# Patient Record
Sex: FEMALE | Race: WHITE | Employment: OTHER | URBAN - NONMETROPOLITAN AREA
[De-identification: names, ages, dates, MRNs, and addresses within clinical notes are randomized per-mention and may not be internally consistent; named-entity substitution may affect disease eponyms.]

---

## 2017-01-03 ENCOUNTER — OFFICE VISIT (OUTPATIENT)
Dept: FAMILY MEDICINE CLINIC | Age: 82
End: 2017-01-03

## 2017-01-03 ENCOUNTER — TELEPHONE (OUTPATIENT)
Dept: FAMILY MEDICINE CLINIC | Age: 82
End: 2017-01-03

## 2017-01-03 VITALS
HEART RATE: 64 BPM | SYSTOLIC BLOOD PRESSURE: 126 MMHG | TEMPERATURE: 97.6 F | WEIGHT: 142.8 LBS | RESPIRATION RATE: 14 BRPM | HEIGHT: 60 IN | DIASTOLIC BLOOD PRESSURE: 68 MMHG | BODY MASS INDEX: 28.03 KG/M2

## 2017-01-03 DIAGNOSIS — M25.562 ACUTE PAIN OF LEFT KNEE: Primary | ICD-10-CM

## 2017-01-03 DIAGNOSIS — M81.0 OSTEOPOROSIS: ICD-10-CM

## 2017-01-03 DIAGNOSIS — Z23 NEED FOR INFLUENZA VACCINATION: ICD-10-CM

## 2017-01-03 PROCEDURE — 90688 IIV4 VACCINE SPLT 0.5 ML IM: CPT | Performed by: FAMILY MEDICINE

## 2017-01-03 PROCEDURE — 99213 OFFICE O/P EST LOW 20 MIN: CPT | Performed by: FAMILY MEDICINE

## 2017-01-03 PROCEDURE — G0008 ADMIN INFLUENZA VIRUS VAC: HCPCS | Performed by: FAMILY MEDICINE

## 2017-01-03 RX ORDER — ZOLEDRONIC ACID 5 MG/100ML
5 INJECTION, SOLUTION INTRAVENOUS ONCE
Qty: 100 ML | Refills: 0 | Status: SHIPPED | OUTPATIENT
Start: 2017-01-03 | End: 2017-02-22

## 2017-01-31 ENCOUNTER — NURSE TRIAGE (OUTPATIENT)
Dept: ADMINISTRATIVE | Age: 82
End: 2017-01-31

## 2017-02-06 ENCOUNTER — OFFICE VISIT (OUTPATIENT)
Dept: OTOLARYNGOLOGY | Age: 82
End: 2017-02-06

## 2017-02-06 VITALS
RESPIRATION RATE: 16 BRPM | HEART RATE: 72 BPM | DIASTOLIC BLOOD PRESSURE: 60 MMHG | HEIGHT: 60 IN | TEMPERATURE: 97.9 F | BODY MASS INDEX: 27.61 KG/M2 | WEIGHT: 140.6 LBS | SYSTOLIC BLOOD PRESSURE: 126 MMHG

## 2017-02-06 DIAGNOSIS — Z96.22 S/P TYMPANIC TUBE INSERTION: ICD-10-CM

## 2017-02-06 DIAGNOSIS — J01.00 ACUTE MAXILLARY SINUSITIS, RECURRENCE NOT SPECIFIED: Primary | ICD-10-CM

## 2017-02-06 LAB — GRAM STAIN RESULT: NORMAL

## 2017-02-06 PROCEDURE — 1036F TOBACCO NON-USER: CPT | Performed by: NURSE PRACTITIONER

## 2017-02-06 PROCEDURE — 99213 OFFICE O/P EST LOW 20 MIN: CPT | Performed by: NURSE PRACTITIONER

## 2017-02-06 PROCEDURE — G8420 CALC BMI NORM PARAMETERS: HCPCS | Performed by: NURSE PRACTITIONER

## 2017-02-06 PROCEDURE — G8484 FLU IMMUNIZE NO ADMIN: HCPCS | Performed by: NURSE PRACTITIONER

## 2017-02-06 PROCEDURE — G8427 DOCREV CUR MEDS BY ELIG CLIN: HCPCS | Performed by: NURSE PRACTITIONER

## 2017-02-06 PROCEDURE — 1123F ACP DISCUSS/DSCN MKR DOCD: CPT | Performed by: NURSE PRACTITIONER

## 2017-02-06 PROCEDURE — 1090F PRES/ABSN URINE INCON ASSESS: CPT | Performed by: NURSE PRACTITIONER

## 2017-02-06 PROCEDURE — 4040F PNEUMOC VAC/ADMIN/RCVD: CPT | Performed by: NURSE PRACTITIONER

## 2017-02-06 ASSESSMENT — ENCOUNTER SYMPTOMS
COLOR CHANGE: 0
CHOKING: 0
PHOTOPHOBIA: 0
CHEST TIGHTNESS: 0
COUGH: 0
VOMITING: 0
EYE DISCHARGE: 0
CONSTIPATION: 0
STRIDOR: 0
EYE PAIN: 0
BACK PAIN: 0
RECTAL PAIN: 0
RHINORRHEA: 1
NAUSEA: 0
EYE REDNESS: 1
EYE ITCHING: 1
DIARRHEA: 0
ANAL BLEEDING: 0
TROUBLE SWALLOWING: 0
VOICE CHANGE: 0
SINUS PRESSURE: 1
ABDOMINAL PAIN: 0
APNEA: 0
FACIAL SWELLING: 0
WHEEZING: 0
BLOOD IN STOOL: 0
ABDOMINAL DISTENTION: 0
SORE THROAT: 0
SHORTNESS OF BREATH: 0

## 2017-02-08 LAB
AEROBIC CULTURE: ABNORMAL
ORGANISM: ABNORMAL

## 2017-02-09 ENCOUNTER — TELEPHONE (OUTPATIENT)
Dept: OTOLARYNGOLOGY | Age: 82
End: 2017-02-09

## 2017-02-09 RX ORDER — CEFDINIR 300 MG/1
300 CAPSULE ORAL 2 TIMES DAILY
Qty: 20 CAPSULE | Refills: 0 | Status: SHIPPED | OUTPATIENT
Start: 2017-02-09 | End: 2017-02-19

## 2017-02-22 ENCOUNTER — OFFICE VISIT (OUTPATIENT)
Dept: INTERNAL MEDICINE | Age: 82
End: 2017-02-22

## 2017-02-22 VITALS
DIASTOLIC BLOOD PRESSURE: 70 MMHG | HEIGHT: 60 IN | HEART RATE: 56 BPM | WEIGHT: 141 LBS | SYSTOLIC BLOOD PRESSURE: 146 MMHG | BODY MASS INDEX: 27.68 KG/M2

## 2017-02-22 DIAGNOSIS — I10 ESSENTIAL HYPERTENSION: Primary | ICD-10-CM

## 2017-02-22 DIAGNOSIS — E03.9 HYPOTHYROIDISM, UNSPECIFIED TYPE: ICD-10-CM

## 2017-02-22 DIAGNOSIS — E78.00 PURE HYPERCHOLESTEROLEMIA: ICD-10-CM

## 2017-02-22 PROCEDURE — 99214 OFFICE O/P EST MOD 30 MIN: CPT | Performed by: INTERNAL MEDICINE

## 2017-02-22 PROCEDURE — 4040F PNEUMOC VAC/ADMIN/RCVD: CPT | Performed by: INTERNAL MEDICINE

## 2017-02-22 PROCEDURE — G8420 CALC BMI NORM PARAMETERS: HCPCS | Performed by: INTERNAL MEDICINE

## 2017-02-22 PROCEDURE — G8427 DOCREV CUR MEDS BY ELIG CLIN: HCPCS | Performed by: INTERNAL MEDICINE

## 2017-02-22 PROCEDURE — G8484 FLU IMMUNIZE NO ADMIN: HCPCS | Performed by: INTERNAL MEDICINE

## 2017-02-22 PROCEDURE — 1036F TOBACCO NON-USER: CPT | Performed by: INTERNAL MEDICINE

## 2017-02-22 PROCEDURE — 1090F PRES/ABSN URINE INCON ASSESS: CPT | Performed by: INTERNAL MEDICINE

## 2017-02-22 PROCEDURE — 93000 ELECTROCARDIOGRAM COMPLETE: CPT | Performed by: INTERNAL MEDICINE

## 2017-02-22 PROCEDURE — 1123F ACP DISCUSS/DSCN MKR DOCD: CPT | Performed by: INTERNAL MEDICINE

## 2017-02-22 RX ORDER — MULTIVIT-MIN/IRON/FOLIC ACID/K 18-600-40
CAPSULE ORAL DAILY
COMMUNITY
End: 2018-06-13 | Stop reason: ALTCHOICE

## 2017-02-23 ENCOUNTER — OFFICE VISIT (OUTPATIENT)
Dept: FAMILY MEDICINE CLINIC | Age: 82
End: 2017-02-23

## 2017-02-23 ENCOUNTER — TELEPHONE (OUTPATIENT)
Dept: FAMILY MEDICINE CLINIC | Age: 82
End: 2017-02-23

## 2017-02-23 VITALS
OXYGEN SATURATION: 94 % | SYSTOLIC BLOOD PRESSURE: 138 MMHG | HEART RATE: 68 BPM | TEMPERATURE: 101.5 F | DIASTOLIC BLOOD PRESSURE: 54 MMHG | BODY MASS INDEX: 27.68 KG/M2 | RESPIRATION RATE: 12 BRPM | WEIGHT: 141 LBS | HEIGHT: 60 IN

## 2017-02-23 DIAGNOSIS — R05.9 COUGH: Primary | ICD-10-CM

## 2017-02-23 DIAGNOSIS — R50.81 FEVER IN OTHER DISEASES: ICD-10-CM

## 2017-02-23 LAB
INFLUENZA A ANTIBODY: NEGATIVE
INFLUENZA B ANTIBODY: NEGATIVE

## 2017-02-23 PROCEDURE — 87804 INFLUENZA ASSAY W/OPTIC: CPT | Performed by: FAMILY MEDICINE

## 2017-02-23 PROCEDURE — G8427 DOCREV CUR MEDS BY ELIG CLIN: HCPCS | Performed by: FAMILY MEDICINE

## 2017-02-23 PROCEDURE — G8420 CALC BMI NORM PARAMETERS: HCPCS | Performed by: FAMILY MEDICINE

## 2017-02-23 PROCEDURE — G8484 FLU IMMUNIZE NO ADMIN: HCPCS | Performed by: FAMILY MEDICINE

## 2017-02-23 PROCEDURE — 4040F PNEUMOC VAC/ADMIN/RCVD: CPT | Performed by: FAMILY MEDICINE

## 2017-02-23 PROCEDURE — 1036F TOBACCO NON-USER: CPT | Performed by: FAMILY MEDICINE

## 2017-02-23 PROCEDURE — 1090F PRES/ABSN URINE INCON ASSESS: CPT | Performed by: FAMILY MEDICINE

## 2017-02-23 PROCEDURE — 99213 OFFICE O/P EST LOW 20 MIN: CPT | Performed by: FAMILY MEDICINE

## 2017-02-23 PROCEDURE — 1123F ACP DISCUSS/DSCN MKR DOCD: CPT | Performed by: FAMILY MEDICINE

## 2017-02-23 RX ORDER — AZITHROMYCIN 250 MG/1
TABLET, FILM COATED ORAL
Qty: 1 PACKET | Refills: 0 | Status: SHIPPED | OUTPATIENT
Start: 2017-02-23 | End: 2017-03-02

## 2017-02-24 ENCOUNTER — NURSE TRIAGE (OUTPATIENT)
Dept: ADMINISTRATIVE | Age: 82
End: 2017-02-24

## 2017-03-02 ENCOUNTER — OFFICE VISIT (OUTPATIENT)
Dept: FAMILY MEDICINE CLINIC | Age: 82
End: 2017-03-02

## 2017-03-02 VITALS
DIASTOLIC BLOOD PRESSURE: 68 MMHG | SYSTOLIC BLOOD PRESSURE: 142 MMHG | OXYGEN SATURATION: 98 % | RESPIRATION RATE: 12 BRPM | WEIGHT: 138 LBS | HEART RATE: 66 BPM | HEIGHT: 60 IN | BODY MASS INDEX: 27.09 KG/M2 | TEMPERATURE: 98 F

## 2017-03-02 DIAGNOSIS — J01.90 ACUTE RHINOSINUSITIS: Primary | ICD-10-CM

## 2017-03-02 PROCEDURE — 4040F PNEUMOC VAC/ADMIN/RCVD: CPT | Performed by: FAMILY MEDICINE

## 2017-03-02 PROCEDURE — 1090F PRES/ABSN URINE INCON ASSESS: CPT | Performed by: FAMILY MEDICINE

## 2017-03-02 PROCEDURE — G8420 CALC BMI NORM PARAMETERS: HCPCS | Performed by: FAMILY MEDICINE

## 2017-03-02 PROCEDURE — G8428 CUR MEDS NOT DOCUMENT: HCPCS | Performed by: FAMILY MEDICINE

## 2017-03-02 PROCEDURE — 1123F ACP DISCUSS/DSCN MKR DOCD: CPT | Performed by: FAMILY MEDICINE

## 2017-03-02 PROCEDURE — 1036F TOBACCO NON-USER: CPT | Performed by: FAMILY MEDICINE

## 2017-03-02 PROCEDURE — 99213 OFFICE O/P EST LOW 20 MIN: CPT | Performed by: FAMILY MEDICINE

## 2017-03-02 PROCEDURE — G8484 FLU IMMUNIZE NO ADMIN: HCPCS | Performed by: FAMILY MEDICINE

## 2017-03-02 RX ORDER — AMOXICILLIN AND CLAVULANATE POTASSIUM 875; 125 MG/1; MG/1
1 TABLET, FILM COATED ORAL 2 TIMES DAILY
Qty: 20 TABLET | Refills: 0 | Status: SHIPPED | OUTPATIENT
Start: 2017-03-02 | End: 2019-11-07 | Stop reason: SDUPTHER

## 2017-03-17 DIAGNOSIS — I10 ESSENTIAL HYPERTENSION: ICD-10-CM

## 2017-03-17 RX ORDER — SPIRONOLACTONE 50 MG/1
TABLET, FILM COATED ORAL
Qty: 30 TABLET | Refills: 11 | Status: SHIPPED | OUTPATIENT
Start: 2017-03-17 | End: 2018-04-05 | Stop reason: SDUPTHER

## 2017-04-05 ENCOUNTER — OFFICE VISIT (OUTPATIENT)
Dept: OTOLARYNGOLOGY | Age: 82
End: 2017-04-05

## 2017-04-05 VITALS
TEMPERATURE: 97.7 F | HEIGHT: 60 IN | BODY MASS INDEX: 27.68 KG/M2 | SYSTOLIC BLOOD PRESSURE: 128 MMHG | HEART RATE: 72 BPM | WEIGHT: 141 LBS | DIASTOLIC BLOOD PRESSURE: 68 MMHG | RESPIRATION RATE: 14 BRPM

## 2017-04-05 DIAGNOSIS — Z97.4 WEARS HEARING AID: ICD-10-CM

## 2017-04-05 DIAGNOSIS — Z96.22 S/P TYMPANIC TUBE INSERTION: Primary | ICD-10-CM

## 2017-04-05 PROCEDURE — 4040F PNEUMOC VAC/ADMIN/RCVD: CPT | Performed by: NURSE PRACTITIONER

## 2017-04-05 PROCEDURE — 1090F PRES/ABSN URINE INCON ASSESS: CPT | Performed by: NURSE PRACTITIONER

## 2017-04-05 PROCEDURE — 1123F ACP DISCUSS/DSCN MKR DOCD: CPT | Performed by: NURSE PRACTITIONER

## 2017-04-05 PROCEDURE — 99212 OFFICE O/P EST SF 10 MIN: CPT | Performed by: NURSE PRACTITIONER

## 2017-04-05 PROCEDURE — G8427 DOCREV CUR MEDS BY ELIG CLIN: HCPCS | Performed by: NURSE PRACTITIONER

## 2017-04-05 PROCEDURE — 1036F TOBACCO NON-USER: CPT | Performed by: NURSE PRACTITIONER

## 2017-04-05 PROCEDURE — G8420 CALC BMI NORM PARAMETERS: HCPCS | Performed by: NURSE PRACTITIONER

## 2017-04-05 RX ORDER — AMLODIPINE BESYLATE 2.5 MG/1
2.5 TABLET ORAL DAILY
COMMUNITY
End: 2017-07-10 | Stop reason: SDUPTHER

## 2017-04-05 ASSESSMENT — ENCOUNTER SYMPTOMS
NAUSEA: 0
COUGH: 0
CHEST TIGHTNESS: 0
SHORTNESS OF BREATH: 0
SORE THROAT: 0
RECTAL PAIN: 0
EYE ITCHING: 0
DIARRHEA: 0
COLOR CHANGE: 0
FACIAL SWELLING: 0
CHOKING: 0
EYE REDNESS: 0
APNEA: 0
EYE PAIN: 0
STRIDOR: 0
VOICE CHANGE: 0
EYE DISCHARGE: 0
BACK PAIN: 0
VOMITING: 0
WHEEZING: 0
PHOTOPHOBIA: 0
ABDOMINAL PAIN: 0
ANAL BLEEDING: 0
SINUS PRESSURE: 0
BLOOD IN STOOL: 0
TROUBLE SWALLOWING: 0
RHINORRHEA: 0
ABDOMINAL DISTENTION: 0
CONSTIPATION: 0

## 2017-04-10 ENCOUNTER — TELEPHONE (OUTPATIENT)
Dept: FAMILY MEDICINE CLINIC | Age: 82
End: 2017-04-10

## 2017-04-10 DIAGNOSIS — M81.0 OSTEOPOROSIS: Primary | ICD-10-CM

## 2017-04-10 RX ORDER — ZOLEDRONIC ACID 5 MG/100ML
5 INJECTION, SOLUTION INTRAVENOUS ONCE
Qty: 100 ML | Refills: 0 | Status: SHIPPED | OUTPATIENT
Start: 2017-04-10 | End: 2018-02-09 | Stop reason: ALTCHOICE

## 2017-04-11 ENCOUNTER — TELEPHONE (OUTPATIENT)
Dept: FAMILY MEDICINE CLINIC | Age: 82
End: 2017-04-11

## 2017-06-05 RX ORDER — FLUTICASONE PROPIONATE 50 MCG
SPRAY, SUSPENSION (ML) NASAL
Qty: 1 BOTTLE | Refills: 3 | Status: SHIPPED | OUTPATIENT
Start: 2017-06-05 | End: 2018-01-09 | Stop reason: SDUPTHER

## 2017-06-13 ENCOUNTER — OFFICE VISIT (OUTPATIENT)
Dept: FAMILY MEDICINE CLINIC | Age: 82
End: 2017-06-13

## 2017-06-13 ENCOUNTER — TELEPHONE (OUTPATIENT)
Dept: FAMILY MEDICINE CLINIC | Age: 82
End: 2017-06-13

## 2017-06-13 VITALS
SYSTOLIC BLOOD PRESSURE: 104 MMHG | WEIGHT: 140 LBS | RESPIRATION RATE: 12 BRPM | HEIGHT: 60 IN | TEMPERATURE: 98.7 F | DIASTOLIC BLOOD PRESSURE: 58 MMHG | HEART RATE: 60 BPM | BODY MASS INDEX: 27.48 KG/M2

## 2017-06-13 DIAGNOSIS — I10 ESSENTIAL HYPERTENSION: ICD-10-CM

## 2017-06-13 DIAGNOSIS — R10.9 LEFT FLANK PAIN: Primary | ICD-10-CM

## 2017-06-13 DIAGNOSIS — Z91.81 AT HIGH RISK FOR FALLS: ICD-10-CM

## 2017-06-13 LAB
BILIRUBIN, POC: NEGATIVE
BLOOD URINE, POC: ABNORMAL
CLARITY, POC: CLEAR
COLOR, POC: YELLOW
GLUCOSE URINE, POC: NEGATIVE
KETONES, POC: NEGATIVE
LEUKOCYTE EST, POC: ABNORMAL
NITRITE, POC: NEGATIVE
PH, POC: 7
PROTEIN, POC: NEGATIVE
SPECIFIC GRAVITY, POC: 1.01
UROBILINOGEN, POC: ABNORMAL

## 2017-06-13 PROCEDURE — 81002 URINALYSIS NONAUTO W/O SCOPE: CPT | Performed by: FAMILY MEDICINE

## 2017-06-13 PROCEDURE — G8427 DOCREV CUR MEDS BY ELIG CLIN: HCPCS | Performed by: FAMILY MEDICINE

## 2017-06-13 PROCEDURE — G8419 CALC BMI OUT NRM PARAM NOF/U: HCPCS | Performed by: FAMILY MEDICINE

## 2017-06-13 PROCEDURE — 1123F ACP DISCUSS/DSCN MKR DOCD: CPT | Performed by: FAMILY MEDICINE

## 2017-06-13 PROCEDURE — 1036F TOBACCO NON-USER: CPT | Performed by: FAMILY MEDICINE

## 2017-06-13 PROCEDURE — 1090F PRES/ABSN URINE INCON ASSESS: CPT | Performed by: FAMILY MEDICINE

## 2017-06-13 PROCEDURE — 99213 OFFICE O/P EST LOW 20 MIN: CPT | Performed by: FAMILY MEDICINE

## 2017-06-13 PROCEDURE — 4040F PNEUMOC VAC/ADMIN/RCVD: CPT | Performed by: FAMILY MEDICINE

## 2017-06-13 ASSESSMENT — PATIENT HEALTH QUESTIONNAIRE - PHQ9
SUM OF ALL RESPONSES TO PHQ9 QUESTIONS 1 & 2: 0
1. LITTLE INTEREST OR PLEASURE IN DOING THINGS: 0
2. FEELING DOWN, DEPRESSED OR HOPELESS: 0
SUM OF ALL RESPONSES TO PHQ QUESTIONS 1-9: 0

## 2017-06-15 ENCOUNTER — OFFICE VISIT (OUTPATIENT)
Dept: FAMILY MEDICINE CLINIC | Age: 82
End: 2017-06-15

## 2017-06-15 VITALS
TEMPERATURE: 98 F | BODY MASS INDEX: 28.03 KG/M2 | WEIGHT: 142.8 LBS | HEART RATE: 69 BPM | SYSTOLIC BLOOD PRESSURE: 120 MMHG | RESPIRATION RATE: 16 BRPM | HEIGHT: 60 IN | DIASTOLIC BLOOD PRESSURE: 68 MMHG

## 2017-06-15 DIAGNOSIS — M54.6 ACUTE RIGHT-SIDED THORACIC BACK PAIN: Primary | ICD-10-CM

## 2017-06-15 PROCEDURE — 1036F TOBACCO NON-USER: CPT | Performed by: FAMILY MEDICINE

## 2017-06-15 PROCEDURE — 4040F PNEUMOC VAC/ADMIN/RCVD: CPT | Performed by: FAMILY MEDICINE

## 2017-06-15 PROCEDURE — 99213 OFFICE O/P EST LOW 20 MIN: CPT | Performed by: FAMILY MEDICINE

## 2017-06-15 PROCEDURE — 1123F ACP DISCUSS/DSCN MKR DOCD: CPT | Performed by: FAMILY MEDICINE

## 2017-06-15 PROCEDURE — 1090F PRES/ABSN URINE INCON ASSESS: CPT | Performed by: FAMILY MEDICINE

## 2017-06-15 PROCEDURE — G8427 DOCREV CUR MEDS BY ELIG CLIN: HCPCS | Performed by: FAMILY MEDICINE

## 2017-06-15 PROCEDURE — 96372 THER/PROPH/DIAG INJ SC/IM: CPT | Performed by: FAMILY MEDICINE

## 2017-06-15 PROCEDURE — G8419 CALC BMI OUT NRM PARAM NOF/U: HCPCS | Performed by: FAMILY MEDICINE

## 2017-06-15 RX ORDER — METHYLPREDNISOLONE ACETATE 40 MG/ML
20 INJECTION, SUSPENSION INTRA-ARTICULAR; INTRALESIONAL; INTRAMUSCULAR; SOFT TISSUE ONCE
Status: COMPLETED | OUTPATIENT
Start: 2017-06-15 | End: 2017-06-15

## 2017-06-15 RX ADMIN — METHYLPREDNISOLONE ACETATE 20 MG: 40 INJECTION, SUSPENSION INTRA-ARTICULAR; INTRALESIONAL; INTRAMUSCULAR; SOFT TISSUE at 15:33

## 2017-06-26 ENCOUNTER — OFFICE VISIT (OUTPATIENT)
Dept: FAMILY MEDICINE CLINIC | Age: 82
End: 2017-06-26

## 2017-06-26 VITALS
BODY MASS INDEX: 28.43 KG/M2 | SYSTOLIC BLOOD PRESSURE: 132 MMHG | RESPIRATION RATE: 12 BRPM | HEART RATE: 68 BPM | HEIGHT: 59 IN | WEIGHT: 141 LBS | TEMPERATURE: 98 F | DIASTOLIC BLOOD PRESSURE: 68 MMHG

## 2017-06-26 DIAGNOSIS — N30.01 ACUTE CYSTITIS WITH HEMATURIA: Primary | ICD-10-CM

## 2017-06-26 LAB
BILIRUBIN, POC: NEGATIVE
BLOOD URINE, POC: ABNORMAL
CLARITY, POC: CLEAR
COLOR, POC: YELLOW
GLUCOSE URINE, POC: NEGATIVE
KETONES, POC: NEGATIVE
LEUKOCYTE EST, POC: ABNORMAL
NITRITE, POC: POSITIVE
PH, POC: 6.5
PROTEIN, POC: NEGATIVE
SPECIFIC GRAVITY, POC: 1.01
UROBILINOGEN, POC: ABNORMAL

## 2017-06-26 PROCEDURE — 1036F TOBACCO NON-USER: CPT | Performed by: FAMILY MEDICINE

## 2017-06-26 PROCEDURE — 4040F PNEUMOC VAC/ADMIN/RCVD: CPT | Performed by: FAMILY MEDICINE

## 2017-06-26 PROCEDURE — 99213 OFFICE O/P EST LOW 20 MIN: CPT | Performed by: FAMILY MEDICINE

## 2017-06-26 PROCEDURE — 81003 URINALYSIS AUTO W/O SCOPE: CPT | Performed by: FAMILY MEDICINE

## 2017-06-26 PROCEDURE — 1090F PRES/ABSN URINE INCON ASSESS: CPT | Performed by: FAMILY MEDICINE

## 2017-06-26 PROCEDURE — G8419 CALC BMI OUT NRM PARAM NOF/U: HCPCS | Performed by: FAMILY MEDICINE

## 2017-06-26 PROCEDURE — 1123F ACP DISCUSS/DSCN MKR DOCD: CPT | Performed by: FAMILY MEDICINE

## 2017-06-26 PROCEDURE — G8427 DOCREV CUR MEDS BY ELIG CLIN: HCPCS | Performed by: FAMILY MEDICINE

## 2017-06-26 RX ORDER — CIPROFLOXACIN 500 MG/1
500 TABLET, FILM COATED ORAL 2 TIMES DAILY
Qty: 6 TABLET | Refills: 0 | Status: SHIPPED | OUTPATIENT
Start: 2017-06-26 | End: 2017-06-29

## 2017-06-28 ENCOUNTER — TELEPHONE (OUTPATIENT)
Dept: FAMILY MEDICINE CLINIC | Age: 82
End: 2017-06-28

## 2017-06-28 DIAGNOSIS — N30.00 ACUTE CYSTITIS WITHOUT HEMATURIA: Primary | ICD-10-CM

## 2017-06-28 LAB
ORGANISM: ABNORMAL
URINE CULTURE, ROUTINE: ABNORMAL

## 2017-06-28 RX ORDER — NITROFURANTOIN 25; 75 MG/1; MG/1
100 CAPSULE ORAL 2 TIMES DAILY
Qty: 10 CAPSULE | Refills: 0 | Status: SHIPPED | OUTPATIENT
Start: 2017-06-28 | End: 2017-07-03

## 2017-07-10 RX ORDER — AMLODIPINE BESYLATE 2.5 MG/1
2.5 TABLET ORAL DAILY
Qty: 30 TABLET | Refills: 11 | Status: SHIPPED | OUTPATIENT
Start: 2017-07-10 | End: 2018-03-23 | Stop reason: SDUPTHER

## 2017-07-21 ENCOUNTER — HOSPITAL ENCOUNTER (EMERGENCY)
Age: 82
Discharge: HOME | End: 2017-07-21
Payer: MEDICARE

## 2017-07-21 VITALS
RESPIRATION RATE: 16 BRPM | BODY MASS INDEX: 27.27 KG/M2 | OXYGEN SATURATION: 97 % | SYSTOLIC BLOOD PRESSURE: 167 MMHG | HEART RATE: 76 BPM | WEIGHT: 135 LBS | TEMPERATURE: 97.4 F | DIASTOLIC BLOOD PRESSURE: 72 MMHG

## 2017-07-21 DIAGNOSIS — L03.116 CELLULITIS OF LEFT LOWER EXTREMITY: Primary | ICD-10-CM

## 2017-07-21 PROCEDURE — 87186 SC STD MICRODIL/AGAR DIL: CPT

## 2017-07-21 PROCEDURE — 99213 OFFICE O/P EST LOW 20 MIN: CPT | Performed by: NURSE PRACTITIONER

## 2017-07-21 PROCEDURE — 87077 CULTURE AEROBIC IDENTIFY: CPT

## 2017-07-21 PROCEDURE — 99213 OFFICE O/P EST LOW 20 MIN: CPT

## 2017-07-21 PROCEDURE — 87147 CULTURE TYPE IMMUNOLOGIC: CPT

## 2017-07-21 PROCEDURE — 87205 SMEAR GRAM STAIN: CPT

## 2017-07-21 PROCEDURE — 87184 SC STD DISK METHOD PER PLATE: CPT

## 2017-07-21 PROCEDURE — 87070 CULTURE OTHR SPECIMN AEROBIC: CPT

## 2017-07-21 RX ORDER — AMOXICILLIN AND CLAVULANATE POTASSIUM 875; 125 MG/1; MG/1
1 TABLET, FILM COATED ORAL 2 TIMES DAILY
Qty: 20 TABLET | Refills: 0 | Status: SHIPPED | OUTPATIENT
Start: 2017-07-21 | End: 2017-07-31

## 2017-07-21 RX ORDER — CHOLECALCIFEROL (VITAMIN D3) 125 MCG
1 CAPSULE ORAL DAILY
Qty: 30 CAPSULE | Refills: 0 | Status: SHIPPED | OUTPATIENT
Start: 2017-07-21 | End: 2017-08-20

## 2017-07-21 ASSESSMENT — PAIN DESCRIPTION - DESCRIPTORS: DESCRIPTORS: DISCOMFORT

## 2017-07-21 ASSESSMENT — ENCOUNTER SYMPTOMS
COLOR CHANGE: 1
NAUSEA: 0
CHEST TIGHTNESS: 0
VOMITING: 0
SHORTNESS OF BREATH: 0

## 2017-07-21 ASSESSMENT — PAIN DESCRIPTION - PAIN TYPE: TYPE: ACUTE PAIN

## 2017-07-21 ASSESSMENT — PAIN SCALES - GENERAL: PAINLEVEL_OUTOF10: 4

## 2017-07-24 ENCOUNTER — CARE COORDINATION (OUTPATIENT)
Dept: FAMILY MEDICINE CLINIC | Age: 82
End: 2017-07-24

## 2017-07-24 LAB
AEROBIC CULTURE: ABNORMAL
AEROBIC CULTURE: ABNORMAL
GRAM STAIN RESULT: ABNORMAL
ORGANISM: ABNORMAL

## 2017-08-25 ENCOUNTER — OFFICE VISIT (OUTPATIENT)
Dept: FAMILY MEDICINE CLINIC | Age: 82
End: 2017-08-25
Payer: MEDICARE

## 2017-08-25 VITALS
HEART RATE: 64 BPM | RESPIRATION RATE: 10 BRPM | SYSTOLIC BLOOD PRESSURE: 132 MMHG | WEIGHT: 138 LBS | HEIGHT: 60 IN | DIASTOLIC BLOOD PRESSURE: 60 MMHG | TEMPERATURE: 97.8 F | BODY MASS INDEX: 27.09 KG/M2

## 2017-08-25 DIAGNOSIS — R10.32 LLQ PAIN: Primary | ICD-10-CM

## 2017-08-25 PROCEDURE — 4040F PNEUMOC VAC/ADMIN/RCVD: CPT | Performed by: FAMILY MEDICINE

## 2017-08-25 PROCEDURE — G8419 CALC BMI OUT NRM PARAM NOF/U: HCPCS | Performed by: FAMILY MEDICINE

## 2017-08-25 PROCEDURE — 1123F ACP DISCUSS/DSCN MKR DOCD: CPT | Performed by: FAMILY MEDICINE

## 2017-08-25 PROCEDURE — 1090F PRES/ABSN URINE INCON ASSESS: CPT | Performed by: FAMILY MEDICINE

## 2017-08-25 PROCEDURE — 99213 OFFICE O/P EST LOW 20 MIN: CPT | Performed by: FAMILY MEDICINE

## 2017-08-25 PROCEDURE — G8427 DOCREV CUR MEDS BY ELIG CLIN: HCPCS | Performed by: FAMILY MEDICINE

## 2017-08-25 PROCEDURE — 1036F TOBACCO NON-USER: CPT | Performed by: FAMILY MEDICINE

## 2017-09-06 ENCOUNTER — TELEPHONE (OUTPATIENT)
Dept: FAMILY MEDICINE CLINIC | Age: 82
End: 2017-09-06

## 2017-09-08 ENCOUNTER — OFFICE VISIT (OUTPATIENT)
Dept: INTERNAL MEDICINE CLINIC | Age: 82
End: 2017-09-08
Payer: MEDICARE

## 2017-09-08 VITALS
WEIGHT: 140 LBS | BODY MASS INDEX: 28.22 KG/M2 | SYSTOLIC BLOOD PRESSURE: 140 MMHG | DIASTOLIC BLOOD PRESSURE: 68 MMHG | HEIGHT: 59 IN | HEART RATE: 56 BPM

## 2017-09-08 DIAGNOSIS — I10 ESSENTIAL HYPERTENSION: Primary | ICD-10-CM

## 2017-09-08 DIAGNOSIS — E78.00 PURE HYPERCHOLESTEROLEMIA: ICD-10-CM

## 2017-09-08 DIAGNOSIS — R73.02 IGT (IMPAIRED GLUCOSE TOLERANCE): ICD-10-CM

## 2017-09-08 DIAGNOSIS — E03.9 HYPOTHYROIDISM, UNSPECIFIED TYPE: ICD-10-CM

## 2017-09-08 PROCEDURE — G8417 CALC BMI ABV UP PARAM F/U: HCPCS | Performed by: INTERNAL MEDICINE

## 2017-09-08 PROCEDURE — 1036F TOBACCO NON-USER: CPT | Performed by: INTERNAL MEDICINE

## 2017-09-08 PROCEDURE — 99214 OFFICE O/P EST MOD 30 MIN: CPT | Performed by: INTERNAL MEDICINE

## 2017-09-08 PROCEDURE — 1123F ACP DISCUSS/DSCN MKR DOCD: CPT | Performed by: INTERNAL MEDICINE

## 2017-09-08 PROCEDURE — 4040F PNEUMOC VAC/ADMIN/RCVD: CPT | Performed by: INTERNAL MEDICINE

## 2017-09-08 PROCEDURE — 1090F PRES/ABSN URINE INCON ASSESS: CPT | Performed by: INTERNAL MEDICINE

## 2017-09-08 PROCEDURE — G8427 DOCREV CUR MEDS BY ELIG CLIN: HCPCS | Performed by: INTERNAL MEDICINE

## 2017-09-08 PROCEDURE — 93000 ELECTROCARDIOGRAM COMPLETE: CPT | Performed by: INTERNAL MEDICINE

## 2017-09-11 ENCOUNTER — HOSPITAL ENCOUNTER (OUTPATIENT)
Age: 82
Discharge: HOME | End: 2017-09-11
Payer: MEDICARE

## 2017-09-11 DIAGNOSIS — E03.9 HYPOTHYROIDISM, UNSPECIFIED TYPE: ICD-10-CM

## 2017-09-11 LAB — TSH SERPL DL<=0.05 MIU/L-ACNC: 3.54 UIU/ML (ref 0.4–4.2)

## 2017-09-11 PROCEDURE — 36415 COLL VENOUS BLD VENIPUNCTURE: CPT

## 2017-09-11 PROCEDURE — 84443 ASSAY THYROID STIM HORMONE: CPT

## 2017-09-26 RX ORDER — LEVOTHYROXINE AND LIOTHYRONINE 38; 9 UG/1; UG/1
30 TABLET ORAL DAILY
Qty: 45 TABLET | Refills: 3 | Status: SHIPPED | OUTPATIENT
Start: 2017-09-26 | End: 2018-10-09 | Stop reason: SDUPTHER

## 2017-10-09 ENCOUNTER — OFFICE VISIT (OUTPATIENT)
Dept: ENT CLINIC | Age: 82
End: 2017-10-09
Payer: MEDICARE

## 2017-10-09 VITALS
TEMPERATURE: 98.1 F | RESPIRATION RATE: 16 BRPM | WEIGHT: 139.5 LBS | HEART RATE: 72 BPM | BODY MASS INDEX: 27.75 KG/M2 | SYSTOLIC BLOOD PRESSURE: 118 MMHG | DIASTOLIC BLOOD PRESSURE: 60 MMHG

## 2017-10-09 DIAGNOSIS — Z96.22 S/P TYMPANIC TUBE INSERTION: Primary | ICD-10-CM

## 2017-10-09 PROCEDURE — G8417 CALC BMI ABV UP PARAM F/U: HCPCS | Performed by: NURSE PRACTITIONER

## 2017-10-09 PROCEDURE — G8427 DOCREV CUR MEDS BY ELIG CLIN: HCPCS | Performed by: NURSE PRACTITIONER

## 2017-10-09 PROCEDURE — 99213 OFFICE O/P EST LOW 20 MIN: CPT | Performed by: NURSE PRACTITIONER

## 2017-10-09 PROCEDURE — 4040F PNEUMOC VAC/ADMIN/RCVD: CPT | Performed by: NURSE PRACTITIONER

## 2017-10-09 PROCEDURE — 1090F PRES/ABSN URINE INCON ASSESS: CPT | Performed by: NURSE PRACTITIONER

## 2017-10-09 PROCEDURE — 1123F ACP DISCUSS/DSCN MKR DOCD: CPT | Performed by: NURSE PRACTITIONER

## 2017-10-09 PROCEDURE — G8484 FLU IMMUNIZE NO ADMIN: HCPCS | Performed by: NURSE PRACTITIONER

## 2017-10-09 PROCEDURE — 1036F TOBACCO NON-USER: CPT | Performed by: NURSE PRACTITIONER

## 2017-10-09 ASSESSMENT — ENCOUNTER SYMPTOMS
RESPIRATORY NEGATIVE: 1
GASTROINTESTINAL NEGATIVE: 1
EYE DISCHARGE: 0
RHINORRHEA: 0

## 2017-10-09 NOTE — PROGRESS NOTES
[address] [facility]AL [facility]  [address] [facility]  Julien Portillo 83  Dept: [phone]  Dept Fax: [phone]  Loc: [phone]    Noris Franklin is a 80 y.o. female who was referred by No ref. provider found for:  Chief Complaint   Patient presents with    6 Month Follow-Up     tube check   . HPI:       Patient is here for 6 month follow up tube check. She is s/p right T-tube 7/1/15 with Dr Consuelo Fernandez. Patient previously saw Dr Consuelo Fernandez and was treated for acute sinusitis on a few occasions. She reports year-round trouble with irritated eyes, clear rhinorrhea and some stuffiness, but her symptoms are under control at this time. Subjective:        Review of Systems   Constitutional: Negative. HENT: Negative. Negative for rhinorrhea. Eyes: Negative for discharge. Respiratory: Negative. Cardiovascular: Negative. Gastrointestinal: Negative. Endocrine: Negative. Genitourinary: Negative. Musculoskeletal: Negative. Skin: Negative. Allergic/Immunologic: Positive for environmental allergies. Neurological: Negative. Hematological: Negative. Psychiatric/Behavioral: Negative. Objective:       Physical Exam     This is a 80 y.o. female. Patient is alert and oriented to person, place and time. Mood is happy. No respiratory distress. No nasal voice, no hoarseness. Not obviously hearing impaired. Vitals:    10/09/17 0930   BP: 118/60   Pulse: 72   Resp: 16   Temp: 98.1 °F (36.7 °C)       Head is normocephalic, no obvious masses or lesions. YARED, EOM full. Conjunctivae pink, moist, no discharge. External ears are normal: no scars, lesions or masses.    R External auditory canal clear and free of any pathology  L External auditory canal clear and free of any pathology   Tympanic membranes:  R T tube in place-dry, patent                                            L intact, translucent    Nasal bones: intact  Septum:  deviated  Mucosa:

## 2017-10-17 RX ORDER — NADOLOL 40 MG/1
TABLET ORAL
Qty: 45 TABLET | Refills: 3 | Status: SHIPPED | OUTPATIENT
Start: 2017-10-17 | End: 2018-06-13

## 2017-12-13 ENCOUNTER — OFFICE VISIT (OUTPATIENT)
Dept: FAMILY MEDICINE CLINIC | Age: 82
End: 2017-12-13
Payer: MEDICARE

## 2017-12-13 VITALS
TEMPERATURE: 97.7 F | HEIGHT: 60 IN | WEIGHT: 138 LBS | BODY MASS INDEX: 27.09 KG/M2 | HEART RATE: 62 BPM | RESPIRATION RATE: 14 BRPM | DIASTOLIC BLOOD PRESSURE: 54 MMHG | SYSTOLIC BLOOD PRESSURE: 127 MMHG

## 2017-12-13 DIAGNOSIS — R73.03 PREDIABETES: ICD-10-CM

## 2017-12-13 DIAGNOSIS — E03.9 HYPOTHYROIDISM, UNSPECIFIED TYPE: Primary | ICD-10-CM

## 2017-12-13 DIAGNOSIS — F51.01 PRIMARY INSOMNIA: ICD-10-CM

## 2017-12-13 DIAGNOSIS — Z23 NEED FOR IMMUNIZATION AGAINST INFLUENZA: ICD-10-CM

## 2017-12-13 DIAGNOSIS — I10 ESSENTIAL HYPERTENSION: ICD-10-CM

## 2017-12-13 PROCEDURE — 90686 IIV4 VACC NO PRSV 0.5 ML IM: CPT | Performed by: FAMILY MEDICINE

## 2017-12-13 PROCEDURE — 1090F PRES/ABSN URINE INCON ASSESS: CPT | Performed by: FAMILY MEDICINE

## 2017-12-13 PROCEDURE — G8428 CUR MEDS NOT DOCUMENT: HCPCS | Performed by: FAMILY MEDICINE

## 2017-12-13 PROCEDURE — 4040F PNEUMOC VAC/ADMIN/RCVD: CPT | Performed by: FAMILY MEDICINE

## 2017-12-13 PROCEDURE — G8484 FLU IMMUNIZE NO ADMIN: HCPCS | Performed by: FAMILY MEDICINE

## 2017-12-13 PROCEDURE — G0008 ADMIN INFLUENZA VIRUS VAC: HCPCS | Performed by: FAMILY MEDICINE

## 2017-12-13 PROCEDURE — 99214 OFFICE O/P EST MOD 30 MIN: CPT | Performed by: FAMILY MEDICINE

## 2017-12-13 PROCEDURE — 1123F ACP DISCUSS/DSCN MKR DOCD: CPT | Performed by: FAMILY MEDICINE

## 2017-12-13 PROCEDURE — G8417 CALC BMI ABV UP PARAM F/U: HCPCS | Performed by: FAMILY MEDICINE

## 2017-12-13 PROCEDURE — 1036F TOBACCO NON-USER: CPT | Performed by: FAMILY MEDICINE

## 2017-12-13 NOTE — PATIENT INSTRUCTIONS
You may receive a survey about your visit with us today. The feedback from our patients helps us identify what is working well and where the service to all patients can be enhanced. Thank you!       Janell or Mia

## 2017-12-13 NOTE — PROGRESS NOTES
Reta Del Valle is a 80 y.o. female that presents for 6 Month Follow-Up; Chills (cold all the time for past 3 months, wondering if she has thyroid problems); Insomnia (only getting 5 hrs sleep at night for past 2 months); Other (ridges in fingernails for awhile now ); and Flu Vaccine        HPI:    Notes some insomnia recently. Does not occur every night. States that she will wake in the middle of the night and be up for an hour and then fall back asleep. Hypothyroidism    HPI:  Currently treated for Hypothyroidism? Yes - [place]  Fatigue? Yes - a slight increase  Recent change in weight? No  Cold/Heat intolerance? Yes - states that she feels cold frequently  Diarrhea/Constipation? Yes, noting more frequent constipation   Diaphoresis? No  Anxiety? No  Palpitations? No   Hair Loss? No      HTN    Does patient check BP regularly at home? - Yes - has detailed readings with her today. All BPs are very well controlled. Highest BP in the past month was 120/65. Current Medication regimen - Norvasc, nadolol, aldactone  Tolerating medications well? - yes    Shortness of breath or chest pain? No  Headache or visual complaints? No  Neurologic changes like confusion? No  Extremity edema?  No    BP Readings from Last 3 Encounters:   12/13/17 (!) 127/54   10/09/17 118/60   09/08/17 (!) 140/68         Health Maintenance   Topic Date Due    DTaP/Tdap/Td vaccine (1 - Tdap) 02/21/1944    Pneumococcal low/med risk (2 of 2 - PPSV23) 03/17/2017    Zostavax vaccine  Completed    Flu vaccine  Completed       Past Medical History:   Diagnosis Date    Arthritis     Cancer ([address] 2013    SKIN CANCER ON LEFT ANKLE    Diabetes mellitus ([address] 1/2010    Diverticulosis     Hyperlipidemia     Hypertension     Hypothyroidism     Psoriasis     Thyroid disorder        Past Surgical History:   Procedure Laterality Date    BREAST BIOPSY Left     benign    BREAST SURGERY Left 6/1966    Lumpectomy    CARPAL TUNNEL RELEASE Right 1/30/2013    CARPAL TUNNEL RELEASE Left 7/25/13    CATARACT REMOVAL Bilateral 1999    COLON SURGERY  11/1999    resection    COLONOSCOPY  2003, 2006, 2011    HYSTERECTOMY  2/23/1970    HYSTERECTOMY, TOTAL ABDOMINAL      KNEE ARTHROSCOPY Right 11/21/2007    meniscectomies    KNEE SURGERY Left 2000    replacement    MYRINGOTOMY Right 07/01/2015    tube insertion    OVARY REMOVAL Bilateral 7/12/2001    oophorectomy    OVARY REMOVAL Bilateral     SHOULDER SURGERY  5/14    SINUS SURGERY         Social History   Substance Use Topics    Smoking status: Never Smoker    Smokeless tobacco: Never Used    Alcohol use No       Family History   Problem Relation Age of Onset    Cancer Child     Stroke Sister     Heart Disease Sister     Other Other      visual problems         I have reviewed the patient's past medical history, past surgical history, allergies, medications, social and family history and I have made updates where appropriate.     ROS        PHYSICAL EXAM:  BP (!) 127/54   Pulse 62   Temp 97.7 °F (36.5 °C) (Oral)   Resp 14   Ht 4' 11.5\" (1.511 m)   Wt 138 lb (62.6 kg)   BMI 27.41 kg/m²     General Appearance: well developed and well- nourished, in no acute distress  Head: normocephalic and atraumatic  ENT: external ear and ear canal normal bilaterally, nose without deformity, nasal mucosa and turbinates normal without polyps, oropharynx normal, dentition is normal for age, no lip or gum lesions noted  Neck: supple and non-tender without mass, no thyromegaly or thyroid nodules, no cervical lymphadenopathy  Pulmonary/Chest: clear to auscultation bilaterally- no wheezes, rales or rhonchi, normal air movement, no respiratory distress or retractions  Cardiovascular: normal rate, regular rhythm, normal S1 and S2, no murmurs, rubs, clicks, or gallops, distal pulses intact  Extremities: no cyanosis, clubbing or edema of the lower extremities  Psych:  Normal affect without evidence of depression or anxiety, insight and judgement are appropriate, memory appears intact  Skin: warm and dry, no rash or erythema      ASSESSMENT & Antonette Osman was seen today for 6 month follow-up, chills, insomnia, other and flu vaccine. Diagnoses and all orders for this visit:    Hypothyroidism, unspecified type    Need for immunization against influenza  -     INFLUENZA, QUADV, 3 YRS AND OLDER, IM, PF, PREFILL SYR OR SDV, 0.5ML ([facility], )    Prediabetes    Essential hypertension    Primary insomnia    -Advised to trial melatonin for sleep issues  -Other chronic issues well controlled, continue current medications  -Advised to call if any issues      Return in about 6 months (around 6/13/2018), or if symptoms worsen or fail to improve. Mickeal Skipper received counseling on the following healthy behaviors: medication adherence  Reviewed prior labs and health maintenance. Continue current medications, diet and exercise. Discussed use, benefit, and side effects of prescribed medications. Barriers to medication compliance addressed. Patient given educational materials - see patient instructions. All patient questions answered. Patient voiced understanding.

## 2018-01-09 RX ORDER — FLUTICASONE PROPIONATE 50 MCG
SPRAY, SUSPENSION (ML) NASAL
Qty: 1 BOTTLE | Refills: 5 | Status: SHIPPED | OUTPATIENT
Start: 2018-01-09 | End: 2018-04-18 | Stop reason: SDUPTHER

## 2018-01-25 ENCOUNTER — TELEPHONE (OUTPATIENT)
Dept: FAMILY MEDICINE CLINIC | Age: 83
End: 2018-01-25

## 2018-01-31 ENCOUNTER — TELEPHONE (OUTPATIENT)
Dept: FAMILY MEDICINE CLINIC | Age: 83
End: 2018-01-31

## 2018-01-31 DIAGNOSIS — M54.50 CHRONIC BILATERAL LOW BACK PAIN WITHOUT SCIATICA: Primary | ICD-10-CM

## 2018-01-31 DIAGNOSIS — G89.29 CHRONIC BILATERAL LOW BACK PAIN WITHOUT SCIATICA: Primary | ICD-10-CM

## 2018-02-09 ENCOUNTER — OFFICE VISIT (OUTPATIENT)
Dept: ENT CLINIC | Age: 83
End: 2018-02-09
Payer: MEDICARE

## 2018-02-09 VITALS
TEMPERATURE: 97.5 F | HEIGHT: 59 IN | HEART RATE: 60 BPM | WEIGHT: 142.9 LBS | BODY MASS INDEX: 28.81 KG/M2 | RESPIRATION RATE: 12 BRPM | DIASTOLIC BLOOD PRESSURE: 76 MMHG | SYSTOLIC BLOOD PRESSURE: 120 MMHG

## 2018-02-09 DIAGNOSIS — Z96.22 S/P MYRINGOTOMY WITH INSERTION OF TUBE: ICD-10-CM

## 2018-02-09 DIAGNOSIS — H92.01 OTALGIA OF RIGHT EAR: ICD-10-CM

## 2018-02-09 DIAGNOSIS — Z97.4 WEARS HEARING AID: ICD-10-CM

## 2018-02-09 DIAGNOSIS — J01.00 ACUTE MAXILLARY SINUSITIS, RECURRENCE NOT SPECIFIED: Primary | ICD-10-CM

## 2018-02-09 PROCEDURE — 1123F ACP DISCUSS/DSCN MKR DOCD: CPT | Performed by: PHYSICIAN ASSISTANT

## 2018-02-09 PROCEDURE — 99213 OFFICE O/P EST LOW 20 MIN: CPT | Performed by: PHYSICIAN ASSISTANT

## 2018-02-09 PROCEDURE — G8417 CALC BMI ABV UP PARAM F/U: HCPCS | Performed by: PHYSICIAN ASSISTANT

## 2018-02-09 PROCEDURE — G8427 DOCREV CUR MEDS BY ELIG CLIN: HCPCS | Performed by: PHYSICIAN ASSISTANT

## 2018-02-09 PROCEDURE — 4040F PNEUMOC VAC/ADMIN/RCVD: CPT | Performed by: PHYSICIAN ASSISTANT

## 2018-02-09 PROCEDURE — 1036F TOBACCO NON-USER: CPT | Performed by: PHYSICIAN ASSISTANT

## 2018-02-09 PROCEDURE — G8484 FLU IMMUNIZE NO ADMIN: HCPCS | Performed by: PHYSICIAN ASSISTANT

## 2018-02-09 PROCEDURE — 1090F PRES/ABSN URINE INCON ASSESS: CPT | Performed by: PHYSICIAN ASSISTANT

## 2018-02-09 RX ORDER — AMOXICILLIN AND CLAVULANATE POTASSIUM 875; 125 MG/1; MG/1
1 TABLET, FILM COATED ORAL 2 TIMES DAILY
Qty: 28 TABLET | Refills: 0 | Status: SHIPPED | OUTPATIENT
Start: 2018-02-09 | End: 2018-02-23

## 2018-02-09 ASSESSMENT — ENCOUNTER SYMPTOMS
ABDOMINAL PAIN: 0
RHINORRHEA: 0
EYE PAIN: 0
PHOTOPHOBIA: 0
BACK PAIN: 0
COLOR CHANGE: 0
RECTAL PAIN: 0
ABDOMINAL DISTENTION: 0
DIARRHEA: 0
SORE THROAT: 0
CHOKING: 0
SINUS PRESSURE: 0
BLOOD IN STOOL: 0
COUGH: 0
ANAL BLEEDING: 0
APNEA: 0
NAUSEA: 0
CHEST TIGHTNESS: 0
SHORTNESS OF BREATH: 0
VOMITING: 0
EYE ITCHING: 0
STRIDOR: 0
VOICE CHANGE: 0
WHEEZING: 0
FACIAL SWELLING: 0
EYE REDNESS: 0
CONSTIPATION: 0
TROUBLE SWALLOWING: 0
EYE DISCHARGE: 0

## 2018-03-02 ENCOUNTER — OFFICE VISIT (OUTPATIENT)
Dept: ENT CLINIC | Age: 83
End: 2018-03-02
Payer: MEDICARE

## 2018-03-02 VITALS
WEIGHT: 141.8 LBS | RESPIRATION RATE: 16 BRPM | SYSTOLIC BLOOD PRESSURE: 130 MMHG | BODY MASS INDEX: 28.17 KG/M2 | DIASTOLIC BLOOD PRESSURE: 60 MMHG | TEMPERATURE: 97.9 F | HEART RATE: 72 BPM

## 2018-03-02 DIAGNOSIS — J01.00 ACUTE MAXILLARY SINUSITIS, RECURRENCE NOT SPECIFIED: Primary | ICD-10-CM

## 2018-03-02 DIAGNOSIS — H92.01 OTALGIA OF RIGHT EAR: ICD-10-CM

## 2018-03-02 DIAGNOSIS — Z96.22 S/P MYRINGOTOMY WITH INSERTION OF TUBE: ICD-10-CM

## 2018-03-02 DIAGNOSIS — Z97.4 WEARS HEARING AID: ICD-10-CM

## 2018-03-02 PROCEDURE — 1036F TOBACCO NON-USER: CPT | Performed by: OTOLARYNGOLOGY

## 2018-03-02 PROCEDURE — 1090F PRES/ABSN URINE INCON ASSESS: CPT | Performed by: OTOLARYNGOLOGY

## 2018-03-02 PROCEDURE — G8417 CALC BMI ABV UP PARAM F/U: HCPCS | Performed by: OTOLARYNGOLOGY

## 2018-03-02 PROCEDURE — G8482 FLU IMMUNIZE ORDER/ADMIN: HCPCS | Performed by: OTOLARYNGOLOGY

## 2018-03-02 PROCEDURE — 4040F PNEUMOC VAC/ADMIN/RCVD: CPT | Performed by: OTOLARYNGOLOGY

## 2018-03-02 PROCEDURE — 99212 OFFICE O/P EST SF 10 MIN: CPT | Performed by: OTOLARYNGOLOGY

## 2018-03-02 PROCEDURE — 1123F ACP DISCUSS/DSCN MKR DOCD: CPT | Performed by: OTOLARYNGOLOGY

## 2018-03-02 PROCEDURE — G8427 DOCREV CUR MEDS BY ELIG CLIN: HCPCS | Performed by: OTOLARYNGOLOGY

## 2018-03-02 ASSESSMENT — ENCOUNTER SYMPTOMS
COLOR CHANGE: 0
DIARRHEA: 0
CHOKING: 0
COUGH: 0
RHINORRHEA: 0
APNEA: 0
STRIDOR: 0
NAUSEA: 0
CHEST TIGHTNESS: 0
SORE THROAT: 0
FACIAL SWELLING: 0
SINUS PRESSURE: 0
SHORTNESS OF BREATH: 0
VOMITING: 0
TROUBLE SWALLOWING: 0
WHEEZING: 0
VOICE CHANGE: 0
ABDOMINAL PAIN: 0

## 2018-03-02 NOTE — PROGRESS NOTES
[address] [facility]ESSIONAL SERVS  [facility]  [address] [facility]  [address] [place] [address]  Dept: [phone]  Dept Fax: [phone]  Loc: [phone]    Janice Mayorga is a 80 y.o. female who was referred by No ref. provider found for:  Chief Complaint   Patient presents with    Sinus Problem     3 week follow up per Ruthy Monte   .    HPI:     Janice Mayorga is a 80 y.o. female who presents today for 3 week follow up. Evaluation of right acute maxillary sinusitis and ear pain and fullness. She has a MAT in the right ear  7/2015. She is doing well. Her ear hurts at times. She had a t-tube put in about 3 years ago. She has had her hearing aids for about a month now and the hearing aid on the right is bothering her. She will get a sharp pain in her face every once in a while. Sinuses felt better on the recent antibiotics    History:      Allergies   Allergen Reactions    Actonel [Risedronate Sodium] Other (See Comments)     GI    Bactrim [Sulfamethoxazole-Trimethoprim] Nausea Only           Cardizem [Diltiazem] Other (See Comments)     Raises BP    Celebrex [Celecoxib] Other (See Comments)     Raises BP    Dicloxacillin Other (See Comments)     Heartburn    Doxycycline Other (See Comments)     pulse    Evista [Raloxifene]      Fatigue      Lopid [Gemfibrozil]      Fatigue      Questran [Cholestyramine]      constipation      Synthroid [Levothyroxine Sodium]      GERD, Leg pain    Zestoretic [Lisinopril-Hydrochlorothiazide]      Raises B.P.    Zetia [Ezetimibe]      aches     Current Outpatient Prescriptions   Medication Sig Dispense Refill    glucose blood VI test strips (ASCENSIA AUTODISC VI;ONE TOUCH ULTRA TEST VI) strip Check blood sugar 1 time daily, Dx E11.9 100 strip 3    fluticasone (FLONASE) 50 MCG/ACT nasal spray SHAKE LIQUID AND USE 2 SPRAYS IN EACH NOSTRIL DAILY 1 Bottle 5    nadolol (CORGARD) 40 MG tablet TAKE 1/2 TABLET BY MOUTH EVERY DAY 45 tablet 3    thyroid unless otherwise noted below.      Electronically signed by Carl Badillo MD on 3/24/2018 at 10:43 PM

## 2018-03-13 ENCOUNTER — TELEPHONE (OUTPATIENT)
Dept: ENT CLINIC | Age: 83
End: 2018-03-13

## 2018-03-13 NOTE — TELEPHONE ENCOUNTER
Patient has an appointment on April 10 already with Dr. Mario Lester and she is put on the cancellation list also. Patient was called and notified to keep appointment and we will call her sooner if we get an appointment available. Patient verbalized understanding and appreciated call.

## 2018-03-14 ENCOUNTER — TELEPHONE (OUTPATIENT)
Dept: FAMILY MEDICINE CLINIC | Age: 83
End: 2018-03-14

## 2018-03-14 ENCOUNTER — HOSPITAL ENCOUNTER (OUTPATIENT)
Dept: WOMENS IMAGING | Age: 83
Discharge: HOME | End: 2018-03-14
Payer: MEDICARE

## 2018-03-14 DIAGNOSIS — Z12.31 VISIT FOR SCREENING MAMMOGRAM: ICD-10-CM

## 2018-03-14 PROCEDURE — 77063 BREAST TOMOSYNTHESIS BI: CPT

## 2018-03-14 NOTE — TELEPHONE ENCOUNTER
----- Message from Whitney Copeland DO sent at 3/14/2018 11:46 AM EDT -----  Normal mammogram, repeat in 1-2 years.

## 2018-03-23 RX ORDER — AMLODIPINE BESYLATE 2.5 MG/1
2.5 TABLET ORAL DAILY
Qty: 30 TABLET | Refills: 11 | Status: SHIPPED | OUTPATIENT
Start: 2018-03-23 | End: 2018-04-05 | Stop reason: SDUPTHER

## 2018-04-05 ENCOUNTER — TELEPHONE (OUTPATIENT)
Dept: FAMILY MEDICINE CLINIC | Age: 83
End: 2018-04-05

## 2018-04-05 DIAGNOSIS — I10 ESSENTIAL HYPERTENSION: ICD-10-CM

## 2018-04-05 RX ORDER — AMLODIPINE BESYLATE 2.5 MG/1
2.5 TABLET ORAL DAILY
Qty: 90 TABLET | Refills: 3 | Status: SHIPPED | OUTPATIENT
Start: 2018-04-05 | End: 2019-02-19 | Stop reason: SDUPTHER

## 2018-04-05 RX ORDER — SPIRONOLACTONE 50 MG/1
50 TABLET, FILM COATED ORAL DAILY
Qty: 90 TABLET | Refills: 3 | Status: SHIPPED | OUTPATIENT
Start: 2018-04-05 | End: 2018-12-24 | Stop reason: SDUPTHER

## 2018-04-18 RX ORDER — FLUTICASONE PROPIONATE 50 MCG
SPRAY, SUSPENSION (ML) NASAL
Qty: 1 BOTTLE | Refills: 5 | Status: SHIPPED | OUTPATIENT
Start: 2018-04-18 | End: 2019-03-05

## 2018-05-31 ENCOUNTER — TELEPHONE (OUTPATIENT)
Dept: ADMINISTRATIVE | Age: 83
End: 2018-05-31

## 2018-05-31 ENCOUNTER — HOSPITAL ENCOUNTER (EMERGENCY)
Age: 83
Discharge: HOME | End: 2018-05-31
Attending: EMERGENCY MEDICINE
Payer: MEDICARE

## 2018-05-31 ENCOUNTER — NURSE TRIAGE (OUTPATIENT)
Dept: ADMINISTRATIVE | Age: 83
End: 2018-05-31

## 2018-05-31 VITALS
RESPIRATION RATE: 12 BRPM | HEART RATE: 67 BPM | BODY MASS INDEX: 27.81 KG/M2 | TEMPERATURE: 98.4 F | WEIGHT: 140 LBS | SYSTOLIC BLOOD PRESSURE: 161 MMHG | DIASTOLIC BLOOD PRESSURE: 72 MMHG | OXYGEN SATURATION: 95 %

## 2018-05-31 DIAGNOSIS — R19.7 DIARRHEA, UNSPECIFIED TYPE: ICD-10-CM

## 2018-05-31 DIAGNOSIS — A09 ACUTE INFECTIVE GASTROENTERITIS: Primary | ICD-10-CM

## 2018-05-31 PROCEDURE — 99213 OFFICE O/P EST LOW 20 MIN: CPT

## 2018-05-31 PROCEDURE — 99213 OFFICE O/P EST LOW 20 MIN: CPT | Performed by: EMERGENCY MEDICINE

## 2018-05-31 RX ORDER — SIMETHICONE 80 MG
80 TABLET,CHEWABLE ORAL EVERY 6 HOURS PRN
Qty: 20 TABLET | Refills: 0 | Status: SHIPPED | OUTPATIENT
Start: 2018-05-31 | End: 2018-06-13

## 2018-05-31 ASSESSMENT — ENCOUNTER SYMPTOMS
CHOKING: 0
EYE REDNESS: 0
ABDOMINAL PAIN: 0
FACIAL SWELLING: 0
CONSTIPATION: 0
EYE PAIN: 0
SINUS PRESSURE: 0
BACK PAIN: 0
VOICE CHANGE: 0
TROUBLE SWALLOWING: 0
STRIDOR: 0
NAUSEA: 0
DIARRHEA: 1
EYE DISCHARGE: 0
SORE THROAT: 0
BLOOD IN STOOL: 0
VOMITING: 0
WHEEZING: 0
SHORTNESS OF BREATH: 0
COUGH: 0

## 2018-06-01 ENCOUNTER — TELEPHONE (OUTPATIENT)
Dept: FAMILY MEDICINE CLINIC | Age: 83
End: 2018-06-01

## 2018-06-05 ENCOUNTER — HOSPITAL ENCOUNTER (EMERGENCY)
Age: 83
Discharge: HOME | End: 2018-06-05
Payer: MEDICARE

## 2018-06-05 ENCOUNTER — NURSE TRIAGE (OUTPATIENT)
Dept: ADMINISTRATIVE | Age: 83
End: 2018-06-05

## 2018-06-05 VITALS
TEMPERATURE: 98 F | RESPIRATION RATE: 16 BRPM | DIASTOLIC BLOOD PRESSURE: 72 MMHG | OXYGEN SATURATION: 96 % | WEIGHT: 140 LBS | HEART RATE: 60 BPM | SYSTOLIC BLOOD PRESSURE: 160 MMHG | BODY MASS INDEX: 27.81 KG/M2

## 2018-06-05 DIAGNOSIS — I10 ESSENTIAL HYPERTENSION: Primary | ICD-10-CM

## 2018-06-05 LAB
EKG ATRIAL RATE: 52 BPM
EKG P AXIS: -9 DEGREES
EKG P-R INTERVAL: 176 MS
EKG Q-T INTERVAL: 458 MS
EKG QRS DURATION: 76 MS
EKG QTC CALCULATION (BAZETT): 425 MS
EKG R AXIS: 45 DEGREES
EKG T AXIS: -8 DEGREES
EKG VENTRICULAR RATE: 52 BPM

## 2018-06-05 PROCEDURE — 99213 OFFICE O/P EST LOW 20 MIN: CPT

## 2018-06-05 PROCEDURE — 93005 ELECTROCARDIOGRAM TRACING: CPT | Performed by: NURSE PRACTITIONER

## 2018-06-05 PROCEDURE — 99213 OFFICE O/P EST LOW 20 MIN: CPT | Performed by: NURSE PRACTITIONER

## 2018-06-05 ASSESSMENT — ENCOUNTER SYMPTOMS
SORE THROAT: 0
BACK PAIN: 0
CHEST TIGHTNESS: 0
COUGH: 0
NAUSEA: 0
BLURRED VISION: 0
VOMITING: 0
SHORTNESS OF BREATH: 0
DIARRHEA: 0
RHINORRHEA: 0
SINUS PRESSURE: 0
SINUS PAIN: 0
ABDOMINAL PAIN: 0

## 2018-06-06 ENCOUNTER — HOSPITAL ENCOUNTER (OUTPATIENT)
Dept: GENERAL RADIOLOGY | Age: 83
Discharge: HOME | End: 2018-06-06
Payer: MEDICARE

## 2018-06-06 ENCOUNTER — OFFICE VISIT (OUTPATIENT)
Dept: FAMILY MEDICINE CLINIC | Age: 83
End: 2018-06-06
Payer: MEDICARE

## 2018-06-06 ENCOUNTER — HOSPITAL ENCOUNTER (OUTPATIENT)
Age: 83
Discharge: HOME | End: 2018-06-06
Payer: MEDICARE

## 2018-06-06 ENCOUNTER — TELEPHONE (OUTPATIENT)
Dept: FAMILY MEDICINE CLINIC | Age: 83
End: 2018-06-06

## 2018-06-06 VITALS
HEART RATE: 64 BPM | WEIGHT: 138 LBS | DIASTOLIC BLOOD PRESSURE: 62 MMHG | BODY MASS INDEX: 27.82 KG/M2 | SYSTOLIC BLOOD PRESSURE: 140 MMHG | TEMPERATURE: 98.3 F | HEIGHT: 59 IN | RESPIRATION RATE: 16 BRPM

## 2018-06-06 DIAGNOSIS — R14.0 ABDOMINAL DISTENSION (GASEOUS): ICD-10-CM

## 2018-06-06 DIAGNOSIS — R53.83 FATIGUE, UNSPECIFIED TYPE: ICD-10-CM

## 2018-06-06 DIAGNOSIS — I10 ESSENTIAL HYPERTENSION: Primary | ICD-10-CM

## 2018-06-06 LAB
ALBUMIN SERPL-MCNC: 4 G/DL (ref 3.5–5.1)
ALP BLD-CCNC: 80 U/L (ref 38–126)
ALT SERPL-CCNC: 16 U/L (ref 11–66)
ANION GAP SERPL CALCULATED.3IONS-SCNC: 11 MEQ/L (ref 8–16)
AST SERPL-CCNC: 21 U/L (ref 5–40)
BASOPHILS # BLD: 0.5 %
BASOPHILS ABSOLUTE: 0 THOU/MM3 (ref 0–0.1)
BILIRUB SERPL-MCNC: 0.3 MG/DL (ref 0.3–1.2)
BUN BLDV-MCNC: 20 MG/DL (ref 7–22)
CALCIUM SERPL-MCNC: 9.5 MG/DL (ref 8.5–10.5)
CHLORIDE BLD-SCNC: 100 MEQ/L (ref 98–111)
CO2: 27 MEQ/L (ref 23–33)
CREAT SERPL-MCNC: 0.9 MG/DL (ref 0.4–1.2)
EOSINOPHIL # BLD: 1.4 %
EOSINOPHILS ABSOLUTE: 0.1 THOU/MM3 (ref 0–0.4)
GFR SERPL CREATININE-BSD FRML MDRD: 58 ML/MIN/1.73M2
GLUCOSE BLD-MCNC: 201 MG/DL (ref 70–108)
HCT VFR BLD CALC: 41 % (ref 37–47)
HEMOGLOBIN: 14 GM/DL (ref 12–16)
LYMPHOCYTES # BLD: 25.3 %
LYMPHOCYTES ABSOLUTE: 1.6 THOU/MM3 (ref 1–4.8)
MCH RBC QN AUTO: 32 PG (ref 27–31)
MCHC RBC AUTO-ENTMCNC: 34.3 GM/DL (ref 33–37)
MCV RBC AUTO: 93.4 FL (ref 81–99)
MONOCYTES # BLD: 5.8 %
MONOCYTES ABSOLUTE: 0.4 THOU/MM3 (ref 0.4–1.3)
NUCLEATED RED BLOOD CELLS: 0 /100 WBC
PDW BLD-RTO: 13.6 % (ref 11.5–14.5)
PLATELET # BLD: 176 THOU/MM3 (ref 130–400)
PMV BLD AUTO: 9.6 FL (ref 7.4–10.4)
POTASSIUM SERPL-SCNC: 4.3 MEQ/L (ref 3.5–5.2)
RBC # BLD: 4.39 MILL/MM3 (ref 4.2–5.4)
SEG NEUTROPHILS: 67 %
SEGMENTED NEUTROPHILS ABSOLUTE COUNT: 4.2 THOU/MM3 (ref 1.8–7.7)
SODIUM BLD-SCNC: 138 MEQ/L (ref 135–145)
TOTAL PROTEIN: 7.3 G/DL (ref 6.1–8)
TSH SERPL DL<=0.05 MIU/L-ACNC: 3.28 UIU/ML (ref 0.4–4.2)
WBC # BLD: 6.3 THOU/MM3 (ref 4.8–10.8)

## 2018-06-06 PROCEDURE — 74018 RADEX ABDOMEN 1 VIEW: CPT

## 2018-06-06 PROCEDURE — 99214 OFFICE O/P EST MOD 30 MIN: CPT | Performed by: NURSE PRACTITIONER

## 2018-06-06 PROCEDURE — 4040F PNEUMOC VAC/ADMIN/RCVD: CPT | Performed by: NURSE PRACTITIONER

## 2018-06-06 PROCEDURE — 84443 ASSAY THYROID STIM HORMONE: CPT

## 2018-06-06 PROCEDURE — 1090F PRES/ABSN URINE INCON ASSESS: CPT | Performed by: NURSE PRACTITIONER

## 2018-06-06 PROCEDURE — G8417 CALC BMI ABV UP PARAM F/U: HCPCS | Performed by: NURSE PRACTITIONER

## 2018-06-06 PROCEDURE — 1036F TOBACCO NON-USER: CPT | Performed by: NURSE PRACTITIONER

## 2018-06-06 PROCEDURE — 85025 COMPLETE CBC W/AUTO DIFF WBC: CPT

## 2018-06-06 PROCEDURE — 36415 COLL VENOUS BLD VENIPUNCTURE: CPT

## 2018-06-06 PROCEDURE — 80053 COMPREHEN METABOLIC PANEL: CPT

## 2018-06-06 PROCEDURE — 1123F ACP DISCUSS/DSCN MKR DOCD: CPT | Performed by: NURSE PRACTITIONER

## 2018-06-06 PROCEDURE — G8427 DOCREV CUR MEDS BY ELIG CLIN: HCPCS | Performed by: NURSE PRACTITIONER

## 2018-06-06 ASSESSMENT — ENCOUNTER SYMPTOMS
SORE THROAT: 0
DIARRHEA: 1
NAUSEA: 0
EYE REDNESS: 0
PHOTOPHOBIA: 0
CONSTIPATION: 0
COUGH: 0
EYE PAIN: 0
BACK PAIN: 0
HEARTBURN: 1
ORTHOPNEA: 0
BLOOD IN STOOL: 0
WHEEZING: 0
HEMOPTYSIS: 0
SPUTUM PRODUCTION: 0
ABDOMINAL PAIN: 0
BLURRED VISION: 0
VOMITING: 0
SHORTNESS OF BREATH: 0
EYE DISCHARGE: 0
DOUBLE VISION: 0
SINUS PAIN: 0
STRIDOR: 0

## 2018-06-07 ENCOUNTER — NURSE ONLY (OUTPATIENT)
Dept: FAMILY MEDICINE CLINIC | Age: 83
End: 2018-06-07
Payer: MEDICARE

## 2018-06-07 ENCOUNTER — TELEPHONE (OUTPATIENT)
Dept: FAMILY MEDICINE CLINIC | Age: 83
End: 2018-06-07

## 2018-06-07 DIAGNOSIS — R73.01 ELEVATED FASTING GLUCOSE: Primary | ICD-10-CM

## 2018-06-07 LAB — HBA1C MFR BLD: 6 %

## 2018-06-07 PROCEDURE — 83036 HEMOGLOBIN GLYCOSYLATED A1C: CPT | Performed by: FAMILY MEDICINE

## 2018-06-13 ENCOUNTER — OFFICE VISIT (OUTPATIENT)
Dept: FAMILY MEDICINE CLINIC | Age: 83
End: 2018-06-13
Payer: MEDICARE

## 2018-06-13 VITALS
HEART RATE: 49 BPM | TEMPERATURE: 97.6 F | DIASTOLIC BLOOD PRESSURE: 62 MMHG | SYSTOLIC BLOOD PRESSURE: 122 MMHG | HEIGHT: 59 IN | WEIGHT: 138 LBS | BODY MASS INDEX: 27.82 KG/M2 | RESPIRATION RATE: 14 BRPM

## 2018-06-13 DIAGNOSIS — E03.9 HYPOTHYROIDISM, UNSPECIFIED TYPE: ICD-10-CM

## 2018-06-13 DIAGNOSIS — R00.1 BRADYCARDIA: Primary | ICD-10-CM

## 2018-06-13 DIAGNOSIS — I10 ESSENTIAL HYPERTENSION: ICD-10-CM

## 2018-06-13 DIAGNOSIS — R73.03 PREDIABETES: ICD-10-CM

## 2018-06-13 PROCEDURE — 4040F PNEUMOC VAC/ADMIN/RCVD: CPT | Performed by: FAMILY MEDICINE

## 2018-06-13 PROCEDURE — 1090F PRES/ABSN URINE INCON ASSESS: CPT | Performed by: FAMILY MEDICINE

## 2018-06-13 PROCEDURE — 1123F ACP DISCUSS/DSCN MKR DOCD: CPT | Performed by: FAMILY MEDICINE

## 2018-06-13 PROCEDURE — 1036F TOBACCO NON-USER: CPT | Performed by: FAMILY MEDICINE

## 2018-06-13 PROCEDURE — G8427 DOCREV CUR MEDS BY ELIG CLIN: HCPCS | Performed by: FAMILY MEDICINE

## 2018-06-13 PROCEDURE — G8417 CALC BMI ABV UP PARAM F/U: HCPCS | Performed by: FAMILY MEDICINE

## 2018-06-13 PROCEDURE — 99214 OFFICE O/P EST MOD 30 MIN: CPT | Performed by: FAMILY MEDICINE

## 2018-06-26 ENCOUNTER — NURSE ONLY (OUTPATIENT)
Dept: FAMILY MEDICINE CLINIC | Age: 83
End: 2018-06-26

## 2018-06-26 ENCOUNTER — TELEPHONE (OUTPATIENT)
Dept: FAMILY MEDICINE CLINIC | Age: 83
End: 2018-06-26

## 2018-06-26 VITALS — DIASTOLIC BLOOD PRESSURE: 62 MMHG | HEART RATE: 76 BPM | SYSTOLIC BLOOD PRESSURE: 124 MMHG

## 2018-06-26 DIAGNOSIS — Z01.30 BLOOD PRESSURE CHECK: Primary | ICD-10-CM

## 2018-07-12 ENCOUNTER — TELEPHONE (OUTPATIENT)
Dept: FAMILY MEDICINE CLINIC | Age: 83
End: 2018-07-12

## 2018-07-12 ENCOUNTER — NURSE ONLY (OUTPATIENT)
Dept: FAMILY MEDICINE CLINIC | Age: 83
End: 2018-07-12

## 2018-07-12 VITALS — SYSTOLIC BLOOD PRESSURE: 118 MMHG | DIASTOLIC BLOOD PRESSURE: 62 MMHG | HEART RATE: 88 BPM

## 2018-07-12 DIAGNOSIS — I10 ESSENTIAL HYPERTENSION: Primary | ICD-10-CM

## 2018-07-12 ASSESSMENT — PATIENT HEALTH QUESTIONNAIRE - PHQ9
1. LITTLE INTEREST OR PLEASURE IN DOING THINGS: 0
2. FEELING DOWN, DEPRESSED OR HOPELESS: 0
SUM OF ALL RESPONSES TO PHQ9 QUESTIONS 1 & 2: 0
SUM OF ALL RESPONSES TO PHQ QUESTIONS 1-9: 0

## 2018-07-12 NOTE — PROGRESS NOTES
Pt  Presented at office for b/p check . Pt states she takes her b/p meds in the evening. Pt  Allowed to sit  For  5 min. States h/a is almost gone.     B/P  Right  Med  Adult cuff 118/62, 88

## 2018-07-12 NOTE — TELEPHONE ENCOUNTER
Patients bp 192/92 and heart rate is 116. She has been helping some one move. She is obviously worked up by talking to her. She has a slight headache.

## 2018-07-25 ENCOUNTER — TELEPHONE (OUTPATIENT)
Dept: FAMILY MEDICINE CLINIC | Age: 83
End: 2018-07-25

## 2018-07-25 DIAGNOSIS — F41.9 ANXIETY: Primary | ICD-10-CM

## 2018-07-25 RX ORDER — LORAZEPAM 0.5 MG/1
0.5 TABLET ORAL EVERY 12 HOURS PRN
Qty: 10 TABLET | Refills: 0 | Status: SHIPPED | OUTPATIENT
Start: 2018-07-25 | End: 2018-09-10 | Stop reason: SDUPTHER

## 2018-08-09 ENCOUNTER — OFFICE VISIT (OUTPATIENT)
Dept: FAMILY MEDICINE CLINIC | Age: 83
End: 2018-08-09
Payer: MEDICARE

## 2018-08-09 VITALS
TEMPERATURE: 98.1 F | DIASTOLIC BLOOD PRESSURE: 68 MMHG | RESPIRATION RATE: 20 BRPM | WEIGHT: 139 LBS | HEART RATE: 65 BPM | BODY MASS INDEX: 23.16 KG/M2 | SYSTOLIC BLOOD PRESSURE: 138 MMHG | HEIGHT: 65 IN

## 2018-08-09 DIAGNOSIS — Z91.81 AT HIGH RISK FOR FALLS: ICD-10-CM

## 2018-08-09 DIAGNOSIS — I10 ESSENTIAL HYPERTENSION: ICD-10-CM

## 2018-08-09 DIAGNOSIS — F41.9 ANXIETY: ICD-10-CM

## 2018-08-09 DIAGNOSIS — K21.9 GASTROESOPHAGEAL REFLUX DISEASE WITHOUT ESOPHAGITIS: Primary | ICD-10-CM

## 2018-08-09 PROCEDURE — 1101F PT FALLS ASSESS-DOCD LE1/YR: CPT | Performed by: FAMILY MEDICINE

## 2018-08-09 PROCEDURE — 99214 OFFICE O/P EST MOD 30 MIN: CPT | Performed by: FAMILY MEDICINE

## 2018-08-09 PROCEDURE — G8427 DOCREV CUR MEDS BY ELIG CLIN: HCPCS | Performed by: FAMILY MEDICINE

## 2018-08-09 PROCEDURE — 1123F ACP DISCUSS/DSCN MKR DOCD: CPT | Performed by: FAMILY MEDICINE

## 2018-08-09 PROCEDURE — 1036F TOBACCO NON-USER: CPT | Performed by: FAMILY MEDICINE

## 2018-08-09 PROCEDURE — G8420 CALC BMI NORM PARAMETERS: HCPCS | Performed by: FAMILY MEDICINE

## 2018-08-09 PROCEDURE — 4040F PNEUMOC VAC/ADMIN/RCVD: CPT | Performed by: FAMILY MEDICINE

## 2018-08-09 PROCEDURE — 1090F PRES/ABSN URINE INCON ASSESS: CPT | Performed by: FAMILY MEDICINE

## 2018-08-09 NOTE — PROGRESS NOTES
without evidence of depression or anxiety, insight and judgement are appropriate, memory appears intact  Skin: warm and dry, no rash or erythema      ASSESSMENT & PLAN  Irena Arguello was seen today for anxiety. Diagnoses and all orders for this visit:    Gastroesophageal reflux disease without esophagitis    At high risk for falls    Essential hypertension    Anxiety    -Continue ativan for grief/anxiety  -Chronic issues stable, continue current medications  -Advised to call if any issues      Return in about 4 months (around 12/9/2018), or if symptoms worsen or fail to improve. Controlled Substances Monitoring: The Prescription Monitoring Report for this patient was reviewed today. Zaheer Correia, DO)    No signs of potential drug abuse or diversion identified. CODY Flores          Irena Arguello received counseling on the following healthy behaviors: medication adherence  Reviewed prior labs and health maintenance. Continue current medications, diet and exercise. Discussed use, benefit, and side effects of prescribed medications. Barriers to medication compliance addressed. Patient given educational materials - see patient instructions. All patient questions answered. Patient voiced understanding.

## 2018-08-16 ENCOUNTER — OFFICE VISIT (OUTPATIENT)
Dept: FAMILY MEDICINE CLINIC | Age: 83
End: 2018-08-16
Payer: MEDICARE

## 2018-08-16 VITALS
HEIGHT: 61 IN | RESPIRATION RATE: 16 BRPM | HEART RATE: 72 BPM | BODY MASS INDEX: 26.06 KG/M2 | WEIGHT: 138 LBS | TEMPERATURE: 98.8 F | SYSTOLIC BLOOD PRESSURE: 120 MMHG | DIASTOLIC BLOOD PRESSURE: 74 MMHG

## 2018-08-16 DIAGNOSIS — R00.2 HEART PALPITATIONS: ICD-10-CM

## 2018-08-16 DIAGNOSIS — F41.9 ANXIETY: Primary | ICD-10-CM

## 2018-08-16 PROCEDURE — G8427 DOCREV CUR MEDS BY ELIG CLIN: HCPCS | Performed by: FAMILY MEDICINE

## 2018-08-16 PROCEDURE — 4040F PNEUMOC VAC/ADMIN/RCVD: CPT | Performed by: FAMILY MEDICINE

## 2018-08-16 PROCEDURE — 1101F PT FALLS ASSESS-DOCD LE1/YR: CPT | Performed by: FAMILY MEDICINE

## 2018-08-16 PROCEDURE — G8417 CALC BMI ABV UP PARAM F/U: HCPCS | Performed by: FAMILY MEDICINE

## 2018-08-16 PROCEDURE — 1090F PRES/ABSN URINE INCON ASSESS: CPT | Performed by: FAMILY MEDICINE

## 2018-08-16 PROCEDURE — 93000 ELECTROCARDIOGRAM COMPLETE: CPT | Performed by: FAMILY MEDICINE

## 2018-08-16 PROCEDURE — 1123F ACP DISCUSS/DSCN MKR DOCD: CPT | Performed by: FAMILY MEDICINE

## 2018-08-16 PROCEDURE — 99213 OFFICE O/P EST LOW 20 MIN: CPT | Performed by: FAMILY MEDICINE

## 2018-08-16 PROCEDURE — 1036F TOBACCO NON-USER: CPT | Performed by: FAMILY MEDICINE

## 2018-08-16 RX ORDER — CITALOPRAM 10 MG/1
10 TABLET ORAL DAILY
Qty: 30 TABLET | Refills: 5 | Status: SHIPPED | OUTPATIENT
Start: 2018-08-16 | End: 2018-08-29 | Stop reason: ALTCHOICE

## 2018-08-21 ENCOUNTER — TELEPHONE (OUTPATIENT)
Dept: FAMILY MEDICINE CLINIC | Age: 83
End: 2018-08-21

## 2018-08-21 DIAGNOSIS — F41.1 GAD (GENERALIZED ANXIETY DISORDER): Primary | ICD-10-CM

## 2018-08-22 NOTE — TELEPHONE ENCOUNTER
Pt notified & verbalized understanding. Pt states she feels much better today without taking the Celexa but states she did take a half a tab of Ativan. Pt will let Dr Sekou Horn know if she has any other problems.

## 2018-08-23 RX ORDER — BUSPIRONE HYDROCHLORIDE 5 MG/1
5 TABLET ORAL 2 TIMES DAILY
Qty: 60 TABLET | Refills: 5 | Status: SHIPPED | OUTPATIENT
Start: 2018-08-23 | End: 2018-08-29 | Stop reason: ALTCHOICE

## 2018-08-23 NOTE — TELEPHONE ENCOUNTER
Amara Lopez took 1/2 tab ativan about 0930 felt better until 1230, she at lunch between [id number], about 1230 she started shaky, shes also a little weak, not feeling good, What to do?

## 2018-09-10 DIAGNOSIS — F41.9 ANXIETY: ICD-10-CM

## 2018-09-10 RX ORDER — LORAZEPAM 0.5 MG/1
0.5 TABLET ORAL EVERY 12 HOURS PRN
Qty: 20 TABLET | Refills: 0 | Status: SHIPPED | OUTPATIENT
Start: 2018-09-10 | End: 2019-07-17 | Stop reason: SDUPTHER

## 2018-09-10 NOTE — TELEPHONE ENCOUNTER
Controlled Substances Monitoring:     RX Monitoring 9/10/2018   Attestation The Prescription Monitoring Report for this patient was reviewed today. Documentation No signs of potential drug abuse or diversion identified.

## 2018-10-09 RX ORDER — LEVOTHYROXINE AND LIOTHYRONINE 38; 9 UG/1; UG/1
30 TABLET ORAL DAILY
Qty: 45 TABLET | Refills: 3 | Status: SHIPPED | OUTPATIENT
Start: 2018-10-09 | End: 2019-10-08 | Stop reason: SDUPTHER

## 2018-12-05 ENCOUNTER — OFFICE VISIT (OUTPATIENT)
Dept: FAMILY MEDICINE CLINIC | Age: 83
End: 2018-12-05
Payer: MEDICARE

## 2018-12-05 VITALS
SYSTOLIC BLOOD PRESSURE: 119 MMHG | HEIGHT: 61 IN | HEART RATE: 86 BPM | WEIGHT: 140.6 LBS | BODY MASS INDEX: 26.55 KG/M2 | TEMPERATURE: 97.8 F | DIASTOLIC BLOOD PRESSURE: 58 MMHG | RESPIRATION RATE: 16 BRPM

## 2018-12-05 DIAGNOSIS — F41.9 ANXIETY: ICD-10-CM

## 2018-12-05 DIAGNOSIS — R39.15 URINARY URGENCY: Primary | ICD-10-CM

## 2018-12-05 DIAGNOSIS — I10 ESSENTIAL HYPERTENSION: ICD-10-CM

## 2018-12-05 DIAGNOSIS — Z23 NEED FOR IMMUNIZATION AGAINST INFLUENZA: ICD-10-CM

## 2018-12-05 DIAGNOSIS — N18.30 CHRONIC KIDNEY DISEASE, STAGE III (MODERATE) (HCC): ICD-10-CM

## 2018-12-05 PROCEDURE — 90662 IIV NO PRSV INCREASED AG IM: CPT | Performed by: FAMILY MEDICINE

## 2018-12-05 PROCEDURE — 1090F PRES/ABSN URINE INCON ASSESS: CPT | Performed by: FAMILY MEDICINE

## 2018-12-05 PROCEDURE — G8417 CALC BMI ABV UP PARAM F/U: HCPCS | Performed by: FAMILY MEDICINE

## 2018-12-05 PROCEDURE — 1101F PT FALLS ASSESS-DOCD LE1/YR: CPT | Performed by: FAMILY MEDICINE

## 2018-12-05 PROCEDURE — 1036F TOBACCO NON-USER: CPT | Performed by: FAMILY MEDICINE

## 2018-12-05 PROCEDURE — 4040F PNEUMOC VAC/ADMIN/RCVD: CPT | Performed by: FAMILY MEDICINE

## 2018-12-05 PROCEDURE — 1123F ACP DISCUSS/DSCN MKR DOCD: CPT | Performed by: FAMILY MEDICINE

## 2018-12-05 PROCEDURE — G8428 CUR MEDS NOT DOCUMENT: HCPCS | Performed by: FAMILY MEDICINE

## 2018-12-05 PROCEDURE — G0008 ADMIN INFLUENZA VIRUS VAC: HCPCS | Performed by: FAMILY MEDICINE

## 2018-12-05 PROCEDURE — 99214 OFFICE O/P EST MOD 30 MIN: CPT | Performed by: FAMILY MEDICINE

## 2018-12-05 PROCEDURE — G8482 FLU IMMUNIZE ORDER/ADMIN: HCPCS | Performed by: FAMILY MEDICINE

## 2018-12-05 NOTE — PROGRESS NOTES
Left 7/25/13    CATARACT REMOVAL Bilateral 1999    COLON SURGERY  11/1999    resection    COLONOSCOPY  2003, 2006, 2011    HYSTERECTOMY  2/23/1970    HYSTERECTOMY, TOTAL ABDOMINAL      KNEE ARTHROSCOPY Right 11/21/2007    meniscectomies    KNEE SURGERY Left 2000    replacement    MYRINGOTOMY Right 07/01/2015    tube insertion    OVARY REMOVAL Bilateral 7/12/2001    oophorectomy    OVARY REMOVAL Bilateral     SHOULDER SURGERY  5/14    SINUS SURGERY         Social History   Substance Use Topics    Smoking status: Never Smoker    Smokeless tobacco: Never Used    Alcohol use No       Family History   Problem Relation Age of Onset    Cancer Child     Stroke Sister     Heart Disease Sister     Other Other         visual problems         I have reviewed the patient's past medical history, past surgical history, allergies, medications, social and family history and I have made updates where appropriate.     Review of Systems        PHYSICAL EXAM:  BP (!) 119/58   Pulse 86   Temp 97.8 °F (36.6 °C) (Oral)   Resp 16   Ht 5' 0.7\" (1.542 m)   Wt 140 lb 9.6 oz (63.8 kg)   BMI 26.83 kg/m²     General Appearance: well developed and well- nourished, in no acute distress  Head: normocephalic and atraumatic  ENT: external ear and ear canal normal bilaterally, nose without deformity, nasal mucosa and turbinates normal without polyps, oropharynx normal, dentition is normal for age, no lipor gum lesions noted  Neck: supple and non-tender without mass, no thyromegaly or thyroid nodules, no cervical lymphadenopathy  Pulmonary/Chest: clear to auscultation bilaterally- no wheezes, rales or rhonchi, normal air movement, no respiratory distress or retractions  Cardiovascular: normal rate, regular rhythm, normal S1 and S2, no murmurs, rubs, clicks, or gallops, distal pulses intact  Abdomen: soft, non-tender, non-distended, no rebound or guarding, no masses or hernias noted, no hepatospleenomegaly  Extremities: no

## 2018-12-13 ENCOUNTER — TELEPHONE (OUTPATIENT)
Dept: FAMILY MEDICINE CLINIC | Age: 83
End: 2018-12-13

## 2018-12-13 DIAGNOSIS — G89.29 CHRONIC MIDLINE LOW BACK PAIN WITHOUT SCIATICA: Primary | ICD-10-CM

## 2018-12-13 DIAGNOSIS — M54.50 CHRONIC MIDLINE LOW BACK PAIN WITHOUT SCIATICA: Primary | ICD-10-CM

## 2018-12-24 DIAGNOSIS — I10 ESSENTIAL HYPERTENSION: ICD-10-CM

## 2018-12-24 RX ORDER — SPIRONOLACTONE 50 MG/1
50 TABLET, FILM COATED ORAL DAILY
Qty: 90 TABLET | Refills: 3 | Status: SHIPPED | OUTPATIENT
Start: 2018-12-24 | End: 2020-01-02 | Stop reason: SDUPTHER

## 2019-02-04 ENCOUNTER — TELEPHONE (OUTPATIENT)
Dept: FAMILY MEDICINE CLINIC | Age: 84
End: 2019-02-04

## 2019-02-19 DIAGNOSIS — I10 ESSENTIAL HYPERTENSION: ICD-10-CM

## 2019-02-19 RX ORDER — AMLODIPINE BESYLATE 2.5 MG/1
2.5 TABLET ORAL DAILY
Qty: 90 TABLET | Refills: 3 | Status: SHIPPED | OUTPATIENT
Start: 2019-02-19 | End: 2020-04-21 | Stop reason: SDUPTHER

## 2019-03-05 ENCOUNTER — OFFICE VISIT (OUTPATIENT)
Dept: INTERNAL MEDICINE CLINIC | Age: 84
End: 2019-03-05
Payer: MEDICARE

## 2019-03-05 VITALS
SYSTOLIC BLOOD PRESSURE: 134 MMHG | BODY MASS INDEX: 27.68 KG/M2 | DIASTOLIC BLOOD PRESSURE: 68 MMHG | HEIGHT: 60 IN | WEIGHT: 141 LBS | HEART RATE: 80 BPM

## 2019-03-05 DIAGNOSIS — I10 ESSENTIAL HYPERTENSION: Primary | ICD-10-CM

## 2019-03-05 DIAGNOSIS — R73.02 IGT (IMPAIRED GLUCOSE TOLERANCE): ICD-10-CM

## 2019-03-05 PROCEDURE — G8427 DOCREV CUR MEDS BY ELIG CLIN: HCPCS | Performed by: INTERNAL MEDICINE

## 2019-03-05 PROCEDURE — G8482 FLU IMMUNIZE ORDER/ADMIN: HCPCS | Performed by: INTERNAL MEDICINE

## 2019-03-05 PROCEDURE — 1123F ACP DISCUSS/DSCN MKR DOCD: CPT | Performed by: INTERNAL MEDICINE

## 2019-03-05 PROCEDURE — 99214 OFFICE O/P EST MOD 30 MIN: CPT | Performed by: INTERNAL MEDICINE

## 2019-03-05 PROCEDURE — 1101F PT FALLS ASSESS-DOCD LE1/YR: CPT | Performed by: INTERNAL MEDICINE

## 2019-03-05 PROCEDURE — 1036F TOBACCO NON-USER: CPT | Performed by: INTERNAL MEDICINE

## 2019-03-05 PROCEDURE — 4040F PNEUMOC VAC/ADMIN/RCVD: CPT | Performed by: INTERNAL MEDICINE

## 2019-03-05 PROCEDURE — 1090F PRES/ABSN URINE INCON ASSESS: CPT | Performed by: INTERNAL MEDICINE

## 2019-03-05 PROCEDURE — G8417 CALC BMI ABV UP PARAM F/U: HCPCS | Performed by: INTERNAL MEDICINE

## 2019-03-05 RX ORDER — NAPROXEN SODIUM 220 MG
220 TABLET ORAL PRN
COMMUNITY
End: 2021-03-01

## 2019-03-05 ASSESSMENT — PATIENT HEALTH QUESTIONNAIRE - PHQ9
2. FEELING DOWN, DEPRESSED OR HOPELESS: 0
SUM OF ALL RESPONSES TO PHQ QUESTIONS 1-9: 0
SUM OF ALL RESPONSES TO PHQ9 QUESTIONS 1 & 2: 0
SUM OF ALL RESPONSES TO PHQ QUESTIONS 1-9: 0
1. LITTLE INTEREST OR PLEASURE IN DOING THINGS: 0

## 2019-03-15 ENCOUNTER — HOSPITAL ENCOUNTER (OUTPATIENT)
Dept: WOMENS IMAGING | Age: 84
Discharge: HOME | End: 2019-03-15
Payer: MEDICARE

## 2019-03-15 DIAGNOSIS — Z12.31 VISIT FOR SCREENING MAMMOGRAM: ICD-10-CM

## 2019-03-15 PROCEDURE — 77063 BREAST TOMOSYNTHESIS BI: CPT

## 2019-03-18 ENCOUNTER — TELEPHONE (OUTPATIENT)
Dept: FAMILY MEDICINE CLINIC | Age: 84
End: 2019-03-18

## 2019-03-25 ENCOUNTER — TELEPHONE (OUTPATIENT)
Dept: FAMILY MEDICINE CLINIC | Age: 84
End: 2019-03-25

## 2019-05-15 ENCOUNTER — OFFICE VISIT (OUTPATIENT)
Dept: FAMILY MEDICINE CLINIC | Age: 84
End: 2019-05-15
Payer: MEDICARE

## 2019-05-15 VITALS
RESPIRATION RATE: 12 BRPM | SYSTOLIC BLOOD PRESSURE: 144 MMHG | WEIGHT: 141 LBS | DIASTOLIC BLOOD PRESSURE: 60 MMHG | TEMPERATURE: 98 F | HEART RATE: 61 BPM | HEIGHT: 60 IN | BODY MASS INDEX: 27.68 KG/M2

## 2019-05-15 DIAGNOSIS — M25.551 RIGHT HIP PAIN: Primary | ICD-10-CM

## 2019-05-15 PROCEDURE — 1123F ACP DISCUSS/DSCN MKR DOCD: CPT | Performed by: FAMILY MEDICINE

## 2019-05-15 PROCEDURE — 1036F TOBACCO NON-USER: CPT | Performed by: FAMILY MEDICINE

## 2019-05-15 PROCEDURE — 1090F PRES/ABSN URINE INCON ASSESS: CPT | Performed by: FAMILY MEDICINE

## 2019-05-15 PROCEDURE — G8427 DOCREV CUR MEDS BY ELIG CLIN: HCPCS | Performed by: FAMILY MEDICINE

## 2019-05-15 PROCEDURE — G8417 CALC BMI ABV UP PARAM F/U: HCPCS | Performed by: FAMILY MEDICINE

## 2019-05-15 PROCEDURE — 4040F PNEUMOC VAC/ADMIN/RCVD: CPT | Performed by: FAMILY MEDICINE

## 2019-05-15 PROCEDURE — 99213 OFFICE O/P EST LOW 20 MIN: CPT | Performed by: FAMILY MEDICINE

## 2019-05-15 RX ORDER — PREDNISONE 10 MG/1
30 TABLET ORAL DAILY
Qty: 21 TABLET | Refills: 0 | Status: SHIPPED | OUTPATIENT
Start: 2019-05-15 | End: 2019-05-22

## 2019-05-15 NOTE — PROGRESS NOTES
SURGERY  5/14    SINUS SURGERY         Social History     Tobacco Use    Smoking status: Never Smoker    Smokeless tobacco: Never Used   Substance Use Topics    Alcohol use: No    Drug use: No       Family History   Problem Relation Age of Onset    Cancer Child     Stroke Sister     Heart Disease Sister     Other Other         visual problems         I have reviewed the patient's past medical history, past surgical history, allergies, medications, social and family history and I have made updates where appropriate. Review of Systems        PHYSICAL EXAM:  BP (!) 144/60   Pulse 61   Temp 98 °F (36.7 °C) (Oral)   Resp 12   Ht 5' (1.524 m)   Wt 141 lb (64 kg)   BMI 27.54 kg/m²     General Appearance: well developed and well- nourished, in no acute distress  Head: normocephalic and atraumatic  ENT: external ear and ear canal normal bilaterally, nose without deformity, nasal mucosa and turbinates normal without polyps, oropharynx normal, dentition is normal for age, no lipor gum lesions noted  Neck: supple and non-tender without mass, no thyromegaly or thyroid nodules, no cervical lymphadenopathy  Pulmonary/Chest: clear to auscultation bilaterally- no wheezes, rales or rhonchi, normal air movement, no respiratory distress or retractions  Cardiovascular: normal rate, regular rhythm, normal S1 and S2, no murmurs, rubs, clicks, or gallops, distal pulses intact  Abdomen: soft, non-tender, non-distended, no rebound or guarding, no masses or hernias noted, no hepatospleenomegaly  Extremities: no cyanosis, clubbing or edema of the lower extremities  Psych:  Normal affect without evidence of depression oranxiety, insight and judgement are appropriate, memory appears intact  Skin: warm and dry, no rash or erythema  No right hip tenderness, there is pain with flexion of the hip and with internal rotation    ASSESSMENT & Minal Venturaon was seen today for groin pain, hip pain, leg pain and insomnia.     Diagnoses and all orders for this visit:    Right hip pain  -     XR HIP RIGHT (2-3 VIEWS); Future  -     predniSONE (DELTASONE) 10 MG tablet; Take 3 tablets by mouth daily for 7 days    -OA vs bursitis as cause of her sx  -Will start prednisone x 7 days  -Obtain XRay  -Call in 1 week, if sx persisting, will refer to Dr Kelsey Leon    Return if symptoms worsen or fail to improve. Controlled Substances Monitoring:                     Nathan Cano received counseling on the following healthy behaviors: medication adherence  Reviewed prior labs and health maintenance. Continue current medications, diet and exercise. Discussed use, benefit, and side effects of prescribed medications. Barriers to medication compliance addressed. Patient given educational materials - see patient instructions. All patient questions answered. Patient voiced understanding.

## 2019-05-15 NOTE — PROGRESS NOTES
Have you changed or started any medications since your last visit including any over-the-counter medicines, vitamins, or herbal medicines? no   Are you having any side effects from any of your medications? -  no  Have you stopped taking any of your medications? Is so, why? -  no    Have you seen any other physician or provider since your last visit? No  Have you had any other diagnostic tests since your last visit? No  Have you been seen in the emergency room and/or had an admission to a hospital since we last saw you? No  Have you had your routine dental cleaning in the past 6 months? no    Have you activated your [employer] account? If not, what are your barriers? No:      Patient Care Team:  Danielle Thomas DO as PCP - General (Family Medicine)  Daneille Thomas DO as PCP - [facility] Attributed Provider  TINO Begum (Audiology)    Medical History Review  Past Medical, Family, and Social History     Defer to provider.

## 2019-05-16 ENCOUNTER — HOSPITAL ENCOUNTER (OUTPATIENT)
Dept: GENERAL RADIOLOGY | Age: 84
Discharge: HOME | End: 2019-05-16
Payer: MEDICARE

## 2019-05-16 ENCOUNTER — HOSPITAL ENCOUNTER (OUTPATIENT)
Age: 84
Discharge: HOME | End: 2019-05-16
Payer: MEDICARE

## 2019-05-16 ENCOUNTER — TELEPHONE (OUTPATIENT)
Dept: FAMILY MEDICINE CLINIC | Age: 84
End: 2019-05-16

## 2019-05-16 DIAGNOSIS — M25.551 RIGHT HIP PAIN: ICD-10-CM

## 2019-05-16 PROCEDURE — 73502 X-RAY EXAM HIP UNI 2-3 VIEWS: CPT

## 2019-05-16 NOTE — TELEPHONE ENCOUNTER
Spoke with pt concerning xray and medication. She states she just started Prednisone and will call when it is finished with outcome.   Denied any questions at this time and will keep June appt

## 2019-05-21 ENCOUNTER — TELEPHONE (OUTPATIENT)
Dept: FAMILY MEDICINE CLINIC | Age: 84
End: 2019-05-21

## 2019-05-21 NOTE — TELEPHONE ENCOUNTER
Patient is calling to give a update on her prednisone dosage and how she feels, she started the medication on Thursday, May 16, 2019 she took 3 in morning, she felt better within 4 hours. On Friday/Saturday she felt good, on Saturday afternoon she had a lot of energy. On Sunday/Monday she started feeling shakey. She did not sleep then and she still has 3 prednisones to take tomorrow.  Please advise to patient at  [phone], she says let it ring 5 times and start to talk when answer machine comes on and say who you are and where from and she will

## 2019-05-21 NOTE — TELEPHONE ENCOUNTER
I would see how she feels after she is off of the prednisone for several days, it can cause the symptoms she is describing.   If it worsens or persist, let me know

## 2019-05-21 NOTE — TELEPHONE ENCOUNTER
Spoke to the pt and she said that the Prednisone has taken the pain away but she is just shaky and has a lot of energy. She denies CP, lightheadedness, etc. Please advise.

## 2019-05-21 NOTE — TELEPHONE ENCOUNTER
----- Message from Ashlye Gutierrez DO sent at 5/21/2019  7:25 AM EDT -----  Please call patient and see if her hip pain has resolved. If it has not, I would like to refer her to Dr Isiah Hart.     Edson  ----- Message -----  From: Ashley Gutierrez DO  Sent: 5/21/2019  To: Ashley Gutierrez DO    Call re hip pain, refer to [facility] if no improvement

## 2019-06-05 ENCOUNTER — OFFICE VISIT (OUTPATIENT)
Dept: FAMILY MEDICINE CLINIC | Age: 84
End: 2019-06-05
Payer: MEDICARE

## 2019-06-05 VITALS
WEIGHT: 139.6 LBS | BODY MASS INDEX: 27.41 KG/M2 | HEIGHT: 60 IN | HEART RATE: 75 BPM | SYSTOLIC BLOOD PRESSURE: 121 MMHG | RESPIRATION RATE: 16 BRPM | TEMPERATURE: 97.7 F | DIASTOLIC BLOOD PRESSURE: 58 MMHG

## 2019-06-05 DIAGNOSIS — E03.9 HYPOTHYROIDISM, UNSPECIFIED TYPE: ICD-10-CM

## 2019-06-05 DIAGNOSIS — I10 ESSENTIAL HYPERTENSION: Primary | ICD-10-CM

## 2019-06-05 DIAGNOSIS — R73.01 IFG (IMPAIRED FASTING GLUCOSE): ICD-10-CM

## 2019-06-05 DIAGNOSIS — F41.1 GAD (GENERALIZED ANXIETY DISORDER): ICD-10-CM

## 2019-06-05 DIAGNOSIS — N18.30 CHRONIC KIDNEY DISEASE, STAGE III (MODERATE) (HCC): ICD-10-CM

## 2019-06-05 LAB — HBA1C MFR BLD: 6.1 %

## 2019-06-05 PROCEDURE — 99214 OFFICE O/P EST MOD 30 MIN: CPT | Performed by: FAMILY MEDICINE

## 2019-06-05 PROCEDURE — 1123F ACP DISCUSS/DSCN MKR DOCD: CPT | Performed by: FAMILY MEDICINE

## 2019-06-05 PROCEDURE — 4040F PNEUMOC VAC/ADMIN/RCVD: CPT | Performed by: FAMILY MEDICINE

## 2019-06-05 PROCEDURE — G8417 CALC BMI ABV UP PARAM F/U: HCPCS | Performed by: FAMILY MEDICINE

## 2019-06-05 PROCEDURE — 83036 HEMOGLOBIN GLYCOSYLATED A1C: CPT | Performed by: FAMILY MEDICINE

## 2019-06-05 PROCEDURE — G8427 DOCREV CUR MEDS BY ELIG CLIN: HCPCS | Performed by: FAMILY MEDICINE

## 2019-06-05 PROCEDURE — 1036F TOBACCO NON-USER: CPT | Performed by: FAMILY MEDICINE

## 2019-06-05 PROCEDURE — 1090F PRES/ABSN URINE INCON ASSESS: CPT | Performed by: FAMILY MEDICINE

## 2019-06-05 NOTE — PROGRESS NOTES
Bigg Meyers is a 80 y.o. female that presents for     Chief Complaint   Patient presents with    Follow-up     Pt presents for a 6 month f/u.  Medication Problem     Pt would like to discuss the Prednisone which has been causing nervousness, shaking, dry mouth, and heart burn.  Insomnia     Pt has been having issues getting sleep. She said she gets about 5 hours of sleep per night. She has been taking Melatonin with some relief. BP (!) 121/58   Pulse 75   Temp 97.7 °F (36.5 °C)   Resp 16   Ht 5' (1.524 m)   Wt 139 lb 9.6 oz (63.3 kg)   BMI 27.26 kg/m²       HPI:    Was on prednisone started at last visit for her right hip pain. States that the hip pain is resolved, but had a lot of SEs from the prednisone. Notes still feeling restless since she took it. Has noted some dry mouth. Has ativan at home and has taken some with good relief. She has noted some chronic insomnia. Sleeps about 5 hours per night. States that this does not bother her much and does not want to take anything more. Hypothyroidism:  Recent TSH mildly elevated, free t4 wnl. She is on [place] Thryoid. Denies fatigue. HTN    Does patient check BP regularly at home? - No  Current Medication regimen - amlodipine, aldactone  Tolerating medications well? - yes    Shortness of breath or chest pain? No  Headache or visual complaints? No  Neurologic changes like confusion? No  Extremity edema?  No    BP Readings from Last 3 Encounters:   06/05/19 (!) 121/58   05/15/19 (!) 144/60   03/05/19 134/68         Health Maintenance   Topic Date Due    DTaP/Tdap/Td vaccine (1 - Tdap) 02/21/1944    Shingles Vaccine (2 of 3) 04/12/2014    Pneumococcal 65+ years Vaccine (2 of 2 - PPSV23) 03/17/2017    TSH testing  05/04/2020    Potassium monitoring  05/04/2020    Creatinine monitoring  05/04/2020    Flu vaccine  Completed       Past Medical History:   Diagnosis Date    Arthritis     Cancer ([facility] [address] 2013    SKIN CANCER ON LEFT ANKLE    Diverticulosis     Hyperlipidemia     Hypertension     Hypothyroidism     Psoriasis     Thyroid disorder        Past Surgical History:   Procedure Laterality Date    BREAST BIOPSY Left     benign    BREAST SURGERY Left 6/1966    Lumpectomy    CARPAL TUNNEL RELEASE Right 1/30/2013    CARPAL TUNNEL RELEASE Left 7/25/13    CATARACT REMOVAL Bilateral 1999    COLON SURGERY  11/1999    resection    COLONOSCOPY  2003, 2006, 2011    HYSTERECTOMY  2/23/1970    HYSTERECTOMY, TOTAL ABDOMINAL      KNEE ARTHROSCOPY Right 11/21/2007    meniscectomies    KNEE SURGERY Left 2000    replacement    MYRINGOTOMY Right 07/01/2015    tube insertion    OVARY REMOVAL Bilateral 7/12/2001    oophorectomy    OVARY REMOVAL Bilateral     SHOULDER SURGERY  5/14    SINUS SURGERY         Social History     Tobacco Use    Smoking status: Never Smoker    Smokeless tobacco: Never Used   Substance Use Topics    Alcohol use: No    Drug use: No       Family History   Problem Relation Age of Onset    Cancer Child     Stroke Sister     Heart Disease Sister     Other Other         visual problems         I have reviewed the patient's past medical history, past surgical history, allergies, medications, social and family history and I have made updates where appropriate.     Review of Systems        PHYSICAL EXAM:  BP (!) 121/58   Pulse 75   Temp 97.7 °F (36.5 °C)   Resp 16   Ht 5' (1.524 m)   Wt 139 lb 9.6 oz (63.3 kg)   BMI 27.26 kg/m²     General Appearance: well developed and well- nourished, in no acute distress  Head: normocephalic and atraumatic  ENT: external ear and ear canal normal bilaterally, nose without deformity, nasal mucosa and turbinates normal without polyps, oropharynx normal, dentition is normal for age, no lipor gum lesions noted  Neck: supple and non-tender without mass, no thyromegaly or thyroid nodules, no cervical lymphadenopathy  Pulmonary/Chest: clear to auscultation bilaterally- no

## 2019-06-05 NOTE — PROGRESS NOTES
Visit Information    Have you changed or started any medications since your last visit including any over-the-counter medicines, vitamins, or herbal medicines? no   Are you having any side effects from any of your medications? -  no  Have you stopped taking any of your medications? Is so, why? -  no    Have you seen any other physician or provider since your last visit? No  Have you had any other diagnostic tests since your last visit? No  Have you been seen in the emergency room and/or had an admission to a hospital since we last saw you? No  Have you had your routine dental cleaning in the past 6 months? yes - routine    Have you activated your [employer] account? If not, what are your barriers? No: pt declined     Patient Care Team:  Renaldo Cabrera DO as PCP - General (Family Medicine)  Renaldo Cabrera DO as PCP - [facility]  TINO Edwards (Audiology)    Medical History Review  Past Medical, Family, and Social History reviewed and does contribute to the patient presenting condition    Health Maintenance   Topic Date Due    DTaP/Tdap/Td vaccine (1 - Tdap) 02/21/1944    Shingles Vaccine (2 of 3) 04/12/2014    Pneumococcal 65+ years Vaccine (2 of 2 - PPSV23) 03/17/2017    TSH testing  05/04/2020    Potassium monitoring  05/04/2020    Creatinine monitoring  05/04/2020    Flu vaccine  Completed

## 2019-07-16 ENCOUNTER — OFFICE VISIT (OUTPATIENT)
Dept: FAMILY MEDICINE CLINIC | Age: 84
End: 2019-07-16
Payer: MEDICARE

## 2019-07-16 ENCOUNTER — HOSPITAL ENCOUNTER (OUTPATIENT)
Dept: GENERAL RADIOLOGY | Age: 84
Discharge: HOME | End: 2019-07-16
Payer: MEDICARE

## 2019-07-16 ENCOUNTER — HOSPITAL ENCOUNTER (OUTPATIENT)
Age: 84
Discharge: HOME | End: 2019-07-16
Payer: MEDICARE

## 2019-07-16 VITALS
BODY MASS INDEX: 27.48 KG/M2 | HEIGHT: 60 IN | RESPIRATION RATE: 14 BRPM | TEMPERATURE: 97.7 F | SYSTOLIC BLOOD PRESSURE: 123 MMHG | HEART RATE: 78 BPM | DIASTOLIC BLOOD PRESSURE: 68 MMHG | WEIGHT: 140 LBS

## 2019-07-16 DIAGNOSIS — M54.6 ACUTE BILATERAL THORACIC BACK PAIN: ICD-10-CM

## 2019-07-16 DIAGNOSIS — M54.6 ACUTE BILATERAL THORACIC BACK PAIN: Primary | ICD-10-CM

## 2019-07-16 DIAGNOSIS — M25.512 BILATERAL SHOULDER PAIN, UNSPECIFIED CHRONICITY: ICD-10-CM

## 2019-07-16 DIAGNOSIS — M25.511 BILATERAL SHOULDER PAIN, UNSPECIFIED CHRONICITY: ICD-10-CM

## 2019-07-16 PROCEDURE — 1090F PRES/ABSN URINE INCON ASSESS: CPT | Performed by: FAMILY MEDICINE

## 2019-07-16 PROCEDURE — G8417 CALC BMI ABV UP PARAM F/U: HCPCS | Performed by: FAMILY MEDICINE

## 2019-07-16 PROCEDURE — 1036F TOBACCO NON-USER: CPT | Performed by: FAMILY MEDICINE

## 2019-07-16 PROCEDURE — 99213 OFFICE O/P EST LOW 20 MIN: CPT | Performed by: FAMILY MEDICINE

## 2019-07-16 PROCEDURE — 93000 ELECTROCARDIOGRAM COMPLETE: CPT | Performed by: FAMILY MEDICINE

## 2019-07-16 PROCEDURE — G8427 DOCREV CUR MEDS BY ELIG CLIN: HCPCS | Performed by: FAMILY MEDICINE

## 2019-07-16 PROCEDURE — 1123F ACP DISCUSS/DSCN MKR DOCD: CPT | Performed by: FAMILY MEDICINE

## 2019-07-16 PROCEDURE — 4040F PNEUMOC VAC/ADMIN/RCVD: CPT | Performed by: FAMILY MEDICINE

## 2019-07-16 PROCEDURE — 72072 X-RAY EXAM THORAC SPINE 3VWS: CPT

## 2019-07-16 RX ORDER — VITAMIN B COMPLEX
1 CAPSULE ORAL DAILY
COMMUNITY

## 2019-07-16 RX ORDER — BACLOFEN 10 MG/1
5 TABLET ORAL 3 TIMES DAILY PRN
Qty: 15 TABLET | Refills: 0 | Status: SHIPPED | OUTPATIENT
Start: 2019-07-16 | End: 2019-07-22 | Stop reason: SINTOL

## 2019-07-16 NOTE — PATIENT INSTRUCTIONS
Patient Education        Back Spasm: Care Instructions  Your Care Instructions  A back spasm is sudden tightness and pain in your back muscles. It may happen from overuse or an injury. Things like sleeping in an awkward way, bending, lifting, standing, or sitting can sometimes cause a spasm. But the cause isn't always clear. Home treatment includes using heat or ice, taking over-the-counter (OTC) pain medicines, and avoiding activities that may cause back pain. For a back spasm that doesn't get better with home care, your doctor may prescribe medicine. Treatments such as massage or manipulation may also help ease a back spasm. Your doctor may also suggest exercise or physical therapy to help improve strength and flexibility in your back muscles. In most cases, getting back to your normal activities is good for your back. Just make sure to avoid doing things that make your pain worse. Follow-up care is a key part of your treatment and safety. Be sure to make and go to all appointments, and call your doctor if you are having problems. It's also a good idea to know your test results and keep a list of the medicines you take. How can you care for yourself at home?   Heat, ice, and medicines    · To relieve pain, use heat or ice (whichever feels better) on the affected area. ? Put a warm water bottle, a heating pad set on low, or a warm cloth on your back. Put a thin cloth between the heating pad and your skin. Do not go to sleep with a heating pad on your skin. ? Try ice or a cold pack on the area for 10 to 20 minutes at a time. Put a thin cloth between the ice and your skin.     · For most back pain you can take over-the-counter pain medicine. Nonsteroidal anti-inflammatory drugs (NSAIDs) such as ibuprofen or naproxen seem to work best. But if you can't take NSAIDs you can try acetaminophen. Your doctor can prescribe stronger medicines if needed. Be safe with medicines.  Read and follow all instructions on the

## 2019-07-17 ENCOUNTER — TELEPHONE (OUTPATIENT)
Dept: FAMILY MEDICINE CLINIC | Age: 84
End: 2019-07-17

## 2019-07-17 DIAGNOSIS — F41.9 ANXIETY: ICD-10-CM

## 2019-07-17 RX ORDER — LORAZEPAM 0.5 MG/1
0.5 TABLET ORAL EVERY 12 HOURS PRN
Qty: 20 TABLET | Refills: 0 | Status: SHIPPED | OUTPATIENT
Start: 2019-07-17 | End: 2019-08-20 | Stop reason: SDUPTHER

## 2019-07-17 NOTE — TELEPHONE ENCOUNTER
----- Message from Mike Hodges DO sent at 7/17/2019  7:50 AM EDT -----  Shira Vigil did not display any acute changes. Recommend she continue the treatment we discussed yesterday.

## 2019-07-22 ENCOUNTER — OFFICE VISIT (OUTPATIENT)
Dept: FAMILY MEDICINE CLINIC | Age: 84
End: 2019-07-22
Payer: MEDICARE

## 2019-07-22 VITALS
TEMPERATURE: 98.3 F | DIASTOLIC BLOOD PRESSURE: 60 MMHG | HEART RATE: 88 BPM | SYSTOLIC BLOOD PRESSURE: 132 MMHG | BODY MASS INDEX: 27.29 KG/M2 | RESPIRATION RATE: 16 BRPM | WEIGHT: 139 LBS | HEIGHT: 60 IN

## 2019-07-22 DIAGNOSIS — R06.02 SHORTNESS OF BREATH: ICD-10-CM

## 2019-07-22 DIAGNOSIS — R68.84 JAW PAIN: ICD-10-CM

## 2019-07-22 DIAGNOSIS — R11.0 NAUSEA: ICD-10-CM

## 2019-07-22 DIAGNOSIS — M54.6 ACUTE MIDLINE THORACIC BACK PAIN: Primary | ICD-10-CM

## 2019-07-22 PROCEDURE — 4040F PNEUMOC VAC/ADMIN/RCVD: CPT | Performed by: FAMILY MEDICINE

## 2019-07-22 PROCEDURE — 1123F ACP DISCUSS/DSCN MKR DOCD: CPT | Performed by: FAMILY MEDICINE

## 2019-07-22 PROCEDURE — 1090F PRES/ABSN URINE INCON ASSESS: CPT | Performed by: FAMILY MEDICINE

## 2019-07-22 PROCEDURE — 99213 OFFICE O/P EST LOW 20 MIN: CPT | Performed by: FAMILY MEDICINE

## 2019-07-22 PROCEDURE — G8417 CALC BMI ABV UP PARAM F/U: HCPCS | Performed by: FAMILY MEDICINE

## 2019-07-22 PROCEDURE — G8427 DOCREV CUR MEDS BY ELIG CLIN: HCPCS | Performed by: FAMILY MEDICINE

## 2019-07-22 PROCEDURE — 1036F TOBACCO NON-USER: CPT | Performed by: FAMILY MEDICINE

## 2019-07-22 NOTE — PROGRESS NOTES
Milvia Page is a 80 y.o. female that presents for     Chief Complaint   Patient presents with    Shoulder Pain     bilat continues  up in to neck as per last  office vistit        /60   Pulse 88   Temp 98.3 °F (36.8 °C) (Oral)   Resp 16   Ht 5' (1.524 m)   Wt 139 lb (63 kg)   BMI 27.15 kg/m²       HPI:    States that she continues to have thoracic back pain. Was not able to to take the baclofen due to side effects. Notes that pain more with reaching with both hands or picking something up. States that the pain will go up into her posterior neck and into her jaw as well. Occurs at rest and with exertion. Denies associated CP. Does note intermittent SOB. States that she will get nausea with. States that it also occurs with stress. If she takes an ativan, it will help with the pain. Patient told me last week that sx were of recent onset, but now states that they have been present for 3+ months.     Health Maintenance   Topic Date Due    DTaP/Tdap/Td vaccine (1 - Tdap) 02/21/1944    Annual Wellness Visit (AWV)  02/21/1988    Shingles Vaccine (2 of 3) 04/12/2014    Pneumococcal 65+ years Vaccine (2 of 2 - PPSV23) 03/17/2017    Flu vaccine (1) 09/01/2019    TSH testing  05/04/2020    Potassium monitoring  05/04/2020    Creatinine monitoring  05/04/2020       Past Medical History:   Diagnosis Date    Arthritis     Cancer ([facility] [address] 2013    SKIN CANCER ON LEFT ANKLE    Diverticulosis     Hyperlipidemia     Hypertension     Hypothyroidism     Psoriasis     Thyroid disorder        Past Surgical History:   Procedure Laterality Date    BREAST BIOPSY Left     benign    BREAST SURGERY Left 6/1966    Lumpectomy    CARPAL TUNNEL RELEASE Right 1/30/2013    CARPAL TUNNEL RELEASE Left 7/25/13    CATARACT REMOVAL Bilateral 1999    COLON SURGERY  11/1999    resection    COLONOSCOPY  2003, 2006, 2011    HYSTERECTOMY  2/23/1970    HYSTERECTOMY, TOTAL ABDOMINAL      KNEE ARTHROSCOPY Right visit:    Acute midline thoracic back pain  -     Stress test, lexiscan; Future    Jaw pain  -     Stress test, lexiscan; Future    Nausea  -     Stress test, lexiscan; Future    Shortness of breath   -     Stress test, lexiscan; Future     -Given persistent sx that have not responded to muscular etiology Tx, will obtain stress to evaluate for anginal equivalent  -If stress wnl, will refer to PT  -Patient advised to call immediately or go to ER if any worsening of symptoms      Return if symptoms worsen or fail to improve. Controlled Substance Monitoring:    Acute and Chronic Pain Monitoring:   RX Monitoring 9/10/2018   Attestation The Prescription Monitoring Report for this patient was reviewed today. Periodic Controlled Substance Monitoring No signs of potential drug abuse or diversion identified. Christian Reagan received counseling on the following healthy behaviors: medication adherence  Reviewed prior labs and health maintenance. Continue current medications, diet and exercise. Discussed use, benefit, and side effects of prescribed medications. Barriers to medication compliance addressed. Patient given educational materials - see patient instructions. All patient questions answered. Patient voiced understanding.

## 2019-07-23 ENCOUNTER — TELEPHONE (OUTPATIENT)
Dept: FAMILY MEDICINE CLINIC | Age: 84
End: 2019-07-23

## 2019-08-05 ENCOUNTER — HOSPITAL ENCOUNTER (OUTPATIENT)
Dept: NON INVASIVE DIAGNOSTICS | Age: 84
Discharge: HOME | End: 2019-08-05
Payer: MEDICARE

## 2019-08-05 ENCOUNTER — TELEPHONE (OUTPATIENT)
Dept: FAMILY MEDICINE CLINIC | Age: 84
End: 2019-08-05

## 2019-08-05 DIAGNOSIS — R11.0 NAUSEA: ICD-10-CM

## 2019-08-05 DIAGNOSIS — M54.6 CHRONIC MIDLINE THORACIC BACK PAIN: Primary | ICD-10-CM

## 2019-08-05 DIAGNOSIS — G89.29 CHRONIC MIDLINE THORACIC BACK PAIN: Primary | ICD-10-CM

## 2019-08-05 DIAGNOSIS — R06.02 SHORTNESS OF BREATH: ICD-10-CM

## 2019-08-05 DIAGNOSIS — R68.84 JAW PAIN: ICD-10-CM

## 2019-08-05 DIAGNOSIS — M54.6 ACUTE MIDLINE THORACIC BACK PAIN: ICD-10-CM

## 2019-08-05 PROCEDURE — 93017 CV STRESS TEST TRACING ONLY: CPT

## 2019-08-05 PROCEDURE — 78452 HT MUSCLE IMAGE SPECT MULT: CPT

## 2019-08-05 PROCEDURE — 3430000000 HC RX DIAGNOSTIC RADIOPHARMACEUTICAL: Performed by: FAMILY MEDICINE

## 2019-08-05 PROCEDURE — A9500 TC99M SESTAMIBI: HCPCS | Performed by: FAMILY MEDICINE

## 2019-08-05 PROCEDURE — 2709999900 HC NON-CHARGEABLE SUPPLY

## 2019-08-05 PROCEDURE — 6360000002 HC RX W HCPCS

## 2019-08-05 RX ADMIN — Medication 10.8 MILLICURIE: at 09:56

## 2019-08-05 RX ADMIN — Medication 35 MILLICURIE: at 11:00

## 2019-08-05 NOTE — TELEPHONE ENCOUNTER
----- Message from Barb Goss DO sent at 8/5/2019  5:16 PM EDT -----  The stress test did not display and concerning findings within her heart, is her back pain improved?

## 2019-08-06 NOTE — TELEPHONE ENCOUNTER
Pt returned our call & states her back is feeling a little better. Pt states she does want Dr Daryl Menard to know she's been upset lately by things on the tv & her phone so she has been taking half an Ativan per day & it helps.

## 2019-08-20 DIAGNOSIS — F41.9 ANXIETY: ICD-10-CM

## 2019-08-21 RX ORDER — LORAZEPAM 0.5 MG/1
TABLET ORAL
Qty: 20 TABLET | Refills: 0 | Status: SHIPPED | OUTPATIENT
Start: 2019-08-21 | End: 2019-12-09 | Stop reason: SDUPTHER

## 2019-09-25 ENCOUNTER — HOSPITAL ENCOUNTER (OUTPATIENT)
Dept: CT IMAGING | Age: 84
Discharge: HOME | End: 2019-09-25
Payer: MEDICARE

## 2019-09-25 DIAGNOSIS — J32.9 SINUSITIS, UNSPECIFIED CHRONICITY, UNSPECIFIED LOCATION: ICD-10-CM

## 2019-09-25 PROCEDURE — 70486 CT MAXILLOFACIAL W/O DYE: CPT

## 2019-10-08 ENCOUNTER — OFFICE VISIT (OUTPATIENT)
Dept: FAMILY MEDICINE CLINIC | Age: 84
End: 2019-10-08
Payer: MEDICARE

## 2019-10-08 VITALS
TEMPERATURE: 97.8 F | SYSTOLIC BLOOD PRESSURE: 128 MMHG | DIASTOLIC BLOOD PRESSURE: 56 MMHG | BODY MASS INDEX: 26.56 KG/M2 | HEART RATE: 80 BPM | WEIGHT: 136 LBS | RESPIRATION RATE: 10 BRPM

## 2019-10-08 DIAGNOSIS — M54.6 CHRONIC MIDLINE THORACIC BACK PAIN: Primary | ICD-10-CM

## 2019-10-08 DIAGNOSIS — I10 ESSENTIAL HYPERTENSION: ICD-10-CM

## 2019-10-08 DIAGNOSIS — R09.81 NASAL CONGESTION: ICD-10-CM

## 2019-10-08 DIAGNOSIS — G89.29 CHRONIC MIDLINE THORACIC BACK PAIN: Primary | ICD-10-CM

## 2019-10-08 DIAGNOSIS — F41.9 ANXIETY: ICD-10-CM

## 2019-10-08 DIAGNOSIS — Z23 NEEDS FLU SHOT: ICD-10-CM

## 2019-10-08 DIAGNOSIS — R73.01 IFG (IMPAIRED FASTING GLUCOSE): ICD-10-CM

## 2019-10-08 DIAGNOSIS — E03.9 HYPOTHYROIDISM, UNSPECIFIED TYPE: ICD-10-CM

## 2019-10-08 PROCEDURE — G8417 CALC BMI ABV UP PARAM F/U: HCPCS | Performed by: FAMILY MEDICINE

## 2019-10-08 PROCEDURE — 1090F PRES/ABSN URINE INCON ASSESS: CPT | Performed by: FAMILY MEDICINE

## 2019-10-08 PROCEDURE — 4040F PNEUMOC VAC/ADMIN/RCVD: CPT | Performed by: FAMILY MEDICINE

## 2019-10-08 PROCEDURE — 1123F ACP DISCUSS/DSCN MKR DOCD: CPT | Performed by: FAMILY MEDICINE

## 2019-10-08 PROCEDURE — G8482 FLU IMMUNIZE ORDER/ADMIN: HCPCS | Performed by: FAMILY MEDICINE

## 2019-10-08 PROCEDURE — G0008 ADMIN INFLUENZA VIRUS VAC: HCPCS | Performed by: FAMILY MEDICINE

## 2019-10-08 PROCEDURE — 99214 OFFICE O/P EST MOD 30 MIN: CPT | Performed by: FAMILY MEDICINE

## 2019-10-08 PROCEDURE — G8427 DOCREV CUR MEDS BY ELIG CLIN: HCPCS | Performed by: FAMILY MEDICINE

## 2019-10-08 PROCEDURE — 90653 IIV ADJUVANT VACCINE IM: CPT | Performed by: FAMILY MEDICINE

## 2019-10-08 PROCEDURE — 1036F TOBACCO NON-USER: CPT | Performed by: FAMILY MEDICINE

## 2019-10-08 RX ORDER — GLUCOSAMINE HCL/CHONDROITIN SU 500-400 MG
CAPSULE ORAL
Qty: 100 STRIP | Refills: 0 | Status: SHIPPED | OUTPATIENT
Start: 2019-10-08 | End: 2021-07-06 | Stop reason: SDUPTHER

## 2019-10-08 RX ORDER — FLUTICASONE PROPIONATE 50 MCG
2 SPRAY, SUSPENSION (ML) NASAL DAILY
COMMUNITY
End: 2022-01-06

## 2019-10-08 RX ORDER — LEVOTHYROXINE AND LIOTHYRONINE 38; 9 UG/1; UG/1
30 TABLET ORAL DAILY
Qty: 45 TABLET | Refills: 3 | Status: SHIPPED | OUTPATIENT
Start: 2019-10-08 | End: 2020-10-27 | Stop reason: SDUPTHER

## 2019-11-04 ENCOUNTER — TELEPHONE (OUTPATIENT)
Dept: FAMILY MEDICINE CLINIC | Age: 84
End: 2019-11-04

## 2019-11-07 ENCOUNTER — OFFICE VISIT (OUTPATIENT)
Dept: FAMILY MEDICINE CLINIC | Age: 84
End: 2019-11-07
Payer: MEDICARE

## 2019-11-07 VITALS
SYSTOLIC BLOOD PRESSURE: 130 MMHG | BODY MASS INDEX: 25.68 KG/M2 | HEART RATE: 78 BPM | HEIGHT: 61 IN | DIASTOLIC BLOOD PRESSURE: 60 MMHG | WEIGHT: 136 LBS | RESPIRATION RATE: 12 BRPM | TEMPERATURE: 98.1 F

## 2019-11-07 DIAGNOSIS — J01.90 ACUTE RHINOSINUSITIS: ICD-10-CM

## 2019-11-07 PROCEDURE — 1036F TOBACCO NON-USER: CPT | Performed by: FAMILY MEDICINE

## 2019-11-07 PROCEDURE — G8427 DOCREV CUR MEDS BY ELIG CLIN: HCPCS | Performed by: FAMILY MEDICINE

## 2019-11-07 PROCEDURE — 1090F PRES/ABSN URINE INCON ASSESS: CPT | Performed by: FAMILY MEDICINE

## 2019-11-07 PROCEDURE — 99213 OFFICE O/P EST LOW 20 MIN: CPT | Performed by: FAMILY MEDICINE

## 2019-11-07 PROCEDURE — G8482 FLU IMMUNIZE ORDER/ADMIN: HCPCS | Performed by: FAMILY MEDICINE

## 2019-11-07 PROCEDURE — 4040F PNEUMOC VAC/ADMIN/RCVD: CPT | Performed by: FAMILY MEDICINE

## 2019-11-07 PROCEDURE — G8417 CALC BMI ABV UP PARAM F/U: HCPCS | Performed by: FAMILY MEDICINE

## 2019-11-07 PROCEDURE — 1123F ACP DISCUSS/DSCN MKR DOCD: CPT | Performed by: FAMILY MEDICINE

## 2019-11-07 RX ORDER — AMOXICILLIN AND CLAVULANATE POTASSIUM 875; 125 MG/1; MG/1
1 TABLET, FILM COATED ORAL 2 TIMES DAILY
Qty: 14 TABLET | Refills: 0 | Status: SHIPPED | OUTPATIENT
Start: 2019-11-07 | End: 2020-12-03 | Stop reason: SDUPTHER

## 2019-11-26 ENCOUNTER — OFFICE VISIT (OUTPATIENT)
Dept: FAMILY MEDICINE CLINIC | Age: 84
End: 2019-11-26
Payer: MEDICARE

## 2019-11-26 ENCOUNTER — NURSE ONLY (OUTPATIENT)
Dept: LAB | Age: 84
End: 2019-11-26

## 2019-11-26 ENCOUNTER — TELEPHONE (OUTPATIENT)
Dept: FAMILY MEDICINE CLINIC | Age: 84
End: 2019-11-26

## 2019-11-26 VITALS
HEART RATE: 88 BPM | RESPIRATION RATE: 12 BRPM | TEMPERATURE: 98.2 F | DIASTOLIC BLOOD PRESSURE: 60 MMHG | SYSTOLIC BLOOD PRESSURE: 144 MMHG | WEIGHT: 134 LBS | HEIGHT: 60 IN | BODY MASS INDEX: 26.31 KG/M2

## 2019-11-26 DIAGNOSIS — E03.8 OTHER SPECIFIED HYPOTHYROIDISM: ICD-10-CM

## 2019-11-26 DIAGNOSIS — R30.0 DYSURIA: ICD-10-CM

## 2019-11-26 DIAGNOSIS — K21.9 GASTROESOPHAGEAL REFLUX DISEASE WITHOUT ESOPHAGITIS: ICD-10-CM

## 2019-11-26 DIAGNOSIS — G89.29 CHRONIC MIDLINE THORACIC BACK PAIN: Primary | ICD-10-CM

## 2019-11-26 DIAGNOSIS — M54.6 CHRONIC MIDLINE THORACIC BACK PAIN: Primary | ICD-10-CM

## 2019-11-26 LAB
BILIRUBIN, POC: NORMAL
BLOOD URINE, POC: NORMAL
CLARITY, POC: CLEAR
COLOR, POC: YELLOW
GLUCOSE URINE, POC: NORMAL
KETONES, POC: NORMAL
LEUKOCYTE EST, POC: NORMAL
NITRITE, POC: NORMAL
PH, POC: 7
PROTEIN, POC: NORMAL
SPECIFIC GRAVITY, POC: 1.01
T4 FREE: 1.22 NG/DL (ref 0.93–1.76)
TSH SERPL DL<=0.05 MIU/L-ACNC: 2.21 UIU/ML (ref 0.4–4.2)
UROBILINOGEN, POC: 0.2

## 2019-11-26 PROCEDURE — 99214 OFFICE O/P EST MOD 30 MIN: CPT | Performed by: FAMILY MEDICINE

## 2019-11-26 PROCEDURE — 4040F PNEUMOC VAC/ADMIN/RCVD: CPT | Performed by: FAMILY MEDICINE

## 2019-11-26 PROCEDURE — 81003 URINALYSIS AUTO W/O SCOPE: CPT | Performed by: FAMILY MEDICINE

## 2019-11-26 PROCEDURE — G8482 FLU IMMUNIZE ORDER/ADMIN: HCPCS | Performed by: FAMILY MEDICINE

## 2019-11-26 PROCEDURE — 1090F PRES/ABSN URINE INCON ASSESS: CPT | Performed by: FAMILY MEDICINE

## 2019-11-26 PROCEDURE — G8427 DOCREV CUR MEDS BY ELIG CLIN: HCPCS | Performed by: FAMILY MEDICINE

## 2019-11-26 PROCEDURE — 1123F ACP DISCUSS/DSCN MKR DOCD: CPT | Performed by: FAMILY MEDICINE

## 2019-11-26 PROCEDURE — 1036F TOBACCO NON-USER: CPT | Performed by: FAMILY MEDICINE

## 2019-11-26 PROCEDURE — G8417 CALC BMI ABV UP PARAM F/U: HCPCS | Performed by: FAMILY MEDICINE

## 2019-11-26 RX ORDER — FAMOTIDINE 20 MG/1
20 TABLET, FILM COATED ORAL DAILY
Qty: 90 TABLET | Refills: 3 | Status: SHIPPED | OUTPATIENT
Start: 2019-11-26 | End: 2020-09-21 | Stop reason: SDUPTHER

## 2019-11-26 RX ORDER — FAMOTIDINE 20 MG/1
20 TABLET, FILM COATED ORAL DAILY
Qty: 30 TABLET | Refills: 3 | Status: SHIPPED | OUTPATIENT
Start: 2019-11-26 | End: 2019-11-26 | Stop reason: SDUPTHER

## 2019-12-09 DIAGNOSIS — F41.9 ANXIETY: ICD-10-CM

## 2019-12-09 RX ORDER — LORAZEPAM 0.5 MG/1
TABLET ORAL
Qty: 40 TABLET | Refills: 0 | Status: SHIPPED | OUTPATIENT
Start: 2019-12-09 | End: 2020-08-27 | Stop reason: SDUPTHER

## 2019-12-19 ENCOUNTER — TELEPHONE (OUTPATIENT)
Dept: FAMILY MEDICINE CLINIC | Age: 84
End: 2019-12-19

## 2020-01-02 RX ORDER — SPIRONOLACTONE 50 MG/1
50 TABLET, FILM COATED ORAL DAILY
Qty: 90 TABLET | Refills: 3 | Status: SHIPPED | OUTPATIENT
Start: 2020-01-02 | End: 2020-12-29 | Stop reason: SDUPTHER

## 2020-01-29 ENCOUNTER — OFFICE VISIT (OUTPATIENT)
Dept: FAMILY MEDICINE CLINIC | Age: 85
End: 2020-01-29
Payer: MEDICARE

## 2020-01-29 VITALS
DIASTOLIC BLOOD PRESSURE: 69 MMHG | HEART RATE: 72 BPM | TEMPERATURE: 98.1 F | SYSTOLIC BLOOD PRESSURE: 127 MMHG | WEIGHT: 132 LBS | RESPIRATION RATE: 14 BRPM | HEIGHT: 61 IN | OXYGEN SATURATION: 97 % | BODY MASS INDEX: 24.92 KG/M2

## 2020-01-29 PROCEDURE — 1036F TOBACCO NON-USER: CPT | Performed by: FAMILY MEDICINE

## 2020-01-29 PROCEDURE — G8427 DOCREV CUR MEDS BY ELIG CLIN: HCPCS | Performed by: FAMILY MEDICINE

## 2020-01-29 PROCEDURE — G8482 FLU IMMUNIZE ORDER/ADMIN: HCPCS | Performed by: FAMILY MEDICINE

## 2020-01-29 PROCEDURE — 99213 OFFICE O/P EST LOW 20 MIN: CPT | Performed by: FAMILY MEDICINE

## 2020-01-29 PROCEDURE — G8417 CALC BMI ABV UP PARAM F/U: HCPCS | Performed by: FAMILY MEDICINE

## 2020-01-29 PROCEDURE — 1090F PRES/ABSN URINE INCON ASSESS: CPT | Performed by: FAMILY MEDICINE

## 2020-01-29 PROCEDURE — 1123F ACP DISCUSS/DSCN MKR DOCD: CPT | Performed by: FAMILY MEDICINE

## 2020-01-29 PROCEDURE — 4040F PNEUMOC VAC/ADMIN/RCVD: CPT | Performed by: FAMILY MEDICINE

## 2020-01-29 ASSESSMENT — PATIENT HEALTH QUESTIONNAIRE - PHQ9
2. FEELING DOWN, DEPRESSED OR HOPELESS: 0
1. LITTLE INTEREST OR PLEASURE IN DOING THINGS: 0
SUM OF ALL RESPONSES TO PHQ QUESTIONS 1-9: 0
SUM OF ALL RESPONSES TO PHQ9 QUESTIONS 1 & 2: 0
SUM OF ALL RESPONSES TO PHQ QUESTIONS 1-9: 0

## 2020-01-29 NOTE — PROGRESS NOTES
Jay Cushing is a 80 y.o. female that presents for     Chief Complaint   Patient presents with    Other     little mouth lesion on left side of mouth- couple nights ago- not painful    Immunizations     wants to wait on pneumonia shot until her next appt       /69   Pulse 72   Temp 98.1 °F (36.7 °C) (Oral)   Resp 14   Ht 5' 0.5\" (1.537 m)   Wt 132 lb (59.9 kg)   SpO2 97%   BMI 25.36 kg/m²       HPI:    Skin Lesion    HPI:    Location - left gum/cheek, states that she noticed this while eating, states that it rubs up against her tongue. Length of time it has been present - at least several days  Recent change in size, color or shape? - No  Pruritic? No  Bleeding or drainage? No  Painful? No  No tooth pain, heat/cold sensitivity.      Health Maintenance   Topic Date Due    DTaP/Tdap/Td vaccine (1 - Tdap) 02/21/1936    Shingles Vaccine (2 of 3) 04/12/2014    Pneumococcal 65+ years Vaccine (2 of 2 - PPSV23) 03/17/2017    Annual Wellness Visit (AWV)  11/13/2019    Potassium monitoring  11/02/2020    Creatinine monitoring  11/02/2020    TSH testing  11/26/2020    Flu vaccine  Completed       Past Medical History:   Diagnosis Date    Arthritis     Cancer ([facility] [address] 2013    SKIN CANCER ON LEFT ANKLE    Diverticulosis     Hyperlipidemia     Hypertension     Hypothyroidism     Psoriasis     Thyroid disorder        Past Surgical History:   Procedure Laterality Date    BREAST BIOPSY Left     benign    BREAST SURGERY Left 6/1966    Lumpectomy    CARPAL TUNNEL RELEASE Right 1/30/2013    CARPAL TUNNEL RELEASE Left 7/25/13    CATARACT REMOVAL Bilateral 1999    COLON SURGERY  11/1999    resection    COLONOSCOPY  2003, 2006, 2011    HYSTERECTOMY  2/23/1970    HYSTERECTOMY, TOTAL ABDOMINAL      KNEE ARTHROSCOPY Right 11/21/2007    meniscectomies    KNEE SURGERY Left 2000    replacement    MYRINGOTOMY Right 07/01/2015    tube insertion    OVARY REMOVAL Bilateral 7/12/2001    oophorectomy   

## 2020-02-10 ENCOUNTER — OFFICE VISIT (OUTPATIENT)
Dept: FAMILY MEDICINE CLINIC | Age: 85
End: 2020-02-10
Payer: MEDICARE

## 2020-02-10 VITALS
SYSTOLIC BLOOD PRESSURE: 108 MMHG | BODY MASS INDEX: 25.3 KG/M2 | HEART RATE: 74 BPM | OXYGEN SATURATION: 95 % | TEMPERATURE: 97.7 F | HEIGHT: 61 IN | RESPIRATION RATE: 14 BRPM | WEIGHT: 134 LBS | DIASTOLIC BLOOD PRESSURE: 63 MMHG

## 2020-02-10 PROCEDURE — 1090F PRES/ABSN URINE INCON ASSESS: CPT | Performed by: FAMILY MEDICINE

## 2020-02-10 PROCEDURE — 1123F ACP DISCUSS/DSCN MKR DOCD: CPT | Performed by: FAMILY MEDICINE

## 2020-02-10 PROCEDURE — G8427 DOCREV CUR MEDS BY ELIG CLIN: HCPCS | Performed by: FAMILY MEDICINE

## 2020-02-10 PROCEDURE — 99214 OFFICE O/P EST MOD 30 MIN: CPT | Performed by: FAMILY MEDICINE

## 2020-02-10 PROCEDURE — G0009 ADMIN PNEUMOCOCCAL VACCINE: HCPCS | Performed by: FAMILY MEDICINE

## 2020-02-10 PROCEDURE — G8482 FLU IMMUNIZE ORDER/ADMIN: HCPCS | Performed by: FAMILY MEDICINE

## 2020-02-10 PROCEDURE — G8417 CALC BMI ABV UP PARAM F/U: HCPCS | Performed by: FAMILY MEDICINE

## 2020-02-10 PROCEDURE — 1036F TOBACCO NON-USER: CPT | Performed by: FAMILY MEDICINE

## 2020-02-10 PROCEDURE — 4040F PNEUMOC VAC/ADMIN/RCVD: CPT | Performed by: FAMILY MEDICINE

## 2020-02-10 PROCEDURE — 90732 PPSV23 VACC 2 YRS+ SUBQ/IM: CPT | Performed by: FAMILY MEDICINE

## 2020-02-10 NOTE — PROGRESS NOTES
Rosy Jacobo is a 80 y.o. female that presents for     Chief Complaint   Patient presents with    Follow-up     4 month f/u anxiety- doing better; ativan been helping    Discuss Medications     talk about b complex supplement       /63   Pulse 74   Temp 97.7 °F (36.5 °C) (Oral)   Resp 14   Ht 5' 0.5\" (1.537 m)   Wt 134 lb (60.8 kg)   SpO2 95%   BMI 25.74 kg/m²       HPI:    Has seen ENT for her oral mass. Reports that it is improved without treatment. No oral pain. Anxiety is 'better'. States that she has been keeping busy and this helps. Sleep is doing ok, she is getting a little more recently. HTN    Does patient check BP regularly at home? - Yes - keeps close records  Current Medication regimen - norvasc, aldactone  Tolerating medications well? - yes    Shortness of breath or chest pain? No  Headache or visual complaints? No  Neurologic changes like confusion? No  Extremity edema?  No    BP Readings from Last 3 Encounters:   02/10/20 108/63   01/29/20 127/69   11/26/19 (!) 144/60         Health Maintenance   Topic Date Due    DTaP/Tdap/Td vaccine (1 - Tdap) 02/21/1936    Shingles Vaccine (2 of 3) 04/12/2014    Pneumococcal 65+ years Vaccine (2 of 2 - PPSV23) 03/17/2017    Annual Wellness Visit (AWV)  11/13/2019    Potassium monitoring  11/02/2020    Creatinine monitoring  11/02/2020    TSH testing  11/26/2020    Flu vaccine  Completed    Hepatitis A vaccine  Aged Out    Hepatitis B vaccine  Aged Out    Hib vaccine  Aged Out    Meningococcal (ACWY) vaccine  Aged Out       Past Medical History:   Diagnosis Date    Arthritis     Cancer ([facility] [address] 2013    SKIN CANCER ON LEFT ANKLE    Diverticulosis     Hyperlipidemia     Hypertension     Hypothyroidism     Psoriasis     Thyroid disorder        Past Surgical History:   Procedure Laterality Date    BREAST BIOPSY Left     benign    BREAST SURGERY Left 6/1966    Lumpectomy    CARPAL TUNNEL RELEASE Right 1/30/2013    CARPAL TUNNEL RELEASE Left 7/25/13    CATARACT REMOVAL Bilateral 1999    COLON SURGERY  11/1999    resection    COLONOSCOPY  2003, 2006, 2011    HYSTERECTOMY  2/23/1970    HYSTERECTOMY, TOTAL ABDOMINAL      KNEE ARTHROSCOPY Right 11/21/2007    meniscectomies    KNEE SURGERY Left 2000    replacement    MYRINGOTOMY Right 07/01/2015    tube insertion    OVARY REMOVAL Bilateral 7/12/2001    oophorectomy    OVARY REMOVAL Bilateral     SHOULDER SURGERY  5/14    SINUS SURGERY         Social History     Tobacco Use    Smoking status: Never Smoker    Smokeless tobacco: Never Used   Substance Use Topics    Alcohol use: No    Drug use: No       Family History   Problem Relation Age of Onset    Cancer Child     Stroke Sister     Heart Disease Sister     Other Other         visual problems         I have reviewed the patient's past medical history, past surgical history, allergies, medications, social and family history and I have made updates where appropriate.     Review of Systems        PHYSICAL EXAM:  /63   Pulse 74   Temp 97.7 °F (36.5 °C) (Oral)   Resp 14   Ht 5' 0.5\" (1.537 m)   Wt 134 lb (60.8 kg)   SpO2 95%   BMI 25.74 kg/m²     General Appearance: well developed and well- nourished, in no acute distress  Head: normocephalic and atraumatic  ENT: external ear and ear canal normal bilaterally, nose without deformity, nasal mucosa and turbinates normal without polyps, oropharynx normal, dentition is normal for age, no lipor gum lesions noted  Neck: supple and non-tender without mass, no thyromegaly or thyroid nodules, no cervical lymphadenopathy  Pulmonary/Chest: clear to auscultation bilaterally- no wheezes, rales or rhonchi, normal air movement, no respiratory distress or retractions  Cardiovascular: normal rate, regular rhythm, normal S1 and S2, no murmurs, rubs, clicks, or gallops, distal pulses intact  Extremities: no cyanosis, clubbing or edema of the lower extremities  Psych:  Normal affect without evidence of depression oranxiety, insight and judgement are appropriate, memory appears intact  Skin: warm and dry, no rash or erythema      ASSESSMENT & Pam Walters was seen today for follow-up and discuss medications. Diagnoses and all orders for this visit:    Anxiety    Essential hypertension    Oral mass    -Chronic issues stable, continue current medications  -Advised to call if any issues      Return in about 4 months (around 6/10/2020), or if symptoms worsen or fail to improve. Controlled Substance Monitoring:    Acute and Chronic Pain Monitoring:   RX Monitoring 12/9/2019   Attestation -   Periodic Controlled Substance Monitoring No signs of potential drug abuse or diversion identified. Dustin Jose received counseling on the following healthy behaviors: medication adherence  Reviewed prior labs and health maintenance. Continue current medications, diet and exercise. Discussed use, benefit, and side effects of prescribed medications. Barriers to medication compliance addressed. Patient given educational materials - see patient instructions. All patient questions answered. Patient voiced understanding.

## 2020-04-21 RX ORDER — AMLODIPINE BESYLATE 2.5 MG/1
2.5 TABLET ORAL DAILY
Qty: 90 TABLET | Refills: 3 | Status: SHIPPED | OUTPATIENT
Start: 2020-04-21 | End: 2021-04-01 | Stop reason: SDUPTHER

## 2020-04-21 NOTE — TELEPHONE ENCOUNTER
Requested Prescriptions     Pending Prescriptions Disp Refills    amLODIPine (NORVASC) 2.5 MG tablet 90 tablet 3     Sig: Take 1 tablet by mouth daily

## 2020-06-02 ENCOUNTER — TELEPHONE (OUTPATIENT)
Dept: FAMILY MEDICINE CLINIC | Age: 85
End: 2020-06-02

## 2020-06-02 ENCOUNTER — HOSPITAL ENCOUNTER (OUTPATIENT)
Dept: WOMENS IMAGING | Age: 85
Discharge: HOME | End: 2020-06-02
Payer: MEDICARE

## 2020-06-02 PROCEDURE — 77063 BREAST TOMOSYNTHESIS BI: CPT

## 2020-06-02 NOTE — TELEPHONE ENCOUNTER
----- Message from Benjy Alonzo DO sent at 6/2/2020 11:55 AM EDT -----  Please let pt know that mammogram is normal. Let me know if questions, thanks!

## 2020-06-18 ENCOUNTER — OFFICE VISIT (OUTPATIENT)
Dept: FAMILY MEDICINE CLINIC | Age: 85
End: 2020-06-18
Payer: MEDICARE

## 2020-06-18 VITALS
HEART RATE: 88 BPM | RESPIRATION RATE: 12 BRPM | BODY MASS INDEX: 25.91 KG/M2 | HEIGHT: 60 IN | SYSTOLIC BLOOD PRESSURE: 136 MMHG | WEIGHT: 132 LBS | DIASTOLIC BLOOD PRESSURE: 58 MMHG | OXYGEN SATURATION: 98 % | TEMPERATURE: 97.8 F

## 2020-06-18 PROCEDURE — 1036F TOBACCO NON-USER: CPT | Performed by: FAMILY MEDICINE

## 2020-06-18 PROCEDURE — 4040F PNEUMOC VAC/ADMIN/RCVD: CPT | Performed by: FAMILY MEDICINE

## 2020-06-18 PROCEDURE — G8417 CALC BMI ABV UP PARAM F/U: HCPCS | Performed by: FAMILY MEDICINE

## 2020-06-18 PROCEDURE — 99214 OFFICE O/P EST MOD 30 MIN: CPT | Performed by: FAMILY MEDICINE

## 2020-06-18 PROCEDURE — G8427 DOCREV CUR MEDS BY ELIG CLIN: HCPCS | Performed by: FAMILY MEDICINE

## 2020-06-18 PROCEDURE — 1090F PRES/ABSN URINE INCON ASSESS: CPT | Performed by: FAMILY MEDICINE

## 2020-06-18 PROCEDURE — 1123F ACP DISCUSS/DSCN MKR DOCD: CPT | Performed by: FAMILY MEDICINE

## 2020-06-18 RX ORDER — AMOXICILLIN 500 MG/1
500 CAPSULE ORAL 3 TIMES DAILY
Status: ON HOLD | COMMUNITY
End: 2021-05-10 | Stop reason: HOSPADM

## 2020-07-14 ENCOUNTER — OFFICE VISIT (OUTPATIENT)
Dept: FAMILY MEDICINE CLINIC | Age: 85
End: 2020-07-14
Payer: MEDICARE

## 2020-07-14 VITALS
WEIGHT: 134 LBS | HEART RATE: 71 BPM | DIASTOLIC BLOOD PRESSURE: 60 MMHG | SYSTOLIC BLOOD PRESSURE: 136 MMHG | TEMPERATURE: 98.1 F | OXYGEN SATURATION: 98 % | HEIGHT: 60 IN | BODY MASS INDEX: 26.31 KG/M2 | RESPIRATION RATE: 14 BRPM

## 2020-07-14 PROCEDURE — 1123F ACP DISCUSS/DSCN MKR DOCD: CPT | Performed by: FAMILY MEDICINE

## 2020-07-14 PROCEDURE — 4040F PNEUMOC VAC/ADMIN/RCVD: CPT | Performed by: FAMILY MEDICINE

## 2020-07-14 PROCEDURE — G0438 PPPS, INITIAL VISIT: HCPCS | Performed by: FAMILY MEDICINE

## 2020-07-14 ASSESSMENT — PATIENT HEALTH QUESTIONNAIRE - PHQ9
SUM OF ALL RESPONSES TO PHQ QUESTIONS 1-9: 0
SUM OF ALL RESPONSES TO PHQ QUESTIONS 1-9: 0

## 2020-07-14 ASSESSMENT — LIFESTYLE VARIABLES: HOW OFTEN DO YOU HAVE A DRINK CONTAINING ALCOHOL: 0

## 2020-07-14 NOTE — PROGRESS NOTES
Medicare Annual Wellness Visit  Name: Aaliyah Benitez Date: 2020   MRN: [id number] Sex: Female   Age: 80 y.o. Ethnicity: Non-/Non    : 1925 Race: Gurpreet Paula is here for Medicare AWV    Screenings for behavioral, psychosocial and functional/safety risks, and cognitive dysfunction are all negative except as indicated below. These results, as well as other patient data from the [address] form, are documented in Flowsheets linked to this Encounter. Allergies   Allergen Reactions    Prednisone      GERD symptoms, shakes    Actonel [Risedronate Sodium] Other (See Comments)     GI    Baclofen Other (See Comments)     Confusion     Bactrim [Sulfamethoxazole-Trimethoprim] Nausea Only           Cardizem [Diltiazem] Other (See Comments)     Raises BP    Celebrex [Celecoxib] Other (See Comments)     Raises BP    Dicloxacillin Other (See Comments)     Heartburn    Doxycycline Other (See Comments)     pulse    Evista [Raloxifene]      Fatigue      Lopid [Gemfibrozil]      Fatigue      Questran [Cholestyramine]      constipation      Synthroid [Levothyroxine Sodium]      GERD, Leg pain    Zestoretic [Lisinopril-Hydrochlorothiazide]      Raises B.P.    Zetia [Ezetimibe]      aches         Prior to Visit Medications    Medication Sig Taking?  Authorizing Provider   amoxicillin (AMOXIL) 500 MG capsule Take 500 mg by mouth 3 times daily Yes Historical Provider, MD   amLODIPine (NORVASC) 2.5 MG tablet Take 1 tablet by mouth daily Yes Cezar Aguero, DO   spironolactone (ALDACTONE) 50 MG tablet Take 1 tablet by mouth daily Yes Cezar Aguero,    Loratadine (CLARITIN PO) Take by mouth Yes Historical Provider, MD   fluticasone (FLONASE) 50 MCG/ACT nasal spray 2 sprays by Each Nostril route daily Yes Historical Provider, MD   thyroid (ARMOUR) 60 MG tablet Take 0.5 tablets by mouth daily VINNY Yes Edson Correia, DO   blood glucose monitor strips Check blood sugar q 2/23/1970    HYSTERECTOMY, TOTAL ABDOMINAL      KNEE ARTHROSCOPY Right 11/21/2007    meniscectomies    KNEE SURGERY Left 2000    replacement    MYRINGOTOMY Right 07/01/2015    tube insertion    OVARY REMOVAL Bilateral 7/12/2001    oophorectomy    OVARY REMOVAL Bilateral     SHOULDER SURGERY  5/14    SINUS SURGERY           Family History   Problem Relation Age of Onset    Cancer Child     Stroke Sister     Heart Disease Sister     Other Other         visual problems       CareTeam (Including outside providers/suppliers regularly involved in providing care):   Patient Care Team:  Maru Mancia DO as PCP - General (Family Medicine)  Maru Mancia DO as PCP - [facility] Provider  Prem Maldonado (Audiology)    Wt Readings from Last 3 Encounters:   07/14/20 134 lb (60.8 kg)   06/18/20 132 lb (59.9 kg)   02/10/20 134 lb (60.8 kg)     Vitals:    07/14/20 1009   BP: 136/60   Pulse: 71   Resp: 14   Temp: 98.1 °F (36.7 °C)   TempSrc: Oral   SpO2: 98%   Weight: 134 lb (60.8 kg)   Height: 5' (1.524 m)     Body mass index is 26.17 kg/m². Based upon direct observation of the patient, evaluation of cognition reveals recent and remote memory intact. General Appearance: alert and oriented to person, place and time, well-developed and well-nourished, in no acute distress  Pulmonary/Chest: clear to auscultation bilaterally- no wheezes, rales or rhonchi, normal air movement, no respiratory distress  Cardiovascular: normal rate, normal S1 and S2, no gallops, intact distal pulses and no carotid bruits    Patient's complete Health Risk Assessment and screening values have been reviewed and are found in Flowsheets. The following problems were reviewed today and where indicated follow up appointments were made and/or referrals ordered.     Positive Risk Factor Screenings with Interventions:     Hearing/Vision:  No exam data present  Hearing/Vision  Do you or your family notice any trouble with your hearing?: (!) Yes  Do you have difficulty driving, watching TV, or doing any of your daily activities because of your eyesight?: No  Have you had an eye exam within the past year?: Yes  Hearing/Vision Interventions:  · Hearing concerns:  patient declines any further evaluation/treatment for hearing issues, has hearing aids    Personalized Preventive Plan   Current Health Maintenance Status  Immunization History   Administered Date(s) Administered    Influenza, High Dose (Fluzone 65 yrs and older) 12/05/2018    Influenza, Quadv, IM, (6 mo and older Fluzone, Flulaval, Fluarix and 3 yrs and older Afluria) 01/03/2017    Influenza, Quadv, IM, PF (6 mo and older Fluzone, Flulaval, Fluarix, and 3 yrs and older Afluria) 12/13/2017    Influenza, Triv, inactivated, subunit, adjuvanted, IM (Fluad 65 yrs and older) 10/08/2019    Meningococcal MPSV4 (Menomune) 04/15/2014    Pneumococcal Conjugate 13-valent ([id number]) 03/17/2016    Pneumococcal Polysaccharide ([id number]) 02/10/2020    Zoster Live (Zostavax) 02/15/2014        Health Maintenance   Topic Date Due    DTaP/Tdap/Td vaccine (1 - Tdap) 02/21/1944    Shingles Vaccine (2 of 3) 04/12/2014    Annual Wellness Visit (AWV)  11/13/2019    Flu vaccine (1) 09/01/2020    Potassium monitoring  11/02/2020    Creatinine monitoring  11/02/2020    TSH testing  11/26/2020    Pneumococcal 65+ years Vaccine  Completed    Hepatitis A vaccine  Aged Out    Hepatitis B vaccine  Aged Out    Hib vaccine  Aged Out    Meningococcal (ACWY) vaccine  Aged Out     Recommendations for [employer] Due: see orders and patient instructions/AVS.  . Recommended screening schedule for the next 5-10 years is provided to the patient in written form: see Patient Diana Ray was seen today for medicare awv.     Diagnoses and all orders for this visit:    Routine general medical examination at a health care facility

## 2020-07-14 NOTE — PATIENT INSTRUCTIONS
Personalized Preventive Plan for Ahsan Casper - 7/14/2020  Medicare offers a range of preventive health benefits. Some of the tests and screenings are paid in full while other may be subject to a deductible, co-insurance, and/or copay. Some of these benefits include a comprehensive review of your medical history including lifestyle, illnesses that may run in your family, and various assessments and screenings as appropriate. After reviewing your medical record and screening and assessments performed today your provider may have ordered immunizations, labs, imaging, and/or referrals for you. A list of these orders (if applicable) as well as your Preventive Care list are included within your After Visit Summary for your review. Other Preventive Recommendations:    · A preventive eye exam performed by an eye specialist is recommended every 1-2 years to screen for glaucoma; cataracts, macular degeneration, and other eye disorders. · A preventive dental visit is recommended every 6 months. · Try to get at least 150 minutes of exercise per week or 10,000 steps per day on a pedometer . · Order or download the FREE \"Exercise & Physical Activity: Your Everyday Guide\" from [employer]. Call 4-391.741.6838 or search [employer] online. · You need 4417-1894 mg of calcium and 1533-5835 IU of vitamin D per day. It is possible to meet your calcium requirement with diet alone, but a vitamin D supplement is usually necessary to meet this goal.  · When exposed to the sun, use a sunscreen that protects against both UVA and UVB radiation with an SPF of 30 or greater. Reapply every 2 to 3 hours or after sweating, drying off with a towel, or swimming. · Always wear a seat belt when traveling in a car. Always wear a helmet when riding a bicycle or motorcycle.

## 2020-08-27 ENCOUNTER — OFFICE VISIT (OUTPATIENT)
Dept: FAMILY MEDICINE CLINIC | Age: 85
End: 2020-08-27
Payer: MEDICARE

## 2020-08-27 VITALS — SYSTOLIC BLOOD PRESSURE: 142 MMHG | DIASTOLIC BLOOD PRESSURE: 80 MMHG

## 2020-08-27 PROCEDURE — 1036F TOBACCO NON-USER: CPT | Performed by: FAMILY MEDICINE

## 2020-08-27 PROCEDURE — 99213 OFFICE O/P EST LOW 20 MIN: CPT | Performed by: FAMILY MEDICINE

## 2020-08-27 PROCEDURE — G8427 DOCREV CUR MEDS BY ELIG CLIN: HCPCS | Performed by: FAMILY MEDICINE

## 2020-08-27 PROCEDURE — G8417 CALC BMI ABV UP PARAM F/U: HCPCS | Performed by: FAMILY MEDICINE

## 2020-08-27 PROCEDURE — 1090F PRES/ABSN URINE INCON ASSESS: CPT | Performed by: FAMILY MEDICINE

## 2020-08-27 PROCEDURE — 1123F ACP DISCUSS/DSCN MKR DOCD: CPT | Performed by: FAMILY MEDICINE

## 2020-08-27 PROCEDURE — 4040F PNEUMOC VAC/ADMIN/RCVD: CPT | Performed by: FAMILY MEDICINE

## 2020-08-27 RX ORDER — LORAZEPAM 0.5 MG/1
TABLET ORAL
Qty: 40 TABLET | Refills: 0 | Status: SHIPPED | OUTPATIENT
Start: 2020-08-27 | End: 2021-03-01 | Stop reason: SDUPTHER

## 2020-08-27 NOTE — PROGRESS NOTES
Jane Hendrix is a 80 y.o. female that presents for     No chief complaint on file. BP (!) 142/80       HPI:    Patient reports that BPs are running higher recently. Relates this to a lot of stress. Her daughter is not doing well and thinks that she may pass away in the near future. She is taking PRN ativan and this does help. HTN    Does patient check BP regularly at home? - Yes - generally has been well controlled. Has several SBPs in the low 140s. Current Medication regimen - Norvasc, aldactone  Tolerating medications well? - yes    Shortness of breath or chest pain? No  Headache or visual complaints? No  Neurologic changes like confusion? No  Extremity edema?  No    BP Readings from Last 3 Encounters:   08/27/20 (!) 142/80   07/14/20 136/60   06/18/20 (!) 136/58         Health Maintenance   Topic Date Due    DTaP/Tdap/Td vaccine (1 - Tdap) 02/21/1944    Shingles Vaccine (2 of 3) 04/12/2014    Flu vaccine (1) 09/01/2020    Potassium monitoring  11/02/2020    Creatinine monitoring  11/02/2020    TSH testing  11/26/2020    Annual Wellness Visit (AWV)  07/15/2021    Pneumococcal 65+ years Vaccine  Completed    Hepatitis A vaccine  Aged Out    Hepatitis B vaccine  Aged Out    Hib vaccine  Aged Out    Meningococcal (ACWY) vaccine  Aged Out       Past Medical History:   Diagnosis Date    Arthritis     Cancer ([facility] [address] 2013    SKIN CANCER ON LEFT ANKLE    Diverticulosis     Hyperlipidemia     Hypertension     Hypothyroidism     Psoriasis     Thyroid disorder        Past Surgical History:   Procedure Laterality Date    BREAST BIOPSY Left     benign    BREAST SURGERY Left 6/1966    Lumpectomy    CARPAL TUNNEL RELEASE Right 1/30/2013    CARPAL TUNNEL RELEASE Left 7/25/13    CATARACT REMOVAL Bilateral 1999    COLON SURGERY  11/1999    resection    COLONOSCOPY  2003, 2006, 2011    HYSTERECTOMY  2/23/1970    HYSTERECTOMY, TOTAL ABDOMINAL      KNEE ARTHROSCOPY Right 11/21/2007 meniscectomies    KNEE SURGERY Left 2000    replacement    MYRINGOTOMY Right 07/01/2015    tube insertion    OVARY REMOVAL Bilateral 7/12/2001    oophorectomy    OVARY REMOVAL Bilateral     SHOULDER SURGERY  5/14    SINUS SURGERY         Social History     Tobacco Use    Smoking status: Never Smoker    Smokeless tobacco: Never Used   Substance Use Topics    Alcohol use: No    Drug use: No       Family History   Problem Relation Age of Onset    Cancer Child     Stroke Sister     Heart Disease Sister     Other Other         visual problems         I have reviewed the patient's past medical history, past surgical history, allergies, medications, social and family history and I have made updates where appropriate.     Review of Systems        PHYSICAL EXAM:  BP (!) 142/80     General Appearance: well developed and well- nourished, in no acute distress  Head: normocephalic and atraumatic  ENT: external ear and ear canal normal bilaterally, nose without deformity, nasal mucosa and turbinates normal without polyps, oropharynx normal, dentition is normal for age, no lipor gum lesions noted  Neck: supple and non-tender without mass, no thyromegaly or thyroid nodules, no cervical lymphadenopathy  Pulmonary/Chest: clear to auscultation bilaterally- no wheezes, rales or rhonchi, normal air movement, no respiratory distress or retractions  Cardiovascular: normal rate, regular rhythm, normal S1 and S2, no murmurs, rubs, clicks, or gallops, distal pulses intact  Abdomen: soft, non-tender, non-distended, no rebound or guarding, no masses or hernias noted, no hepatospleenomegaly  Extremities: no cyanosis, clubbing or edema of the lower extremities  Psych:  Normal affect without evidence of depression oranxiety, insight and judgement are appropriate, memory appears intact  Skin: warm and dry, no rash or erythema      ASSESSMENT & PLAN  Diagnoses and all orders for this visit:    Stress    Anxiety  -     LORazepam (ATIVAN) 0.5 MG tablet; TAKE 1 TABLET BY MOUTH EVERY 12 HOURS AS NEEDED FOR ANXIETY    Essential hypertension    -Sx appear related to stress. Continue PRN ativan. Advised to let me know if any worsening of sx. Return if symptoms worsen or fail to improve. Controlled Substance Monitoring:    Acute and Chronic Pain Monitoring:   RX Monitoring 12/9/2019   Attestation -   Periodic Controlled Substance Monitoring No signs of potential drug abuse or diversion identified. Nivia Robb received counseling on the following healthy behaviors: medication adherence  Reviewed prior labs and health maintenance. Continue current medications, diet and exercise. Discussed use, benefit, and side effects of prescribed medications. Barriers to medication compliance addressed. Patient given educational materials - see patient instructions. All patient questions answered. Patient voiced understanding.

## 2020-09-01 ENCOUNTER — TELEPHONE (OUTPATIENT)
Dept: FAMILY MEDICINE CLINIC | Age: 85
End: 2020-09-01

## 2020-09-01 NOTE — TELEPHONE ENCOUNTER
Yes she can take Aleve, 1 tablet, twice a day, preferrably with food, for the neck and back pain.   I will update Dr. Bergman Snare  Please tell her we are sorry to hear about her daughter passing    Thanks  Lee

## 2020-09-01 NOTE — TELEPHONE ENCOUNTER
Pt stopped in the office today, she states he daughter passed away last Sunday of cancer. Pt wanted to know if she can take aleve for the back and neck pain, she thinks the stress is causing more pain.   She also took 2 half tabs of ativan yesterday but that was too much she states she wont try that again

## 2020-09-21 ENCOUNTER — OFFICE VISIT (OUTPATIENT)
Dept: FAMILY MEDICINE CLINIC | Age: 85
End: 2020-09-21
Payer: MEDICARE

## 2020-09-21 VITALS
WEIGHT: 134.2 LBS | SYSTOLIC BLOOD PRESSURE: 122 MMHG | DIASTOLIC BLOOD PRESSURE: 70 MMHG | HEIGHT: 60 IN | HEART RATE: 68 BPM | TEMPERATURE: 98.1 F | BODY MASS INDEX: 26.35 KG/M2 | OXYGEN SATURATION: 99 % | RESPIRATION RATE: 12 BRPM

## 2020-09-21 PROCEDURE — G8427 DOCREV CUR MEDS BY ELIG CLIN: HCPCS | Performed by: FAMILY MEDICINE

## 2020-09-21 PROCEDURE — 4040F PNEUMOC VAC/ADMIN/RCVD: CPT | Performed by: FAMILY MEDICINE

## 2020-09-21 PROCEDURE — 1090F PRES/ABSN URINE INCON ASSESS: CPT | Performed by: FAMILY MEDICINE

## 2020-09-21 PROCEDURE — 99214 OFFICE O/P EST MOD 30 MIN: CPT | Performed by: FAMILY MEDICINE

## 2020-09-21 PROCEDURE — 1036F TOBACCO NON-USER: CPT | Performed by: FAMILY MEDICINE

## 2020-09-21 PROCEDURE — G8417 CALC BMI ABV UP PARAM F/U: HCPCS | Performed by: FAMILY MEDICINE

## 2020-09-21 PROCEDURE — 1123F ACP DISCUSS/DSCN MKR DOCD: CPT | Performed by: FAMILY MEDICINE

## 2020-09-21 RX ORDER — FAMOTIDINE 20 MG/1
20 TABLET, FILM COATED ORAL 2 TIMES DAILY
Qty: 180 TABLET | Refills: 3 | Status: SHIPPED | OUTPATIENT
Start: 2020-09-21 | End: 2021-11-16

## 2020-09-21 RX ORDER — LANOLIN ALCOHOL/MO/W.PET/CERES
3 CREAM (GRAM) TOPICAL NIGHTLY PRN
COMMUNITY

## 2020-09-21 NOTE — PROGRESS NOTES
Ghada Marley is a 80 y.o. female that presents for     Chief Complaint   Patient presents with    Follow-up     Pt presents for a 3 month f/u. She is also c/o still burping a lot.  Back Pain     Pt presents c/o continued upper back pain. She feels that it raises her BP. Her systolic will run in the 739P and diastolic in the 99A when the pain worsens.  Discuss Medications      Pt wants to know if she should take a baby aspirin. She also wants to know if the multi-vit and and biotin is too much. She wants to know if she should take aleve is tylenol. /70   Pulse 68   Temp 98.1 °F (36.7 °C)   Resp 12   Ht 5' (1.524 m)   Wt 134 lb 3.2 oz (60.9 kg)   SpO2 99%   BMI 26.21 kg/m²       HPI:    Still having intermittent thoracic back pain. We have performed a cardiac work up for this which was normal.  States that heat normally helps with this. She has done PT in the past which was normal.    Still having heartburn despite taking the pepcid. Associated with certain foods. Rolaids does help with the symptoms. Notes a lot of anxiety since the passing of her daughter. Ativan does help with the symptoms. HTN    Does patient check BP regularly at home? - No  Current Medication regimen - norvasc, aldactone  Tolerating medications well? - yes    Shortness of breath or chest pain? No  Headache or visual complaints? No  Neurologic changes like confusion? No  Extremity edema?  No    BP Readings from Last 3 Encounters:   09/21/20 122/70   08/27/20 (!) 142/80   07/14/20 136/60         Health Maintenance   Topic Date Due    DTaP/Tdap/Td vaccine (1 - Tdap) 02/21/1944    Shingles Vaccine (2 of 3) 04/12/2014    Flu vaccine (1) 09/01/2020    Potassium monitoring  11/02/2020    Creatinine monitoring  11/02/2020    TSH testing  11/26/2020    Annual Wellness Visit (AWV)  07/15/2021    Pneumococcal 65+ years Vaccine  Completed    Hepatitis A vaccine  Aged Out    Hepatitis B vaccine  Aged C/ Rusty Meryl 19 Hib vaccine  Aged Out    Meningococcal (ACWY) vaccine  Aged Out       Past Medical History:   Diagnosis Date    Arthritis     Cancer ([facility] [address] 2013    SKIN CANCER ON LEFT ANKLE    Diverticulosis     Hyperlipidemia     Hypertension     Hypothyroidism     Psoriasis     Thyroid disorder        Past Surgical History:   Procedure Laterality Date    BREAST BIOPSY Left     benign    BREAST SURGERY Left 6/1966    Lumpectomy    CARPAL TUNNEL RELEASE Right 1/30/2013    CARPAL TUNNEL RELEASE Left 7/25/13    CATARACT REMOVAL Bilateral 1999    COLON SURGERY  11/1999    resection    COLONOSCOPY  2003, 2006, 2011    HYSTERECTOMY  2/23/1970    HYSTERECTOMY, TOTAL ABDOMINAL      KNEE ARTHROSCOPY Right 11/21/2007    meniscectomies    KNEE SURGERY Left 2000    replacement    MYRINGOTOMY Right 07/01/2015    tube insertion    OVARY REMOVAL Bilateral 7/12/2001    oophorectomy    OVARY REMOVAL Bilateral     SHOULDER SURGERY  5/14    SINUS SURGERY         Social History     Tobacco Use    Smoking status: Never Smoker    Smokeless tobacco: Never Used   Substance Use Topics    Alcohol use: No    Drug use: No       Family History   Problem Relation Age of Onset    Cancer Child     Stroke Sister     Heart Disease Sister     Other Other         visual problems         I have reviewed the patient's past medical history, past surgical history, allergies, medications, social and family history and I have made updates where appropriate.     Review of Systems        PHYSICAL EXAM:  /70   Pulse 68   Temp 98.1 °F (36.7 °C)   Resp 12   Ht 5' (1.524 m)   Wt 134 lb 3.2 oz (60.9 kg)   SpO2 99%   BMI 26.21 kg/m²     General Appearance: well developed and well- nourished, in no acute distress  Head: normocephalic and atraumatic  ENT: external ear and ear canal normal bilaterally, nose without deformity, nasal mucosa and turbinates normal without polyps, oropharynx normal, dentition is normal for age, no lipor gum lesions noted  Neck: supple and non-tender without mass, no thyromegaly or thyroid nodules, no cervical lymphadenopathy  Pulmonary/Chest: clear to auscultation bilaterally- no wheezes, rales or rhonchi, normal air movement, no respiratory distress or retractions  Cardiovascular: normal rate, regular rhythm, normal S1 and S2, no murmurs, rubs, clicks, or gallops, distal pulses intact  Abdomen: soft, non-tender, non-distended, no rebound or guarding, no masses or hernias noted, no hepatospleenomegaly  Extremities: no cyanosis, clubbing or edema of the lower extremities  Psych:  Normal affect without evidence of depression oranxiety, insight and judgement are appropriate, memory appears intact  Skin: warm and dry, no rash or erythema      ASSESSMENT & PLAN  Milton Pickett was seen today for follow-up, back pain and discuss medications. Diagnoses and all orders for this visit:    Essential hypertension    Gastroesophageal reflux disease without esophagitis  -     famotidine (PEPCID) 20 MG tablet; Take 1 tablet by mouth 2 times daily    Other specified hypothyroidism  -     T4, Free; Future  -     TSH without Reflex; Future    IFG (impaired fasting glucose)  -     Comprehensive Metabolic Panel; Future  -     Lipid Panel; Future    Dyslipidemia  -     Comprehensive Metabolic Panel; Future  -     Lipid Panel; Future    Anxiety    -Increase pepcid for GERD  -Back pain stable, continue current treatment  -Other chronic issues are stable, continue current medications  -Advised to call if any issues      Return in about 3 months (around 12/21/2020). Controlled Substance Monitoring:    Acute and Chronic Pain Monitoring:   RX Monitoring 12/9/2019   Attestation -   Periodic Controlled Substance Monitoring No signs of potential drug abuse or diversion identified. Milton Pickett received counseling on the following healthy behaviors: medication adherence  Reviewed prior labs and health maintenance.   Continue current medications, diet and exercise. Discussed use, benefit, and side effects of prescribed medications. Barriers to medication compliance addressed. Patient given educational materials - see patient instructions. All patient questions answered. Patient voiced understanding.

## 2020-10-27 RX ORDER — LEVOTHYROXINE AND LIOTHYRONINE 38; 9 UG/1; UG/1
30 TABLET ORAL DAILY
Qty: 45 TABLET | Refills: 3 | Status: SHIPPED | OUTPATIENT
Start: 2020-10-27 | End: 2021-11-01

## 2020-11-27 ENCOUNTER — TELEPHONE (OUTPATIENT)
Dept: FAMILY MEDICINE CLINIC | Age: 85
End: 2020-11-27

## 2020-11-27 RX ORDER — AZITHROMYCIN 250 MG/1
TABLET, FILM COATED ORAL
Qty: 1 PACKET | Refills: 0 | Status: SHIPPED | OUTPATIENT
Start: 2020-11-27 | End: 2020-12-29

## 2020-11-27 NOTE — TELEPHONE ENCOUNTER
Patient calling due to sinus issues. She states that she has drainage and pressure in her sinus and is requesting an antibiotic and an antihistamine if possible.      Pharmacy: [facility] on [address], [address]    Patient Ph: [phone]

## 2020-11-27 NOTE — TELEPHONE ENCOUNTER
Enedina sent to pharmacy, can use zyrtec OTC for the antihistamine. Recommend she let us know if any worsening or persisting sx.

## 2020-11-27 NOTE — TELEPHONE ENCOUNTER
Pt having sinus issues drainage and pressure.  Pt requesting an antibiotic and antihistamine if possible

## 2020-11-30 ENCOUNTER — TELEPHONE (OUTPATIENT)
Dept: FAMILY MEDICINE CLINIC | Age: 85
End: 2020-11-30

## 2020-11-30 NOTE — TELEPHONE ENCOUNTER
She can try OTC sinus medication to help facilitate drainage. Has she tried Sudafed or Tylenol Sinus in the past with any relief? ?  Any problem tolerating these in the past?

## 2020-11-30 NOTE — TELEPHONE ENCOUNTER
Patient informed, verbally understood. Pt has not issue with the sudafed or tylenol sinus in the past and will try one or the other to see if they help.

## 2020-12-03 ENCOUNTER — OFFICE VISIT (OUTPATIENT)
Dept: FAMILY MEDICINE CLINIC | Age: 85
End: 2020-12-03
Payer: MEDICARE

## 2020-12-03 VITALS
HEIGHT: 60 IN | TEMPERATURE: 97.8 F | RESPIRATION RATE: 18 BRPM | HEART RATE: 76 BPM | OXYGEN SATURATION: 98 % | SYSTOLIC BLOOD PRESSURE: 118 MMHG | WEIGHT: 131.6 LBS | BODY MASS INDEX: 25.84 KG/M2 | DIASTOLIC BLOOD PRESSURE: 64 MMHG

## 2020-12-03 PROCEDURE — 1036F TOBACCO NON-USER: CPT | Performed by: NURSE PRACTITIONER

## 2020-12-03 PROCEDURE — 99214 OFFICE O/P EST MOD 30 MIN: CPT | Performed by: NURSE PRACTITIONER

## 2020-12-03 PROCEDURE — G8427 DOCREV CUR MEDS BY ELIG CLIN: HCPCS | Performed by: NURSE PRACTITIONER

## 2020-12-03 PROCEDURE — 1090F PRES/ABSN URINE INCON ASSESS: CPT | Performed by: NURSE PRACTITIONER

## 2020-12-03 PROCEDURE — 4040F PNEUMOC VAC/ADMIN/RCVD: CPT | Performed by: NURSE PRACTITIONER

## 2020-12-03 PROCEDURE — 1123F ACP DISCUSS/DSCN MKR DOCD: CPT | Performed by: NURSE PRACTITIONER

## 2020-12-03 PROCEDURE — G8484 FLU IMMUNIZE NO ADMIN: HCPCS | Performed by: NURSE PRACTITIONER

## 2020-12-03 PROCEDURE — G8417 CALC BMI ABV UP PARAM F/U: HCPCS | Performed by: NURSE PRACTITIONER

## 2020-12-03 RX ORDER — AMOXICILLIN AND CLAVULANATE POTASSIUM 875; 125 MG/1; MG/1
1 TABLET, FILM COATED ORAL 2 TIMES DAILY
Qty: 14 TABLET | Refills: 0 | Status: SHIPPED | OUTPATIENT
Start: 2020-12-03 | End: 2020-12-10

## 2020-12-03 NOTE — PATIENT INSTRUCTIONS
Patient Education        Sinusitis: Care Instructions  Your Care Instructions     Sinusitis is an infection of the lining of the sinus cavities in your head. Sinusitis often follows a cold. It causes pain and pressure in your head and face. In most cases, sinusitis gets better on its own in 1 to 2 weeks. But some mild symptoms may last for several weeks. Sometimes antibiotics are needed. Follow-up care is a key part of your treatment and safety. Be sure to make and go to all appointments, and call your doctor if you are having problems. It's also a good idea to know your test results and keep a list of the medicines you take. How can you care for yourself at home? · Take an over-the-counter pain medicine, such as acetaminophen (Tylenol), ibuprofen (Advil, Motrin), or naproxen (Aleve). Read and follow all instructions on the label. · If the doctor prescribed antibiotics, take them as directed. Do not stop taking them just because you feel better. You need to take the full course of antibiotics. · Be careful when taking over-the-counter cold or flu medicines and Tylenol at the same time. Many of these medicines have acetaminophen, which is Tylenol. Read the labels to make sure that you are not taking more than the recommended dose. Too much acetaminophen (Tylenol) can be harmful. · Breathe warm, moist air from a steamy shower, a hot bath, or a sink filled with hot water. Avoid cold, dry air. Using a humidifier in your home may help. Follow the directions for cleaning the machine. · Use saline (saltwater) nasal washes to help keep your nasal passages open and wash out mucus and bacteria. You can buy saline nose drops at a grocery store or drugstore. Or you can make your own at home by adding 1 teaspoon of salt and 1 teaspoon of baking soda to 2 cups of distilled water. If you make your own, fill a bulb syringe with the solution, insert the tip into your nostril, and squeeze gently. Byron Martínez your nose.   · Put a hot, wet towel or a warm gel pack on your face 3 or 4 times a day for 5 to 10 minutes each time. · Try a decongestant nasal spray like oxymetazoline (Afrin). Do not use it for more than 3 days in a row. Using it for more than 3 days can make your congestion worse. When should you call for help? Call your doctor now or seek immediate medical care if:    · You have new or worse swelling or redness in your face or around your eyes.     · You have a new or higher fever. Watch closely for changes in your health, and be sure to contact your doctor if:    · You have new or worse facial pain.     · The mucus from your nose becomes thicker (like pus) or has new blood in it.     · You are not getting better as expected. Where can you learn more? Go to https://v2 Rat[employer]eb.[employer]. org and sign in to your [employer] account. Enter O946 in the [employer] box to learn more about \"Sinusitis: Care Instructions. \"     If you do not have an account, please click on the \"Sign Up Now\" link. Current as of: April 15, 2020               Content Version: 12.6  © 3604-0611 [employer]. Care instructions adapted under license by [facility] ([facility]). If you have questions about a medical condition or this instruction, always ask your healthcare professional. [address] any warranty or liability for your use of this information.

## 2020-12-03 NOTE — PROGRESS NOTES
[address]   [address] [address]  Dept: [phone]  Loc: [phone]  Visit Date: 12/3/2020      Chief Complaint:   Mag Pillai is a 80 y.o. female thatpresents for Sinus Problem (Zithro ordered on 11/27/2020 pt still having symptoms cant get phlegm loose plugged in sinus )    HPI:  Comes in today with persistent sinus congestion  Called in a few days ago with similar concerns  Was advised to try OTC sinus medication   Has not yet tried this   Also having some sore throat and ear fullness intermittently  Does follow with ENT for chronic sinus troubles  Denies any fever, chills, changes in taste or smell, N, V, D  She comes in alone today  History obtained from pt and medical records  I have reviewed the patient's past medical history, past surgical history, allergies,medications, social and family history and I have made updates where appropriate.     Past Medical History:   Diagnosis Date    Arthritis     Cancer [facility]) 2013    SKIN CANCER ON LEFT ANKLE    Diverticulosis     Hyperlipidemia     Hypertension     Hypothyroidism     Psoriasis     Thyroid disorder        Past Surgical History:   Procedure Laterality Date    BREAST BIOPSY Left     benign    BREAST SURGERY Left 6/1966    Lumpectomy    CARPAL TUNNEL RELEASE Right 1/30/2013    CARPAL TUNNEL RELEASE Left 7/25/13    CATARACT REMOVAL Bilateral 1999    COLON SURGERY  11/1999    resection    COLONOSCOPY  2003, 2006, 2011    HYSTERECTOMY  2/23/1970    HYSTERECTOMY, TOTAL ABDOMINAL      KNEE ARTHROSCOPY Right 11/21/2007    meniscectomies    KNEE SURGERY Left 2000    replacement    MYRINGOTOMY Right 07/01/2015    tube insertion    OVARY REMOVAL Bilateral 7/12/2001    oophorectomy    OVARY REMOVAL Bilateral     SHOULDER SURGERY  5/14    SINUS SURGERY         Social History     Tobacco Use    Smoking status: Never Smoker    Smokeless tobacco: Never Used Extremities: no cyanosis, clubbing or edema of the lowerextremities  Musculoskeletal: No joint swelling or gross deformity   Neuro:  Alert, 5/5 strength globally and symmetrically, 2+ patellar reflexes b/l,  normal speech, no focal findings or movement disorder noted, gait wnl  Psych:  Normal affect without evidence of depression or anxiety, insight and judgement are appropriate, memoryappears intact  Skin: warm and dry, no rash or erythema  Lymph:  No cervical, auricular or supraclavicular lymph nodes palpated    ASSESSMENT & PLAN  Uzair Larry was seen today for sinus problem. Diagnoses and all orders for this visit:    Acute rhinosinusitis  -     amoxicillin-clavulanate (AUGMENTIN) 875-125 MG per tablet; Take 1 tablet by mouth 2 times daily for 7 days    Try other suggested OTC meds  If no improvement, she will call ENT  She is scheduled to see them in the upcoming weeks    No follow-ups on file. Uzair Larry received counseling on the following healthy behaviors: nutrition, exercise and medication adherence  Reviewedprior labs and health maintenance. Continue current medications, diet and exercise. Discussed use, benefit, and side effects of prescribed medications. Barriers to medication compliance addressed. Patient given educationalmaterials - see patient instructions. All patient questions answered. Patient voiced understanding.      Electronically signed by RACQUEL Pena on 12/3/20 at 11:31 AM EST

## 2020-12-04 ENCOUNTER — TELEPHONE (OUTPATIENT)
Dept: ADMINISTRATIVE | Age: 85
End: 2020-12-04

## 2020-12-04 ENCOUNTER — HOSPITAL ENCOUNTER (OUTPATIENT)
Age: 85
Discharge: HOME | End: 2020-12-04
Payer: MEDICARE

## 2020-12-04 DIAGNOSIS — J01.90 ACUTE SINUSITIS, RECURRENCE NOT SPECIFIED, UNSPECIFIED LOCATION: ICD-10-CM

## 2020-12-04 PROCEDURE — 99211 OFF/OP EST MAY X REQ PHY/QHP: CPT

## 2020-12-04 PROCEDURE — U0003 INFECTIOUS AGENT DETECTION BY NUCLEIC ACID (DNA OR RNA); SEVERE ACUTE RESPIRATORY SYNDROME CORONAVIRUS 2 (SARS-COV-2) (CORONAVIRUS DISEASE [COVID-19]), AMPLIFIED PROBE TECHNIQUE, MAKING USE OF HIGH THROUGHPUT TECHNOLOGIES AS DESCRIBED BY CMS-2020-01-R: HCPCS

## 2020-12-04 NOTE — TELEPHONE ENCOUNTER
Pt was seen yesterday for a sinus infection but after talking to a friend she started wondering if she needs to go take a covid test. Would like a call back either way to ease her mind. Pt still believes she just has her \"usual sinus infection\".  Would like call back on home # [phone]

## 2020-12-06 LAB — SARS-COV-2: NOT DETECTED

## 2020-12-07 ENCOUNTER — TELEPHONE (OUTPATIENT)
Dept: FAMILY MEDICINE CLINIC | Age: 85
End: 2020-12-07

## 2020-12-08 ENCOUNTER — TELEPHONE (OUTPATIENT)
Dept: FAMILY MEDICINE CLINIC | Age: 85
End: 2020-12-08

## 2020-12-08 NOTE — TELEPHONE ENCOUNTER
Gorge Persaud came in to drop off AVS from Dr Wendy Morales [facility] and wanted Dr Maria C Parra to know that it stated no evidence of sinus or throat infection. The ENT recommended stopping or changing the spironolactone medication, pt wanted to check with Dr Maria C Parra what he thought would be best. Patient also has 4 antibiotic pills left and wants to know if she should finish those pills. Please advise thanks. The AVS is attatched to this phone encounter. Thank you.

## 2020-12-08 NOTE — TELEPHONE ENCOUNTER
Can we request the note from the visit?   I would like to review it prior to changing the spironolactone

## 2020-12-09 NOTE — TELEPHONE ENCOUNTER
Called [facility]  They will fax the notes over to our fax number at [phone]

## 2020-12-14 ENCOUNTER — TELEPHONE (OUTPATIENT)
Dept: FAMILY MEDICINE CLINIC | Age: 85
End: 2020-12-14

## 2020-12-14 NOTE — TELEPHONE ENCOUNTER
Pt c/o feeling a little nervous, shaky she did go ahead and take ativan  She was a little upset b/c she received a letter to report for jury duty, but she called and this was taken care of.   B/p 181/82 P 86  Pt states her sinus sxs have improved and she finished the abx

## 2020-12-22 ENCOUNTER — HOSPITAL ENCOUNTER (OUTPATIENT)
Age: 85
Discharge: HOME | End: 2020-12-22
Payer: MEDICARE

## 2020-12-22 ENCOUNTER — TELEPHONE (OUTPATIENT)
Dept: FAMILY MEDICINE CLINIC | Age: 85
End: 2020-12-22

## 2020-12-22 LAB
ALBUMIN SERPL-MCNC: 4.2 G/DL (ref 3.5–5.1)
ALP BLD-CCNC: 79 U/L (ref 38–126)
ALT SERPL-CCNC: 11 U/L (ref 11–66)
ANION GAP SERPL CALCULATED.3IONS-SCNC: 11 MEQ/L (ref 8–16)
AST SERPL-CCNC: 19 U/L (ref 5–40)
BILIRUB SERPL-MCNC: 0.5 MG/DL (ref 0.3–1.2)
BUN BLDV-MCNC: 14 MG/DL (ref 7–22)
CALCIUM SERPL-MCNC: 9.8 MG/DL (ref 8.5–10.5)
CHLORIDE BLD-SCNC: 107 MEQ/L (ref 98–111)
CHOLESTEROL, TOTAL: 193 MG/DL (ref 100–199)
CO2: 26 MEQ/L (ref 23–33)
CREAT SERPL-MCNC: 0.9 MG/DL (ref 0.4–1.2)
GFR SERPL CREATININE-BSD FRML MDRD: 58 ML/MIN/1.73M2
GLUCOSE BLD-MCNC: 106 MG/DL (ref 70–108)
HDLC SERPL-MCNC: 43 MG/DL
LDL CHOLESTEROL CALCULATED: 121 MG/DL
POTASSIUM SERPL-SCNC: 4.1 MEQ/L (ref 3.5–5.2)
SODIUM BLD-SCNC: 144 MEQ/L (ref 135–145)
T4 FREE: 1.16 NG/DL (ref 0.93–1.76)
TOTAL PROTEIN: 7.1 G/DL (ref 6.1–8)
TRIGL SERPL-MCNC: 146 MG/DL (ref 0–199)
TSH SERPL DL<=0.05 MIU/L-ACNC: 2.61 UIU/ML (ref 0.4–4.2)

## 2020-12-22 PROCEDURE — 84443 ASSAY THYROID STIM HORMONE: CPT

## 2020-12-22 PROCEDURE — 36415 COLL VENOUS BLD VENIPUNCTURE: CPT

## 2020-12-22 PROCEDURE — 80061 LIPID PANEL: CPT

## 2020-12-22 PROCEDURE — 80053 COMPREHEN METABOLIC PANEL: CPT

## 2020-12-22 PROCEDURE — 84439 ASSAY OF FREE THYROXINE: CPT

## 2020-12-22 NOTE — TELEPHONE ENCOUNTER
Per HIPAA, left detailed message for patient letting them know the results of their labs. Patient was advised to call the office with any questions.

## 2020-12-22 NOTE — TELEPHONE ENCOUNTER
----- Message from Katia Myles DO sent at 12/22/2020  8:19 AM EST -----  Labs look good. Continue current medications.

## 2020-12-29 ENCOUNTER — OFFICE VISIT (OUTPATIENT)
Dept: FAMILY MEDICINE CLINIC | Age: 85
End: 2020-12-29
Payer: MEDICARE

## 2020-12-29 VITALS — WEIGHT: 130.2 LBS | BODY MASS INDEX: 25.43 KG/M2

## 2020-12-29 PROCEDURE — 1036F TOBACCO NON-USER: CPT | Performed by: FAMILY MEDICINE

## 2020-12-29 PROCEDURE — G8427 DOCREV CUR MEDS BY ELIG CLIN: HCPCS | Performed by: FAMILY MEDICINE

## 2020-12-29 PROCEDURE — G8417 CALC BMI ABV UP PARAM F/U: HCPCS | Performed by: FAMILY MEDICINE

## 2020-12-29 PROCEDURE — 99214 OFFICE O/P EST MOD 30 MIN: CPT | Performed by: FAMILY MEDICINE

## 2020-12-29 PROCEDURE — G8484 FLU IMMUNIZE NO ADMIN: HCPCS | Performed by: FAMILY MEDICINE

## 2020-12-29 PROCEDURE — 4040F PNEUMOC VAC/ADMIN/RCVD: CPT | Performed by: FAMILY MEDICINE

## 2020-12-29 PROCEDURE — 1123F ACP DISCUSS/DSCN MKR DOCD: CPT | Performed by: FAMILY MEDICINE

## 2020-12-29 PROCEDURE — 1090F PRES/ABSN URINE INCON ASSESS: CPT | Performed by: FAMILY MEDICINE

## 2020-12-29 RX ORDER — MONTELUKAST SODIUM 10 MG/1
10 TABLET ORAL DAILY
Qty: 90 TABLET | Refills: 3 | Status: SHIPPED | OUTPATIENT
Start: 2020-12-29 | End: 2022-04-04 | Stop reason: SDUPTHER

## 2020-12-29 RX ORDER — SPIRONOLACTONE 50 MG/1
50 TABLET, FILM COATED ORAL DAILY
Qty: 90 TABLET | Refills: 3 | Status: SHIPPED | OUTPATIENT
Start: 2020-12-29 | End: 2021-04-14 | Stop reason: SDUPTHER

## 2020-12-29 RX ORDER — MONTELUKAST SODIUM 10 MG/1
10 TABLET ORAL DAILY
Qty: 30 TABLET | Refills: 5 | Status: SHIPPED | OUTPATIENT
Start: 2020-12-29 | End: 2020-12-29

## 2020-12-29 NOTE — PROGRESS NOTES
COLONOSCOPY  2003, 2006, 2011    HYSTERECTOMY  2/23/1970    HYSTERECTOMY, TOTAL ABDOMINAL      KNEE ARTHROSCOPY Right 11/21/2007    meniscectomies    KNEE SURGERY Left 2000    replacement    MYRINGOTOMY Right 07/01/2015    tube insertion    OVARY REMOVAL Bilateral 7/12/2001    oophorectomy    OVARY REMOVAL Bilateral     SHOULDER SURGERY  5/14    SINUS SURGERY         Social History     Tobacco Use    Smoking status: Never Smoker    Smokeless tobacco: Never Used   Substance Use Topics    Alcohol use: No    Drug use: No       Family History   Problem Relation Age of Onset    Cancer Child     Stroke Sister     Heart Disease Sister     Other Other         visual problems         I have reviewed the patient's past medical history, past surgical history, allergies, medications, social and family history and I have made updates where appropriate.     Review of Systems        PHYSICAL EXAM:  Wt 130 lb 3.2 oz (59.1 kg)   BMI 25.43 kg/m²     General Appearance: well developed and well- nourished, in no acute distress  Head: normocephalic and atraumatic  ENT: external ear and ear canal normal bilaterally, nose without deformity, nasal mucosa and turbinates normal without polyps, oropharynx normal, dentition is normal for age, no lipor gum lesions noted  Neck: supple and non-tender without mass, no thyromegaly or thyroid nodules, no cervical lymphadenopathy  Pulmonary/Chest: clear to auscultation bilaterally- no wheezes, rales or rhonchi, normal air movement, no respiratory distress or retractions  Cardiovascular: normal rate, regular rhythm, normal S1 and S2, no murmurs, rubs, clicks, or gallops, distal pulses intact  Abdomen: soft, non-tender, non-distended, no rebound or guarding, no masses or hernias noted, no hepatospleenomegaly  Extremities: no cyanosis, clubbing or edema of the lower extremities  Psych:  Normal affect without evidence of depression oranxiety, insight and judgement are appropriate, memory appears intact  Skin: warm and dry, no rash or erythema      ASSESSMENT & Opal Saleem was seen today for hypertension. Diagnoses and all orders for this visit:    Essential hypertension  -     spironolactone (ALDACTONE) 50 MG tablet; Take 1 tablet by mouth daily    Post-nasal drip  -     montelukast (SINGULAIR) 10 MG tablet; Take 1 tablet by mouth daily    Gastroesophageal reflux disease without esophagitis    Hypothyroidism, unspecified type    Anxiety    Urinary frequency    -Declining further intervention at this time for urinary frequency  -Will start singulair for allergies  -Other chronic issues are stable, continue current medications  -Advised to call if any issues      Return in about 4 months (around 4/29/2021). Controlled Substance Monitoring:    Acute and Chronic Pain Monitoring:   RX Monitoring 12/9/2019   Attestation -   Periodic Controlled Substance Monitoring No signs of potential drug abuse or diversion identified. Jeana Khanna received counseling on the following healthy behaviors: medication adherence  Reviewed prior labs and health maintenance. Continue current medications, diet and exercise. Discussed use, benefit, and side effects of prescribed medications. Barriers to medication compliance addressed. Patient given educational materials - see patient instructions. All patient questions answered. Patient voiced understanding.

## 2021-01-18 ENCOUNTER — TELEPHONE (OUTPATIENT)
Dept: FAMILY MEDICINE CLINIC | Age: 86
End: 2021-01-18

## 2021-01-18 NOTE — TELEPHONE ENCOUNTER
It's only if you have had allergies to previous vaccines that you should take caution in getting the vaccine. I have not seen any medication allergies that have been listed as a reason for caution.

## 2021-01-18 NOTE — TELEPHONE ENCOUNTER
Pt called wanting information in the covid vaccine. Viky Ovalles had read if a pt had allergies to any medications, they should not get the vaccine.     Please advise

## 2021-02-19 ENCOUNTER — VIRTUAL VISIT (OUTPATIENT)
Dept: FAMILY MEDICINE CLINIC | Age: 86
End: 2021-02-19
Payer: MEDICARE

## 2021-02-19 DIAGNOSIS — J01.91 ACUTE RECURRENT SINUSITIS, UNSPECIFIED LOCATION: Primary | ICD-10-CM

## 2021-02-19 PROCEDURE — 99442 PR PHYS/QHP TELEPHONE EVALUATION 11-20 MIN: CPT | Performed by: NURSE PRACTITIONER

## 2021-02-19 RX ORDER — AMOXICILLIN AND CLAVULANATE POTASSIUM 875; 125 MG/1; MG/1
1 TABLET, FILM COATED ORAL 2 TIMES DAILY
Qty: 20 TABLET | Refills: 0 | Status: SHIPPED | OUTPATIENT
Start: 2021-02-19 | End: 2021-03-01

## 2021-02-19 NOTE — PROGRESS NOTES
Beulah Villafana is a 80 y.o. female evaluated via telephone on 2/19/2021. CC: sinus pain and pressure    Consent:  She and/or health care decision maker is aware that that she may receive a bill for this telephone service, depending on her insurance coverage, and has provided verbal consent to proceed: Yes      Documentation:  I communicated with the patient and/or health care decision maker about sinus issues. Details of this discussion including any medical advice provided:     Runny nose, sinus pain and pressure  No cough  Dry throat  No ear pain   Headache this am   Sore, scratchy throat  Denies any fever, chills, changes in taste or smell, N, V, D    Assessment & Plan  Acute Sinusitis    Start Augmentin  Continue OTC meds  Push fluids  Warm fluids for comfort  Discussed her vitamins and gave her appropriate doses    I affirm this is a Patient Initiated Episode with a Patient who has not had a related appointment within my department in the past 7 days or scheduled within the next 24 hours.     Patient identification was verified at the start of the visit: Yes    Total Time: minutes: 11-20 minutes    Note: not billable if this call serves to triage the patient into an appointment for the relevant concern    Electronically signed by RACQUEL Rolon CNP on 2/19/2021 at 9:44 AM

## 2021-02-26 ENCOUNTER — IMMUNIZATION (OUTPATIENT)
Dept: PRIMARY CARE CLINIC | Age: 86
End: 2021-02-26
Payer: MEDICARE

## 2021-02-26 PROCEDURE — 91300 COVID-19, PFIZER VACCINE 30MCG/0.3ML DOSE: CPT | Performed by: FAMILY MEDICINE

## 2021-02-26 PROCEDURE — 0001A COVID-19, PFIZER VACCINE 30MCG/0.3ML DOSE: CPT | Performed by: FAMILY MEDICINE

## 2021-03-01 ENCOUNTER — OFFICE VISIT (OUTPATIENT)
Dept: FAMILY MEDICINE CLINIC | Age: 86
End: 2021-03-01
Payer: MEDICARE

## 2021-03-01 VITALS
HEIGHT: 60 IN | RESPIRATION RATE: 16 BRPM | DIASTOLIC BLOOD PRESSURE: 72 MMHG | SYSTOLIC BLOOD PRESSURE: 118 MMHG | OXYGEN SATURATION: 95 % | WEIGHT: 134 LBS | HEART RATE: 77 BPM | BODY MASS INDEX: 26.31 KG/M2

## 2021-03-01 DIAGNOSIS — G89.29 CHRONIC MIDLINE THORACIC BACK PAIN: Primary | ICD-10-CM

## 2021-03-01 DIAGNOSIS — I10 ESSENTIAL HYPERTENSION: ICD-10-CM

## 2021-03-01 DIAGNOSIS — F41.9 ANXIETY: ICD-10-CM

## 2021-03-01 DIAGNOSIS — M54.6 CHRONIC MIDLINE THORACIC BACK PAIN: Primary | ICD-10-CM

## 2021-03-01 PROCEDURE — 4040F PNEUMOC VAC/ADMIN/RCVD: CPT | Performed by: FAMILY MEDICINE

## 2021-03-01 PROCEDURE — 1123F ACP DISCUSS/DSCN MKR DOCD: CPT | Performed by: FAMILY MEDICINE

## 2021-03-01 PROCEDURE — 1090F PRES/ABSN URINE INCON ASSESS: CPT | Performed by: FAMILY MEDICINE

## 2021-03-01 PROCEDURE — 99214 OFFICE O/P EST MOD 30 MIN: CPT | Performed by: FAMILY MEDICINE

## 2021-03-01 PROCEDURE — 1036F TOBACCO NON-USER: CPT | Performed by: FAMILY MEDICINE

## 2021-03-01 PROCEDURE — G8484 FLU IMMUNIZE NO ADMIN: HCPCS | Performed by: FAMILY MEDICINE

## 2021-03-01 PROCEDURE — G8417 CALC BMI ABV UP PARAM F/U: HCPCS | Performed by: FAMILY MEDICINE

## 2021-03-01 PROCEDURE — G8427 DOCREV CUR MEDS BY ELIG CLIN: HCPCS | Performed by: FAMILY MEDICINE

## 2021-03-01 RX ORDER — MELOXICAM 7.5 MG/1
7.5 TABLET ORAL DAILY PRN
Qty: 30 TABLET | Refills: 3 | Status: ON HOLD | OUTPATIENT
Start: 2021-03-01 | End: 2021-05-10 | Stop reason: HOSPADM

## 2021-03-01 RX ORDER — LORAZEPAM 0.5 MG/1
TABLET ORAL
Qty: 40 TABLET | Refills: 0 | Status: SHIPPED | OUTPATIENT
Start: 2021-03-01 | End: 2022-04-20

## 2021-03-01 RX ORDER — AMLODIPINE BESYLATE 2.5 MG/1
TABLET ORAL
COMMUNITY
Start: 2020-04-21 | End: 2021-03-01 | Stop reason: SDUPTHER

## 2021-03-01 RX ORDER — AMLODIPINE BESYLATE 2.5 MG/1
2.5 TABLET ORAL DAILY
Qty: 90 TABLET | Refills: 3 | Status: SHIPPED | OUTPATIENT
Start: 2021-03-01 | End: 2021-04-01

## 2021-03-01 NOTE — PROGRESS NOTES
Marylene Forward is a 80 y.o. female that presents for     Chief Complaint   Patient presents with    Back Pain     miiddle of back and into sinuses  heat works       /72   Pulse 77   Resp 16   Ht 5' (1.524 m)   Wt 134 lb (60.8 kg)   SpO2 95%   BMI 26.17 kg/m²       HPI:    Continues to note thoracic back pain. This is a chronic issue. Has had a fairly extensive work up for this in the past, only significant finding thus far has been OA changes. Sx have improved when she takes aleve. Sx worse after she is more physically active. HTN    Does patient check BP regularly at home? - Yes - generally well controlled  Current Medication regimen - norvasc, aldactone  Tolerating medications well? - yes    Shortness of breath or chest pain? No  Headache or visual complaints? No  Neurologic changes like confusion? No  Extremity edema? No    BP Readings from Last 3 Encounters:   03/01/21 118/72   12/03/20 118/64   09/21/20 122/70     Anxiety has been ok recently. Taking lorazepam PRN which does help when she does take it.     Health Maintenance   Topic Date Due    DTaP/Tdap/Td vaccine (1 - Tdap) 02/21/1944    Shingles Vaccine (2 of 3) 04/12/2014    COVID-19 Vaccine (2 of 2 - Pfizer series) 03/19/2021    Annual Wellness Visit (AWV)  07/15/2021    TSH testing  12/22/2021    Potassium monitoring  12/22/2021    Creatinine monitoring  12/22/2021    Flu vaccine  Completed    Pneumococcal 65+ years Vaccine  Completed    Hepatitis A vaccine  Aged Out    Hepatitis B vaccine  Aged Out    Hib vaccine  Aged Out    Meningococcal (ACWY) vaccine  Aged Out       Past Medical History:   Diagnosis Date    Arthritis     Cancer ([facility] [address] 2013    SKIN CANCER ON LEFT ANKLE    Diverticulosis     Hyperlipidemia     Hypertension     Hypothyroidism     Psoriasis     Thyroid disorder        Past Surgical History:   Procedure Laterality Date    BREAST BIOPSY Left     benign    BREAST SURGERY Left 6/1966    Lumpectomy  CARPAL TUNNEL RELEASE Right 1/30/2013    CARPAL TUNNEL RELEASE Left 7/25/13    CATARACT REMOVAL Bilateral 1999    COLON SURGERY  11/1999    resection    COLONOSCOPY  2003, 2006, 2011    HYSTERECTOMY  2/23/1970    HYSTERECTOMY, TOTAL ABDOMINAL      KNEE ARTHROSCOPY Right 11/21/2007    meniscectomies    KNEE SURGERY Left 2000    replacement    MYRINGOTOMY Right 07/01/2015    tube insertion    OVARY REMOVAL Bilateral 7/12/2001    oophorectomy    OVARY REMOVAL Bilateral     SHOULDER SURGERY  5/14    SINUS SURGERY         Social History     Tobacco Use    Smoking status: Never Smoker    Smokeless tobacco: Never Used   Substance Use Topics    Alcohol use: No    Drug use: No       Family History   Problem Relation Age of Onset    Cancer Child     Stroke Sister     Heart Disease Sister     Other Other         visual problems         I have reviewed the patient's past medical history, past surgical history, allergies, medications, social and family history and I have made updates where appropriate.     Review of Systems        PHYSICAL EXAM:  /72   Pulse 77   Resp 16   Ht 5' (1.524 m)   Wt 134 lb (60.8 kg)   SpO2 95%   BMI 26.17 kg/m²     General Appearance: well developed and well- nourished, in no acute distress  Head: normocephalic and atraumatic  ENT: external ear and ear canal normal bilaterally, nose without deformity, nasal mucosa and turbinates normal without polyps, oropharynx normal, dentition is normal for age, no lipor gum lesions noted  Neck: supple and non-tender without mass, no thyromegaly or thyroid nodules, no cervical lymphadenopathy  Pulmonary/Chest: clear to auscultation bilaterally- no wheezes, rales or rhonchi, normal air movement, no respiratory distress or retractions  Cardiovascular: normal rate, regular rhythm, normal S1 and S2, no murmurs, rubs, clicks, or gallops, distal pulses intact  Extremities: no cyanosis, clubbing or edema of the lower extremities  Psych:

## 2021-03-05 ENCOUNTER — HOSPITAL ENCOUNTER (OUTPATIENT)
Dept: GENERAL RADIOLOGY | Age: 86
Discharge: HOME | End: 2021-03-05
Payer: MEDICARE

## 2021-03-05 ENCOUNTER — HOSPITAL ENCOUNTER (OUTPATIENT)
Age: 86
Discharge: HOME | End: 2021-03-05
Payer: MEDICARE

## 2021-03-05 DIAGNOSIS — G89.29 CHRONIC MIDLINE THORACIC BACK PAIN: ICD-10-CM

## 2021-03-05 DIAGNOSIS — M54.6 CHRONIC MIDLINE THORACIC BACK PAIN: ICD-10-CM

## 2021-03-05 PROCEDURE — 72072 X-RAY EXAM THORAC SPINE 3VWS: CPT

## 2021-03-08 ENCOUNTER — TELEPHONE (OUTPATIENT)
Dept: FAMILY MEDICINE CLINIC | Age: 86
End: 2021-03-08

## 2021-03-08 NOTE — TELEPHONE ENCOUNTER
----- Message from Connie Vincent DO sent at 3/8/2021  8:35 AM EST -----  The XRay did not display any new findings in her back. She has a lot of chronic arthritis changes in her back. I recommend continuing the treatment plan we discussed last week.

## 2021-03-19 ENCOUNTER — IMMUNIZATION (OUTPATIENT)
Dept: PRIMARY CARE CLINIC | Age: 86
End: 2021-03-19
Payer: MEDICARE

## 2021-03-19 PROCEDURE — 91300 COVID-19, PFIZER VACCINE 30MCG/0.3ML DOSE: CPT

## 2021-03-19 PROCEDURE — 0002A COVID-19, PFIZER VACCINE 30MCG/0.3ML DOSE: CPT

## 2021-04-01 ENCOUNTER — OFFICE VISIT (OUTPATIENT)
Dept: FAMILY MEDICINE CLINIC | Age: 86
End: 2021-04-01
Payer: MEDICARE

## 2021-04-01 VITALS
HEIGHT: 60 IN | DIASTOLIC BLOOD PRESSURE: 76 MMHG | OXYGEN SATURATION: 97 % | WEIGHT: 133.4 LBS | RESPIRATION RATE: 16 BRPM | HEART RATE: 66 BPM | TEMPERATURE: 98.6 F | SYSTOLIC BLOOD PRESSURE: 124 MMHG | BODY MASS INDEX: 26.19 KG/M2

## 2021-04-01 DIAGNOSIS — M54.6 CHRONIC MIDLINE THORACIC BACK PAIN: Primary | ICD-10-CM

## 2021-04-01 DIAGNOSIS — G89.29 CHRONIC MIDLINE THORACIC BACK PAIN: Primary | ICD-10-CM

## 2021-04-01 DIAGNOSIS — I10 ESSENTIAL HYPERTENSION: ICD-10-CM

## 2021-04-01 PROCEDURE — G8427 DOCREV CUR MEDS BY ELIG CLIN: HCPCS | Performed by: FAMILY MEDICINE

## 2021-04-01 PROCEDURE — 1090F PRES/ABSN URINE INCON ASSESS: CPT | Performed by: FAMILY MEDICINE

## 2021-04-01 PROCEDURE — 99214 OFFICE O/P EST MOD 30 MIN: CPT | Performed by: FAMILY MEDICINE

## 2021-04-01 PROCEDURE — 1123F ACP DISCUSS/DSCN MKR DOCD: CPT | Performed by: FAMILY MEDICINE

## 2021-04-01 PROCEDURE — 1036F TOBACCO NON-USER: CPT | Performed by: FAMILY MEDICINE

## 2021-04-01 PROCEDURE — G8417 CALC BMI ABV UP PARAM F/U: HCPCS | Performed by: FAMILY MEDICINE

## 2021-04-01 PROCEDURE — 4040F PNEUMOC VAC/ADMIN/RCVD: CPT | Performed by: FAMILY MEDICINE

## 2021-04-01 RX ORDER — AMLODIPINE BESYLATE 2.5 MG/1
2.5 TABLET ORAL DAILY
Qty: 90 TABLET | Refills: 3 | Status: ON HOLD | OUTPATIENT
Start: 2021-04-01 | End: 2021-05-10 | Stop reason: HOSPADM

## 2021-04-01 NOTE — PROGRESS NOTES
resection    COLONOSCOPY  2003, 2006, 2011    HYSTERECTOMY  2/23/1970    HYSTERECTOMY, TOTAL ABDOMINAL      KNEE ARTHROSCOPY Right 11/21/2007    meniscectomies    KNEE SURGERY Left 2000    replacement    MYRINGOTOMY Right 07/01/2015    tube insertion    OVARY REMOVAL Bilateral 7/12/2001    oophorectomy    OVARY REMOVAL Bilateral     SHOULDER SURGERY  5/14    SINUS SURGERY         Social History     Tobacco Use    Smoking status: Never Smoker    Smokeless tobacco: Never Used   Substance Use Topics    Alcohol use: No    Drug use: No       Family History   Problem Relation Age of Onset    Cancer Child     Stroke Sister     Heart Disease Sister     Other Other         visual problems         I have reviewed the patient's past medical history, past surgical history, allergies, medications, social and family history and I have made updates where appropriate.     Review of Systems        PHYSICAL EXAM:  /76   Pulse 66   Temp 98.6 °F (37 °C)   Resp 16   Ht 5' (1.524 m)   Wt 133 lb 6.4 oz (60.5 kg)   SpO2 97%   BMI 26.05 kg/m²     General Appearance: well developed and well- nourished, in no acute distress  Head: normocephalic and atraumatic  ENT: external ear and ear canal normal bilaterally, nose without deformity, nasal mucosa and turbinates normal without polyps, oropharynx normal, dentition is normal for age, no lipor gum lesions noted  Neck: supple and non-tender without mass, no thyromegaly or thyroid nodules, no cervical lymphadenopathy  Pulmonary/Chest: clear to auscultation bilaterally- no wheezes, rales or rhonchi, normal air movement, no respiratory distress or retractions  Cardiovascular: normal rate, regular rhythm, normal S1 and S2, no murmurs, rubs, clicks, or gallops, distal pulses intact  Extremities: no cyanosis, clubbing or edema of the lower extremities  Psych:  Normal affect without evidence of depression oranxiety, insight and judgement are appropriate, memory appears intact  Skin: warm and dry, no rash or erythema      ASSESSMENT & PLAN  Roselyn Tinoco was seen today for follow-up and other. Diagnoses and all orders for this visit:    Chronic midline thoracic back pain  -     Ambulatory referral to Physical Therapy    Essential hypertension  -     amLODIPine (NORVASC) 2.5 MG tablet; Take 1 tablet by mouth daily    -Start PT for thoracic back pain  -Other chronic issues are stable, continue current medications  -Advised to call if any issues      Return in about 2 months (around 6/1/2021). All copied or forwarded information in the progress note was verified by me to be accurate at the time of visit  Patient's past medical, surgical, social and family history were reviewed and updated     All patient questions answered. Patient voiced understanding.

## 2021-04-07 ENCOUNTER — HOSPITAL ENCOUNTER (OUTPATIENT)
Dept: PHYSICAL THERAPY | Age: 86
Setting detail: THERAPIES SERIES
Discharge: HOME | End: 2021-04-07
Payer: MEDICARE

## 2021-04-07 PROCEDURE — 97161 PT EVAL LOW COMPLEX 20 MIN: CPT

## 2021-04-07 PROCEDURE — 97110 THERAPEUTIC EXERCISES: CPT

## 2021-04-07 NOTE — PROGRESS NOTES
** PLEASE SIGN, DATE AND TIME CERTIFICATION BELOW AND RETURN TO [facility] OUTPATIENT REHABILITATION (FAX #: [phone]). ATTEST/CO-SIGN IF ACCESSING VIA [employer]. THANK YOU.**    I certify that I have examined the patient below and determined that Physical Medicine and Rehabilitation service is necessary and that I approve the established plan of care for up to 90 days or as specifically noted. Attestation, signature or co-signature of physician indicates approval of certification requirements.    ________________________ ____________ __________  Physician Signature   Date   Time  [address] [facility]  PHYSICAL THERAPY  [x] EVALUATION  [] DAILY NOTE (LAND) [] DAILY NOTE (AQUATIC ) [] PROGRESS NOTE [] DISCHARGE NOTE    [] [facility]   [] [facility] 90    [] [facility]   [] Sameer Kohli    Date: 2021  Patient Name:  Sahil Nickerson  : 1925  MRN: [id number]  CSN: [id number]    Referring Practitioner Carter Mitchell DO   Diagnosis Pain in thoracic spine [M54.6]  Other chronic pain [G89.29]    Treatment Diagnosis Chronic mid thoracic pain, postural weakness   Date of Evaluation 21    Additional Pertinent History HTN, osteoporosis, arthritis. Insurance: Primary: Payor: MEDICARE /  /  / ,   Secondary: [facility]   Authorization Information: Aquatics and modalities covered except ionto, HP/CP, telehealth covered   Visit # 1, 1/10 for progress note   Visits Allowed: Unlimited based on medical necessity   Recertification Date: 3/75/66   Physician Follow-Up: 21   Physician Orders:    History of Present Illness: Chronic midline thoracic pain since 10/2020, when she helped lift a [place] cabinet; but symptoms have been worse over the past 3 months. SUBJECTIVE: Pt notes doctor thought she might have muscle spasms so was prescribed Meloxicam to take instead of Aleve.  Pain starts at bra line and gradually moves into sinuses, occasionally starts in neck. Pt carried 2 jugs of water the other day which triggered pain. Pain is aggravated with walking too fast. Pt uses heat for relief. Pt able to stop and stretch at onset to control pain. Social/Functional History and Current Status:  Medications and Allergies have been reviewed and are listed on Medical History Questionnaire. Raghu Esposito lives alone in 1 story duplex with stairs and no handrail to enter.     Task Previous Current   ADLs  Independent Independent   IADL's Independent Independent   Ambulation Independent Independent   Transfers Independent Independent   Recreation Independent Independent   Community Integration Independent Independent   Driving Active  Active    Work Retired  Retired     OBJECTIVE:    Pain: None currently, 5-6/10 thoracic pain at onset, radiating into neck and sinusus; pt reports it elevates heart rate   Palpation Tender right mid thoracic paraspinals and latts   Observation Mild protracted shoulders, normal spinal curvature, right scapula region swollen compared to left   Posture        Range of Motion Shoulder WFL- right limited ~25% d/t TSA  Cervical [facility]  Thoracic WFL- mild pain with rotation   Strength Shoulder B flexion and abduction 4+/5, ER/IR 4-/5  Bicep B 5/5, tricep B 4-/5   WFL  Horizontal abduction 4/5, latts 4-/5   Coordination    Sensation intact   Bed Mobility Mod I   Transfers    Ambulation    Stairs    Balance    Special Tests          TREATMENT   Precautions:    Pain: Denies current pain    X in shaded column indicates activity completed today   Modalities Parameters/  Location  Notes                     Manual Therapy Time/Technique  Notes                     Exercise/Intervention   Notes   Levator stretch B 3x15 sec  x    3 way corner pec stretch 3x15 sec  x    Dive stretch 3x15 sec  x    Lying over towel roll lengthwise along spine with arms out to side x4 min  x Specific Interventions Next Treatment: postural strengthening- shoulder extension, ER, horizontal abduction, IR, latts; manual/massage as needed; cervical stabs    Activity/Treatment Tolerance:  [x]  Patient tolerated treatment well  []  Patient limited by fatigue  []  Patient limited by pain   []  Patient limited by medical complications  []  Other:     Assessment: 80year old presents with chronic mid thoracic pain, right worse than left that radiates upward to neck and sinuses. Pt demos tightness in thoracic paraspinals and latissimus dorsi causing weakness. Pt demos slightly protracted shoulders which may contribute to pain. Pt notes good tolerance to stretches and left shoulder feels better. Pt will benefit from skilled PT to address listed impairments. Body Structures/Functions/Activity Limitations: impaired ROM, impaired strength, impaired posture and pain  Prognosis: good    GOALS:  Patient Goal: Eliminate pain    Short Term Goals:  Time Frame: 6 weeks  Patient will deny pain in mid thoracic spine without tenderness to walking faster and lifting. Patient will demonstrate good postural awareness during therapy session without cues  to carry over to functional activities and lifting. Patient will improve horizontal abduction, latt, shoulder ER strength to 4+/5 for stabilization when lifting and cleaning. Long Term Goals:  Time Frame: 12 weeks  Patient will be independent and compliant with HEP daily to achieve above goals. Patient Education:   [x]  HEP/Education Completed: Plan of Care, Goals, stretches listed above with handout provided   Edyn Access Code: [email or web address]  []  No new Education completed  []  Reviewed Prior HEP      [x]  Patient verbalized and/or demonstrated understanding of education provided. []  Patient unable to verbalize and/or demonstrate understanding of education provided. Will continue education.   []  Barriers to learning:     PLAN:  Treatment Recommendations: Strengthening, Range of Motion, Manual Therapy - Soft Tissue Mobilization, Home Exercise Program, Patient Education and Modalities    [x]  Plan of care initiated. Plan to see patient 2 times per week for 12 weeks to address the treatment planned outlined above.   []  Continue with current plan of care  []  Modify plan of care as follows:    []  Hold pending physician visit  []  Discharge    Time In 1345   Time Out 1430   Timed Code Minutes: 15 min   Total Treatment Time: 45 min       Electronically Signed by: Gorge Brown

## 2021-04-12 ENCOUNTER — HOSPITAL ENCOUNTER (OUTPATIENT)
Dept: PHYSICAL THERAPY | Age: 86
Setting detail: THERAPIES SERIES
Discharge: HOME | End: 2021-04-12
Payer: MEDICARE

## 2021-04-12 PROCEDURE — 97110 THERAPEUTIC EXERCISES: CPT

## 2021-04-12 NOTE — PROGRESS NOTES
towel roll lengthwise along spine with arms out to side x4 min  x                                                       Specific Interventions Next Treatment: postural strengthening- shoulder extension, ER, horizontal abduction, IR, latts; manual/massage as needed; cervical stabs    Activity/Treatment Tolerance:  [x]  Patient tolerated treatment well  []  Patient limited by fatigue  []  Patient limited by pain   []  Patient limited by medical complications  []  Other:     Assessment: Patient having increased thoracic pain over the weekend so initiated manual interventions with patient finding some relief. GOALS:  Patient Goal: Eliminate pain    Short Term Goals:  Time Frame: 6 weeks  Patient will deny pain in mid thoracic spine without tenderness to walking faster and lifting. Patient will demonstrate good postural awareness during therapy session without cues  to carry over to functional activities and lifting. Patient will improve horizontal abduction, latt, shoulder ER strength to 4+/5 for stabilization when lifting and cleaning. Long Term Goals:  Time Frame: 12 weeks  Patient will be independent and compliant with HEP daily to achieve above goals. Patient Education:   []  HEP/Education Completed:   Princess Murphy Access Code: [email or web address]  []  No new Education completed  [x]  Reviewed Prior HEP      [x]  Patient verbalized and/or demonstrated understanding of education provided. []  Patient unable to verbalize and/or demonstrate understanding of education provided. Will continue education. []  Barriers to learning:     PLAN:  Treatment Recommendations: Strengthening, Range of Motion, Manual Therapy - Soft Tissue Mobilization, Home Exercise Program, Patient Education and Modalities    []  Plan of care initiated. Plan to see patient 2 times per week for 12 weeks to address the treatment planned outlined above.   [x]  Continue with current plan of care  []  Modify plan of care as follows:    []  Hold pending physician visit  []  Discharge    Time In 1147   Time Out 1215   Timed Code Minutes: 28 min   Total Treatment Time: 28 min       Electronically Signed by: Red Pale

## 2021-04-14 ENCOUNTER — HOSPITAL ENCOUNTER (OUTPATIENT)
Dept: GENERAL RADIOLOGY | Age: 86
Discharge: HOME | End: 2021-04-14
Payer: MEDICARE

## 2021-04-14 ENCOUNTER — OFFICE VISIT (OUTPATIENT)
Dept: FAMILY MEDICINE CLINIC | Age: 86
End: 2021-04-14
Payer: MEDICARE

## 2021-04-14 ENCOUNTER — HOSPITAL ENCOUNTER (OUTPATIENT)
Age: 86
Discharge: HOME | End: 2021-04-14
Payer: MEDICARE

## 2021-04-14 VITALS
HEART RATE: 68 BPM | RESPIRATION RATE: 18 BRPM | HEIGHT: 61 IN | TEMPERATURE: 97.3 F | DIASTOLIC BLOOD PRESSURE: 76 MMHG | WEIGHT: 133.8 LBS | SYSTOLIC BLOOD PRESSURE: 116 MMHG | OXYGEN SATURATION: 97 % | BODY MASS INDEX: 25.26 KG/M2

## 2021-04-14 DIAGNOSIS — M25.512 ACUTE PAIN OF LEFT SHOULDER: Primary | ICD-10-CM

## 2021-04-14 DIAGNOSIS — M25.612 DECREASED ROM OF LEFT SHOULDER: ICD-10-CM

## 2021-04-14 DIAGNOSIS — I10 ESSENTIAL HYPERTENSION: ICD-10-CM

## 2021-04-14 DIAGNOSIS — M25.512 ACUTE PAIN OF LEFT SHOULDER: ICD-10-CM

## 2021-04-14 PROCEDURE — 4040F PNEUMOC VAC/ADMIN/RCVD: CPT | Performed by: NURSE PRACTITIONER

## 2021-04-14 PROCEDURE — 73030 X-RAY EXAM OF SHOULDER: CPT

## 2021-04-14 PROCEDURE — 1123F ACP DISCUSS/DSCN MKR DOCD: CPT | Performed by: NURSE PRACTITIONER

## 2021-04-14 PROCEDURE — 1036F TOBACCO NON-USER: CPT | Performed by: NURSE PRACTITIONER

## 2021-04-14 PROCEDURE — G8417 CALC BMI ABV UP PARAM F/U: HCPCS | Performed by: NURSE PRACTITIONER

## 2021-04-14 PROCEDURE — 99214 OFFICE O/P EST MOD 30 MIN: CPT | Performed by: NURSE PRACTITIONER

## 2021-04-14 PROCEDURE — 1090F PRES/ABSN URINE INCON ASSESS: CPT | Performed by: NURSE PRACTITIONER

## 2021-04-14 PROCEDURE — G8427 DOCREV CUR MEDS BY ELIG CLIN: HCPCS | Performed by: NURSE PRACTITIONER

## 2021-04-14 RX ORDER — SPIRONOLACTONE 50 MG/1
50 TABLET, FILM COATED ORAL DAILY
Qty: 90 TABLET | Refills: 3 | Status: SHIPPED | OUTPATIENT
Start: 2021-04-14 | End: 2022-05-23

## 2021-04-14 ASSESSMENT — PATIENT HEALTH QUESTIONNAIRE - PHQ9
DEPRESSION UNABLE TO ASSESS: FUNCTIONAL CAPACITY MOTIVATION LIMITS ACCURACY
1. LITTLE INTEREST OR PLEASURE IN DOING THINGS: 0
2. FEELING DOWN, DEPRESSED OR HOPELESS: 0
SUM OF ALL RESPONSES TO PHQ QUESTIONS 1-9: 0
SUM OF ALL RESPONSES TO PHQ QUESTIONS 1-9: 0

## 2021-04-14 NOTE — PATIENT INSTRUCTIONS
Patient Education        Shoulder Pain: Care Instructions  Your Care Instructions     You can hurt your shoulder by using it too much during an activity, such as fishing or baseball. It can also happen as part of the everyday wear and tear of getting older. Shoulder injuries can be slow to heal, but your shoulder should get better with time. Your doctor may recommend a sling to rest your shoulder. If you have injured your shoulder, you may need testing and treatment. Follow-up care is a key part of your treatment and safety. Be sure to make and go to all appointments, and call your doctor if you are having problems. It's also a good idea to know your test results and keep a list of the medicines you take. How can you care for yourself at home? · Take pain medicines exactly as directed. ? If the doctor gave you a prescription medicine for pain, take it as prescribed. ? If you are not taking a prescription pain medicine, ask your doctor if you can take an over-the-counter medicine. ? Do not take two or more pain medicines at the same time unless the doctor told you to. Many pain medicines contain acetaminophen, which is Tylenol. Too much acetaminophen (Tylenol) can be harmful. · If your doctor recommends that you wear a sling, use it as directed. Do not take it off before your doctor tells you to. · Put ice or a cold pack on the sore area for 10 to 20 minutes at a time. Put a thin cloth between the ice and your skin. · If there is no swelling, you can put moist heat, a heating pad, or a warm cloth on your shoulder. Some doctors suggest alternating between hot and cold. · Rest your shoulder for a few days. If your doctor recommends it, you can then begin gentle exercise of the shoulder, but do not lift anything heavy. When should you call for help? Call 911 anytime you think you may need emergency care. For example, call if:    · You have chest pain or pressure.  This may occur with:  ? Sweating. ? Shortness of breath. ? Nausea or vomiting. ? Pain that spreads from the chest to the neck, jaw, or one or both shoulders or arms. ? Dizziness or lightheadedness. ? A fast or uneven pulse. After calling 911, chew 1 adult-strength aspirin. Wait for an ambulance. Do not try to drive yourself.     · Your arm or hand is cool or pale or changes color. Call your doctor now or seek immediate medical care if:    · You have signs of infection, such as:  ? Increased pain, swelling, warmth, or redness in your shoulder. ? Red streaks leading from a place on your shoulder. ? Pus draining from an area of your shoulder. ? Swollen lymph nodes in your neck, armpits, or groin. ? A fever. Watch closely for changes in your health, and be sure to contact your doctor if:    · You cannot use your shoulder.     · Your shoulder does not get better as expected. Where can you learn more? Go to [email or web address].[email or web address] org and sign in to your [employer] account. Enter O473 in the finalsite box to learn more about \"Shoulder Pain: Care Instructions. \"     If you do not have an account, please click on the \"Sign Up Now\" link. Current as of: November 16, 2020               Content Version: 12.8  © 1249-9947 [employer]wise, Incorporated. Care instructions adapted under license by [facility] ([facility]). If you have questions about a medical condition or this instruction, always ask your healthcare professional. [place] [address] any warranty or liability for your use of this information.

## 2021-04-14 NOTE — PROGRESS NOTES
[facility] MEDICINE  [address] [place] [address] [place] [address] RAJ RAJ/ [facility]  Dept: [phone]  Loc: [phone]  Visit Date: 4/14/2021      Chief Complaint:   Merrill Orona is a 80 y.o. female thatpresents for Shoulder Pain (lft sided was reaching behind to scratch and it clicked out  thinks may need xray is not getting better )      HPI:  Comes in today with c/o left shoulder pain  Started a little over a month ago after she reached back to scratch her lower back  Since then has hurt intermittently, at times \"catches and clicks\"  When the pain is present her ROM is limited   When she is not having pain she has 90% of her ROM  Denies any noticeable weakness  Denies any numbness or tingling  Going to PT for chronic back pain but unable to do a lot of the exercises due to her shoulder pain  Also reports since starting Meloxicam her pain has significantly improved    She comes in alone. History obtained from pt and medical records. I have reviewed the patient's past medical history, past surgical history, allergies,medications, social and family history and I have made updates where appropriate.     Past Medical History:   Diagnosis Date    Arthritis     Cancer [facility]) 2013    SKIN CANCER ON LEFT ANKLE    Diverticulosis     Hyperlipidemia     Hypertension     Hypothyroidism     Psoriasis     Thyroid disorder        Past Surgical History:   Procedure Laterality Date    BREAST BIOPSY Left     benign    BREAST SURGERY Left 6/1966    Lumpectomy    CARPAL TUNNEL RELEASE Right 1/30/2013    CARPAL TUNNEL RELEASE Left 7/25/13    CATARACT REMOVAL Bilateral 1999    COLON SURGERY  11/1999    resection    COLONOSCOPY  2003, 2006, 2011    HYSTERECTOMY  2/23/1970    HYSTERECTOMY, TOTAL ABDOMINAL      KNEE ARTHROSCOPY Right 11/21/2007    meniscectomies    KNEE SURGERY Left 2000    replacement    MYRINGOTOMY Right 07/01/2015    tube insertion    OVARY REMOVAL Bilateral 7/12/2001 oophorectomy    OVARY REMOVAL Bilateral     SHOULDER SURGERY  5/14    SINUS SURGERY         Social History     Tobacco Use    Smoking status: Never Smoker    Smokeless tobacco: Never Used   Substance Use Topics    Alcohol use: No    Drug use: No       Family History   Problem Relation Age of Onset    Cancer Child     Stroke Sister     Heart Disease Sister     Other Other         visual problems         Review of Systems  Constitutional: Negative for Fever, Chills, Fatigue  Cardiovascular:  Negative forChest Pain, Palpitations  Respiratory:  Negative for Cough, Wheezing, Shortness of breath  Gastrointestinal:  Nausea, Vomiting, Diarrhea, Constipation, Blood in stool  Genitourinary:  Negative for Difficulty or painful urination,Change in frequency, Urgency  Skin:  Negative for Color change, Rash, Itching, Wound  Psychiatric:  Negative forAnxiety, Depression, Suicidal ideation  Musculoskeletal:  Negative for Back pain, Gait problems +left shoulder pain  Neurological:  Negative for Dizziness, Headaches        PHYSICAL EXAM:  /76   Pulse 68   Temp 97.3 °F (36.3 °C)   Resp 18   Ht 5' 1\" (1.549 m)   Wt 133 lb 12.8 oz (60.7 kg)   SpO2 97%   BMI 25.28 kg/m²     General Appearance: well developed and well- nourished, in no acute distress  Head: normocephalic and atraumatic  Eyes: pupils equal, round, and reactive to light,  conjunctivae and eye lids without erythema  ENT: external ear and ear canal normal bilaterally, nose without deformity, nasal mucosa and turbinates normal without polyps, oropharynx normal, dentition is normal for age, no lip or gum lesions noted  Neck: supple and non-tender without mass, no thyromegaly or thyroid nodules, no cervical lymphadenopathy  Pulmonary/Chest: clear to auscultation bilaterally- no wheezes, rales or rhonchi, normal air movement, no respiratory distress orretractions  Cardiovascular: normal rate, regular rhythm, normal S1 and S2, no murmurs, rubs, clicks, or gallops,distal pulses intact  Abdomen: soft, non-tender, non-distended, normal bowel sounds,  no rebound or guarding, nomasses or hernias noted, no hepatospleenomegaly  Extremities: no cyanosis, clubbing or edema of the lowerextremities  Musculoskeletal: slight swelling of the left shoulder, hand grasps R>L, full ROM this am   Neuro:  Alert, 5/5 strength globally and symmetrically, normal speech, no focal findings or movement disorder noted, gait wnl  Psych:  Normal affect without evidence of depression or anxiety, insight and judgement are appropriate, memoryappears intact  Skin: warm and dry, no rash or erythema  Lymph:  No cervical, auricular or supraclavicular lymph nodes palpated    ASSESSMENT & PLAN  Indio Hawk was seen today for shoulder pain. Diagnoses and all orders for this visit:    Acute pain of left shoulder  -     XR SHOULDER LEFT (MIN 2 VIEWS); Future    Essential hypertension  -     spironolactone (ALDACTONE) 50 MG tablet; Take 1 tablet by mouth daily    Decreased ROM of left shoulder  -     XR SHOULDER LEFT (MIN 2 VIEWS); Future    Refilled meds  Will get imaging  Call with results    No follow-ups on file. Indio Hawk received counseling on the following healthy behaviors: nutrition, exercise and medication adherence  Reviewedprior labs and health maintenance. Continue current medications, diet and exercise. Discussed use, benefit, and side effects of prescribed medications. Barriers to medication compliance addressed. Patient given educationalmaterials - see patient instructions. All patient questions answered. Patient voiced understanding.      Electronically signed by RACQUEL Portillo on 4/14/21 at 10:46 AM EDT

## 2021-04-15 ENCOUNTER — APPOINTMENT (OUTPATIENT)
Dept: PHYSICAL THERAPY | Age: 86
End: 2021-04-15
Payer: MEDICARE

## 2021-04-15 ENCOUNTER — HOSPITAL ENCOUNTER (OUTPATIENT)
Dept: PHYSICAL THERAPY | Age: 86
Setting detail: THERAPIES SERIES
End: 2021-04-15
Payer: MEDICARE

## 2021-04-16 ENCOUNTER — TELEPHONE (OUTPATIENT)
Dept: FAMILY MEDICINE CLINIC | Age: 86
End: 2021-04-16

## 2021-04-16 DIAGNOSIS — M25.512 ACUTE PAIN OF LEFT SHOULDER: Primary | ICD-10-CM

## 2021-04-16 NOTE — TELEPHONE ENCOUNTER
----- Message from RACQUEL Mark CNP sent at 4/14/2021  4:16 PM EDT -----  Shoulder xray showed moderately severe degenerative changes in the left shoulder. Showed some spurring of the left humeral head and narrowing of the joint. We can refer her to ortho if would like to see what their recs are?   Electronically signed by ARCQUEL Mark CNP on 4/14/2021 at 4:16 PM

## 2021-04-19 ENCOUNTER — HOSPITAL ENCOUNTER (OUTPATIENT)
Dept: PHYSICAL THERAPY | Age: 86
Setting detail: THERAPIES SERIES
Discharge: HOME | End: 2021-04-19
Payer: MEDICARE

## 2021-04-19 PROCEDURE — 97110 THERAPEUTIC EXERCISES: CPT

## 2021-04-19 PROCEDURE — 97140 MANUAL THERAPY 1/> REGIONS: CPT

## 2021-04-19 NOTE — PROGRESS NOTES
X    Lying over towel roll lengthwise along spine with arms out to side, HOR ABD, cane press and supine shoulder press  1 min  x10   x    Seated scap retraction, posterior shoulder rolls and UT shrug 10x  X                                                Specific Interventions Next Treatment: postural strengthening- shoulder extension, ER, horizontal abduction, IR, latts; manual/massage as needed; cervical stabs    Activity/Treatment Tolerance:  [x]  Patient tolerated treatment well  []  Patient limited by fatigue  []  Patient limited by pain   []  Patient limited by medical complications  []  Other:     Assessment: Patient presents with palpable tension along B thoracic paraspinals that respond positively to manual interventions with mild erythemal response achieved. Patient has limited tolerance with postural training exercises due to complaints of L shoulder pain. GOALS:  Patient Goal: Eliminate pain    Short Term Goals:  Time Frame: 6 weeks  Patient will deny pain in mid thoracic spine without tenderness to walking faster and lifting. Patient will demonstrate good postural awareness during therapy session without cues  to carry over to functional activities and lifting. Patient will improve horizontal abduction, latt, shoulder ER strength to 4+/5 for stabilization when lifting and cleaning. Long Term Goals:  Time Frame: 12 weeks  Patient will be independent and compliant with HEP daily to achieve above goals. Patient Education:   []  HEP/Education Completed:   [facility] Access Code: [email or web address]  []  No new Education completed  [x]  Reviewed Prior HEP      [x]  Patient verbalized and/or demonstrated understanding of education provided. []  Patient unable to verbalize and/or demonstrate understanding of education provided. Will continue education.   []  Barriers to learning:     PLAN:  Treatment Recommendations: Strengthening, Range of Motion, Manual Therapy - Soft Tissue Mobilization, Home Exercise Program, Patient Education and Modalities    []  Plan of care initiated. Plan to see patient 2 times per week for 12 weeks to address the treatment planned outlined above.   [x]  Continue with current plan of care  []  Modify plan of care as follows:    []  Hold pending physician visit  []  Discharge    Time In 1059   Time Out 1138   Timed Code Minutes: 39 min   Total Treatment Time: 39 min       Electronically Signed by: Red Pale

## 2021-04-22 ENCOUNTER — HOSPITAL ENCOUNTER (OUTPATIENT)
Dept: PHYSICAL THERAPY | Age: 86
Setting detail: THERAPIES SERIES
Discharge: HOME | End: 2021-04-22
Payer: MEDICARE

## 2021-04-22 PROCEDURE — 97140 MANUAL THERAPY 1/> REGIONS: CPT

## 2021-04-22 PROCEDURE — 97110 THERAPEUTIC EXERCISES: CPT

## 2021-04-22 NOTE — PROGRESS NOTES
sec  X    Lying over towel roll lengthwise along spine with arms out to side, alt UE flexion, HOR ABD, cane press and supine shoulder press  1 min  x12   x    Seated scap retraction, posterior shoulder rolls and UT shrug 10x  X           UBE each way x2 min   X           Shoulder rows and extension                        Specific Interventions Next Treatment: postural strengthening- shoulder extension, ER, horizontal abduction, IR, latts; manual/massage as needed; cervical stabs    Activity/Treatment Tolerance:  [x]  Patient tolerated treatment well  []  Patient limited by fatigue  []  Patient limited by pain   []  Patient limited by medical complications  []  Other:     Assessment: Patient continues to response well with positive relief following manual interventions with good erythemal response achieved. Patient noting less frequency of experiencing elevated pain levels with daily tasks. GOALS:  Patient Goal: Eliminate pain    Short Term Goals:  Time Frame: 6 weeks  Patient will deny pain in mid thoracic spine without tenderness to walking faster and lifting. Patient will demonstrate good postural awareness during therapy session without cues  to carry over to functional activities and lifting. Patient will improve horizontal abduction, latt, shoulder ER strength to 4+/5 for stabilization when lifting and cleaning. Long Term Goals:  Time Frame: 12 weeks  Patient will be independent and compliant with HEP daily to achieve above goals. Patient Education:   []  HEP/Education Completed:   [employer] Access Code: [email or web address]  []  No new Education completed  [x]  Reviewed Prior HEP      [x]  Patient verbalized and/or demonstrated understanding of education provided. []  Patient unable to verbalize and/or demonstrate understanding of education provided. Will continue education.   []  Barriers to learning:     PLAN:  Treatment Recommendations: Strengthening, Range of Motion, Manual Therapy - Soft Tissue Mobilization, Home Exercise Program, Patient Education and Modalities    []  Plan of care initiated. Plan to see patient 2 times per week for 12 weeks to address the treatment planned outlined above.   [x]  Continue with current plan of care  []  Modify plan of care as follows:    []  Hold pending physician visit  []  Discharge    Time In 1005   Time Out 1045   Timed Code Minutes: 40 min   Total Treatment Time: 40 min       Electronically Signed by: Sarah Avendñao

## 2021-04-26 ENCOUNTER — HOSPITAL ENCOUNTER (OUTPATIENT)
Dept: PHYSICAL THERAPY | Age: 86
Setting detail: THERAPIES SERIES
Discharge: HOME | End: 2021-04-26
Payer: MEDICARE

## 2021-04-26 DIAGNOSIS — M19.012 ARTHRITIS OF LEFT SHOULDER REGION: Primary | ICD-10-CM

## 2021-04-26 DIAGNOSIS — M75.101 ROTATOR CUFF SYNDROME OF RIGHT SHOULDER: ICD-10-CM

## 2021-04-26 DIAGNOSIS — M75.102 ROTATOR CUFF SYNDROME OF LEFT SHOULDER: ICD-10-CM

## 2021-04-26 PROCEDURE — 97110 THERAPEUTIC EXERCISES: CPT

## 2021-04-26 PROCEDURE — 97140 MANUAL THERAPY 1/> REGIONS: CPT

## 2021-04-26 NOTE — PROGRESS NOTES
[facility]  PHYSICAL THERAPY  [] EVALUATION  [x] DAILY NOTE (LAND) [] DAILY NOTE (AQUATIC ) [] PROGRESS NOTE [] DISCHARGE NOTE    [] [address] [place] [address]   [] Fransico     [] [facility]   [] Bonnie Tinoco    Date: 2021  Patient Name:  Raghu Esposito  : 1925  MRN: [id number]  CSN: [id number]    Referring Practitioner DO Darinel Arcos MD also following for shoulder)   Diagnosis Pain in thoracic spine [M54.6]  Other chronic pain [G89.29]   M19.012 Osteoarthritis of left glenohumeral joint  M75.101 Unspecified rotator cuff tear or rupture of right  M75.102 Unspecified rotator cuff tear or rupture of left   Treatment Diagnosis Chronic mid thoracic pain, postural weakness, B shoulder pain, decreased shoulder ROM and strength   Date of Evaluation 21    Additional Pertinent History HTN, osteoporosis, arthritis. Insurance: Primary: Payor: MEDICARE /  /  / ,   Secondary: [facility]   Authorization Information: Aqu[employer] and modalities covered except ionto, HP/CP, telehealth covered   Visit # 5, 5/10 for progress note   Visits Allowed: Unlimited based on medical necessity   Recertification Date: 04   Physician Follow-Up: 21   Physician Orders:    History of Present Illness: Chronic midline thoracic pain since 10/2020, when she helped lift a [place] cabinet; but symptoms have been worse over the past 3 months. SUBJECTIVE: Patient will be receiving an injection to the L shoulder on 21. Patient feels her mid back pain is improving and feels its at least 30% better.        TREATMENT   Precautions:    Pain: 5/10 in thoracic spine, 8/10 L shoulder     X in shaded column indicates activity completed today   Modalities Parameters/  Location  Notes                     Manual Therapy Time/Technique  Notes   Gentle soft tissue massage to B thoracic paraspinals in prone 8 minutes  X  palpable tension on R>L Exercise/Intervention   Notes   Levator stretch B, UT and rotation  3x15 sec   Verbal review    3 way corner pec stretch 3x15 sec   Cues for form and to not support weight through arms but through legs    Dive stretch 3x15 sec  X    Lying over towel roll lengthwise along spine with arms out to side, alt UE flexion, HOR ABD, cane press and supine shoulder press  1 min  x12   x    Seated scap retraction, posterior shoulder rolls and UT shrug 10x  X           UBE each way x2 min   X           Shoulder rows and extension (bilateral and unilateral), adduction x10 ew peach X    Shoulder isometrics                 Specific Interventions Next Treatment: postural strengthening- shoulder extension, ER, horizontal abduction, IR, latts; manual/massage as needed; cervical stabs    Activity/Treatment Tolerance:  [x]  Patient tolerated treatment well  []  Patient limited by fatigue  []  Patient limited by pain   []  Patient limited by medical complications  []  Other:     Assessment: Initiated strengthening for improved postural control and scapular stability to improve patient's ease with functional tasks with decreased pain. Patient tolerates well but is fatigued. Patient continues to find positive relief in symptoms with manual interventions with softening. Additional diagnoses added this date for shoulder dysfunction. GOALS:  Patient Goal: Eliminate pain    Short Term Goals:  Time Frame: 6 weeks  Patient will deny pain in mid thoracic spine without tenderness to walking faster and lifting. Patient will demonstrate good postural awareness during therapy session without cues  to carry over to functional activities and lifting. Patient will improve horizontal abduction, latt, shoulder ER strength to 4+/5 for stabilization when lifting and cleaning. Long Term Goals:  Time Frame: 12 weeks  Patient will be independent and compliant with HEP daily to achieve above goals.        Patient Education:   []  HEP/Education Completed:    [employer] Access Code: [email or web address]  []  No new Education completed  [x]  Reviewed Prior HEP      [x]  Patient verbalized and/or demonstrated understanding of education provided. []  Patient unable to verbalize and/or demonstrate understanding of education provided. Will continue education. []  Barriers to learning:     PLAN:  Treatment Recommendations: Strengthening, Range of Motion, Manual Therapy - Soft Tissue Mobilization, Home Exercise Program, Patient Education and Modalities    []  Plan of care initiated. Plan to see patient 2 times per week for 12 weeks to address the treatment planned outlined above.   [x]  Continue with current plan of care  []  Modify plan of care as follows:    []  Hold pending physician visit  []  Discharge    Time In 1059   Time Out 1137   Timed Code Minutes: 38 min   Total Treatment Time: 38 min       Electronically Signed by: Purvi Leo

## 2021-04-29 ENCOUNTER — HOSPITAL ENCOUNTER (OUTPATIENT)
Dept: PHYSICAL THERAPY | Age: 86
Setting detail: THERAPIES SERIES
Discharge: HOME | End: 2021-04-29
Payer: MEDICARE

## 2021-04-29 PROCEDURE — 97110 THERAPEUTIC EXERCISES: CPT

## 2021-04-29 PROCEDURE — 97140 MANUAL THERAPY 1/> REGIONS: CPT

## 2021-04-29 NOTE — PROGRESS NOTES
[address] [facility]  PHYSICAL THERAPY  [] EVALUATION  [x] DAILY NOTE (LAND) [] DAILY NOTE (AQUATIC ) [] PROGRESS NOTE [] DISCHARGE NOTE    [] [facility]   [] [place] [address]    [] [facility]   [] Lora Metzger    Date: 2021  Patient Name:  Creig Scheuermann  : 1925  MRN: [id number]  CSN: [id number]    Referring Practitioner DO Kylah Peterson MD also following for shoulder)   Diagnosis Pain in thoracic spine [M54.6]  Other chronic pain [G89.29]   M19.012 Osteoarthritis of left glenohumeral joint  M75.101 Unspecified rotator cuff tear or rupture of right  M75.102 Unspecified rotator cuff tear or rupture of left   Treatment Diagnosis Chronic mid thoracic pain, postural weakness, B shoulder pain, decreased shoulder ROM and strength   Date of Evaluation 21    Additional Pertinent History HTN, osteoporosis, arthritis. Insurance: Primary: Payor: MEDICARE /  /  / ,   Secondary: [facility]   Authorization Information: Aqu[employer] and modalities covered except ionto, HP/CP, telehealth covered   Visit # 6, 6/10 for progress note   Visits Allowed: Unlimited based on medical necessity   Recertification Date: 3/18/59   Physician Follow-Up: 21   Physician Orders:    History of Present Illness: Chronic midline thoracic pain since 10/2020, when she helped lift a [place] cabinet; but symptoms have been worse over the past 3 months. SUBJECTIVE: Patient will be receiving an injection to the L shoulder on 21. Patient notes she can tell she worked her shoulder last visit but noting intolerable. Mid back pain is also steadily improving and not occurring has frequently.        TREATMENT   Precautions:    Pain: 5/10 in thoracic spine, 8/10 L shoulder     X in shaded column indicates activity completed today   Modalities Parameters/  Location  Notes                     Manual Therapy Time/Technique  Notes   Gentle soft tissue daily to achieve above goals. Patient Education:   []  HEP/Education Completed:   Camelia Ochoa Access Code: [email or web address]  []  No new Education completed  [x]  Reviewed Prior HEP      [x]  Patient verbalized and/or demonstrated understanding of education provided. []  Patient unable to verbalize and/or demonstrate understanding of education provided. Will continue education. []  Barriers to learning:     PLAN:  Treatment Recommendations: Strengthening, Range of Motion, Manual Therapy - Soft Tissue Mobilization, Home Exercise Program, Patient Education and Modalities    []  Plan of care initiated. Plan to see patient 2 times per week for 12 weeks to address the treatment planned outlined above.   [x]  Continue with current plan of care  []  Modify plan of care as follows:    []  Hold pending physician visit  []  Discharge    Time In 1000   Time Out 1045   Timed Code Minutes: 45 min   Total Treatment Time: 45 min       Electronically Signed by: Calli Colon

## 2021-05-03 ENCOUNTER — HOSPITAL ENCOUNTER (OUTPATIENT)
Dept: PHYSICAL THERAPY | Age: 86
Setting detail: THERAPIES SERIES
Discharge: HOME | End: 2021-05-03
Payer: MEDICARE

## 2021-05-03 PROCEDURE — 97140 MANUAL THERAPY 1/> REGIONS: CPT

## 2021-05-03 PROCEDURE — 97110 THERAPEUTIC EXERCISES: CPT

## 2021-05-03 NOTE — PROGRESS NOTES
[address] [facility]  PHYSICAL THERAPY  [] EVALUATION  [x] DAILY NOTE (LAND) [] DAILY NOTE (AQUATIC ) [] PROGRESS NOTE [] DISCHARGE NOTE    [x] OUTPATIENT REHABILITATION [facility]   [] [facility]    [] [facility]   [] Inés Murray    Date: 5/3/2021  Patient Name:  Stuart Alarcon  : 1925  MRN: [id number]  CSN: [id number]    Referring Practitioner DO Gilberto Cole MD also following for shoulder)   Diagnosis Pain in thoracic spine [M54.6]  Other chronic pain [G89.29]   M19.012 Osteoarthritis of left glenohumeral joint  M75.101 Unspecified rotator cuff tear or rupture of right  M75.102 Unspecified rotator cuff tear or rupture of left   Treatment Diagnosis Chronic mid thoracic pain, postural weakness, B shoulder pain, decreased shoulder ROM and strength   Date of Evaluation 21    Additional Pertinent History HTN, osteoporosis, arthritis. Insurance: Primary: Payor: MEDICARE /  /  / ,   Secondary: [facility]   Authorization Information: Aquatics and modalities covered except ionto, HP/CP, telehealth covered   Visit # 7, 7/10 for progress note   Visits Allowed: Unlimited based on medical necessity   Recertification Date:    Physician Follow-Up: 21   Physician Orders:    History of Present Illness: Chronic midline thoracic pain since 10/2020, when she helped lift a [place] cabinet; but symptoms have been worse over the past 3 months. SUBJECTIVE: Patient reports she will need therapy on her shoulder after injection 21. Pt reports thoracic pain is intermittent and feels its stress induced. Left shoulder pain is intermittent as well. No pain in either location at start of session. Pt feels band across back at bra-line when she has pain, rating it 6/10. Pt notes having some sinus issues today. Pt applies heat for relief. Pt notes back is getting better with therapy. Sometimes she doesn't have to apply heat.  Pt notes compliance with HEP daily. TREATMENT   Precautions:    Pain: 6/10 in thoracic spine    X in shaded column indicates activity completed today   Modalities Parameters/  Location  Notes                     Manual Therapy Time/Technique  Notes   Gentle soft tissue massage to B thoracic paraspinals in prone 8 minutes  X  palpable tension on R               Exercise/Intervention   Notes   Levator stretch B, UT and rotation  3x20 sec  X    3 way corner pec stretch 3x15 sec   Cues for form and to not support weight through arms but through legs    Dive stretch 3x15 sec  X    Lying over towel roll lengthwise along spine with arms out to side  alt UE flexion, HOR ABD, cane press and supine shoulder press  1 min    x15  X    X     Seated scap retraction, posterior shoulder rolls and UT shrug 14x  X           UBE each way x2 min   X 4 min retro today          Shoulder rows and extension (bilateral and unilateral), adduction x10 ew peach X    Shoulder isometrics: flexion, ext, IR and ER  5x5\" Bilateral X             Specific Interventions Next Treatment: postural strengthening- shoulder extension, ER, horizontal abduction, IR, latts; manual/massage as needed; cervical stabs    Activity/Treatment Tolerance:  [x]  Patient tolerated treatment well  []  Patient limited by fatigue  []  Patient limited by pain   []  Patient limited by medical complications  []  Other:     Assessment: Progressed reps with multiple exercises with good tolerance. Ended with STM and trigger point release to thoracic paraspinals with palpable tension and multiple knots on right. Pt c/o left bicep pain with chest press, otherwise no pain during exercises. Pt progressing well toward goals and plans to see massage therapist when finished with PT.     GOALS:  Patient Goal: Eliminate pain    Short Term Goals:  Time Frame: 6 weeks  Patient will deny pain in mid thoracic spine without tenderness to walking faster and lifting.   Patient will demonstrate good postural

## 2021-05-06 ENCOUNTER — APPOINTMENT (OUTPATIENT)
Dept: GENERAL RADIOLOGY | Age: 86
DRG: 270 | End: 2021-05-06
Payer: MEDICARE

## 2021-05-06 ENCOUNTER — HOSPITAL ENCOUNTER (OUTPATIENT)
Dept: PHYSICAL THERAPY | Age: 86
Setting detail: THERAPIES SERIES
End: 2021-05-06
Payer: MEDICARE

## 2021-05-06 ENCOUNTER — APPOINTMENT (OUTPATIENT)
Dept: CARDIAC CATH/INVASIVE PROCEDURES | Age: 86
DRG: 270 | End: 2021-05-06
Payer: MEDICARE

## 2021-05-06 ENCOUNTER — HOSPITAL ENCOUNTER (INPATIENT)
Age: 86
LOS: 4 days | Discharge: HOME | DRG: 270 | End: 2021-05-10
Attending: EMERGENCY MEDICINE | Admitting: INTERNAL MEDICINE
Payer: MEDICARE

## 2021-05-06 ENCOUNTER — OFFICE VISIT (OUTPATIENT)
Dept: FAMILY MEDICINE CLINIC | Age: 86
End: 2021-05-06
Payer: MEDICARE

## 2021-05-06 VITALS
DIASTOLIC BLOOD PRESSURE: 62 MMHG | RESPIRATION RATE: 18 BRPM | WEIGHT: 134.8 LBS | OXYGEN SATURATION: 96 % | TEMPERATURE: 97 F | HEART RATE: 70 BPM | BODY MASS INDEX: 25.45 KG/M2 | SYSTOLIC BLOOD PRESSURE: 168 MMHG | HEIGHT: 61 IN

## 2021-05-06 DIAGNOSIS — R00.2 PALPITATIONS: ICD-10-CM

## 2021-05-06 DIAGNOSIS — I21.4 NSTEMI (NON-ST ELEVATED MYOCARDIAL INFARCTION) (HCC): Primary | ICD-10-CM

## 2021-05-06 DIAGNOSIS — G89.29 CHRONIC MIDLINE THORACIC BACK PAIN: ICD-10-CM

## 2021-05-06 DIAGNOSIS — M54.6 CHRONIC MIDLINE THORACIC BACK PAIN: ICD-10-CM

## 2021-05-06 DIAGNOSIS — N17.9 ACUTE KIDNEY INJURY SUPERIMPOSED ON CKD (HCC): ICD-10-CM

## 2021-05-06 DIAGNOSIS — R94.31 ST SEGMENT CHANGES ON ELECTROCARDIOGRAM: ICD-10-CM

## 2021-05-06 DIAGNOSIS — N18.9 ACUTE KIDNEY INJURY SUPERIMPOSED ON CKD (HCC): ICD-10-CM

## 2021-05-06 DIAGNOSIS — R07.9 CHEST PAIN, UNSPECIFIED TYPE: ICD-10-CM

## 2021-05-06 DIAGNOSIS — R00.0 TACHYCARDIA: Primary | ICD-10-CM

## 2021-05-06 LAB
ACTIVATED CLOTTING TIME: 434 SECONDS (ref 1–150)
ALBUMIN SERPL-MCNC: 4.6 G/DL (ref 3.5–5.1)
ALP BLD-CCNC: 92 U/L (ref 38–126)
ALT SERPL-CCNC: 21 U/L (ref 11–66)
ANION GAP SERPL CALCULATED.3IONS-SCNC: 15 MEQ/L (ref 8–16)
APTT: 184.9 SECONDS (ref 22–38)
APTT: 30.3 SECONDS (ref 22–38)
AST SERPL-CCNC: 70 U/L (ref 5–40)
BACTERIA: ABNORMAL
BASOPHILS # BLD: 0.1 %
BASOPHILS ABSOLUTE: 0 THOU/MM3 (ref 0–0.1)
BILIRUB SERPL-MCNC: 0.2 MG/DL (ref 0.3–1.2)
BILIRUBIN URINE: NEGATIVE
BLOOD, URINE: ABNORMAL
BUN BLDV-MCNC: 22 MG/DL (ref 7–22)
CALCIUM SERPL-MCNC: 10.5 MG/DL (ref 8.5–10.5)
CASTS: ABNORMAL /LPF
CASTS: ABNORMAL /LPF
CHARACTER, URINE: CLEAR
CHLORIDE BLD-SCNC: 104 MEQ/L (ref 98–111)
CO2: 24 MEQ/L (ref 23–33)
COLOR: YELLOW
CREAT SERPL-MCNC: 1 MG/DL (ref 0.4–1.2)
CRYSTALS: ABNORMAL
EOSINOPHIL # BLD: 0 %
EOSINOPHILS ABSOLUTE: 0 THOU/MM3 (ref 0–0.4)
EPITHELIAL CELLS, UA: ABNORMAL /HPF
ERYTHROCYTE [DISTWIDTH] IN BLOOD BY AUTOMATED COUNT: 12.6 % (ref 11.5–14.5)
ERYTHROCYTE [DISTWIDTH] IN BLOOD BY AUTOMATED COUNT: 12.9 % (ref 11.5–14.5)
ERYTHROCYTE [DISTWIDTH] IN BLOOD BY AUTOMATED COUNT: 44.6 FL (ref 35–45)
ERYTHROCYTE [DISTWIDTH] IN BLOOD BY AUTOMATED COUNT: 46 FL (ref 35–45)
GFR SERPL CREATININE-BSD FRML MDRD: 51 ML/MIN/1.73M2
GLUCOSE BLD-MCNC: 163 MG/DL (ref 70–108)
GLUCOSE, URINE: NEGATIVE MG/DL
HCT VFR BLD CALC: 46.8 % (ref 37–47)
HCT VFR BLD CALC: 48.4 % (ref 37–47)
HEMOGLOBIN: 15.1 GM/DL (ref 12–16)
HEMOGLOBIN: 15.8 GM/DL (ref 12–16)
IMMATURE GRANS (ABS): 0.06 THOU/MM3 (ref 0–0.07)
IMMATURE GRANULOCYTES: 0.4 %
INR BLD: 1 (ref 0.85–1.13)
KETONES, URINE: NEGATIVE
LEUKOCYTE EST, POC: NEGATIVE
LV EF: 43 %
LV EF: 48 %
LVEF MODALITY: NORMAL
LVEF MODALITY: NORMAL
LYMPHOCYTES # BLD: 3.4 %
LYMPHOCYTES ABSOLUTE: 0.5 THOU/MM3 (ref 1–4.8)
MAGNESIUM: 2.2 MG/DL (ref 1.6–2.4)
MCH RBC QN AUTO: 30.9 PG (ref 26–33)
MCH RBC QN AUTO: 31.9 PG (ref 26–33)
MCHC RBC AUTO-ENTMCNC: 32.3 GM/DL (ref 32.2–35.5)
MCHC RBC AUTO-ENTMCNC: 32.6 GM/DL (ref 32.2–35.5)
MCV RBC AUTO: 95.9 FL (ref 81–99)
MCV RBC AUTO: 97.6 FL (ref 81–99)
MISCELLANEOUS LAB TEST RESULT: ABNORMAL
MONOCYTES # BLD: 7.3 %
MONOCYTES ABSOLUTE: 1.1 THOU/MM3 (ref 0.4–1.3)
MRSA SCREEN RT-PCR: NEGATIVE
NITRITE, URINE: NEGATIVE
NUCLEATED RED BLOOD CELLS: 0 /100 WBC
OSMOLALITY CALCULATION: 291.9 MOSMOL/KG (ref 275–300)
PH UA: 6.5 (ref 5–9)
PLATELET # BLD: 176 THOU/MM3 (ref 130–400)
PLATELET # BLD: 195 THOU/MM3 (ref 130–400)
PMV BLD AUTO: 10.5 FL (ref 9.4–12.4)
PMV BLD AUTO: 9.9 FL (ref 9.4–12.4)
POTASSIUM REFLEX MAGNESIUM: 3.9 MEQ/L (ref 3.5–5.2)
PRO-BNP: 1519 PG/ML (ref 0–1800)
PRO-BNP: 6466 PG/ML (ref 0–1800)
PROCALCITONIN: 0.06 NG/ML (ref 0.01–0.09)
PROTEIN UA: ABNORMAL MG/DL
RBC # BLD: 4.88 MILL/MM3 (ref 4.2–5.4)
RBC # BLD: 4.96 MILL/MM3 (ref 4.2–5.4)
RBC URINE: ABNORMAL /HPF
REASON FOR REJECTION: NORMAL
REJECTED TEST: NORMAL
RENAL EPITHELIAL, UA: ABNORMAL
SARS-COV-2, NAAT: NOT DETECTED
SEG NEUTROPHILS: 88.8 %
SEGMENTED NEUTROPHILS ABSOLUTE COUNT: 13 THOU/MM3 (ref 1.8–7.7)
SODIUM BLD-SCNC: 143 MEQ/L (ref 135–145)
SPECIFIC GRAVITY UA: 1.02 (ref 1–1.03)
T4 FREE: 1.09 NG/DL (ref 0.93–1.76)
TOTAL PROTEIN: 7.9 G/DL (ref 6.1–8)
TROPONIN T: 0.33 NG/ML
TROPONIN T: 1.01 NG/ML
TROPONIN T: 5.88 NG/ML
TSH SERPL DL<=0.05 MIU/L-ACNC: 4.59 UIU/ML (ref 0.4–4.2)
UROBILINOGEN, URINE: 0.2 EU/DL (ref 0–1)
VANCOMYCIN RESISTANT ENTEROCOCCUS: NEGATIVE
WBC # BLD: 14.6 THOU/MM3 (ref 4.8–10.8)
WBC # BLD: 17.7 THOU/MM3 (ref 4.8–10.8)
WBC UA: ABNORMAL /HPF
YEAST: ABNORMAL

## 2021-05-06 PROCEDURE — C9601 PERC DRUG-EL COR STENT BRAN: HCPCS | Performed by: INTERNAL MEDICINE

## 2021-05-06 PROCEDURE — 2580000003 HC RX 258: Performed by: FAMILY MEDICINE

## 2021-05-06 PROCEDURE — 87086 URINE CULTURE/COLONY COUNT: CPT

## 2021-05-06 PROCEDURE — 6360000002 HC RX W HCPCS: Performed by: STUDENT IN AN ORGANIZED HEALTH CARE EDUCATION/TRAINING PROGRAM

## 2021-05-06 PROCEDURE — 5A02210 ASSISTANCE WITH CARDIAC OUTPUT USING BALLOON PUMP, CONTINUOUS: ICD-10-PCS | Performed by: INTERNAL MEDICINE

## 2021-05-06 PROCEDURE — 33967 INSERT I-AORT PERCUT DEVICE: CPT | Performed by: INTERNAL MEDICINE

## 2021-05-06 PROCEDURE — 4A023N7 MEASUREMENT OF CARDIAC SAMPLING AND PRESSURE, LEFT HEART, PERCUTANEOUS APPROACH: ICD-10-PCS | Performed by: INTERNAL MEDICINE

## 2021-05-06 PROCEDURE — C1887 CATHETER, GUIDING: HCPCS

## 2021-05-06 PROCEDURE — 36415 COLL VENOUS BLD VENIPUNCTURE: CPT

## 2021-05-06 PROCEDURE — 6370000000 HC RX 637 (ALT 250 FOR IP)

## 2021-05-06 PROCEDURE — 92928 PRQ TCAT PLMT NTRAC ST 1 LES: CPT | Performed by: INTERNAL MEDICINE

## 2021-05-06 PROCEDURE — 99221 1ST HOSP IP/OBS SF/LOW 40: CPT | Performed by: NURSE PRACTITIONER

## 2021-05-06 PROCEDURE — 6370000000 HC RX 637 (ALT 250 FOR IP): Performed by: INTERNAL MEDICINE

## 2021-05-06 PROCEDURE — 93306 TTE W/DOPPLER COMPLETE: CPT

## 2021-05-06 PROCEDURE — B2151ZZ FLUOROSCOPY OF LEFT HEART USING LOW OSMOLAR CONTRAST: ICD-10-PCS | Performed by: INTERNAL MEDICINE

## 2021-05-06 PROCEDURE — 87040 BLOOD CULTURE FOR BACTERIA: CPT

## 2021-05-06 PROCEDURE — 99223 1ST HOSP IP/OBS HIGH 75: CPT | Performed by: INTERNAL MEDICINE

## 2021-05-06 PROCEDURE — 6370000000 HC RX 637 (ALT 250 FOR IP): Performed by: NURSE PRACTITIONER

## 2021-05-06 PROCEDURE — 84145 PROCALCITONIN (PCT): CPT

## 2021-05-06 PROCEDURE — 87081 CULTURE SCREEN ONLY: CPT

## 2021-05-06 PROCEDURE — 96374 THER/PROPH/DIAG INJ IV PUSH: CPT

## 2021-05-06 PROCEDURE — 87500 VANOMYCIN DNA AMP PROBE: CPT

## 2021-05-06 PROCEDURE — 027137Z DILATION OF CORONARY ARTERY, TWO ARTERIES WITH FOUR OR MORE DRUG-ELUTING INTRALUMINAL DEVICES, PERCUTANEOUS APPROACH: ICD-10-PCS | Performed by: INTERNAL MEDICINE

## 2021-05-06 PROCEDURE — 6370000000 HC RX 637 (ALT 250 FOR IP): Performed by: STUDENT IN AN ORGANIZED HEALTH CARE EDUCATION/TRAINING PROGRAM

## 2021-05-06 PROCEDURE — 1090F PRES/ABSN URINE INCON ASSESS: CPT | Performed by: NURSE PRACTITIONER

## 2021-05-06 PROCEDURE — 6360000002 HC RX W HCPCS: Performed by: INTERNAL MEDICINE

## 2021-05-06 PROCEDURE — 85610 PROTHROMBIN TIME: CPT

## 2021-05-06 PROCEDURE — 93454 CORONARY ARTERY ANGIO S&I: CPT | Performed by: INTERNAL MEDICINE

## 2021-05-06 PROCEDURE — 71045 X-RAY EXAM CHEST 1 VIEW: CPT

## 2021-05-06 PROCEDURE — C1894 INTRO/SHEATH, NON-LASER: HCPCS

## 2021-05-06 PROCEDURE — B2111ZZ FLUOROSCOPY OF MULTIPLE CORONARY ARTERIES USING LOW OSMOLAR CONTRAST: ICD-10-PCS | Performed by: INTERNAL MEDICINE

## 2021-05-06 PROCEDURE — 2780000010 HC IMPLANT OTHER

## 2021-05-06 PROCEDURE — 81001 URINALYSIS AUTO W/SCOPE: CPT

## 2021-05-06 PROCEDURE — 51702 INSERT TEMP BLADDER CATH: CPT

## 2021-05-06 PROCEDURE — 92929 PR PRQ TRLUML CORONARY STENT W/ANGIO ADDL ART/BRNCH: CPT | Performed by: INTERNAL MEDICINE

## 2021-05-06 PROCEDURE — 4040F PNEUMOC VAC/ADMIN/RCVD: CPT | Performed by: NURSE PRACTITIONER

## 2021-05-06 PROCEDURE — 85027 COMPLETE CBC AUTOMATED: CPT

## 2021-05-06 PROCEDURE — 93005 ELECTROCARDIOGRAM TRACING: CPT | Performed by: NURSE PRACTITIONER

## 2021-05-06 PROCEDURE — 85730 THROMBOPLASTIN TIME PARTIAL: CPT

## 2021-05-06 PROCEDURE — 93000 ELECTROCARDIOGRAM COMPLETE: CPT | Performed by: NURSE PRACTITIONER

## 2021-05-06 PROCEDURE — 6360000004 HC RX CONTRAST MEDICATION: Performed by: INTERNAL MEDICINE

## 2021-05-06 PROCEDURE — 6370000000 HC RX 637 (ALT 250 FOR IP): Performed by: FAMILY MEDICINE

## 2021-05-06 PROCEDURE — 84439 ASSAY OF FREE THYROXINE: CPT

## 2021-05-06 PROCEDURE — 84484 ASSAY OF TROPONIN QUANT: CPT

## 2021-05-06 PROCEDURE — 84443 ASSAY THYROID STIM HORMONE: CPT

## 2021-05-06 PROCEDURE — G8417 CALC BMI ABV UP PARAM F/U: HCPCS | Performed by: NURSE PRACTITIONER

## 2021-05-06 PROCEDURE — 99222 1ST HOSP IP/OBS MODERATE 55: CPT | Performed by: SURGERY

## 2021-05-06 PROCEDURE — 85025 COMPLETE CBC W/AUTO DIFF WBC: CPT

## 2021-05-06 PROCEDURE — 99222 1ST HOSP IP/OBS MODERATE 55: CPT | Performed by: FAMILY MEDICINE

## 2021-05-06 PROCEDURE — 2500000003 HC RX 250 WO HCPCS

## 2021-05-06 PROCEDURE — 93005 ELECTROCARDIOGRAM TRACING: CPT | Performed by: EMERGENCY MEDICINE

## 2021-05-06 PROCEDURE — 99215 OFFICE O/P EST HI 40 MIN: CPT | Performed by: NURSE PRACTITIONER

## 2021-05-06 PROCEDURE — 99291 CRITICAL CARE FIRST HOUR: CPT

## 2021-05-06 PROCEDURE — 80053 COMPREHEN METABOLIC PANEL: CPT

## 2021-05-06 PROCEDURE — C1874 STENT, COATED/COV W/DEL SYS: HCPCS

## 2021-05-06 PROCEDURE — 1123F ACP DISCUSS/DSCN MKR DOCD: CPT | Performed by: NURSE PRACTITIONER

## 2021-05-06 PROCEDURE — 85347 COAGULATION TIME ACTIVATED: CPT

## 2021-05-06 PROCEDURE — C1725 CATH, TRANSLUMIN NON-LASER: HCPCS

## 2021-05-06 PROCEDURE — 94660 CPAP INITIATION&MGMT: CPT

## 2021-05-06 PROCEDURE — G8427 DOCREV CUR MEDS BY ELIG CLIN: HCPCS | Performed by: NURSE PRACTITIONER

## 2021-05-06 PROCEDURE — 87641 MR-STAPH DNA AMP PROBE: CPT

## 2021-05-06 PROCEDURE — 2000000000 HC ICU R&B

## 2021-05-06 PROCEDURE — 6360000002 HC RX W HCPCS

## 2021-05-06 PROCEDURE — 87635 SARS-COV-2 COVID-19 AMP PRB: CPT

## 2021-05-06 PROCEDURE — 1036F TOBACCO NON-USER: CPT | Performed by: NURSE PRACTITIONER

## 2021-05-06 PROCEDURE — 83880 ASSAY OF NATRIURETIC PEPTIDE: CPT

## 2021-05-06 PROCEDURE — 83735 ASSAY OF MAGNESIUM: CPT

## 2021-05-06 PROCEDURE — 6360000002 HC RX W HCPCS: Performed by: NURSE PRACTITIONER

## 2021-05-06 PROCEDURE — C9600 PERC DRUG-EL COR STENT SING: HCPCS | Performed by: INTERNAL MEDICINE

## 2021-05-06 PROCEDURE — C1769 GUIDE WIRE: HCPCS

## 2021-05-06 PROCEDURE — 93005 ELECTROCARDIOGRAM TRACING: CPT | Performed by: STUDENT IN AN ORGANIZED HEALTH CARE EDUCATION/TRAINING PROGRAM

## 2021-05-06 RX ORDER — FLUTICASONE PROPIONATE 50 MCG
2 SPRAY, SUSPENSION (ML) NASAL DAILY
Status: DISCONTINUED | OUTPATIENT
Start: 2021-05-07 | End: 2021-05-10 | Stop reason: HOSPADM

## 2021-05-06 RX ORDER — POLYETHYLENE GLYCOL 3350 17 G/17G
17 POWDER, FOR SOLUTION ORAL DAILY PRN
Status: DISCONTINUED | OUTPATIENT
Start: 2021-05-06 | End: 2021-05-09

## 2021-05-06 RX ORDER — HEPARIN SODIUM 10000 [USP'U]/100ML
5-30 INJECTION, SOLUTION INTRAVENOUS CONTINUOUS
Status: DISCONTINUED | OUTPATIENT
Start: 2021-05-06 | End: 2021-05-07

## 2021-05-06 RX ORDER — SODIUM CHLORIDE 9 MG/ML
25 INJECTION, SOLUTION INTRAVENOUS PRN
Status: DISCONTINUED | OUTPATIENT
Start: 2021-05-06 | End: 2021-05-06 | Stop reason: SDUPTHER

## 2021-05-06 RX ORDER — ACETAMINOPHEN 325 MG/1
650 TABLET ORAL EVERY 6 HOURS PRN
Status: DISCONTINUED | OUTPATIENT
Start: 2021-05-06 | End: 2021-05-10 | Stop reason: HOSPADM

## 2021-05-06 RX ORDER — MORPHINE SULFATE 2 MG/ML
2 INJECTION, SOLUTION INTRAMUSCULAR; INTRAVENOUS ONCE
Status: COMPLETED | OUTPATIENT
Start: 2021-05-06 | End: 2021-05-06

## 2021-05-06 RX ORDER — ACETAMINOPHEN 325 MG/1
650 TABLET ORAL EVERY 4 HOURS PRN
Status: DISCONTINUED | OUTPATIENT
Start: 2021-05-06 | End: 2021-05-06 | Stop reason: SDUPTHER

## 2021-05-06 RX ORDER — LEVOTHYROXINE AND LIOTHYRONINE 38; 9 UG/1; UG/1
30 TABLET ORAL DAILY
Status: DISCONTINUED | OUTPATIENT
Start: 2021-05-06 | End: 2021-05-10 | Stop reason: HOSPADM

## 2021-05-06 RX ORDER — NITROGLYCERIN 0.4 MG/1
0.4 TABLET SUBLINGUAL EVERY 5 MIN PRN
Status: DISCONTINUED | OUTPATIENT
Start: 2021-05-06 | End: 2021-05-10 | Stop reason: HOSPADM

## 2021-05-06 RX ORDER — SODIUM CHLORIDE 9 MG/ML
25 INJECTION, SOLUTION INTRAVENOUS PRN
Status: DISCONTINUED | OUTPATIENT
Start: 2021-05-06 | End: 2021-05-06

## 2021-05-06 RX ORDER — ONDANSETRON 2 MG/ML
4 INJECTION INTRAMUSCULAR; INTRAVENOUS EVERY 6 HOURS PRN
Status: DISCONTINUED | OUTPATIENT
Start: 2021-05-06 | End: 2021-05-10 | Stop reason: HOSPADM

## 2021-05-06 RX ORDER — HEPARIN SODIUM 10000 [USP'U]/100ML
5-30 INJECTION, SOLUTION INTRAVENOUS CONTINUOUS
Status: DISCONTINUED | OUTPATIENT
Start: 2021-05-06 | End: 2021-05-06

## 2021-05-06 RX ORDER — ONDANSETRON 2 MG/ML
4 INJECTION INTRAMUSCULAR; INTRAVENOUS EVERY 6 HOURS PRN
Status: DISCONTINUED | OUTPATIENT
Start: 2021-05-06 | End: 2021-05-06

## 2021-05-06 RX ORDER — NITROGLYCERIN 20 MG/100ML
5-200 INJECTION INTRAVENOUS CONTINUOUS
Status: DISCONTINUED | OUTPATIENT
Start: 2021-05-06 | End: 2021-05-10

## 2021-05-06 RX ORDER — DOCUSATE SODIUM 100 MG/1
100 CAPSULE, LIQUID FILLED ORAL 2 TIMES DAILY
Status: DISCONTINUED | OUTPATIENT
Start: 2021-05-06 | End: 2021-05-10 | Stop reason: HOSPADM

## 2021-05-06 RX ORDER — ATORVASTATIN CALCIUM 80 MG/1
80 TABLET, FILM COATED ORAL NIGHTLY
Status: DISCONTINUED | OUTPATIENT
Start: 2021-05-06 | End: 2021-05-10 | Stop reason: HOSPADM

## 2021-05-06 RX ORDER — FAMOTIDINE 20 MG/1
20 TABLET, FILM COATED ORAL DAILY
Status: DISCONTINUED | OUTPATIENT
Start: 2021-05-06 | End: 2021-05-10 | Stop reason: HOSPADM

## 2021-05-06 RX ORDER — MONTELUKAST SODIUM 10 MG/1
10 TABLET ORAL DAILY
Status: DISCONTINUED | OUTPATIENT
Start: 2021-05-07 | End: 2021-05-10 | Stop reason: HOSPADM

## 2021-05-06 RX ORDER — SODIUM CHLORIDE 9 MG/ML
INJECTION, SOLUTION INTRAVENOUS CONTINUOUS
Status: DISCONTINUED | OUTPATIENT
Start: 2021-05-06 | End: 2021-05-06

## 2021-05-06 RX ORDER — OMEGA-3/DHA/EPA/FISH OIL 300-1000MG
2 CAPSULE ORAL DAILY
Status: DISCONTINUED | OUTPATIENT
Start: 2021-05-06 | End: 2021-05-06 | Stop reason: RX

## 2021-05-06 RX ORDER — SODIUM CHLORIDE 0.9 % (FLUSH) 0.9 %
5-40 SYRINGE (ML) INJECTION PRN
Status: DISCONTINUED | OUTPATIENT
Start: 2021-05-06 | End: 2021-05-06 | Stop reason: SDUPTHER

## 2021-05-06 RX ORDER — POLYETHYLENE GLYCOL 3350 17 G/17G
17 POWDER, FOR SOLUTION ORAL DAILY PRN
Status: DISCONTINUED | OUTPATIENT
Start: 2021-05-06 | End: 2021-05-06 | Stop reason: SDUPTHER

## 2021-05-06 RX ORDER — HEPARIN SODIUM 1000 [USP'U]/ML
60 INJECTION, SOLUTION INTRAVENOUS; SUBCUTANEOUS PRN
Status: DISCONTINUED | OUTPATIENT
Start: 2021-05-07 | End: 2021-05-10 | Stop reason: ALTCHOICE

## 2021-05-06 RX ORDER — PROMETHAZINE HYDROCHLORIDE 25 MG/1
12.5 TABLET ORAL EVERY 6 HOURS PRN
Status: DISCONTINUED | OUTPATIENT
Start: 2021-05-06 | End: 2021-05-10 | Stop reason: HOSPADM

## 2021-05-06 RX ORDER — HEPARIN SODIUM 1000 [USP'U]/ML
60 INJECTION, SOLUTION INTRAVENOUS; SUBCUTANEOUS PRN
Status: DISCONTINUED | OUTPATIENT
Start: 2021-05-06 | End: 2021-05-06

## 2021-05-06 RX ORDER — AMLODIPINE BESYLATE 5 MG/1
2.5 TABLET ORAL DAILY
Status: DISCONTINUED | OUTPATIENT
Start: 2021-05-07 | End: 2021-05-10 | Stop reason: HOSPADM

## 2021-05-06 RX ORDER — HEPARIN SODIUM 1000 [USP'U]/ML
60 INJECTION, SOLUTION INTRAVENOUS; SUBCUTANEOUS ONCE
Status: COMPLETED | OUTPATIENT
Start: 2021-05-06 | End: 2021-05-06

## 2021-05-06 RX ORDER — ASPIRIN 81 MG/1
324 TABLET, CHEWABLE ORAL ONCE
Status: COMPLETED | OUTPATIENT
Start: 2021-05-06 | End: 2021-05-06

## 2021-05-06 RX ORDER — HEPARIN SODIUM 1000 [USP'U]/ML
60 INJECTION, SOLUTION INTRAVENOUS; SUBCUTANEOUS PRN
Status: DISCONTINUED | OUTPATIENT
Start: 2021-05-07 | End: 2021-05-06

## 2021-05-06 RX ORDER — SPIRONOLACTONE 25 MG/1
50 TABLET ORAL DAILY
Status: DISCONTINUED | OUTPATIENT
Start: 2021-05-06 | End: 2021-05-10 | Stop reason: HOSPADM

## 2021-05-06 RX ORDER — HEPARIN SODIUM 1000 [USP'U]/ML
30 INJECTION, SOLUTION INTRAVENOUS; SUBCUTANEOUS PRN
Status: DISCONTINUED | OUTPATIENT
Start: 2021-05-07 | End: 2021-05-06

## 2021-05-06 RX ORDER — ASPIRIN 325 MG
325 TABLET ORAL ONCE
Status: DISCONTINUED | OUTPATIENT
Start: 2021-05-06 | End: 2021-05-06

## 2021-05-06 RX ORDER — FUROSEMIDE 10 MG/ML
40 INJECTION INTRAMUSCULAR; INTRAVENOUS EVERY 6 HOURS
Status: DISCONTINUED | OUTPATIENT
Start: 2021-05-06 | End: 2021-05-07

## 2021-05-06 RX ORDER — LANOLIN ALCOHOL/MO/W.PET/CERES
3 CREAM (GRAM) TOPICAL DAILY
Status: DISCONTINUED | OUTPATIENT
Start: 2021-05-06 | End: 2021-05-10 | Stop reason: HOSPADM

## 2021-05-06 RX ORDER — ASPIRIN 81 MG/1
81 TABLET, CHEWABLE ORAL DAILY
Status: DISCONTINUED | OUTPATIENT
Start: 2021-05-07 | End: 2021-05-06 | Stop reason: SDUPTHER

## 2021-05-06 RX ORDER — ACETAMINOPHEN 650 MG/1
650 SUPPOSITORY RECTAL EVERY 6 HOURS PRN
Status: DISCONTINUED | OUTPATIENT
Start: 2021-05-06 | End: 2021-05-06 | Stop reason: SDUPTHER

## 2021-05-06 RX ORDER — SODIUM CHLORIDE 0.9 % (FLUSH) 0.9 %
5-40 SYRINGE (ML) INJECTION EVERY 12 HOURS SCHEDULED
Status: DISCONTINUED | OUTPATIENT
Start: 2021-05-06 | End: 2021-05-10 | Stop reason: HOSPADM

## 2021-05-06 RX ORDER — ATORVASTATIN CALCIUM 80 MG/1
80 TABLET, FILM COATED ORAL NIGHTLY
Status: DISCONTINUED | OUTPATIENT
Start: 2021-05-06 | End: 2021-05-06

## 2021-05-06 RX ORDER — POTASSIUM CHLORIDE 20 MEQ/1
40 TABLET, EXTENDED RELEASE ORAL PRN
Status: DISCONTINUED | OUTPATIENT
Start: 2021-05-06 | End: 2021-05-06

## 2021-05-06 RX ORDER — MORPHINE SULFATE 2 MG/ML
2 INJECTION, SOLUTION INTRAMUSCULAR; INTRAVENOUS
Status: DISCONTINUED | OUTPATIENT
Start: 2021-05-06 | End: 2021-05-10 | Stop reason: HOSPADM

## 2021-05-06 RX ORDER — SODIUM CHLORIDE 9 MG/ML
INJECTION, SOLUTION INTRAVENOUS CONTINUOUS
Status: DISCONTINUED | OUTPATIENT
Start: 2021-05-06 | End: 2021-05-06 | Stop reason: SDUPTHER

## 2021-05-06 RX ORDER — GLUCOSAMINE/CHONDR SU A SOD 750-600 MG
1500 TABLET ORAL DAILY
Status: DISCONTINUED | OUTPATIENT
Start: 2021-05-06 | End: 2021-05-06 | Stop reason: RX

## 2021-05-06 RX ORDER — SODIUM CHLORIDE 0.9 % (FLUSH) 0.9 %
5-40 SYRINGE (ML) INJECTION PRN
Status: DISCONTINUED | OUTPATIENT
Start: 2021-05-06 | End: 2021-05-06

## 2021-05-06 RX ORDER — ATORVASTATIN CALCIUM 40 MG/1
40 TABLET, FILM COATED ORAL NIGHTLY
Status: DISCONTINUED | OUTPATIENT
Start: 2021-05-06 | End: 2021-05-06 | Stop reason: DRUGHIGH

## 2021-05-06 RX ORDER — HEPARIN SODIUM 1000 [USP'U]/ML
30 INJECTION, SOLUTION INTRAVENOUS; SUBCUTANEOUS PRN
Status: DISCONTINUED | OUTPATIENT
Start: 2021-05-06 | End: 2021-05-06

## 2021-05-06 RX ORDER — LORAZEPAM 1 MG/1
0.5 TABLET ORAL EVERY 6 HOURS PRN
Status: DISCONTINUED | OUTPATIENT
Start: 2021-05-06 | End: 2021-05-10 | Stop reason: HOSPADM

## 2021-05-06 RX ORDER — GLUCOSAMINE/CHONDR SU A SOD 750-600 MG
1500 TABLET ORAL DAILY
COMMUNITY
End: 2021-06-10

## 2021-05-06 RX ORDER — ACETAMINOPHEN 650 MG/1
650 SUPPOSITORY RECTAL EVERY 6 HOURS PRN
Status: DISCONTINUED | OUTPATIENT
Start: 2021-05-06 | End: 2021-05-10 | Stop reason: HOSPADM

## 2021-05-06 RX ORDER — SODIUM CHLORIDE 0.9 % (FLUSH) 0.9 %
5-40 SYRINGE (ML) INJECTION EVERY 12 HOURS SCHEDULED
Status: DISCONTINUED | OUTPATIENT
Start: 2021-05-06 | End: 2021-05-06

## 2021-05-06 RX ORDER — ACETAMINOPHEN 325 MG/1
650 TABLET ORAL EVERY 6 HOURS PRN
Status: DISCONTINUED | OUTPATIENT
Start: 2021-05-06 | End: 2021-05-06 | Stop reason: DRUGHIGH

## 2021-05-06 RX ORDER — POTASSIUM CHLORIDE 7.45 MG/ML
10 INJECTION INTRAVENOUS PRN
Status: DISCONTINUED | OUTPATIENT
Start: 2021-05-06 | End: 2021-05-06

## 2021-05-06 RX ORDER — HEPARIN SODIUM 1000 [USP'U]/ML
30 INJECTION, SOLUTION INTRAVENOUS; SUBCUTANEOUS PRN
Status: DISCONTINUED | OUTPATIENT
Start: 2021-05-07 | End: 2021-05-10 | Stop reason: ALTCHOICE

## 2021-05-06 RX ORDER — CARVEDILOL 3.12 MG/1
3.12 TABLET ORAL 2 TIMES DAILY WITH MEALS
Status: DISCONTINUED | OUTPATIENT
Start: 2021-05-06 | End: 2021-05-10 | Stop reason: HOSPADM

## 2021-05-06 RX ORDER — PROMETHAZINE HYDROCHLORIDE 25 MG/1
12.5 TABLET ORAL EVERY 6 HOURS PRN
Status: DISCONTINUED | OUTPATIENT
Start: 2021-05-06 | End: 2021-05-06

## 2021-05-06 RX ORDER — SODIUM CHLORIDE 0.9 % (FLUSH) 0.9 %
5-40 SYRINGE (ML) INJECTION EVERY 12 HOURS SCHEDULED
Status: DISCONTINUED | OUTPATIENT
Start: 2021-05-06 | End: 2021-05-06 | Stop reason: SDUPTHER

## 2021-05-06 RX ORDER — ASPIRIN 81 MG/1
81 TABLET, CHEWABLE ORAL DAILY
Status: DISCONTINUED | OUTPATIENT
Start: 2021-05-07 | End: 2021-05-10 | Stop reason: HOSPADM

## 2021-05-06 RX ORDER — LORAZEPAM 1 MG/1
0.5 TABLET ORAL EVERY 6 HOURS PRN
COMMUNITY
End: 2022-04-20

## 2021-05-06 RX ADMIN — SODIUM CHLORIDE: 9 INJECTION, SOLUTION INTRAVENOUS at 18:09

## 2021-05-06 RX ADMIN — ONDANSETRON 4 MG: 2 INJECTION INTRAMUSCULAR; INTRAVENOUS at 21:17

## 2021-05-06 RX ADMIN — ASPIRIN 324 MG: 81 TABLET, CHEWABLE ORAL at 11:32

## 2021-05-06 RX ADMIN — TICAGRELOR 180 MG: 90 TABLET ORAL at 17:14

## 2021-05-06 RX ADMIN — ATORVASTATIN CALCIUM 80 MG: 80 TABLET, FILM COATED ORAL at 22:07

## 2021-05-06 RX ADMIN — CARVEDILOL 3.12 MG: 3.12 TABLET, FILM COATED ORAL at 22:07

## 2021-05-06 RX ADMIN — ASPIRIN 325 MG: 325 TABLET, FILM COATED ORAL at 17:13

## 2021-05-06 RX ADMIN — DOCUSATE SODIUM 100 MG: 100 CAPSULE ORAL at 22:07

## 2021-05-06 RX ADMIN — FAMOTIDINE 20 MG: 20 TABLET ORAL at 22:09

## 2021-05-06 RX ADMIN — MORPHINE SULFATE 2 MG: 2 INJECTION, SOLUTION INTRAMUSCULAR; INTRAVENOUS at 18:02

## 2021-05-06 RX ADMIN — IOPAMIDOL 350 ML: 755 INJECTION, SOLUTION INTRAVENOUS at 17:15

## 2021-05-06 RX ADMIN — Medication 3 MG: at 22:07

## 2021-05-06 RX ADMIN — NITROGLYCERIN 0.4 MG: 0.4 TABLET, ORALLY DISINTEGRATING SUBLINGUAL at 11:34

## 2021-05-06 RX ADMIN — HEPARIN SODIUM 3540 UNITS: 1000 INJECTION, SOLUTION INTRAVENOUS; SUBCUTANEOUS at 12:12

## 2021-05-06 RX ADMIN — HEPARIN SODIUM AND DEXTROSE 12 UNITS/KG/HR: 10000; 5 INJECTION INTRAVENOUS at 19:51

## 2021-05-06 RX ADMIN — HEPARIN SODIUM AND DEXTROSE 12 UNITS/KG/HR: 10000; 5 INJECTION INTRAVENOUS at 12:12

## 2021-05-06 RX ADMIN — SODIUM CHLORIDE, PRESERVATIVE FREE 10 ML: 5 INJECTION INTRAVENOUS at 21:18

## 2021-05-06 ASSESSMENT — PAIN DESCRIPTION - PAIN TYPE
TYPE: ACUTE PAIN
TYPE: ACUTE PAIN
TYPE: CHRONIC PAIN;ACUTE PAIN

## 2021-05-06 ASSESSMENT — ENCOUNTER SYMPTOMS
SHORTNESS OF BREATH: 0
WHEEZING: 0
EYE PAIN: 0
ABDOMINAL PAIN: 0
ABDOMINAL DISTENTION: 0
SHORTNESS OF BREATH: 0
RHINORRHEA: 0
COUGH: 0
BACK PAIN: 1
NAUSEA: 0
EYE DISCHARGE: 0
EYE DISCHARGE: 0
VOMITING: 0
COLOR CHANGE: 0
CONSTIPATION: 0
EYE REDNESS: 0
TROUBLE SWALLOWING: 0
BACK PAIN: 1
COLOR CHANGE: 0
EYE REDNESS: 0
SINUS PRESSURE: 0
VOMITING: 0
NAUSEA: 0
SINUS PAIN: 0
ABDOMINAL PAIN: 0
SORE THROAT: 0
DIARRHEA: 0
EYE ITCHING: 0
CHEST TIGHTNESS: 1
DIARRHEA: 0

## 2021-05-06 ASSESSMENT — PAIN SCALES - GENERAL
PAINLEVEL_OUTOF10: 6
PAINLEVEL_OUTOF10: 4
PAINLEVEL_OUTOF10: 3

## 2021-05-06 ASSESSMENT — PAIN DESCRIPTION - DESCRIPTORS
DESCRIPTORS: ACHING
DESCRIPTORS: ACHING;CONSTANT

## 2021-05-06 ASSESSMENT — PAIN DESCRIPTION - LOCATION
LOCATION: NECK;HEAD
LOCATION: CHEST

## 2021-05-06 NOTE — PRE SEDATION
[facility]  Sedation/Analgesia History & Physical    Pt Name: Viridiana Mack  Account number: [de-identified]  MRN: [id number]  YOB: 1925  Provider Performing Procedure: Bibi Nielson MD  Referring Provider: Debbie Barrera DO   Primary Care Physician: Armando Streeter DO  Date: 5/6/2021    PRE-PROCEDURE    Code Status: FULL CODE  Brief History/Pre-Procedure Diagnosis:   NSTEMI     Consent: : I have discussed with the patient risks, benefits, and alternatives (and relevant risks, benefits, and side effects related to alternatives or not receiving care), and likelihood of the success. The patient and/or representative appear to understand and agree to proceed. The discussion encompasses risks, benefits, and side effects related to the alternatives and the risks related to not receiving the proposed care, treatment, and services. The indication, risks and benefits of the procedure and possible therapeutic consequences and alternatives were discussed with the patient. The patient was given the opportunity to ask questions and to have them answered to his/her satisfaction. Risks of the procedure include but are not limited to the following: Bleeding, hematoma including retroperitoneal hemmorhage, infection, pain and discomfort, injury to the aorta and other blood vessels, rhythm disturbance, low blood pressure, myocardial infarction, stroke, kidney damage/failure, myocardial perforation, allergic reactions to sedatives/contrast material, loss of pulse/vascular compromise requiring surgery, aneurysm/pseudoaneurysm formation, possible loss of a limb/hand/leg, needing blood transfusion, requiring emergent open heart surgery or emergent coronary intervention, the need for intubation/respiratory support, the requirement for defibrillation/cardioversion, and death. Alternatives to and omission of the suggested procedure were discussed.  The patient had no further questions and wished to proceed; the consent form was signed. MEDICAL HISTORY  [x]ASHD/ANGINA/MI/CHF   [x]Hypertension  []Diabetes  [x]Hyperlipidemia  []Smoking  []Family Hx of ASHD  [x]Additional information:       has a past medical history of Arthritis, Cancer ([address] Diverticulosis, Hyperlipidemia, Hypertension, Hypothyroidism, Psoriasis, and Thyroid disorder. SURGICAL HISTORY   has a past surgical history that includes Breast surgery (Left, 6/1966); Hysterectomy (2/23/1970); Colon surgery (11/1999); knee surgery (Left, 2000); Ovary removal (Bilateral, 7/12/2001); Cataract removal (Bilateral, 1999); Knee arthroscopy (Right, 11/21/2007); Carpal tunnel release (Right, 1/30/2013); Carpal tunnel release (Left, 7/25/13); shoulder surgery (5/14); myringotomy (Right, 07/01/2015); Colonoscopy (2003, 2006, 2011); Breast biopsy (Left); Ovary removal (Bilateral); Hysterectomy, total abdominal; and sinus surgery.   Additional information:       ALLERGIES   Allergies as of 05/06/2021 - Review Complete 05/06/2021   Allergen Reaction Noted    Prednisone  07/16/2019    Actonel [risedronate sodium] Other (See Comments) 02/03/2015    Baclofen Other (See Comments) 07/22/2019    Bactrim [sulfamethoxazole-trimethoprim] Nausea Only 02/03/2015    Cardizem [diltiazem] Other (See Comments) 05/07/2014    Celebrex [celecoxib] Other (See Comments) 05/07/2014    Dicloxacillin Other (See Comments) 02/23/2017    Doxycycline Other (See Comments) 02/03/2015    Evista [raloxifene]  02/03/2015    Lopid [gemfibrozil]  02/03/2015    Questran [cholestyramine]  02/03/2015    Synthroid [levothyroxine sodium]  02/03/2015    Zestoretic [lisinopril-hydrochlorothiazide]  08/26/2011    Zetia [ezetimibe]  02/03/2015     Additional information:       MEDICATIONS   Aspirin  [x] 81 mg  [] 325 mg  [] None  Antiplatelet drug therapy use last 5 days  [x]No []Yes  Coumadin Use Last 5 Days [x]No []Yes  Other anticoagulant use last 5 days  [x]No []Yes    Current Facility-Administered Medications:     nitroGLYCERIN (NITROSTAT) SL tablet 0.4 mg, 0.4 mg, Sublingual, Q5 Min PRN, Ovi Alberto MD, 0.4 mg at 05/06/21 1134    heparin (porcine) injection 3,540 Units, 60 Units/kg, Intravenous, PRN, Ovi Alberto MD    heparin (porcine) injection 1,770 Units, 30 Units/kg, Intravenous, PRN, Ovi Alberto MD    heparin 25,000 units in dextrose 5% 250 mL (premix) infusion, 5-30 Units/kg/hr, Intravenous, Continuous, Ovi Alberto MD, Last Rate: 7.1 mL/hr at 05/06/21 1212, 12 Units/kg/hr at 05/06/21 1212    [START ON 5/7/2021] amLODIPine (NORVASC) tablet 2.5 mg, 2.5 mg, Oral, Daily, Ovi Alberto MD    famotidine (PEPCID) tablet 20 mg, 20 mg, Oral, Daily, Ovi Alberto MD  [facility]  [START ON 5/7/2021] fluticasone (FLONASE) 50 MCG/ACT nasal spray 2 spray, 2 spray, Each Nostril, Daily, Ovi Alberto MD    LORazepam (ATIVAN) tablet 0.5 mg, 0.5 mg, Oral, Q6H PRN, Ovi Alberto MD    melatonin tablet 3 mg, 3 mg, Oral, Daily, Ovi Alberto MD  [facility]  [START ON 5/7/2021] montelukast (SINGULAIR) tablet 10 mg, 10 mg, Oral, Daily, Ovi Alberto MD    spironolactone (ALDACTONE) tablet 50 mg, 50 mg, Oral, Daily, Ovi Alberto MD    thyroid (ARMOUR) tablet 30 mg, 30 mg, Oral, Daily, Ovi Alberto MD    sodium chloride flush 0.9 % injection 5-40 mL, 5-40 mL, Intravenous, 2 times per day, Cristo Stanley MD    sodium chloride flush 0.9 % injection 5-40 mL, 5-40 mL, Intravenous, PRN, Ovi Alberto MD    0.9 % sodium chloride infusion, 25 mL, Intravenous, PRN, Ovi Alberto MD    promethazine (PHENERGAN) tablet 12.5 mg, 12.5 mg, Oral, Q6H PRN **OR** ondansetron (ZOFRAN) injection 4 mg, 4 mg, Intravenous, Q6H PRN, Ovi Alberto MD    acetaminophen (TYLENOL) tablet 650 mg, 650 mg, Oral, Q6H PRN **OR** acetaminophen (TYLENOL) suppository 650 mg, 650 mg, Rectal, Q6H PRN, Ovi Alberto MD    polyethylene glycol (GLYCOLAX) packet 17 g, 17 g, Oral, Daily PRN, Shira Marie MD    atorvastatin (LIPITOR) tablet 80 mg, 80 mg, Oral, Nightly, Ovi Constantino MD    perflutren lipid microspheres (DEFINITY) injection 1.65 mg, 1.5 mL, Intravenous, ONCE PRN, Ovi Constantino MD    carvedilol (COREG) tablet 3.125 mg, 3.125 mg, Oral, BID WC, Ovi Constantino MD    ticagrelor [facility]) tablet 180 mg, 180 mg, Oral, Once, Shira Marie MD  [facility]  [START ON 5/7/2021] ticagrelor (BRILINTA) tablet 90 mg, 90 mg, Oral, BID, Ovi Constantino MD    aspirin tablet 325 mg, 325 mg, Oral, Once, Shira Marie MD  [facility]  [START ON 5/7/2021] aspirin chewable tablet 81 mg, 81 mg, Oral, Daily, Ovi Constantino MD    Current Outpatient Medications:     LORazepam (ATIVAN) 1 MG tablet, Take 0.5 mg by mouth every 6 hours as needed for Anxiety.  Pt usually only takes a half tab when needed, Disp: , Rfl:     Glucosamine HCl 1500 MG TABS, Take 1,500 mg by mouth daily, Disp: , Rfl:     spironolactone (ALDACTONE) 50 MG tablet, Take 1 tablet by mouth daily, Disp: 90 tablet, Rfl: 3    amLODIPine (NORVASC) 2.5 MG tablet, Take 1 tablet by mouth daily, Disp: 90 tablet, Rfl: 3    meloxicam (MOBIC) 7.5 MG tablet, Take 1 tablet by mouth daily as needed for Pain, Disp: 30 tablet, Rfl: 3    thyroid (ARMOUR) 60 MG tablet, Take 0.5 tablets by mouth daily VINNY, Disp: 45 tablet, Rfl: 3    melatonin 3 MG TABS tablet, Take 3 mg by mouth daily, Disp: , Rfl:     famotidine (PEPCID) 20 MG tablet, Take 1 tablet by mouth 2 times daily, Disp: 180 tablet, Rfl: 3    fluticasone (FLONASE) 50 MCG/ACT nasal spray, 2 sprays by Each Nostril route daily, Disp: , Rfl:     blood glucose monitor strips, Check blood sugar q daily, Dx R73.01, Disp: 100 strip, Rfl: 0    b complex vitamins capsule, Take 1 capsule by mouth daily, Disp: , Rfl:     OLIVE LEAF PO, Take 450 mg by mouth daily, Disp: , Rfl:     Handicap Placard MISC, by Does not apply route Expires 9/6/2022, Disp: 1 each, Rfl: 0    Blood distress  HEENT:  Unremarkable for age  Neck: without increased JVD, carotid pulses 2+ bilaterally without bruits  Heart: RRR, S1 & S2 WNL, S4 gallop, without murmurs or rubs   NYHA: 2  Lungs: Clear to auscultation    Abdomen: BS present, without HSM, masses, or tenderness    Extremities: without C,C,E.  Pulses 2+ bilaterally  Mental Status: Alert & Oriented        PLANNED PROCEDURE   [x]Cath  [x]PCI                []Pacemaker/AICD  []JESSE             []Cardioversion []Peripheral angiography/PTA  []Other:      SEDATION  Planned agent:[x]Midazolam []Meperidine [x]Sublimaze []Morphine  []Diazepam  []Other:     ASA Classification:  []1 []2 [x]3 []4 []5  Class 1: A normal healthy patient  Class 2: Pt with mild to moderate systemic disease  Class 3: Severe systemic disease or disturbance  Class 4: Severe systemic disorders that are already life threatening. Class 5: Moribund pt with little chances of survival, for more than 24 hours. Mallampati I Airway Classification:   []1 []2 [x]3 []4    [x]Pre-procedure diagnostic studies complete and results available. Comment:    [x]Previous sedation/anesthesia experiences assessed. Comment:    [x]The patient is an appropriate candidate to undergo the planned procedure sedation and anesthesia. (Refer to nursing sedation/analgesia documentation record)  [x]Formulation and discussion of sedation/procedure plan, risks, and expectations with patient and/or responsible adult completed. [x]Patient examined immediately prior to the procedure.  (Refer to nursing sedation/analgesia documentation record)    Don Weiner MD   Electronically signed 5/6/2021 at 2:39 PM

## 2021-05-06 NOTE — ED NOTES
Dr Seth Aranda to bedside to updated on POC/admit status. Pt continues restful on cart, VSS.       Jerry Pollard RN  05/1934

## 2021-05-06 NOTE — FLOWSHEET NOTE
1736-patient admitted from cath lab with IABP in place. Patient alert and oriented. See flowsheets for assessment  1800-Dr Castillo rounded and orders received and updated family.   at bedside  1900-Report given to [facility]

## 2021-05-06 NOTE — ED NOTES
Bed: 040A  Expected date:   Expected time:   Means of arrival:   Comments:  [address], RN  05/06/21 [id number]

## 2021-05-06 NOTE — CONSULTS
[facility] [address] [place] [address]  Cardiology Consult      Patient:  Stuart Alarcon  YOB: 1925    MRN: [id number]   Acct: [de-identified]     Primary Care Physician: Robert Patton DO    REASON FOR CONSULT:    NSTEMI     CHIEF COMPLAINT:    Chest pain     HISTORY OF PRESENT ILLNESS:    Stuart Alarcon is a pleasant 80year old female patient with past medical history that includes:   Past Medical History:   Diagnosis Date    Arthritis     Cancer ([facility] [address] 2013    SKIN CANCER ON LEFT ANKLE    Diverticulosis     Hyperlipidemia     Hypertension     Hypothyroidism     Psoriasis     Thyroid disorder    She denies personal history of cardiac disease. She reports chronic shoulder pains. She is s/p \"left shoulder injection\" for rotator cuff tear. Patient is functional and independent in her ADLs, lives by herself. Patient denies shortness of breath, dyspnea on exertion, orthopnea, paroxysmal nocturnal dyspnea, palpitations, dizziness, syncope, recent weight gain or leg swelling. She has been experiencing intermittent chest pains for the past few months. She also reports back pains, has been by physical therapy. She states that today, her chest pain is retrosternal, radiated to shoulder blades, described as tightness/aching, up to 10/10 today, no associated SOB or diaphoresis. She was seen at PCP office, EKG revealed SR ST elevation in aVR, significant ST depressions in inferolateral leads. [facility]. Troponin was found to be elevated at 0.32. She was started on Heparin gtt, given SL NTG. She reports improvement in her chest pain. Cardiology was consulted. EKG changes improved. EKG repeated in ER revealed SR, ischemic ST/T changes in lateral leads. ProBNP 1,519. Cr 1. CXR revealed no acute findings.        All labs, EKG's, diagnostic testing and images as well as cardiac cath, stress testing   were reviewed during this encounter    CARDIAC TESTING  Echo:   ECHO: No results found for this or any previous visit. Past Medical History:    Past Medical History:   Diagnosis Date    Arthritis     Cancer ([facility] [address] 2013    SKIN CANCER ON LEFT ANKLE    Diverticulosis     Hyperlipidemia     Hypertension     Hypothyroidism     Psoriasis     Thyroid disorder        Past Surgical History:    Past Surgical History:   Procedure Laterality Date    BREAST BIOPSY Left     benign    BREAST SURGERY Left 6/1966    Lumpectomy    CARPAL TUNNEL RELEASE Right 1/30/2013    CARPAL TUNNEL RELEASE Left 7/25/13    CATARACT REMOVAL Bilateral 1999    COLON SURGERY  11/1999    resection    COLONOSCOPY  2003, 2006, 2011    HYSTERECTOMY  2/23/1970    HYSTERECTOMY, TOTAL ABDOMINAL      KNEE ARTHROSCOPY Right 11/21/2007    meniscectomies    KNEE SURGERY Left 2000    replacement    MYRINGOTOMY Right 07/01/2015    tube insertion    OVARY REMOVAL Bilateral 7/12/2001    oophorectomy    OVARY REMOVAL Bilateral     SHOULDER SURGERY  5/14    SINUS SURGERY         Medications Prior to Admission:    Not in a hospital admission. Allergies:    Prednisone, Actonel [risedronate sodium], Baclofen, Bactrim [sulfamethoxazole-trimethoprim], Cardizem [diltiazem], Celebrex [celecoxib], Dicloxacillin, Doxycycline, Evista [raloxifene], Lopid [gemfibrozil], Questran [cholestyramine], Synthroid [levothyroxine sodium], Zestoretic [lisinopril-hydrochlorothiazide], and Zetia [ezetimibe]    Social History:    reports that she has never smoked. She has never used smokeless tobacco. She reports that she does not drink alcohol or use drugs. Family History:   family history includes Cancer in her child; Heart Disease in her sister; Other in an other family member; Stroke in her sister.       REVIEW OF SYSTEMS:  Constitutional: negative for anorexia, chills and fevers,weight change  Skin: negative for new skin rash per patient  HEENT: negative for head trauma or new visual changes  Respiratory: negative for cough, hemoptysis, wheezing, MCKEON, SOB   Cardiovascular: negative for  orthopnea, palpitations and syncope. Gastrointestinal: negative for abdominal pain,nausea , vomiting, constipation, diarrhea. Hematologic/lymphatic: negative for bruising,prolonged bleeding,blood clots  Musculoskeletal:negative for muscle weakness, myalgias,wasting  Neurological: negative for coordination problems, dizziness, gait problems and vertigo  Behavioral/Psych:negative for mood/sleep disturbance      PHYSICAL EXAM:   Vitals:  Patient Vitals for the past 24 hrs:   BP Temp Temp src Pulse Resp SpO2 Height Weight   05/06/21 1319 (!) 160/78 -- -- 96 21 99 % -- --   05/06/21 1230 136/88 -- -- 92 16 97 % -- --   05/06/21 1145 129/72 -- -- 89 17 98 % -- --   05/06/21 1130 (!) 147/86 -- -- 103 16 98 % -- --   05/06/21 1100 (!) 150/79 97.8 °F (36.6 °C) Oral 96 16 99 % 5' (1.524 m) 130 lb (59 kg)       Last 3 weights: Wt Readings from Last 3 Encounters:   05/06/21 130 lb (59 kg)   05/06/21 134 lb 12.8 oz (61.1 kg)   04/14/21 133 lb 12.8 oz (60.7 kg)     24 hour intake/output:No intake or output data in the 24 hours ending 05/06/21 1356  BMI:Body mass index is 25.39 kg/m². General Appearance: alert and oriented to person, place and time, well developed and well- nourished, in no acute distress  Skin: warm and dry, no rash or erythema  Eyes: pupils equal, round, and reactive to light, extraocular eye movements intact, conjunctivae normal  Neck: supple and non-tender without mass, no thyromegaly or thyroid nodules, no cervical lymphadenopathy  Pulmonary/Chest: clear to auscultation bilaterally- no wheezes, rales or rhonchi, normal air movement, no respiratory distress  Cardiovascular: normal rate, regular rhythm, normal S1 and S2, systolic murmur.  No rubs, clicks, or gallops, distal pulses intact, no carotid bruits, Negative JVD  Radial Pulses: intact 2+  Abdomen: soft, non-tender, non-distended, normal bowel sounds, no masses or organomegaly  Extremities: no cyanosis, clubbing . no Edema  Musculoskeletal: normal range of motion, no joint swelling, deformity or tenderness      RADIOLOGY   Xr Chest Portable    Result Date: 5/6/2021  PROCEDURE: XR CHEST PORTABLE CLINICAL INFORMATION: chest pain COMPARISON: 2/23/2017 TECHNIQUE: A single mobile view of the chest was obtained. 1. Normal heart size. Moderate degenerative changes left shoulder. Right shoulder prosthesis. 2. No acute findings. No infiltrates or effusions are seen. **This report has been created using voice recognition software. It may contain minor errors which are inherent in voice recognition technology. ** Final report electronically signed by Dr. Douglass Has on 5/6/2021 11:37 AM      LABS:  Recent Labs     05/06/21  1118   TROPONINT 0.327*     CBC:   Lab Results   Component Value Date    WBC 14.6 05/06/2021    RBC 4.88 05/06/2021    HGB 15.1 05/06/2021    HCT 46.8 05/06/2021    MCV 95.9 05/06/2021    MCH 30.9 05/06/2021    MCHC 32.3 05/06/2021    RDW 13.8 11/02/2019     05/06/2021    MPV 9.9 05/06/2021     BMP:    Lab Results   Component Value Date     05/06/2021    K 3.9 05/06/2021     05/06/2021    CO2 24 05/06/2021    BUN 22 05/06/2021    LABALBU 4.6 05/06/2021    LABALBU 4.1 03/15/2012    CREATININE 1.0 05/06/2021    CALCIUM 10.5 05/06/2021    LABGLOM 51 05/06/2021    GLUCOSE 163 05/06/2021    GLUCOSE 97 11/02/2019     Hepatic Function Panel:    Lab Results   Component Value Date    ALKPHOS 92 05/06/2021    ALT 21 05/06/2021    AST 70 05/06/2021    PROT 7.9 05/06/2021    BILITOT 0.2 05/06/2021    BILIDIR 0.1 11/02/2019    LABALBU 4.6 05/06/2021    LABALBU 4.1 03/15/2012     Magnesium:    Lab Results   Component Value Date    MG 2.3 09/25/2012     Warfarin PT/INR:  No components found for: PTPATWAR, PTINRANTHONY  HgBA1c:    Lab Results   Component Value Date    LABA1C 6.1 06/05/2019     FLP:    Lab Results   Component Value Date    TRIG 146 12/22/2020 HDL 43 12/22/2020    LDLCALC 121 12/22/2020     TSH:    Lab Results   Component Value Date    TSH 2.610 12/22/2020     BNP: No results found for: BNP      ASSESSMENT:  Geovany Napier is a pleasant 80year old female patient with past medical history that includes:   Past Medical History:   Diagnosis Date    Arthritis     Cancer ([facility] [address] 2013    SKIN CANCER ON LEFT ANKLE    Diverticulosis     Hyperlipidemia     Hypertension     Hypothyroidism     Psoriasis     Thyroid disorder    She denies personal history of cardiac disease. She reports chronic shoulder pains. She is s/p \"left shoulder injection\" for rotator cuff tear. Patient is functional and independent in her ADLs, lives by herself. Patient denies shortness of breath, dyspnea on exertion, orthopnea, paroxysmal nocturnal dyspnea, palpitations, dizziness, syncope, recent weight gain or leg swelling. She has been experiencing intermittent chest pains for the past few months. She also reports back pains, has been by physical therapy. She states that today, her chest pain is retrosternal, radiated to shoulder blades, described as tightness/aching, up to 10/10 today, no associated SOB or diaphoresis. She was seen at PCP office, EKG revealed SR ST elevation in aVR, significant ST depressions in inferolateral leads. She was sent to [facility] ER for further evaluation. Troponin was found to be elevated at 0.32. She was started on Heparin gtt, given SL NTG. She reports improvement in her chest pain. Cardiology was consulted. EKG changes improved. EKG repeated in ER revealed SR, ischemic ST/T changes in lateral leads. ProBNP 1,519. Cr 1. CXR revealed no acute findings. 1. Chest pain   2. Elevated troponin   3. Abnormal EKG   4. NSTEMI  5. Hypertension  6.  Dyslipidemia     RECOMMENDATIONS:   Serial EKGs were reviewed, dynamic changes noted, improved   Chest pain has improved    Troponin elevation   Presentation is c/w MI   Treat as NSTEMI   ASA loading dose in ER   Agree with Heparin gtt   Echocardiogram today    ASA 81 mg po daily    On Coreg    Admit to Medicine    Telemetry    Keep NPO  A decision was made to proceed with cardiac catheterization with possible PCI as it it the recommended procedure for the patient. The indication, risks and benefits of the procedure and possible therapeutic consequences and alternatives were discussed with the patient. The patient was given the opportunity to ask questions and to have them answered to his/her satisfaction. Risks of the procedure include but are not limited to the following: Bleeding, hematoma including retroperitoneal hematoma, infection, pain and discomfort, injury to the aorta and other blood vessels, rhythm disturbance, low blood pressure, myocardial infarction, need for bypass surgery, stroke, kidney damage/failure, myocardial perforation, allergic reactions to sedatives/contrast material, loss of pulse/vascular compromise requiring surgery, aneurysm/pseudoaneurysm formation, possible loss of a limb/hand/leg, death. Alternatives to and omission of the suggested procedure were discussed. The patient also understands the need for DAPT if PCI is necessary. The patient had no further questions and wished to proceed    Above findings and plan of care were discussed with patient, questions were answered, agreeable to plan. Thank you for allowing me to participate in the care of this patient. Please let me know if I can be of any further assistance.       Geri Marshall MD, [facility]   1:56 PM  5/6/2021

## 2021-05-06 NOTE — ED NOTES
Bed: 004A  Expected date: 5/6/21  Expected time: 10:32 AM  Means of arrival: [facility]  Comments:  [address] 89year old      Letty Ramirez RN  05/06/21 1047

## 2021-05-06 NOTE — ACP (ADVANCE CARE PLANNING)
43  Conversation Outcomes:  [x] ACP discussion completed  [] Existing advance directive reviewed with patient; no changes to patient's previously recorded wishes  [] New Advance Directive completed  [] Portable Do Not Rescitate prepared for Provider review and signature  [] POLST/POST/MOLST/MOST prepared for Provider review and signature      Follow-up plan:    [x] Schedule follow-up conversation to continue planning  [x] Referred individual to Provider for additional questions/concerns   [] Advised patient/agent/surrogate to review completed ACP document and update if needed with changes in condition, patient preferences or care setting    [x] This note routed to one or more involved healthcare providers     - Sherryn Severin was resting comfortably during our conversation. Admittedly, she hadn't spoken to her PCP or family about her EoL choices. As we pursued her understanding of her prognosis, she initially had decided she did not want CPR.   - Paula Leonard through the conversation, a physician came in to explain the procedures likely to be performed for them t determine best course of treatment. When asked about CPR, Sherryn Severin intitally stated \"I don't want that. \" She also shared that she didn't want long term intubation (to leave the hospital on a vent). This staff shared that in order to do the proposed procedures the physicians need to know her feelings toward CPR and intubation so we are sure to do what she requests. She then decided that she would like to discuss with her children before she decided not to use CPR. Her feelings remained the same regarding ventilation however. - We then reviewed her AD that was filed and realized most of the information was invalid. Corrections were made to a new document and provided to her family present  for signature once she gets to the floor later this evening. A consult was placed with Spiritual Care to follow-up.   - The AD was provided to Nelson Martínez to obtain signature later tonight. Palliative Care consult should be determined at that time regaridng a possible code status change from Full Code to [facility]. The patient could need clarification to assist in this determination.

## 2021-05-06 NOTE — ED PROVIDER NOTES
[place] ENCOUNTER          Pt Name: Sahil Nickerson   MRN: [id number]  [place] 2/21/1925  Date of evaluation: 5/6/2021  Treating Resident Physician: Camryn Ramos DO  Supervising Physician: Yeni Hernandez DO    CHIEF COMPLAINT       Chief Complaint   Patient presents with    Chest Pain     History obtained from the patient, chart review      HISTORY OF PRESENT ILLNESS    HPI  Sahil Nickerson is a 80 y.o. female who presents to the emergency department for evaluation of chest pain. The patient states that she has had a history of chest pain which has started in her right back which radiates to her left back and up to her neck for about the past year. She reports that she intermittently experiences this pain over the past year and it was made worse with her physical therapy exercises for her shoulder. Reports that it usually resolves with a heating pad. She states that her chest pain started this morning at 7 AM while getting dressed and not improved after using a heating pad this morning so she presented to her primary care provider today, who did an EKG which showed changes and referred the patient to the emergency department for further evaluation. She reports her pain is currently a severity of 5/10, described as a \"dull ache \"and constant, starting in the right back, radiating to the left back, and up to the neck. The patient has no other acute complaints at this time. REVIEW OF SYSTEMS   Review of Systems   Constitutional: Positive for fatigue. Negative for fever. HENT: Negative for rhinorrhea, sinus pressure and sinus pain. Eyes: Negative for discharge, redness and itching. Respiratory: Positive for chest tightness. Negative for shortness of breath. Cardiovascular: Positive for chest pain and palpitations. Gastrointestinal: Negative for abdominal distention, abdominal pain, diarrhea, nausea and vomiting.    Genitourinary: Negative for difficulty urinating, dysuria, frequency, hematuria and urgency. Musculoskeletal: Positive for back pain and neck pain. Negative for arthralgias. Skin: Negative for color change and pallor. Neurological: Negative for dizziness, light-headedness and headaches.          PAST MEDICAL AND SURGICAL HISTORY     Past Medical History:   Diagnosis Date    Arthritis     Cancer ([facility] [address] 2013    SKIN CANCER ON LEFT ANKLE    Diverticulosis     Hyperlipidemia     Hypertension     Hypothyroidism     Psoriasis     Thyroid disorder      Past Surgical History:   Procedure Laterality Date    BREAST BIOPSY Left     benign    BREAST SURGERY Left 6/1966    Lumpectomy    CARPAL TUNNEL RELEASE Right 1/30/2013    CARPAL TUNNEL RELEASE Left 7/25/13    CATARACT REMOVAL Bilateral 1999    COLON SURGERY  11/1999    resection    COLONOSCOPY  2003, 2006, 2011    HYSTERECTOMY  2/23/1970    HYSTERECTOMY, TOTAL ABDOMINAL      KNEE ARTHROSCOPY Right 11/21/2007    meniscectomies    KNEE SURGERY Left 2000    replacement    MYRINGOTOMY Right 07/01/2015    tube insertion    OVARY REMOVAL Bilateral 7/12/2001    oophorectomy    OVARY REMOVAL Bilateral     SHOULDER SURGERY  5/14    SINUS SURGERY           MEDICATIONS     Current Facility-Administered Medications:     nitroGLYCERIN (NITROSTAT) SL tablet 0.4 mg, 0.4 mg, Sublingual, Q5 Min PRN, Ovi Noguera MD, 0.4 mg at 05/06/21 1134    heparin (porcine) injection 3,540 Units, 60 Units/kg, Intravenous, PRN, Ovi Noguera MD    heparin (porcine) injection 1,770 Units, 30 Units/kg, Intravenous, PRN, Ovi Noguera MD    heparin 25,000 units in dextrose 5% 250 mL (premix) infusion, 5-30 Units/kg/hr, Intravenous, Continuous, Ovi Noguera MD, Last Rate: 7.1 mL/hr at 05/06/21 1212, 12 Units/kg/hr at 05/06/21 1212    [START ON 5/7/2021] amLODIPine (NORVASC) tablet 2.5 mg, 2.5 mg, Oral, Daily, Ovi Noguera MD    famotidine (PEPCID) tablet 20 mg, 20 mg,   LORazepam (ATIVAN) 1 MG tablet, Take 0.5 mg by mouth every 6 hours as needed for Anxiety. Pt usually only takes a half tab when needed, Disp: , Rfl:     Glucosamine HCl 1500 MG TABS, Take 1,500 mg by mouth daily, Disp: , Rfl:     spironolactone (ALDACTONE) 50 MG tablet, Take 1 tablet by mouth daily, Disp: 90 tablet, Rfl: 3    amLODIPine (NORVASC) 2.5 MG tablet, Take 1 tablet by mouth daily, Disp: 90 tablet, Rfl: 3    meloxicam (MOBIC) 7.5 MG tablet, Take 1 tablet by mouth daily as needed for Pain, Disp: 30 tablet, Rfl: 3    thyroid (ARMOUR) 60 MG tablet, Take 0.5 tablets by mouth daily VINNY, Disp: 45 tablet, Rfl: 3    melatonin 3 MG TABS tablet, Take 3 mg by mouth daily, Disp: , Rfl:     famotidine (PEPCID) 20 MG tablet, Take 1 tablet by mouth 2 times daily, Disp: 180 tablet, Rfl: 3    fluticasone (FLONASE) 50 MCG/ACT nasal spray, 2 sprays by Each Nostril route daily, Disp: , Rfl:     blood glucose monitor strips, Check blood sugar q daily, Dx R73.01, Disp: 100 strip, Rfl: 0    b complex vitamins capsule, Take 1 capsule by mouth daily, Disp: , Rfl:     OLIVE LEAF PO, Take 450 mg by mouth daily, Disp: , Rfl:     Handicap Placard [facility], by Does not apply route Expires 9/6/2022, Disp: 1 each, Rfl: 0    Blood Glucose Monitoring Suppl (TRUE METRIX METER) W/DEVICE KIT, 1 Device by Does not apply route daily Check blood sugar q daily, Dx E11.9, Disp: 1 kit, Rfl: 0    Multiple Vitamins-Minerals (THERAPEUTIC MULTIVITAMIN-MINERALS) tablet, Take 1 tablet by mouth daily. , Disp: , Rfl:     fish oil-omega-3 fatty acids 1000 MG capsule, Take 2 g by mouth daily.   , Disp: , Rfl:     CALCIUM CITRATE, Take 600 mg by mouth daily , Disp: , Rfl:     montelukast (SINGULAIR) 10 MG tablet, TAKE 1 TABLET BY MOUTH DAILY, Disp: 90 tablet, Rfl: 3    amoxicillin (AMOXIL) 500 MG capsule, Take 500 mg by mouth 3 times daily, Disp: , Rfl:     Loratadine (CLARITIN PO), Take by mouth, Disp: , Rfl:     polyethyl glycol-propyl glycol 0.4-0.3 % (SYSTANE) 0.4-0.3 % ophthalmic solution, 1 drop as needed for Dry Eyes, Disp: , Rfl:     docusate sodium (COLACE) 100 MG capsule, Take 100 mg by mouth as needed for Constipation, Disp: , Rfl:     AYR SALINE NASAL NO-DRIP GEL, Use 1 spray in each nostril 4 times a day (Patient taking differently: as needed Use 1 spray in each nostril 4 times a day ), Disp: 1 Tube, Rfl: 3      SOCIAL HISTORY     Social History     Social History Narrative    Not on file     Social History     Tobacco Use    Smoking status: Never Smoker    Smokeless tobacco: Never Used   Substance Use Topics    Alcohol use: No    Drug use: No         ALLERGIES     Allergies   Allergen Reactions    Prednisone      GERD symptoms, shakes    Actonel [Risedronate Sodium] Other (See Comments)     GI    Baclofen Other (See Comments)     Confusion     Bactrim [Sulfamethoxazole-Trimethoprim] Nausea Only           Cardizem [Diltiazem] Other (See Comments)     Raises BP    Celebrex [Celecoxib] Other (See Comments)     Raises BP    Dicloxacillin Other (See Comments)     Heartburn    Doxycycline Other (See Comments)     pulse    Evista [Raloxifene]      Fatigue      Lopid [Gemfibrozil]      Fatigue      Questran [Cholestyramine]      constipation      Synthroid [Levothyroxine Sodium]      GERD, Leg pain    Zestoretic [Lisinopril-Hydrochlorothiazide]      Raises B.P.    Zetia [Ezetimibe]      aches         FAMILY HISTORY     Family History   Problem Relation Age of Onset    Cancer Child     Stroke Sister     Heart Disease Sister     Other Other         visual problems         PREVIOUS RECORDS   Previous records reviewed:       PHYSICAL EXAM     ED Triage Vitals [05/06/21 1100]   BP Temp Temp Source Pulse Resp SpO2 Height Weight   (!) 150/79 97.8 °F (36.6 °C) Oral 96 16 99 % 5' (1.524 m) 130 lb (59 kg)     Initial vital signs and nursing assessment reviewed and normal. Body mass index is 25.39 kg/m².  Pulsoximetry is normal per my interpretation. Additional Vital Signs:  Vitals:    05/06/21 1319   BP: (!) 160/78   Pulse: 96   Resp: 21   Temp:    SpO2: 99%       Physical Exam  Constitutional:       General: She is not in acute distress. Appearance: Normal appearance. She is not ill-appearing. HENT:      Head: Normocephalic and atraumatic. Nose: Nose normal. No congestion. Mouth/Throat:      Mouth: Mucous membranes are moist.      Pharynx: Oropharynx is clear. No oropharyngeal exudate or posterior oropharyngeal erythema. Eyes:      Extraocular Movements: Extraocular movements intact. Pupils: Pupils are equal, round, and reactive to light. Neck:      Musculoskeletal: Normal range of motion and neck supple. Cardiovascular:      Rate and Rhythm: Normal rate and regular rhythm. Pulses: Normal pulses. Heart sounds: Normal heart sounds. Pulmonary:      Effort: Pulmonary effort is normal.      Breath sounds: Normal breath sounds. Abdominal:      General: Abdomen is flat. There is no distension. Palpations: Abdomen is soft. Tenderness: There is no abdominal tenderness. Musculoskeletal: Normal range of motion. General: No swelling or tenderness. Right lower leg: No edema. Left lower leg: No edema. Skin:     General: Skin is warm and dry. Neurological:      General: No focal deficit present. Mental Status: She is alert and oriented to person, place, and time. Mental status is at baseline. MEDICAL DECISION MAKING   Initial Assessment:   3 80year-old female presenting to the emergency department for evaluation of chest pain with EKG changes noted as an outpatient. EKG done by primary care provider's office prior to arrival noting diffuse ST segment depression and nonspecific T abnormality which was not present on an EKG done 6/5/2018. 2. Repeat EKG today showing new T wave inversions in 1, aVL, and V4 through V6.   Plan:    IV, labs, chest infusion (12 Units/kg/hr × 59 kg Intravenous New Bag 5/6/21 1212)   amLODIPine (NORVASC) tablet 2.5 mg (has no administration in time range)   famotidine (PEPCID) tablet 20 mg (has no administration in time range)   fluticasone (FLONASE) 50 MCG/ACT nasal spray 2 spray (has no administration in time range)   LORazepam (ATIVAN) tablet 0.5 mg (has no administration in time range)   melatonin tablet 3 mg (has no administration in time range)   montelukast (SINGULAIR) tablet 10 mg (has no administration in time range)   spironolactone (ALDACTONE) tablet 50 mg (has no administration in time range)   thyroid (ARMOUR) tablet 30 mg (has no administration in time range)   sodium chloride flush 0.9 % injection 5-40 mL (has no administration in time range)   sodium chloride flush 0.9 % injection 5-40 mL (has no administration in time range)   0.9 % sodium chloride infusion (has no administration in time range)   promethazine (PHENERGAN) tablet 12.5 mg (has no administration in time range)     Or   ondansetron (ZOFRAN) injection 4 mg (has no administration in time range)   acetaminophen (TYLENOL) tablet 650 mg (has no administration in time range)     Or   acetaminophen (TYLENOL) suppository 650 mg (has no administration in time range)   polyethylene glycol (GLYCOLAX) packet 17 g (has no administration in time range)   atorvastatin (LIPITOR) tablet 80 mg (has no administration in time range)   perflutren lipid microspheres (DEFINITY) injection 1.65 mg (has no administration in time range)   carvedilol (COREG) tablet 3.125 mg (has no administration in time range)   ticagrelor (BRILINTA) tablet 180 mg (has no administration in time range)   ticagrelor (BRILINTA) tablet 90 mg (has no administration in time range)   aspirin tablet 325 mg (has no administration in time range)   aspirin chewable tablet 81 mg (has no administration in time range)   aspirin chewable tablet 324 mg (324 mg Oral Given 5/6/21 1132)   heparin (porcine) injection 3,540 Units (3,540 Units Intravenous Given 5/6/21 1212)         ED COURSE     ED Course as of May 06 1536   Thu May 06, 2021   1223 EKG repeated. Will start heparin for elevated troponin and concern for NSTEMI. Patient reported some improvement after NTG and a change in her discomfort. Troponin T(!): 0.327 [DO]   1234 Case discussed with Dr. Isabelle Eid who agreed to admit the patient to the hospital and recommended discussing this case with the cardiology team.    [DO]   [phone] with Dr. Dedra Maciel, who agreed with admission to hospitalist service, current plan, and to treat for NSTEMI.     [DO]      ED Course User Index  [DO] Ubaldo Weiner DO       Strict return precautions and follow up instructions were discussed with the patient prior to discharge, with which the patient agrees. MEDICATION CHANGES     New Prescriptions    No medications on file         FINAL DISPOSITION     Final diagnoses:   NSTEMI (non-ST elevated myocardial infarction) (HCC)     Condition: condition: fair  Dispo: Admit to med/surg floor      This transcription was electronically signed. Parts of this transcriptions may have been dictated by use of voice recognition software and electronically transcribed, and parts may have been transcribed with the assistance of an ED scribe. The transcription may contain errors not detected in proofreading. Please refer to my supervising physician's documentation if my documentation differs.     Electronically Signed: Ubaldo Weiner, 05/06/21, 3:36 PM          Ubaldo Weiner DO  Resident  05/06/21 0573

## 2021-05-06 NOTE — FLOWSHEET NOTE
[facility] PROGRESS NOTE      Patient: Sahil Nickerson  Room #: 4D-01/001-A            YOB: 1925  Age: 80 y.o. Gender: female            Admit Date & Time: 5/6/2021 10:39 AM    Assessment:    given filled HCPOA docment, still unsigned by patient, as patient went for heart surgery prior to document completion. Patient was quite alert and very conversant. Engaged daughter outside 3 D, facilitated entry to patient room, offered and led in prayer. Daughter was given a copy of HCPOA and asked if these expressions seemed representative of her mother. Daughter affirmed. Interventions:   found a friend of patient on 2 waiting and engasged her Doralee Gist) learned daughter in route.  engaged patient as she was brought to 4D 1 . Chaplalin interacted with nurse to inform of intentions expressed in unsigned HCPOA.  called palliative care and left message for consult for patient.  engaged doc in room as he was conversing with patient and informed of expressed wishes.  gave copies of HCPOA for daughter's brothers    prayed with patient, family and staff as discussion completed.  offered copy of HCPOA to nurse for chart and she accepted. Outcomes:  Several minutes of interaction with patient being quite conversant and doctor interacting, along with daughter and  and nurse. .  Patient agreed to care description of \"Doing short terms things to help. Not doing long terms things leading to disability. \"     Plan:    1. Full code to remain overnight. Paliative care to consult in morning and assess code status. Electronically signed by Leonidas Lamar on 5/6/2021 at 7:26 PM.  [address]  [phone]       05/06/21 1800   Encounter Summary   Services provided to: Patient; Family; Patient and family together Referral/Consult From: Other    Support System Children;Family members;Friends/neighbors   Place of [employer]   ( [place] )   Continue Visiting   (05/06/2021  P  F)   Complexity of Encounter High   Length of Encounter 30 minutes;1 hour   Spiritual Assessment Completed Yes   Advance Care Planning Yes   Routine   Type Post-procedure   Assessment Calm; Approachable; Anxious; Hopeful;Spiritual struggle;Coping;Peaceful   Intervention Active listening;Explored feelings, thoughts, concerns;Explored coping resources;Nurtured hope;Prayer;Hope;Sustaining presence/ Ministry of presence; Facilitated family conference;Develop care plan   Outcome Acceptance;Expressed gratitude;Expressed feelings of evy, peace, and/or awe;Engaged in conversation;Expressed feelings/needs/concerns; Less anxious, less agitated;New perspective;Encouraged

## 2021-05-06 NOTE — BRIEF OP NOTE
[address] [address]  Sedation/Analgesia Post Sedation Record    Pt Name: Roxy Collet  Account number: [de-identified]  MRN: [id number]  YOB: 1925  Procedure Performed By: Nikolai Velázquez, [address]  Primary Care Physician: Tran Grewal DO  Date: 5/6/2021    POST-PROCEDURE    Physicians/Assistants: BELEN Beatty MD    Procedure Performed:Cath/ PCI      Sedation/Anesthesia: Versed/ Fentanyl and 2% xylocaine local anesthesia. Estimated Blood Loss: < 50 ml. Specimens Removed: None         Disposition of Specimen: N/A        Complications: No Immediate Complications.        Post-procedure Diagnosis/Findings:     Severe LAD 80-90% and DX 90% disease  Ostial OM 90%   RCA non-dom    PCI of the LAD x 3 JUSTUS  PCI of the OM x 1 JUSTUS    Dx shut down during LAD PCI, ST-elevations laterally  LAD stenting was complicated by no-reflow - Adenosine IC given to improve  Rescued with PTCA but DX would not stay open - stented x 2 but still no flow    IABP placed at 1:1 to help perfuse DX/LAD  IV Heparin  DAPT  TTE    ICU care             Nikolai Velázquez, [address]  Electronically signed 5/6/2021 at 5:06 PM  Interventional Cardiology

## 2021-05-06 NOTE — H&P
History & Physical        Patient:  Geovany Napier  YOB: 1925    MRN: [id number]     Acct: [de-identified]    PCP: Hank Toledo DO    Date of Admission: 5/6/2021    Date of Service: Pt seen/examined on 05/06/21  and Admitted toInpatient with expected LOS greater than two midnights due to medical therapy. Chief Complaint:  Chest pain       History Of Present Illness:     80 y.o. female with PMH listed below who presented to [facility] with chief complaint of chest pain    Patient reports that around 7am this morning, after eating breakfast, she started having mid thoracic pain that radiates to her neck and mid sternal area. She described the pain as achy, sharp, 2-3/10, was constant until she received nitroglycerin and ASA in ER per pt, which resolved her back and chest pain. Pt reports that she had lightheadedness, HA and palpitations as well during that time. She denies N/V, diaphoresis, abd pain. She states that she called her PCP and was seen in PCP office and per pt had EKG in PCP office, which was \"abnormal\" per pt and was sent to ER. In ER, VS: temp 97.8F, RR 16, NJ 96, /79, saturating 99% on room air. Labs include BMP normal except for glucose 163. CBC normal except for WBC 14. 6. pro-BNP normal. Troponin T 0.327. 1st EKG showed ST depression on anterolateral leads. Repeat EKG showed ST depression on anterior and inferior leads, prolonged QTc. COVID-19 rapid test (-). CXR no acute findings. Pt received  mg PO and was started on heparin gtt in ER. Hospital Medicine service was called for IP management. When I saw the patient in ER, pt denies having chest pain anymore. Pt denies loss of taste/smell, myalgia, cough, fever, chills, diarrhea.       Past Medical History:          Diagnosis Date    Arthritis     Cancer [facility]) 2013    SKIN CANCER ON LEFT ANKLE    Diverticulosis     Hyperlipidemia     Hypertension     Hypothyroidism     Psoriasis     Thyroid disorder        Past Surgical History:          Procedure Laterality Date    BREAST BIOPSY Left     benign    BREAST SURGERY Left 6/1966    Lumpectomy    CARPAL TUNNEL RELEASE Right 1/30/2013    CARPAL TUNNEL RELEASE Left 7/25/13    CATARACT REMOVAL Bilateral 1999    COLON SURGERY  11/1999    resection    COLONOSCOPY  2003, 2006, 2011    HYSTERECTOMY  2/23/1970    HYSTERECTOMY, TOTAL ABDOMINAL      KNEE ARTHROSCOPY Right 11/21/2007    meniscectomies    KNEE SURGERY Left 2000    replacement    MYRINGOTOMY Right 07/01/2015    tube insertion    OVARY REMOVAL Bilateral 7/12/2001    oophorectomy    OVARY REMOVAL Bilateral     SHOULDER SURGERY  5/14    SINUS SURGERY         Medications Prior to Admission:      Prior to Admission medications    Medication Sig Start Date End Date Taking? Authorizing Provider   LORazepam (ATIVAN) 1 MG tablet Take 0.5 mg by mouth every 6 hours as needed for Anxiety.  Pt usually only takes a half tab when needed   Yes Historical Provider, MD   Glucosamine HCl 1500 MG TABS Take 1,500 mg by mouth daily   Yes Historical Provider, MD   spironolactone (ALDACTONE) 50 MG tablet Take 1 tablet by mouth daily 4/14/21  Yes RACQUEL Dunne - CNP   amLODIPine (NORVASC) 2.5 MG tablet Take 1 tablet by mouth daily 4/1/21  Yes Cristy Razo DO   meloxicam (MOBIC) 7.5 MG tablet Take 1 tablet by mouth daily as needed for Pain 3/1/21  Yes Cristy Razo DO   thyroid (ARMOUR) 60 MG tablet Take 0.5 tablets by mouth daily VINNY 10/27/20  Yes Edson Correia DO   melatonin 3 MG TABS tablet Take 3 mg by mouth daily   Yes Historical Provider, MD   famotidine (PEPCID) 20 MG tablet Take 1 tablet by mouth 2 times daily 9/21/20  Yes Cristy Razo DO   fluticasone (FLONASE) 50 MCG/ACT nasal spray 2 sprays by Each Nostril route daily   Yes Historical Provider, MD   blood glucose monitor strips Check blood sugar q daily, Dx R73.01 10/8/19  Yes Cristy Razo DO   b complex vitamins capsule Take 1 capsule by mouth daily   Yes Historical Provider, MD   OLIVE LEAF PO Take 450 mg by mouth daily   Yes Historical Provider, MD   Handicap Placard [address] [facility] by Does not apply route Expires 9/6/2022 9/6/17  Yes Estefany Crouch, DO   Blood Glucose Monitoring Suppl (TRUE METRIX METER) W/DEVICE KIT 1 Device by Does not apply route daily Check blood sugar q daily, Dx E11.9 7/15/16  Yes Estefany Crouch, DO   Multiple Vitamins-Minerals (THERAPEUTIC MULTIVITAMIN-MINERALS) tablet Take 1 tablet by mouth daily. Yes Historical Provider, MD   fish oil-omega-3 fatty acids 1000 MG capsule Take 2 g by mouth daily. Yes Historical Provider, MD   CALCIUM CITRATE Take 600 mg by mouth daily    Yes Historical Provider, MD   montelukast (SINGULAIR) 10 MG tablet TAKE 1 TABLET BY MOUTH DAILY 12/29/20   Estefany Crouch, DO   amoxicillin (AMOXIL) 500 MG capsule Take 500 mg by mouth 3 times daily    Historical Provider, MD   Loratadine (CLARITIN PO) Take by mouth    Historical Provider, MD   polyethyl glycol-propyl glycol 0.4-0.3 % (SYSTANE) 0.4-0.3 % ophthalmic solution 1 drop as needed for Dry Eyes    Historical Provider, MD   docusate sodium (COLACE) 100 MG capsule Take 100 mg by mouth as needed for Constipation    Historical Provider, MD   AYMARTINA SALINE NASAL NO-DRIP GEL Use 1 spray in each nostril 4 times a day  Patient taking differently: as needed Use 1 spray in each nostril 4 times a day  12/12/16   Bonnie Hiadlgo MD       Allergies:  Prednisone, Actonel [risedronate sodium], Baclofen, Bactrim [sulfamethoxazole-trimethoprim], Cardizem [diltiazem], Celebrex [celecoxib], Dicloxacillin, Doxycycline, Evista [raloxifene], Lopid [gemfibrozil], Questran [cholestyramine], Synthroid [levothyroxine sodium], Zestoretic [lisinopril-hydrochlorothiazide], and Zetia [ezetimibe]    Social History:      TOBACCO:   reports that she has never smoked. She has never used smokeless tobacco.  ETOH:   reports no history of alcohol use.       Family History: Problem Relation Age of Onset    Cancer Child     Stroke Sister     Heart Disease Sister     Other Other         visual problems         REVIEW OF SYSTEMS:   Pertinent positives as noted in the HPI. All other systems reviewed and negative. PHYSICAL EXAM:    /88   Pulse 92   Temp 97.8 °F (36.6 °C) (Oral)   Resp 16   Ht 5' (1.524 m)   Wt 130 lb (59 kg)   SpO2 97%   BMI 25.39 kg/m²     General appearance: alert, not in acute distress   HEENT:  Normal cephalic, atraumatic without obvious deformity. Pupils equal, round, and reactive to light. Extra ocular muscles intact. Conjunctivae clear. Clear oral mucosa. Neck: Supple, with full range of motion. No jugular venous distention. Trachea midline. Respiratory:  Normal respiratory effort. Clear to auscultation, bilaterally without Rales/Wheezes/Rhonchi. Cardiovascular:  Normal rate, regular rhythm with normal S1/S2 without murmurs, rubs or gallops. Abdomen: Soft, non-tender, non-distended with normal bowel sounds. Musculoskeletal:  No clubbing, cyanosis or edema bilaterally. Full range of motion without deformity. Skin: No rashes or lesions. Neurological exam: alert, oriented, normal speech, no focal findings or movement disorder noted. Exam of extremities: peripheral pulses normal, no pedal or leg edema, no clubbing or cyanosis        Labs:     Recent Labs     05/06/21  1118   WBC 14.6*   HGB 15.1   HCT 46.8        Recent Labs     05/06/21  1118      K 3.9      CO2 24   BUN 22   CREATININE 1.0   CALCIUM 10.5     Recent Labs     05/06/21  1118   AST 70*   ALT 21   BILITOT 0.2*   ALKPHOS 92     Recent Labs     05/06/21  1118   INR 1.00     No results for input(s): CKTOTAL, TROPONINI in the last 72 hours.     Urinalysis:      Lab Results   Component Value Date    NITRU NEGATIVE 10/13/2015    WBCUA 2-4 10/13/2015    BACTERIA NONE 10/13/2015    RBCUA 0-2 10/13/2015    BLOODU small 11/26/2019    BLOODU TRACE 10/13/2015 Encourage ambulation           [x] Already on Anticoagulation    Code Status: full code       Disposition:    [] Home       [] TCU       [] Rehab       [] Psych       [] SNF       [] [place]       [x] Other-admit to 3B under Hospital medicine service     Thank you Tran Grewal DO for the opportunity to be involved in this patient's care.     Electronically signed by Merleen Aschoff, MD on 5/6/2021 at 1:19 PM

## 2021-05-06 NOTE — CONSULTS
CRITICAL CARE CONSULT NOTE      Patient:  Matthew Singh    Unit/Bed:4D-01/001-A  YOB: 1925  MRN: [id number]   PCP: Gianluca Sanford DO  Date of Admission: 5/6/2021  Chief Complaint:- chest pain    Assessment and Plan:    1. NSTEMI: 2-day complaint of back pain with radiation up until neck face and chest.  This was not associated with any shortness of breath nausea vomiting or diaphoresis. Patient did receive steroid injection on 5/5/2021. ECHO (pre procedure) 5/6/2021 LVEF 45-50% global hypokinesis left ventricle. Mild MR, mild TR. LHC under the care Dr. Lili Jennings. Severe LAD 80 to 90% and diagonal 90% disease. Ostial OM 90%, RCA nondominant. PCI of the LAD x3 JUSTUS, PCI of the OM x1 JUSTUS. Diagonal shutdown during LAD PCI, ST elevation laterally seen. LAD stenting was complicated by no reflow-adenosine IC given to improve. Rescued with PTCA but diagonal would not stay open-stented x2 but still no flow. IABP was placed at one-to-one to help perfuse diagonal and LAD. IV heparin and nitro to continue. Bedside echo post procedure pending. DAPT. Will repeat EKG. Tentative plan for removal of IABP 5/7/2021, monitor. 2. HPTN Emergency:  History of essential hypertension. Positive for NSTEMI and Pulmonary Edema post procedure. Nitro drip titrate to maintain systolic blood pressure between 120-140. IVP Loop given. Norvasc and Coreg have been continued parameters to hold. 3. Acute respiratory failure, hypoxia: likely secondary to pulmonary edema. Will switch 100% NRB Mask to Bipap and de-escalate to NC as able. Titrate to SaO2 92%. 4. HPTN ADHF: in the setting of NSTEMI and Diagonal vessel closure that would not stay open/no flow. IABP at 1:1 will continue. IVP Loop and NTG will be given. Aldactone to be continued. Post procedure ECHO is pending. 5. Leukocytosis:  Likely reactive secondary to above. No fever present at time of admission. PCT nml. Urine is benign, monitor.    6. CKD stage 3 (baseline Crea 0.8-1.07 in elderly 80year old) :  Urine is benign, Dose medications to GFR no NSAIDS to be given. 7. Hypothyroidism:  History, [place] to continue. 8. Dyslipidemia:  History, Lipitor will be continued. INITIAL H AND P AND ICU COURSE:  Leon Callaway is a 55-year-old  nonagenarian female transferred to [facility] ICU on 5/6/2021 status post cardiac cath intervention. Patient has past medical history significant for time non-smoker, essential hypertension, hypothyroidism, dyslipidemia, skin cancer. Covid-19 vaccination completion 4/2021. Jesi Richardson was seen in outpatient clinic by her PCP with complaint of back pain onset 2 days ago. Patient states that she did undergo a steroid injection in her rotator cuff with noted improvement in pain. Patient states her back pain will radiate up into her posterior neck and wraps around to her face she also notes chest pain. This was not associated with any shortness of breath diaphoresis nausea or vomiting. An EKG was completed in clinic with noted ST depression in leads I to III, aVF, V3, V4 V5 V6. Patient was encouraged to go to the emergency room for further evaluation. She arrived to [facility] ER on 5/6/2021 via EMS. With ongoing complaint of pain she describes pain as 5 out of 10, \"dull ache and constant \"radiating in the right back to the left back and up into the neck. Patient had used heat and Tylenol with no improvement in pain. EKG was repeated. Elevated troponin was identified. Case was reviewed with cardiology team.  Nitro and heparin continuous drips were started. Bedside echo was completed. Patient was taken to cardiac Cath Lab under the care of Dr. Ike Menjivar. Severe disease of LAD and diagonal and OM1 were identified. Drug-eluting stents were placed. LAD stenting was complicated by no reflow-adenosine IC was given to improve. Rescued with PTCA but diagonal would not stay open-stented x2 but still no flow.   IABP was placed to help perfuse diagonal and LAD. Patient was then transferred to the ICU for further management care. Past Medical History: See HPI. Family History: Mother -unknown, father -unknown. .  Social History: Patient is a lifetime non-smoker, denies any alcohol or illicit drug use. Maria A Rivera is a  and lives alone. ROS   GENERAL: Positive for fatigue  SKN: No lesions or rashes. HEAD: No headaches or recent injury  EYES: No acute changes in vision, no diplopia or blurred vision, no drainage  EARS: No hearing loss, no tinnitus, no drainage  NOSE/THROAT: No rhinorrhea or pharyngitis, no nasal drainage  NECK: No lumps or unusual neck stiffness  PULMONARY: No dyspnea, orthopnea or paroxysmal nocturnal dyspnea, stridor wheezing cough  CARDIAC: Positive for chest pain, hypertension.   GI: Denies any abdominal pain no history melena or hematochezia, no diarrhea or constipation  : No hematuria  PERIPHERAL VASCULAR: No intermittent claudication or unusual leg cramps  MUSCULOSKELETAL: Occasional arthralgias, myalgias  NEUROLOGICAL: No  Seizures or Syncope  HEMATOLOGIC:  No unusual bruising or bleeding  PSYCH: No homicidal or suicidal ideations    Scheduled Meds:   [START ON 2021] amLODIPine  2.5 mg Oral Daily    famotidine  20 mg Oral Daily    [START ON 2021] fluticasone  2 spray Each Nostril Daily    melatonin  3 mg Oral Daily    [START ON 2021] montelukast  10 mg Oral Daily    spironolactone  50 mg Oral Daily    thyroid  30 mg Oral Daily    sodium chloride flush  5-40 mL Intravenous 2 times per day    carvedilol  3.125 mg Oral BID WC    [START ON 2021] aspirin  81 mg Oral Daily    potassium (CARDIAC) replacement protocol   Other RX Placeholder    magnesium replacement protocol   Other RX Placeholder    [START ON 2021] ticagrelor  90 mg Oral Q12H    atorvastatin  80 mg Oral Nightly     Continuous Infusions:   nitroGLYCERIN 40 mcg/min (21 5270)    heparin (PORCINE) Infusion         PHYSICAL EXAMINATION:  T: 97.8.  P: 102. RR: 28. B/P: 133/78. FiO2: Room air. O2 Sat: 95.  I/O: Ending  Body mass index is 25.39 kg/m². GCS:   15  General:   Pale elderly pleasant  HEENT:  normocephalic and atraumatic. No scleral icterus. PERR  Neck: supple. No Thyromegaly. Lungs: clear to auscultation, crackles right base. No retractions  Cardiac: RRR. No JVD. Abdomen: soft. Nontender, bowel sounds present  Extremities:  No clubbing, cyanosis, or edema x 4. Bilateral feet and lower extremities are cool to touch. Positive for right groin IABP. No hematoma bleeding or redness noted at insertion site. Positive for right radial ecchymosis vas band present. Digits pale warm to touch cap refill present movement and sensation is present. Vasculature: capillary refill < 3 seconds. Palpable dorsalis pedis pulses. Skin:  warm and dry. Psych:  Alert and oriented x3. Affect appropriate  Lymph:  No supraclavicular adenopathy. Neurologic:  No focal deficit. No seizures. Data: (All radiographs, tracings, PFTs, and imaging are personally viewed and interpreted unless otherwise noted).  5/6/2021 1118 sodium 1 43, potassium 3.9 chlorides 104 CO2 24 BUN 22 creatinine 1.0   Glucose 163   Calcium is 10.5   P BNP 1519   Troponin 0 0.327-1.010   Albumin 4.6 alk phos is 92 ALT 21 AST 70 total bili 0.2   5/6/2021 1118 White count 14.6 hemoglobin 17.1 hematocrit is 45.8 platelet count 259   5/6/2021 Covid-19-negative   5/6/2021 1136 chest x-ray normal heart size. Moderate degenerative changes left shoulder. Right shoulder prosthesis. No acute findings. No infiltrate or effusions are seen.  5/6/2021 1400 echo LVEF 45 to 50%, mild MR, mild TR, mild global hypokinesis left ventricle.  Cardiac telemetry normal sinus rhythm      Seen with multidisciplinary ICU team yes.   Meets Continued ICU Level Care Criteria:    [x] Yes   [] No - Transfer Planned to listed location:  [] HOSPITALIST CONTACTED- DR. Esteves discussed and reviewed with Dr. Radha Mccollum    Electronically signed by RACQUEL Rosales - CNP       CRITICAL CARE SPECIALIST  I have independently performed an evaluation on [address] [employer] . I have reviewed the above documentation completed by the [facility]. Please see my additional contributions to the HPI, physical exam, assessment/medical decision making. Currently stable on intra-aortic balloon pump, anticipate removal later today. Will consult with cardiology for plans for later today. 35 minutes of critical care time was spent, it was time was discontinuous, time does not include procedures and it does include my direct assessment of patient and coordination of care. .     Electronically signed by Tony Calix MD on 5/7/2021 at 9:08 AM

## 2021-05-06 NOTE — ED NOTES
ED to inpatient nurses report    Chief Complaint   Patient presents with    Chest Pain      Present to ED from home  LOC: alert and orientated to name, place, date  Vital signs   Vitals:    05/06/21 1130 05/06/21 1145 05/06/21 1230 05/06/21 1319   BP: (!) 147/86 129/72 136/88 (!) 160/78   Pulse: 103 89 92 96   Resp: 16 17 16 21   Temp:       TempSrc:       SpO2: 98% 98% 97% 99%   Weight:       Height:          Oxygen Baseline room air    Current needs required room air Bipap/Cpap No  LDAs:   Peripheral IV 05/06/21 Left Forearm (Active)   Site Assessment Clean;Dry; Intact 05/06/21 1125   Dressing Status Clean;Dry; Intact 05/06/21 1125   Dressing Intervention New 05/06/21 1125       Peripheral IV 05/06/21 Right Forearm (Active)   Site Assessment Clean;Dry; Intact 05/06/21 1449   Line Status Brisk blood return;Flushed;Normal saline locked 05/06/21 1449   Dressing Status Clean;Dry; Intact 05/06/21 1449   Dressing Intervention New 05/06/21 1449     Mobility: Independent  Pending ED orders: none  Present condition: stable, being taken to cath lab then to 4K    Electronically signed by Jared Jackson RN on 5/6/2021 at 2:49 PM     Herrera Wilkins RN  05/06/21 2592

## 2021-05-06 NOTE — ED TRIAGE NOTES
Pt to rm 04 per EMS sent in by her PCP for further evaluation. Pt states she has had upper back/shoulder pain for 'a while', it will sometimes radiate around into her chest and neck but she can usually control the pain at home doing her exercises and using a heating pad. Today she states the pain got really bad after breakfast, 10/10, and it didn't get any better with her heating pad so she called her PCP for an appointment. PCP noted EKG changes and sent her in for evaluation. On arrival pt appears in no acute distress, VSS, ambulated to the bathroom on arrival w/o difficulty. States she has felt well otherwise and has no other concerns.

## 2021-05-06 NOTE — PROGRESS NOTES
Lia Edward [address] MEDICINE [address] [address] [place] [address] Road  [address] [address]  Dept: [phone]  Loc: [phone]  Visit Date: 5/6/2021      Chief Complaint:   Leopoldo Rump is a 80 y.o. female thatpresents for Back Pain (chronic 2 days ago was bad yesterday had injection in rotator cuff  is going to thereapy and no changes  )        HPI:  Nora Martinez comes in today with c/o worsening thoracic back pain, palpitations, high BP, and generally \"not feeling right. \"  Had steroid injection into her shoulder yesterday. Said that pain is gone. All of her pain is in her back,  Radiates up her posterior neck, a wraps around to her face. Also notes chest pain. Denies any shortness of breath. Tried heat and Tylenol without relief. Back Pain  This is a recurrent problem. The current episode started more than 1 month ago. The problem occurs constantly. The problem has been gradually worsening since onset. The pain is present in the thoracic spine. The quality of the pain is described as aching. The pain is at a severity of 10/10. The pain is severe. The pain is the same all the time. Exacerbated by: no provoking factors identified. Associated symptoms include chest pain. Pertinent negatives include no abdominal pain, dysuria, fever, headaches or weakness. (Radiates to the posterior neck around to her face  ) She has tried heat for the symptoms. The treatment provided no relief. The patient comes in alone today. History obtained from pt and medical records. I have reviewed the patient's past medical history, past surgical history, allergies,medications, social and family history and I have made updates where appropriate.     Past Medical History:   Diagnosis Date    Arthritis     Cancer [facility]) 2013    SKIN CANCER ON LEFT ANKLE    Diverticulosis     Hyperlipidemia     Hypertension     Hypothyroidism     Psoriasis     Thyroid disorder        Past Surgical History:   Procedure Laterality Date    BREAST BIOPSY Left     benign    BREAST SURGERY Left 6/1966    Lumpectomy    CARPAL TUNNEL RELEASE Right 1/30/2013    CARPAL TUNNEL RELEASE Left 7/25/13    CATARACT REMOVAL Bilateral 1999    COLON SURGERY  11/1999    resection    COLONOSCOPY  2003, 2006, 2011    HYSTERECTOMY  2/23/1970    HYSTERECTOMY, TOTAL ABDOMINAL      KNEE ARTHROSCOPY Right 11/21/2007    meniscectomies    KNEE SURGERY Left 2000    replacement    MYRINGOTOMY Right 07/01/2015    tube insertion    OVARY REMOVAL Bilateral 7/12/2001    oophorectomy    OVARY REMOVAL Bilateral     SHOULDER SURGERY  5/14    SINUS SURGERY         Social History     Tobacco Use    Smoking status: Never Smoker    Smokeless tobacco: Never Used   Substance Use Topics    Alcohol use: No    Drug use: No       Family History   Problem Relation Age of Onset    Cancer Child     Stroke Sister     Heart Disease Sister     Other Other         visual problems         Review of Systems   Constitutional: Negative. Negative for chills, fatigue and fever. HENT: Negative for ear pain, mouth sores, sore throat and trouble swallowing. Eyes: Negative for pain, discharge, redness and visual disturbance. Respiratory: Negative for cough, shortness of breath and wheezing. Cardiovascular: Positive for chest pain. Negative for palpitations and leg swelling. Gastrointestinal: Negative for abdominal pain, constipation, diarrhea, nausea and vomiting. Endocrine: Negative for cold intolerance and heat intolerance. Genitourinary: Negative for dysuria, frequency and urgency. Musculoskeletal: Positive for back pain. Negative for arthralgias, gait problem and joint swelling. Skin: Negative for color change, rash and wound. Neurological: Negative for dizziness, weakness and headaches. Psychiatric/Behavioral: Negative for agitation, confusion and hallucinations.              PHYSICAL EXAM:  BP (!) 168/62   Pulse 70   Temp 97 °F (36.1 °C) Resp 18   Ht 5' 1\" (1.549 m)   Wt 134 lb 12.8 oz (61.1 kg)   SpO2 96%   BMI 25.47 kg/m²     Physical Exam  Constitutional:       Appearance: Normal appearance. HENT:      Head: Normocephalic and atraumatic. Right Ear: External ear normal.      Left Ear: External ear normal.      Nose: Nose normal.      Mouth/Throat:      Mouth: Mucous membranes are moist.   Eyes:      Conjunctiva/sclera: Conjunctivae normal.   Neck:      Musculoskeletal: Normal range of motion. Comments: Pain in posterior neck radiating from thoracic region of her back    Cardiovascular:      Rate and Rhythm: Tachycardia present. Rhythm irregular. Pulses: Normal pulses. Heart sounds: Normal heart sounds. Pulmonary:      Effort: Pulmonary effort is normal.      Breath sounds: Normal breath sounds. Abdominal:      General: Bowel sounds are normal.      Palpations: Abdomen is soft. Musculoskeletal: Normal range of motion. Left shoulder: She exhibits pain. Cervical back: She exhibits pain. She exhibits no tenderness and no swelling. Thoracic back: She exhibits pain. She exhibits no swelling. Skin:     General: Skin is warm and dry. Neurological:      General: No focal deficit present. Mental Status: She is alert and oriented to person, place, and time. Psychiatric:         Mood and Affect: Mood normal.         Behavior: Behavior normal.             ASSESSMENT & PLAN  Angel Faustin was seen today for back pain. Diagnoses and all orders for this visit:    Tachycardia  -     EKG 12 lead    Chronic midline thoracic back pain    Chest pain, unspecified type    Palpitations    ST segment changes on electrocardiogram      Has significant ST depression in several leads  With this along with her pain, recommend she go to ER. EMS was called for transport. Report called to Ludmila Marmolejo RN at [facility] ED      No follow-ups on file.     I spent 50+ minutes with the patient discussing symptoms, medications and side effects, treatment options, performing an exam, reviewing previous notes and tests, and developing a plan of care. All questions answered. Patient verbalized understanding. Indio Hawk received counseling on the following healthy behaviors: nutrition, exercise and medication adherence  Reviewedprior labs and health maintenance. Continue current medications, diet and exercise. Discussed use, benefit, and side effects of prescribed medications. Barriers to medication compliance addressed. Patient given educationalmaterials - see patient instructions. All patient questions answered. Patient voiced understanding.      Electronically signed by RACQUEL Portillo on 5/6/21 at 9:09 AM EDT

## 2021-05-07 ENCOUNTER — APPOINTMENT (OUTPATIENT)
Dept: GENERAL RADIOLOGY | Age: 86
DRG: 270 | End: 2021-05-07
Payer: MEDICARE

## 2021-05-07 LAB
ALBUMIN SERPL-MCNC: 4 G/DL (ref 3.5–5.1)
ALP BLD-CCNC: 76 U/L (ref 38–126)
ALT SERPL-CCNC: 54 U/L (ref 11–66)
ANION GAP SERPL CALCULATED.3IONS-SCNC: 11 MEQ/L (ref 8–16)
ANION GAP SERPL CALCULATED.3IONS-SCNC: 12 MEQ/L (ref 8–16)
ANION GAP SERPL CALCULATED.3IONS-SCNC: 16 MEQ/L (ref 8–16)
APTT: 49.4 SECONDS (ref 22–38)
APTT: 64.9 SECONDS (ref 22–38)
AST SERPL-CCNC: 324 U/L (ref 5–40)
BASOPHILS # BLD: 0.1 %
BASOPHILS ABSOLUTE: 0 THOU/MM3 (ref 0–0.1)
BILIRUB SERPL-MCNC: 0.5 MG/DL (ref 0.3–1.2)
BUN BLDV-MCNC: 31 MG/DL (ref 7–22)
BUN BLDV-MCNC: 35 MG/DL (ref 7–22)
BUN BLDV-MCNC: 42 MG/DL (ref 7–22)
CALCIUM SERPL-MCNC: 9.1 MG/DL (ref 8.5–10.5)
CALCIUM SERPL-MCNC: 9.6 MG/DL (ref 8.5–10.5)
CALCIUM SERPL-MCNC: 9.7 MG/DL (ref 8.5–10.5)
CHLORIDE BLD-SCNC: 100 MEQ/L (ref 98–111)
CHLORIDE BLD-SCNC: 101 MEQ/L (ref 98–111)
CHLORIDE BLD-SCNC: 102 MEQ/L (ref 98–111)
CHOLESTEROL, TOTAL: 184 MG/DL (ref 100–199)
CO2: 21 MEQ/L (ref 23–33)
CO2: 25 MEQ/L (ref 23–33)
CO2: 26 MEQ/L (ref 23–33)
CREAT SERPL-MCNC: 1.3 MG/DL (ref 0.4–1.2)
CREAT SERPL-MCNC: 1.5 MG/DL (ref 0.4–1.2)
CREAT SERPL-MCNC: 1.6 MG/DL (ref 0.4–1.2)
EOSINOPHIL # BLD: 0 %
EOSINOPHILS ABSOLUTE: 0 THOU/MM3 (ref 0–0.4)
ERYTHROCYTE [DISTWIDTH] IN BLOOD BY AUTOMATED COUNT: 13.3 % (ref 11.5–14.5)
ERYTHROCYTE [DISTWIDTH] IN BLOOD BY AUTOMATED COUNT: 47.3 FL (ref 35–45)
GFR SERPL CREATININE-BSD FRML MDRD: 30 ML/MIN/1.73M2
GFR SERPL CREATININE-BSD FRML MDRD: 32 ML/MIN/1.73M2
GFR SERPL CREATININE-BSD FRML MDRD: 38 ML/MIN/1.73M2
GLUCOSE BLD-MCNC: 102 MG/DL (ref 70–108)
GLUCOSE BLD-MCNC: 128 MG/DL (ref 70–108)
GLUCOSE BLD-MCNC: 133 MG/DL (ref 70–108)
HCT VFR BLD CALC: 44.1 % (ref 37–47)
HDLC SERPL-MCNC: 62 MG/DL
HEMOGLOBIN: 14.5 GM/DL (ref 12–16)
IMMATURE GRANS (ABS): 0.04 THOU/MM3 (ref 0–0.07)
IMMATURE GRANULOCYTES: 0.3 %
LDL CHOLESTEROL CALCULATED: 106 MG/DL
LYMPHOCYTES # BLD: 6.9 %
LYMPHOCYTES ABSOLUTE: 0.9 THOU/MM3 (ref 1–4.8)
MAGNESIUM: 2.5 MG/DL (ref 1.6–2.4)
MCH RBC QN AUTO: 31.9 PG (ref 26–33)
MCHC RBC AUTO-ENTMCNC: 32.9 GM/DL (ref 32.2–35.5)
MCV RBC AUTO: 97.1 FL (ref 81–99)
MONOCYTES # BLD: 10.7 %
MONOCYTES ABSOLUTE: 1.4 THOU/MM3 (ref 0.4–1.3)
NUCLEATED RED BLOOD CELLS: 0 /100 WBC
PLATELET # BLD: 150 THOU/MM3 (ref 130–400)
PMV BLD AUTO: 10.4 FL (ref 9.4–12.4)
POTASSIUM REFLEX MAGNESIUM: 4.5 MEQ/L (ref 3.5–5.2)
POTASSIUM SERPL-SCNC: 3.9 MEQ/L (ref 3.5–5.2)
POTASSIUM SERPL-SCNC: 4 MEQ/L (ref 3.5–5.2)
RBC # BLD: 4.54 MILL/MM3 (ref 4.2–5.4)
SEG NEUTROPHILS: 82 %
SEGMENTED NEUTROPHILS ABSOLUTE COUNT: 10.4 THOU/MM3 (ref 1.8–7.7)
SODIUM BLD-SCNC: 137 MEQ/L (ref 135–145)
SODIUM BLD-SCNC: 138 MEQ/L (ref 135–145)
SODIUM BLD-SCNC: 139 MEQ/L (ref 135–145)
TOTAL PROTEIN: 7.3 G/DL (ref 6.1–8)
TRIGL SERPL-MCNC: 82 MG/DL (ref 0–199)
WBC # BLD: 12.7 THOU/MM3 (ref 4.8–10.8)

## 2021-05-07 PROCEDURE — 71045 X-RAY EXAM CHEST 1 VIEW: CPT

## 2021-05-07 PROCEDURE — APPSS180 APP SPLIT SHARED TIME > 60 MINUTES: Performed by: NURSE PRACTITIONER

## 2021-05-07 PROCEDURE — 99232 SBSQ HOSP IP/OBS MODERATE 35: CPT | Performed by: PHYSICIAN ASSISTANT

## 2021-05-07 PROCEDURE — 94660 CPAP INITIATION&MGMT: CPT

## 2021-05-07 PROCEDURE — 94761 N-INVAS EAR/PLS OXIMETRY MLT: CPT

## 2021-05-07 PROCEDURE — 80053 COMPREHEN METABOLIC PANEL: CPT

## 2021-05-07 PROCEDURE — 99291 CRITICAL CARE FIRST HOUR: CPT | Performed by: SURGERY

## 2021-05-07 PROCEDURE — 2580000003 HC RX 258: Performed by: NURSE PRACTITIONER

## 2021-05-07 PROCEDURE — 80061 LIPID PANEL: CPT

## 2021-05-07 PROCEDURE — 2580000003 HC RX 258: Performed by: FAMILY MEDICINE

## 2021-05-07 PROCEDURE — 2700000000 HC OXYGEN THERAPY PER DAY

## 2021-05-07 PROCEDURE — 6360000002 HC RX W HCPCS: Performed by: INTERNAL MEDICINE

## 2021-05-07 PROCEDURE — 2140000000 HC CCU INTERMEDIATE R&B

## 2021-05-07 PROCEDURE — 36415 COLL VENOUS BLD VENIPUNCTURE: CPT

## 2021-05-07 PROCEDURE — 85730 THROMBOPLASTIN TIME PARTIAL: CPT

## 2021-05-07 PROCEDURE — 93005 ELECTROCARDIOGRAM TRACING: CPT | Performed by: INTERNAL MEDICINE

## 2021-05-07 PROCEDURE — 83735 ASSAY OF MAGNESIUM: CPT

## 2021-05-07 PROCEDURE — 6370000000 HC RX 637 (ALT 250 FOR IP): Performed by: INTERNAL MEDICINE

## 2021-05-07 PROCEDURE — 51702 INSERT TEMP BLADDER CATH: CPT

## 2021-05-07 PROCEDURE — 6370000000 HC RX 637 (ALT 250 FOR IP): Performed by: NURSE PRACTITIONER

## 2021-05-07 PROCEDURE — 6370000000 HC RX 637 (ALT 250 FOR IP): Performed by: FAMILY MEDICINE

## 2021-05-07 PROCEDURE — 6360000002 HC RX W HCPCS: Performed by: NURSE PRACTITIONER

## 2021-05-07 PROCEDURE — 85025 COMPLETE CBC W/AUTO DIFF WBC: CPT

## 2021-05-07 RX ADMIN — CARVEDILOL 3.12 MG: 3.12 TABLET, FILM COATED ORAL at 18:06

## 2021-05-07 RX ADMIN — CARVEDILOL 3.12 MG: 3.12 TABLET, FILM COATED ORAL at 08:16

## 2021-05-07 RX ADMIN — DOCUSATE SODIUM 100 MG: 100 CAPSULE ORAL at 08:15

## 2021-05-07 RX ADMIN — FUROSEMIDE 10 MG/HR: 10 INJECTION, SOLUTION INTRAMUSCULAR; INTRAVENOUS at 05:37

## 2021-05-07 RX ADMIN — MONTELUKAST SODIUM 10 MG: 10 TABLET ORAL at 08:15

## 2021-05-07 RX ADMIN — ONDANSETRON 4 MG: 2 INJECTION INTRAMUSCULAR; INTRAVENOUS at 23:34

## 2021-05-07 RX ADMIN — POLYETHYLENE GLYCOL 3350 17 G: 17 POWDER, FOR SOLUTION ORAL at 20:56

## 2021-05-07 RX ADMIN — FUROSEMIDE 40 MG: 10 INJECTION, SOLUTION INTRAMUSCULAR; INTRAVENOUS at 01:36

## 2021-05-07 RX ADMIN — ACETAMINOPHEN 650 MG: 325 TABLET ORAL at 08:15

## 2021-05-07 RX ADMIN — SODIUM CHLORIDE, PRESERVATIVE FREE 10 ML: 5 INJECTION INTRAVENOUS at 20:56

## 2021-05-07 RX ADMIN — ATORVASTATIN CALCIUM 80 MG: 80 TABLET, FILM COATED ORAL at 21:37

## 2021-05-07 RX ADMIN — FAMOTIDINE 20 MG: 20 TABLET ORAL at 08:14

## 2021-05-07 RX ADMIN — Medication 3 MG: at 21:37

## 2021-05-07 RX ADMIN — ACETAMINOPHEN 650 MG: 325 TABLET ORAL at 14:23

## 2021-05-07 RX ADMIN — ASPIRIN 81 MG: 81 TABLET, CHEWABLE ORAL at 08:15

## 2021-05-07 RX ADMIN — TICAGRELOR 90 MG: 90 TABLET ORAL at 18:06

## 2021-05-07 RX ADMIN — LORAZEPAM 0.5 MG: 1 TABLET ORAL at 10:25

## 2021-05-07 RX ADMIN — AMLODIPINE BESYLATE 2.5 MG: 5 TABLET ORAL at 08:15

## 2021-05-07 RX ADMIN — SPIRONOLACTONE 50 MG: 25 TABLET ORAL at 08:15

## 2021-05-07 RX ADMIN — TICAGRELOR 90 MG: 90 TABLET ORAL at 08:14

## 2021-05-07 RX ADMIN — LEVOTHYROXINE, LIOTHYRONINE 30 MG: 38; 9 TABLET ORAL at 08:16

## 2021-05-07 RX ADMIN — SODIUM CHLORIDE, PRESERVATIVE FREE 10 ML: 5 INJECTION INTRAVENOUS at 09:00

## 2021-05-07 ASSESSMENT — ENCOUNTER SYMPTOMS
NAUSEA: 0
SHORTNESS OF BREATH: 0
SINUS PRESSURE: 0
COLOR CHANGE: 0
BACK PAIN: 0
ABDOMINAL PAIN: 0
VOMITING: 0
WHEEZING: 0
TROUBLE SWALLOWING: 0
CHEST TIGHTNESS: 0
PHOTOPHOBIA: 0

## 2021-05-07 ASSESSMENT — PAIN SCALES - GENERAL
PAINLEVEL_OUTOF10: 0
PAINLEVEL_OUTOF10: 2
PAINLEVEL_OUTOF10: 4

## 2021-05-07 ASSESSMENT — PAIN DESCRIPTION - DESCRIPTORS: DESCRIPTORS: ACHING

## 2021-05-07 ASSESSMENT — PAIN DESCRIPTION - PAIN TYPE: TYPE: ACUTE PAIN

## 2021-05-07 ASSESSMENT — PAIN DESCRIPTION - ONSET: ONSET: ON-GOING

## 2021-05-07 ASSESSMENT — PAIN - FUNCTIONAL ASSESSMENT: PAIN_FUNCTIONAL_ASSESSMENT: ACTIVITIES ARE NOT PREVENTED

## 2021-05-07 ASSESSMENT — PAIN DESCRIPTION - LOCATION: LOCATION: HEAD

## 2021-05-07 ASSESSMENT — PAIN DESCRIPTION - FREQUENCY: FREQUENCY: CONTINUOUS

## 2021-05-07 NOTE — PROGRESS NOTES
Cardiology Progress Note      Patient:  Sarah Lebron  YOB: 1925  MRN: [id number]   Acct: [de-identified]  Admit Date:  5/6/2021  Primary Cardiologist: none    Note per dr John Bo:    NSTEMI      CHIEF COMPLAINT:    Chest pain      HISTORY OF PRESENT ILLNESS:    Sarah Lebron is a pleasant 80year old female patient with past medical history that includes:   Past Medical History        Past Medical History:   Diagnosis Date    Arthritis      Cancer ([facility] [address] 2013     SKIN CANCER ON LEFT ANKLE    Diverticulosis      Hyperlipidemia      Hypertension      Hypothyroidism      Psoriasis      Thyroid disorder        She denies personal history of cardiac disease. She reports chronic shoulder pains. She is s/p \"left shoulder injection\" for rotator cuff tear. Patient is functional and independent in her ADLs, lives by herself. Patient denies shortness of breath, dyspnea on exertion, orthopnea, paroxysmal nocturnal dyspnea, palpitations, dizziness, syncope, recent weight gain or leg swelling. She has been experiencing intermittent chest pains for the past few months. She also reports back pains, has been by physical therapy. She states that today, her chest pain is retrosternal, radiated to shoulder blades, described as tightness/aching, up to 10/10 today, no associated SOB or diaphoresis. She was seen at PCP office, EKG revealed SR ST elevation in aVR, significant ST depressions in inferolateral leads. She was sent to [facility] ER for further evaluation. Troponin was found to be elevated at 0.32. She was started on Heparin gtt, given SL NTG. She reports improvement in her chest pain. Cardiology was consulted. EKG changes improved. EKG repeated in ER revealed SR, ischemic ST/T changes in lateral leads. ProBNP 1,519. Cr 1. CXR revealed no acute findings. \"    Subjective (Events in last 24 hours): pt awake and alert. NAD. No cp or sob.   No complaints  On 3 l/min O2  On ntg gtt at 10mcg/min, on PRN  promethazine, 12.5 mg, Q6H PRN    Or  ondansetron, 4 mg, Q6H PRN  acetaminophen, 650 mg, Q6H PRN    Or  acetaminophen, 650 mg, Q6H PRN  polyethylene glycol, 17 g, Daily PRN  heparin (porcine), 30 Units/kg, PRN  heparin (porcine), 60 Units/kg, PRN  morphine, 2 mg, Q2H PRN        Diagnostics:  TTE 5/7/21  Summary   Normal left ventricular size and systolic function. There was mild-moderate hypokinesis of the mid to distal anteroseptal   wall, mid to distal lateral wall   Wall thickness was within normal limits. Ejection fraction was estimated at 40-45%. There was trace-mild aortic regurgitation. IVC size is within normal limits with normal respiratory phasic changes. Signature      ----------------------------------------------------------------   Electronically signed by Tato Bailey MD (Interpreting   physician) on 05/07/2021 at 08:07 AM    Lab Data:    Cardiac Enzymes:  No results for input(s): CKTOTAL, CKMB, CKMBINDEX, TROPONINI in the last 72 hours.     CBC:   Lab Results   Component Value Date    WBC 12.7 05/07/2021    RBC 4.54 05/07/2021    HGB 14.5 05/07/2021    HCT 44.1 05/07/2021     05/07/2021       CMP:    Lab Results   Component Value Date     05/07/2021    K 4.5 05/07/2021     05/07/2021    CO2 21 05/07/2021    BUN 31 05/07/2021    CREATININE 1.3 05/07/2021    AGRATIO 1.4 11/02/2019    LABGLOM 38 05/07/2021    GLUCOSE 133 05/07/2021    GLUCOSE 97 11/02/2019    CALCIUM 9.7 05/07/2021       Hepatic Function Panel:    Lab Results   Component Value Date    ALKPHOS 76 05/07/2021    ALT 54 05/07/2021     05/07/2021    PROT 7.3 05/07/2021    BILITOT 0.5 05/07/2021    BILIDIR 0.1 11/02/2019    LABALBU 4.0 05/07/2021    LABALBU 4.1 03/15/2012       Magnesium:    Lab Results   Component Value Date    MG 2.5 05/07/2021       PT/INR:    Lab Results   Component Value Date    INR 1.00 05/06/2021       HgBA1c:    Lab Results   Component Value Date    LABA1C 6.1 06/05/2019 FLP:    Lab Results   Component Value Date    TRIG 82 05/07/2021    HDL 62 05/07/2021    LDLCALC 106 05/07/2021       TSH:    Lab Results   Component Value Date    TSH 4.590 05/06/2021         Assessment:    NSTEMI    S/p cath 5/6/21 -   Severe LAD 80-90% and DX 90% disease  Ostial OM 90%              RCA non-dom   PCI of the LAD x 3 JUSTUS  PCI of the OM x 1 JUSTUS   Dx shut down during LAD PCI, ST-elevations laterally  LAD stenting was complicated by no-reflow - Adenosine IC given to improve  Rescued with PTCA but DX would not stay open - stented x 2 but still no flow   IABP placed at 1:1 to help perfuse DX/LAD    Acute hypoxic resp failure  Acute systolic CHF with pulmonary edema  ICMP - ef 40-45 per TTE 5/7/21  HTN  dyslipidemia      Plan:    Daily I/o and weights  2 liter fluid restriction and 2gm sodium diet  Keep mag >2 and K >4  Cont asa/brilinta/statin/BB/aldactone/norvasc  Cont ntg gtt/lasix gtt for now  Dr Marni Miller to remove IABP today  Daily bmp  Cardiac rehab  Sl ntg upon dc       Electronically signed by Tino Patrick PA-C on 5/7/2021 at 11:08 AM

## 2021-05-07 NOTE — PLAN OF CARE
Problem: Impaired respiratory status  Goal: Absence of new skin breakdown  Description: Absence of new skin breakdown  5/7/2021 1517 by Jamal Graves RN  Outcome: Ongoing  5/7/2021 0432 by Lauren Badillo RCP  Outcome: Ongoing     Problem: Impaired respiratory status  Goal: Normal spontaneous ventilation  5/7/2021 1517 by Jamal Graves RN  Outcome: Completed  5/7/2021 0432 by Lauren Badillo RCP  Outcome: Ongoing

## 2021-05-07 NOTE — FLOWSHEET NOTE
Pt is not willing to sign her Ad at this time. Spoke with nurse Adin Aquino and asked her to call our 's number when family arrives. 05/07/21 0930   Encounter Summary   Services provided to: Patient   Referral/Consult From: Other disciplines; Other    Support System Children   Place of Religious [facility] Visiting Yes  (5/7 AD unsigned)   Complexity of Encounter Moderate   Length of Encounter 15 minutes   Advance Care Planning Yes   Spiritual/Nondenominational   Type Spiritual support   Advance Directives (For Healthcare)   Healthcare Directive No, patient does not have an advance directive for healthcare treatment   Type of Healthcare Directive Health care treatment directive   Copy in Chart Other (Comment)   Chart Copy Status : Other (Comment)  (forms are in pt's chart but are unsigned)   Date Reviewed and Current: 05/07/21   Care Plan:  Continue spiritual and emotional care for patient and family. Including prayers.

## 2021-05-07 NOTE — CARE COORDINATION
DISCHARGE/PLANNING EVALUATION  5/7/21, 9:43 AM EDT    Reason for Referral: Discharge planning. Mental Status: Alert and oriented. Decision Making: Makes own decisions. Family/Social/Home Environment: Assessment completed with pt while in ICU. Pt states she lives alone and is very independent and active. Pt states she does not use dme in the home and still drives. Pt states she does  meals from [employer] 1 x a week. Pt states she has a daughter that resides in [place] [facility] and 2 sons that live in [place] and are supportive. Current Services including food security, transportation and housekeeping:  See note above. Current Equipment: Pt states she has dme if needed, walker, rollator, shower chair, high toilet, and grab bars. Pt states she does have life line that she does wear. Payment Source: Medicare and [place] BC/BS  Concerns or Barriers to Discharge: pt denies barriers. Post acute provider list with quality measures, geographic area and applicable managed care information provided. Questions regarding selection process answered: declined HH. Teach Back Method used with pt regarding care plan and discharge planning. Patient verbalized understanding of the plan of care and contribute to goal setting. Patient goals, treatment preferences and discharge plan: pt planning home alone as PTA and declines needing HH at this time. Pt states she had HH over 20 yrs ago and is aware what they have to offer. SW did recommend that pt consider closer to discharge, pt agreed.      Electronically signed by JOE Gibson on 5/7/2021 at 9:43 AM

## 2021-05-07 NOTE — PLAN OF CARE
Problem: Impaired respiratory status  Goal: Normal spontaneous ventilation  Outcome: Ongoing     Patient currently on continuous bipap due to RR, crackles. Will continue to assess for ability to come off bipap. Problem: Impaired respiratory status  Goal: Absence of new skin breakdown  Description: Absence of new skin breakdown  Outcome: Ongoing    Patient on continuous bipap at this time, will continue to alternate masks to prevent skin breakdown.

## 2021-05-07 NOTE — CARE COORDINATION
5/7/21, 7:33 AM EDT  DISCHARGE PLANNING EVALUATION:    Sherley Oneil       Admitted: 5/6/2021/ [address] day: 1   Location: -01/001-A Reason for admit: NSTEMI (non-ST elevated myocardial infarction) ([facility] [address] [I21.4]   PMH:  has a past medical history of Arthritis, Cancer ([facility] [address] Diverticulosis, Hyperlipidemia, Hypertension, Hypothyroidism, Psoriasis, and Thyroid disorder. Procedure:  5/6 CXR: No acute findings  5/6 Echo with EF 45-50%  5/6 Cardiac Cath: PCI of LAD x3, PCI of OM x1; IABP placed  5/6 CXR:   Increased interstitial markings bilaterally may represent interstitial    edema versus interstitial pneumonia     5/7 CXR:   1.  IABP has its tip projecting at the level of the left mainstem    bronchus. 2.  Diffusely increased interstitial markings persist     Barriers to Discharge: Presented to her PCP with c/o back pain x 2 days radiating to posterior neck and wraps around to face. Also reported chest pain. EKG with ST depression and was encouraged to go to ED. Presented to ED via EMS. EKG repeated. Started on heparin and nitro drips. Echo done. Taken to cath lab and received stenting to LAD and OM, IABP placed. ICU post-cath. Pulmonary edema post-procedure, placed on bipap. Afebrile. NSR. On bipap 18/6, 30% FIO2, sats 98%. Ox4. Follows commands. IABP 1:1. [place]. Intensivist and Cardiology following. On clear liquid diet. Dietitian and Cardiac Rehab consulted. Lasix @ 10 mg/hr, heparin drip, nitro @ 10 mcg/min, norvasc, asa, lipitor, coreg, pepcid, singulair, aldactone, armour, brilinta. Loading dose of brilinta given. BUN 22 - now 31, Creat 1 - now 1.3, pro-bnp 1519 - then 6466, trop 1.01 - then 5.88, ast 70 - now 324, tsh 4.59, wbc 14.6 - now 12.7. Blood and urine cultures sent. COVID 19 was negative. PCP: Elisabet Zuniga DO  Readmission Risk Score: 9%    Patient Goals/Plan/Treatment Preferences: Spoke with Adalgisa Aye; states she lives at home alone and did not use any DME PTA. She has a [employer].  She has a walker and shower chair if she would need it. She drives, cares for herself independently, and has a PCP. SW on case. Plan to return home at discharge, denies needs, and declines HH. Will need PT/OT when IABP out. Monitor for possible needs. Transportation/Food Security/Housekeeping Addressed:  No issues identified.

## 2021-05-07 NOTE — PROGRESS NOTES
5 - Dr. Zahida Hathaway pulls IABP. Sheath left intact. No complications. 1655 - Sheath pulled, quick clot applied and manual pressure held for 20 minutes, dressing applied. Friends have taken pt's purse home.

## 2021-05-07 NOTE — PROGRESS NOTES
CRITICAL CARE PROGRESS NOTE      Patient:  Philippe Miranda    Unit/Bed:4D-01/001-A  YOB: 1925  MRN: [id number]   PCP: Matias Moreno DO  Date of Admission: 5/6/2021  Chief Complaint:-Chest pain    Assessment and Plan:      1. Acute hypoxic respiratory failure: Secondary to pulmonary edema. Patient was on nasal cannula transition to BiPAP overnight. Transition back to nasal cannula as tolerated. 2. NSTEMI: S/p cardiac cath with Dr. Jane Oliver. PCI to LAD x3 drug-eluting stents, PCI OM with 1 drug-eluting stent. Balloon pump in place. Heparin drip. Brilinta and ASA. Waiting decision on removal of intra-aortic balloon pump  3. Hypertensive emergency: Nitro drip, added oral antihypertensives. Normotensive now. 4. Leukocytosis: No fever, procalcitonin normal, white blood cell count 17.7, likely stress-induced. Repeat pending. 5. CKD Stage III: At baseline creatinine. Monitor closely with Lasix drip. creatinine 1.3  6. Acute heart failure with reduced ejection fraction: On Lasix drip, status post PCI. 7. Hyperlipidemia: Lipitor  8. Hypothyroidism: [place], T 4 1.09    INITIAL H AND P AND ICU COURSE:  Tatiana Mcdaniels is a 80year old white female who presented [facility] on [facility]2021 with complaints of chest pain. She has a past medical history of lifetime non-smoker, thyroid disease, hypertension, hypothyroidism, cancer, hyperlipidemia, diverticulosis. Per report patient was seen out patient by her PCP with complaints of back pain that started 2 days prior to admission. She stated that she had underwent a steroid injection in her rotator cuff with improvement of pain. She states that the pain was radiating up her posterior neck and wrapped around her face and also noted chest pain. She had no shortness of breath diaphoresis nausea or vomiting. EKG on arrival did show ST depression.   When she [facility] on 5/6/[facility]21 via EMS she was complaining of pain 5 out of 10 that was mg Oral Daily    sodium chloride flush  5-40 mL Intravenous 2 times per day    carvedilol  3.125 mg Oral BID     aspirin  81 mg Oral Daily    magnesium replacement protocol   Other RX Placeholder    ticagrelor  90 mg Oral Q12H    atorvastatin  80 mg Oral Nightly    docusate sodium  100 mg Oral BID     Continuous Infusions:   furosemide (LASIX) 1mg/ml infusion 10 mg/hr (05/07/21 5725)    nitroGLYCERIN 10 mcg/min (05/07/21 2420)    heparin (PORCINE) Infusion 8 Units/kg/hr (05/07/21 0134)       PHYSICAL EXAMINATION:  T:  98.5. P:  74. RR:  23. B/P:  140/74. FiO2:  30. O2 Sat:  98.  I/O:  1115/2025  Body mass index is 25.39 kg/m². GCS: 15  General:   Acute on chronically ill-appearing white female  HEENT:  normocephalic and atraumatic. No scleral icterus. PERR  Neck: supple. No Thyromegaly. Lungs: diminished to auscultation. No retractions  Cardiac: RRR. No JVD. Abdomen: soft. Nontender. Extremities:  No clubbing, cyanosis, or edema x 4. Vasculature: capillary refill < 3 seconds. Palpable dorsalis pedis pulses. Skin:  warm and dry. Psych:  Alert and oriented x3. Affect appropriate  Lymph:  No supraclavicular adenopathy. Neurologic:  No focal deficit. No seizures. Data: (All radiographs, tracings, PFTs, and imaging are personally viewed and interpreted unless otherwise noted).  Sodium under 39, potassium 4.5, chloride 102, CO2 21, BUN 31, creatinine 1.3, anion gap 16.0, glucose 133   WBC 12.7, hemoglobin 14.5, hematocrit 44.1, platelet count 749   Telemetry shows   Chest x-ray 5/7/2021 IA BP has rejected at the level of the left mainstem bronchus. Diffuse increased interstitial markings.  EKG 5/7/2021 reports normal sinus rhythm   Echocardiogram 5/6/2021 reports ejection fraction 45 to 50%, mild global hypokinesis, mild mitral and tricuspid regurgitation.          Meets Continued ICU Level Care Criteria:    [x] Yes   [] No - Transfer Planned to listed location:  [] HOSPITALIST CONTACTED- DR     Case and plan discussed with Dr. Shawnee Martinez and Dr. Kostas Osborn. Electronically signed by Billie Horn. RACQUEL Nguyễn - CNP       CRITICAL CARE SPECIALIST  I have independently performed an evaluation on Kristel . I have reviewed the above documentation completed by the [facility]. Please see my additional contributions to the HPI, physical exam, assessment/medical decision making. Alert and pleasant on exam today she is extubated. Intra-aortic balloon pump in place. Cardiology plans for removal today. No requiring pressors at this time.      Electronically signed by Demetrice Mcgowan MD on 5/7/2021 at 11:08 AM

## 2021-05-08 ENCOUNTER — APPOINTMENT (OUTPATIENT)
Dept: GENERAL RADIOLOGY | Age: 86
DRG: 270 | End: 2021-05-08
Payer: MEDICARE

## 2021-05-08 LAB
ANION GAP SERPL CALCULATED.3IONS-SCNC: 11 MEQ/L (ref 8–16)
BACTERIA: ABNORMAL /HPF
BASOPHILS # BLD: 0.2 %
BASOPHILS ABSOLUTE: 0 THOU/MM3 (ref 0–0.1)
BILIRUBIN URINE: NEGATIVE
BLOOD, URINE: ABNORMAL
BUN BLDV-MCNC: 40 MG/DL (ref 7–22)
CALCIUM SERPL-MCNC: 9.4 MG/DL (ref 8.5–10.5)
CASTS 2: ABNORMAL /LPF
CASTS UA: ABNORMAL /LPF
CHARACTER, URINE: CLEAR
CHLORIDE BLD-SCNC: 101 MEQ/L (ref 98–111)
CO2: 28 MEQ/L (ref 23–33)
COLOR: YELLOW
CREAT SERPL-MCNC: 1.5 MG/DL (ref 0.4–1.2)
CRYSTALS, UA: ABNORMAL
EKG ATRIAL RATE: 74 BPM
EKG ATRIAL RATE: 82 BPM
EKG ATRIAL RATE: 84 BPM
EKG ATRIAL RATE: 91 BPM
EKG ATRIAL RATE: 95 BPM
EKG P AXIS: -22 DEGREES
EKG P AXIS: 55 DEGREES
EKG P AXIS: 71 DEGREES
EKG P AXIS: 74 DEGREES
EKG P AXIS: 84 DEGREES
EKG P-R INTERVAL: 150 MS
EKG P-R INTERVAL: 160 MS
EKG P-R INTERVAL: 166 MS
EKG P-R INTERVAL: 174 MS
EKG P-R INTERVAL: 220 MS
EKG Q-T INTERVAL: 378 MS
EKG Q-T INTERVAL: 386 MS
EKG Q-T INTERVAL: 396 MS
EKG Q-T INTERVAL: 398 MS
EKG Q-T INTERVAL: 438 MS
EKG QRS DURATION: 68 MS
EKG QRS DURATION: 70 MS
EKG QRS DURATION: 72 MS
EKG QRS DURATION: 82 MS
EKG QRS DURATION: 84 MS
EKG QTC CALCULATION (BAZETT): 462 MS
EKG QTC CALCULATION (BAZETT): 470 MS
EKG QTC CALCULATION (BAZETT): 474 MS
EKG QTC CALCULATION (BAZETT): 475 MS
EKG QTC CALCULATION (BAZETT): 486 MS
EKG R AXIS: -19 DEGREES
EKG R AXIS: -30 DEGREES
EKG R AXIS: -55 DEGREES
EKG R AXIS: -63 DEGREES
EKG R AXIS: -72 DEGREES
EKG T AXIS: 108 DEGREES
EKG T AXIS: 119 DEGREES
EKG T AXIS: 121 DEGREES
EKG T AXIS: 122 DEGREES
EKG T AXIS: 93 DEGREES
EKG VENTRICULAR RATE: 74 BPM
EKG VENTRICULAR RATE: 82 BPM
EKG VENTRICULAR RATE: 84 BPM
EKG VENTRICULAR RATE: 91 BPM
EKG VENTRICULAR RATE: 95 BPM
EOSINOPHIL # BLD: 0.2 %
EOSINOPHILS ABSOLUTE: 0 THOU/MM3 (ref 0–0.4)
EPITHELIAL CELLS, UA: ABNORMAL /HPF
ERYTHROCYTE [DISTWIDTH] IN BLOOD BY AUTOMATED COUNT: 13.2 % (ref 11.5–14.5)
ERYTHROCYTE [DISTWIDTH] IN BLOOD BY AUTOMATED COUNT: 46.7 FL (ref 35–45)
GFR SERPL CREATININE-BSD FRML MDRD: 32 ML/MIN/1.73M2
GLUCOSE BLD-MCNC: 131 MG/DL (ref 70–108)
GLUCOSE URINE: NEGATIVE MG/DL
HCT VFR BLD CALC: 46.8 % (ref 37–47)
HEMOGLOBIN: 15.2 GM/DL (ref 12–16)
IMMATURE GRANS (ABS): 0.03 THOU/MM3 (ref 0–0.07)
IMMATURE GRANULOCYTES: 0.2 %
KETONES, URINE: NEGATIVE
LEUKOCYTE ESTERASE, URINE: NEGATIVE
LYMPHOCYTES # BLD: 9.6 %
LYMPHOCYTES ABSOLUTE: 1.2 THOU/MM3 (ref 1–4.8)
MAGNESIUM: 2.5 MG/DL (ref 1.6–2.4)
MCH RBC QN AUTO: 31.1 PG (ref 26–33)
MCHC RBC AUTO-ENTMCNC: 32.5 GM/DL (ref 32.2–35.5)
MCV RBC AUTO: 95.9 FL (ref 81–99)
MISCELLANEOUS 2: ABNORMAL
MONOCYTES # BLD: 12.1 %
MONOCYTES ABSOLUTE: 1.5 THOU/MM3 (ref 0.4–1.3)
MRSA SCREEN: NORMAL
NITRITE, URINE: NEGATIVE
NUCLEATED RED BLOOD CELLS: 0 /100 WBC
PH UA: 5 (ref 5–9)
PLATELET # BLD: 150 THOU/MM3 (ref 130–400)
PMV BLD AUTO: 10.4 FL (ref 9.4–12.4)
POTASSIUM SERPL-SCNC: 4.2 MEQ/L (ref 3.5–5.2)
PRO-BNP: ABNORMAL PG/ML (ref 0–1800)
PROTEIN UA: 30
RBC # BLD: 4.88 MILL/MM3 (ref 4.2–5.4)
RBC URINE: ABNORMAL /HPF
RENAL EPITHELIAL, UA: ABNORMAL
SEG NEUTROPHILS: 77.7 %
SEGMENTED NEUTROPHILS ABSOLUTE COUNT: 9.3 THOU/MM3 (ref 1.8–7.7)
SODIUM BLD-SCNC: 140 MEQ/L (ref 135–145)
SPECIFIC GRAVITY, URINE: > 1.03 (ref 1–1.03)
URINE CULTURE, ROUTINE: NORMAL
UROBILINOGEN, URINE: 0.2 EU/DL (ref 0–1)
WBC # BLD: 12 THOU/MM3 (ref 4.8–10.8)
WBC UA: ABNORMAL /HPF
YEAST: ABNORMAL

## 2021-05-08 PROCEDURE — 93010 ELECTROCARDIOGRAM REPORT: CPT | Performed by: INTERNAL MEDICINE

## 2021-05-08 PROCEDURE — 99232 SBSQ HOSP IP/OBS MODERATE 35: CPT | Performed by: FAMILY MEDICINE

## 2021-05-08 PROCEDURE — 6370000000 HC RX 637 (ALT 250 FOR IP): Performed by: FAMILY MEDICINE

## 2021-05-08 PROCEDURE — 71046 X-RAY EXAM CHEST 2 VIEWS: CPT

## 2021-05-08 PROCEDURE — 2580000003 HC RX 258: Performed by: FAMILY MEDICINE

## 2021-05-08 PROCEDURE — 2140000000 HC CCU INTERMEDIATE R&B

## 2021-05-08 PROCEDURE — 36415 COLL VENOUS BLD VENIPUNCTURE: CPT

## 2021-05-08 PROCEDURE — 93005 ELECTROCARDIOGRAM TRACING: CPT | Performed by: FAMILY MEDICINE

## 2021-05-08 PROCEDURE — 99232 SBSQ HOSP IP/OBS MODERATE 35: CPT | Performed by: PHYSICIAN ASSISTANT

## 2021-05-08 PROCEDURE — 6370000000 HC RX 637 (ALT 250 FOR IP): Performed by: PHYSICIAN ASSISTANT

## 2021-05-08 PROCEDURE — 83735 ASSAY OF MAGNESIUM: CPT

## 2021-05-08 PROCEDURE — 6370000000 HC RX 637 (ALT 250 FOR IP): Performed by: INTERNAL MEDICINE

## 2021-05-08 PROCEDURE — 99221 1ST HOSP IP/OBS SF/LOW 40: CPT | Performed by: INTERNAL MEDICINE

## 2021-05-08 PROCEDURE — 81001 URINALYSIS AUTO W/SCOPE: CPT

## 2021-05-08 PROCEDURE — 85025 COMPLETE CBC W/AUTO DIFF WBC: CPT

## 2021-05-08 PROCEDURE — 83880 ASSAY OF NATRIURETIC PEPTIDE: CPT

## 2021-05-08 PROCEDURE — 6370000000 HC RX 637 (ALT 250 FOR IP): Performed by: NURSE PRACTITIONER

## 2021-05-08 PROCEDURE — 80048 BASIC METABOLIC PNL TOTAL CA: CPT

## 2021-05-08 RX ORDER — FUROSEMIDE 20 MG/1
20 TABLET ORAL DAILY
Status: DISCONTINUED | OUTPATIENT
Start: 2021-05-08 | End: 2021-05-10 | Stop reason: HOSPADM

## 2021-05-08 RX ADMIN — FLUTICASONE PROPIONATE 2 SPRAY: 50 SPRAY, METERED NASAL at 11:20

## 2021-05-08 RX ADMIN — SPIRONOLACTONE 50 MG: 25 TABLET ORAL at 09:32

## 2021-05-08 RX ADMIN — CARVEDILOL 3.12 MG: 3.12 TABLET, FILM COATED ORAL at 16:22

## 2021-05-08 RX ADMIN — Medication 3 MG: at 19:37

## 2021-05-08 RX ADMIN — LEVOTHYROXINE, LIOTHYRONINE 30 MG: 38; 9 TABLET ORAL at 09:31

## 2021-05-08 RX ADMIN — FAMOTIDINE 20 MG: 20 TABLET ORAL at 09:30

## 2021-05-08 RX ADMIN — MONTELUKAST SODIUM 10 MG: 10 TABLET ORAL at 09:32

## 2021-05-08 RX ADMIN — ASPIRIN 81 MG: 81 TABLET, CHEWABLE ORAL at 09:30

## 2021-05-08 RX ADMIN — FUROSEMIDE 20 MG: 20 TABLET ORAL at 14:17

## 2021-05-08 RX ADMIN — ATORVASTATIN CALCIUM 80 MG: 80 TABLET, FILM COATED ORAL at 19:37

## 2021-05-08 RX ADMIN — CARVEDILOL 3.12 MG: 3.12 TABLET, FILM COATED ORAL at 09:19

## 2021-05-08 RX ADMIN — POLYETHYLENE GLYCOL 3350 17 G: 17 POWDER, FOR SOLUTION ORAL at 19:37

## 2021-05-08 RX ADMIN — TICAGRELOR 90 MG: 90 TABLET ORAL at 19:37

## 2021-05-08 RX ADMIN — TICAGRELOR 90 MG: 90 TABLET ORAL at 09:36

## 2021-05-08 RX ADMIN — DOCUSATE SODIUM 100 MG: 100 CAPSULE ORAL at 19:38

## 2021-05-08 RX ADMIN — DOCUSATE SODIUM 100 MG: 100 CAPSULE ORAL at 09:30

## 2021-05-08 RX ADMIN — SODIUM CHLORIDE, PRESERVATIVE FREE 10 ML: 5 INJECTION INTRAVENOUS at 19:37

## 2021-05-08 RX ADMIN — SODIUM CHLORIDE, PRESERVATIVE FREE 10 ML: 5 INJECTION INTRAVENOUS at 09:32

## 2021-05-08 RX ADMIN — AMLODIPINE BESYLATE 2.5 MG: 5 TABLET ORAL at 09:19

## 2021-05-08 ASSESSMENT — PAIN SCALES - GENERAL
PAINLEVEL_OUTOF10: 2
PAINLEVEL_OUTOF10: 0

## 2021-05-08 ASSESSMENT — PAIN DESCRIPTION - ONSET: ONSET: ON-GOING

## 2021-05-08 ASSESSMENT — PAIN DESCRIPTION - PAIN TYPE: TYPE: ACUTE PAIN

## 2021-05-08 ASSESSMENT — PAIN DESCRIPTION - ORIENTATION: ORIENTATION: RIGHT;LOWER

## 2021-05-08 ASSESSMENT — PAIN DESCRIPTION - DESCRIPTORS: DESCRIPTORS: ACHING

## 2021-05-08 ASSESSMENT — PAIN DESCRIPTION - FREQUENCY: FREQUENCY: INTERMITTENT

## 2021-05-08 ASSESSMENT — PAIN DESCRIPTION - LOCATION: LOCATION: BACK

## 2021-05-08 NOTE — ACP (ADVANCE CARE PLANNING)
Advance Care Planning     Advance Care Planning Inpatient Note  [facility] Department    Today's Date: 5/8/2021  Unit: [facility]    Received request from IDT Member. Upon review of chart and communication with care team, patient's decision making abilities are not in question. Patientwas/were present in the room during visit. Goals of ACP Conversation:  Discuss advance care planning documents    Health Care Decision Makers:      Primary Decision Maker: Wei Sentbrittney - [phone]    Secondary Decision Maker: Michael Lyons - [phone]    Supplemental (Other) Decision Maker: Pedro Mar - Eloy  Click here to complete [address] including selection of the Healthcare Decision Maker Relationship (ie \"Primary\")     Today we:  Verified [address] (Patient Wishes):  Healthcare Power of /Advance Directive Appointment of Health Care Agent     Assessment:  Maria A Rivera is now in 3B, in bed in room 22. She is awake and alert. I checked her AD in Epic and found it to be not what this patient wanted. She had met with Odalys Mcghee in the ED and is now ready to sign her updated [address]. Interventions:  Reviewed ACP document(s) on record for proper execution and need for updating. Assisted in the completion of documents according to patient's wishes at this time. Encouraged ongoing ACP conversation with future decision makers and loved ones. Returned original document(s) to patient, as well as copies for distribution to appointed agents. Outcomes/Plan:  New advance directive completed.     Electronically signed by Fernando Styles, [address] on 5/8/2021 at 3:58 PM

## 2021-05-08 NOTE — PROCEDURES
[address] [place] [address]                            CARDIAC CATHETERIZATION    PATIENT NAME: Gulshan Wilkinson                     :        1925  MED REC NO:   [id number]                           ROOM:       0022  ACCOUNT NO:   [de-identified]                           ADMIT DATE: 2021  PROVIDER:     Ivan Branch MD    DATE OF PROCEDURE:  2021    INDICATIONS:  NSTEMI. DESCRIPTION OF PROCEDURE:  After informed consent was obtained from the  patient, she was brought to the cardiac catheterization laboratory and  prepped in sterile fashion. Right femoral artery was chosen as the  primary point of access. Preprocedure timeout was completed. After  infiltration of the right wrist with 2% lidocaine using micropuncture  and modified Seldinger technique under fluoroscopic guidance and  ultrasound guidance, I was able to insert a 6-Chinese sheath into the  right femoral artery. Of note, I turned to the right radial artery  access, but the artery was extremely small, would not allow the wire to  pass, therefore no further attempts were made. We then performed  diagnostic coronary angiography using a 6-Chinese JL4 and 6-Chinese JR4  catheters. CORONARY ANGIOGRAM:    LEFT MAIN:  Patent without any significant obstruction. LAD:  Ostium has 30% stenosis in the mid section of the LAD. There is  severe diffuse disease proximally totalling 70% to 80% in the mid,  distal 80% to 90% severe stenosis seen. Distal LAD has mild luminal  irregularities, no significant obstruction. There is a large diagonal  Branch, this branch was subtotally occluded. In the mid section of  the ostium, it has about 80% to 90% stenosis. LCX:  Proximally, it has no significant obstruction. Gives rise to a  large OM1 that has a 99% stenosis. AV groove branch continues without  any significant obstruction.   There is OM2 without any significant Guideline-directed therapy for CAD. 11.  Guideline-directed therapy for CHF. 12.  TTE. 13.  The patient remains critically ill, we will follow up in the ICU. All the above was explained to the patient and the patient's family. They were agreeable and amenable to the above plan.         Angella Peña MD    D: 05/07/2021 18:12:21       T: 05/07/2021 21:38:54     JOSE [email or web address]  Job#: [id number]     Doc#: [id number]    CC:

## 2021-05-08 NOTE — CONSULTS
Nephrology Consult Note  Patient's Name: Rajesh Mccallum  11:37 AM  5/8/2021    Nephrologist: Judi Casillas    Reason for Consult: Acute kidney injury  Requesting Physician: Ciera Prince MD  PCP: Flako Workman DO    Chief Complaint: None  Assessment  1. Acute kidney injury multifactorial resulting from combination of renal hypoperfusion from hypotension, diuretics and possibly contrast-induced nephropathy. 2. Status post cardiac catheter with intervention  3. Hypotension  4. Leukocytosis  5. Cardiomyopathy    Plan    1. I discussed my thoughts with the patient. 2. She appeared to have understood. 3. She had no questions. 4. Labs reviewed. 5. Chest x-ray report and images reviewed. 6. Echocardiogram report reviewed. 7. Cardiac cath report reviewed. 8. Medications reviewed. 9. No changes for now. 10. However would consider discontinuation of either furosemide or spironolactone if the serum creatinine rises more  11. Labs in the morning  12. We will follow      History Obtained From: Patient, staff electronic medical record  History of Present Ilness:    Rajesh Mccallum is a 80 y.o. female with history of hypertension, skin cancer, clinical joint disease among other things admitted through the emergency department after the patient presented there with  chest pain. She had subsequent  cardiac catheter with multiple interventions 2 days ago. .  Serum creatinine was 1.3 mg/dL on admission. Today it is 1.5 mg/dL. I was asked to see her. She otherwise is doing well with no complaint. Chest pain is gone. No shortness of breath. No nausea vomiting. No headaches. No fever chills. No difficulty urination.     Past Medical History:   Diagnosis Date    Arthritis     Cancer [facility]) 2013    SKIN CANCER ON LEFT ANKLE    Diverticulosis     Hyperlipidemia     Hypertension     Hypothyroidism     Psoriasis     Thyroid disorder        Past Surgical History:   Procedure Laterality Date    BREAST BIOPSY Left benign    BREAST SURGERY Left 6/1966    Lumpectomy    CARPAL TUNNEL RELEASE Right 1/30/2013    CARPAL TUNNEL RELEASE Left 7/25/13    CATARACT REMOVAL Bilateral 1999    COLON SURGERY  11/1999    resection    COLONOSCOPY  2003, 2006, 2011    HYSTERECTOMY  2/23/1970    HYSTERECTOMY, TOTAL ABDOMINAL      KNEE ARTHROSCOPY Right 11/21/2007    meniscectomies    KNEE SURGERY Left 2000    replacement    MYRINGOTOMY Right 07/01/2015    tube insertion    OVARY REMOVAL Bilateral 7/12/2001    oophorectomy    OVARY REMOVAL Bilateral     SHOULDER SURGERY  5/14    SINUS SURGERY         Family History   Problem Relation Age of Onset    Cancer Child     Stroke Sister     Heart Disease Sister     Other Other         visual problems        reports that she has never smoked. She has never used smokeless tobacco. She reports that she does not drink alcohol or use drugs.     Allergies:  Prednisone, Actonel [risedronate sodium], Baclofen, Bactrim [sulfamethoxazole-trimethoprim], Cardizem [diltiazem], Celebrex [celecoxib], Dicloxacillin, Doxycycline, Evista [raloxifene], Lopid [gemfibrozil], Questran [cholestyramine], Synthroid [levothyroxine sodium], Zestoretic [lisinopril-hydrochlorothiazide], and Zetia [ezetimibe]    Current Medications:    furosemide (LASIX) tablet 20 mg, Daily  nitroGLYCERIN (NITROSTAT) SL tablet 0.4 mg, Q5 Min PRN  amLODIPine (NORVASC) tablet 2.5 mg, Daily  famotidine (PEPCID) tablet 20 mg, Daily  fluticasone (FLONASE) 50 MCG/ACT nasal spray 2 spray, Daily  LORazepam (ATIVAN) tablet 0.5 mg, Q6H PRN  melatonin tablet 3 mg, Daily  montelukast (SINGULAIR) tablet 10 mg, Daily  spironolactone (ALDACTONE) tablet 50 mg, Daily  thyroid (ARMOUR) tablet 30 mg, Daily  sodium chloride flush 0.9 % injection 5-40 mL, 2 times per day  perflutren lipid microspheres (DEFINITY) injection 1.65 mg, ONCE PRN  carvedilol (COREG) tablet 3.125 mg, BID WC  aspirin chewable tablet 81 mg, Daily  trimethobenzamide ([facility]) 1.6 (H) 05/07/2021    CREATININE 1.5 (H) 05/07/2021    BUN 40 (H) 05/08/2021    BUN 42 (H) 05/07/2021    BUN 35 (H) 05/07/2021    GLUCOSE 131 (H) 05/08/2021    GLUCOSE 102 05/07/2021    GLUCOSE 128 (H) 05/07/2021    PHOS 3.5 11/02/2019    PHOS 3.7 05/04/2019    PHOS 3.1 11/03/2018    WBC 12.0 (H) 05/08/2021    WBC 12.7 (H) 05/07/2021    WBC 17.7 (H) 05/06/2021    HGB 15.2 05/08/2021    HGB 14.5 05/07/2021    HGB 15.8 05/06/2021    HCT 46.8 05/08/2021    HCT 44.1 05/07/2021    HCT 48.4 (H) 05/06/2021    MCV 95.9 05/08/2021     05/08/2021     {Labs reviewed    Imaging:  CXR results: Diagnostic images reports reviewed        Thank you Dr. Demetrice Guillen DO for allowing us to participate in care of [address]   **This report has been created using voice recognition software. It maycontain minor  errors which are inherent in voice recognition technology. **    Electronically signed by Cecilia Horne MD on 5/8/2021 at 11:37 AM

## 2021-05-08 NOTE — PLAN OF CARE
Problem: Impaired respiratory status  Goal: Absence of new skin breakdown  Description: Absence of new skin breakdown  5/7/2021 2256 by Tatyana Jiang RN  Outcome: Ongoing  Note: No new signs of skin breakdown noted this shift. Pt is continent and able to turn self. Will continue to monitor. Problem: Cardiac Output - Decreased:  Goal: Hemodynamic stability will improve  Description: Hemodynamic stability will improve  Outcome: Ongoing  Note:   Vitals:    05/07/21 2215   BP: 120/63   Pulse: 76   Resp: 22   Temp:    SpO2: 95%        Problem: Pain:  Goal: Control of chronic pain  Description: Control of chronic pain  Outcome: Ongoing  Note: Pt denied pain this shift. Pain goal is no pain. Will continue to monitor and manage pain appropriately. Problem: Tissue Perfusion - Cardiopulmonary, Altered:  Goal: Absence of angina  Description: Absence of angina  Outcome: Ongoing  Note: Pt denied angina this shift. Problem: Discharge Planning:  Goal: Discharged to appropriate level of care  Description: Discharged to appropriate level of care  Outcome: Ongoing  Note: Pt plans to be discharged to home alone with support when medically stable. Will continue to monitor for further discharge needs. Problem: Falls - Risk of:  Goal: Will remain free from falls  Description: Will remain free from falls  Outcome: Ongoing  Note: Pt free from falls this shift. Bed alarm on, 2/4 side rails up, call light in reach, fall band on, nonskid socks on, clear pathway, bed in locked and lowest position. Will continue to monitor. Care plan reviewed with patient. Patient verbalizes understanding of the plan of care and contribute to goal setting.

## 2021-05-08 NOTE — FLOWSHEET NOTE
Advance Directive Consult: Advance Directive education provided and forms were completed. Copies placed in chart and original returned to patient. Physician notified. Copy sent to Medical Records. Health care POA is now signed. 05/08/21 3332   Encounter Summary   Services provided to: Patient   Referral/Consult From: Lilly Galdamez Visiting Yes  (5/8 POA signed)   Complexity of Encounter Moderate   Length of Encounter 30 minutes   Spiritual/Scientologist   Type Spiritual support   Care Plan:  Continue spiritual and emotional care for patient and family. Including prayers.

## 2021-05-08 NOTE — PROGRESS NOTES
Cardiology Progress Note      Patient:  Dia Carmichael  YOB: 1925  MRN: [id number]   Acct: [de-identified]  Admit Date:  5/6/2021  Primary Cardiologist: none    Note per dr Galan Player:    NSTEMI      CHIEF COMPLAINT:    Chest pain      HISTORY OF PRESENT ILLNESS:    Dia Carmichael is a pleasant 80year old female patient with past medical history that includes:   Past Medical History        Past Medical History:   Diagnosis Date    Arthritis      Cancer ([facility] [address] 2013     SKIN CANCER ON LEFT ANKLE    Diverticulosis      Hyperlipidemia      Hypertension      Hypothyroidism      Psoriasis      Thyroid disorder        She denies personal history of cardiac disease. She reports chronic shoulder pains. She is s/p \"left shoulder injection\" for rotator cuff tear. Patient is functional and independent in her ADLs, lives by herself. Patient denies shortness of breath, dyspnea on exertion, orthopnea, paroxysmal nocturnal dyspnea, palpitations, dizziness, syncope, recent weight gain or leg swelling. She has been experiencing intermittent chest pains for the past few months. She also reports back pains, has been by physical therapy. She states that today, her chest pain is retrosternal, radiated to shoulder blades, described as tightness/aching, up to 10/10 today, no associated SOB or diaphoresis. She was seen at PCP office, EKG revealed SR ST elevation in aVR, significant ST depressions in inferolateral leads. She was sent to [facility] ER for further evaluation. Troponin was found to be elevated at 0.32. She was started on Heparin gtt, given SL NTG. She reports improvement in her chest pain. Cardiology was consulted. EKG changes improved. EKG repeated in ER revealed SR, ischemic ST/T changes in lateral leads. ProBNP 1,519. Cr 1. CXR revealed no acute findings. \"    Subjective (Events in last 24 hours):   Pt awake and alert. NAD. No cp or sob. Denies orthopnea.   No edema    Net I/o -1.5 L    Objective:   BP (!) 141/72   Pulse 96   Temp 98 °F (36.7 °C) (Oral)   Resp 20   Ht 5' (1.524 m)   Wt 132 lb 1.6 oz (59.9 kg)   SpO2 95%   BMI 25.80 kg/m²        TELEMETRY: nsr    Physical Exam:  General Appearance: alert and oriented to person, place and time, in no acute distress  Cardiovascular: normal rate, regular rhythm, normal S1 and S2, no murmurs, rubs, clicks, or gallops, distal pulses intact, no carotid bruits, no JVD  Pulmonary/Chest: bibasilar crackles  Abdomen: soft, non-tender, non-distended, normal bowel sounds, no masses Extremities: no cyanosis, clubbing or edema, pulse   Skin: warm and dry  Head: normocephalic and atraumatic  Eyes: pupils equal, round, and reactive to light  Neck: supple and non-tender without mass, no thyromegaly   Neurological: alert, oriented, normal speech, no focal findings or movement disorder noted  Right wrist - +ecchymosis, no hematoma, +2 radial pulse  Right groin - no hematoma, +DP, +PT    Medications:    amLODIPine  2.5 mg Oral Daily    famotidine  20 mg Oral Daily    fluticasone  2 spray Each Nostril Daily    melatonin  3 mg Oral Daily    montelukast  10 mg Oral Daily    spironolactone  50 mg Oral Daily    thyroid  30 mg Oral Daily    sodium chloride flush  5-40 mL Intravenous 2 times per day    carvedilol  3.125 mg Oral BID WC    aspirin  81 mg Oral Daily    magnesium replacement protocol   Other RX Placeholder    ticagrelor  90 mg Oral Q12H    atorvastatin  80 mg Oral Nightly    docusate sodium  100 mg Oral BID      nitroGLYCERIN Stopped (05/07/21 0915)     nitroGLYCERIN, 0.4 mg, Q5 Min PRN  LORazepam, 0.5 mg, Q6H PRN  perflutren lipid microspheres, 1.5 mL, ONCE PRN  trimethobenzamide, 200 mg, Q6H PRN  promethazine, 12.5 mg, Q6H PRN    Or  ondansetron, 4 mg, Q6H PRN  acetaminophen, 650 mg, Q6H PRN    Or  acetaminophen, 650 mg, Q6H PRN  polyethylene glycol, 17 g, Daily PRN  heparin (porcine), 30 Units/kg, PRN  heparin (porcine), 60 Units/kg, PRN  morphine, 2 mg, Q2H PRN        Diagnostics:  TTE 5/7/21  Summary   Normal left ventricular size and systolic function. There was mild-moderate hypokinesis of the mid to distal anteroseptal   wall, mid to distal lateral wall   Wall thickness was within normal limits. Ejection fraction was estimated at 40-45%. There was trace-mild aortic regurgitation. IVC size is within normal limits with normal respiratory phasic changes. Signature      ----------------------------------------------------------------   Electronically signed by Suleiman Arrington MD (Interpreting   physician) on 05/07/2021 at 08:07 AM    Cath 5/6/21  CORONARY ANGIOGRAM:     LEFT MAIN:  Patent without any significant obstruction.     LAD:  Ostium has 30% stenosis in the mid section of the LAD. There is  severe diffuse disease proximally totalling 70% to 80% in the mid,  distal 80% to 90% severe stenosis seen. Distal LAD has mild luminal  irregularities, no significant obstruction. There is a large diagonal  branch.  ______ branch was subtotally occluded. In the mid section of  the ostium, it has about 80% to 90% stenosis.     LCX:  Proximally, it has no significant obstruction. Gives rise to a  large OM1 that has a 99% stenosis. AV groove branch continues without  any significant obstruction. There is OM2 without any significant  obstruction. There is left PDA without any significant obstruction.     RAMUS INTERMEDIUS:  Subtotally occluded ramus intermedius that is about  less than 1.5 mm in diameter at the proximal end.     RCA:  There is a proximal 50% stenosis. It is a nondominant vessel with  a small marginal branch.     INTERVENTION:  Given the findings and presentation, I elected to proceed  with intervention. I exchanged out for 6-Israeli EBU 3.5 guiding  catheter. I cannulated the left main without complication. Heparin IV  was given. ACT was confirmed to be above 250 seconds.   I wired the  lesion using a Runthrough wire only to the apex. I predilated the  lesion using a 2.5 x 12 NC balloon at 5 inflations at 6 to 8  atmospheres. I then passed a Xience [place] JUSTUS 2.5 x 38, the wire would  not cross. I then used 2.5 x 12 NC balloon and predilated the lesion  distally up to 6 to 8 atmospheres. I then passed the 2.5 x [address] and deployed this into the most distal normal segment. This  was deployed at 9 atmospheres. I then passed a 3.0 x [address] and deployed this in overlapping fashion with the first stent  deployed at 12 atmospheres. Then, I saw that the diagonal branch was in  jeopardy, however, this was extremely small, we did cross the stent with  the [facility] wire to ensure that it was able to be saved if needed. However, at that time, the patient started having extravasation  anterolaterally and was complaining of back pain that was reminiscent of  her presentation. The diagonal branch had basically a NATHANIEL-1 flow. I  then after crossing it used a 2.0 x 20 NC balloon and dilated the  proximal and mid diagonal branch to ensure flow. Thereafter, we had  reasonable flow in the diagonal branch. I then stented the proximal LAD  with a 3.5 x 18 Xience [place] JUSTUS at 12 atmospheres in overlapping  fashion with the second stent deployed. I then postdilated the stent  distally with the 2.5 x 20 NC balloon from 18 to 24 atmospheres and the  proximal stent with the 3.5 x 20 from 16 to 20 atmospheres. Post PCI  angiography demonstrated NATHANIEL-0 flow with no reflow, all the way to the  distal LAD. Again IC adenosine to help improve the flow to the LAD  which did improve to the NATHANIEL-2 flow. However, there was significant  anterolateral ST elevation. There was a very severe circumflex lesion  about 90% in the OM1 left branch into the superior inferior branch. Given these findings, I was able to stent this branch.   I redirected the  [facility] wire into the OM1 branch, predilated the lesion with a 2.0 x 8  NC balloon up to 16 atmospheres and passed the 2.5 x 12 Xience [place]  JUSTUS and stented the first OM1. There was actually a good flow into both  the superior and inferior branches. The patient then again was showing  more ST elevations. I readvanced the Runthrough wire into the LAD, and  there was significant no reflow in the LAD again. At this time, I  passed over-the-wire 1.5 balloon with the Runthrough wire into the  distal LAD. I injected into coronary distal IC adenosine to help  improve the flow. While that was occurring, I then went back and  postdilated the OM branch with the 2.5 x 20 NC balloon. Once this was  done, I went back and tried to save the diagonal branch once more with  the 2.0 x 12 Mini TREK balloon up to 10 to 12 atmospheres to ensure  flow, however, the diagonal branch would not stay open no matter what I  did. I did [place] balloon inflation of the diagonal branch as well as  into the LAD, but again the diagonal branch would not stay open. There  were ST elevations in the anterolateral segment on the anterolateral  ECG. Therefore, I attempted multiple inflations while in the diagonal  branch at 10 to 12 atmospheres to avoid dissection but still no flow  could be restored. The patient was having back pain reminiscent of her  chest pain and anterolateral ST elevation. Given these findings, I  attempted to stent the vessel with 2.25 x [address] [facility] JUSTUS deployed  at 7 atmospheres and then used a 2.25 x [address] [facility] JUSTUS to the  ostium of the diagonal branch in overlapping fashion with the first  stent deployed. There was significantly no reflow in the diagonal  branch and again no perfusion that was obvious in this branch. At this  point, the LAD had improved flow. Diagonal branch was having no  significant reflow and the circumflex was open, therefore I elected no  further intervention to the diagonal branch.   Continue management of the  LAD and circumflex by placement of the balloon pump. I took a final  angiogram demonstrating NATHANIEL-2 flow in the LAD and NATHANIEL-3 flow in the  circumflex and NATHANIEL-0 flow in the diagonal branch. I then took a  femoral angiogram that demonstrated common femoral artery puncture. I  exchanged out for a 7. 5-[place] 40 mL IABP balloon pump sheath that was  inserted over the 0.035 guidewire. I prepped a 40 mL IABP balloon  catheter per manufacture's recommendations. This was inserted over a  0.25 guidewire into the ascending aorta. The guidewire was removed,  balloon pump was deaired appropriately prior to insertion and balloon  pump was then initiated. IV heparin was initiated as well. IV  nitroglycerin was also initiated. Balloon pump was then sutured in  place.     MEDICATIONS:  See EMR.     ACCESS:  See above.     ESTIMATED BLOOD LOSS:  Less than 50 mL.     SUMMARY:  Successful PCI of the LAD with three overlapping drug-eluting  stents; successful PCI of the OM1 with one drug-eluting stent; diagonal  PCI with two drug-eluting stents with no reflow; IBP insertion.     PLAN:  1. Bedrest.  2.  Optimal medical therapy. 3.  Routine access site care  4. ICU care. 5.  IV heparin. 6.  We will wean balloon pump in the next 24 hours as long as there is  no pain and the ST elevations have improved. 7. DAPT. 8.  IV Lasix diuresis. 9.  Chest x-ray. 10.  Guideline-directed therapy for CAD. 11.  Guideline-directed therapy for CHF. 12.  TTE. 13.  The patient remains critically ill, we will follow up in the ICU.     All the above was explained to the patient and the patient's family. They were agreeable and amenable to the above plan.           Jaun Wilkerson MD    Lab Data:    Cardiac Enzymes:  No results for input(s): CKTOTAL, CKMB, CKMBINDEX, TROPONINI in the last 72 hours.     CBC:   Lab Results   Component Value Date    WBC 12.0 05/08/2021    RBC 4.88 05/08/2021    HGB 15.2 05/08/2021    HCT 46.8 05/08/2021     05/08/2021       CMP: Lab Results   Component Value Date     05/08/2021    K 4.2 05/08/2021    K 4.5 05/07/2021     05/08/2021    CO2 28 05/08/2021    BUN 40 05/08/2021    CREATININE 1.5 05/08/2021    AGRATIO 1.4 11/02/2019    LABGLOM 32 05/08/2021    GLUCOSE 131 05/08/2021    GLUCOSE 97 11/02/2019    CALCIUM 9.4 05/08/2021       Hepatic Function Panel:    Lab Results   Component Value Date    ALKPHOS 76 05/07/2021    ALT 54 05/07/2021     05/07/2021    PROT 7.3 05/07/2021    BILITOT 0.5 05/07/2021    BILIDIR 0.1 11/02/2019    LABALBU 4.0 05/07/2021    LABALBU 4.1 03/15/2012       Magnesium:    Lab Results   Component Value Date    MG 2.5 05/08/2021       PT/INR:    Lab Results   Component Value Date    INR 1.00 05/06/2021       HgBA1c:    Lab Results   Component Value Date    LABA1C 6.1 06/05/2019       FLP:    Lab Results   Component Value Date    TRIG 82 05/07/2021    HDL 62 05/07/2021    LDLCALC 106 05/07/2021       TSH:    Lab Results   Component Value Date    TSH 4.590 05/06/2021         Assessment:    NSTEMI    S/p cath 5/6/21 -   Severe LAD 80-90% and DX 90% disease  Ostial OM 90%              RCA non-dom   PCI of the LAD x 3 JUSTUS  PCI of the OM x 1 JUSTUS   Dx shut down during LAD PCI, ST-elevations laterally  LAD stenting was complicated by no-reflow - Adenosine IC given to improve  Rescued with PTCA but DX would not stay open - stented x 2 but still no flow   IABP placed at 1:1 to help perfuse DX/LAD, removed 5/7/21    Acute hypoxic resp failure - improved  Acute systolic CHF with pulmonary edema - improved  ICMP - ef 40-45 per TTE 5/7/21  HTN  Dyslipidemia  CRISTINA      Plan:    Daily I/o and weights  2 liter fluid restriction and 2gm sodium diet  Keep mag >2 and K >4  Cont asa/brilinta/statin/BB/aldactone/norvasc  Start lasix 20 po daily  Renal consulted today for CRISTINA  Cardiac rehab - ordered  Sl ntg upon dc  Will see prn  F/up dr Marni Miller 2 weeks       Electronically signed by Tino Patrick PA-C on 5/8/2021 at 10:30 AM

## 2021-05-08 NOTE — FLOWSHEET NOTE
Acute Coronary Syndrome Folder given to patient which includes the following education:    1. Heart healthy Diet booklet  2. Reduce your risk of Heart attack paper  3. MI Patient experience Survey  4. Cardiac Rehab phamphlet  5. How to take your Nitroglycerine paper  6. Resources for you and your family paper  7. Smoking Cessation information  8. Heart Attack What's Ahead book  9. Cardiac Cath Discharge Instructions Groin/Radial Approach    Stent card given to the patient.

## 2021-05-08 NOTE — PLAN OF CARE
Problem: Impaired respiratory status  Goal: Absence of new skin breakdown  Description: Absence of new skin breakdown  Outcome: Ongoing     Problem: Cardiac Output - Decreased:  Goal: Hemodynamic stability will improve  Description: Hemodynamic stability will improve  Outcome: Ongoing  Note: Ongoing assessment & interventions provided throughout shift. Patient on continuous telemetry monitoring, heart tones, vitals & pulses checked at least 3 times per shift & PRN. Vitals within acceptable limits. Peripheral pulses palpable. Problem: Pain:  Goal: Control of chronic pain  Description: Control of chronic pain  Outcome: Ongoing  Note: Patient denies pain so far this shift. Reminded patient to report any pain, pressure, or shortness of breath to the nurse. Will continue to monitor. Problem: Tissue Perfusion - Cardiopulmonary, Altered:  Goal: Absence of angina  Description: Absence of angina  Outcome: Ongoing  Note: No chest pain, pressure or shortness of breath noted. Reminded patient to report any of these to the nurse. Problem: Discharge Planning:  Goal: Discharged to appropriate level of care  Description: Discharged to appropriate level of care  Outcome: Ongoing  Note: She is from home alone. Problem: Falls - Risk of:  Goal: Will remain free from falls  Description: Will remain free from falls  Outcome: Ongoing  Note: Assessment & interventions provided throughout shift. Bed locked & in low position, call light in reach, side-rails up x2, bed/chair alarm utilized, non-slip socks on when ambulating, reminded patient to use call light to call for assistance. Care plan reviewed with patient. Patient verbalizes understanding of the care plan and contributed to goal setting.

## 2021-05-08 NOTE — PROGRESS NOTES
Temp 97.5 °F (36.4 °C) (Oral)   Resp 20   Ht 5' (1.524 m)   Wt 132 lb 1.6 oz (59.9 kg)   SpO2 91%   BMI 25.80 kg/m²     General appearance: Alert, not in acute distress  HEENT: Pupils equal, round, and reactive to light. Conjunctivae clear. Clear oral mucosa  Neck: Supple, with full range of motion. No jugular venous distention. Trachea midline. Respiratory:  Normal respiratory effort. Clear to auscultation, bilaterally without Rales/Wheezes/Rhonchi. Cardiovascular: Normal rate, regular rhythm with normal S1/S2 without murmurs, rubs or gallops. Abdomen: Soft, non-tender, non-distended with normal bowel sounds. Musculoskeletal: passive and active ROM x 4 extremities. (+) Right CVA tenderness  Skin:  No rashes or lesions. Exam of extremities: peripheral pulses normal, no pedal edema, trace pitting edema on both distal legs, no clubbing or cyanosis      Labs:   Recent Labs     05/06/21  1118 05/06/21  1903 05/07/21  0706   WBC 14.6* 17.7* 12.7*   HGB 15.1 15.8 14.5   HCT 46.8 48.4* 44.1    195 150     Recent Labs     05/07/21  0706 05/07/21  1740 05/07/21  2249    138 137   K 4.5 4.0 3.9    100 101   CO2 21* 26 25   BUN 31* 35* 42*   CREATININE 1.3* 1.5* 1.6*   CALCIUM 9.7 9.6 9.1     Recent Labs     05/06/21  1118 05/07/21  0706   AST 70* 324*   ALT 21 54   BILITOT 0.2* 0.5   ALKPHOS 92 76     Recent Labs     05/06/21  1118   INR 1.00     No results for input(s): Salo Robin in the last 72 hours. Urinalysis:      Lab Results   Component Value Date    NITRU NEGATIVE 05/06/2021    WBCUA NONE SEEN 05/06/2021    BACTERIA NONE SEEN 05/06/2021    RBCUA 5-10 05/06/2021    BLOODU SMALL 05/06/2021    SPECGRAV 1.017 05/06/2021    GLUCOSEU neg 11/26/2019    GLUCOSEU NEGATIVE 10/13/2015       Radiology:  XR CHEST (2 VW)   Final Result   Actually improved aeration of the chest overall there is interstitial edema and very mild vascular congestion. There is cardiomegaly.                **This with Dr. Callaway Rued in 2 weeks after discharge    CRISTINA on CKD stage III, likely multifactorial due to recent contrast and diuretics    -Creatinine of 1.5 today, was 1.6 yesterday. Baseline creatinine ranges between 0.8-1.0. Possibly contrast-induced nephropathy versus due to diuretics (patient was placed on Lasix drip for acute systolic congestive heart failure/pulmonary edema in the ICU)  -Nephrology consulted,recommend to monitor for now and if creatinine is worsening could consider stopping either furosemide or spironolactone. Appreciate nephrology input. -BMP in a.m. Acute hypoxic resp failure due to acute systolic CHF, resolved     -pt was placed on BiPAP and nasal cannula in the ICU, currently on room air, saturating well. Acute systolic CHF with pulmonary edema, resolved  ICMP, EF 40-45%,  mild-moderate hypokinesis of the mid to distal anteroseptal   wall, mid to distal lateral wall per TTE 5/7/21     -Noted to have pulmonary edema on chest x-ray and has elevated proBNP  -Patient was managed with Lasix drip while in the ICU, will stop on 5/7/2021 at 1554  -Chest x-ray today improved  -Cardiology PA started Lasix 20 mg p.o. daily today  -Continue Coreg    Trace to mild aortic regurgitation    -Noted on echocardiogram 5/6/2021  -Follow-up needed PCP and cardiology on discharge    Hyperglycemia/prediabetes     -A1c of 6.1% on 6/5/2019, repeat A1c in a.m.     Leukocytosis, likely reactive due to NSTEMI     -afebrile  -denies s/sx of infection  -CXR normal  -(-) COVID-19  -blood cxs x 2 no growth preliminary   -CBC in am     Prolonged QTc, resolving      - check magnesium level.  Keep magnesium >/=2.0 and potassium at least 4.0  -EKG in am  -tele     Anion gap metabolic acidosis, resolved    Physical deconditioning    -Patient is c/o generalized weakness, lives alone, not safe for discharge to home yet, awaiting PT/OT to see the patient    Right flank pain, chronic, unclear etiology    -pt is c/o right flank pain   -UA unremarkable except for (+) proteinuria and microscopic hematuria. Pt denies UTI symptoms nor gross hematuria. ?neprholith, will cont to monitor, if persist or worsen, will consider kidney US, would like to avoid abd CT with contrast due to CRISTINA       HLD     -cont lipitor  -, HDL 62 on 5/7/2021      Hypothyroidism     -cont thyroid armour  -TSH at goal in 12/2020     Essential HTN     -cont home meds aldactone and amlodipine with holding parameters   -Also on Coreg and Lasix  -VS per protocol         Anticipated Discharge in : pending         DVT prophylaxis: [] Lovenox                                 [x] SCDs                                 [] SQ Heparin                                 [] Encourage ambulation           [] Already on Anticoagulation     Disposition:    [] Home       [] TCU       [] Rehab       [] Psych       [] SNF       [] [place]       [x] Other-Plan as above.  PT/OT consult for DC recommendation     Code Status: Full Code      Electronically signed by Satnam Freed MD on 5/8/2021 at 8:56 AM

## 2021-05-09 ENCOUNTER — APPOINTMENT (OUTPATIENT)
Dept: ULTRASOUND IMAGING | Age: 86
DRG: 270 | End: 2021-05-09
Payer: MEDICARE

## 2021-05-09 LAB
ANION GAP SERPL CALCULATED.3IONS-SCNC: 9 MEQ/L (ref 8–16)
AVERAGE GLUCOSE: 108 MG/DL (ref 70–126)
BUN BLDV-MCNC: 35 MG/DL (ref 7–22)
CALCIUM SERPL-MCNC: 9.3 MG/DL (ref 8.5–10.5)
CHLORIDE BLD-SCNC: 100 MEQ/L (ref 98–111)
CO2: 27 MEQ/L (ref 23–33)
CREAT SERPL-MCNC: 1.4 MG/DL (ref 0.4–1.2)
GFR SERPL CREATININE-BSD FRML MDRD: 35 ML/MIN/1.73M2
GLUCOSE BLD-MCNC: 121 MG/DL (ref 70–108)
HBA1C MFR BLD: 5.6 % (ref 4.4–6.4)
POTASSIUM REFLEX MAGNESIUM: 4.5 MEQ/L (ref 3.5–5.2)
SODIUM BLD-SCNC: 136 MEQ/L (ref 135–145)

## 2021-05-09 PROCEDURE — 99232 SBSQ HOSP IP/OBS MODERATE 35: CPT | Performed by: FAMILY MEDICINE

## 2021-05-09 PROCEDURE — 6370000000 HC RX 637 (ALT 250 FOR IP): Performed by: FAMILY MEDICINE

## 2021-05-09 PROCEDURE — 6370000000 HC RX 637 (ALT 250 FOR IP): Performed by: NURSE PRACTITIONER

## 2021-05-09 PROCEDURE — 76770 US EXAM ABDO BACK WALL COMP: CPT

## 2021-05-09 PROCEDURE — 36415 COLL VENOUS BLD VENIPUNCTURE: CPT

## 2021-05-09 PROCEDURE — 99232 SBSQ HOSP IP/OBS MODERATE 35: CPT | Performed by: INTERNAL MEDICINE

## 2021-05-09 PROCEDURE — 6370000000 HC RX 637 (ALT 250 FOR IP): Performed by: INTERNAL MEDICINE

## 2021-05-09 PROCEDURE — 2140000000 HC CCU INTERMEDIATE R&B

## 2021-05-09 PROCEDURE — 80048 BASIC METABOLIC PNL TOTAL CA: CPT

## 2021-05-09 PROCEDURE — 2580000003 HC RX 258: Performed by: FAMILY MEDICINE

## 2021-05-09 PROCEDURE — 83036 HEMOGLOBIN GLYCOSYLATED A1C: CPT

## 2021-05-09 PROCEDURE — 6370000000 HC RX 637 (ALT 250 FOR IP): Performed by: PHYSICIAN ASSISTANT

## 2021-05-09 RX ORDER — SENNA PLUS 8.6 MG/1
1 TABLET ORAL NIGHTLY
Status: DISCONTINUED | OUTPATIENT
Start: 2021-05-09 | End: 2021-05-10 | Stop reason: HOSPADM

## 2021-05-09 RX ORDER — POLYETHYLENE GLYCOL 3350 17 G/17G
17 POWDER, FOR SOLUTION ORAL DAILY
Status: DISCONTINUED | OUTPATIENT
Start: 2021-05-09 | End: 2021-05-10 | Stop reason: HOSPADM

## 2021-05-09 RX ADMIN — TICAGRELOR 90 MG: 90 TABLET ORAL at 21:21

## 2021-05-09 RX ADMIN — SENNOSIDES 8.6 MG: 8.6 TABLET, FILM COATED ORAL at 21:23

## 2021-05-09 RX ADMIN — FUROSEMIDE 20 MG: 20 TABLET ORAL at 09:32

## 2021-05-09 RX ADMIN — ACETAMINOPHEN 650 MG: 325 TABLET ORAL at 21:21

## 2021-05-09 RX ADMIN — FLUTICASONE PROPIONATE 2 SPRAY: 50 SPRAY, METERED NASAL at 09:31

## 2021-05-09 RX ADMIN — TICAGRELOR 90 MG: 90 TABLET ORAL at 09:35

## 2021-05-09 RX ADMIN — FAMOTIDINE 20 MG: 20 TABLET ORAL at 09:35

## 2021-05-09 RX ADMIN — SPIRONOLACTONE 50 MG: 25 TABLET ORAL at 09:36

## 2021-05-09 RX ADMIN — DOCUSATE SODIUM 100 MG: 100 CAPSULE ORAL at 21:21

## 2021-05-09 RX ADMIN — SODIUM CHLORIDE, PRESERVATIVE FREE 10 ML: 5 INJECTION INTRAVENOUS at 21:21

## 2021-05-09 RX ADMIN — SODIUM CHLORIDE, PRESERVATIVE FREE 10 ML: 5 INJECTION INTRAVENOUS at 09:32

## 2021-05-09 RX ADMIN — MONTELUKAST SODIUM 10 MG: 10 TABLET ORAL at 09:32

## 2021-05-09 RX ADMIN — POLYETHYLENE GLYCOL 3350 17 G: 17 POWDER, FOR SOLUTION ORAL at 09:35

## 2021-05-09 RX ADMIN — ASPIRIN 81 MG: 81 TABLET, CHEWABLE ORAL at 09:35

## 2021-05-09 RX ADMIN — CARVEDILOL 3.12 MG: 3.12 TABLET, FILM COATED ORAL at 09:30

## 2021-05-09 RX ADMIN — ATORVASTATIN CALCIUM 80 MG: 80 TABLET, FILM COATED ORAL at 21:21

## 2021-05-09 RX ADMIN — DOCUSATE SODIUM 100 MG: 100 CAPSULE ORAL at 09:35

## 2021-05-09 RX ADMIN — AMLODIPINE BESYLATE 2.5 MG: 5 TABLET ORAL at 09:32

## 2021-05-09 RX ADMIN — CARVEDILOL 3.12 MG: 3.12 TABLET, FILM COATED ORAL at 16:15

## 2021-05-09 RX ADMIN — Medication 3 MG: at 21:21

## 2021-05-09 RX ADMIN — LEVOTHYROXINE, LIOTHYRONINE 30 MG: 38; 9 TABLET ORAL at 09:33

## 2021-05-09 ASSESSMENT — PAIN SCALES - GENERAL
PAINLEVEL_OUTOF10: 3
PAINLEVEL_OUTOF10: 0
PAINLEVEL_OUTOF10: 0

## 2021-05-09 NOTE — PROGRESS NOTES
Renal Progress Note    Assessment and Plan:    1. Acute kidney injury with serum creatinine improved this morning  2. Hypotension improved  3. Leukocytosis improving  4. Cardiomyopathy  5. Deconditioned state  6.   Status post cardiac cath  PLAN:  Labs reviewed  Serum creatinine is improved today  Medications reviewed  No changes  Labs in the morning  We will follow        Patient Active Problem List:     Deviated nasal septum     Hypertrophy of nasal turbinates     Sensorineural hearing loss     Mixed hearing loss     Hypertension     Hypothyroidism     Prediabetes     Pure hypercholesterolemia     Decreased ROM of left shoulder     Acute pain of left shoulder     NSTEMI (non-ST elevated myocardial infarction) ([facility])     Hyperglycemia     Leukocytosis     Prolonged Q-T interval on ECG     Stage 3a chronic kidney disease     Hyperlipidemia     Acute kidney injury superimposed on CKD ([facility])     Acute respiratory failure with hypoxia ([facility])     Ischemic cardiomyopathy     Aortic valve regurgitation     High anion gap metabolic acidosis     Physical deconditioning     Right flank pain      Subjective:   Admit Date: 5/6/2021    Interval History:   Seen for acute kidney injury  Comfortably asleep this morning  Updated by the staff  No issues of concern  Blood pressure is good  Urine output is good        Medications:   Scheduled Meds:   furosemide  20 mg Oral Daily    amLODIPine  2.5 mg Oral Daily    famotidine  20 mg Oral Daily    fluticasone  2 spray Each Nostril Daily    melatonin  3 mg Oral Daily    montelukast  10 mg Oral Daily    spironolactone  50 mg Oral Daily    thyroid  30 mg Oral Daily    sodium chloride flush  5-40 mL Intravenous 2 times per day    carvedilol  3.125 mg Oral BID WC    aspirin  81 mg Oral Daily    magnesium replacement protocol   Other RX Placeholder    ticagrelor  90 mg Oral Q12H    atorvastatin  80 mg Oral Nightly    docusate sodium  100 mg Oral BID     Continuous Infusions:   nitroGLYCERIN Stopped (05/07/21 0915)       CBC:   Recent Labs     05/06/21  1903 05/07/21  0706 05/08/21  0849   WBC 17.7* 12.7* 12.0*   HGB 15.8 14.5 15.2    150 150     CMP:    Recent Labs     05/07/21  2249 05/08/21  0830 05/09/21  0331    140 136   K 3.9 4.2 4.5    101 100   CO2 25 28 27   BUN 42* 40* 35*   CREATININE 1.6* 1.5* 1.4*   GLUCOSE 102 131* 121*   CALCIUM 9.1 9.4 9.3   LABGLOM 30* 32* 35*     Troponin: No results for input(s): TROPONINI in the last 72 hours. BNP: No results for input(s): BNP in the last 72 hours. INR:   Recent Labs     05/06/21  1118   INR 1.00     Lipids:   Recent Labs     05/07/21  0706   CHOL 184   TRIG 82   HDL 62     Liver:   Recent Labs     05/07/21  0706   *   ALT 54   ALKPHOS 76   PROT 7.3   LABALBU 4.0   BILITOT 0.5     Iron:  No results for input(s): IRONS, FERRITIN in the last 72 hours. Invalid input(s): LABIRONS  XR CHEST (2 VW)   Final Result   Actually improved aeration of the chest overall there is interstitial edema and very mild vascular congestion. There is cardiomegaly. **This report has been created using voice recognition software. It may contain minor errors which are inherent in voice recognition technology. **      Final report electronically signed by Dr. King Valle on 5/8/2021 7:58 AM      XR CHEST PORTABLE   Final Result   Impression:   1. IABP has its tip projecting at the level of the left mainstem    bronchus. 2.  Diffusely increased interstitial markings persist.      This document has been electronically signed by: Taisha Boyd MD on    05/07/2021 03:48 AM      XR CHEST PORTABLE   Final Result   Impression:   1. Increased interstitial markings bilaterally may represent interstitial    edema versus interstitial pneumonia      This document has been electronically signed by: Taisha Boyd MD on    05/06/2021 08:19 PM      XR CHEST PORTABLE   Final Result   1. Normal heart size.

## 2021-05-09 NOTE — PLAN OF CARE
Problem: Impaired respiratory status  Goal: Absence of new skin breakdown  Description: Absence of new skin breakdown  Outcome: Ongoing     Problem: Cardiac Output - Decreased:  Goal: Hemodynamic stability will improve  Description: Hemodynamic stability will improve  Outcome: Ongoing  Note: Ongoing assessment & interventions provided throughout shift. Patient on continuous telemetry monitoring, heart tones, vitals & pulses checked at least 3 times per shift & PRN. Vitals within acceptable limits. Peripheral pulses palpable. Problem: Pain:  Goal: Control of chronic pain  Description: Control of chronic pain  Outcome: Ongoing  Note: Patient denies pain so far this shift. Reminded patient to report any pain, pressure, or shortness of breath to the nurse. Will continue to monitor. Problem: Tissue Perfusion - Cardiopulmonary, Altered:  Goal: Absence of angina  Description: Absence of angina  Outcome: Ongoing     Problem: Discharge Planning:  Goal: Discharged to appropriate level of care  Description: Discharged to appropriate level of care  Outcome: Ongoing  Note: She is from home alone but is agreeable to PT/OT/rehab/nursing home for strengthening before returning home alone. Problem: Falls - Risk of:  Goal: Will remain free from falls  Description: Will remain free from falls  Outcome: Ongoing  Note: Assessment & interventions provided throughout shift. Bed locked & in low position, call light in reach, side-rails up x2, bed/chair alarm utilized, non-slip socks on when ambulating, reminded patient to use call light to call for assistance. Problem: Pain:  Goal: Pain level will decrease  Description: Pain level will decrease  Outcome: Ongoing  Note: Patient denies pain so far this shift. Reminded patient to report any pain, pressure, or shortness of breath to the nurse. Will continue to monitor.       Goal: Control of acute pain  Description: Control of acute pain  Outcome: Ongoing    Care plan reviewed with patient. Patient verbalizes understanding of the care plan and contributed to goal setting.

## 2021-05-09 NOTE — PLAN OF CARE
Problem: Impaired respiratory status  Goal: Absence of new skin breakdown  Description: Absence of new skin breakdown  Outcome: Ongoing  Note: No new signs of skin breakdown noted this shift. Pt is continent and able to turn self. Will continue to monitor. Problem: Cardiac Output - Decreased:  Goal: Hemodynamic stability will improve  Description: Hemodynamic stability will improve  Outcome: Ongoing  Note:   Vitals:    05/08/21 1928   BP: (!) 112/56   Pulse: 82   Resp: 18   Temp: 99.1 °F (37.3 °C)   SpO2: 93%      Problem: Pain:  Goal: Control of chronic pain  Description: Control of chronic pain  Outcome: Ongoing  Note: Pt denied pain this shift. Pain goal is no pain. Will continue to monitor and manage pain appropriately. Problem: Tissue Perfusion - Cardiopulmonary, Altered:  Goal: Absence of angina  Description: Absence of angina  Outcome: Ongoing  Note: Pt denied angina this shift. Problem: Discharge Planning:  Goal: Discharged to appropriate level of care  Description: Discharged to appropriate level of care  Outcome: Ongoing  Note: Pt plans to be discharged to home alone with support when medically stable. Will continue to monitor for further discharge needs. Problem: Falls - Risk of:  Goal: Will remain free from falls  Description: Will remain free from falls  Outcome: Ongoing  Note: Pt free from falls this shift. Bed alarm on, 2/4 side rails up, call light in reach, fall band on, nonskid socks on, clear pathway, bed in locked and lowest position. Will continue to monitor. Care plan reviewed with patient. Patient verbalizes understanding of the plan of care and contribute to goal setting.

## 2021-05-09 NOTE — PROGRESS NOTES
Hospitalist Progress Note    Patient:  Leopoldo Rump      Unit/Bed:3B-22/022-A    YOB: 1925    MRN: [id number]       Acct: [de-identified]     PCP: Estefany Crouch DO    Date of Admission: 5/6/2021    Chief Complaint: Follow-up for chest pain    Hospital Course:          Subjective:     Patient seen and examined. Patient denies chest pain, shortness of breath, palpitations, cough, fever, chills. Patient reports that she still have the right flank pain, constant, feels tight, 2 out of 10 in intensity today. She reports constipation, last bowel movement 4 days ago, denies abdominal pain, nausea and vomiting. She reports that she is passing flatus. She denies UTI symptoms.         Medications:  Reviewed    Infusion Medications    nitroGLYCERIN Stopped (05/07/21 0915)     Scheduled Medications    furosemide  20 mg Oral Daily    amLODIPine  2.5 mg Oral Daily    famotidine  20 mg Oral Daily    fluticasone  2 spray Each Nostril Daily    melatonin  3 mg Oral Daily    montelukast  10 mg Oral Daily    spironolactone  50 mg Oral Daily    thyroid  30 mg Oral Daily    sodium chloride flush  5-40 mL Intravenous 2 times per day    carvedilol  3.125 mg Oral BID WC    aspirin  81 mg Oral Daily    magnesium replacement protocol   Other RX Placeholder    ticagrelor  90 mg Oral Q12H    atorvastatin  80 mg Oral Nightly    docusate sodium  100 mg Oral BID     PRN Meds: nitroGLYCERIN, LORazepam, perflutren lipid microspheres, trimethobenzamide, promethazine **OR** ondansetron, acetaminophen **OR** acetaminophen, polyethylene glycol, heparin (porcine), heparin (porcine), morphine      Intake/Output Summary (Last 24 hours) at 5/9/2021 0843  Last data filed at 5/9/2021 0417  Gross per 24 hour   Intake 1310 ml   Output 1200 ml   Net 110 ml       Diet:  DIET CARDIAC; No Caffeine    Exam:  BP (!) 117/59   Pulse 83   Temp 99.1 °F (37.3 °C) (Oral)   Resp 18   Ht 5' (1.524 m)   Wt 128 lb 12.8 oz (58.4 kg) SpO2 92%   BMI 25.15 kg/m²     General appearance: Alert, not in acute distress  HEENT: Pupils equal, round, and reactive to light. Conjunctivae clear. Clear oral mucosa  Neck: Supple, with full range of motion. No jugular venous distention. Trachea midline. Respiratory:  Normal respiratory effort. Clear to auscultation, bilaterally without Rales/Wheezes/Rhonchi. Cardiovascular: Normal rate, regular rhythm with normal S1/S2 without murmurs, rubs or gallops. Abdomen: Soft, non-tender, non-distended with normal bowel sounds. Musculoskeletal: passive and active ROM x 4 extremities. (+) Right CVA tenderness  Skin:  No rashes or lesions. Exam of extremities: peripheral pulses normal, no pedal edema, trace pitting edema on both distal legs, no clubbing or cyanosis      Labs:   Recent Labs     05/06/21  1903 05/07/21  0706 05/08/21  0849   WBC 17.7* 12.7* 12.0*   HGB 15.8 14.5 15.2   HCT 48.4* 44.1 46.8    150 150     Recent Labs     05/07/21  2249 05/08/21  0830 05/09/21  0331    140 136   K 3.9 4.2 4.5    101 100   CO2 25 28 27   BUN 42* 40* 35*   CREATININE 1.6* 1.5* 1.4*   CALCIUM 9.1 9.4 9.3     Recent Labs     05/06/21  1118 05/07/21  0706   AST 70* 324*   ALT 21 54   BILITOT 0.2* 0.5   ALKPHOS 92 76     Recent Labs     05/06/21  1118   INR 1.00     No results for input(s): CKTOTAL, TROPONINI in the last 72 hours. Urinalysis:      Lab Results   Component Value Date    NITRU NEGATIVE 05/08/2021    WBCUA 5-9 05/08/2021    BACTERIA NONE SEEN 05/08/2021    RBCUA 5-10 05/08/2021    BLOODU MODERATE 05/08/2021    SPECGRAV 1.017 05/06/2021    GLUCOSEU NEGATIVE 05/08/2021       Radiology:  XR CHEST (2 VW)   Final Result   Actually improved aeration of the chest overall there is interstitial edema and very mild vascular congestion. There is cardiomegaly. **This report has been created using voice recognition software.  It may contain minor errors which are inherent in voice recognition technology. **      Final report electronically signed by Dr. Kiet Hart on 5/8/2021 7:58 AM      XR CHEST PORTABLE   Final Result   Impression:   1. IABP has its tip projecting at the level of the left mainstem    bronchus. 2.  Diffusely increased interstitial markings persist.      This document has been electronically signed by: Lois Horowitz MD on    05/07/2021 03:48 AM      XR CHEST PORTABLE   Final Result   Impression:   1. Increased interstitial markings bilaterally may represent interstitial    edema versus interstitial pneumonia      This document has been electronically signed by: Lois Horowitz MD on    05/06/2021 08:19 PM      XR CHEST PORTABLE   Final Result   1. Normal heart size. Moderate degenerative changes left shoulder. Right shoulder prosthesis. 2. No acute findings. No infiltrates or effusions are seen. **This report has been created using voice recognition software. It may contain minor errors which are inherent in voice recognition technology. **      Final report electronically signed by Dr. Bryan Becker on 5/6/2021 11:37 AM          Diet: DIET CARDIAC; No Caffeine      Assessment/Plan:      NSTEMI    -S/p cath 5/6/21, which showed severe LAD 80-90% and DX 90% disease  Ostial OM 90%, RCA non-dom, PCI of the LAD x 3 JUSTUS, PCI of the OM x 1 JUSTUS, Dx shut down during LAD PCI, ST-elevations laterally, LAD stenting was complicated by no-reflow - Adenosine IC given to improve, Rescued with PTCA but DX would not stay open - stented x 2 but still no flow IABP placed at 1:1 to help perfuse DX/LAD, removed 5/7/21\"  -pt was transferred to ICU post-cath, then was transferred to  from ICU on 5/7/2021   -cardiac rehab consulted   -Continue aspirin, Brilinta, Lipitor, Coreg  -Telemetry  -Cardiology signed off 5/8/2021, appreciate cardiology input.   Follow-up with Dr. Syed Alarcon in 2 weeks after discharge    CRISTINA on CKD stage III, likely multifactorial due to recent contrast and diuretics, improving    -Creatinine of 1.4 today, was 1.5 yesterday. Baseline creatinine ranges between 0.8-1.0. Possibly contrast-induced nephropathy versus due to diuretics (patient was placed on Lasix drip for acute systolic congestive heart failure/pulmonary edema in the ICU)  -Nephrology consulted,recommend to monitor for now and if creatinine is worsening could consider stopping either furosemide or spironolactone. Appreciate nephrology input. -BMP in a.m. Acute hypoxic resp failure due to acute systolic CHF, resolved     -pt was placed on BiPAP and nasal cannula in the ICU, currently on room air, saturating well. Acute systolic CHF with pulmonary edema, resolved  ICMP, EF 40-45%,  mild-moderate hypokinesis of the mid to distal anteroseptal   wall, mid to distal lateral wall per TTE 5/7/21     -Noted to have pulmonary edema on chest x-ray and has elevated proBNP  -Patient was managed with Lasix drip while in the ICU, will stop on 5/7/2021 at 1554  -Chest x-ray on 5/8/2021 improved  -Cardiology PA started Lasix 20 mg p.o. daily 5/8/2021, continue Lasix, creatinine improving. Patient is also on Aldactone.  -Continue Coreg    Trace to mild aortic regurgitation    -Noted on echocardiogram 5/6/2021  -Follow-up needed PCP and cardiology on discharge    Hyperglycemia/prediabetes     -A1c of 6.1% on 6/5/2019, repeat A1c today is 5.6%     Leukocytosis, likely reactive due to NSTEMI, stable     -afebrile  -denies s/sx of infection  -CXR normal  -(-) COVID-19  -blood cxs x 2 no growth preliminary   -CBC in am     Prolonged QTc, resolving      - check magnesium level. Keep magnesium >/=2.0 and potassium at least 4.0  -EKG in am  -tele     Anion gap metabolic acidosis, resolved    Physical deconditioning    -Patient is c/o generalized weakness, lives alone, not safe for discharge to home yet, awaiting PT/OT to see the patient. Patient states that she is okay to go to SNF if PT/OT recommend SNF.   Will consult  in anticipation of possible SNF placement on discharge. Right flank pain, chronic, unclear etiology    -pt is c/o right flank pain for past 5 months  -UA unremarkable except for (+) proteinuria and microscopic hematuria. Pt denies UTI symptoms nor gross hematuria. ?neprholith, patient is still complaining of right flank pain. Will obtain ultrasound of the kidneys to rule out nephrolithiasis. Would like to avoid CT abdomen/pelvis with contrast due to CRISTINA. Constipation    -Start stool softener  -Monitor for signs and symptoms of sbo     HLD     -cont lipitor  -, HDL 62 on 5/7/2021      Hypothyroidism     -cont thyroid armour  -TSH at goal in 12/2020     Essential HTN     -cont home meds aldactone and amlodipine with holding parameters   -Also on Coreg and Lasix  -VS per protocol         Anticipated Discharge in : pending         DVT prophylaxis: [] Lovenox                                 [x] SCDs                                 [] SQ Heparin                                 [] Encourage ambulation           [] Already on Anticoagulation     Disposition:    [] Home       [] TCU       [] Rehab       [] Psych       [] SNF       [] [facility]       [x] Other-Plan as above.  PT/OT consult for DC recommendation     Code Status: Full Code      Electronically signed by Larry Ellis MD on 5/9/2021 at 8:43 AM

## 2021-05-10 VITALS
RESPIRATION RATE: 16 BRPM | DIASTOLIC BLOOD PRESSURE: 58 MMHG | WEIGHT: 129.6 LBS | HEIGHT: 60 IN | HEART RATE: 79 BPM | TEMPERATURE: 97.8 F | SYSTOLIC BLOOD PRESSURE: 110 MMHG | OXYGEN SATURATION: 97 % | BODY MASS INDEX: 25.45 KG/M2

## 2021-05-10 LAB
ANION GAP SERPL CALCULATED.3IONS-SCNC: 10 MEQ/L (ref 8–16)
BASOPHILS # BLD: 0.3 %
BASOPHILS ABSOLUTE: 0 THOU/MM3 (ref 0–0.1)
BUN BLDV-MCNC: 34 MG/DL (ref 7–22)
CALCIUM SERPL-MCNC: 8.9 MG/DL (ref 8.5–10.5)
CHLORIDE BLD-SCNC: 102 MEQ/L (ref 98–111)
CO2: 23 MEQ/L (ref 23–33)
CREAT SERPL-MCNC: 1.2 MG/DL (ref 0.4–1.2)
EOSINOPHIL # BLD: 0.9 %
EOSINOPHILS ABSOLUTE: 0.1 THOU/MM3 (ref 0–0.4)
ERYTHROCYTE [DISTWIDTH] IN BLOOD BY AUTOMATED COUNT: 13 % (ref 11.5–14.5)
ERYTHROCYTE [DISTWIDTH] IN BLOOD BY AUTOMATED COUNT: 45.2 FL (ref 35–45)
GFR SERPL CREATININE-BSD FRML MDRD: 42 ML/MIN/1.73M2
GLUCOSE BLD-MCNC: 123 MG/DL (ref 70–108)
HCT VFR BLD CALC: 46.2 % (ref 37–47)
HEMOGLOBIN: 15.2 GM/DL (ref 12–16)
IMMATURE GRANS (ABS): 0.07 THOU/MM3 (ref 0–0.07)
IMMATURE GRANULOCYTES: 0.7 %
LYMPHOCYTES # BLD: 12.5 %
LYMPHOCYTES ABSOLUTE: 1.2 THOU/MM3 (ref 1–4.8)
MCH RBC QN AUTO: 31.3 PG (ref 26–33)
MCHC RBC AUTO-ENTMCNC: 32.9 GM/DL (ref 32.2–35.5)
MCV RBC AUTO: 95.1 FL (ref 81–99)
MONOCYTES # BLD: 10.3 %
MONOCYTES ABSOLUTE: 1 THOU/MM3 (ref 0.4–1.3)
NUCLEATED RED BLOOD CELLS: 0 /100 WBC
PLATELET # BLD: 166 THOU/MM3 (ref 130–400)
PMV BLD AUTO: 10.2 FL (ref 9.4–12.4)
POTASSIUM REFLEX MAGNESIUM: 4.1 MEQ/L (ref 3.5–5.2)
RBC # BLD: 4.86 MILL/MM3 (ref 4.2–5.4)
SEG NEUTROPHILS: 75.3 %
SEGMENTED NEUTROPHILS ABSOLUTE COUNT: 7.2 THOU/MM3 (ref 1.8–7.7)
SODIUM BLD-SCNC: 135 MEQ/L (ref 135–145)
WBC # BLD: 9.6 THOU/MM3 (ref 4.8–10.8)

## 2021-05-10 PROCEDURE — 85025 COMPLETE CBC W/AUTO DIFF WBC: CPT

## 2021-05-10 PROCEDURE — 6370000000 HC RX 637 (ALT 250 FOR IP): Performed by: NURSE PRACTITIONER

## 2021-05-10 PROCEDURE — 6370000000 HC RX 637 (ALT 250 FOR IP): Performed by: PHYSICIAN ASSISTANT

## 2021-05-10 PROCEDURE — 99239 HOSP IP/OBS DSCHRG MGMT >30: CPT | Performed by: FAMILY MEDICINE

## 2021-05-10 PROCEDURE — 6370000000 HC RX 637 (ALT 250 FOR IP): Performed by: INTERNAL MEDICINE

## 2021-05-10 PROCEDURE — 80048 BASIC METABOLIC PNL TOTAL CA: CPT

## 2021-05-10 PROCEDURE — 97535 SELF CARE MNGMENT TRAINING: CPT

## 2021-05-10 PROCEDURE — 6370000000 HC RX 637 (ALT 250 FOR IP): Performed by: FAMILY MEDICINE

## 2021-05-10 PROCEDURE — 97162 PT EVAL MOD COMPLEX 30 MIN: CPT

## 2021-05-10 PROCEDURE — 2580000003 HC RX 258: Performed by: FAMILY MEDICINE

## 2021-05-10 PROCEDURE — 97166 OT EVAL MOD COMPLEX 45 MIN: CPT

## 2021-05-10 PROCEDURE — 36415 COLL VENOUS BLD VENIPUNCTURE: CPT

## 2021-05-10 PROCEDURE — 97116 GAIT TRAINING THERAPY: CPT

## 2021-05-10 PROCEDURE — 97110 THERAPEUTIC EXERCISES: CPT

## 2021-05-10 PROCEDURE — 99232 SBSQ HOSP IP/OBS MODERATE 35: CPT | Performed by: INTERNAL MEDICINE

## 2021-05-10 RX ORDER — ASPIRIN 81 MG/1
81 TABLET, CHEWABLE ORAL DAILY
Qty: 30 TABLET | Refills: 0 | DISCHARGE
Start: 2021-05-11 | End: 2021-06-16 | Stop reason: SDUPTHER

## 2021-05-10 RX ORDER — FUROSEMIDE 20 MG/1
20 TABLET ORAL DAILY
Qty: 60 TABLET | Refills: 0 | DISCHARGE
Start: 2021-05-11 | End: 2021-06-16 | Stop reason: SDUPTHER

## 2021-05-10 RX ORDER — NITROGLYCERIN 0.4 MG/1
TABLET SUBLINGUAL
Qty: 25 TABLET | Refills: 0 | DISCHARGE
Start: 2021-05-10 | End: 2022-07-05 | Stop reason: SDUPTHER

## 2021-05-10 RX ORDER — CARVEDILOL 3.12 MG/1
3.12 TABLET ORAL 2 TIMES DAILY WITH MEALS
Qty: 60 TABLET | Refills: 0 | DISCHARGE
Start: 2021-05-10 | End: 2021-06-16 | Stop reason: SDUPTHER

## 2021-05-10 RX ORDER — ATORVASTATIN CALCIUM 80 MG/1
80 TABLET, FILM COATED ORAL NIGHTLY
Qty: 30 TABLET | Refills: 0 | DISCHARGE
Start: 2021-05-10 | End: 2021-06-16 | Stop reason: SDUPTHER

## 2021-05-10 RX ADMIN — FLUTICASONE PROPIONATE 2 SPRAY: 50 SPRAY, METERED NASAL at 10:08

## 2021-05-10 RX ADMIN — MONTELUKAST SODIUM 10 MG: 10 TABLET ORAL at 10:09

## 2021-05-10 RX ADMIN — FAMOTIDINE 20 MG: 20 TABLET ORAL at 10:13

## 2021-05-10 RX ADMIN — LEVOTHYROXINE, LIOTHYRONINE 30 MG: 38; 9 TABLET ORAL at 09:52

## 2021-05-10 RX ADMIN — DOCUSATE SODIUM 100 MG: 100 CAPSULE ORAL at 10:13

## 2021-05-10 RX ADMIN — SPIRONOLACTONE 50 MG: 25 TABLET ORAL at 10:07

## 2021-05-10 RX ADMIN — SODIUM CHLORIDE, PRESERVATIVE FREE 10 ML: 5 INJECTION INTRAVENOUS at 10:15

## 2021-05-10 RX ADMIN — ASPIRIN 81 MG: 81 TABLET, CHEWABLE ORAL at 10:13

## 2021-05-10 RX ADMIN — POLYETHYLENE GLYCOL 3350 17 G: 17 POWDER, FOR SOLUTION ORAL at 10:13

## 2021-05-10 RX ADMIN — FUROSEMIDE 20 MG: 20 TABLET ORAL at 10:10

## 2021-05-10 RX ADMIN — AMLODIPINE BESYLATE 2.5 MG: 5 TABLET ORAL at 10:09

## 2021-05-10 RX ADMIN — LORAZEPAM 0.5 MG: 1 TABLET ORAL at 13:57

## 2021-05-10 RX ADMIN — TICAGRELOR 90 MG: 90 TABLET ORAL at 10:15

## 2021-05-10 RX ADMIN — CARVEDILOL 3.12 MG: 3.12 TABLET, FILM COATED ORAL at 10:08

## 2021-05-10 ASSESSMENT — PAIN SCALES - GENERAL
PAINLEVEL_OUTOF10: 0
PAINLEVEL_OUTOF10: 0

## 2021-05-10 NOTE — PROGRESS NOTES
MI Documentation    MI: : NSTEMI    PCI: YES    EF: 40-45%  ASA w/i first 24 hours: YES   BB w/i first 24 hours: YES       ------------------------------------------  1. ASA: YES  2.  BB: YES  3. ACE/ARB: CONTRAINDICATION hypotension  4.   Statin: YES  5.  P2Y12: YES    ))))))REMEMBER NTG SL AT DISCHARGE((((((

## 2021-05-10 NOTE — CARE COORDINATION
5/10/21, 9:51 AM EDT    DISCHARGE PLANNING EVALUATION  Spoke with Angel Faustin this morning. She would like to go to [facility] at discharge for rehab. Spoke with Ronald at the facility and made the referral.      5/10/21, 2:55 PM EDT    Patient goals/plan/ treatment preferences discussed by  and . Patient goals/plan/ treatment preferences reviewed with patient/ family. Patient/ family verbalize understanding of discharge plan and are in agreement with goal/plan/treatment preferences. Understanding was demonstrated using the teach back method. AVS provided by RN at time of discharge, which includes all necessary medical information pertaining to the patients current course of illness, treatment, post-discharge goals of care, and treatment preferences. Services After Discharge  Services At/After Discharge: Nursing Services, OT, PT, Aide Services([facility])   IMM Letter  IMM Letter given to Patient/Family/Significant other/Guardian/POA/by[de-identified]   IMM Letter date given[de-identified] 05/10/21  IMM Letter time given[de-identified] Madison Cherry has been accepted to [facility]. She will be skilled at the facility under her Medicare benefit. She will be transported by family. Discharge instructions are attached to blue discharge packet. Ronald from the facility is aware discharge will be this afternoon.

## 2021-05-10 NOTE — PROGRESS NOTES
Comprehensive Nutrition Assessment    Type and Reason for Visit:  Initial, Consult, Patient Education    Nutrition Recommendations/Plan:  1. Continue Cardiac, No Caffeine Diet. 2. Monitor pt's po intake. Pt is eating >50% of most meals at this time. Declined nutritional supplements but are aware that they are available. 3. No diet education needed. Pt has history of CAD with OHS and has been educated on Cardiac diet multiple times in the past.   4. Encouraged pt to resume all vitamin/mineral supplementation that pt was taking at home PTA. Nutrition Assessment:   Pt improving from a nutritional standpoint AEB improved po intake, stable weight. Pt Remains at risk for further nutritional compromise r/t need for angioplasty with multiple stents, poor po intake PTA, advanced age, and underlying medical conditions (hx of Thyroid Disease, HTN, Diverticulosis, Arthritis, Psoriasis, Skin Cancer, Hyperlipidemia). Nutrition recommendations/interventions as per above. Malnutrition Assessment:  Malnutrition Status:   No Malnutrition      Estimated Daily Nutrient Needs:  Energy (kcal):  1470 - 1764 kcal/day (25-30 kcal/kgm based on wt of 58.8 kgm); Weight Used for Energy Requirements:  Current     Protein (g):  73 - 88 grams of protein daily (1.2 - 1.5 grams of protein/kgm based on wt of 58.8 kgm); Weight Used for Protein Requirements:  Current        Fluid (ml/day):  per MD     Nutrition Related Findings:   Pt admitted with NSTEMI - s/p angioplasty on 5/6 with multiple stents. Pt seen - up sitting in chair. Had just finished breakfast and was talking on the phone. Returned to pt's room a few minutes later. Pt is [place] but very pleasant to talk with. Poor po intake PTA but has improved during hospital visit (>50% of most meals). No problems with chewing/swallowing. Discussed nutritional supplements but pt declined at this time d/t feeling that appetite is good now. Talked about Cardiac Diet.  Pt has history of CAD and OHS. Has been educated on heart healthy diet multiple times in the past. No diet education needed today. Labs reviewed. Blood sugars running in the 120's with A1C 5.6%. Rx includes: Colace, Pepcid, Lasix, Melatonin, Glycolax, Senokot, [place].  Pt takes MVI, Glucosamine, Melatonin, B Complex, Olive Leaf, MVI, Fish Oil, and Calcium Citrate at home. + BM early this am.      Wounds:  None       Current Nutrition Therapies:    DIET CARDIAC; No Caffeine    Anthropometric Measures:  · Height: 5' (152.4 cm)  · Current Body Weight: 129 lb 9.6 oz (58.8 kg)(no edema)   · Admission Body Weight: 132 lb 1.6 oz (59.9 kg)    · Usual Body Weight: 133 lb 12.8 oz (60.7 kg)(Per EMR on 4/1/21 (5 weeks ago))     · Ideal Body Weight: 100 lbs   · BMI: 25.3  · BMI Categories:   25.31kg/m  Overweight      Nutrition Diagnosis:   · Inadequate oral intake related to cardiac dysfunction as evidenced by poor intake prior to admission    Nutrition Interventions:   Food and/or Nutrient Delivery:  Continue Current Diet  Nutrition Education/Counseling:  No recommendation at this time, Education not indicated   Coordination of Nutrition Care:  Continue to monitor while inpatient    Goals:  Pt will continue to tolerate Cardiac Diet and eat at least 50% of her meals       Nutrition Monitoring and Evaluation:   Behavioral-Environmental Outcomes:  None Identified   Food/Nutrient Intake Outcomes:  Food and Nutrient Intake  Physical Signs/Symptoms Outcomes:  Biochemical Data, Weight, Skin     Discharge Planning:    Continue current diet     Electronically signed by Milagro Palumbo RD, ZAC on 5/10/21 at 10:33 AM EDT    Contact: [phone]

## 2021-05-10 NOTE — PROGRESS NOTES
Renal Progress Note    Assessment and Plan:    1. Acute kidney injury resolving  2. Leukocytosis improving  3. Cardiomyopathy  4.   Deconditioning  PLAN:   Labs reviewed  Serum creatinine is improved to 1.2 mg/dL from 1.4 mg/dL yesterday  Medications reviewed   No changes  Labs in the morning  We will continue to follow      Patient Active Problem List:     Deviated nasal septum     Hypertrophy of nasal turbinates     Sensorineural hearing loss     Mixed hearing loss     Hypertension     Hypothyroidism     Prediabetes     Pure hypercholesterolemia     Decreased ROM of left shoulder     Acute pain of left shoulder     NSTEMI (non-ST elevated myocardial infarction) ([facility])     Hyperglycemia     Leukocytosis     Prolonged Q-T interval on ECG     Stage 3a chronic kidney disease     Hyperlipidemia     Acute kidney injury superimposed on CKD ([facility])     Acute respiratory failure with hypoxia ([facility])     Ischemic cardiomyopathy     Aortic valve regurgitation     High anion gap metabolic acidosis     Physical deconditioning     Right flank pain      Subjective:   Admit Date: 5/6/2021    Interval History:   Seen for acute kidney injury  Comfortably asleep this morning  No issues from staff  Blood pressure is improved and stable  Urine output is not completely documented      Medications:   Scheduled Meds:   polyethylene glycol  17 g Oral Daily    senna  1 tablet Oral Nightly    furosemide  20 mg Oral Daily    amLODIPine  2.5 mg Oral Daily    famotidine  20 mg Oral Daily    fluticasone  2 spray Each Nostril Daily    melatonin  3 mg Oral Daily    montelukast  10 mg Oral Daily    spironolactone  50 mg Oral Daily    thyroid  30 mg Oral Daily    sodium chloride flush  5-40 mL Intravenous 2 times per day    carvedilol  3.125 mg Oral BID WC    aspirin  81 mg Oral Daily    magnesium replacement protocol   Other RX Placeholder    ticagrelor  90 mg Oral Q12H    atorvastatin  80 mg Oral Nightly    docusate sodium  100 mg Oral BID     Continuous Infusions:    CBC:   Recent Labs     05/08/21  0849   WBC 12.0*   HGB 15.2        CMP:    Recent Labs     05/08/21  0830 05/09/21  0331 05/10/21  0340    136 135   K 4.2 4.5 4.1    100 102   CO2 28 27 23   BUN 40* 35* 34*   CREATININE 1.5* 1.4* 1.2   GLUCOSE 131* 121* 123*   CALCIUM 9.4 9.3 8.9   LABGLOM 32* 35* 42*     Troponin: No results for input(s): TROPONINI in the last 72 hours. BNP: No results for input(s): BNP in the last 72 hours. INR: No results for input(s): INR in the last 72 hours. Lipids: No results for input(s): CHOL, LDLDIRECT, TRIG, HDL, AMYLASE, LIPASE in the last 72 hours. Liver: No results for input(s): AST, ALT, ALKPHOS, PROT, LABALBU, BILITOT in the last 72 hours. Invalid input(s): BILDIR  Iron:  No results for input(s): IRONS, FERRITIN in the last 72 hours. Invalid input(s): LABIRONS  US RENAL COMPLETE   Final Result   1. Questionable calculus superior pole right kidney. 2. Benign-appearing cyst superior pole left kidney. 3. Elevated resistive index left side, suggesting possible medical renal disease. 4. Study otherwise unremarkable. No acute findings. **This report has been created using voice recognition software. It may contain minor errors which are inherent in voice recognition technology. **      Final report electronically signed by Dr. Breana Melendrez on 5/9/2021 1:23 PM      XR CHEST (2 VW)   Final Result   Actually improved aeration of the chest overall there is interstitial edema and very mild vascular congestion. There is cardiomegaly. **This report has been created using voice recognition software. It may contain minor errors which are inherent in voice recognition technology. **      Final report electronically signed by Dr. Bernardo Kate on 5/8/2021 7:58 AM      XR CHEST PORTABLE   Final Result   Impression:   1. IABP has its tip projecting at the level of the left mainstem    bronchus.    2. Diffusely increased interstitial markings persist.      This document has been electronically signed by: Guero Lau MD on    05/07/2021 03:48 AM      XR CHEST PORTABLE   Final Result   Impression:   1. Increased interstitial markings bilaterally may represent interstitial    edema versus interstitial pneumonia      This document has been electronically signed by: Guero Lau MD on    05/06/2021 08:19 PM      XR CHEST PORTABLE   Final Result   1. Normal heart size. Moderate degenerative changes left shoulder. Right shoulder prosthesis. 2. No acute findings. No infiltrates or effusions are seen. **This report has been created using voice recognition software. It may contain minor errors which are inherent in voice recognition technology. **      Final report electronically signed by Dr. Julien Gonzalez on 5/6/2021 11:37 AM            Objective:   Vitals: BP (!) 124/59   Pulse 74   Temp 97.4 °F (36.3 °C)   Resp 12   Ht 5' (1.524 m)   Wt 129 lb 9.6 oz (58.8 kg)   SpO2 97%   BMI 25.31 kg/m²    Wt Readings from Last 3 Encounters:   05/10/21 129 lb 9.6 oz (58.8 kg)   05/06/21 134 lb 12.8 oz (61.1 kg)   04/14/21 133 lb 12.8 oz (60.7 kg)      24HR INTAKE/OUTPUT:      Intake/Output Summary (Last 24 hours) at 5/10/2021 0549  Last data filed at 5/9/2021 2121  Gross per 24 hour   Intake 1160 ml   Output 500 ml   Net 660 ml       Constitutional: 80year-old gentle lady comfortably asleep , no apparent distress   Skin:normal with no rash or lesions. HEENT: Head is normal. .Oral mucosa is moist   Neck:supple with no carotid bruit  Cardiovascular:  S1, S2 without murmur or rubs   Respiratory:  Clear to ausculation without wheezes, rhonchi or rales. Abdomen: Soft. Good bowel sounds. Ext: No LE edema  Musculoskeletal:Intact  Neuro: Deferred      Electronically signed by Shannan Mcbride MD on 5/10/2021 at 8:21 AM  **This report has been created using voice recognition software.  It maycontain minor errors which are inherent in voice recognition technology. **

## 2021-05-10 NOTE — FLOWSHEET NOTE
Report called to [facility] the nurse at the nursing home. Discussed discharge summary with patient. Instructed patient about medications & follow up appointments. Patient denies any additional questions. Patient was discharged with all belongings. No distress noted. Patient discharged to home. Taken down to the vehicle by tech per wheelchair. Transported to the nursing home by her friend Laisha BAUGH and last dose [facility].

## 2021-05-10 NOTE — DISCHARGE INSTR - COC
Continuity of Care Form    Patient Name: Matthew Singh   :  1925  MRN:  [id number]    Admit date:  2021  Discharge date:  5/10/21    Code Status Order: Limited   Advance Directives:   Advance Care Flowsheet Documentation       Date/Time Healthcare Directive Type of Healthcare Directive Copy in [address] Agent's Name Healthcare Agent's Phone Number    21 0930  No, patient does not have an advance directive for healthcare treatment  Health care treatment directive  Other (Comment)  --  --  --    21 1231  Yes, patient has an advance directive for healthcare treatment  Durable power of  for health care  Other (Comment) in [facility]   Mg Brooks or Karen He  --            Admitting Physician:  Barbie Borden MD  PCP: Gianluca Sanford DO    Discharging Nurse: * [facility] Unit/Room#: 3B-22/022-A  Discharging Unit Phone Number: [phone]    Emergency Contact:   Extended Emergency Contact Information  Primary Emergency Contact: Hazel JOHNSON [address] Phone: [phone]  Relation: Other  Secondary Emergency Contact: [facility]  Address: [address] [place] [address], [address] [place] [address]  Mobile Phone: [phone]  Relation: Child    Past Surgical History:  Past Surgical History:   Procedure Laterality Date    BREAST BIOPSY Left     benign    BREAST SURGERY Left 1966    Lumpectomy    CARPAL TUNNEL RELEASE Right 2013    CARPAL TUNNEL RELEASE Left 13    CATARACT REMOVAL Bilateral     COLON SURGERY  1999    resection    COLONOSCOPY  , ,     HYSTERECTOMY  1970    HYSTERECTOMY, TOTAL ABDOMINAL      KNEE ARTHROSCOPY Right 2007    meniscectomies    KNEE SURGERY Left     replacement    MYRINGOTOMY Right 2015    tube insertion    OVARY REMOVAL Bilateral 2001    oophorectomy    OVARY REMOVAL Bilateral     SHOULDER SURGERY  5/14    SINUS SURGERY         Immunization History:   Immunization History   Administered Date(s) Administered    COVID-19, Pfizer, PF, 30mcg/0.3mL 02/26/2021, 03/19/2021    Influenza, High Dose (Fluzone 65 yrs and older) 12/05/2018    Influenza, [place], IM, (6 mo and older Fluzone, Flulaval, Fluarix and 3 yrs and older Afluria) 01/03/2017    Influenza, Quadv, IM, PF (6 mo and older Fluzone, Flulaval, Fluarix, and 3 yrs and older Afluria) 12/13/2017    Influenza, Triv, inactivated, subunit, adjuvanted, IM (Fluad 65 yrs and older) 10/08/2019    Meningococcal MPSV4 (Menomune) 04/15/2014    Pneumococcal Conjugate 13-valent (Cuwhwub49) 03/17/2016    Pneumococcal Polysaccharide (Pnnrdukzz78) 02/10/2020    Zoster Live (Zostavax) 02/15/2014       Active Problems:  Patient Active Problem List   Diagnosis Code    Deviated nasal septum J34.2    Hypertrophy of nasal turbinates J34.3    Sensorineural hearing loss H90.5    Mixed hearing loss H90.8    Hypertension I10    Hypothyroidism E03.9    Prediabetes R73.03    Pure hypercholesterolemia E78.00    Decreased ROM of left shoulder M25.612    Acute pain of left shoulder M25.512    NSTEMI (non-ST elevated myocardial infarction) ([facility]) I21.4    Hyperglycemia R73.9    Leukocytosis D72.829    Prolonged Q-T interval on ECG R94.31    Stage 3a chronic kidney disease N18.31    Hyperlipidemia E78.5    Acute kidney injury superimposed on CKD ([facility]) N17.9, N18.9    Acute respiratory failure with hypoxia ([facility]) J96.01    Ischemic cardiomyopathy I25.5    Aortic valve regurgitation I35.1    High anion gap metabolic acidosis P11.9    Physical deconditioning R53.81    Right flank pain R10.9       Isolation/Infection:   Isolation            No Isolation          Patient Infection Status       Infection Onset Added Last Indicated Last Indicated By Review Planned Expiration Resolved Resolved By    None active    Resolved    COVID-19 Rule Out 05/06/21 05/06/21 05/06/21 COVID-19, Rapid (Ordered)   05/06/21 Rule-Out Test Resulted    COVID-19 Rule Out 12/04/20 12/04/20 12/04/20 COVID-19 Ambulatory (Ordered)   12/06/20 Rule-Out Test Resulted            Nurse Assessment:  Last Vital Signs: /61   Pulse 84   Temp 97.7 °F (36.5 °C) (Oral)   Resp 18   Ht 5' (1.524 m)   Wt 129 lb 9.6 oz (58.8 kg)   SpO2 96%   BMI 25.31 kg/m²     Last documented pain score (0-10 scale): Pain Level: 0  Last Weight:   Wt Readings from Last 1 Encounters:   05/10/21 129 lb 9.6 oz (58.8 kg)     Mental Status:  oriented, alert, coherent, logical, thought processes intact and able to concentrate and follow conversation    IV Access:  - None    Nursing Mobility/ADLs:  Walking   Assisted  Transfer  Assisted  Bathing  Assisted  Dressing  Assisted  Toileting  Assisted  Feeding  [address]  Assisted  Med Delivery   whole    Wound Care Documentation and Therapy:        Elimination:  Continence:   · Bowel: Yes  · Bladder: Yes  Urinary Catheter: None   Colostomy/Ileostomy/Ileal Conduit: No       Date of Last BM: 5/9/21    Intake/Output Summary (Last 24 hours) at 5/10/2021 1228  Last data filed at 5/10/2021 1015  Gross per 24 hour   Intake 1170 ml   Output 0 ml   Net 1170 ml     I/O last 3 completed shifts: In: 1160 [P.O.:1140; I.V.:20]  Out: 500 [Urine:500]    Safety Concerns: At Risk for Falls    Impairments/Disabilities:      None    Nutrition Therapy:  Current Nutrition Therapy:   - Oral Diet:  Cardiac    Routes of Feeding: Oral  Liquids: Thin Liquids  Daily Fluid Restriction: no  Last Modified Barium Swallow with Video (Video Swallowing Test): not done    Treatments at the Time of Hospital Discharge:   Respiratory Treatments: see MAR  Oxygen Therapy:  is not on home oxygen therapy.   Ventilator:    - No ventilator support    Rehab Therapies: Physical Therapy and Occupational Therapy  Weight Bearing Status/Restrictions: No weight bearing restirctions  Other Medical Equipment (for information only, NOT a DME order):  walker  Other Treatments: NA    Patient's personal belongings (please select all that are sent with patient):  Glasses, Hearing Aides bilateral, Dentures upper and lower    RN SIGNATURE:  Electronically signed by Yenifer Ferrera RN on 5/10/21 at 3:35 PM EDT    CASE MANAGEMENT/SOCIAL WORK SECTION    Inpatient Status Date: 5/6/2021    Readmission Risk Assessment Score:  Readmission Risk              Risk of Unplanned Readmission:        12           Discharging to Facility/ Agency   · Name: [facility]   · Natashal[address]ie [address], Nadia Page, [address] [facility]  · Phone:[phone]  · Fax:[phone]    Dialysis Facility (if applicable)   · Name:  · Address:  · Dialysis Schedule:  · Phone:  · Fax:    / signature: Electronically signed by Fawn Burdick. JOE Hayes on 5/10/21 at 12:46 PM EDT    PHYSICIAN SECTION    Prognosis: Fair    Condition at Discharge: Stable    Rehab Potential (if transferring to Rehab): Fair    Recommended Labs or Other Treatments After Discharge: f/u with Dr. Prabhakar Restrepo ( Cardiologist) in 2 weeks and with Dr. Earline Sanchez ( nephrologist) in 4 weeks with repeat BMP in 3 weeks     Physician Certification: I certify the above information and transfer of Merrill Orona  is necessary for the continuing treatment of the diagnosis listed and that she requires [place] for greater 30 days.      Update Admission H&P: No change in H&P    PHYSICIAN SIGNATURE:  Electronically signed by Nancy Dohetry MD on 5/10/21 at 12:28 PM EDT

## 2021-05-10 NOTE — PROGRESS NOTES
Angelica Jensen 60  INPATIENT OCCUPATIONAL THERAPY  [facility] CCU-STEPDOWN 3B  EVALUATION    Time:   Time In: 845  Time Out: 919  Timed Code Treatment Minutes: 26 Minutes  Minutes: 34    Date: 5/10/2021  Patient Name: Kyle Logan,   Gender: female      MRN: [id number]  : 1925  (80 y.o.)  Referring Practitioner: Yolanda Raymond MD  Diagnosis: NSTEMI (non-ST elevated myocardial infarction) [facility])  Additional Pertinent Hx: She denies personal history of cardiac disease. She reports chronic shoulder pains. She is s/p \"left shoulder injection\" for rotator cuff tear. Patient is functional and independent in her ADLs, lives by herself. Patient denies shortness of breath, dyspnea on exertion, orthopnea, paroxysmal nocturnal dyspnea, palpitations, dizziness, syncope, recent weight gain or leg swelling. She has been experiencing intermittent chest pains for the past few months. She also reports back pains, has been by physical therapy. She states that today, her chest pain is retrosternal, radiated to shoulder blades, described as tightness/aching, up to 10/10 today, no associated SOB or diaphoresis. She was seen at PCP office, EKG revealed SR ST elevation in aVR, significant ST depressions in inferolateral leads. She was sent to [facility] ER for further evaluation. Troponin was found to be elevated at 0.32. She was started on Heparin gtt, given SL NTG. She reports improvement in her chest pain    Restrictions/Precautions:  Restrictions/Precautions: General Precautions, Fall Risk    Subjective  Chart Reviewed: Yes, Orders, Progress Notes, History and Physical  Patient assessed for rehabilitation services?: Yes  Family / Caregiver Present: No    Subjective: RN okayed OT session. Pt sitting in bed with visitor present upon arrival. Pt awaiting breakfast but agreeable to participating in therapy.  On 5/10, pt reporting she does not feel that she could safely dc home alone yet    Pain:  Pain Assessment  Patient Currently to/from chair with arms. to bedside recliner, good safety awareness   *Pt with very slight lightheadedness after standing, encouraged to take some time for body to regulate, then reporting lightheadedness has improved. Pt also reports she usually eats early in the morning and feels that some lightheadedness might be due to not eating yet. FUNCTIONAL MOBILITY:  Assistive Device: Rolling Walker  Assist Level:  Contact Guard Assistance. Distance: To and from bathroom  **Pt reports she did not use AD prior to admission, requiring 1-2 cues for walker placement/management in smaller space. Pt motivated to walk, but requesting to wait until after she has eaten breakfast.     Activity Tolerance:  Patient tolerance of  treatment: good. Pt is very motivated to participate today, was IND PTA, but reports she would not feel safe to return home alone yet before going somewhere to receive therapies. Educated patient on energy conservation techniques and importance of pacing tasks throughout the day. Pt agreeable and verbalizing understanding. Due to decreased endurance needed for ADL/IADL completion, pt would benefit from continued therapy after discharge. Assessment:  Assessment: Pt s/p cath/PCI procedure on 5/6 and presenting with the following performance deficits. Pt requiring skilled OT services to return to PLOF home alone. Without skilled OT services, pt is at increased risk of falls. Performance deficits / Impairments: Decreased functional mobility , Decreased strength, Decreased endurance, Decreased ADL status, Decreased high-level IADLs  Prognosis: Fair  REQUIRES OT FOLLOW UP: Yes    Treatment Initiated: Treatment and education initiated within context of evaluation.   Evaluation time included review of current medical information, gathering information related to past medical, social and functional history, completion of standardized testing, formal and informal observation of tasks, assessment of data and development of plan of care and goals. Treatment time included skilled education and facilitation of tasks to increase safety and independence with ADL's for improved functional independence and quality of life. Discharge Recommendations:  [address], Patient would benefit from continued therapy after discharge    Patient Education:  OT Education: OT Role, Plan of Care, Home Exercise Program, ADL Adaptive Strategies, Energy Conservation, IADL Safety    Equipment Recommendations:  Equipment Needed: No    Plan:  Times per week: 5x  Current Treatment Recommendations: Strengthening, Self-Care / ADL, Functional Mobility Training, Endurance Training, Home Management Training. See long-term goal time frame for expected duration of plan of care. If no long-term goals established, a short length of stay is anticipated. Goals:  Patient goals : To return home and get back to walking  Short term goals  Time Frame for Short term goals: By discharge  Short term goal 1: Pt to tolerate dynamic standing task >8 minutes with BUE release, superivision, and good safety awareness to increase safety and indep in sinkside grooming tasks. Short term goal 2: Pt to complete simple homemaking task with supervision, good safety awareness, and rest breaks as needed in prep for home environment. Short term goal 3: Pt to tolerate functional [facility] and community distances with supervision and AD as needed to increase endurance for toileting and homemaking tasks. Short term goal 4: Pt to tolerate BUE ROM and light resistive strengthening HEP with minimal cues for technique for increase endurance needed for BADL/IADL tasks. Following session, patient left in safe position with all fall risk precautions in place.

## 2021-05-10 NOTE — PROGRESS NOTES
CLINICAL PHARMACY: [address] [facility] Select Patient?: No  Total # of Interventions Recommended: 1 -   - Discontinued Medication #: 1   -   Total # Interventions Accepted: 1  Intervention Severity:   - Level 1 Intervention Present?: No   - Level 2 #: 1   - Level 3 #: 0   Time Spent (min): 15    Additional Documentation:  PC to Dr. Wilson Albert to see if should stop norvasc as coreg was started for patient b/c of MI and BP is marginal  Received call back from Dr. Wilson Albert - stop norvasc

## 2021-05-10 NOTE — DISCHARGE SUMMARY
Hospital Medicine Discharge Summary      Patient Identification:   Jolie Vera   : 1925  MRN: [id number]   Account: [de-identified]      Patient's PCP: Andrew Silver DO    Admit Date: 2021     Discharge Date:   5/10/2021     Admitting Physician: Mauro Hugo MD     Discharge Physician: Jaspal Salazar MD     Discharge Diagnoses with Hospital course:    80 y.o. female with PMH listed below who presented to [address] [place] [address] with chief complaint of chest pain     Patient reports that around 7am this morning, after eating breakfast, she started having mid thoracic pain that radiates to her neck and mid sternal area. She described the pain as achy, sharp, 2-3/10, was constant until she received nitroglycerin and ASA in ER per pt, which resolved her back and chest pain. Pt reports that she had lightheadedness, HA and palpitations as well during that time. She denies N/V, diaphoresis, abd pain. She states that she called her PCP and was seen in PCP office and per pt had EKG in PCP office, which was \"abnormal\" per pt and was sent to ER.     In ER, VS: temp 97.8F, RR 16, PA 96, /79, saturating 99% on room air. Labs include BMP normal except for glucose 163. CBC normal except for WBC 14. 6. pro-BNP normal. Troponin T 0.327. 1st EKG showed ST depression on anterolateral leads. Repeat EKG showed ST depression on anterior and inferior leads, prolonged QTc. COVID-19 rapid test (-). CXR no acute findings. Pt received  mg PO and was started on heparin gtt in ER. Hospital Medicine service was called for IP management. Patient was admitted under [facility] for NSTEMI. Cardiology was consulted.  Patient underwent cardiac cath on 2021 which showed severe LAD 80-90% and DX 90% disease  Ostial OM 90%, RCA non-dom, PCI of the LAD x 3 JUSTUS, PCI of the OM x 1 JUSTUS, Dx shut down during LAD PCI, ST-elevations laterally, LAD stenting was complicated by no-reflow - Adenosine IC given to improve, Rescued with PTCA but DX would not stay open - stented x 2 but still no flow IABP placed at 1:1 to help perfuse DX/LAD, removed 5/7/21\". Patient was transferred to ICU post-cardiac cath. She was also managed with lasix gtt while in the ICU for Acute systolic CHF with pulmonary edema. She was transferred to stepdown unit 3B from ICU on 5/7/2021. Please see below for details of hospital course:    NSTEMI     -S/p cath 5/6/21, which showed severe LAD 80-90% and DX 90% disease  Ostial OM 90%, RCA non-dom, PCI of the LAD x 3 JUSTUS, PCI of the OM x 1 JUSTUS, Dx shut down during LAD PCI, ST-elevations laterally, LAD stenting was complicated by no-reflow - Adenosine IC given to improve, Rescued with PTCA but DX would not stay open - stented x 2 but still no flow IABP placed at 1:1 to help perfuse DX/LAD, removed 5/7/21\"  -pt was transferred to ICU post-cath, then was transferred to  from ICU on 5/7/2021   -cardiac rehab consulted   -Continue aspirin, Brilinta, Lipitor, Coreg  -Telemetry  -Cardiology signed off 5/8/2021, appreciate cardiology input. Follow-up with Dr. Sydnee De Guzman in 2 weeks after discharge     CRISTINA on CKD stage III, likely multifactorial due to recent contrast and diuretics, improving     -Creatinine of 1.2 today, was 1.4 yesterday. Baseline creatinine ranges between 0.8-1.0. Possibly contrast-induced nephropathy versus due to diuretics (patient was placed on Lasix drip for acute systolic congestive heart failure/pulmonary edema in the ICU)  -Nephrology on-board, Dr. Soo Dave (nephrology) is okay for discharge on 5/10/2021, recommend follow-up in his office in 4 weeks with repeat BMP in 3 weeks prior nephrology appointment. Appreciate nephrology input.   -BMP in a.m.     Acute hypoxic resp failure due to acute systolic CHF, resolved      -pt was placed on BiPAP and nasal cannula in the ICU, currently on room air, saturating well.     Acute systolic CHF with pulmonary edema, resolved  ICMP, EF 40-45%, softener  -Monitor for signs and symptoms of sbo     HLD     -cont lipitor  -, HDL 62 on 5/7/2021      Hypothyroidism     -cont thyroid armour  -TSH at goal in 12/2020     Essential HTN     -cont home meds aldactone with holding parameters   -Also on Coreg and Lasix  -stop amlodipine as SBP 100s to 110s    -VS per protocol      On 5/10/2021, patient states that her right flank pain is better now. She denies flank pain today. Patient denies chest pain, shortness of breath, palpitations, cough, fever, chills. She reports that she had small BM this morning, denies abdominal pain, nausea and vomiting. She denies UTI symptoms. Cardiology and nephrology are okay for discharge. Patient is medically stable for discharge. PT/OT evaluated the patient. Palliative care RN saw the patient for code status discussion on 5/10/2021. Patient decided to change her code status from full code to [facility] or limited no x4. PT/OT recommend SNF. SW consulted for SNF placement. [facility]. Patient was discharged in stable condition to [facility] on 5/10/2021. The patient was seen and examined on day of discharge and this discharge summary is in conjunction with any daily progress note from day of discharge. Exam:     Vitals:  Vitals:    05/10/21 0315 05/10/21 0326 05/10/21 1000 05/10/21 1207   BP:  (!) 124/59 (!) 111/57 115/61   Pulse:  74 92 84   Resp:  12 16 18   Temp:  97.4 °F (36.3 °C) 97.9 °F (36.6 °C) 97.7 °F (36.5 °C)   TempSrc:   Oral Oral   SpO2:  97% 92% 96%   Weight: 129 lb 9.6 oz (58.8 kg)      Height:         Weight: Weight: 129 lb 9.6 oz (58.8 kg)     24 hour intake/output:    Intake/Output Summary (Last 24 hours) at 5/10/2021 1510  Last data filed at 5/10/2021 1344  Gross per 24 hour   Intake 430 ml   Output 0 ml   Net 430 ml         General appearance: Alert, not in acute distress  HEENT: Pupils equal, round, and reactive to light. Conjunctivae clear.   Clear oral mucosa  Neck: Supple, with full range of motion. No jugular venous distention. Trachea midline. Respiratory:  Normal respiratory effort. Clear to auscultation, bilaterally without Rales/Wheezes/Rhonchi. Cardiovascular: Normal rate, regular rhythm with normal S1/S2 without murmurs, rubs or gallops. Abdomen: Soft, non-tender, non-distended with normal bowel sounds. Musculoskeletal: passive and active ROM x 4 extremities. No CVA tenderness  Skin:  No rashes or lesions. Exam of extremities: peripheral pulses normal, no pedal edema, trace pitting edema on both distal legs, no clubbing or cyanosis    Labs: For convenience and continuity at follow-up the following most recent labs are provided:      CBC:    Lab Results   Component Value Date    WBC 9.6 05/10/2021    HGB 15.2 05/10/2021    HCT 46.2 05/10/2021     05/10/2021       Renal:    Lab Results   Component Value Date     05/10/2021    K 4.1 05/10/2021     05/10/2021    CO2 23 05/10/2021    BUN 34 05/10/2021    CREATININE 1.2 05/10/2021    CALCIUM 8.9 05/10/2021    PHOS 3.5 11/02/2019         Significant Diagnostic Studies    Radiology:   US RENAL COMPLETE   Final Result   1. Questionable calculus superior pole right kidney. 2. Benign-appearing cyst superior pole left kidney. 3. Elevated resistive index left side, suggesting possible medical renal disease. 4. Study otherwise unremarkable. No acute findings. **This report has been created using voice recognition software. It may contain minor errors which are inherent in voice recognition technology. **      Final report electronically signed by Dr. Gabrielle Bobo on 5/9/2021 1:23 PM      XR CHEST (2 VW)   Final Result   Actually improved aeration of the chest overall there is interstitial edema and very mild vascular congestion. There is cardiomegaly. **This report has been created using voice recognition software.  It may contain minor errors which are inherent in voice recognition technology. **      Final report electronically signed by Dr. Germain Moss on 5/8/2021 7:58 AM      XR CHEST PORTABLE   Final Result   Impression:   1. IABP has its tip projecting at the level of the left mainstem    bronchus. 2.  Diffusely increased interstitial markings persist.      This document has been electronically signed by: Devon Pope MD on    05/07/2021 03:48 AM      XR CHEST PORTABLE   Final Result   Impression:   1. Increased interstitial markings bilaterally may represent interstitial    edema versus interstitial pneumonia      This document has been electronically signed by: Devon Pope MD on    05/06/2021 08:19 PM      XR CHEST PORTABLE   Final Result   1. Normal heart size. Moderate degenerative changes left shoulder. Right shoulder prosthesis. 2. No acute findings. No infiltrates or effusions are seen. **This report has been created using voice recognition software. It may contain minor errors which are inherent in voice recognition technology. **      Final report electronically signed by Dr. Ernesto Silva on 5/6/2021 11:37 AM             Consults:     IP CONSULT TO CARDIOLOGY  IP CONSULT TO CARDIOLOGY  IP CONSULT TO CARDIAC REHAB  IP CONSULT TO DIETITIAN  IP CONSULT TO SOCIAL WORK  IP CONSULT TO NEPHROLOGY  IP CONSULT TO SOCIAL WORK  PALLIATIVE CARE EVAL    Disposition:    [] Home       [] TCU       [] Rehab       [] Psych       [x] SNF       [] [place]       [] Other-    Condition at Discharge: Stable    Code Status:  Limited     Patient Instructions:    Discharge lab work: BMP in 3 weeks   Activity: activity as tolerated  Diet: DIET CARDIAC; No Caffeine      Follow-up visits:   [facility] [address]  [address], [address] [place] [address]    In 4 weeks      [address]  [facility] [address] [phone] Discharge Medications:      Brayanjoel Domenic   Home Medication Instructions [id number]    Printed on:05/10/21 6599   Medication Information                      aspirin 81 MG chewable tablet  Take 1 tablet by mouth daily             atorvastatin (LIPITOR) 80 MG tablet  Take 1 tablet by mouth nightly             AYR SALINE NASAL NO-DRIP GEL  Use 1 spray in each nostril 4 times a day             b complex vitamins capsule  Take 1 capsule by mouth daily             blood glucose monitor strips  Check blood sugar q daily, Dx R73.01             Blood Glucose Monitoring Suppl (TRUE METRIX METER) W/DEVICE KIT  1 Device by Does not apply route daily Check blood sugar q daily, Dx E11.9             CALCIUM CITRATE  Take 600 mg by mouth daily              carvedilol (COREG) 3.125 MG tablet  Take 1 tablet by mouth 2 times daily (with meals)             docusate sodium (COLACE) 100 MG capsule  Take 100 mg by mouth as needed for Constipation             famotidine (PEPCID) 20 MG tablet  Take 1 tablet by mouth 2 times daily             fish oil-omega-3 fatty acids 1000 MG capsule  Take 2 g by mouth daily. fluticasone (FLONASE) 50 MCG/ACT nasal spray  2 sprays by Each Nostril route daily             furosemide (LASIX) 20 MG tablet  Take 1 tablet by mouth daily             Glucosamine HCl 1500 MG TABS  Take 1,500 mg by mouth daily             Handicap Placard MISC  by Does not apply route Expires 9/6/2022             Loratadine (CLARITIN PO)  Take 1 tablet by mouth daily as needed              LORazepam (ATIVAN) 1 MG tablet  Take 0.5 mg by mouth every 6 hours as needed for Anxiety. Pt usually only takes a half tab when needed             melatonin 3 MG TABS tablet  Take 3 mg by mouth daily             montelukast (SINGULAIR) 10 MG tablet  TAKE 1 TABLET BY MOUTH DAILY             Multiple Vitamins-Minerals (THERAPEUTIC MULTIVITAMIN-MINERALS) tablet  Take 1 tablet by mouth daily.              nitroGLYCERIN (NITROSTAT) 0.4 MG SL tablet  up to max of 3 total doses. If no relief after 1 dose, call 911. OLIVE LEAF PO  Take 450 mg by mouth daily             polyethyl glycol-propyl glycol 0.4-0.3 % (SYSTANE) 0.4-0.3 % ophthalmic solution  1 drop as needed for Dry Eyes             spironolactone (ALDACTONE) 50 MG tablet  Take 1 tablet by mouth daily             thyroid (ARMOUR) 60 MG tablet  Take 0.5 tablets by mouth daily VINNY             ticagrelor (BRILINTA) 90 MG TABS tablet  Take 1 tablet by mouth every 12 hours                 Time Spent on discharge is more than 30 minutes in the examination, evaluation, counseling and review of medications and discharge plan. Discharge Medications for PCI/MI (performed or attempted):   ASA:   yes    Statin:   yes  ACE/ARB:  No (CRISTINA)   P2Y12 Inhibitor:  yes   Beta Blocker:   yes  Nitro SL:   yes   Cardiac Rehab:  yes             Signed: Thank you Beverly Chavez DO for the opportunity to be involved in this patient's care.     Electronically signed by Madonna Roblero MD on 5/10/2021 at 3:10 PM

## 2021-05-10 NOTE — CARE COORDINATION
5/10/21, 12:33 PM EDT    DISCHARGE ON GOING EVALUATION    [facility] day: 4  Location: -22/022-A Reason for admit: NSTEMI (non-ST elevated myocardial infarction) ([address] [I21.4]   Procedure:   5/6 Echo with EF 45-50%  5/6 Cardiac Cath: PCI of LAD x3, PCI of OM x1; IABP placed. 5/8 CXR - improved aeration, there is interstitial edema and very mild vascular congestion. Cardiomegaly. 5/9 Renal US - Questionable calculus superior pole right kidney. Benign-appearing cyst superior pole left kidney. Elevated resistive index left side, suggesting possible medical renal disease. Nothing acute. Barriers to Discharge: Pt transferred to  over the weekend from ICU. PT/OT. Planning ECF today. PCP: Tran Grewal DO  Readmission Risk Score: 12%  Patient Goals/Plan/Treatment Preferences: From home alone. Planning Em[facility] Los at discharge. SW following.

## 2021-05-10 NOTE — PROGRESS NOTES
rails  Entrance Stairs - Number of Steps: 1 LESLY  Home Equipment: 4 wheeled walker, Cane, Standard walker     Bathroom Shower/Tub: Tub/Shower unit  Bathroom Toilet: Standard  Bathroom Equipment: Shower chair, Toilet raiser, Hand-held shower, Grab bars in shower  Bathroom Accessibility: Accessible       ADL Assistance: Independent  Homemaking Assistance: [facility] Responsibilities: Yes(assist with vacuming)  Ambulation Assistance: Independent  Transfer Assistance: Independent    Active : Yes     Additional Comments: Pt independent at PLOF with no AD    OBJECTIVE:  Range of Motion:  Right Lower Extremity: WFL  Left Lower Extremity: [facility]    Strength:  Right Lower Extremity: Impaired - hip flexion and knee grossly 4/5  Left Lower Extremity: Impaired - hip flexion and knee grossly 4/5    Balance:  Static Sitting Balance:  Independent  Static Standing Balance: Contact Guard Assistance  Dynamic Standing Balance: Contact Guard Assistance    Bed Mobility:  Rolling to Right: Stand By Assistance   Supine to Sit: Minimal Assistance--assist with trunk  Sit to Supine: Stand By Assistance   *Head of bed flat, no rails    Transfers:  Sit to Stand: Contact Guard Assistance  Stand to Sit:Contact Guard Assistance   *Pt notes mild lightheadedness with transfers. Education on avoiding ambulating when lightheaded and to stop/discontinue activity until lightheadedness resolves. Ambulation:  Contact Guard Assistance  Distance: 36', 39', 5'x2  Surface: Level Tile  Device:Rolling Walker  Gait Deviations: Forward Flexed Posture, Slow Claudia, Decreased Step Length Bilaterally, Decreased Gait Speed and Decreased Heel Strike Bilaterally  *Education on ambulating with staff. PT monitoring heart rate with activity  *Verbal cues for RW placement in small spaces    Exercise:  Patient was guided in 1 set(s) 10-15 reps of exercise to both lower extremities.   Pt notes completing exercises daily at home and would like to review allow patient to return to OF. Prognosis: Good   Co-morbidities: arthritis, HTN  Pt has telemetry, requiring use of walker    REQUIRES PT FOLLOW UP: Yes    Discharge Recommendations:  Discharge Recommendations: Continue to assess pending progress, Subacute/Skilled Nursing Facility, Patient would benefit from continued therapy after discharge    Patient Education:  PT Education: PT Role, Goals, Plan of Care, Home Exercise Program    Equipment Recommendations:  Equipment Needed: (monitor need for RW)    Plan:  Times per week: 5xGM  Times per day: Daily  Current Treatment Recommendations: Strengthening, Transfer Training, Neuromuscular Re-education, Endurance Training, Patient/Caregiver Education & Training, Balance Training, Gait Training, Home Exercise Program, Functional Mobility Training, Stair training, Safety Education & Training    Goals:  Patient goals : go home  Short term goals  Time Frame for Short term goals: at discharge  Short term goal 1: Patient will complete sit < > stand with modified independence to stand to ambulate safely. Short term goal 2: Patient will ambulate 80' with a RW and SBA to progress towards ambulating household distances safely. Short term goal 3: Patient will complete supine < > sit with independence to transfer in/out of bed safely. Short term goal 4: Patient will ascend/descend 1 step with RW and CGA to prepare for safe home entry. Long term goals  Time Frame for Long term goals : N/A due to short estimated length of stay    Following session, patient left in safe position with all fall risk precautions in place.

## 2021-05-10 NOTE — CARE COORDINATION
5/10/21, 7:08 AM EDT    DISCHARGE BARRIERS        Patient transferred to 3B. Report given to unit SW, Ivette, regarding discharge plan for this patient.

## 2021-05-10 NOTE — PLAN OF CARE
Problem: Pain:  Goal: Control of chronic pain  Description: Control of chronic pain  Outcome: Ongoing     Problem: Falls - Risk of:  Goal: Will remain free from falls  Description: Will remain free from falls  Outcome: Ongoing     Problem: Pain:  Goal: Pain level will decrease  Description: Pain level will decrease  Outcome: Ongoing  Goal: Control of acute pain  Description: Control of acute pain  Outcome: Ongoing  Goal: Control of chronic pain  Description: Control of chronic pain  Outcome: Ongoing   . Josué Fulton Care plan reviewed with patient . Patient verbalize understanding of the plan of care and contribute to goal setting.

## 2021-05-10 NOTE — PLAN OF CARE
Problem: Nutrition  Goal: Optimal nutrition therapy  Outcome: Ongoing    Nutrition Problem #1: Inadequate oral intake  Intervention: Food and/or Nutrient Delivery: Continue Current Diet  Nutritional Goals: Pt will continue to tolerate Cardiac Diet and eat at least 50% of her meals

## 2021-05-11 ENCOUNTER — TELEPHONE (OUTPATIENT)
Dept: FAMILY MEDICINE CLINIC | Age: 86
End: 2021-05-11

## 2021-05-11 ENCOUNTER — TELEPHONE (OUTPATIENT)
Dept: CARDIOLOGY CLINIC | Age: 86
End: 2021-05-11

## 2021-05-11 DIAGNOSIS — N18.30 STAGE 3 CHRONIC KIDNEY DISEASE, UNSPECIFIED WHETHER STAGE 3A OR 3B CKD (HCC): Primary | ICD-10-CM

## 2021-05-11 NOTE — TELEPHONE ENCOUNTER
[facility] Transitions Initial Follow Up Call    Outreach made within 2 business days of discharge: Yes    Patient: Kyle Logan Patient : 1925   MRN: [id number]  Reason for Admission: There are no discharge diagnoses documented for the most recent discharge.   Discharge Date: 5/10/21       Spoke with: [facility]

## 2021-05-11 NOTE — TELEPHONE ENCOUNTER
[facility] Transitions Initial Follow Up Call    Outreach made within 2 business days of discharge: Yes    Patient: Marisela Mota Patient : 1925   MRN: [id number]  Reason for Admission: There are no discharge diagnoses documented for the most recent discharge. Discharge Date: 5/10/21       Spoke with: pt    Discharge department/facility: [facility] Interactive Patient Contact:  Was patient able to fill all prescriptions: Yes  Was patient instructed to bring all medications to the follow-up visit: Yes  Is patient taking all medications as directed in the discharge summary?  Yes  Does patient understand their discharge instructions: Yes  Does patient have questions or concerns that need addressed prior to 7-14 day follow up office visit: no    Scheduled appointment with PCP within 7-14 days    Follow Up  Future Appointments   Date Time Provider Rosy Fisher   2021 12:30 PM Debbie Yip MD N [facility] - [place]   2021  2:20 PM DO ALEX Aguilar PCT P - [place]   6/10/2021  2:00 PM MD CELINE Easton LPN

## 2021-05-11 NOTE — TELEPHONE ENCOUNTER
Aubrey gerardo called to make a  2 wk hospital f/u appt with Dr Alee Florez. Farhat Dudley was DC 05-10 to aubrey gerardo from [facility]. Dx=weakness, and stents place. Please call aubrey gerardo at [phone], and ask for [address]eno nurse.

## 2021-05-12 LAB
BLOOD CULTURE, ROUTINE: NORMAL
BLOOD CULTURE, ROUTINE: NORMAL

## 2021-05-19 ENCOUNTER — TELEPHONE (OUTPATIENT)
Dept: CARDIOLOGY CLINIC | Age: 86
End: 2021-05-19

## 2021-05-19 NOTE — PROGRESS NOTES
[address] [facility] [address]  [place] [address]  Dept: [phone]  Dept Fax: [phone]  Loc: [phone]    Visit Date: 5/20/2021    Ms. Elayne Torres is a 80 y.o. female  who presented for:    NSTEMI  Leg edema     HPI:   HPI Baldo Osgood is a pleasant 80year old female patient who  has a past medical history of Arthritis, Cancer ([address] Diverticulosis, Hyperlipidemia, Hypertension, Hypothyroidism, Psoriasis, S/P angioplasty with stent, and Thyroid disorder. On 5/2021, she was admitted to [facility] with NSTEMI. Patient underwent cardiac cath on 5/6/2021 which showed severe LAD 80-90% and DX 90% disease, Ostial OM 90%, RCA non-dominant. She underwent PCI of the LAD x 3 JUSTUS, PCI of the OM x 1 JUSTUS. Procedure was complicated by loss of side branch (D1) and no re-flow in LAD. IABP was placed post PCI. Echo 5/2021 revealed EF 40-45%, Mild MR. The patient returns for follow up. She reports chronic leg edema, recently improved. Patient denies chest pain, shortness of breath, dyspnea on exertion, orthopnea, paroxysmal nocturnal dyspnea, palpitations, dizziness, syncope, weight gain. /69. Current Outpatient Medications:     nitroGLYCERIN (NITROSTAT) 0.4 MG SL tablet, up to max of 3 total doses. If no relief after 1 dose, call 911., Disp: 25 tablet, Rfl: 0    atorvastatin (LIPITOR) 80 MG tablet, Take 1 tablet by mouth nightly, Disp: 30 tablet, Rfl: 0    carvedilol (COREG) 3.125 MG tablet, Take 1 tablet by mouth 2 times daily (with meals), Disp: 60 tablet, Rfl: 0    furosemide (LASIX) 20 MG tablet, Take 1 tablet by mouth daily, Disp: 60 tablet, Rfl: 0    ticagrelor (BRILINTA) 90 MG TABS tablet, Take 1 tablet by mouth every 12 hours, Disp: 60 tablet, Rfl:     aspirin 81 MG chewable tablet, Take 1 tablet by mouth daily, Disp: 30 tablet, Rfl: 0    LORazepam (ATIVAN) 1 MG tablet, Take 0.5 mg by mouth every 6 hours as needed for Anxiety.  Pt usually only takes a half tab when needed, Disp: , Rfl:     Glucosamine HCl 1500 MG TABS, Take 1,500 mg by mouth daily, Disp: , Rfl:     spironolactone (ALDACTONE) 50 MG tablet, Take 1 tablet by mouth daily, Disp: 90 tablet, Rfl: 3    montelukast (SINGULAIR) 10 MG tablet, TAKE 1 TABLET BY MOUTH DAILY, Disp: 90 tablet, Rfl: 3    thyroid (ARMOUR) 60 MG tablet, Take 0.5 tablets by mouth daily VINNY, Disp: 45 tablet, Rfl: 3    melatonin 3 MG TABS tablet, Take 3 mg by mouth daily, Disp: , Rfl:     famotidine (PEPCID) 20 MG tablet, Take 1 tablet by mouth 2 times daily, Disp: 180 tablet, Rfl: 3    Loratadine (CLARITIN PO), Take 1 tablet by mouth daily as needed , Disp: , Rfl:     fluticasone (FLONASE) 50 MCG/ACT nasal spray, 2 sprays by Each Nostril route daily, Disp: , Rfl:     blood glucose monitor strips, Check blood sugar q daily, Dx R73.01, Disp: 100 strip, Rfl: 0    b complex vitamins capsule, Take 1 capsule by mouth daily, Disp: , Rfl:     polyethyl glycol-propyl glycol 0.4-0.3 % (SYSTANE) 0.4-0.3 % ophthalmic solution, 1 drop as needed for Dry Eyes, Disp: , Rfl:     docusate sodium (COLACE) 100 MG capsule, Take 100 mg by mouth as needed for Constipation, Disp: , Rfl:     OLIVE LEAF PO, Take 450 mg by mouth daily, Disp: , Rfl:     Handicap Placard [facility], by Does not apply route Expires 9/6/2022, Disp: 1 each, Rfl: 0    AYR SALINE NASAL NO-DRIP GEL, Use 1 spray in each nostril 4 times a day (Patient taking differently: as needed Use 1 spray in each nostril 4 times a day ), Disp: 1 Tube, Rfl: 3    Blood Glucose Monitoring Suppl (TRUE METRIX METER) W/DEVICE KIT, 1 Device by Does not apply route daily Check blood sugar q daily, Dx E11.9, Disp: 1 kit, Rfl: 0    Multiple Vitamins-Minerals (THERAPEUTIC MULTIVITAMIN-MINERALS) tablet, Take 1 tablet by mouth daily. , Disp: , Rfl:     fish oil-omega-3 fatty acids 1000 MG capsule, Take 2 g by mouth daily.   , Disp: , Rfl:     CALCIUM CITRATE, Take 600 mg by mouth daily , Disp: , Rfl:     Past Medical History  Jim Calvert  has a past medical history of Arthritis, Cancer ([facility] [address] Diverticulosis, Hyperlipidemia, Hypertension, Hypothyroidism, Psoriasis, S/P angioplasty with stent, and Thyroid disorder. Social History  Jim Calvert  reports that she has never smoked. She has never used smokeless tobacco. She reports that she does not drink alcohol and does not use drugs. Family History  Jim Calvert family history includes Cancer in her child; Heart Disease in her sister; Other in an other family member; Stroke in her sister. Past Surgical History   Past Surgical History:   Procedure Laterality Date    BREAST BIOPSY Left     benign    BREAST SURGERY Left 6/1966    Lumpectomy    CARPAL TUNNEL RELEASE Right 1/30/2013    CARPAL TUNNEL RELEASE Left 7/25/13    CATARACT REMOVAL Bilateral 1999    COLON SURGERY  11/1999    resection    COLONOSCOPY  2003, 2006, 2011    HYSTERECTOMY  2/23/1970    HYSTERECTOMY, TOTAL ABDOMINAL      KNEE ARTHROSCOPY Right 11/21/2007    meniscectomies    KNEE SURGERY Left 2000    replacement    MYRINGOTOMY Right 07/01/2015    tube insertion    OVARY REMOVAL Bilateral 7/12/2001    oophorectomy    OVARY REMOVAL Bilateral     SHOULDER SURGERY  5/14    SINUS SURGERY         Review of Systems   Constitutional: Negative for chills and fever  HENT: Negative for congestion, sinus pressure, sneezing and sore throat. Eyes: Negative for pain, discharge, redness and itching. Respiratory: Negative for apnea, cough  Gastrointestinal: Negative for blood in stool, constipation, diarrhea   Endocrine: Negative for cold intolerance, heat intolerance, polydipsia. Genitourinary: Negative for dysuria, enuresis, flank pain and hematuria. Musculoskeletal: Negative for arthralgias, joint swelling and neck pain. Neurological: Negative for numbness and headaches. Psychiatric/Behavioral: Negative for agitation, confusion, decreased concentration and dysphoric mood. Objective: There were no vitals taken for this visit. Wt Readings from Last 3 Encounters:   05/10/21 129 lb 9.6 oz (58.8 kg)   05/06/21 134 lb 12.8 oz (61.1 kg)   04/14/21 133 lb 12.8 oz (60.7 kg)     BP Readings from Last 3 Encounters:   05/10/21 (!) 110/58   05/06/21 (!) 168/62   04/14/21 116/76       Nursing note and vitals reviewed. Physical Exam   Constitutional: Oriented to person, place, and time. Appears well-developed and well-nourished. ENT: Moist mucous membranes. No bleeding. Tongue is midline. Head: Normocephalic and atraumatic. Eyes: EOM are normal. Pupils are equal, round, and reactive to light. Neck: Normal range of motion. Neck supple. No JVD present. Cardiovascular: Normal rate, regular rhythm, murmur, no rubs, and intact distal pulses. Pulmonary/Chest: Effort normal and breath sounds normal. No respiratory distress. No wheezes. No rales. Abdominal: Soft. Bowel sounds are normal. No distension. There is no tenderness. Musculoskeletal: Normal range of motion. No edema. Neurological: Alert and oriented to person, place, and time. No cranial nerve deficit. Coordination normal.   Skin: Skin is warm and dry. Psychiatric: Normal mood and affect.        No results found for: CKTOTAL, CKMB, CKMBINDEX    Lab Results   Component Value Date    WBC 9.6 05/10/2021    RBC 4.86 05/10/2021    HGB 15.2 05/10/2021    HCT 46.2 05/10/2021    MCV 95.1 05/10/2021    MCH 31.3 05/10/2021    MCHC 32.9 05/10/2021    RDW 13.8 11/02/2019     05/10/2021    MPV 10.2 05/10/2021       Lab Results   Component Value Date     05/10/2021    K 4.1 05/10/2021     05/10/2021    CO2 23 05/10/2021    BUN 34 05/10/2021    LABALBU 4.0 05/07/2021    LABALBU 4.1 03/15/2012    CREATININE 1.2 05/10/2021    CALCIUM 8.9 05/10/2021    LABGLOM 42 05/10/2021    GLUCOSE 123 05/10/2021    GLUCOSE 97 11/02/2019       Lab Results   Component Value Date    ALKPHOS 76 05/07/2021    ALT 54 05/07/2021  05/07/2021    PROT 7.3 05/07/2021    BILITOT 0.5 05/07/2021    BILIDIR 0.1 11/02/2019    LABALBU 4.0 05/07/2021    LABALBU 4.1 03/15/2012       Lab Results   Component Value Date    MG 2.5 05/08/2021       Lab Results   Component Value Date    INR 1.00 05/06/2021         Lab Results   Component Value Date    LABA1C 5.6 05/09/2021       Lab Results   Component Value Date    TRIG 82 05/07/2021    HDL 62 05/07/2021    LDLCALC 106 05/07/2021       Lab Results   Component Value Date    TSH 4.590 05/06/2021         Testing Reviewed:      I have individually reviewed the cardiac test below:    ECHO: Results for orders placed during the hospital encounter of 05/06/21    Echocardiogram 2D/ M-Mode/ Colorflow/ Do    Narrative  Transthoracic Echocardiography Report (TTE)    Demographics    Patient Name    Sheldon Beyer Gender               Female    MR #            [id number]      Race                     Ethnicity    Account #       [de-identified]      Room Number          0001    Accession       [id number]     Date of Study        05/06/2021  Number    Date of Birth   02/21/1925     Referring Physician  Yue Cullen MD    Age             80 year(s)     Madi Pennington, [place],  RVT    Interpreting         Storm Lutz MD  Physician    Procedure    Type of Study    TTE procedure:ECHOCARDIOGRAM COMPLETE 2D W DOPPLER W COLOR. Procedure Date  Date: 05/06/2021 Start: 05:10 PM    Study Location: Cath lab  Technical Quality: Limited visualization due to poor acoustical window. Indications:NSTEMI. Additional Medical History:Hypertension, Hyperlipidemia, Thyroid disease,  Arthritis, Skin cancer. Patient Status: STAT    Height: 60 inches Weight: 130 pounds BSA: 1.55 m^2 BMI: 25.39 kg/m^2    BP: 160/78 mmHg    Conclusions    Summary  Normal left ventricular size and systolic function.   There was mild-moderate hypokinesis of the mid to distal anteroseptal  wall, mid to distal lateral wall  Wall thickness was within normal limits. Ejection fraction was estimated at 40-45%. There was trace-mild aortic regurgitation. IVC size is within normal limits with normal respiratory phasic changes. Signature    ----------------------------------------------------------------  Electronically signed by Florian Cooper MD (Interpreting  physician) on 05/07/2021 at 08:07 AM  ----------------------------------------------------------------    Findings    Mitral Valve  The mitral valve structure was normal with normal leaflet separation. DOPPLER: The transmitral velocity was within the normal range with no  evidence for mitral stenosis. There was no evidence of mitral  regurgitation. Aortic Valve  The aortic valve was trileaflet with normal thickness and cuspal  separation. DOPPLER: Transaortic velocity was within the normal range with  no evidence of aortic stenosis. There was trace-mild aortic regurgitation. Tricuspid Valve  The tricuspid valve structure was normal with normal leaflet separation. DOPPLER: There was no evidence of tricuspid stenosis. There was no  evidence of tricuspid regurgitation. Pulmonic Valve  The pulmonic valve leaflets were not well seen. DOPPLER: The transpulmonic  velocity was within the normal range with no evidence for regurgitation. Left Atrium  Left atrial size was normal.    Left Ventricle  Normal left ventricular size and systolic function. There was mild-moderate hypokinesis of the mid to distal anteroseptal  wall, mid to distal lateral wall  Wall thickness was within normal limits. Ejection fraction was estimated at 40-45%. Right Atrium  Right atrial size was normal.    Right Ventricle  The right ventricular size was normal with normal systolic function and  wall thickness. Pericardial Effusion  The pericardium was normal in appearance with no evidence of a pericardial  effusion. Pleural Effusion  No evidence of pleural effusion.     Aorta / Great Vessels  IVC size is within normal limits with normal respiratory phasic changes. M-Mode/2D Measurements & Calculations    LV Diastolic   LV Systolic Dimension:    AV Cusp Separation: 1.5 cmLA  Dimension: 4.2 3.3 cm                    Dimension: 2.5 cmAO Root  cm             LV Volume Diastolic: 55.0 Dimension: 2.9 cmLA Area: 13.8  LV FS:21.4 %   ml                        cm^2  LV PW          LV Volume Systolic: 07.7  Diastolic: 0.7 ml  cm             LV EDV/LV EDV Index: 51.6  Septum         ml/33 m^2LV ESV/LV ESV    RV Diastolic Dimension: 1.6 cm  Diastolic: 0.7 Index: 78.3 ml/19 m^2  cm             EF Calculated: 42.6 %     LA/Aorta: 0.86    LV Length: 6.7 cm         LA volume/Index: 30.1 ml /19m^2    LV Area  Diastolic:  40.2 cm^2  LV Area  Systolic: 31.9  cm^2    Doppler Measurements & Calculations    MV Peak E-Wave: 66 cm/s   AV Peak Velocity: 139   LVOT Peak Velocity: 72  MV Peak A-Wave: 81.4 cm/s cm/s                    cm/s  MV E/A Ratio: 0.81        AV Peak Gradient: 7.73  LVOT Peak Gradient: 2  MV Peak Gradient: 1.74    mmHg                    mmHg  mmHg    MV Deceleration Time: 77  msec  IVRT: 53 msec  PV Peak Velocity: 64.3  cm/s  AV DVI (Vmax):0.52      PV Peak Gradient: 1.65  mmHg  MR Velocity: 379 cm/s    [email or web address] [id number]     Cath:5/2021  -PCI of LAD, complicated by loss of side branch (D1), no-reflow in LAD (Reuired IABP)  -PCI OM    AssessmentPlan:   Alexandra Ochoa is a pleasant 80year old female patient who  has a past medical history of Arthritis, Cancer ([address] Diverticulosis, Hyperlipidemia, Hypertension, Hypothyroidism, Psoriasis, S/P angioplasty with stent, and Thyroid disorder. On 5/2021, she was admitted to [facility] with NSTEMI. Patient underwent cardiac cath on 5/6/2021 which showed severe LAD 80-90% and DX 90% disease, Ostial OM 90%, RCA non-dominant.  She underwent PCI of the LAD x 3 JUSTUS, PCI of the OM x 1 JUSTUS. Procedure was complicated by loss of side branch (D1) and no re-flow in LAD. IABP was placed post PCI. Echo 5/2021 revealed EF 40-45%, Mild MR. The patient returns for follow up. She reports chronic leg edema, recently improved. Patient denies chest pain, shortness of breath, dyspnea on exertion, orthopnea, paroxysmal nocturnal dyspnea, palpitations, dizziness, syncope, weight gain. /69. 1. NSTEMI  2. PCI LAD, OM 5/2021  3. 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 HFrEF  5. S/P acute respiratory failure 2/2 CHF   6. HTN  7. Dyslipidemia  8. Hypothyroidism      She feels well   She was admitted to NH/Rehab post [address] [place] [address] admission   She states that she did well with Rehab   Will go home soon   The patient is advised to begin progressive daily aerobic exercise program.   On DAPT   No bleeding complications    Denies chest pains   On Coreg, aldactone   Lasix   SBP >140   The patient was instructed to check the blood pressure at home, and record the readings. Patient will call office with blood pressure readings, will adjust patient's antihypertensive medications as needed accordingly    Leg edema has improved    Check BMP, Mg   limit Na intake         Above findings and plan of care were discussed with patient in details, patient's questions were answered.      Disposition:  RTC in 6 months             Electronically signed by Amber Oneill MD, [facility] [place], [place]    5/19/2021 at 7:33 PM EDT

## 2021-05-19 NOTE — TELEPHONE ENCOUNTER
Deann Burgess from [facility] calling in on this patient who is scheduled with Dr Grabiel Sierra for tomorrow 5/20/2021 for follow up stemi. They do not have transportation available, please call them back to advise when she can be seen again, [phone], and ask for her nurse.

## 2021-05-20 ENCOUNTER — OFFICE VISIT (OUTPATIENT)
Dept: CARDIOLOGY CLINIC | Age: 86
End: 2021-05-20
Payer: MEDICARE

## 2021-05-20 VITALS
HEIGHT: 60 IN | HEART RATE: 88 BPM | WEIGHT: 127.25 LBS | SYSTOLIC BLOOD PRESSURE: 103 MMHG | DIASTOLIC BLOOD PRESSURE: 62 MMHG | BODY MASS INDEX: 24.98 KG/M2

## 2021-05-20 DIAGNOSIS — I50.22 CHRONIC HFREF (HEART FAILURE WITH REDUCED EJECTION FRACTION) (HCC): Primary | ICD-10-CM

## 2021-05-20 PROCEDURE — 1090F PRES/ABSN URINE INCON ASSESS: CPT | Performed by: INTERNAL MEDICINE

## 2021-05-20 PROCEDURE — G8427 DOCREV CUR MEDS BY ELIG CLIN: HCPCS | Performed by: INTERNAL MEDICINE

## 2021-05-20 PROCEDURE — 1123F ACP DISCUSS/DSCN MKR DOCD: CPT | Performed by: INTERNAL MEDICINE

## 2021-05-20 PROCEDURE — 1036F TOBACCO NON-USER: CPT | Performed by: INTERNAL MEDICINE

## 2021-05-20 PROCEDURE — 4040F PNEUMOC VAC/ADMIN/RCVD: CPT | Performed by: INTERNAL MEDICINE

## 2021-05-20 PROCEDURE — 99214 OFFICE O/P EST MOD 30 MIN: CPT | Performed by: INTERNAL MEDICINE

## 2021-05-20 PROCEDURE — 1111F DSCHRG MED/CURRENT MED MERGE: CPT | Performed by: INTERNAL MEDICINE

## 2021-05-20 PROCEDURE — G8420 CALC BMI NORM PARAMETERS: HCPCS | Performed by: INTERNAL MEDICINE

## 2021-05-20 NOTE — PROGRESS NOTES
Pt here for [facility] fu cath with stents     Pt continues with sob at times, swelling in legs and feet at times

## 2021-05-24 ENCOUNTER — TELEPHONE (OUTPATIENT)
Dept: FAMILY MEDICINE CLINIC | Age: 86
End: 2021-05-24

## 2021-05-24 NOTE — TELEPHONE ENCOUNTER
----- Message from Calderon Little sent at 5/24/2021  9:48 AM EDT -----  Subject: Message to Provider    QUESTIONS  Information for Provider? Patient is requesting to speak with Jenet Runner   to inform him that she went home on Friday from the nursing facility. Please contact   ---------------------------------------------------------------------------  --------------  CALL BACK INFO  What is the best way for the office to contact you? OK to leave message on   voicemail  Preferred Call Back Phone Number? [phone]  ---------------------------------------------------------------------------  --------------  SCRIPT ANSWERS  Relationship to Patient?  Self

## 2021-05-25 ENCOUNTER — TELEPHONE (OUTPATIENT)
Dept: FAMILY MEDICINE CLINIC | Age: 86
End: 2021-05-25

## 2021-05-25 NOTE — TELEPHONE ENCOUNTER
----- Message from [employer] sent at 5/25/2021  3:29 PM EDT -----  Subject: Message to Provider    QUESTIONS  Information for Provider? Patient would like to know if she should come is   needing to come in for bloodwork 5/27- to 6/7 and wants to know if she   should keep 6/7 appointment. Patient is now home from hospital. Please   advise  ---------------------------------------------------------------------------  --------------  CALL BACK INFO  What is the best way for the office to contact you? OK to leave message on   voicemail, OK to respond with electronic message via [employer] portal (only   for patients who have registered [employer] account)  Preferred Call Back Phone Number? [phone]  ---------------------------------------------------------------------------  --------------  SCRIPT ANSWERS  Relationship to Patient?  Self

## 2021-05-26 NOTE — TELEPHONE ENCOUNTER
[facility] Trans[facility] Initial Follow Up Call    Outreach made within 2 business days of discharge: Yes    Patient: Tyler Espinoza Patient : 1925   MRN: [id number]  Reason for Admission: There are no discharge diagnoses documented for the most recent discharge. Discharge Date: 5/10/21       Spoke with: Jesi Richardson    Discharge department/facility: [facility] Interactive Patient Contact:  Was patient able to fill all prescriptions: Yes  Was patient instructed to bring all medications to the follow-up visit: Yes  Is patient taking all medications as directed in the discharge summary?  Yes  Does patient understand their discharge instructions: Yes  Does patient have questions or concerns that need addressed prior to 7-14 day follow up office visit: no    Scheduled appointment with PCP within 7-14 days    Follow Up  Future Appointments   Date Time Provider Rosy Salgadoi   2021  2:20 PM Princeton Hamman, DO [place] [facility] [place]   6/10/2021  2:00 PM [facility] [place]   2021 11:00 AM STR MAMMOGRAPHY [address] STR Radiolog   2021  3:45 PM Sandy Sanchez MD [address] [facility] Hunt LPN

## 2021-05-27 ENCOUNTER — NURSE ONLY (OUTPATIENT)
Dept: LAB | Age: 86
End: 2021-05-27

## 2021-05-27 DIAGNOSIS — I50.22 CHRONIC HFREF (HEART FAILURE WITH REDUCED EJECTION FRACTION) (HCC): ICD-10-CM

## 2021-05-27 LAB
ANION GAP SERPL CALCULATED.3IONS-SCNC: 10 MEQ/L (ref 8–16)
BUN BLDV-MCNC: 19 MG/DL (ref 7–22)
CALCIUM SERPL-MCNC: 9.6 MG/DL (ref 8.5–10.5)
CHLORIDE BLD-SCNC: 104 MEQ/L (ref 98–111)
CO2: 29 MEQ/L (ref 23–33)
CREAT SERPL-MCNC: 1 MG/DL (ref 0.4–1.2)
GFR SERPL CREATININE-BSD FRML MDRD: 51 ML/MIN/1.73M2
GLUCOSE BLD-MCNC: 130 MG/DL (ref 70–108)
MAGNESIUM: 2.2 MG/DL (ref 1.6–2.4)
POTASSIUM SERPL-SCNC: 4 MEQ/L (ref 3.5–5.2)
SODIUM BLD-SCNC: 143 MEQ/L (ref 135–145)

## 2021-05-28 ENCOUNTER — TELEPHONE (OUTPATIENT)
Dept: CARDIOLOGY CLINIC | Age: 86
End: 2021-05-28

## 2021-05-28 NOTE — TELEPHONE ENCOUNTER
Aline Patino MD  P [facility] Clinical Support Pool  Kidney function improved   Cont current Rx plan

## 2021-06-02 ENCOUNTER — OFFICE VISIT (OUTPATIENT)
Dept: FAMILY MEDICINE CLINIC | Age: 86
End: 2021-06-02
Payer: MEDICARE

## 2021-06-02 VITALS
RESPIRATION RATE: 18 BRPM | OXYGEN SATURATION: 98 % | SYSTOLIC BLOOD PRESSURE: 110 MMHG | BODY MASS INDEX: 25.45 KG/M2 | WEIGHT: 129.6 LBS | TEMPERATURE: 98.4 F | HEART RATE: 88 BPM | HEIGHT: 60 IN | DIASTOLIC BLOOD PRESSURE: 60 MMHG

## 2021-06-02 DIAGNOSIS — J96.01 ACUTE RESPIRATORY FAILURE WITH HYPOXIA (HCC): ICD-10-CM

## 2021-06-02 DIAGNOSIS — N18.9 ACUTE KIDNEY INJURY SUPERIMPOSED ON CKD (HCC): ICD-10-CM

## 2021-06-02 DIAGNOSIS — I21.4 NSTEMI (NON-ST ELEVATED MYOCARDIAL INFARCTION) (HCC): Primary | ICD-10-CM

## 2021-06-02 DIAGNOSIS — N17.9 ACUTE KIDNEY INJURY SUPERIMPOSED ON CKD (HCC): ICD-10-CM

## 2021-06-02 PROCEDURE — 99213 OFFICE O/P EST LOW 20 MIN: CPT | Performed by: NURSE PRACTITIONER

## 2021-06-02 PROCEDURE — 4040F PNEUMOC VAC/ADMIN/RCVD: CPT | Performed by: NURSE PRACTITIONER

## 2021-06-02 PROCEDURE — 1111F DSCHRG MED/CURRENT MED MERGE: CPT | Performed by: NURSE PRACTITIONER

## 2021-06-02 PROCEDURE — 1090F PRES/ABSN URINE INCON ASSESS: CPT | Performed by: NURSE PRACTITIONER

## 2021-06-02 PROCEDURE — G8417 CALC BMI ABV UP PARAM F/U: HCPCS | Performed by: NURSE PRACTITIONER

## 2021-06-02 PROCEDURE — 1036F TOBACCO NON-USER: CPT | Performed by: NURSE PRACTITIONER

## 2021-06-02 PROCEDURE — G8427 DOCREV CUR MEDS BY ELIG CLIN: HCPCS | Performed by: NURSE PRACTITIONER

## 2021-06-02 PROCEDURE — 1123F ACP DISCUSS/DSCN MKR DOCD: CPT | Performed by: NURSE PRACTITIONER

## 2021-06-02 SDOH — ECONOMIC STABILITY: FOOD INSECURITY: WITHIN THE PAST 12 MONTHS, THE FOOD YOU BOUGHT JUST DIDN'T LAST AND YOU DIDN'T HAVE MONEY TO GET MORE.: NEVER TRUE

## 2021-06-02 SDOH — ECONOMIC STABILITY: FOOD INSECURITY: WITHIN THE PAST 12 MONTHS, YOU WORRIED THAT YOUR FOOD WOULD RUN OUT BEFORE YOU GOT MONEY TO BUY MORE.: NEVER TRUE

## 2021-06-02 ASSESSMENT — SOCIAL DETERMINANTS OF HEALTH (SDOH): HOW HARD IS IT FOR YOU TO PAY FOR THE VERY BASICS LIKE FOOD, HOUSING, MEDICAL CARE, AND HEATING?: VERY HARD

## 2021-06-02 NOTE — PROGRESS NOTES
[address] MEDICINE [address] TARA Maksim Brock. [facility]  Dept: [phone]  Loc: [phone]  Visit Date: 6/2/2021      Chief Complaint:   Render Babinski is a 80 y.o. female thatpresents for Follow-Up from Hospital        HPI:  Follow up for recent admission to hospital  [facility]  Was only there about 1.5 weeks  Doing well since being home   Slowly trying to build up her strength  Lives alone, but has a lot of family support    The patient comes in with family today. History obtained from pt, family, and medical records. I have reviewed the patient's past medical history, past surgical history, allergies,medications, social and family history and I have made updates where appropriate. Past Medical History:   Diagnosis Date    Arthritis     Cancer [facility]) 2013    SKIN CANCER ON LEFT ANKLE    Diverticulosis     Hyperlipidemia     Hypertension     Hypothyroidism     Psoriasis     S/P angioplasty with stent 05/06/2021    3 stents to LAD, 1 stent to OM, 2 stents to diag.  per  Johannah Holstein Thyroid disorder        Past Surgical History:   Procedure Laterality Date    BREAST BIOPSY Left     benign    BREAST SURGERY Left 6/1966    Lumpectomy    CARPAL TUNNEL RELEASE Right 1/30/2013    CARPAL TUNNEL RELEASE Left 7/25/13    CATARACT REMOVAL Bilateral 1999    COLON SURGERY  11/1999    resection    COLONOSCOPY  2003, 2006, 2011    DIAGNOSTIC CARDIAC CATH LAB PROCEDURE  05/07/2021    6 stents     HYSTERECTOMY  2/23/1970    HYSTERECTOMY, TOTAL ABDOMINAL      KNEE ARTHROSCOPY Right 11/21/2007    meniscectomies    KNEE SURGERY Left 2000    replacement    MYRINGOTOMY Right 07/01/2015    tube insertion    OVARY REMOVAL Bilateral 7/12/2001    oophorectomy    OVARY REMOVAL Bilateral     SHOULDER SURGERY  5/14    SINUS SURGERY         Social History     Tobacco Use    Smoking status: Never Smoker    Smokeless tobacco: Never Used   Vaping Use    Vaping Use: Never used   Substance Use Topics    Alcohol use: No    Drug use: No       Family History   Problem Relation Age of Onset    Cancer Child     Stroke Sister     Heart Disease Sister     Other Other         visual problems         Review of Systems   Constitutional: Negative. Negative for chills, fatigue and fever. HENT: Negative for ear pain, mouth sores, sore throat and trouble swallowing. Eyes: Negative for pain, discharge, redness and visual disturbance. Respiratory: Negative for cough, shortness of breath and wheezing. Cardiovascular: Negative for chest pain, palpitations and leg swelling. Gastrointestinal: Negative for abdominal pain, constipation, diarrhea, nausea and vomiting. Endocrine: Negative for cold intolerance and heat intolerance. Genitourinary: Negative for dysuria, frequency and urgency. Musculoskeletal: Negative for arthralgias, gait problem and joint swelling. Skin: Negative for color change, rash and wound. Neurological: Negative for dizziness, weakness and headaches. Psychiatric/Behavioral: Negative for agitation, confusion and hallucinations. PHYSICAL EXAM:  /60   Pulse 88   Temp 98.4 °F (36.9 °C)   Resp 18   Ht 5' (1.524 m)   Wt 129 lb 9.6 oz (58.8 kg)   SpO2 98%   BMI 25.31 kg/m²     Physical Exam  Constitutional:       Appearance: Normal appearance. HENT:      Head: Normocephalic and atraumatic. Right Ear: External ear normal.      Left Ear: External ear normal.      Nose: Nose normal.      Mouth/Throat:      Mouth: Mucous membranes are moist.   Eyes:      Conjunctiva/sclera: Conjunctivae normal.   Cardiovascular:      Rate and Rhythm: Normal rate and regular rhythm. Pulses: Normal pulses. Heart sounds: Normal heart sounds. Pulmonary:      Effort: Pulmonary effort is normal.      Breath sounds: Normal breath sounds.    Abdominal:      General: Bowel sounds are normal.      Palpations: Abdomen is soft.   Musculoskeletal:         General: Normal range of motion. Cervical back: Normal range of motion. Skin:     General: Skin is warm and dry. Neurological:      General: No focal deficit present. Mental Status: She is alert and oriented to person, place, and time. Psychiatric:         Mood and Affect: Mood normal.         Behavior: Behavior normal.         ASSESSMENT & PLAN  Sherryn Severin was seen today for follow-up from hospital.    Diagnoses and all orders for this visit:    NSTEMI (non-ST elevated myocardial infarction) ([facility]    Acute respiratory failure with hypoxia ([facility]    Acute kidney injury superimposed on CKD ([facility]      Follow up after discharge from SNF following recent MI  Doing well, no concerns  Reviewed medications, activity, exercise  Continue current meds  Followed up recently with Cardio  Continue to keep an eye on BP and BS  Continue to follow up prn  No follow-ups on file. Sherryn Severin received counseling on the following healthy behaviors: nutrition, exercise and medication adherence  Reviewedprior labs and health maintenance. Continue current medications, diet and exercise. Discussed use, benefit, and side effects of prescribed medications. Barriers to medication compliance addressed. Patient given educationalmaterials - see patient instructions. All patient questions answered. Patient voiced understanding.      Electronically signed by RACQUEL Araujo - CNP, APRN on 6/2/21 at 2:44 PM EDT

## 2021-06-04 ASSESSMENT — ENCOUNTER SYMPTOMS
SORE THROAT: 0
ABDOMINAL PAIN: 0
COUGH: 0
CONSTIPATION: 0
TROUBLE SWALLOWING: 0
VOMITING: 0
NAUSEA: 0
EYE DISCHARGE: 0
COLOR CHANGE: 0
SHORTNESS OF BREATH: 0
EYE REDNESS: 0
DIARRHEA: 0
WHEEZING: 0
EYE PAIN: 0

## 2021-06-10 ENCOUNTER — OFFICE VISIT (OUTPATIENT)
Dept: NEPHROLOGY | Age: 86
End: 2021-06-10
Payer: MEDICARE

## 2021-06-10 VITALS
BODY MASS INDEX: 25.31 KG/M2 | HEART RATE: 75 BPM | DIASTOLIC BLOOD PRESSURE: 75 MMHG | TEMPERATURE: 98.6 F | OXYGEN SATURATION: 98 % | SYSTOLIC BLOOD PRESSURE: 141 MMHG | WEIGHT: 129.6 LBS

## 2021-06-10 DIAGNOSIS — R53.81 PHYSICAL DECONDITIONING: ICD-10-CM

## 2021-06-10 DIAGNOSIS — E78.2 MIXED HYPERLIPIDEMIA: ICD-10-CM

## 2021-06-10 DIAGNOSIS — I25.5 ISCHEMIC CARDIOMYOPATHY: ICD-10-CM

## 2021-06-10 DIAGNOSIS — N18.30 STAGE 3 CHRONIC KIDNEY DISEASE, UNSPECIFIED WHETHER STAGE 3A OR 3B CKD (HCC): Primary | ICD-10-CM

## 2021-06-10 PROCEDURE — 1090F PRES/ABSN URINE INCON ASSESS: CPT | Performed by: INTERNAL MEDICINE

## 2021-06-10 PROCEDURE — G8427 DOCREV CUR MEDS BY ELIG CLIN: HCPCS | Performed by: INTERNAL MEDICINE

## 2021-06-10 PROCEDURE — 1123F ACP DISCUSS/DSCN MKR DOCD: CPT | Performed by: INTERNAL MEDICINE

## 2021-06-10 PROCEDURE — 1036F TOBACCO NON-USER: CPT | Performed by: INTERNAL MEDICINE

## 2021-06-10 PROCEDURE — 4040F PNEUMOC VAC/ADMIN/RCVD: CPT | Performed by: INTERNAL MEDICINE

## 2021-06-10 PROCEDURE — G8417 CALC BMI ABV UP PARAM F/U: HCPCS | Performed by: INTERNAL MEDICINE

## 2021-06-10 PROCEDURE — 99213 OFFICE O/P EST LOW 20 MIN: CPT | Performed by: INTERNAL MEDICINE

## 2021-06-10 NOTE — PROGRESS NOTES
Medications:     Current Outpatient Medications   Medication Sig Dispense Refill    nitroGLYCERIN (NITROSTAT) 0.4 MG SL tablet up to max of 3 total doses. If no relief after 1 dose, call 911. 25 tablet 0    atorvastatin (LIPITOR) 80 MG tablet Take 1 tablet by mouth nightly 30 tablet 0    carvedilol (COREG) 3.125 MG tablet Take 1 tablet by mouth 2 times daily (with meals) 60 tablet 0    furosemide (LASIX) 20 MG tablet Take 1 tablet by mouth daily 60 tablet 0    ticagrelor (BRILINTA) 90 MG TABS tablet Take 1 tablet by mouth every 12 hours 60 tablet     aspirin 81 MG chewable tablet Take 1 tablet by mouth daily 30 tablet 0    LORazepam (ATIVAN) 1 MG tablet Take 0.5 mg by mouth every 6 hours as needed for Anxiety.  Pt usually only takes a half tab when needed      spironolactone (ALDACTONE) 50 MG tablet Take 1 tablet by mouth daily 90 tablet 3    montelukast (SINGULAIR) 10 MG tablet TAKE 1 TABLET BY MOUTH DAILY 90 tablet 3    thyroid (ARMOUR) 60 MG tablet Take 0.5 tablets by mouth daily VINNY 45 tablet 3    melatonin 3 MG TABS tablet Take 3 mg by mouth daily      famotidine (PEPCID) 20 MG tablet Take 1 tablet by mouth 2 times daily 180 tablet 3    Loratadine (CLARITIN PO) Take 1 tablet by mouth daily as needed       fluticasone (FLONASE) 50 MCG/ACT nasal spray 2 sprays by Each Nostril route daily      blood glucose monitor strips Check blood sugar q daily, Dx R73.01 100 strip 0    b complex vitamins capsule Take 1 capsule by mouth daily      polyethyl glycol-propyl glycol 0.4-0.3 % (SYSTANE) 0.4-0.3 % ophthalmic solution 1 drop as needed for Dry Eyes      docusate sodium (COLACE) 100 MG capsule Take 100 mg by mouth as needed for Constipation      OLIVE LEAF PO Take 450 mg by mouth daily      Handicap Placard MISC by Does not apply route Expires 9/6/2022 1 each 0    AYR SALINE NASAL NO-DRIP GEL Use 1 spray in each nostril 4 times a day (Patient taking differently: as needed Use 1 spray in each nostril 4 times a day ) 1 Tube 3    Blood Glucose Monitoring Suppl (TRUE METRIX METER) W/DEVICE KIT 1 Device by Does not apply route daily Check blood sugar q daily, Dx E11.9 1 kit 0    Multiple Vitamins-Minerals (THERAPEUTIC MULTIVITAMIN-MINERALS) tablet Take 1 tablet by mouth daily.  fish oil-omega-3 fatty acids 1000 MG capsule Take 2 g by mouth daily.  CALCIUM CITRATE Take 600 mg by mouth daily        No current facility-administered medications for this visit.        Lab Results:    CBC:   Lab Results   Component Value Date    WBC 9.6 05/10/2021    HGB 15.2 05/10/2021    HCT 46.2 05/10/2021    MCV 95.1 05/10/2021     05/10/2021     BMP:    Lab Results   Component Value Date     05/27/2021     05/10/2021     05/09/2021    K 4.0 05/27/2021    K 4.1 05/10/2021    K 4.5 05/09/2021     05/27/2021     05/10/2021     05/09/2021    CO2 29 05/27/2021    CO2 23 05/10/2021    CO2 27 05/09/2021    BUN 19 05/27/2021    BUN 34 (H) 05/10/2021    BUN 35 (H) 05/09/2021    CREATININE 1.0 05/27/2021    CREATININE 1.2 05/10/2021    CREATININE 1.4 (H) 05/09/2021    GLUCOSE 130 (H) 05/27/2021    GLUCOSE 123 (H) 05/10/2021    GLUCOSE 121 (H) 05/09/2021      Hepatic:   Lab Results   Component Value Date     (H) 05/07/2021    AST 70 (H) 05/06/2021    AST 19 12/22/2020    ALT 54 05/07/2021    ALT 21 05/06/2021    ALT 11 12/22/2020    BILITOT 0.5 05/07/2021    BILITOT 0.2 (L) 05/06/2021    BILITOT 0.5 12/22/2020    ALKPHOS 76 05/07/2021    ALKPHOS 92 05/06/2021    ALKPHOS 79 12/22/2020     BNP: No results found for: BNP  Lipids:   Lab Results   Component Value Date    CHOL 184 05/07/2021    HDL 62 05/07/2021     INR:   Lab Results   Component Value Date    INR 1.00 05/06/2021     URINE: No results found for: NAUR, PROTUR  Lab Results   Component Value Date    NITRU NEGATIVE 05/08/2021    COLORU YELLOW 05/08/2021    PHUR 5.0 05/08/2021    LABCAST NONE SEEN 05/06/2021    LABCAST NONE SEEN 05/06/2021    WBCUA 5-9 05/08/2021    RBCUA 5-10 05/08/2021    YEAST NONE SEEN 05/08/2021    BACTERIA NONE SEEN 05/08/2021    CLARITYU clear 11/26/2019    SPECGRAV 1.017 05/06/2021    LEUKOCYTESUR NEGATIVE 05/08/2021    UROBILINOGEN 0.2 05/08/2021    BILIRUBINUR NEGATIVE 05/08/2021    BILIRUBINUR neg 11/26/2019    BLOODU MODERATE 05/08/2021    GLUCOSEU NEGATIVE 05/08/2021    KETUA NEGATIVE 05/08/2021      Microalbumen/Creatinine ratio:  No components found for: RUCREAT    Objective:   Vitals: BP (!) 141/75 (Site: Right Upper Arm, Position: Sitting, Cuff Size: Medium Adult)   Pulse 75   Temp 98.6 °F (37 °C)   Wt 129 lb 9.6 oz (58.8 kg)   SpO2 98%   BMI 25.31 kg/m²      Constitutional:  Alert, awake, no apparent distress  Skin:normal with no rash or any lesions  HEENT:Pupils are reactive . Throat is clear. Oral mucosa is moist.  Neck:supple with no thyromegaly or bruit   Cardiovascular:  S1, S2 without murmur   Respiratory:  Clear to auscultation with no wheezes or rales  Abdomen: +bowel sound, soft, non tender and no bruit  Ext: No LE edema  Musculoskeletal:Intact  Neuro:Alert, awake and oriented with no obvious focal deficit. Speech is normal.    Electronically signed by Jim Anglin MD on 6/10/2021 at 2:12 PM   **This report has been created using voice recognition software. It maycontain minor  errors which are inherent in voice recognition technology. **

## 2021-06-10 NOTE — TELEPHONE ENCOUNTER
Pt asking if she can try another option besides brilinta, clopidogrel is preferred. Is it ok to switch?

## 2021-06-11 NOTE — TELEPHONE ENCOUNTER
Is cost the reason for switching from Brilinta? Brilinta does have better efficacy but if cost is an issue, she can switch to plavix. Her stent was done in early May 2021 so still needs to be on 1 of brilinta or plavix for sure at this time. Would recommend loading dose of Plavix 300 mg once one day after her last dose of brilinta (ie if she took brilinta doses on friday, give plavix 300mg on Saturday), then starting Sunday 75 mg daily. (D/w Dr Praful Cancino to clarify) Thanks.

## 2021-06-14 RX ORDER — CLOPIDOGREL 300 MG/1
300 TABLET, FILM COATED ORAL ONCE
Qty: 1 TABLET | Refills: 0 | Status: SHIPPED | OUTPATIENT
Start: 2021-06-20 | End: 2021-10-07

## 2021-06-14 RX ORDER — CLOPIDOGREL BISULFATE 75 MG/1
75 TABLET ORAL DAILY
Qty: 90 TABLET | Refills: 1 | Status: SHIPPED | OUTPATIENT
Start: 2021-06-14 | End: 2021-12-02

## 2021-06-14 NOTE — TELEPHONE ENCOUNTER
Discussed with patient and it is over $400 for a month supply and she can't afford. She has encough Brilinta through Saturday. She verbalized understanding to take Plavix 300mg x 1 dose on Sunday and then 75mg daily.

## 2021-06-21 RX ORDER — FUROSEMIDE 20 MG/1
20 TABLET ORAL DAILY
Qty: 30 TABLET | Refills: 6 | OUTPATIENT
Start: 2021-06-21 | End: 2022-01-17 | Stop reason: SDUPTHER

## 2021-06-21 RX ORDER — CARVEDILOL 3.12 MG/1
3.12 TABLET ORAL 2 TIMES DAILY WITH MEALS
Qty: 60 TABLET | Refills: 6 | OUTPATIENT
Start: 2021-06-21 | End: 2021-10-12 | Stop reason: SDUPTHER

## 2021-06-21 RX ORDER — ATORVASTATIN CALCIUM 80 MG/1
80 TABLET, FILM COATED ORAL NIGHTLY
Qty: 30 TABLET | Refills: 6 | OUTPATIENT
Start: 2021-06-21 | End: 2022-03-08 | Stop reason: SDUPTHER

## 2021-06-21 RX ORDER — ASPIRIN 81 MG/1
81 TABLET, CHEWABLE ORAL DAILY
Qty: 30 TABLET | Refills: 6 | OUTPATIENT
Start: 2021-06-21 | End: 2022-02-16

## 2021-06-24 ENCOUNTER — HOSPITAL ENCOUNTER (OUTPATIENT)
Dept: WOMENS IMAGING | Age: 86
Discharge: HOME | End: 2021-06-24
Payer: MEDICARE

## 2021-06-24 DIAGNOSIS — Z12.31 VISIT FOR SCREENING MAMMOGRAM: ICD-10-CM

## 2021-06-24 PROCEDURE — 77063 BREAST TOMOSYNTHESIS BI: CPT

## 2021-07-06 ENCOUNTER — TELEPHONE (OUTPATIENT)
Dept: FAMILY MEDICINE CLINIC | Age: 86
End: 2021-07-06

## 2021-07-06 DIAGNOSIS — R73.01 IFG (IMPAIRED FASTING GLUCOSE): ICD-10-CM

## 2021-07-06 RX ORDER — GLUCOSAMINE HCL/CHONDROITIN SU 500-400 MG
CAPSULE ORAL
Qty: 100 STRIP | Refills: 3 | Status: SHIPPED | OUTPATIENT
Start: 2021-07-06

## 2021-07-06 NOTE — TELEPHONE ENCOUNTER
Blood sugars and blood pressures both look fantastic. She is ok to drive from my standpoint if she feels safe enough to.

## 2021-07-06 NOTE — TELEPHONE ENCOUNTER
Patient stopped in with blood pressure and sugar readings and wanted to know what Dr Chucho Mancuso thought about them. She wanted to know if she needs to check her BP and Blood sugars everyday. She also wondered about driving, she feels fine to drive but wanted to be sure it was ok with Dr Chucho Mancuso. Thanks. The readings are attatched to this phone message.

## 2021-07-23 ENCOUNTER — TELEPHONE (OUTPATIENT)
Dept: CARDIOLOGY CLINIC | Age: 86
End: 2021-07-23

## 2021-07-23 NOTE — TELEPHONE ENCOUNTER
Pt is requesting clearance to exercise/swim at [employer] [facility]. Activity- general conditioning @ low- moderate intensity. Also to utilize whirlpool 102 degrees at no more then 10 min.      Please advise      Form in Patels box for signature  Fax # [phone]

## 2021-07-27 NOTE — TELEPHONE ENCOUNTER
Spoke with pt at length. Pt does not was to switch from Dr Alicia Marie. The [facility] told her when she was discharged to f/u with Dr Rodriguez Staff. Explained to pt that Dr Rodriguez Staff did the cath because he was on call when she came to [facility]. She has seen Dr Alicia Marie since. Pt has questions and requesting appt. Appt made with Dr Alicia Marie. Pt voiced understanding.

## 2021-07-28 ENCOUNTER — OFFICE VISIT (OUTPATIENT)
Dept: CARDIOLOGY CLINIC | Age: 86
End: 2021-07-28
Payer: MEDICARE

## 2021-07-28 VITALS
WEIGHT: 128 LBS | HEART RATE: 80 BPM | SYSTOLIC BLOOD PRESSURE: 159 MMHG | DIASTOLIC BLOOD PRESSURE: 83 MMHG | BODY MASS INDEX: 25.13 KG/M2 | HEIGHT: 60 IN

## 2021-07-28 DIAGNOSIS — I50.22 CHRONIC HFREF (HEART FAILURE WITH REDUCED EJECTION FRACTION) (HCC): Primary | ICD-10-CM

## 2021-07-28 PROCEDURE — G8417 CALC BMI ABV UP PARAM F/U: HCPCS | Performed by: INTERNAL MEDICINE

## 2021-07-28 PROCEDURE — 99214 OFFICE O/P EST MOD 30 MIN: CPT | Performed by: INTERNAL MEDICINE

## 2021-07-28 PROCEDURE — 1123F ACP DISCUSS/DSCN MKR DOCD: CPT | Performed by: INTERNAL MEDICINE

## 2021-07-28 PROCEDURE — 1090F PRES/ABSN URINE INCON ASSESS: CPT | Performed by: INTERNAL MEDICINE

## 2021-07-28 PROCEDURE — 1036F TOBACCO NON-USER: CPT | Performed by: INTERNAL MEDICINE

## 2021-07-28 PROCEDURE — G8427 DOCREV CUR MEDS BY ELIG CLIN: HCPCS | Performed by: INTERNAL MEDICINE

## 2021-07-28 PROCEDURE — 4040F PNEUMOC VAC/ADMIN/RCVD: CPT | Performed by: INTERNAL MEDICINE

## 2021-07-28 NOTE — PROGRESS NOTES
[id number] [facility] [address]  [place] [address]  Dept: [phone]  Dept Fax: [phone]  Loc: [phone]    Visit Date: 7/28/2021    Ms. Rome Fernandez is a 80 y.o. female  who presented for:  Chief Complaint   Patient presents with    Follow-up    NSTEMI    HPI:   MARCOS Jefferson is a pleasant 80year old female patient who  has a past medical history of Arthritis, Cancer ([address] Diverticulosis, Hyperlipidemia, Hypertension, Hypothyroidism, Psoriasis, S/P angioplasty with stent, and Thyroid disorder. On 5/2021, she was admitted to [facility] with NSTEMI. Patient underwent cardiac cath on 5/6/2021 which showed severe LAD 80-90% and DX 90% disease, Ostial OM 90%, RCA non-dominant. She underwent PCI of the LAD x 3 JUSTUS, PCI of the OM x 1 JUSTUS. Procedure was complicated by loss of side branch (D1) and no re-flow in LAD. IABP was placed post PCI. Echo 5/2021 revealed EF 40-45%, Mild MR. Patient denies chest pain, shortness of breath, dyspnea on exertion, orthopnea, paroxysmal nocturnal dyspnea, palpitations, dizziness, syncope, weight gain or leg swelling. She wishes to get permission to start an exercise program. She is compliant with her medications. Current Outpatient Medications:     blood glucose monitor strips, Check blood sugar q daily, Dx R73.01, Disp: 100 strip, Rfl: 3    atorvastatin (LIPITOR) 80 MG tablet, Take 1 tablet by mouth nightly, Disp: 30 tablet, Rfl: 6    carvedilol (COREG) 3.125 MG tablet, Take 1 tablet by mouth 2 times daily (with meals), Disp: 60 tablet, Rfl: 6    furosemide (LASIX) 20 MG tablet, Take 1 tablet by mouth daily, Disp: 30 tablet, Rfl: 6    aspirin 81 MG chewable tablet, Take 1 tablet by mouth daily, Disp: 30 tablet, Rfl: 6    clopidogrel (PLAVIX) 75 MG tablet, Take 1 tablet by mouth daily, Disp: 90 tablet, Rfl: 1    nitroGLYCERIN (NITROSTAT) 0.4 MG SL tablet, up to max of 3 total doses.  If no relief after 1 dose, call 911., Disp: 25 tablet, Rfl: 0    LORazepam (ATIVAN) 1 MG tablet, Take 0.5 mg by mouth every 6 hours as needed for Anxiety. Pt usually only takes a half tab when needed, Disp: , Rfl:     spironolactone (ALDACTONE) 50 MG tablet, Take 1 tablet by mouth daily, Disp: 90 tablet, Rfl: 3    montelukast (SINGULAIR) 10 MG tablet, TAKE 1 TABLET BY MOUTH DAILY, Disp: 90 tablet, Rfl: 3    thyroid (ARMOUR) 60 MG tablet, Take 0.5 tablets by mouth daily VINNY, Disp: 45 tablet, Rfl: 3    melatonin 3 MG TABS tablet, Take 3 mg by mouth daily, Disp: , Rfl:     famotidine (PEPCID) 20 MG tablet, Take 1 tablet by mouth 2 times daily, Disp: 180 tablet, Rfl: 3    Loratadine (CLARITIN PO), Take 1 tablet by mouth daily as needed , Disp: , Rfl:     fluticasone (FLONASE) 50 MCG/ACT nasal spray, 2 sprays by Each Nostril route daily, Disp: , Rfl:     b complex vitamins capsule, Take 1 capsule by mouth daily, Disp: , Rfl:     polyethyl glycol-propyl glycol 0.4-0.3 % (SYSTANE) 0.4-0.3 % ophthalmic solution, 1 drop as needed for Dry Eyes, Disp: , Rfl:     docusate sodium (COLACE) 100 MG capsule, Take 100 mg by mouth as needed for Constipation, Disp: , Rfl:     OLIVE LEAF PO, Take 450 mg by mouth daily, Disp: , Rfl:     [place]icap Placard [facility] Does not apply route Expires 9/6/2022, Disp: 1 each, Rfl: 0    AYR SALINE NASAL NO-DRIP GEL, Use 1 spray in each nostril 4 times a day (Patient taking differently: as needed Use 1 spray in each nostril 4 times a day ), Disp: 1 Tube, Rfl: 3    Blood Glucose Monitoring Suppl (TRUE METRIX METER) W/DEVICE KIT, 1 Device by Does not apply route daily Check blood sugar q daily, Dx E11.9, Disp: 1 kit, Rfl: 0    Multiple Vitamins-Minerals (THERAPEUTIC MULTIVITAMIN-MINERALS) tablet, Take 1 tablet by mouth daily. , Disp: , Rfl:     fish oil-omega-3 fatty acids 1000 MG capsule, Take 2 g by mouth daily.   , Disp: , Rfl:     CALCIUM CITRATE, Take 600 mg by mouth daily , Disp: , Rfl:    clopidogrel (PLAVIX) 300 MG TABS, Take 1 tablet by mouth once for 1 dose, Disp: 1 tablet, Rfl: 0    Past Medical History  Reji Fajardo  has a past medical history of Arthritis, Cancer ([address] Diverticulosis, Hyperlipidemia, Hypertension, Hypothyroidism, Psoriasis, S/P angioplasty with stent, and Thyroid disorder. Social History  Reji Fajardo  reports that she has never smoked. She has never used smokeless tobacco. She reports that she does not drink alcohol and does not use drugs. Family History  Reji Fajardo family history includes Breast Cancer in her sister; Breast Cancer (age of onset: 52) in her daughter; Cancer in her child; Heart Disease in her sister; Other in an other family member; Stroke in her sister. Past Surgical History   Past Surgical History:   Procedure Laterality Date    BREAST BIOPSY Left     benign    BREAST SURGERY Left 6/1966    Lumpectomy    CARPAL TUNNEL RELEASE Right 1/30/2013    CARPAL TUNNEL RELEASE Left 7/25/13    CATARACT REMOVAL Bilateral 1999    COLON SURGERY  11/1999    resection    COLONOSCOPY  2003, 2006, 2011    DIAGNOSTIC CARDIAC CATH LAB PROCEDURE  05/07/2021    6 stents     HYSTERECTOMY  2/23/1970    HYSTERECTOMY, TOTAL ABDOMINAL      KNEE ARTHROSCOPY Right 11/21/2007    meniscectomies    KNEE SURGERY Left 2000    replacement    MYRINGOTOMY Right 07/01/2015    tube insertion    OVARY REMOVAL Bilateral 7/12/2001    oophorectomy    OVARY REMOVAL Bilateral     SHOULDER SURGERY  5/14    SINUS SURGERY         Review of Systems   Constitutional: Negative for chills and fever  HENT: Negative for congestion, sinus pressure, sneezing and sore throat. Eyes: Negative for pain, discharge, redness and itching. Respiratory: Negative for apnea, cough  Gastrointestinal: Negative for blood in stool, constipation, diarrhea   Endocrine: Negative for cold intolerance, heat intolerance, polydipsia. Genitourinary: Negative for dysuria, enuresis, flank pain and hematuria. Musculoskeletal: Negative for arthralgias, joint swelling and neck pain. Neurological: Negative for numbness and headaches. Psychiatric/Behavioral: Negative for agitation, confusion, decreased concentration and dysphoric mood. Objective:     BP (!) 159/83   Pulse 80   Ht 5' (1.524 m)   Wt 128 lb (58.1 kg)   BMI 25.00 kg/m²     Wt Readings from Last 3 Encounters:   07/28/21 128 lb (58.1 kg)   06/10/21 129 lb 9.6 oz (58.8 kg)   06/02/21 129 lb 9.6 oz (58.8 kg)     BP Readings from Last 3 Encounters:   07/28/21 (!) 159/83   06/10/21 (!) 141/75   06/02/21 110/60       Nursing note and vitals reviewed. Physical Exam   Constitutional: Oriented to person, place, and time. Appears well-developed and well-nourished. ENT: Moist mucous membranes. No bleeding. Tongue is midline. Head: Normocephalic and atraumatic. Eyes: EOM are normal. Pupils are equal, round, and reactive to light. Neck: Normal range of motion. Neck supple. No JVD present. Cardiovascular: Normal rate, regular rhythm, murmur, no rubs, and intact distal pulses. Pulmonary/Chest: Effort normal and breath sounds normal. No respiratory distress. No wheezes. No rales. Abdominal: Soft. Bowel sounds are normal. No distension. There is no tenderness. Musculoskeletal: Normal range of motion. no edema. Neurological: Alert and oriented to person, place, and time. No cranial nerve deficit. Coordination normal.   Skin: Skin is warm and dry. Psychiatric: Normal mood and affect.        No results found for: CKTOTAL, CKMB, CKMBINDEX    Lab Results   Component Value Date    WBC 9.6 05/10/2021    RBC 4.86 05/10/2021    HGB 15.2 05/10/2021    HCT 46.2 05/10/2021    MCV 95.1 05/10/2021    MCH 31.3 05/10/2021    MCHC 32.9 05/10/2021    RDW 13.8 11/02/2019     05/10/2021    MPV 10.2 05/10/2021       Lab Results   Component Value Date     05/27/2021    K 4.0 05/27/2021    K 4.1 05/10/2021     05/27/2021    CO2 29 05/27/2021 normal.    Right Ventricle  The right ventricular size was normal with normal systolic function and  wall thickness. Pericardial Effusion  The pericardium was normal in appearance with no evidence of a pericardial  effusion. Pleural Effusion  No evidence of pleural effusion. Aorta / Great Vessels  IVC size is within normal limits with normal respiratory phasic changes. M-Mode/2D Measurements & Calculations    LV Diastolic   LV Systolic Dimension:    AV Cusp Separation: 1.5 cmLA  Dimension: 4.2 3.3 cm                    Dimension: 2.5 cmAO Root  cm             LV Volume Diastolic: 62.1 Dimension: 2.9 cmLA Area: 13.8  LV FS:21.4 %   ml                        cm^2  LV PW          LV Volume Systolic: 62.0  Diastolic: 0.7 ml  cm             LV EDV/LV EDV Index: 51.6  Septum         ml/33 m^2LV ESV/LV ESV    RV Diastolic Dimension: 1.6 cm  Diastolic: 0.7 Index: 15.0 ml/19 m^2  cm             EF Calculated: 42.6 %     LA/Aorta: 0.86    LV Length: 6.7 cm         LA volume/Index: 30.1 ml /19m^2    LV Area  Diastolic:  87.2 cm^2  LV Area  Systolic: 59.3  cm^2    Doppler Measurements & Calculations    MV Peak E-Wave: 66 cm/s   AV Peak Velocity: 139   LVOT Peak Velocity: 72  MV Peak A-Wave: 81.4 cm/s cm/s                    cm/s  MV E/A Ratio: 0.81        AV Peak Gradient: 7.73  LVOT Peak Gradient: 2  MV Peak Gradient: 1.74    mmHg                    mmHg  mmHg    MV Deceleration Time: 77  msec  IVRT: 53 msec  PV Peak Velocity: 64.3  cm/s  AV DVI (Vmax):0.52      PV Peak Gradient: 1.65  mmHg  MR Velocity: 379 cm/s    [id number]     Cath:  CORONARY ANGIOGRAM:     LEFT MAIN:  Patent without any significant obstruction.     LAD:  Ostium has 30% stenosis in the mid section of the LAD.   There is  severe diffuse disease proximally totalling 70% to 80% in the mid,  distal 80% to 90% severe stenosis seen. Distal LAD has mild luminal  irregularities, no significant obstruction. There is a large diagonal  Branch, this branch was subtotally occluded. In the mid section of  the ostium, it has about 80% to 90% stenosis.     LCX:  Proximally, it has no significant obstruction. Gives rise to a  large OM1 that has a 99% stenosis. AV groove branch continues without  any significant obstruction. There is OM2 without any significant  obstruction. There is left PDA without any significant obstruction.     RAMUS INTERMEDIUS:  Subtotally occluded ramus intermedius that is about  less than 1.5 mm in diameter at the proximal end.     RCA:  There is a proximal 50% stenosis. It is a nondominant vessel with  a small marginal branch.     INTERVENTION:  Given the findings and presentation, I elected to proceed  with intervention. I exchanged out for 6-Sinhala EBU 3.5 guiding  catheter. I cannulated the left main without complication. Heparin IV  was given. ACT was confirmed to be above 250 seconds. I wired the  lesion using a Runthrough wire only to the apex. I predilated the  lesion using a 2.5 x 12 NC balloon at 5 inflations at 6 to 8  atmospheres. I then passed a Xience [place] JUSTUS 2.5 x 38, the wire would  not cross. I then used 2.5 x 12 NC balloon and predilated the lesion  distally up to 6 to 8 atmospheres. I then passed the 2.5 x [address] and deployed this into the most distal normal segment. This  was deployed at 9 atmospheres. I then passed a 3.0 x [address] and deployed this in overlapping fashion with the first stent  deployed at 12 atmospheres. Then, I saw that the diagonal branch was in  jeopardy, however, this was extremely small, we did cross the stent with  the [facility] wire to ensure that it was able to be saved if needed.    However, at that time, the patient started having extravasation  anterolaterally and was complaining of back pain that was reminiscent of  her presentation. The diagonal branch had basically a NATHANIEL-1 flow. I  then after crossing it used a 2.0 x 20 NC balloon and dilated the  proximal and mid diagonal branch to ensure flow. Thereafter, we had  reasonable flow in the diagonal branch. I then stented the proximal LAD  with a 3.5 x 18 Xience [place] JUSTUS at 12 atmospheres in overlapping  fashion with the second stent deployed. I then postdilated the stent  distally with the 2.5 x 20 NC balloon from 18 to 24 atmospheres and the  proximal stent with the 3.5 x 20 from 16 to 20 atmospheres. Post PCI  angiography demonstrated NATHANIEL-0 flow with no reflow, all the way to the  distal LAD. Again IC adenosine to help improve the flow to the LAD  which did improve to the NATHANIEL-2 flow. However, there was significant  anterolateral ST elevation. There was a very severe circumflex lesion  about 90% in the OM1 left branch into the superior inferior branch. Given these findings, I was able to stent this branch. I redirected the  [facility] wire into the OM1 branch, predilated the lesion with a 2.0 x 8  NC balloon up to 16 atmospheres and passed the 2.5 x 12 Xience [place]  JUSTUS and stented the first OM1. There was actually a good flow into both  the superior and inferior branches. The patient then again was showing  more ST elevations. I readvanced the Runthrough wire into the LAD, and  there was significant no reflow in the LAD again. At this time, I  passed over-the-wire 1.5 balloon with the Runthrough wire into the  distal LAD. I injected into coronary distal IC adenosine to help  improve the flow. While that was occurring, I then went back and  postdilated the OM branch with the 2.5 x 20 NC balloon. Once this was  done, I went back and tried to save the diagonal branch once more with  the 2.0 x 12 Mini TREK balloon up to 10 to 12 atmospheres to ensure  flow, however, the diagonal branch would not stay open no matter what I  did.   I did Kissing balloon inflation of the diagonal branch as well as  into the LAD, but again the diagonal branch would not stay open. There  were ST elevations in the anterolateral segment on the anterolateral  ECG. Therefore, I attempted multiple inflations while in the diagonal  branch at 10 to 12 atmospheres to avoid dissection but still no flow  could be restored. The patient was having back pain reminiscent of her  chest pain and anterolateral ST elevation. Given these findings, I  attempted to stent the vessel with 2.25 x [address] JUSTUS deployed  at 7 atmospheres and then used a 2.25 x [facility] JUSTUS to the  ostium of the diagonal branch in overlapping fashion with the first  stent deployed. There was significantly no reflow in the diagonal  branch and again no perfusion that was obvious in this branch. At this  point, the LAD had improved flow. Diagonal branch was having no  significant reflow and the circumflex was open, therefore I elected no  further intervention to the diagonal branch. Continue management of the  LAD and circumflex by placement of the balloon pump. I took a final  angiogram demonstrating NATHANIEL-2 flow in the LAD and NATHANIEL-3 flow in the  circumflex and NATHANIEL-0 flow in the diagonal branch. I then took a  femoral angiogram that demonstrated common femoral artery puncture. I  exchanged out for a 7. 5-Lao 40 mL IABP balloon pump sheath that was  inserted over the 0.035 guidewire. I prepped a 40 mL IABP balloon  catheter per manufacture's recommendations. This was inserted over a  0.25 guidewire into the ascending aorta. The guidewire was removed,  balloon pump was deaired appropriately prior to insertion and balloon  pump was then initiated. IV heparin was initiated as well. IV  nitroglycerin was also initiated.   Balloon pump was then sutured in  place.     MEDICATIONS:  See EMR.     ACCESS:  See above.     ESTIMATED BLOOD LOSS:  Less than 50 mL.     SUMMARY:  Successful PCI of the LAD with three overlapping drug-eluting  stents; successful PCI of the OM1 with one drug-eluting stent; diagonal  PCI with two drug-eluting stents with no reflow; IABP insertion.     AssessmentPlan:     1. NSTEMI  2. PCI LAD, OM 5/2021  3. 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 HFrEF  5. S/P acute respiratory failure 2/2 CHF   6. HTN  7. Dyslipidemia  8. Hypothyroidism        BP controlled   The patient was instructed to check the blood pressure at home, and record the readings. Patient will call office with blood pressure readings, will adjust patient's antihypertensive medications as needed accordingly    Cont DAPT   No apparent bleeding complications    Hyperlipidemia: on statins, followed periodically. Patient need periodic lipid and liver profile   Coreg   Aldactone   The patient is advised to begin progressive daily aerobic exercise program   Cont daily weight   Limit salt intake   On lasix   No signs of volume overload today     Above findings and plan of care were discussed with patient in details, patient's questions were answered.      Disposition:  RTC in 1 year             Electronically signed by Loyda Eid MD, [facility]    7/28/2021 at 4:14 PM EDT

## 2021-08-09 ENCOUNTER — OFFICE VISIT (OUTPATIENT)
Dept: FAMILY MEDICINE CLINIC | Age: 86
End: 2021-08-09
Payer: MEDICARE

## 2021-08-09 VITALS
HEART RATE: 67 BPM | OXYGEN SATURATION: 99 % | RESPIRATION RATE: 12 BRPM | WEIGHT: 129.2 LBS | SYSTOLIC BLOOD PRESSURE: 118 MMHG | HEIGHT: 60 IN | BODY MASS INDEX: 25.36 KG/M2 | TEMPERATURE: 97.6 F | DIASTOLIC BLOOD PRESSURE: 64 MMHG

## 2021-08-09 DIAGNOSIS — R73.01 IFG (IMPAIRED FASTING GLUCOSE): ICD-10-CM

## 2021-08-09 DIAGNOSIS — I10 ESSENTIAL HYPERTENSION: ICD-10-CM

## 2021-08-09 DIAGNOSIS — G89.29 CHRONIC LEFT SHOULDER PAIN: Primary | ICD-10-CM

## 2021-08-09 DIAGNOSIS — M25.512 CHRONIC LEFT SHOULDER PAIN: Primary | ICD-10-CM

## 2021-08-09 DIAGNOSIS — I25.10 CORONARY ARTERY DISEASE INVOLVING NATIVE CORONARY ARTERY OF NATIVE HEART WITHOUT ANGINA PECTORIS: ICD-10-CM

## 2021-08-09 PROCEDURE — G8417 CALC BMI ABV UP PARAM F/U: HCPCS | Performed by: FAMILY MEDICINE

## 2021-08-09 PROCEDURE — 99214 OFFICE O/P EST MOD 30 MIN: CPT | Performed by: FAMILY MEDICINE

## 2021-08-09 PROCEDURE — 1123F ACP DISCUSS/DSCN MKR DOCD: CPT | Performed by: FAMILY MEDICINE

## 2021-08-09 PROCEDURE — 1036F TOBACCO NON-USER: CPT | Performed by: FAMILY MEDICINE

## 2021-08-09 PROCEDURE — 1090F PRES/ABSN URINE INCON ASSESS: CPT | Performed by: FAMILY MEDICINE

## 2021-08-09 PROCEDURE — G8427 DOCREV CUR MEDS BY ELIG CLIN: HCPCS | Performed by: FAMILY MEDICINE

## 2021-08-09 PROCEDURE — 4040F PNEUMOC VAC/ADMIN/RCVD: CPT | Performed by: FAMILY MEDICINE

## 2021-08-09 RX ORDER — ASPIRIN 81 MG/1
TABLET, COATED ORAL PRN
COMMUNITY
Start: 2021-06-16

## 2021-08-09 RX ORDER — MULTIVIT WITH MINERALS/LUTEIN
1000 TABLET ORAL DAILY
COMMUNITY

## 2021-08-09 NOTE — PROGRESS NOTES
Ellis Mcneill is a 80 y.o. female that presents for     Chief Complaint   Patient presents with    Follow-up       /64   Pulse 67   Temp 97.6 °F (36.4 °C) (Infrared)   Resp 12   Ht 5' (1.524 m)   Wt 129 lb 3.2 oz (58.6 kg)   SpO2 99%   BMI 25.23 kg/m²       HPI    CAD    History of myocardial infarction? Yes - recently admitted for this    History of heart failure? Yes. Current symptoms of angina? No   Patient compliant with therapy? No  Tobacco use? No      Antiplatelet therapy? Yes    Beta-blocker therapy? Yes   ACE/ARB therapy - Yes    HTN    Does patient check BP regularly at home? - Yes - well controlled,   Current Medication regimen - coreg, aldactone, lasix  Tolerating medications well? - yes    Shortness of breath or chest pain? No  Headache or visual complaints? No  Neurologic changes like confusion? No  Extremity edema? No    BP Readings from Last 3 Encounters:   08/09/21 118/64   07/28/21 (!) 159/83   06/10/21 (!) 141/75       Still having left shoulder pain. Has seen ortho previously for this. Mild decrease in ROM. Health Maintenance   Topic Date Due    DTaP/Tdap/Td vaccine (1 - Tdap) Never done    Shingles Vaccine (2 of 3) 04/12/2014    Annual Wellness Visit (AWV)  Never done    Flu vaccine (1) 09/01/2021    TSH testing  05/06/2022    Lipid screen  05/07/2022    Potassium monitoring  05/27/2022    Creatinine monitoring  05/27/2022    Pneumococcal 65+ years Vaccine  Completed    COVID-19 Vaccine  Completed    Hepatitis A vaccine  Aged Out    Hepatitis B vaccine  Aged Out    Hib vaccine  Aged Out    Meningococcal (ACWY) vaccine  Aged Out       Past Medical History:   Diagnosis Date    Arthritis     Cancer ([facility] [address] 2013    SKIN CANCER ON LEFT ANKLE    Diverticulosis     Hyperlipidemia     Hypertension     Hypothyroidism     Psoriasis     S/P angioplasty with stent 05/06/2021    3 stents to LAD, 1 stent to OM, 2 stents to diag.  per Dr. Jahaira Elizalde Thyroid disorder        Past Surgical History:   Procedure Laterality Date    BREAST BIOPSY Left     benign    BREAST SURGERY Left 6/1966    Lumpectomy    CARPAL TUNNEL RELEASE Right 1/30/2013    CARPAL TUNNEL RELEASE Left 7/25/13    CATARACT REMOVAL Bilateral 1999    COLON SURGERY  11/1999    resection    COLONOSCOPY  2003, 2006, 2011    DIAGNOSTIC CARDIAC CATH LAB PROCEDURE  05/07/2021    6 stents     HYSTERECTOMY  2/23/1970    HYSTERECTOMY, TOTAL ABDOMINAL      KNEE ARTHROSCOPY Right 11/21/2007    meniscectomies    KNEE SURGERY Left 2000    replacement    MYRINGOTOMY Right 07/01/2015    tube insertion    OVARY REMOVAL Bilateral 7/12/2001    oophorectomy    OVARY REMOVAL Bilateral     SHOULDER SURGERY  5/14    SINUS SURGERY         Social History     Tobacco Use    Smoking status: Never Smoker    Smokeless tobacco: Never Used   Vaping Use    Vaping Use: Never used   Substance Use Topics    Alcohol use: No    Drug use: No       Family History   Problem Relation Age of Onset    Cancer Child     Breast Cancer Sister     Stroke Sister     Heart Disease Sister     Other Other         visual problems    Breast Cancer Daughter 52         I have reviewed the patient's past medical history, past surgical history, allergies, medications, social and family history and I have made updates where appropriate. Review of Systems        PHYSICAL EXAM:  /64   Pulse 67   Temp 97.6 °F (36.4 °C) (Infrared)   Resp 12   Ht 5' (1.524 m)   Wt 129 lb 3.2 oz (58.6 kg)   SpO2 99%   BMI 25.23 kg/m²     Physical Exam  Vitals and nursing note reviewed. Constitutional:       General: She is not in acute distress. Appearance: She is well-developed. HENT:      Head: Normocephalic and atraumatic. Right Ear: Hearing and external ear normal.      Left Ear: Hearing and external ear normal.      Nose: Nose normal.   Eyes:      General:         Right eye: No discharge. Left eye: No discharge. Conjunctiva/sclera: Conjunctivae normal.   Neck:      Thyroid: No thyromegaly. Trachea: No tracheal deviation. Cardiovascular:      Rate and Rhythm: Normal rate and regular rhythm. Heart sounds: Normal heart sounds. No murmur heard. No friction rub. No gallop. Pulmonary:      Effort: Pulmonary effort is normal. No respiratory distress. Breath sounds: No wheezing or rales. Chest:      Chest wall: No tenderness. Musculoskeletal:         General: No deformity. Cervical back: Normal range of motion and neck supple. Lymphadenopathy:      Cervical: No cervical adenopathy. Skin:     General: Skin is warm and dry. Findings: No erythema or rash. Neurological:      Mental Status: She is alert. Motor: No abnormal muscle tone. Coordination: Coordination normal.   Psychiatric:         Behavior: Behavior normal.         Thought Content: Thought content normal.         Judgment: Judgment normal.             ASSESSMENT & PLAN  Eugenia Sanchez was seen today for follow-up. Diagnoses and all orders for this visit:    Chronic left shoulder pain  -     [facility]    Essential hypertension    Coronary artery disease involving native coronary artery of native heart without angina pectoris    IFG (impaired fasting glucose)    HTN controlled, continue coreg, aldactone, lasix. Tolerating these well  Refer to OT for her shoulder  -Other chronic issues are stable, continue current medications  -Advised to call if any issues        Return in about 2 months (around 10/9/2021). The most recent results of the following tests were reviewed with the patient today: Cath report, Lipids, a1c     All copied or forwarded information in the progress note was verified by me to be accurate at the time of visit  Patient's past medical, surgical, social and family history were reviewed and updated     All patient questions answered. Patient voiced understanding.

## 2021-08-13 ENCOUNTER — HOSPITAL ENCOUNTER (OUTPATIENT)
Dept: OCCUPATIONAL THERAPY | Age: 86
Setting detail: THERAPIES SERIES
Discharge: HOME | End: 2021-08-13
Payer: MEDICARE

## 2021-08-13 PROCEDURE — 97110 THERAPEUTIC EXERCISES: CPT

## 2021-08-13 PROCEDURE — 97165 OT EVAL LOW COMPLEX 30 MIN: CPT

## 2021-08-13 NOTE — PROGRESS NOTES
** PLEASE SIGN, DATE AND TIME CERTIFICATION BELOW AND RETURN TO [facility] OUTPATIENT REHABILITATION (FAX #: [phone]). ATTEST/CO-SIGN IF ACCESSING VIA [employer]. THANK YOU.**    I certify that I have examined the patient below and determined that Physical Medicine and Rehabilitation service is necessary and that I approve the established plan of care for up to 90 days or as specifically noted. Attestation, signature or co-signature of physician indicates approval of certification requirements.    ________________________ ____________ __________  Physician Signature   Date   Time   [address] THERAPY  [x] EVALUATION  [] DAILY NOTE (LAND) [] DAILY NOTE (AQUATIC ) [] PROGRESS NOTE [] DISCHARGE NOTE    [x] [address] [facility]   [] [place] [address]    [] [address] [facility]   [] [facility]    Date: 2021  Patient Name:  Wanda Veláqzuez  : 1925  MRN: [id number]  CSN: [id number]    Referring Practitioner Zulma Guzman DO   Diagnosis Chronic L shoulder pain M25.512    Treatment Diagnosis L shoulder decreased ROM, pain   Date of Evaluation 21      Functional Outcome Measure Used Upper Extremity Functional Scale   Functional Outcome Score 38/80 (21)       Insurance: Primary: Payor: Ramone Cardenas /  /  / ,   Secondary: [facility]   Authorization Information: PRECERTIFICATION REQUIRED:  No  INSURANCE THERAPY BENEFIT:  Patient has unlimited visits based on medical necessity  Benefit will not cover maintenance or preventative treatment.   AQUATIC THERAPY COVERED:   Yes  MODALITIES COVERED:  Yes, with the exception of iontophoresis and hot packs/cold packs  TELEHEALTH COVERED: Yes   Visit # 1, 1/10 for progress note   Visits Allowed:    Recertification Date:    Physician Follow-Up: 2021   Physician Orders: Chauncey Elizabeth and treat   Pertinent History: Patient reports that on , she went to reach her L arm behind her back and had pain and difficulty moving her arm. PROCEDURE: XR SHOULDER LEFT (MIN 2 VIEWS)       CLINICAL INFORMATION: Acute pain of left shoulder, Decreased ROM of left shoulder       COMPARISON: No prior study.       TECHNIQUE: 4 standard views of the left shoulder were obtained       FINDINGS: There is moderate degenerative spurring of the left humeral head. There is marked narrowing of the glenohumeral joint. No fractures seen. Findings of moderate osteoporosis. SUBJECTIVE: Patient is pleasant and cooperative. Is interested in conservative measures to improve her shoulder. Social/Functional History:  Medications and Allergies have been reviewed and are listed on the Medical History Questionnaire      Task Prior Level of Function  (current level of function addressed below)   ADLs  Independent   Ambulation Independent   Transfers Independent   Hobbies Exercises daily   Driving Active    Work Retired     OBJECTIVE:  Hand Dominance right handed   Palpation No tenderness to palpation   Observation Very mild elevation and abduction of L scapula compared to R   Posture WFL, mild forward head. Edema    Special Tests Positive empty can test.       ADL's Patient reports pain and difficulty with bathing, dressing, laying down, lifting, cleaning. Sensation Left Upper Extremity: Intact.    Coordination WFL           LEFT UPPER EXTREMITY  RANGE OF MOTION    AROM PROM COMMENTS         Shoulder Flexion 145     Shoulder Extension      Shoulder Abduction 140     Shoulder Adduction      Shoulder External Rotation 50 arm at side     Shoulder Internal Rotation IR thumb tip 2\" above waistline     Shoulder Range of Motion is [facility]  []      Elbow Flexion      Elbow Extension      Forearm Pronation      Forearm Supination      Elbow Range of Motion is [facility] [place]  [x]      Wrist Flexion      Wrist Extension      Wrist Radial Deviation      Wrist Ulnar Deviation      Wrist Range of Motion is [facility] [place]  [x]   If no measurement is recorded, no formal assessment was completed for that motion. LEFT UPPER EXTREMITY  STRENGTH    Strength Rating Comments   Shoulder Flexion 4-/5    Shoulder Extension     Shoulder Abduction 3+/5    Shoulder Adduction     Shoulder External Rotation 4-/5    Shoulder Internal Rotation 4/5    []  Shoulder Strength is grossly WFL. Elbow Flexion     Elbow Extension     Forearm Pronation     Forearm Supination     [] Elbow Strength is grossly WFL. Wrist Flexion     Wrist Extension     Wrist Radial Deviation     Wrist Ulnar Deviation     []  Wrist Strength is grossly WFL. Right Left    Strength Setting:      Pinch Strength Tip Pinch:      Lateral Pinch           If no ratings are recorded, no formal assessment was completed. TREATMENT   Precautions: 5# lifting restriction    Pain:  8/10 L shoulder with activity and at night proximal lateral shoulder radiating to elbow     X in shaded column indicates Activity Completed Today   Modalities Parameters/  Location  Notes/Comments                     Manual Therapy Time/  Technique  Notes/Comments                     Exercises   Sets/  Sec Reps  Notes/Comments   Wall slides 15 sec 3 X    Corner stretch at 60 15 sec 3 X    Corner stretch hands down for pec minor 15 sec 3 X    Posterior capsule stretch 15 sec 3 X    IR stretch hand behind back 15 sec 3 X                  Activities Time    Notes/Comments   Discussed sleeping postures to improve comfort to joint at nighttime                        Specific Interventions Next Treatment: MHP with gentle stretching (ER arm at side with dowel), ROM, gentle stretching and STM. Activity/Treatment Tolerance:  [x]  Patient tolerated treatment well  []  Patient limited by fatigue  []  Patient limited by pain   []  Patient limited by other medical complications  []  Other:     Assessment: Patient presents with history of 4 months of shoulder pain and decreased ROM.   Needs skilled therapy services to improve ROM and eventual strength for self care. Areas for Improvement: impaired activity tolerance, impaired ROM, impaired strength and pain  Prognosis: good    GOALS:  Patient Goal: Decrease pain    Short Term Goals:  Time Frame: 4 weeks  *  Patient will improve AROM L shoulder flexion to 155, abduction to 155, ER to 70, IR thumb tip to 3\" above waistline for improved flexibility for bathing. * Patient will report pain at night no more than 5/10 for improved ease with sleep. * Patient will improve L shoulder strength to 4/5 throughout for improved ease with lifting a bag of groceries. * Patient will demonstrate I knowledge of HEP for improved flexibility and strength for bathing and dressing. Long Term Goals:  Time Frame: 12 weeks  *  Patient will report dressing and bathing with no increase in pain. *  Patient will improve UEFS to at least 55. * Patient will demonstrate ability to reach overhead for an object with affected extremity without increased pain. Patient Education:   [x]  HEP/Education Completed: Plan of Care, Goals, HEP   [employer] Access Code for HEP: Access Code: Alex Greenberg   Standing Shoulder Flexion Stretch on Wall - 1 x daily - 7 x weekly - 1 sets - 5-10 reps - 15 hold   Corner Pec Major Stretch - 2 x daily - 7 x weekly - 1 sets - 5-10 reps - 15 hold   Corner Pec Minor Stretch - 2 x daily - 7 x weekly - 1 sets - 5-10 reps - 15 hold   Modified Posterior Capsule Stretch - 2 x daily - 7 x weekly - 1 sets - 3-5 reps - 15 hold   Standing Shoulder Internal Rotation Stretch with Hands Behind Back - 2 x daily - 7 x weekly - 1 sets - 3-5 reps - 15 hold  []  No new Education completed  []  Reviewed Prior HEP      [x]  Patient verbalized and/or demonstrated understanding of education provided. []  Patient unable to verbalize and/or demonstrate understanding of education provided. Will continue education.   [x]  Barriers to learning: none    PLAN:  Treatment Recommendations: Strengthening, Range of Motion, Manual Therapy - Soft Tissue Mobilization, Manual Therapy - Joint Manipulation, Pain Management, Home Exercise Program, Patient Education and Modalities    [x]  Plan of care initiated. Plan to see patient 2 times per week for 12 weeks to address the treatment planned outlined above.   []  Continue with current plan of care  []  Modify plan of care as follows:    []  Hold pending physician visit  []  Discharge    Time In 1115   Time Out 1200   Timed Code Minutes: 15 min   Total Treatment Time: 45 min       Electronically Signed by:  Kj LOPEZ, CHT #[id number]

## 2021-08-13 NOTE — PROGRESS NOTES
** PLEASE SIGN, DATE AND TIME CERTIFICATION BELOW AND RETURN TO [facility] OUTPATIENT REHABILITATION (FAX #: [phone]). ATTEST/CO-SIGN IF ACCESSING VIA [employer]. THANK YOU.**    I certify that I have examined the patient below and determined that Physical Medicine and Rehabilitation service is necessary and that I approve the established plan of care for up to 90 days or as specifically noted. Attestation, signature or co-signature of physician indicates approval of certification requirements.    ________________________ ____________ __________  Physician Signature   Date   Time   [address] THER[address]Y  [x] EVALUATION  [] DAILY NOTE (LAND) [] DAILY NOTE (AQUATIC ) [] PROGRESS NOTE [] DISCHARGE NOTE    [] [facility]   [] [facility]    [] [facility]   [] Alexia Opitz    Date: 2021  Patient Name:  Anurag Leonard  : 1925  MRN: [id number]  CSN: [id number]    Referring Practitioner Parth Meza DO   Diagnosis Pain in thoracic spine [M54.6]    Treatment Diagnosis ***   Date of Evaluation 21      Functional Outcome Measure Used ***   Functional Outcome Score *** (21)       Insurance: Primary: Payor: MEDICARE /  /  / ,   Secondary: [facility]   Authorization Information: PRECERTIFICATION REQUIRED:  No  INSURANCE THERAPY BENEFIT:  Patient has unlimited visits based on medical necessity  Benefit will not cover maintenance or preventative treatment. AQUATIC THERAPY COVERED:   Yes  MODALITIES COVERED:  Yes, with the exception of iontophoresis and hot packs/cold packs  TELEHEALTH COVERED: Yes  REFERENCE #:      Visit # 1, 1/10 for progress note   Visits Allowed: No limit   Recertification Date: ***   Physician Follow-Up: ***   Physician Orders:    Pertinent History:      SUBJECTIVE: ***    Social/Functional History:  {OT SOCIAL FUNCTIONAL:98595}    Anurag Leonard lives {PT LIVES JJZB:79342} in {PT LIVING Activity Completed Today   Modalities Parameters/  Location  Notes/Comments                     Manual Therapy Time/  Technique  Notes/Comments                     Exercises   Sets/  Sec Reps  Notes/Comments                                                    Activities Time    Notes/Comments                       Specific Interventions Next Treatment: ***    Activity/Treatment Tolerance:  []  Patient tolerated treatment well  []  Patient limited by fatigue  []  Patient limited by pain   []  Patient limited by other medical complications  []  Other:     Assessment: ***  Areas for Improvement: {Problem List:7280612380}  Prognosis: {GOOD/FAIR/POOR:480434647}    GOALS:  Patient Goal: ***    Short Term Goals:  Time Frame: ***  {OT OP ST}    Long Term Goals:  Time Frame: ***  {OT OP LT}    Patient Education:   []  HEP/Education Completed: Plan of Care, Goals, ***   [address] Access Code for HEP: ***  []  No new Education completed  []  Reviewed Prior HEP      []  Patient verbalized and/or demonstrated understanding of education provided. []  Patient unable to verbalize and/or demonstrate understanding of education provided. Will continue education. []  Barriers to learning: ***    PLAN:  Treatment Recommendations: {OT OP TREATMENT RECOMMENDATIONS:53591}    [x]  Plan of care initiated. Plan to see patient *** times per week for *** weeks to address the treatment planned outlined above.   []  Continue with current plan of care  []  Modify plan of care as follows:    []  Hold pending physician visit  []  Discharge    Time In ***   Time Out ***   Timed Code Minutes: *** min   Total Treatment Time: *** min       Electronically Signed by: Carolann Bryson OT

## 2021-08-16 ENCOUNTER — HOSPITAL ENCOUNTER (OUTPATIENT)
Dept: OCCUPATIONAL THERAPY | Age: 86
Setting detail: THERAPIES SERIES
Discharge: HOME | End: 2021-08-16
Payer: MEDICARE

## 2021-08-16 PROCEDURE — 97140 MANUAL THERAPY 1/> REGIONS: CPT

## 2021-08-16 PROCEDURE — 97110 THERAPEUTIC EXERCISES: CPT

## 2021-08-16 NOTE — PROGRESS NOTES
[address]  [] EVALUATION  [x] DAILY NOTE (LAND) [] DAILY NOTE (AQUATIC ) [] PROGRESS NOTE [] DISCHARGE NOTE    [x] OUTPATIENT REHABILITATION [facility]   [] [place] [address]    [] [facility]   [] [facility] [place]    Date: 2021  Patient Name:  Mag Pillai  : 1925  MRN: [id number]  CSN: [id number]    Referring Practitioner Zully Matos DO   Diagnosis Chronic L shoulder pain M25.512    Treatment Diagnosis L shoulder decreased ROM, pain   Date of Evaluation 21      Functional Outcome Measure Used Upper Extremity Functional Scale   Functional Outcome Score 38/80 (21)       Insurance: Primary: Payor: MEDICARE /  /  / ,   Secondary: [facility]   Authorization Information: PRECERTIFICATION REQUIRED:  No  INSURANCE THERAPY BENEFIT:  Patient has unlimited visits based on medical necessity  Benefit will not cover maintenance or preventative treatment. AQUATIC THERAPY COVERED:   Yes  MODALITIES COVERED:  Yes, with the exception of iontophoresis and hot packs/cold packs  TELEHEALTH COVERED: Yes   Visit # 2, 2/10 for progress note   Visits Allowed:    Recertification Date:    Physician Follow-Up: 2021   Physician Orders: Chanell Molina and treat   Pertinent History: Patient reports that on , she went to reach her L arm behind her back and had pain and difficulty moving her arm. PROCEDURE: XR SHOULDER LEFT (MIN 2 VIEWS)       CLINICAL INFORMATION: Acute pain of left shoulder, Decreased ROM of left shoulder       COMPARISON: No prior study.       TECHNIQUE: 4 standard views of the left shoulder were obtained       FINDINGS: There is moderate degenerative spurring of the left humeral head. There is marked narrowing of the glenohumeral joint. No fractures seen. Findings of moderate osteoporosis. SUBJECTIVE: Patient reports use of modalities over the weekend, trial of heat and icy hot.  Patient reports compliance with HEP over the weekend, 2x/day. Patient has questions on techniques of HEP exercises. Patient reports (+) sleep disturbances. TREATMENT   Precautions: 5# lifting restriction    Pain:  7/10 L shoulder with activity and at night proximal lateral shoulder radiating to elbow     X in shaded column indicates Activity Completed Today   Modalities Parameters/  Location  Notes/Comments                     Manual Therapy Time/  Technique  Notes/Comments   PROM L Shoulder all motions (ER at side)  X Trialed with MHP this date throughout motions, good tolerance    STM lateral shoulder/ bicep   X Good tolerance, short duration to avoid tenderness          Exercises   Sets/  Sec Reps  Notes/Comments   Wall slides 15 sec 3 X    Corner stretch at 60 15 sec 3 X Cues for technique with improvement noted    Corner stretch hands down for pec minor 15 sec 3 X    Posterior capsule stretch 15 sec 3 X    IR stretch hand behind back 15 sec 3 X Cues for technique    Scap retractions  1 10  X Initiated seated for warm up. Seated EOB ER dowel (arm at side) 5 sec 5 X Initiated this date. Cues for technique, with TC provided at elbow to avoid compensation                                Activities Time    Notes/Comments   Discussed sleeping postures to improve comfort to joint at nighttime                        Specific Interventions Next Treatment: MHP with gentle stretching (ER arm at side with dowel), ROM, gentle stretching and STM. Activity/Treatment Tolerance:  [x]  Patient tolerated treatment well  []  Patient limited by fatigue  []  Patient limited by pain   []  Patient limited by other medical complications  []  Other:     Assessment: Patient is slowly progressing towards goals. Good tolerance with newly added exercises and PROM, with cues provided to avoid compensation. Decreased pain present at end of session, with patient rating L shoulder 4/10. Patient enjoyed MHP throughout stretches.  Education and review of HEP throughout, with improved technique min cues provided. Areas for Improvement: impaired activity tolerance, impaired ROM, impaired strength and pain  Prognosis: good    GOALS:  Patient Goal: Decrease pain    Short Term Goals:  Time Frame: 4 weeks  *  Patient will improve AROM L shoulder flexion to 155, abduction to 155, ER to 70, IR thumb tip to 3\" above waistline for improved flexibility for bathing. * Patient will report pain at night no more than 5/10 for improved ease with sleep. * Patient will improve L shoulder strength to 4/5 throughout for improved ease with lifting a bag of groceries. * Patient will demonstrate I knowledge of HEP for improved flexibility and strength for bathing and dressing. Long Term Goals:  Time Frame: 12 weeks  *  Patient will report dressing and bathing with no increase in pain. *  Patient will improve UEFS to at least 55. * Patient will demonstrate ability to reach overhead for an object with affected extremity without increased pain. Patient Education:   []  HEP/Education Completed: Plan of Care, Goals, HEP   [employer] Access Code for HEP: Access Code: Zackery Galeana   Standing Shoulder Flexion Stretch on Wall - 1 x daily - 7 x weekly - 1 sets - 5-10 reps - 15 hold   Corner Pec Major Stretch - 2 x daily - 7 x weekly - 1 sets - 5-10 reps - 15 hold   Corner Pec Minor Stretch - 2 x daily - 7 x weekly - 1 sets - 5-10 reps - 15 hold   Modified Posterior Capsule Stretch - 2 x daily - 7 x weekly - 1 sets - 3-5 reps - 15 hold   Standing Shoulder Internal Rotation Stretch with Hands Behind Back - 2 x daily - 7 x weekly - 1 sets - 3-5 reps - 15 hold  []  No new Education completed  [x]  Reviewed Prior HEP      [x]  Patient verbalized and/or demonstrated understanding of education provided. []  Patient unable to verbalize and/or demonstrate understanding of education provided. Will continue education.   [x]  Barriers to learning: none    PLAN:  Treatment Recommendations: Strengthening, Range of Motion, Manual Therapy - Soft Tissue Mobilization, Manual Therapy - Joint Manipulation, Pain Management, Home Exercise Program, Patient Education and Modalities    []  Plan of care initiated. Plan to see patient 2 times per week for 12 weeks to address the treatment planned outlined above. [x]  Continue with current plan of care  []  Modify plan of care as follows:    []  Hold pending physician visit  []  Discharge    Time In 1000   Time Out 1040   Timed Code Minutes: 40 min   Total Treatment Time: 40 min       Electronically Signed by:   Haresh JOHANSEN/PAN #[id number]

## 2021-08-20 ENCOUNTER — HOSPITAL ENCOUNTER (OUTPATIENT)
Dept: OCCUPATIONAL THERAPY | Age: 86
Setting detail: THERAPIES SERIES
Discharge: HOME | End: 2021-08-20
Payer: MEDICARE

## 2021-08-20 PROCEDURE — 97110 THERAPEUTIC EXERCISES: CPT

## 2021-08-20 NOTE — PROGRESS NOTES
[address]  [] EVALUATION  [x] DAILY NOTE (LAND) [] DAILY NOTE (AQUATIC ) [] PROGRESS NOTE [] DISCHARGE NOTE    [x] OUTPATIENT REHABILITATION [facility]   [] [place] [address]    [] [facility]   [] [facility]    Date: 2021  Patient Name:  [place] Owens  : 1925  MRN: [id number]  CSN: [id number]    Referring Practitioner Mitzy Diaz DO   Diagnosis Chronic L shoulder pain M25.512    Treatment Diagnosis L shoulder decreased ROM, pain   Date of Evaluation 21      Functional Outcome Measure Used Upper Extremity Functional Scale   Functional Outcome Score 38/80 (21)       Insurance: Primary: Payor: MEDICARE /  /  / ,   Secondary: [facility]   Authorization Information: PRECERTIFICATION REQUIRED:  No  INSURANCE THERAPY BENEFIT:  Patient has unlimited visits based on medical necessity  Benefit will not cover maintenance or preventative treatment. AQUATIC THERAPY COVERED:   Yes  MODALITIES COVERED:  Yes, with the exception of iontophoresis and hot packs/cold packs  TELEHEALTH COVERED: Yes   Visit # 3, 3/10 for progress note   Visits Allowed:    Recertification Date:    Physician Follow-Up: 2021   Physician Orders: Rose Arteaga and treat   Pertinent History: Patient reports that on , she went to reach her L arm behind her back and had pain and difficulty moving her arm. PROCEDURE: XR SHOULDER LEFT (MIN 2 VIEWS)       CLINICAL INFORMATION: Acute pain of left shoulder, Decreased ROM of left shoulder       COMPARISON: No prior study.       TECHNIQUE: 4 standard views of the left shoulder were obtained       FINDINGS: There is moderate degenerative spurring of the left humeral head. There is marked narrowing of the glenohumeral joint. No fractures seen. Findings of moderate osteoporosis. SUBJECTIVE: Pt reports she uses Tylenol for pain (including L shoulder and hips/knees) as well as [employer] TREATMENT   Precautions: 5# lifting restriction    4/10 pain upon entry to therapy. 3/10 after heat/supine dowel. X in shaded column indicates Activity Completed Today   Modalities Parameters/  Location  Notes/Comments   Hot pack to L shoulder, supine  15 min  x Completion of dowel yani ex: [email or web address], horiz abd               Manual Therapy Time/  Technique  Notes/Comments   STM lateral shoulder/ bicep   x Good tolerance, short duration to avoid tenderness          Exercises   Sets/  Sec Reps  Notes/Comments   Wall slides 15 sec 3 x    Corner stretch at 60 15 sec 3 x Cues for technique with improvement noted - cue to step into corner closer to decrease knee pain    Corner stretch hands down for pec minor 15 sec 3 x Done at wall - corner position was awkward for her   Posterior capsule stretch 15 sec 3 x    IR stretch hand behind back 15 sec 1 x    Scap retractions  1 10  x Standing with cues to decrease shoulder elevation and retract only. After multiple reps she was able to correctly complete the motion by employing shoulder depression and retraction. PROM L Shoulder all motions (ER at side) 1 10 x ABD/SCAP [email or web address] (Inspect Manager) 70 FLEX 157                                Activities Time    Notes/Comments   Discussed sleeping postures to improve comfort to joint at nighttime                        Specific Interventions Next Treatment: MHP with gentle stretching (ER arm at side with dowel), ROM, gentle stretching and STM. Activity/Treatment Tolerance:  [x]  Patient tolerated treatment well  []  Patient limited by fatigue  []  Patient limited by pain   []  Patient limited by other medical complications  []  Other:     Assessment: Pt made good gains with L shoulder AA/PROM this date. Pt reported she was pleased with progress and felt like she has gained motion that she has not had before with decreased pain.        Areas for Improvement: impaired activity tolerance, impaired ROM, impaired strength and pain  Prognosis: good    GOALS:  Patient Goal: Decrease pain    Short Term Goals:  Time Frame: 4 weeks  *  Patient will improve AROM L shoulder flexion to 155, abduction to 155, ER to 70, IR thumb tip to 3\" above waistline for improved flexibility for bathing. * Patient will report pain at night no more than 5/10 for improved ease with sleep. * Patient will improve L shoulder strength to 4/5 throughout for improved ease with lifting a bag of groceries. * Patient will demonstrate I knowledge of HEP for improved flexibility and strength for bathing and dressing. Long Term Goals:  Time Frame: 12 weeks  *  Patient will report dressing and bathing with no increase in pain. *  Patient will improve UEFS to at least 55. * Patient will demonstrate ability to reach overhead for an object with affected extremity without increased pain. Patient Education:   []  HEP/Education Completed: Plan of Care, Goals, HEP   [facility] Access Code for HEP: Access Code: Aquiles Florian   Standing Shoulder Flexion Stretch on Wall - 1 x daily - 7 x weekly - 1 sets - 5-10 reps - 15 hold   Corner Pec Major Stretch - 2 x daily - 7 x weekly - 1 sets - 5-10 reps - 15 hold   Corner Pec Minor Stretch - 2 x daily - 7 x weekly - 1 sets - 5-10 reps - 15 hold   Modified Posterior Capsule Stretch - 2 x daily - 7 x weekly - 1 sets - 3-5 reps - 15 hold   Standing Shoulder Internal Rotation Stretch with Hands Behind Back - 2 x daily - 7 x weekly - 1 sets - 3-5 reps - 15 hold  []  No new Education completed  [x]  Reviewed Prior HEP      [x]  Patient verbalized and/or demonstrated understanding of education provided. []  Patient unable to verbalize and/or demonstrate understanding of education provided. Will continue education.   [x]  Barriers to learning: none    PLAN:  Treatment Recommendations: Strengthening, Range of Motion, Manual Therapy - Soft Tissue Mobilization, Manual Therapy - Joint Manipulation, Pain Management, Home Exercise Program, Patient Education and Modalities    []  Plan of care initiated. Plan to see patient 2 times per week for 12 weeks to address the treatment planned outlined above.   [x]  Continue with current plan of care  []  Modify plan of care as follows:    []  Hold pending physician visit  []  Discharge    Time In 1045   Time Out 1130   Timed Code Minutes: 30 min   Total Treatment Time: 45 min       Electronically Signed by:  JOE Lopez, [address] [place] [address]

## 2021-08-25 ENCOUNTER — HOSPITAL ENCOUNTER (OUTPATIENT)
Dept: OCCUPATIONAL THERAPY | Age: 86
Setting detail: THERAPIES SERIES
Discharge: HOME | End: 2021-08-25
Payer: MEDICARE

## 2021-08-25 PROCEDURE — 97110 THERAPEUTIC EXERCISES: CPT

## 2021-08-25 PROCEDURE — 97140 MANUAL THERAPY 1/> REGIONS: CPT

## 2021-08-25 NOTE — PROGRESS NOTES
[address]  [] EVALUATION  [x] DAILY NOTE (LAND) [] DAILY NOTE (AQUATIC ) [] PROGRESS NOTE [] DISCHARGE NOTE    [x] OUTPATIENT REHABILITATION CENTER [facility]   [] [place] [address]    [] [facility]   [] [facility]    Date: 2021  Patient Name:  Mag Pillai  : 1925  MRN: [id number]  CSN: [id number]    Referring Practitioner Zully Matos DO   Diagnosis Chronic L shoulder pain M25.512    Treatment Diagnosis L shoulder decreased ROM, pain   Date of Evaluation 21      Functional Outcome Measure Used Upper Extremity Functional Scale   Functional Outcome Score 38/80 (21)       Insurance: Primary: Payor: MEDICARE /  /  / ,   Secondary: [facility]   Authorization Information: PRECERTIFICATION REQUIRED:  No  INSURANCE THERAPY BENEFIT:  Patient has unlimited visits based on medical necessity  Benefit will not cover maintenance or preventative treatment. AQUATIC THERAPY COVERED:   Yes  MODALITIES COVERED:  Yes, with the exception of iontophoresis and hot packs/cold packs  TELEHEALTH COVERED: Yes   Visit # 4, 4/10 for progress note   Visits Allowed:    Recertification Date:    Physician Follow-Up: 2021   Physician Orders: Chanell Molina and treat   Pertinent History: Patient reports that on , she went to reach her L arm behind her back and had pain and difficulty moving her arm. PROCEDURE: XR SHOULDER LEFT (MIN 2 VIEWS)       CLINICAL INFORMATION: Acute pain of left shoulder, Decreased ROM of left shoulder       COMPARISON: No prior study.       TECHNIQUE: 4 standard views of the left shoulder were obtained       FINDINGS: There is moderate degenerative spurring of the left humeral head. There is marked narrowing of the glenohumeral joint. No fractures seen. Findings of moderate osteoporosis. SUBJECTIVE: Pt reports she can tell her ROM is improving and pain is getting better.   Sleeping is still difficult at night. Patient also reports pain in her R hip when performing wall exercises. TREATMENT   Precautions: 5# lifting restriction    4/10 pain upon entry to therapy. 3/10 after heat/supine dowel. X in shaded column indicates Activity Completed Today   Modalities Parameters/  Location  Notes/Comments   Hot pack to L shoulder, supine  10 min  x Gentle stretching into ER while on MHP               Manual Therapy Time/  Technique  Notes/Comments   STM lateral shoulder/ bicep, upper trap, RC  x Good tolerance, short duration to avoid tenderness          Exercises   Sets/  Sec Reps  Notes/Comments   Supine PROM L shoulder all planes   X Good ROM noted   Supine dowel flexion 1 10 X Replaced this exercise in HEP rather than wall slides due to R hip pain with wall slides   Supine dowel ER 1 10 X Replaced this exercise in HEP rather than wall slides due to R hip pain with wall slides   Posterior capsule stetch 15 sec 5 X    IR gentle stretch behind back 15 sec 5 X                                              Activities Time    Notes/Comments   Patient c/o pain and arthritis in hands, triggering of R3 and 4, trialing isotoner glove at night  X                      Specific Interventions Next Treatment: MHP with gentle stretching (ER arm at side with dowel), ROM, gentle stretching and STM. Activity/Treatment Tolerance:  [x]  Patient tolerated treatment well  []  Patient limited by fatigue  []  Patient limited by pain   []  Patient limited by other medical complications  []  Other:     Assessment: Pt made good gains with L shoulder AA/PROM this date. Pt reported she was pleased with progress and felt like she has gained motion that she has not had before with decreased pain.        Areas for Improvement: impaired activity tolerance, impaired ROM, impaired strength and pain  Prognosis: good    GOALS:  Patient Goal: Decrease pain    Short Term Goals:  Time Frame: 4 weeks  *  Patient will improve AROM L shoulder flexion to 155, abduction to 155, ER to 70, IR thumb tip to 3\" above waistline for improved flexibility for bathing. * Patient will report pain at night no more than 5/10 for improved ease with sleep. * Patient will improve L shoulder strength to 4/5 throughout for improved ease with lifting a bag of groceries. * Patient will demonstrate I knowledge of HEP for improved flexibility and strength for bathing and dressing. Long Term Goals:  Time Frame: 12 weeks  *  Patient will report dressing and bathing with no increase in pain. *  Patient will improve UEFS to at least 55. * Patient will demonstrate ability to reach overhead for an object with affected extremity without increased pain. Patient Education:   []  HEP/Education Completed: Plan of Care, Goals, HEP   [facility] Access Code for HEP: Access Code: [email or web address]   Modified Posterior Capsule Stretch - 2 x daily - 7 x weekly - 1 sets - 3-5 reps - 15 hold   Standing Shoulder Internal Rotation Stretch with Hands Behind Back - 2 x daily - 7 x weekly - 1 sets - 3-5 reps - 15 hold   Supine Shoulder Flexion Extension AAROM with Dowel - 2 x daily - 7 x weekly - 1 sets - 10 reps - 5-15 hold   Supine Shoulder External Rotation in 45 Degrees Abduction AAROM with Dowel - 2 x daily - 7 x weekly - 1 sets - 10 reps - 5-15 hold  []  No new Education completed  [x]  Reviewed Prior HEP      [x]  Patient verbalized and/or demonstrated understanding of education provided. []  Patient unable to verbalize and/or demonstrate understanding of education provided. Will continue education. [x]  Barriers to learning: none    PLAN:  Treatment Recommendations: Strengthening, Range of Motion, Manual Therapy - Soft Tissue Mobilization, Manual Therapy - Joint Manipulation, Pain Management, Home Exercise Program, Patient Education and Modalities    []  Plan of care initiated. Plan to see patient 2 times per week for 12 weeks to address the treatment planned outlined above.   [x]

## 2021-08-27 ENCOUNTER — TELEPHONE (OUTPATIENT)
Dept: FAMILY MEDICINE CLINIC | Age: 86
End: 2021-08-27

## 2021-08-27 ENCOUNTER — HOSPITAL ENCOUNTER (OUTPATIENT)
Dept: OCCUPATIONAL THERAPY | Age: 86
Setting detail: THERAPIES SERIES
Discharge: HOME | End: 2021-08-27
Payer: MEDICARE

## 2021-08-27 PROCEDURE — 97110 THERAPEUTIC EXERCISES: CPT

## 2021-08-27 NOTE — TELEPHONE ENCOUNTER
I would have her touch base with the Cardiologist to see what they recommend.   Electronically signed by RACQUEL Almodovar CNP on 8/27/2021 at 4:16 PM

## 2021-08-27 NOTE — PROGRESS NOTES
[address]  [] EVALUATION  [x] DAILY NOTE (LAND) [] DAILY NOTE (AQUATIC ) [] PROGRESS NOTE [] DISCHARGE NOTE    [x] OUTPATIENT REHABILITATION [facility]   [] [place] [address]    [] [facility]   [] [facility]    Date: 2021  Patient Name:  Kristian Alfonso  : 1925  MRN: [id number]  CSN: [id number]    Referring Practitioner Jonathon Siemens, DO   Diagnosis Chronic L shoulder pain M25.512    Treatment Diagnosis L shoulder decreased ROM, pain   Date of Evaluation 21      Functional Outcome Measure Used Upper Extremity Functional Scale   Functional Outcome Score 38/80 (21)       Insurance: Primary: Payor: MEDICARE /  /  / ,   Secondary: [facility]   Authorization Information: PRECERTIFICATION REQUIRED:  No  INSURANCE THERAPY BENEFIT:  Patient has unlimited visits based on medical necessity  Benefit will not cover maintenance or preventative treatment. AQUATIC THERAPY COVERED:   Yes  MODALITIES COVERED:  Yes, with the exception of iontophoresis and hot packs/cold packs  TELEHEALTH COVERED: Yes   Visit # 5, 5/10 for progress note   Visits Allowed:    Recertification Date:    Physician Follow-Up: 2021   Physician Orders: Rosy Amato and treat   Pertinent History: Patient reports that on , she went to reach her L arm behind her back and had pain and difficulty moving her arm. PROCEDURE: XR SHOULDER LEFT (MIN 2 VIEWS)       CLINICAL INFORMATION: Acute pain of left shoulder, Decreased ROM of left shoulder       COMPARISON: No prior study.       TECHNIQUE: 4 standard views of the left shoulder were obtained       FINDINGS: There is moderate degenerative spurring of the left humeral head. There is marked narrowing of the glenohumeral joint. No fractures seen. Findings of moderate osteoporosis.          SUBJECTIVE: Pt reports improved independence with ADL, but continues to be bothered by clicking/popping in L shoulder with ROM. TREATMENT   Precautions: 5# lifting restriction    4/10 pain upon entry to therapy. 3/10 after heat/supine dowel. X in shaded column indicates Activity Completed Today   Modalities Parameters/  Location  Notes/Comments   Hot pack to L shoulder, supine  15 min  x Gentle stretching into ER while on MHP  Dowel yani horiz abd/add, flex, and ER. Manual Therapy Time/  Technique  Notes/Comments   STM lateral shoulder/ bicep, upper trap, RC  x Good tolerance, short duration to avoid tenderness          Exercises   Sets/  Sec Reps  Notes/Comments   Supine PROM L shoulder all planes  10 x [email or web address] with heat: 61 degrees  AROM FLEX: 160    Supine dowel flexion 1 15 x Replaced this exercise in HEP rather than wall slides due to R hip pain with wall slides   Supine dowel ER 1 15 x Replaced this exercise in HEP rather than wall slides due to R hip pain with wall slides   Posterior capsule stetch 15 sec 5 x    IR gentle stretch behind back 15 sec 5 x    Supine PRE press, circles cw, ccw, flex   10 ea x 1#                                      Activities Time    Notes/Comments   Patient c/o pain and arthritis in hands, triggering of R3 and 4, trialing isotoner glove at night  x Liked the glove but wants an Xsmall                     Specific Interventions Next Treatment: MHP with gentle stretching (ER arm at side with dowel), ROM, gentle stretching and STM, gentle strengthening    Activity/Treatment Tolerance:  [x]  Patient tolerated treatment well  []  Patient limited by fatigue  []  Patient limited by pain   []  Patient limited by other medical complications  []  Other:     Assessment: Pt continues to report that she has difficulty with putting on her shirt because of popping/clicking in shoulder but not limited by ROM or strength to do this. Pt demonstrated WFL L shoulder AROM in standing. Did well with supine PRE, no pain.   Will continue to progress with strengthening for improved shoulder stability. Areas for Improvement: impaired activity tolerance, impaired ROM, impaired strength and pain  Prognosis: good    GOALS:  Patient Goal: Decrease pain    Short Term Goals:  Time Frame: 4 weeks  *  Patient will improve AROM L shoulder flexion to 155, abduction to 155, ER to 70, IR thumb tip to 3\" above waistline for improved flexibility for bathing. * Patient will report pain at night no more than 5/10 for improved ease with sleep. * Patient will improve L shoulder strength to 4/5 throughout for improved ease with lifting a bag of groceries. * Patient will demonstrate I knowledge of HEP for improved flexibility and strength for bathing and dressing. Long Term Goals:  Time Frame: 12 weeks  *  Patient will report dressing and bathing with no increase in pain. *  Patient will improve UEFS to at least 55. * Patient will demonstrate ability to reach overhead for an object with affected extremity without increased pain. Patient Education:   []  HEP/Education Completed: Plan of Care, Goals, HEP   [facility] Access Code for HEP: Access Code: [email or web address]   Modified Posterior Capsule Stretch - 2 x daily - 7 x weekly - 1 sets - 3-5 reps - 15 hold   Standing Shoulder Internal Rotation Stretch with Hands Behind Back - 2 x daily - 7 x weekly - 1 sets - 3-5 reps - 15 hold   Supine Shoulder Flexion Extension AAROM with Dowel - 2 x daily - 7 x weekly - 1 sets - 10 reps - 5-15 hold   Supine Shoulder External Rotation in 45 Degrees Abduction AAROM with Dowel - 2 x daily - 7 x weekly - 1 sets - 10 reps - 5-15 hold  []  No new Education completed  [x]  Reviewed Prior HEP      [x]  Patient verbalized and/or demonstrated understanding of education provided. []  Patient unable to verbalize and/or demonstrate understanding of education provided. Will continue education.   [x]  Barriers to learning: none    PLAN:  Treatment Recommendations: Strengthening, Range of Motion, Manual Therapy - Soft Tissue Mobilization, Manual Therapy - Joint Manipulation, Pain Management, Home Exercise Program, Patient Education and Modalities    []  Plan of care initiated. Plan to see patient 2 times per week for 12 weeks to address the treatment planned outlined above.   [x]  Continue with current plan of care  []  Modify plan of care as follows:    []  Hold pending physician visit  []  Discharge    Time In 1115   Time Out 1200   Timed Code Minutes: 45 min   Total Treatment Time: 45 min       Electronically Signed by:  LESLEY Dubon/PAN, [address] [place] [address]

## 2021-08-27 NOTE — TELEPHONE ENCOUNTER
----- Message from Maegan Stone sent at 8/27/2021  2:25 PM EDT -----  Subject: Message to Provider    QUESTIONS  Information for Provider? Pt called in to state that she has been   experiencing tiredness higher than normal after being put on new   medications post heart attack. She wanted to know if there was any way to   prevent this side effect  ---------------------------------------------------------------------------  --------------  CALL BACK INFO  What is the best way for the office to contact you? OK to leave message on   voicemail  Preferred Call Back Phone Number? [phone]  ---------------------------------------------------------------------------  --------------  SCRIPT ANSWERS  Relationship to Patient?  Self

## 2021-08-30 ENCOUNTER — HOSPITAL ENCOUNTER (OUTPATIENT)
Dept: OCCUPATIONAL THERAPY | Age: 86
Setting detail: THERAPIES SERIES
Discharge: HOME | End: 2021-08-30
Payer: MEDICARE

## 2021-08-30 PROCEDURE — 97140 MANUAL THERAPY 1/> REGIONS: CPT

## 2021-08-30 PROCEDURE — 97110 THERAPEUTIC EXERCISES: CPT

## 2021-08-30 NOTE — PROGRESS NOTES
[address]  [] EVALUATION  [x] DAILY NOTE (LAND) [] DAILY NOTE (AQUATIC ) [] PROGRESS NOTE [] DISCHARGE NOTE    [x] OUTPATIENT REHABILITATION CENTER [facility]   [] [place] [address]    [] [facility]   [] Alexia Opitz    Date: 2021  Patient Name:  Anurag Leonard  : 1925  MRN: [id number]  CSN: [id number]    Referring Practitioner Parth Meza DO   Diagnosis Chronic L shoulder pain M25.512    Treatment Diagnosis L shoulder decreased ROM, pain   Date of Evaluation 21      Functional Outcome Measure Used Upper Extremity Functional Scale   Functional Outcome Score 38/80 (21)       Insurance: Primary: Payor: Keisha Oropeza /  /  / ,   Secondary: [facility]   Authorization Information: PRECERTIFICATION REQUIRED:  No  INSURANCE THERAPY BENEFIT:  Patient has unlimited visits based on medical necessity  Benefit will not cover maintenance or preventative treatment. AQUATIC THERAPY COVERED:   Yes  MODALITIES COVERED:  Yes, with the exception of iontophoresis and hot packs/cold packs  TELEHEALTH COVERED: Yes   Visit # 6, 6/10 for progress note   Visits Allowed:    Recertification Date: 15/27/71   Physician Follow-Up: 2021   Physician Orders: Nayely Elias and treat   Pertinent History: Patient reports that on , she went to reach her L arm behind her back and had pain and difficulty moving her arm. PROCEDURE: XR SHOULDER LEFT (MIN 2 VIEWS)       CLINICAL INFORMATION: Acute pain of left shoulder, Decreased ROM of left shoulder       COMPARISON: No prior study.       TECHNIQUE: 4 standard views of the left shoulder were obtained       FINDINGS: There is moderate degenerative spurring of the left humeral head. There is marked narrowing of the glenohumeral joint. No fractures seen. Findings of moderate osteoporosis. SUBJECTIVE: Patient reports \"this weather is not helping my shoulder\".  Patient reports increased discomfort noted in the evenings. TREATMENT   Precautions: 5# lifting restriction    4/10 pain upon entry to therapy. 3/10 after heat/supine dowel. X in shaded column indicates Activity Completed Today   Modalities Parameters/  Location  Notes/Comments   Hot pack to L shoulder, supine  15 min  x Completed PROM to all motions while on MHP                Manual Therapy Time/  Technique  Notes/Comments   STM lateral shoulder/ bicep, upper trap, RC  x Good tolerance, short duration to avoid tenderness          Exercises   Sets/  Sec Reps  Notes/Comments   Supine PROM L shoulder all planes  10 x Completed with MHP   Supine dowel: flexion, horizontal abd/add, ER 1 10 e x Hold at end range for prolonged stretch. Good tolerance noted throughout reps. Cues for technique with ER           Posterior capsule stetch 15 sec 5 x    IR gentle stretch behind back 15 sec 5 x    Supine PRE: SA punch, circles cw, ccw, flex   10 ea x 1#    Therapist guided SA punch and circles for technique, good tolerance    Supine Alphabet A-Z  all X    Sidelying ER  1 10 X 1#  Initiated this date. Good tolerance, TC provided at anterior shoulder for support. No pain present, fatigue noted at end reps                         Activities Time    Notes/Comments   Patient c/o pain and arthritis in hands, triggering of R3 and 4, trialing isotoner glove at night   Liked the glove but wants an Xsmall                     Specific Interventions Next Treatment: MHP with gentle stretching (ER arm at side with dowel), ROM, gentle stretching and STM, gentle strengthening    Activity/Treatment Tolerance:  [x]  Patient tolerated treatment well  []  Patient limited by fatigue  []  Patient limited by pain   []  Patient limited by other medical complications  []  Other:     Assessment: Patient is progressing towards goals. Continued with gentle strengthening noted above with new exercises, fair tolerance with no increase in pain but fatigue present.  Continued MHP with PROM, with good results and decrease in pain noted from patient. Areas for Improvement: impaired activity tolerance, impaired ROM, impaired strength and pain  Prognosis: good    GOALS:  Patient Goal: Decrease pain    Short Term Goals:  Time Frame: 4 weeks  *  Patient will improve AROM L shoulder flexion to 155, abduction to 155, ER to 70, IR thumb tip to 3\" above waistline for improved flexibility for bathing. * Patient will report pain at night no more than 5/10 for improved ease with sleep. * Patient will improve L shoulder strength to 4/5 throughout for improved ease with lifting a bag of groceries. * Patient will demonstrate I knowledge of HEP for improved flexibility and strength for bathing and dressing. Long Term Goals:  Time Frame: 12 weeks  *  Patient will report dressing and bathing with no increase in pain. *  Patient will improve UEFS to at least 55. * Patient will demonstrate ability to reach overhead for an object with affected extremity without increased pain. Patient Education:   []  HEP/Education Completed: Plan of Care, Goals, HEP   [facility] Access Code for HEP: Access Code: [email or web address]   Modified Posterior Capsule Stretch - 2 x daily - 7 x weekly - 1 sets - 3-5 reps - 15 hold   Standing Shoulder Internal Rotation Stretch with Hands Behind Back - 2 x daily - 7 x weekly - 1 sets - 3-5 reps - 15 hold   Supine Shoulder Flexion Extension AAROM with Dowel - 2 x daily - 7 x weekly - 1 sets - 10 reps - 5-15 hold   Supine Shoulder External Rotation in 45 Degrees Abduction AAROM with Dowel - 2 x daily - 7 x weekly - 1 sets - 10 reps - 5-15 hold  []  No new Education completed  [x]  Reviewed Prior HEP      [x]  Patient verbalized and/or demonstrated understanding of education provided. []  Patient unable to verbalize and/or demonstrate understanding of education provided. Will continue education.   [x]  Barriers to learning: none    PLAN:  Treatment Recommendations: Strengthening, Range of Motion, Manual Therapy - Soft Tissue Mobilization, Manual Therapy - Joint Manipulation, Pain Management, Home Exercise Program, Patient Education and Modalities    []  Plan of care initiated. Plan to see patient 2 times per week for 12 weeks to address the treatment planned outlined above. [x]  Continue with current plan of care  []  Modify plan of care as follows:    []  Hold pending physician visit  []  Discharge    Time In 0957   Time Out 1042   Timed Code Minutes: 45 min   Total Treatment Time: 45 min       Electronically Signed by:   Barbara TANG #[id number]

## 2021-09-01 ENCOUNTER — HOSPITAL ENCOUNTER (OUTPATIENT)
Dept: OCCUPATIONAL THERAPY | Age: 86
Setting detail: THERAPIES SERIES
Discharge: HOME | End: 2021-09-01
Payer: MEDICARE

## 2021-09-01 PROCEDURE — 97110 THERAPEUTIC EXERCISES: CPT

## 2021-09-01 NOTE — PROGRESS NOTES
[address]  [] EVALUATION  [x] DAILY NOTE (LAND) [] DAILY NOTE (AQUATIC ) [] PROGRESS NOTE [] DISCHARGE NOTE    [x] OUTPATIENT REHABILITATION CENTER [facility]   [] [place] [address]    [] [facility]   [] Amina Sargent    Date: 2021  Patient Name:  Dedra Morris  : 1925  MRN: [id number]  CSN: [id number]    Referring Practitioner Keyla Adams DO   Diagnosis Chronic L shoulder pain M25.512    Treatment Diagnosis L shoulder decreased ROM, pain   Date of Evaluation 21      Functional Outcome Measure Used Upper Extremity Functional Scale   Functional Outcome Score 38/80 (21)       Insurance: Primary: Payor: MEDICARE /  /  / ,   Secondary: [facility]   Authorization Information: PRECERTIFICATION REQUIRED:  No  INSURANCE THERAPY BENEFIT:  Patient has unlimited visits based on medical necessity  Benefit will not cover maintenance or preventative treatment. AQUATIC THERAPY COVERED:   Yes  MODALITIES COVERED:  Yes, with the exception of iontophoresis and hot packs/cold packs  TELEHEALTH COVERED: Yes   Visit # 7, 7/10 for progress note   Visits Allowed:    Recertification Date:    Physician Follow-Up: 2021   Physician Orders: Lyric Colon and treat   Pertinent History: Patient reports that on , she went to reach her L arm behind her back and had pain and difficulty moving her arm. PROCEDURE: XR SHOULDER LEFT (MIN 2 VIEWS)       CLINICAL INFORMATION: Acute pain of left shoulder, Decreased ROM of left shoulder       COMPARISON: No prior study.       TECHNIQUE: 4 standard views of the left shoulder were obtained       FINDINGS: There is moderate degenerative spurring of the left humeral head. There is marked narrowing of the glenohumeral joint. No fractures seen. Findings of moderate osteoporosis. SUBJECTIVE: Patient reports good improvement in her shoulder with better ROM reported and demonstrated.   Still having difficulty sleeping at night between her L shoulder and R hip as she prefers to sleep on her side. TREATMENT   Precautions: 5# lifting restriction    \"not much\"     X in shaded column indicates Activity Completed Today   Modalities Parameters/  Location  Notes/Comments   Hot pack to L shoulder, supine  15 min  x Completed PROM to all motions while on MHP                Manual Therapy Time/  Technique  Notes/Comments   STM lateral shoulder/ bicep, upper trap, RC  x Good tolerance, short duration to avoid tenderness          Exercises   Sets/  Sec Reps  Notes/Comments   Supine PROM L shoulder all planes  10 x Completed with MHP   Supine dowel: flexion, ER 1 10 e x Hold at end range for prolonged stretch. Good tolerance noted throughout reps. Cues for technique with ER           Posterior capsule stetch 15 sec 5 x    IR gentle stretch behind back 15 sec 5 x    Supine PRE: SA punch, circles cw, ccw, flex   10 ea x 1#    Therapist guided SA punch and circles for technique, good tolerance    Supine Alphabet A-Z  all X    Sidelying ER  1 10 X 1#  Initiated this date. Good tolerance, TC provided at anterior shoulder for support. No pain present, fatigue noted at end reps    Standing row green band 1 10 X jyoti well                 Activities Time    Notes/Comments   Patient c/o pain and arthritis in hands, triggering of R3 and 4, trialing isotoner glove at night   Liked the glove but wants an Xsmall                     Specific Interventions Next Treatment: MHP with gentle stretching (ER arm at side with dowel), ROM, gentle stretching and STM, gentle strengthening    Activity/Treatment Tolerance:  [x]  Patient tolerated treatment well  []  Patient limited by fatigue  []  Patient limited by pain   []  Patient limited by other medical complications  []  Other:     Assessment: Patient is progressing towards goals. Reassessment next week.     Areas for Improvement: impaired activity tolerance, impaired ROM, impaired strength and pain  Prognosis: good    GOALS:  Patient Goal: Decrease pain    Short Term Goals:  Time Frame: 4 weeks  *  Patient will improve AROM L shoulder flexion to 155, abduction to 155, ER to 70, IR thumb tip to 3\" above waistline for improved flexibility for bathing. * Patient will report pain at night no more than 5/10 for improved ease with sleep. * Patient will improve L shoulder strength to 4/5 throughout for improved ease with lifting a bag of groceries. * Patient will demonstrate I knowledge of HEP for improved flexibility and strength for bathing and dressing. Long Term Goals:  Time Frame: 12 weeks  *  Patient will report dressing and bathing with no increase in pain. *  Patient will improve UEFS to at least 55. * Patient will demonstrate ability to reach overhead for an object with affected extremity without increased pain. Patient Education:   []  HEP/Education Completed: Plan of Care, Goals, HEP   [facility] Access Code for HEP: Access Code: [email or web address]   Modified Posterior Capsule Stretch - 2 x daily - 7 x weekly - 1 sets - 3-5 reps - 15 hold   Standing Shoulder Internal Rotation Stretch with Hands Behind Back - 2 x daily - 7 x weekly - 1 sets - 3-5 reps - 15 hold   Supine Shoulder Flexion Extension AAROM with Dowel - 2 x daily - 7 x weekly - 1 sets - 10 reps - 5-15 hold   Supine Shoulder External Rotation in 45 Degrees Abduction AAROM with Dowel - 2 x daily - 7 x weekly - 1 sets - 10 reps - 5-15 hold  []  No new Education completed  [x]  Reviewed Prior HEP      [x]  Patient verbalized and/or demonstrated understanding of education provided. []  Patient unable to verbalize and/or demonstrate understanding of education provided. Will continue education.   [x]  Barriers to learning: none    PLAN:  Treatment Recommendations: Strengthening, Range of Motion, Manual Therapy - Soft Tissue Mobilization, Manual Therapy - Joint Manipulation, Pain Management, Home Exercise Program, Patient Education and Modalities    []  Plan of care initiated. Plan to see patient 2 times per week for 12 weeks to address the treatment planned outlined above.   [x]  Continue with current plan of care  []  Modify plan of care as follows:    []  Hold pending physician visit  []  Discharge    Time In 1000   Time Out 1040   Timed Code Minutes: 40 min   Total Treatment Time: 40 min       Electronically Signed by: Macho LEACH/PAN CHT #[id number]

## 2021-09-03 ENCOUNTER — OFFICE VISIT (OUTPATIENT)
Dept: FAMILY MEDICINE CLINIC | Age: 86
End: 2021-09-03
Payer: MEDICARE

## 2021-09-03 VITALS
HEART RATE: 68 BPM | BODY MASS INDEX: 25.62 KG/M2 | WEIGHT: 130.5 LBS | SYSTOLIC BLOOD PRESSURE: 136 MMHG | OXYGEN SATURATION: 98 % | TEMPERATURE: 98.3 F | HEIGHT: 60 IN | DIASTOLIC BLOOD PRESSURE: 60 MMHG | RESPIRATION RATE: 12 BRPM

## 2021-09-03 DIAGNOSIS — M25.551 RIGHT HIP PAIN: Primary | ICD-10-CM

## 2021-09-03 DIAGNOSIS — M19.90 OSTEOARTHRITIS, UNSPECIFIED OSTEOARTHRITIS TYPE, UNSPECIFIED SITE: ICD-10-CM

## 2021-09-03 PROCEDURE — 1123F ACP DISCUSS/DSCN MKR DOCD: CPT | Performed by: NURSE PRACTITIONER

## 2021-09-03 PROCEDURE — G8427 DOCREV CUR MEDS BY ELIG CLIN: HCPCS | Performed by: NURSE PRACTITIONER

## 2021-09-03 PROCEDURE — 1036F TOBACCO NON-USER: CPT | Performed by: NURSE PRACTITIONER

## 2021-09-03 PROCEDURE — 99214 OFFICE O/P EST MOD 30 MIN: CPT | Performed by: NURSE PRACTITIONER

## 2021-09-03 PROCEDURE — 4040F PNEUMOC VAC/ADMIN/RCVD: CPT | Performed by: NURSE PRACTITIONER

## 2021-09-03 PROCEDURE — G8417 CALC BMI ABV UP PARAM F/U: HCPCS | Performed by: NURSE PRACTITIONER

## 2021-09-03 PROCEDURE — 1090F PRES/ABSN URINE INCON ASSESS: CPT | Performed by: NURSE PRACTITIONER

## 2021-09-03 NOTE — PROGRESS NOTES
SUBJECTIVE:  Qiana Goldstein is a 80 y.o. y/o female that presents with Hip Pain (right, currently seeing pt for shoulder )  . HPI:  Pain is present in the right hip. Symptoms have been present for chronically for years . The pain is intermittent, moderate. The patient describes the pain as aching. Inciting injury or history of trauma? No  Pain is aggravated by - movement  Pain is relieved by - rest  Radiation of the pain? Starts in right groin, sometimes hurts some in the upper thigh  Paresthesias of the extremities? No  Decreased ROM? Yes   Edema? No  Treatments tried - ice and heat    Says pain started after she was on bike at PT for her shoulder    OBJECTIVE:  /60   Pulse 68   Temp 98.3 °F (36.8 °C) (Oral)   Resp 12   Ht 5' (1.524 m)   Wt 130 lb 8 oz (59.2 kg)   SpO2 98%   BMI 25.49 kg/m²   General appearance: alert, well appearing, and in no distress. Right hip full ROM, PP/P equal and strong bilaterally        ASSESSMENT & PLAN  Uzair Larry was seen today for hip pain. Diagnoses and all orders for this visit:    Right hip pain  -     JOSHUA Fitzgerald MD, Orthopedic Surgery, [facility]    Osteoarthritis, unspecified osteoarthritis type, unspecified site  -     JOSHUA Fitzgerald MD, Orthopedic Surgery, [facility]        No follow-ups on file.     -Presentation is consistent with OA  -Start above treatments  -Patient advised to contact our office immediately if symptoms worsen or persist  -Patient counseled on conservative care including activity modification and OTC meds

## 2021-09-03 NOTE — PATIENT INSTRUCTIONS
Patient Education        Hip Pain: Care Instructions  Your Care Instructions     Hip pain may be caused by many things, including overuse, a fall, or a twisting movement. Another cause of hip pain is arthritis. Your pain may increase when you stand up, walk, or squat. The pain may come and go or may be constant. Home treatment can help relieve hip pain, swelling, and stiffness. If your pain is ongoing, you may need more tests and treatment. Follow-up care is a key part of your treatment and safety. Be sure to make and go to all appointments, and call your doctor if you are having problems. It's also a good idea to know your test results and keep a list of the medicines you take. How can you care for yourself at home? · Take pain medicines exactly as directed. ? If the doctor gave you a prescription medicine for pain, take it as prescribed. ? If you are not taking a prescription pain medicine, ask your doctor if you can take an over-the-counter medicine. · Rest and protect your hip. Take a break from any activity, including standing or walking, that may cause pain. · Put ice or a cold pack against your hip for 10 to 20 minutes at a time. Try to do this every 1 to 2 hours for the next 3 days (when you are awake) or until the swelling goes down. Put a thin cloth between the ice and your skin. · Sleep on your healthy side with a pillow between your knees, or sleep on your back with pillows under your knees. · If there is no swelling, you can put moist heat, a heating pad, or a warm cloth on your hip. Do gentle stretching exercises to help keep your hip flexible. · Learn how to prevent falls. Have your vision and hearing checked regularly. Wear slippers or shoes with a nonskid sole. · Stay at a healthy weight. · Wear comfortable shoes. When should you call for help? Call 911 anytime you think you may need emergency care.  For example, call if:    · You have sudden chest pain and shortness of breath, or you cough up blood.     · You are not able to stand or walk or bear weight.     · Your buttocks, legs, or feet feel numb or tingly.     · Your leg or foot is cool or pale or changes color.     · You have severe pain. Call your doctor now or seek immediate medical care if:    · You have signs of infection, such as:  ? Increased pain, swelling, warmth, or redness in the hip area. ? Red streaks leading from the hip area. ? Pus draining from the hip area. ? A fever.     · You have signs of a blood clot, such as:  ? Pain in your calf, back of the knee, thigh, or groin. ? Redness and swelling in your leg or groin.     · You are not able to bend, straighten, or move your leg normally.     · You have trouble urinating or having bowel movements. Watch closely for changes in your health, and be sure to contact your doctor if:    · You do not get better as expected. Where can you learn more? Go to https://[employer]idem[employer]. org and sign in to your [employer] account. Enter E045 in the Enabled Employment box to learn more about \"Hip Pain: Care Instructions. \"     If you do not have an account, please click on the \"Sign Up Now\" link. Current as of: October 19, 2020               Content Version: 12.9  © 6931-2185 [employer]wise, Incorporated. Care instructions adapted under license by [facility] ([facility]). If you have questions about a medical condition or this instruction, always ask your healthcare professional. [place] [address] any warranty or liability for your use of this information.

## 2021-09-08 ENCOUNTER — HOSPITAL ENCOUNTER (OUTPATIENT)
Dept: OCCUPATIONAL THERAPY | Age: 86
Setting detail: THERAPIES SERIES
Discharge: HOME | End: 2021-09-08
Payer: MEDICARE

## 2021-09-08 PROCEDURE — 97110 THERAPEUTIC EXERCISES: CPT

## 2021-09-08 PROCEDURE — 97140 MANUAL THERAPY 1/> REGIONS: CPT

## 2021-09-08 NOTE — PROGRESS NOTES
Still having difficulty sleeping at night between her L shoulder and R hip as she prefers to sleep on her side. Saw Dr. Preston Wooten for her R hip who is referring her to Dr. Hayley Holloway next week. TREATMENT   Precautions: 5# lifting restriction    \"not much\"     X in shaded column indicates Activity Completed Today   Modalities Parameters/  Location  Notes/Comments   Hot pack to L shoulder, supine  15 min  x Completed PROM to all motions while on MHP                Manual Therapy Time/  Technique  Notes/Comments   STM lateral shoulder/ bicep, upper trap, RC  x Good tolerance, short duration to avoid tenderness          Exercises   Sets/  Sec Reps  Notes/Comments   Supine PROM L shoulder all planes  10 x Completed with MHP   Supine dowel: flexion, ER 1 10 e x Hold at end range for prolonged stretch. Good tolerance noted throughout reps. Cues for technique with ER           Posterior capsule stetch 15 sec 5 x    IR gentle stretch behind back 15 sec 5 x    Supine PRE: SA punch, circles cw, ccw, flex   10 ea x 1#    Therapist guided SA punch and circles for technique, good tolerance    Supine Alphabet A-Z  all X    Sidelying ER  1 10 X 1#  Initiated this date. Good tolerance, TC provided at anterior shoulder for support. No pain present, fatigue noted at end reps    Standing row green band 1 10 X jyoti well                 Activities Time    Notes/Comments   Patient c/o pain and arthritis in hands, triggering of R3 and 4, trialing isotoner glove at night  X Issued extra small for R and L hands to wear at night. Specific Interventions Next Treatment: MHP with gentle stretching (ER arm at side with dowel), ROM, gentle stretching and STM, gentle strengthening    Activity/Treatment Tolerance:  [x]  Patient tolerated treatment well  []  Patient limited by fatigue  []  Patient limited by pain   []  Patient limited by other medical complications  []  Other:     Assessment: Patient is progressing towards goals. Patient has demonstrated improvement in ROM and reduction in pain. Reports improved ease with all aspects of ADL. Patient reports she is still having difficulty with lifting, pulling up pants, and sleeping. Plan to continue another month to address strength and improvement in pain. See goals for current objective measures. Areas for Improvement: impaired activity tolerance, impaired ROM, impaired strength and pain  Prognosis: good    GOALS:  Patient Goal: Decrease pain    Short Term Goals:  Time Frame: 4 weeks  *  Patient will improve AROM L shoulder flexion to 155, abduction to 155, ER to 70, IR thumb tip to 3\" above waistline for improved flexibility for bathing. AROM flexion = 160, abduction = 160, ER = 75, IR thumb tip reaches waistline. GOAL MET - REVISED GOAL:  Patient will improve AROM flexion and abduction to 165, ER to 80 for improved flexibility with dressing. * Patient will report pain at night no more than 5/10 for improved ease with sleep. 4/10. GOAL MET - REVISED GOAL:  Patient will report pain no more than 3/10 for improved ease with sleep. * Patient will improve L shoulder strength to 4/5 throughout for improved ease with lifting a bag of groceries. Flexion and IR = 4/5, abduction and ER = 4-/5. GOAL NOT MET - CONTINUE  * Patient will demonstrate I knowledge of HEP for improved flexibility and strength for bathing and dressing. GOAL MET - Update to strengthening    Long Term Goals:  Time Frame: 4 weeks  *  Patient will report dressing and bathing with no increase in pain. GOAL NOT MET (but improved) - CONTINUE  *  Patient will improve UEFS to at least 55.  63. GOAL MET - REVISED GOAL:  Patient will improve UEFS to greater than 63. * Patient will demonstrate ability to reach overhead for an object with affected extremity without increased pain.   GOAL NOT MET - CONTINUE    Patient Education:   [x]  HEP/Education Completed: Plan of Care, Goals, HEP   Michael Sailors Access Code for HEP:

## 2021-09-13 ENCOUNTER — HOSPITAL ENCOUNTER (OUTPATIENT)
Dept: OCCUPATIONAL THERAPY | Age: 86
Setting detail: THERAPIES SERIES
Discharge: HOME | End: 2021-09-13
Payer: MEDICARE

## 2021-09-13 ENCOUNTER — APPOINTMENT (OUTPATIENT)
Dept: OCCUPATIONAL THERAPY | Age: 86
End: 2021-09-13
Payer: MEDICARE

## 2021-09-13 PROCEDURE — 97110 THERAPEUTIC EXERCISES: CPT

## 2021-09-13 PROCEDURE — 97140 MANUAL THERAPY 1/> REGIONS: CPT

## 2021-09-13 NOTE — PROGRESS NOTES
[address]  [] EVALUATION  [x] DAILY NOTE (LAND) [] DAILY NOTE (AQUATIC ) [] PROGRESS NOTE [] DISCHARGE NOTE    [x] OUTPATIENT REHABILITATION [facility]   [] [place] [address]    [] [facility]   [facility]    Date: 2021  Patient Name:  Kristian Alfonso  : 1925  MRN: [id number]  CSN: [id number]    Referring Practitioner Jonathon Siemens, DO   Diagnosis Chronic L shoulder pain M25.512    Treatment Diagnosis L shoulder decreased ROM, pain   Date of Evaluation 21      Functional Outcome Measure Used Upper Extremity Functional Scale   Functional Outcome Score 38/80 (21) 63/80 (21)      Insurance: Primary: Payor: Cherri Covington /  /  / ,   Secondary: BCDELORIS   Authorization Information: PRECERTIFICATION REQUIRED:  No  INSURANCE THERAPY BENEFIT:  Patient has unlimited visits based on medical necessity  Benefit will not cover maintenance or preventative treatment. AQUATIC THERAPY COVERED:   Yes  MODALITIES COVERED:  Yes, with the exception of iontophoresis and hot packs/cold packs  TELEHEALTH COVERED: Yes   Visit # 9, 1/10 for PN; PN completed 21   Visits Allowed:    Recertification Date:    Physician Follow-Up: 2021   Physician Orders: Rosy Amato and treat   Pertinent History: Patient reports that on , she went to reach her L arm behind her back and had pain and difficulty moving her arm. PROCEDURE: XR SHOULDER LEFT (MIN 2 VIEWS)       CLINICAL INFORMATION: Acute pain of left shoulder, Decreased ROM of left shoulder       COMPARISON: No prior study.       TECHNIQUE: 4 standard views of the left shoulder were obtained       FINDINGS: There is moderate degenerative spurring of the left humeral head. There is marked narrowing of the glenohumeral joint. No fractures seen. Findings of moderate osteoporosis. SUBJECTIVE: Patient reports continued discomfort noted at night, with (+) sleep disturbances. arrival, with good tolerance and no increase in pain. Monitor tolerance and continued per patient. Areas for Improvement: impaired activity tolerance, impaired ROM, impaired strength and pain  Prognosis: good    GOALS:  Patient Goal: Decrease pain    Short Term Goals:  Time Frame: 4 weeks  *  Patient will improve AROM L shoulder flexion to 155, abduction to 155, ER to 70, IR thumb tip to 3\" above waistline for improved flexibility for bathing. AROM flexion = 160, abduction = 160, ER = 75, IR thumb tip reaches waistline. GOAL MET - REVISED GOAL:  Patient will improve AROM flexion and abduction to 165, ER to 80 for improved flexibility with dressing. * Patient will report pain at night no more than 5/10 for improved ease with sleep. 4/10. GOAL MET - REVISED GOAL:  Patient will report pain no more than 3/10 for improved ease with sleep. * Patient will improve L shoulder strength to 4/5 throughout for improved ease with lifting a bag of groceries. Flexion and IR = 4/5, abduction and ER = 4-/5. GOAL NOT MET - CONTINUE  * Patient will demonstrate I knowledge of HEP for improved flexibility and strength for bathing and dressing. GOAL MET - Update to strengthening    Long Term Goals:  Time Frame: 4 weeks  *  Patient will report dressing and bathing with no increase in pain. GOAL NOT MET (but improved) - CONTINUE  *  Patient will improve UEFS to at least 55.  63. GOAL MET - REVISED GOAL:  Patient will improve UEFS to greater than 63. * Patient will demonstrate ability to reach overhead for an object with affected extremity without increased pain.   GOAL NOT MET - CONTINUE    Patient Education:   [x]  HEP/Education Completed: Plan of Care, Goals, HEP   [facility] Access Code for HEP: Access Code: [email or web address]   Modified Posterior Capsule Stretch - 2 x daily - 7 x weekly - 1 sets - 3-5 reps - 15 hold   Standing Shoulder Internal Rotation Stretch with Hands Behind Back - 2 x daily - 7 x weekly - 1 sets - 3-5 reps - 15 hold   Supine Shoulder Flexion Extension AAROM with Dowel - 2 x daily - 7 x weekly - 1 sets - 10 reps - 5-15 hold   Supine Shoulder External Rotation in 45 Degrees Abduction AAROM with Dowel - 2 x daily - 7 x weekly - 1 sets - 10 reps - 5-15 hold   Supine Scapular Protraction in Flexion with Dumbbells - 1 x daily - 7 x weekly - 3 sets - 10 reps - NEW TODAY   Supine Shoulder Circles with Weight - 1 x daily - 7 x weekly - 3 sets - 10 reps - NEW TODAY   Standing Shoulder Row with Anchored Resistance - 1 x daily - 7 x weekly - 3 sets - 10 reps - NEW TODAY  []  No new Education completed  []  Reviewed Prior HEP      [x]  Patient verbalized and/or demonstrated understanding of education provided. []  Patient unable to verbalize and/or demonstrate understanding of education provided. Will continue education. [x]  Barriers to learning: none    PLAN:  Treatment Recommendations: Strengthening, Range of Motion, Manual Therapy - Soft Tissue Mobilization, Manual Therapy - Joint Manipulation, Pain Management, Home Exercise Program, Patient Education and Modalities    []  Plan of care initiated. [x]  Continue with current plan of care  Plan to see patient 2 times per week for 4 weeks to address the treatment planned outlined above. []  Modify plan of care as follows:    []  Hold pending physician visit  []  Discharge    Time In 1015   Time Out 1056   Timed Code Minutes: 41 min   Total Treatment Time: 41 min       Electronically Signed by:  aKrla TANG #[id number]

## 2021-09-14 ENCOUNTER — APPOINTMENT (OUTPATIENT)
Dept: OCCUPATIONAL THERAPY | Age: 86
End: 2021-09-14
Payer: MEDICARE

## 2021-09-15 ENCOUNTER — HOSPITAL ENCOUNTER (OUTPATIENT)
Dept: OCCUPATIONAL THERAPY | Age: 86
Setting detail: THERAPIES SERIES
Discharge: HOME | End: 2021-09-15
Payer: MEDICARE

## 2021-09-15 PROCEDURE — 97110 THERAPEUTIC EXERCISES: CPT

## 2021-09-15 NOTE — PROGRESS NOTES
[address]  [] EVALUATION  [x] DAILY NOTE (LAND) [] DAILY NOTE (AQUATIC ) [] PROGRESS NOTE [] DISCHARGE NOTE    [x] OUTPATIENT REHABILITATION CENTER [facility]   [] [place] [address]    [] [facility]   [] Rossana Vargas    Date: 9/15/2021  Patient Name:  Montana Holcomb  : 1925  MRN: [id number]  CSN: [id number]    Referring Practitioner Eloina Brantley DO   Diagnosis Chronic L shoulder pain M25.512    Treatment Diagnosis L shoulder decreased ROM, pain   Date of Evaluation 21      Functional Outcome Measure Used Upper Extremity Functional Scale   Functional Outcome Score 38/80 (21) 63/80 (21)      Insurance: Primary: Payor: Gus Medina /  /  / ,   Secondary: [facility]   Authorization Information: PRECERTIFICATION REQUIRED:  No  INSURANCE THERAPY BENEFIT:  Patient has unlimited visits based on medical necessity  Benefit will not cover maintenance or preventative treatment. AQUATIC THERAPY COVERED:   Yes  MODALITIES COVERED:  Yes, with the exception of iontophoresis and hot packs/cold packs  TELEHEALTH COVERED: Yes   Visit # 10, 2/10 for PN; PN completed 21   Visits Allowed:    Recertification Date:    Physician Follow-Up: 2021   Physician Orders: Darrin Ma and treat   Pertinent History: Patient reports that on , she went to reach her L arm behind her back and had pain and difficulty moving her arm. PROCEDURE: XR SHOULDER LEFT (MIN 2 VIEWS)       CLINICAL INFORMATION: Acute pain of left shoulder, Decreased ROM of left shoulder       COMPARISON: No prior study.       TECHNIQUE: 4 standard views of the left shoulder were obtained       FINDINGS: There is moderate degenerative spurring of the left humeral head. There is marked narrowing of the glenohumeral joint. No fractures seen. Findings of moderate osteoporosis. SUBJECTIVE: Patient reports continued discomfort noted at night, with (+) sleep disturbances. Patient reports improved ROM for improved tolerance with reaching tasks. TREATMENT   Precautions: 5# lifting restriction    PAIN 2-3/10 at worst     X in shaded column indicates Activity Completed Today   Modalities Parameters/  Location  Notes/Comments   Hot pack to L shoulder, supine  15 min  x Completed PROM to all motions while on MHP                Manual Therapy Time/  Technique  Notes/Comments   STM lateral shoulder/ bicep, upper trap, RC  x Good tolerance, short duration to avoid tenderness          Exercises   Sets/  Sec Reps  Notes/Comments   Supine PROM L shoulder all planes  10 x Completed with MHP   Supine dowel: flexion, ER 1 10 e x  Cues for technique with ER           Posterior capsule stetch 15 sec 5 x    IR gentle stretch behind back L hand only 15 sec 5     Supine PRE: SA punch, circles cw, ccw, flex   10 ea x 1#, good tolerance    Supine Alphabet A-Z  all     Sidelying ER,  1 10 e X 1#         Standing row green band 1 15 X jyoti well   Standing extension green band 1 15 X Tolerated well and good technique    Seated Bicep curl 1# 1 15 X  Tolerated well no pain    Pulley ex  1 10 x To cool down    Activities Time    Notes/Comments   Patient c/o pain and arthritis in hands, triggering of R3 and 4, trialing isotoner glove at night   Issued extra small for R and L hands to wear at night. Specific Interventions Next Treatment: MHP with gentle stretching (ER arm at side with dowel), ROM, gentle stretching and STM, gentle strengthening    Activity/Treatment Tolerance:  [x]  Patient tolerated treatment well  []  Patient limited by fatigue  []  Patient limited by pain   []  Patient limited by other medical complications  []  Other:     Assessment: Patient is progressing towards goals. Continued with gentle strengthening secondary to decreased pain upon arrival, with good tolerance and no increase in pain. Monitor tolerance and continued per patient.      Areas for Improvement: impaired activity tolerance, impaired ROM, impaired strength and pain  Prognosis: good    GOALS:  Patient Goal: Decrease pain    Short Term Goals:  Time Frame: 4 weeks  *  Patient will improve AROM L shoulder flexion to 155, abduction to 155, ER to 70, IR thumb tip to 3\" above waistline for improved flexibility for bathing. AROM flexion = 160, abduction = 160, ER = 75, IR thumb tip reaches waistline. GOAL MET - REVISED GOAL:  Patient will improve AROM flexion and abduction to 165, ER to 80 for improved flexibility with dressing. * Patient will report pain at night no more than 5/10 for improved ease with sleep. 4/10. GOAL MET - REVISED GOAL:  Patient will report pain no more than 3/10 for improved ease with sleep. * Patient will improve L shoulder strength to 4/5 throughout for improved ease with lifting a bag of groceries. Flexion and IR = 4/5, abduction and ER = 4-/5. GOAL NOT MET - CONTINUE  * Patient will demonstrate I knowledge of HEP for improved flexibility and strength for bathing and dressing. GOAL MET - Update to strengthening    Long Term Goals:  Time Frame: 4 weeks  *  Patient will report dressing and bathing with no increase in pain. GOAL NOT MET (but improved) - CONTINUE  *  Patient will improve UEFS to at least 55.  63. GOAL MET - REVISED GOAL:  Patient will improve UEFS to greater than 63. * Patient will demonstrate ability to reach overhead for an object with affected extremity without increased pain.   GOAL NOT MET - CONTINUE    Patient Education:   [x]  HEP/Education Completed: Plan of Care, Goals, HEP   [employer] Access Code for HEP: Access Code: [email or web address]   Modified Posterior Capsule Stretch - 2 x daily - 7 x weekly - 1 sets - 3-5 reps - 15 hold   Standing Shoulder Internal Rotation Stretch with Hands Behind Back - 2 x daily - 7 x weekly - 1 sets - 3-5 reps - 15 hold   Supine Shoulder Flexion Extension AAROM with Dowel - 2 x daily - 7 x weekly - 1 sets - 10 reps - 5-15 hold   Supine Shoulder External Rotation in 45 Degrees Abduction AAROM with Dowel - 2 x daily - 7 x weekly - 1 sets - 10 reps - 5-15 hold   Supine Scapular Protraction in Flexion with Dumbbells - 1 x daily - 7 x weekly - 3 sets - 10 reps - NEW TODAY   Supine Shoulder Circles with Weight - 1 x daily - 7 x weekly - 3 sets - 10 reps - NEW TODAY   Standing Shoulder Row with Anchored Resistance - 1 x daily - 7 x weekly - 3 sets - 10 reps - NEW TODAY  []  No new Education completed  []  Reviewed Prior HEP      [x]  Patient verbalized and/or demonstrated understanding of education provided. []  Patient unable to verbalize and/or demonstrate understanding of education provided. Will continue education. [x]  Barriers to learning: none    PLAN:  Treatment Recommendations: Strengthening, Range of Motion, Manual Therapy - Soft Tissue Mobilization, Manual Therapy - Joint Manipulation, Pain Management, Home Exercise Program, Patient Education and Modalities    []  Plan of care initiated. [x]  Continue with current plan of care  Plan to see patient 2 times per week for 4 weeks to address the treatment planned outlined above.   []  Modify plan of care as follows:    []  Hold pending physician visit  []  Discharge    Time In 1045   Time Out 1130   Timed Code Minutes: 45 min   Total Treatment Time: 45 min       Electronically Signed by: LESLEY Loo OTR/L [id number]

## 2021-09-16 ENCOUNTER — APPOINTMENT (OUTPATIENT)
Dept: OCCUPATIONAL THERAPY | Age: 86
End: 2021-09-16
Payer: MEDICARE

## 2021-09-17 ENCOUNTER — HOSPITAL ENCOUNTER (OUTPATIENT)
Dept: OCCUPATIONAL THERAPY | Age: 86
Setting detail: THERAPIES SERIES
Discharge: HOME | End: 2021-09-17
Payer: MEDICARE

## 2021-09-17 ENCOUNTER — APPOINTMENT (OUTPATIENT)
Dept: OCCUPATIONAL THERAPY | Age: 86
End: 2021-09-17
Payer: MEDICARE

## 2021-09-17 PROCEDURE — 97140 MANUAL THERAPY 1/> REGIONS: CPT

## 2021-09-17 PROCEDURE — 97110 THERAPEUTIC EXERCISES: CPT

## 2021-09-17 NOTE — PROGRESS NOTES
[address]  [] EVALUATION  [x] DAILY NOTE (LAND) [] DAILY NOTE (AQUATIC ) [] PROGRESS NOTE [] DISCHARGE NOTE    [x] OUTPATIENT REHABILITATION CENTER [facility]   [] [place] [address]    [] [facility]   [] Khoi Yepez    Date: 2021  Patient Name:  Emil Daigle  : 1925  MRN: [id number]  CSN: [id number]    Referring Practitioner Suzy Burgess DO   Diagnosis Chronic L shoulder pain M25.512    Treatment Diagnosis L shoulder decreased ROM, pain   Date of Evaluation 21      Functional Outcome Measure Used Upper Extremity Functional Scale   Functional Outcome Score 38/80 (21) 63/80 (21)      Insurance: Primary: Payor: Emery Damico /  /  / ,   Secondary: BC   Authorization Information: PRECERTIFICATION REQUIRED:  No  INSURANCE THERAPY BENEFIT:  Patient has unlimited visits based on medical necessity  Benefit will not cover maintenance or preventative treatment. AQUATIC THERAPY COVERED:   Yes  MODALITIES COVERED:  Yes, with the exception of iontophoresis and hot packs/cold packs  TELEHEALTH COVERED: Yes   Visit # 11, 3/10 for PN; PN completed 21   Visits Allowed:    Recertification Date:    Physician Follow-Up: 2021   Physician Orders: Trav Parra and treat   Pertinent History: Patient reports that on , she went to reach her L arm behind her back and had pain and difficulty moving her arm. PROCEDURE: XR SHOULDER LEFT (MIN 2 VIEWS)       CLINICAL INFORMATION: Acute pain of left shoulder, Decreased ROM of left shoulder       COMPARISON: No prior study.       TECHNIQUE: 4 standard views of the left shoulder were obtained       FINDINGS: There is moderate degenerative spurring of the left humeral head. There is marked narrowing of the glenohumeral joint. No fractures seen. Findings of moderate osteoporosis.          SUBJECTIVE: Patient reports she went to ortho doctor and was told her hip is bone on bone    TREATMENT   Precautions: 5# lifting restriction    PAIN 2-3/10 at worst     X in shaded column indicates Activity Completed Today   Modalities Parameters/  Location  Notes/Comments   Hot pack to L shoulder, supine  15 min  Xx Completed PROM to all motions while on MHP                Manual Therapy Time/  Technique  Notes/Comments   STM lateral shoulder/ bicep, upper trap, RC  Xx Good tolerance, short duration to avoid tenderness          Exercises   Sets/  Sec Reps  Notes/Comments   Supine PROM L shoulder all planes  10 Xx Completed with MHP   Supine dowel: flexion, ER 1 10 e Xx             Posterior capsule stetch 15 sec 5 Xx    IR gentle stretch behind back L hand only 15 sec 5     Supine PRE: SA punch, circles cw, ccw, flex with 1#  10 ea Xx 1#, good tolerance    Supine Alphabet A-Z  all     Sidelying ER 1# 1 10 e Xx          Standing row green band 1 15 Xx    Standing extension green band 1 15 Xx    Seated Bicep curl 1# 1 15 Xx    Pulley ex  1 10     Activities Time    Notes/Comments   Patient c/o pain and arthritis in hands, triggering of R3 and 4, trialing isotoner glove at night   Issued extra small for R and L hands to wear at night. Specific Interventions Next Treatment: MHP with gentle stretching (ER arm at side with dowel), ROM, gentle stretching and STM, gentle strengthening    Activity/Treatment Tolerance:  [x]  Patient tolerated treatment well  []  Patient limited by fatigue  []  Patient limited by pain   []  Patient limited by other medical complications  []  Other:     Assessment: Patient is progressing towards goals. Continues to tolerate gentle strengthening well without report of pain.  Pt. Very motivated    Areas for Improvement: impaired activity tolerance, impaired ROM, impaired strength and pain  Prognosis: good    GOALS:  Patient Goal: Decrease pain    Short Term Goals:  Time Frame: 4 weeks  *  Patient will improve AROM L shoulder flexion to 155, abduction to 155, ER to 70, IR thumb tip to 3\" above waistline for improved flexibility for bathing. AROM flexion = 160, abduction = 160, ER = 75, IR thumb tip reaches waistline. GOAL MET - REVISED GOAL:  Patient will improve AROM flexion and abduction to 165, ER to 80 for improved flexibility with dressing. * Patient will report pain at night no more than 5/10 for improved ease with sleep. 4/10. GOAL MET - REVISED GOAL:  Patient will report pain no more than 3/10 for improved ease with sleep. * Patient will improve L shoulder strength to 4/5 throughout for improved ease with lifting a bag of groceries. Flexion and IR = 4/5, abduction and ER = 4-/5. GOAL NOT MET - CONTINUE  * Patient will demonstrate I knowledge of HEP for improved flexibility and strength for bathing and dressing. GOAL MET - Update to strengthening    Long Term Goals:  Time Frame: 4 weeks  *  Patient will report dressing and bathing with no increase in pain. GOAL NOT MET (but improved) - CONTINUE  *  Patient will improve UEFS to at least 55.  63. GOAL MET - REVISED GOAL:  Patient will improve UEFS to greater than 63. * Patient will demonstrate ability to reach overhead for an object with affected extremity without increased pain.   GOAL NOT MET - CONTINUE    Patient Education:   []  HEP/Education Completed: Plan of Care, Goals, HEP   [facility] for HEP: Access Code: [email or web address]   Modified Posterior Capsule Stretch - 2 x daily - 7 x weekly - 1 sets - 3-5 reps - 15 hold   Standing Shoulder Internal Rotation Stretch with Hands Behind Back - 2 x daily - 7 x weekly - 1 sets - 3-5 reps - 15 hold   Supine Shoulder Flexion Extension AAROM with Dowel - 2 x daily - 7 x weekly - 1 sets - 10 reps - 5-15 hold   Supine Shoulder External Rotation in 45 Degrees Abduction AAROM with Dowel - 2 x daily - 7 x weekly - 1 sets - 10 reps - 5-15 hold   Supine Scapular Protraction in Flexion with Dumbbells - 1 x daily - 7 x weekly - 3 sets - 10 reps - NEW TODAY   Supine Shoulder Circles with Weight - 1 x daily - 7 x weekly - 3 sets - 10 reps - NEW TODAY   Standing Shoulder Row with Anchored Resistance - 1 x daily - 7 x weekly - 3 sets - 10 reps - NEW TODAY  [x]  No new Education completed  []  Reviewed Prior HEP      []  Patient verbalized and/or demonstrated understanding of education provided. []  Patient unable to verbalize and/or demonstrate understanding of education provided. Will continue education. [x]  Barriers to learning: none    PLAN:  Treatment Recommendations: Strengthening, Range of Motion, Manual Therapy - Soft Tissue Mobilization, Manual Therapy - Joint Manipulation, Pain Management, Home Exercise Program, Patient Education and Modalities    []  Plan of care initiated. [x]  Continue with current plan of care  Plan to see patient 2 times per week for 4 weeks to address the treatment planned outlined above.   []  Modify plan of care as follows:    []  Hold pending physician visit  []  Discharge    Time In 1015   Time Out 1055   Timed Code Minutes: 40 min   Total Treatment Time: 40 min       Electronically Signed by: Lawson Leroy OTR/L [id number]

## 2021-09-21 ENCOUNTER — APPOINTMENT (OUTPATIENT)
Dept: OCCUPATIONAL THERAPY | Age: 86
End: 2021-09-21
Payer: MEDICARE

## 2021-09-23 ENCOUNTER — APPOINTMENT (OUTPATIENT)
Dept: OCCUPATIONAL THERAPY | Age: 86
End: 2021-09-23
Payer: MEDICARE

## 2021-09-28 ENCOUNTER — HOSPITAL ENCOUNTER (OUTPATIENT)
Dept: OCCUPATIONAL THERAPY | Age: 86
Setting detail: THERAPIES SERIES
Discharge: HOME | End: 2021-09-28
Payer: MEDICARE

## 2021-09-28 PROCEDURE — 97110 THERAPEUTIC EXERCISES: CPT

## 2021-09-28 PROCEDURE — 97140 MANUAL THERAPY 1/> REGIONS: CPT

## 2021-09-28 NOTE — PROGRESS NOTES
shoulder pain that she has noticed in the past week with her focus on her hip. Patient reports (-) sleep disturbances with shoulder, with increase household tasks noticed with use of LUE. TREATMENT   Precautions: 5# lifting restriction    PAIN 2-3/10 at worst     X in shaded column indicates Activity Completed Today   Modalities Parameters/  Location  Notes/Comments   Hot pack to L shoulder, supine  15 min  X Completed PROM to all motions while on MHP                Manual Therapy Time/  Technique  Notes/Comments   STM lateral shoulder/ bicep, upper trap, RC  X Good tolerance, short duration to avoid tenderness          Exercises   Sets/  Sec Reps  Notes/Comments   Supine PROM L shoulder all planes  10 X Completed with MHP   Supine dowel: flexion, ER 1 10 e X             Posterior capsule stetch 15 sec 5 X    IR gentle stretch behind back L hand only 15 sec 5     Supine PRE: SA punch, circles cw, ccw, flex with 1# 1 15 ea X 1#, good tolerance     Cues for circles, increased reps    Supine Alphabet A-Z  all     Sidelying ER 1# 1 10 e X          Standing row green band 1 15 X    Standing extension green band 1 15 X    Seated Bicep curl 1# 1 15 X    Pulley ex  1 10     Activities Time    Notes/Comments   Patient c/o pain and arthritis in hands, triggering of R3 and 4, trialing isotoner glove at night   Issued extra small for R and L hands to wear at night. Specific Interventions Next Treatment: MHP with gentle stretching (ER arm at side with dowel), ROM, gentle stretching and STM, gentle strengthening    Activity/Treatment Tolerance:  [x]  Patient tolerated treatment well  []  Patient limited by fatigue  []  Patient limited by pain   []  Patient limited by other medical complications  []  Other:     Assessment: Patient is progressing towards goals. Continued with strengthening with good tolerance and technique throughout.  Updated HEP and provided patient with new handout for all current exercises. All questions answered on exercises, and patient has current theraband for exercises. Plan to discharge next session at last scheduled visit. Areas for Improvement: impaired activity tolerance, impaired ROM, impaired strength and pain  Prognosis: good    GOALS:  Patient Goal: Decrease pain    Short Term Goals:  Time Frame: 4 weeks  *  Patient will improve AROM L shoulder flexion to 155, abduction to 155, ER to 70, IR thumb tip to 3\" above waistline for improved flexibility for bathing. AROM flexion = 160, abduction = 160, ER = 75, IR thumb tip reaches waistline. GOAL MET - REVISED GOAL:  Patient will improve AROM flexion and abduction to 165, ER to 80 for improved flexibility with dressing. * Patient will report pain at night no more than 5/10 for improved ease with sleep. 4/10. GOAL MET - REVISED GOAL:  Patient will report pain no more than 3/10 for improved ease with sleep. * Patient will improve L shoulder strength to 4/5 throughout for improved ease with lifting a bag of groceries. Flexion and IR = 4/5, abduction and ER = 4-/5. GOAL NOT MET - CONTINUE  * Patient will demonstrate I knowledge of HEP for improved flexibility and strength for bathing and dressing. GOAL MET - Update to strengthening    Long Term Goals:  Time Frame: 4 weeks  *  Patient will report dressing and bathing with no increase in pain. GOAL NOT MET (but improved) - CONTINUE  *  Patient will improve UEFS to at least 55.  63. GOAL MET - REVISED GOAL:  Patient will improve UEFS to greater than 63. * Patient will demonstrate ability to reach overhead for an object with affected extremity without increased pain.   GOAL NOT MET - CONTINUE    Patient Education:   []  HEP/Education Completed: Plan of Care, Goals, HEP   [facility] Access Code for HEP: Access Code: [email or web address]   Modified Posterior Capsule Stretch - 2 x daily - 7 x weekly - 1 sets - 3-5 reps - 15 hold   Standing Shoulder Internal Rotation Stretch with Hands Behind Back - 2 x daily - 7 x weekly - 1 sets - 3-5 reps - 15 hold   Supine Shoulder Flexion Extension AAROM with Dowel - 2 x daily - 7 x weekly - 1 sets - 10 reps - 5-15 hold   Supine Shoulder External Rotation in 45 Degrees Abduction AAROM with Dowel - 2 x daily - 7 x weekly - 1 sets - 10 reps - 5-15 hold   Supine Scapular Protraction in Flexion with Dumbbells - 1 x daily - 7 x weekly - 3 sets - 10 reps - NEW TODAY   Supine Shoulder Circles with Weight - 1 x daily - 7 x weekly - 3 sets - 10 reps - NEW TODAY   Standing Shoulder Row with Anchored Resistance - 1 x daily - 7 x weekly - 3 sets - 10 reps - NEW TODAY  [x]  No new Education completed  []  Reviewed Prior HEP      []  Patient verbalized and/or demonstrated understanding of education provided. []  Patient unable to verbalize and/or demonstrate understanding of education provided. Will continue education. [x]  Barriers to learning: none    PLAN:  Treatment Recommendations: Strengthening, Range of Motion, Manual Therapy - Soft Tissue Mobilization, Manual Therapy - Joint Manipulation, Pain Management, Home Exercise Program, Patient Education and Modalities    []  Plan of care initiated. [x]  Continue with current plan of care  Plan to see patient 2 times per week for 4 weeks to address the treatment planned outlined above. []  Modify plan of care as follows:    []  Hold pending physician visit  []  Discharge    Time In 1028   Time Out 1110   Timed Code Minutes: 42 min   Total Treatment Time: 42 min       Electronically Signed by:  Eduardo TANG #[id number]

## 2021-10-01 ENCOUNTER — HOSPITAL ENCOUNTER (OUTPATIENT)
Dept: OCCUPATIONAL THERAPY | Age: 86
Setting detail: THERAPIES SERIES
Discharge: HOME | End: 2021-10-01
Payer: MEDICARE

## 2021-10-01 PROCEDURE — 97530 THERAPEUTIC ACTIVITIES: CPT

## 2021-10-01 NOTE — DISCHARGE SUMMARY
[address] THER[address]  [] EVALUATION  [] DAILY NOTE (LAND) [] DAILY NOTE (AQUATIC ) [] PROGRESS NOTE [x] DISCHARGE NOTE    [x] OUTPATIENT REHABILITATION CENTER [facility]   [] [facility]    [] [facility]   [] Angella العراقي    Date: 10/1/2021  Patient Name:  Nathan Steiner  : 1925  MRN: [id number]  CSN: [id number]    Referring Practitioner Kalee Salvador DO   Diagnosis Chronic L shoulder pain M25.512    Treatment Diagnosis L shoulder decreased ROM, pain   Date of Evaluation 21      Functional Outcome Measure Used Upper Extremity Functional Scale   Functional Outcome Score 38/80 (21) 63/80 (21) 70/80 (10/1/21)       Insurance: Primary: Payor: Yisel Arroyo /  /  / ,   Secondary: BCBS   Authorization Information: PRECERTIFICATION REQUIRED:  No  INSURANCE THERAPY BENEFIT:  Patient has unlimited visits based on medical necessity  Benefit will not cover maintenance or preventative treatment. AQUATIC THERAPY COVERED:   Yes  MODALITIES COVERED:  Yes, with the exception of iontophoresis and hot packs/cold packs  TELEHEALTH COVERED: Yes   Visit # 13, 5/10 for PN; PN completed 21, D/C note completed 10/1/21    Visits Allowed:    Recertification Date:    Physician Follow-Up: 2021   Physician Orders: Laurita Carlisle and treat   Pertinent History: Patient reports that on , she went to reach her L arm behind her back and had pain and difficulty moving her arm. PROCEDURE: XR SHOULDER LEFT (MIN 2 VIEWS)       CLINICAL INFORMATION: Acute pain of left shoulder, Decreased ROM of left shoulder       COMPARISON: No prior study.       TECHNIQUE: 4 standard views of the left shoulder were obtained       FINDINGS: There is moderate degenerative spurring of the left humeral head. There is marked narrowing of the glenohumeral joint. No fractures seen. Findings of moderate osteoporosis.          SUBJECTIVE: Pt reports that things are going very well. She is not having pain at rest and with light activities such as getting dressed. She does have increased pain with mopping and lifting heavier items. To avoid this, she modifies tasks such as dividing up groceries into various bags at the store to ensure it is not too much weight. TREATMENT   Precautions: 5# lifting restriction    PAIN 2-3/10 at worst     X in shaded column indicates Activity Completed Today   Modalities Parameters/  Location  Notes/Comments   Hot pack to L shoulder, supine  15 min  X Completed following session                Manual Therapy Time/  Technique  Notes/Comments   STM lateral shoulder/ bicep, upper trap, RC  X Good tolerance, short duration to avoid tenderness          Exercises   Sets/  Sec Reps  Notes/Comments   Supine PROM L shoulder all planes  10  Declined, pt feels like it 'overdid it' the last time. Since she is not having pain, she wants to continue with HEP review   Supine dowel: flexion, ER 1 10 e X  Tolerates well, instructed in techniques to achieve more of a stretch (ER at 60 deg abd). Good return demo. L UE demos more flex than R UE during supine wand. Posterior capsule stetch 15 sec 5 X    IR gentle stretch behind back L hand only 15 sec 5     Supine PRE: SA punch, circles cw, ccw, flex with 1# 1 15 ea X 1#, no pain, tolerates well     Cues for circles to initiate motion from shoulder, not wrist, increased reps    Supine Alphabet A-Z  all     Sidelying ER 1# 1 10 e X Cues to avoid initiating motion with wrist and forearm. Pt able to complete when progressing from AROM to utilizing 1# weight. Standing row green band 1 15 X    Standing extension green band 1 15 X    Seated Bicep curl 1# 1 15 X Cues for neutral wrist    Pulley ex  1 10     Activities Time    Notes/Comments   Patient c/o pain and arthritis in hands, triggering of R3 and 4, trialing isotoner glove at night   Issued extra small for R and L hands to wear at night.    Reviewed goals/pain  x 0/10 pain with sleeping after having hip injection,   Measurements: L shoulder AROM/MMT-  Flex-170/5  abd-172/5  ER-80/5  IR-thumb above bra/5               Specific Interventions Next Treatment: MHP with gentle stretching (ER arm at side with dowel), ROM, gentle stretching and STM, gentle strengthening    Activity/Treatment Tolerance:  [x]  Patient tolerated treatment well  []  Patient limited by fatigue  []  Patient limited by pain   []  Patient limited by other medical complications  []  Other:     Assessment: Pt met all goals. Written instructions given to avoid compensation with wrist and forearm during all exercises. Pt states good understanding. Areas for Improvement: impaired activity tolerance, impaired ROM, impaired strength and pain  Prognosis: good    GOALS:  Patient Goal: Decrease pain    Short Term Goals:  Time Frame: 4 weeks  *  Patient will improve AROM L shoulder flexion to 155, abduction to 155, ER to 70, IR thumb tip to 3\" above waistline for improved flexibility for bathing. AROM flexion = 160, abduction = 160, ER = 75, IR thumb tip reaches waistline. GOAL MET - REVISED GOAL:  Patient will improve AROM flexion and abduction to 165, ER to 80 for improved flexibility with dressing. GOAL MET  * Patient will report pain at night no more than 5/10 for improved ease with sleep. 4/10. GOAL MET - REVISED GOAL:  Patient will report pain no more than 3/10 for improved ease with sleep. GOAL MET  * Patient will improve L shoulder strength to 4/5 throughout for improved ease with lifting a bag of groceries. Flexion and IR = 4/5, abduction and ER = 4-/5. GOAL MET  * Patient will demonstrate I knowledge of HEP for improved flexibility and strength for bathing and dressing. GOAL MET - Additional written cues given to avoid compensation     Long Term Goals:  Time Frame: 4 weeks  *  Patient will report dressing and bathing with no increase in pain.  GOAL MET  *  Patient will improve UEFS to at least 54.  63. GOAL MET - REVISED GOAL:  Patient will improve UEFS to greater than 63. GOAL MET   * Patient will demonstrate ability to reach overhead for an object with affected extremity without increased pain. GOAL MET-retrieved and replaced mug from 2nd shelf (165 deg elevation to achieve). Patient Education:   []  HEP/Education Completed: Plan of Care, Goals, HEP   [address] Access Code for HEP: Access Code: Estel Quant Modified Posterior Capsule Stretch - 2 x daily - 7 x weekly - 1 sets - 3-5 reps - 15 hold   Standing Shoulder Internal Rotation Stretch with Hands Behind Back - 2 x daily - 7 x weekly - 1 sets - 3-5 reps - 15 hold   Supine Shoulder Flexion Extension AAROM with Dowel - 2 x daily - 7 x weekly - 1 sets - 10 reps - 5-15 hold   Supine Shoulder External Rotation in 45 Degrees Abduction AAROM with Dowel - 2 x daily - 7 x weekly - 1 sets - 10 reps - 5-15 hold   Supine Scapular Protraction in Flexion with Dumbbells - 1 x daily - 7 x weekly - 3 sets - 10 reps - NEW TODAY   Supine Shoulder Circles with Weight - 1 x daily - 7 x weekly - 3 sets - 10 reps - NEW TODAY   Standing Shoulder Row with Anchored Resistance - 1 x daily - 7 x weekly - 3 sets - 10 reps - NEW TODAY  []  No new Education completed  [x]  Reviewed Prior HEP      [x]  Patient verbalized and/or demonstrated understanding of education provided. []  Patient unable to verbalize and/or demonstrate understanding of education provided. Will continue education. [x]  Barriers to learning: none    PLAN:  Treatment Recommendations: Strengthening, Range of Motion, Manual Therapy - Soft Tissue Mobilization, Manual Therapy - Joint Manipulation, Pain Management, Home Exercise Program, Patient Education and Modalities    []  Plan of care initiated. []  Continue with current plan of care  Plan to see patient 2 times per week for 4 weeks to address the treatment planned outlined above.   []  Modify plan of care as follows:    []  Hold pending physician visit  [x]  Discharge    Time In 1045   Time Out 1130   Timed Code Minutes: 45 min   Total Treatment Time: 45 min       Electronically Signed by: Vikki Jade, [facility]

## 2021-10-07 ENCOUNTER — OFFICE VISIT (OUTPATIENT)
Dept: FAMILY MEDICINE CLINIC | Age: 86
End: 2021-10-07
Payer: MEDICARE

## 2021-10-07 VITALS — BODY MASS INDEX: 25 KG/M2 | SYSTOLIC BLOOD PRESSURE: 135 MMHG | WEIGHT: 128 LBS | DIASTOLIC BLOOD PRESSURE: 70 MMHG

## 2021-10-07 DIAGNOSIS — M25.512 CHRONIC LEFT SHOULDER PAIN: ICD-10-CM

## 2021-10-07 DIAGNOSIS — I10 ESSENTIAL HYPERTENSION: Primary | ICD-10-CM

## 2021-10-07 DIAGNOSIS — G89.29 CHRONIC LEFT SHOULDER PAIN: ICD-10-CM

## 2021-10-07 DIAGNOSIS — I25.10 CORONARY ARTERY DISEASE INVOLVING NATIVE CORONARY ARTERY OF NATIVE HEART WITHOUT ANGINA PECTORIS: ICD-10-CM

## 2021-10-07 PROCEDURE — 1123F ACP DISCUSS/DSCN MKR DOCD: CPT | Performed by: FAMILY MEDICINE

## 2021-10-07 PROCEDURE — 1036F TOBACCO NON-USER: CPT | Performed by: FAMILY MEDICINE

## 2021-10-07 PROCEDURE — 99214 OFFICE O/P EST MOD 30 MIN: CPT | Performed by: FAMILY MEDICINE

## 2021-10-07 PROCEDURE — G8427 DOCREV CUR MEDS BY ELIG CLIN: HCPCS | Performed by: FAMILY MEDICINE

## 2021-10-07 PROCEDURE — 4040F PNEUMOC VAC/ADMIN/RCVD: CPT | Performed by: FAMILY MEDICINE

## 2021-10-07 PROCEDURE — G8417 CALC BMI ABV UP PARAM F/U: HCPCS | Performed by: FAMILY MEDICINE

## 2021-10-07 PROCEDURE — 1090F PRES/ABSN URINE INCON ASSESS: CPT | Performed by: FAMILY MEDICINE

## 2021-10-07 PROCEDURE — G8484 FLU IMMUNIZE NO ADMIN: HCPCS | Performed by: FAMILY MEDICINE

## 2021-10-07 NOTE — PROGRESS NOTES
Hussein Birmingham is a 80 y.o. female that presents for     Chief Complaint   Patient presents with    Hypertension       /70   Wt 128 lb (58.1 kg)   BMI 25.00 kg/m²       HPI    CAD    History of myocardial infarction? Yes  History of heart failure? Yes. Current symptoms of angina? No   Patient compliant with therapy? No  Tobacco use? No      Antiplatelet therapy? Yes    Beta-blocker therapy? Yes   ACE/ARB therapy - Yes    HTN    Does patient check BP regularly at home? - Yes - well controlled, reviewed today  Current Medication regimen - coreg, aldactone, lasix  Tolerating medications well? - yes    Shortness of breath or chest pain? No  Headache or visual complaints? No  Neurologic changes like confusion? No  Extremity edema? No    BP Readings from Last 3 Encounters:   10/07/21 135/70   09/03/21 136/60   08/09/21 118/64       Left shoulder pain is doing much better with therapy. Having less pain and improved ROM. Health Maintenance   Topic Date Due    DTaP/Tdap/Td vaccine (1 - Tdap) Never done    Shingles Vaccine (2 of 3) 04/12/2014    Annual Wellness Visit (AWV)  07/15/2021    Flu vaccine (1) 09/01/2021    TSH testing  05/06/2022    Lipid screen  05/07/2022    Potassium monitoring  05/27/2022    Creatinine monitoring  05/27/2022    Pneumococcal 65+ years Vaccine  Completed    COVID-19 Vaccine  Completed    Hepatitis A vaccine  Aged Out    Hepatitis B vaccine  Aged Out    Hib vaccine  Aged Out    Meningococcal (ACWY) vaccine  Aged Out       Past Medical History:   Diagnosis Date    Arthritis     Cancer ([facility] [address] 2013    SKIN CANCER ON LEFT ANKLE    Diverticulosis     Hyperlipidemia     Hypertension     Hypothyroidism     Psoriasis     S/P angioplasty with stent 05/06/2021    3 stents to LAD, 1 stent to OM, 2 stents to diag.  per Dr. Armen Thomas Thyroid disorder        Past Surgical History:   Procedure Laterality Date    BREAST BIOPSY Left     benign    BREAST SURGERY Left 6/1966    Lumpectomy    CARPAL TUNNEL RELEASE Right 1/30/2013    CARPAL TUNNEL RELEASE Left 7/25/13    CATARACT REMOVAL Bilateral 1999    COLON SURGERY  11/1999    resection    COLONOSCOPY  2003, 2006, 2011    DIAGNOSTIC CARDIAC CATH LAB PROCEDURE  05/07/2021    6 stents     HYSTERECTOMY  2/23/1970    HYSTERECTOMY, TOTAL ABDOMINAL      KNEE ARTHROSCOPY Right 11/21/2007    meniscectomies    KNEE SURGERY Left 2000    replacement    MYRINGOTOMY Right 07/01/2015    tube insertion    OVARY REMOVAL Bilateral 7/12/2001    oophorectomy    OVARY REMOVAL Bilateral     SHOULDER SURGERY  5/14    SINUS SURGERY         Social History     Tobacco Use    Smoking status: Never Smoker    Smokeless tobacco: Never Used   Vaping Use    Vaping Use: Never used   Substance Use Topics    Alcohol use: No    Drug use: No       Family History   Problem Relation Age of Onset    Cancer Child     Breast Cancer Sister     Stroke Sister     Heart Disease Sister     Other Other         visual problems    Breast Cancer Daughter 52         I have reviewed the patient's past medical history, past surgical history, allergies, medications, social and family history and I have made updates where appropriate. Review of Systems        PHYSICAL EXAM:  /70   Wt 128 lb (58.1 kg)   BMI 25.00 kg/m²     Physical Exam  Vitals and nursing note reviewed. Constitutional:       General: She is not in acute distress. Appearance: She is well-developed. HENT:      Head: Normocephalic and atraumatic. Right Ear: Hearing and external ear normal.      Left Ear: Hearing and external ear normal.      Nose: Nose normal.   Eyes:      General:         Right eye: No discharge. Left eye: No discharge. Conjunctiva/sclera: Conjunctivae normal.   Neck:      Thyroid: No thyromegaly. Trachea: No tracheal deviation. Cardiovascular:      Rate and Rhythm: Normal rate and regular rhythm.       Heart sounds: Normal heart sounds. No murmur heard. No friction rub. No gallop. Pulmonary:      Effort: Pulmonary effort is normal. No respiratory distress. Breath sounds: No wheezing or rales. Chest:      Chest wall: No tenderness. Musculoskeletal:         General: No deformity. Cervical back: Normal range of motion and neck supple. Lymphadenopathy:      Cervical: No cervical adenopathy. Skin:     General: Skin is warm and dry. Findings: No erythema or rash. Neurological:      Mental Status: She is alert. Motor: No abnormal muscle tone. Coordination: Coordination normal.   Psychiatric:         Behavior: Behavior normal.         Thought Content: Thought content normal.         Judgment: Judgment normal.             ASSESSMENT & PLAN  Suzie Julien was seen today for hypertension. Diagnoses and all orders for this visit:    Essential hypertension    Chronic left shoulder pain    Coronary artery disease involving native coronary artery of native heart without angina pectoris    -HTN controlled, continue coreg, aldactone, lasix. Tolerating these well  -Other chronic issues are stable, continue current medications  -Advised to call if any issues        Return in about 4 months (around 2/7/2022). The most recent results of the following tests were reviewed with the patient today: Cath report, Lipids, a1c     All copied or forwarded information in the progress note was verified by me to be accurate at the time of visit  Patient's past medical, surgical, social and family history were reviewed and updated     All patient questions answered. Patient voiced understanding.

## 2021-10-12 ENCOUNTER — TELEPHONE (OUTPATIENT)
Dept: FAMILY MEDICINE CLINIC | Age: 86
End: 2021-10-12

## 2021-10-12 ENCOUNTER — NURSE TRIAGE (OUTPATIENT)
Dept: OTHER | Facility: CLINIC | Age: 86
End: 2021-10-12

## 2021-10-12 ENCOUNTER — OFFICE VISIT (OUTPATIENT)
Dept: FAMILY MEDICINE CLINIC | Age: 86
End: 2021-10-12
Payer: MEDICARE

## 2021-10-12 VITALS
SYSTOLIC BLOOD PRESSURE: 148 MMHG | RESPIRATION RATE: 14 BRPM | WEIGHT: 127 LBS | HEART RATE: 69 BPM | TEMPERATURE: 97.5 F | DIASTOLIC BLOOD PRESSURE: 62 MMHG | BODY MASS INDEX: 24.94 KG/M2 | OXYGEN SATURATION: 98 % | HEIGHT: 60 IN

## 2021-10-12 DIAGNOSIS — I10 ESSENTIAL HYPERTENSION: ICD-10-CM

## 2021-10-12 DIAGNOSIS — I10 ESSENTIAL HYPERTENSION: Primary | ICD-10-CM

## 2021-10-12 PROCEDURE — G8427 DOCREV CUR MEDS BY ELIG CLIN: HCPCS | Performed by: FAMILY MEDICINE

## 2021-10-12 PROCEDURE — G8420 CALC BMI NORM PARAMETERS: HCPCS | Performed by: FAMILY MEDICINE

## 2021-10-12 PROCEDURE — 1090F PRES/ABSN URINE INCON ASSESS: CPT | Performed by: FAMILY MEDICINE

## 2021-10-12 PROCEDURE — 4040F PNEUMOC VAC/ADMIN/RCVD: CPT | Performed by: FAMILY MEDICINE

## 2021-10-12 PROCEDURE — G8484 FLU IMMUNIZE NO ADMIN: HCPCS | Performed by: FAMILY MEDICINE

## 2021-10-12 PROCEDURE — 1123F ACP DISCUSS/DSCN MKR DOCD: CPT | Performed by: FAMILY MEDICINE

## 2021-10-12 PROCEDURE — 1036F TOBACCO NON-USER: CPT | Performed by: FAMILY MEDICINE

## 2021-10-12 PROCEDURE — 99214 OFFICE O/P EST MOD 30 MIN: CPT | Performed by: FAMILY MEDICINE

## 2021-10-12 RX ORDER — CARVEDILOL 6.25 MG/1
TABLET ORAL
Qty: 180 TABLET | Refills: 3 | Status: SHIPPED | OUTPATIENT
Start: 2021-10-12 | End: 2021-12-02 | Stop reason: SDUPTHER

## 2021-10-12 RX ORDER — CARVEDILOL 6.25 MG/1
6.25 TABLET ORAL 2 TIMES DAILY WITH MEALS
Qty: 60 TABLET | Refills: 5 | Status: SHIPPED | OUTPATIENT
Start: 2021-10-12 | End: 2021-10-12

## 2021-10-12 NOTE — TELEPHONE ENCOUNTER
Patient transferred to the office via NT   Patient reports the following BP readings      10.10.2021  0720 144/77 P-70  1550 171/91 P-78  1920  163/81 P-76    10.11.2021  0900 133/71 P-70  1700 165/86 P-79  1730 155/75 P-73  1945 137/70 P-67    10.12.2021  0830 147/73 P-69  Patient wants to schedule an appointment to see \"Dr Alexa Hubbard"  Patient states that she would feel better seeing Dr Valle Presume    Is it ok to over book your schedule today

## 2021-10-12 NOTE — TELEPHONE ENCOUNTER
Reason for Disposition   BP Systolic BP >= 670 OR Diastolic >= 90 and postpartum (from 0 to 6 weeks after delivery)    Answer Assessment - Initial Assessment Questions  1. BLOOD PRESSURE: \"What is the blood pressure? \" \"Did you take at least two measurements 5 minutes apart?\"  148/73    2. ONSET: \"When did you take your blood pressure? \"    10/6/2021    3. HOW: \"How did you obtain the blood pressure? \" (e.g., visiting nurse, automatic home BP monitor)      4. HISTORY: \"Do you have a history of high blood pressure? \"   Hypertension    MI 5/2021    5. MEDICATIONS: Jeff Sol you taking any medications for blood pressure? \" \"Have you missed any doses recently? \"      no    6. OTHER SYMPTOMS: \"Do you have any symptoms? \" (e.g., headache, chest pain, blurred vision, difficulty breathing, weakness)    Headache    7. PREGNANCY: \"Is there any chance you are pregnant? \" \"When was your last menstrual period? \"      NA    Protocols used: HIGH BLOOD PRESSURE-ADULT-OH    Received call from Rita Bhakta at [facility] with [employer] Complaint. Brief description of triage: Please see notes above. Triage indicates for patient to     Care advice provided, patient verbalizes understanding; denies any other questions or concerns; instructed to call back for any new or worsening symptoms. Writer provided warm transfer to  MD office Altamease Gowers)  For assistance with scheduling,   Attention Provider: Thank you for allowing me to participate in the care of your patient. The patient was connected to triage in response to information provided to the ECC/PSC. Please do not respond through this encounter as the response is not directed to a shared pool.

## 2021-10-12 NOTE — PATIENT INSTRUCTIONS
Continue the furosemide and Spironolactone at the current doses. I have increased the Carvedilol (Coreg) to 6.25mg. You will take 1 pill twice per day.

## 2021-10-12 NOTE — PROGRESS NOTES
Pam Polo is a 80 y.o. female that presents for     Chief Complaint   Patient presents with    Hypertension       BP (!) 148/62   Pulse 69   Temp 97.5 °F (36.4 °C) (Infrared)   Resp 14   Ht 5' (1.524 m)   Wt 127 lb (57.6 kg)   SpO2 98%   BMI 24.80 kg/m²       HPI    HTN    Does patient check BP regularly at home? - Yes - For the past week, BPs have been quite elevated. SBPs 150-170/80s. No known inciting event or new meds. Current Medication regimen - Coreg, aldactone, Lasix  Tolerating medications well? - yes    Shortness of breath or chest pain? No  Headache or visual complaints? Yes - when SBP is in the 170s, will feel fatigued and like sand her eyes  Neurologic changes like confusion? No  Extremity edema? No    BP Readings from Last 3 Encounters:   10/12/21 (!) 148/62   10/07/21 135/70   09/03/21 136/60         Health Maintenance   Topic Date Due    DTaP/Tdap/Td vaccine (1 - Tdap) Never done    Shingles Vaccine (2 of 3) 04/12/2014    Annual Wellness Visit (AWV)  07/15/2021    Flu vaccine (1) 09/01/2021    TSH testing  05/06/2022    Lipid screen  05/07/2022    Potassium monitoring  05/27/2022    Creatinine monitoring  05/27/2022    Pneumococcal 65+ years Vaccine  Completed    COVID-19 Vaccine  Completed    Hepatitis A vaccine  Aged Out    Hepatitis B vaccine  Aged Out    Hib vaccine  Aged Out    Meningococcal (ACWY) vaccine  Aged Out       Past Medical History:   Diagnosis Date    Arthritis     Cancer ([facility] [address] 2013    SKIN CANCER ON LEFT ANKLE    Diverticulosis     Hyperlipidemia     Hypertension     Hypothyroidism     Psoriasis     S/P angioplasty with stent 05/06/2021    3 stents to LAD, 1 stent to OM, 2 stents to diag.  per Dr. Patrick Fierro Thyroid disorder        Past Surgical History:   Procedure Laterality Date    BREAST BIOPSY Left     benign    BREAST SURGERY Left 6/1966    Lumpectomy    CARPAL TUNNEL RELEASE Right 1/30/2013    CARPAL TUNNEL RELEASE Left 7/25/13    CATARACT REMOVAL Bilateral 1999    COLON SURGERY  11/1999    resection    COLONOSCOPY  2003, 2006, 2011    DIAGNOSTIC CARDIAC CATH LAB PROCEDURE  05/07/2021    6 stents     HYSTERECTOMY  2/23/1970    HYSTERECTOMY, TOTAL ABDOMINAL      KNEE ARTHROSCOPY Right 11/21/2007    meniscectomies    KNEE SURGERY Left 2000    replacement    MYRINGOTOMY Right 07/01/2015    tube insertion    OVARY REMOVAL Bilateral 7/12/2001    oophorectomy    OVARY REMOVAL Bilateral     SHOULDER SURGERY  5/14    SINUS SURGERY         Social History     Tobacco Use    Smoking status: Never Smoker    Smokeless tobacco: Never Used   Vaping Use    Vaping Use: Never used   Substance Use Topics    Alcohol use: No    Drug use: No       Family History   Problem Relation Age of Onset    Cancer Child     Breast Cancer Sister     Stroke Sister     Heart Disease Sister     Other Other         visual problems    Breast Cancer Daughter 52         I have reviewed the patient's past medical history, past surgical history, allergies, medications, social and family history and I have made updates where appropriate. Review of Systems        PHYSICAL EXAM:  BP (!) 148/62   Pulse 69   Temp 97.5 °F (36.4 °C) (Infrared)   Resp 14   Ht 5' (1.524 m)   Wt 127 lb (57.6 kg)   SpO2 98%   BMI 24.80 kg/m²     Physical Exam  Vitals and nursing note reviewed. Constitutional:       General: She is not in acute distress. Appearance: She is well-developed. HENT:      Head: Normocephalic and atraumatic. Right Ear: Hearing and external ear normal.      Left Ear: Hearing and external ear normal.      Nose: Nose normal.   Eyes:      General:         Right eye: No discharge. Left eye: No discharge. Conjunctiva/sclera: Conjunctivae normal.   Neck:      Thyroid: No thyromegaly. Trachea: No tracheal deviation. Cardiovascular:      Rate and Rhythm: Normal rate and regular rhythm. Heart sounds: Normal heart sounds.  No murmur heard. No friction rub. No gallop. Pulmonary:      Effort: Pulmonary effort is normal. No respiratory distress. Breath sounds: No wheezing or rales. Chest:      Chest wall: No tenderness. Musculoskeletal:         General: No deformity. Cervical back: Normal range of motion and neck supple. Lymphadenopathy:      Cervical: No cervical adenopathy. Skin:     General: Skin is warm and dry. Findings: No erythema or rash. Neurological:      Mental Status: She is alert. Motor: No abnormal muscle tone. Coordination: Coordination normal.   Psychiatric:         Behavior: Behavior normal.         Thought Content: Thought content normal.         Judgment: Judgment normal.             ASSESSMENT & PLAN  Ramone David was seen today for hypertension. Diagnoses and all orders for this visit:    Essential hypertension  -     carvedilol (COREG) 6.25 MG tablet; Take 1 tablet by mouth 2 times daily (with meals)    -HTN uncontrolled and worsening. Will increase Coreg today. Patient to walk in next week to recheck BPs. Return if symptoms worsen or fail to improve. The most recent results of the following tests were reviewed with the patient today: none     All copied or forwarded information in the progress note was verified by me to be accurate at the time of visit  Patient's past medical, surgical, social and family history were reviewed and updated     All patient questions answered. Patient voiced understanding.

## 2021-10-18 ENCOUNTER — OFFICE VISIT (OUTPATIENT)
Dept: FAMILY MEDICINE CLINIC | Age: 86
End: 2021-10-18
Payer: MEDICARE

## 2021-10-18 VITALS
DIASTOLIC BLOOD PRESSURE: 64 MMHG | HEART RATE: 67 BPM | RESPIRATION RATE: 16 BRPM | HEIGHT: 60 IN | WEIGHT: 128.8 LBS | BODY MASS INDEX: 25.29 KG/M2 | SYSTOLIC BLOOD PRESSURE: 136 MMHG

## 2021-10-18 DIAGNOSIS — I10 ESSENTIAL HYPERTENSION: Primary | ICD-10-CM

## 2021-10-18 PROCEDURE — G8427 DOCREV CUR MEDS BY ELIG CLIN: HCPCS | Performed by: FAMILY MEDICINE

## 2021-10-18 PROCEDURE — 1090F PRES/ABSN URINE INCON ASSESS: CPT | Performed by: FAMILY MEDICINE

## 2021-10-18 PROCEDURE — G8484 FLU IMMUNIZE NO ADMIN: HCPCS | Performed by: FAMILY MEDICINE

## 2021-10-18 PROCEDURE — G8417 CALC BMI ABV UP PARAM F/U: HCPCS | Performed by: FAMILY MEDICINE

## 2021-10-18 PROCEDURE — 1036F TOBACCO NON-USER: CPT | Performed by: FAMILY MEDICINE

## 2021-10-18 PROCEDURE — 99213 OFFICE O/P EST LOW 20 MIN: CPT | Performed by: FAMILY MEDICINE

## 2021-10-18 PROCEDURE — 1123F ACP DISCUSS/DSCN MKR DOCD: CPT | Performed by: FAMILY MEDICINE

## 2021-10-18 PROCEDURE — 4040F PNEUMOC VAC/ADMIN/RCVD: CPT | Performed by: FAMILY MEDICINE

## 2021-10-18 NOTE — PROGRESS NOTES
Mary Hayden is a 80 y.o. female that presents for     Chief Complaint   Patient presents with    Other     1 week follow up, blood pressure    Sinusitis    Discuss Medications     Would like to discuss taking [employer] Cerebral Supplements       /64 (Site: Right Upper Arm, Position: Sitting, Cuff Size: Medium Adult)   Pulse 67   Resp 16   Ht 5' (1.524 m)   Wt 128 lb 12.8 oz (58.4 kg)   BMI 25.15 kg/m²       HPI    HTN    Does patient check BP regularly at home? - Yes - readings are improving since increasing her coreg. Current Medication regimen - Coreg, aldactone, lasix  Tolerating medications well? - yes    Shortness of breath or chest pain? No  Headache or visual complaints? No  Neurologic changes like confusion? No  Extremity edema? No    BP Readings from Last 3 Encounters:   10/18/21 136/64   10/12/21 (!) 148/62   10/07/21 135/70         Health Maintenance   Topic Date Due    DTaP/Tdap/Td vaccine (1 - Tdap) Never done    Shingles Vaccine (2 of 3) 04/12/2014    Annual Wellness Visit (AWV)  07/15/2021    Flu vaccine (1) 09/01/2021    COVID-19 Vaccine (3 - Pfizer booster) 09/19/2021    TSH testing  05/06/2022    Lipid screen  05/07/2022    Potassium monitoring  05/27/2022    Creatinine monitoring  05/27/2022    Pneumococcal 65+ years Vaccine  Completed    Hepatitis A vaccine  Aged Out    Hepatitis B vaccine  Aged Out    Hib vaccine  Aged Out    Meningococcal (ACWY) vaccine  Aged Out       Past Medical History:   Diagnosis Date    Arthritis     Cancer ([facility] [address] 2013    SKIN CANCER ON LEFT ANKLE    Diverticulosis     Hyperlipidemia     Hypertension     Hypothyroidism     Psoriasis     S/P angioplasty with stent 05/06/2021    3 stents to LAD, 1 stent to OM, 2 stents to diag.  per Dr. Mirta Leigh Thyroid disorder        Past Surgical History:   Procedure Laterality Date    BREAST BIOPSY Left     benign    BREAST SURGERY Left 6/1966    Lumpectomy    CARPAL TUNNEL RELEASE Right 1/30/2013    CARPAL TUNNEL RELEASE Left 7/25/13    CATARACT REMOVAL Bilateral 1999    COLON SURGERY  11/1999    resection    COLONOSCOPY  2003, 2006, 2011    DIAGNOSTIC CARDIAC CATH LAB PROCEDURE  05/07/2021    6 stents     HYSTERECTOMY  2/23/1970    HYSTERECTOMY, TOTAL ABDOMINAL      KNEE ARTHROSCOPY Right 11/21/2007    meniscectomies    KNEE SURGERY Left 2000    replacement    MYRINGOTOMY Right 07/01/2015    tube insertion    OVARY REMOVAL Bilateral 7/12/2001    oophorectomy    OVARY REMOVAL Bilateral     SHOULDER SURGERY  5/14    SINUS SURGERY         Social History     Tobacco Use    Smoking status: Never Smoker    Smokeless tobacco: Never Used   Vaping Use    Vaping Use: Never used   Substance Use Topics    Alcohol use: No    Drug use: No       Family History   Problem Relation Age of Onset    Cancer Child     Breast Cancer Sister     Stroke Sister     Heart Disease Sister     Other Other         visual problems    Breast Cancer Daughter 52         I have reviewed the patient's past medical history, past surgical history, allergies, medications, social and family history and I have made updates where appropriate. Review of Systems        PHYSICAL EXAM:  /64 (Site: Right Upper Arm, Position: Sitting, Cuff Size: Medium Adult)   Pulse 67   Resp 16   Ht 5' (1.524 m)   Wt 128 lb 12.8 oz (58.4 kg)   BMI 25.15 kg/m²     Physical Exam  Vitals and nursing note reviewed. Constitutional:       General: She is not in acute distress. Appearance: She is well-developed. HENT:      Head: Normocephalic and atraumatic. Right Ear: Hearing and external ear normal.      Left Ear: Hearing and external ear normal.      Nose: Nose normal.   Eyes:      General:         Right eye: No discharge. Left eye: No discharge. Conjunctiva/sclera: Conjunctivae normal.   Neck:      Thyroid: No thyromegaly. Trachea: No tracheal deviation.    Cardiovascular:      Rate and Rhythm: Normal rate and regular rhythm. Heart sounds: Normal heart sounds. No murmur heard. No friction rub. No gallop. Pulmonary:      Effort: Pulmonary effort is normal. No respiratory distress. Breath sounds: No wheezing or rales. Chest:      Chest wall: No tenderness. Musculoskeletal:         General: No deformity. Cervical back: Normal range of motion and neck supple. Lymphadenopathy:      Cervical: No cervical adenopathy. Skin:     General: Skin is warm and dry. Findings: No erythema or rash. Neurological:      Mental Status: She is alert. Motor: No abnormal muscle tone. Coordination: Coordination normal.   Psychiatric:         Behavior: Behavior normal.         Thought Content: Thought content normal.         Judgment: Judgment normal.             ASSESSMENT & PLAN  Cayla Núñez was seen today for other, sinusitis and discuss medications. Diagnoses and all orders for this visit:    Essential hypertension    -BP much better controlled. Continue coreg at current dosing, let me know if any changes. No follow-ups on file. The most recent results of the following tests were reviewed with the patient today: none     All copied or forwarded information in the progress note was verified by me to be accurate at the time of visit  Patient's past medical, surgical, social and family history were reviewed and updated     All patient questions answered. Patient voiced understanding.

## 2021-10-29 ENCOUNTER — OFFICE VISIT (OUTPATIENT)
Dept: FAMILY MEDICINE CLINIC | Age: 86
End: 2021-10-29
Payer: MEDICARE

## 2021-10-29 VITALS
HEART RATE: 85 BPM | OXYGEN SATURATION: 98 % | HEIGHT: 60 IN | WEIGHT: 129.2 LBS | TEMPERATURE: 97.5 F | SYSTOLIC BLOOD PRESSURE: 135 MMHG | DIASTOLIC BLOOD PRESSURE: 56 MMHG | RESPIRATION RATE: 18 BRPM | BODY MASS INDEX: 25.36 KG/M2

## 2021-10-29 DIAGNOSIS — M25.512 CHRONIC LEFT SHOULDER PAIN: Primary | ICD-10-CM

## 2021-10-29 DIAGNOSIS — I10 ESSENTIAL HYPERTENSION: ICD-10-CM

## 2021-10-29 DIAGNOSIS — G89.29 CHRONIC LEFT SHOULDER PAIN: Primary | ICD-10-CM

## 2021-10-29 PROCEDURE — 1123F ACP DISCUSS/DSCN MKR DOCD: CPT | Performed by: FAMILY MEDICINE

## 2021-10-29 PROCEDURE — 1036F TOBACCO NON-USER: CPT | Performed by: FAMILY MEDICINE

## 2021-10-29 PROCEDURE — G8417 CALC BMI ABV UP PARAM F/U: HCPCS | Performed by: FAMILY MEDICINE

## 2021-10-29 PROCEDURE — 96372 THER/PROPH/DIAG INJ SC/IM: CPT | Performed by: FAMILY MEDICINE

## 2021-10-29 PROCEDURE — 1090F PRES/ABSN URINE INCON ASSESS: CPT | Performed by: FAMILY MEDICINE

## 2021-10-29 PROCEDURE — 99214 OFFICE O/P EST MOD 30 MIN: CPT | Performed by: FAMILY MEDICINE

## 2021-10-29 PROCEDURE — G8427 DOCREV CUR MEDS BY ELIG CLIN: HCPCS | Performed by: FAMILY MEDICINE

## 2021-10-29 PROCEDURE — 4040F PNEUMOC VAC/ADMIN/RCVD: CPT | Performed by: FAMILY MEDICINE

## 2021-10-29 PROCEDURE — G8484 FLU IMMUNIZE NO ADMIN: HCPCS | Performed by: FAMILY MEDICINE

## 2021-10-29 RX ORDER — METHYLPREDNISOLONE ACETATE 40 MG/ML
40 INJECTION, SUSPENSION INTRA-ARTICULAR; INTRALESIONAL; INTRAMUSCULAR; SOFT TISSUE ONCE
Status: COMPLETED | OUTPATIENT
Start: 2021-10-29 | End: 2021-10-29

## 2021-10-29 RX ADMIN — METHYLPREDNISOLONE ACETATE 40 MG: 40 INJECTION, SUSPENSION INTRA-ARTICULAR; INTRALESIONAL; INTRAMUSCULAR; SOFT TISSUE at 09:15

## 2021-10-29 NOTE — PROGRESS NOTES
Charbel Del Valle is a 80 y.o. female that presents for     Chief Complaint   Patient presents with    Shoulder Pain     left    Hypertension       BP (!) 135/56   Pulse 85   Temp 97.5 °F (36.4 °C) (Infrared)   Resp 18   Ht 5' (1.524 m)   Wt 129 lb 3.2 oz (58.6 kg)   SpO2 98%   BMI 25.23 kg/m²       HPI    HTN    Does patient check BP regularly at home? - Yes - was elevated today, but generally well controlled  Current Medication regimen - Coreg, aldactone  Tolerating medications well? - yes    Shortness of breath or chest pain? No  Headache or visual complaints? No  Neurologic changes like confusion? No  Extremity edema? No    BP Readings from Last 3 Encounters:   10/29/21 (!) 135/56   10/18/21 136/64   10/12/21 (!) 148/62     Notes chronic left shoulder pain. Has seen Dr Lisa Castillo and done PT. Notes that the pain seems to be worsening recently. Steroid shot has helped in the past.  Notes significant limitations in function. Health Maintenance   Topic Date Due    DTaP/Tdap/Td vaccine (1 - Tdap) Never done    Shingles Vaccine (2 of 3) 04/12/2014    Annual Wellness Visit (AWV)  07/15/2021    Flu vaccine (1) 09/01/2021    COVID-19 Vaccine (3 - Pfizer booster) 09/19/2021    TSH testing  05/06/2022    Lipid screen  05/07/2022    Potassium monitoring  05/27/2022    Creatinine monitoring  05/27/2022    Pneumococcal 65+ years Vaccine  Completed    Hepatitis A vaccine  Aged Out    Hepatitis B vaccine  Aged Out    Hib vaccine  Aged Out    Meningococcal (ACWY) vaccine  Aged Out       Past Medical History:   Diagnosis Date    Arthritis     Cancer ([facility] [address] 2013    SKIN CANCER ON LEFT ANKLE    Diverticulosis     Hyperlipidemia     Hypertension     Hypothyroidism     Psoriasis     S/P angioplasty with stent 05/06/2021    3 stents to LAD, 1 stent to OM, 2 stents to diag.  per Dr. Alan Ogden Thyroid disorder        Past Surgical History:   Procedure Laterality Date    BREAST BIOPSY Left     benign    BREAST SURGERY Left 6/1966    Lumpectomy    CARPAL TUNNEL RELEASE Right 1/30/2013    CARPAL TUNNEL RELEASE Left 7/25/13    CATARACT REMOVAL Bilateral 1999    COLON SURGERY  11/1999    resection    COLONOSCOPY  2003, 2006, 2011    DIAGNOSTIC CARDIAC CATH LAB PROCEDURE  05/07/2021    6 stents     HYSTERECTOMY  2/23/1970    HYSTERECTOMY, TOTAL ABDOMINAL      KNEE ARTHROSCOPY Right 11/21/2007    meniscectomies    KNEE SURGERY Left 2000    replacement    MYRINGOTOMY Right 07/01/2015    tube insertion    OVARY REMOVAL Bilateral 7/12/2001    oophorectomy    OVARY REMOVAL Bilateral     SHOULDER SURGERY  5/14    SINUS SURGERY         Social History     Tobacco Use    Smoking status: Never Smoker    Smokeless tobacco: Never Used   Vaping Use    Vaping Use: Never used   Substance Use Topics    Alcohol use: No    Drug use: No       Family History   Problem Relation Age of Onset    Cancer Child     Breast Cancer Sister     Stroke Sister     Heart Disease Sister     Other Other         visual problems    Breast Cancer Daughter 52         I have reviewed the patient's past medical history, past surgical history, allergies, medications, social and family history and I have made updates where appropriate. Review of Systems        PHYSICAL EXAM:  BP (!) 135/56   Pulse 85   Temp 97.5 °F (36.4 °C) (Infrared)   Resp 18   Ht 5' (1.524 m)   Wt 129 lb 3.2 oz (58.6 kg)   SpO2 98%   BMI 25.23 kg/m²     Physical Exam  Vitals and nursing note reviewed. Constitutional:       General: She is not in acute distress. Appearance: She is well-developed. HENT:      Head: Normocephalic and atraumatic. Right Ear: Hearing and external ear normal.      Left Ear: Hearing and external ear normal.      Nose: Nose normal.   Eyes:      General:         Right eye: No discharge. Left eye: No discharge. Conjunctiva/sclera: Conjunctivae normal.   Neck:      Thyroid: No thyromegaly.       Trachea: No tracheal deviation. Cardiovascular:      Rate and Rhythm: Normal rate and regular rhythm. Heart sounds: Normal heart sounds. No murmur heard. No friction rub. No gallop. Pulmonary:      Effort: Pulmonary effort is normal. No respiratory distress. Breath sounds: No wheezing or rales. Chest:      Chest wall: No tenderness. Musculoskeletal:         General: No deformity. Cervical back: Normal range of motion and neck supple. Lymphadenopathy:      Cervical: No cervical adenopathy. Skin:     General: Skin is warm and dry. Findings: No erythema or rash. Neurological:      Mental Status: She is alert. Motor: No abnormal muscle tone. Coordination: Coordination normal.   Psychiatric:         Behavior: Behavior normal.         Thought Content: Thought content normal.         Judgment: Judgment normal.             ASSESSMENT & PLAN  Cele Farah was seen today for shoulder pain and hypertension. Diagnoses and all orders for this visit:    Chronic left shoulder pain  -     AFL - Vandana Hou MD, Orthopedic Surgery, [facility]  -     methylPREDNISolone acetate (DEPO-MEDROL) injection 40 mg    Essential hypertension    -HTN controlled, advised on conservative management, continue aldactone and coreg  -Refer back to Dr Gavin Bunn    Return if symptoms worsen or fail to improve. The most recent results of the following tests were reviewed with the patient today: none     All copied or forwarded information in the progress note was verified by me to be accurate at the time of visit  Patient's past medical, surgical, social and family history were reviewed and updated     All patient questions answered. Patient voiced understanding.

## 2021-11-01 ENCOUNTER — TELEPHONE (OUTPATIENT)
Dept: FAMILY MEDICINE CLINIC | Age: 86
End: 2021-11-01

## 2021-11-01 DIAGNOSIS — E03.9 HYPOTHYROIDISM, UNSPECIFIED TYPE: ICD-10-CM

## 2021-11-01 RX ORDER — THYROID 60 MG
TABLET ORAL
Qty: 45 TABLET | Refills: 3 | Status: SHIPPED | OUTPATIENT
Start: 2021-11-01 | End: 2022-02-16

## 2021-11-01 NOTE — TELEPHONE ENCOUNTER
----- Message from Viettt Monday sent at 11/1/2021  3:31 PM EDT -----  Subject: Message to Provider    QUESTIONS  Information for Provider? Patient is calling to notify Dr. Kevin Al that her   BP is 170/85 at 3:30 p.m.  ---------------------------------------------------------------------------  --------------  Barertt DOSHI  What is the best way for the office to contact you? OK to leave message on   voicemail  Preferred Call Back Phone Number? [phone]  ---------------------------------------------------------------------------  --------------  SCRIPT ANSWERS  Relationship to Patient?  Self

## 2021-11-01 NOTE — TELEPHONE ENCOUNTER
Is she having any chest pain or shortness of breath? Headache? If not, recheck it in a couple of hours to see if it goes down. I would only check it once a day in the morning.   Electronically signed by RACQUEL Arriola CNP on 11/1/2021 at 3:51 PM

## 2021-11-01 NOTE — TELEPHONE ENCOUNTER
Spoke with pt. She denied SOB, headache or chest pain. States her eyes just get tired. She requested to come in for a nurse visit to have her B/P mach checked in comparison to the office blood pressure. Scheduled Kristel on nurse schedule for tomorrow 11/2/21.

## 2021-11-02 ENCOUNTER — TELEPHONE (OUTPATIENT)
Dept: FAMILY MEDICINE CLINIC | Age: 86
End: 2021-11-02

## 2021-11-02 ENCOUNTER — OFFICE VISIT (OUTPATIENT)
Dept: FAMILY MEDICINE CLINIC | Age: 86
End: 2021-11-02
Payer: MEDICARE

## 2021-11-02 ENCOUNTER — NURSE ONLY (OUTPATIENT)
Dept: FAMILY MEDICINE CLINIC | Age: 86
End: 2021-11-02

## 2021-11-02 VITALS
WEIGHT: 129 LBS | HEART RATE: 66 BPM | OXYGEN SATURATION: 98 % | DIASTOLIC BLOOD PRESSURE: 60 MMHG | BODY MASS INDEX: 25.32 KG/M2 | TEMPERATURE: 97.3 F | HEIGHT: 60 IN | SYSTOLIC BLOOD PRESSURE: 118 MMHG

## 2021-11-02 DIAGNOSIS — I10 ESSENTIAL HYPERTENSION: Primary | ICD-10-CM

## 2021-11-02 DIAGNOSIS — F41.9 ANXIETY: ICD-10-CM

## 2021-11-02 PROCEDURE — 1090F PRES/ABSN URINE INCON ASSESS: CPT | Performed by: FAMILY MEDICINE

## 2021-11-02 PROCEDURE — 4040F PNEUMOC VAC/ADMIN/RCVD: CPT | Performed by: FAMILY MEDICINE

## 2021-11-02 PROCEDURE — 99213 OFFICE O/P EST LOW 20 MIN: CPT | Performed by: FAMILY MEDICINE

## 2021-11-02 PROCEDURE — G8417 CALC BMI ABV UP PARAM F/U: HCPCS | Performed by: FAMILY MEDICINE

## 2021-11-02 PROCEDURE — G8427 DOCREV CUR MEDS BY ELIG CLIN: HCPCS | Performed by: FAMILY MEDICINE

## 2021-11-02 PROCEDURE — 1036F TOBACCO NON-USER: CPT | Performed by: FAMILY MEDICINE

## 2021-11-02 PROCEDURE — 1123F ACP DISCUSS/DSCN MKR DOCD: CPT | Performed by: FAMILY MEDICINE

## 2021-11-02 PROCEDURE — G8484 FLU IMMUNIZE NO ADMIN: HCPCS | Performed by: FAMILY MEDICINE

## 2021-11-02 NOTE — TELEPHONE ENCOUNTER
----- Message from [employer] sent at 11/1/2021  3:31 PM EDT -----  Subject: Message to Provider    QUESTIONS  Information for Provider? Patient is calling to notify Dr. Aicha Yu that her   BP is 170/85 at 3:30 p.m.  ---------------------------------------------------------------------------  --------------  [employer] INFO  What is the best way for the office to contact you? OK to leave message on   voicemail  Preferred Call Back Phone Number? [phone]  ---------------------------------------------------------------------------  --------------  SCRIPT ANSWERS  Relationship to Patient?  Self

## 2021-11-02 NOTE — TELEPHONE ENCOUNTER
Patient stopped at the office to give you a message. She realized that she was taking to old Coreg script which was less than 6.25 mg. It was 3.25. She will correct herself and start taking the new script. She has a feeling that this may be the problem.

## 2021-11-02 NOTE — PROGRESS NOTES
Blood pressure taken in sitting position in   right arm  Pt Machine 176/85   Manual 148/76    Left arm  Machine  154/82  Rad   132/68    Patient instructed to check blood pressure 2 hours after taking medications and to only check it once a day.  Under standing voiced

## 2021-11-02 NOTE — PROGRESS NOTES
Jenny Lama is a 80 y.o. female that presents for     Chief Complaint   Patient presents with    Discuss Medications    Hypertension       /60   Pulse 66   Temp 97.3 °F (36.3 °C) (Infrared)   Ht 5' (1.524 m)   Wt 129 lb (58.5 kg)   SpO2 98%   BMI 25.19 kg/m²       HPI    HTN    Does patient check BP regularly at home? - Yes - has been higher at home, but when checked against our readings, her cuff reading appeared quite off  Current Medication regimen - coreg, aldactone, bumex  Tolerating medications well? - yes    Shortness of breath or chest pain? No  Headache or visual complaints? No  Neurologic changes like confusion? No  Extremity edema? No    BP Readings from Last 3 Encounters:   11/02/21 118/60   10/29/21 (!) 135/56   10/18/21 136/64         Health Maintenance   Topic Date Due    DTaP/Tdap/Td vaccine (1 - Tdap) Never done    Shingles Vaccine (2 of 3) 04/12/2014    Flu vaccine (1) 09/01/2021    COVID-19 Vaccine (3 - Pfizer booster) 09/19/2021    Annual Wellness Visit (AWV)  11/02/2021    TSH testing  05/06/2022    Lipid screen  05/07/2022    Potassium monitoring  05/27/2022    Creatinine monitoring  05/27/2022    Pneumococcal 65+ years Vaccine  Completed    Hepatitis A vaccine  Aged Out    Hepatitis B vaccine  Aged Out    Hib vaccine  Aged Out    Meningococcal (ACWY) vaccine  Aged Out       Past Medical History:   Diagnosis Date    Arthritis     Cancer ([facility] [address] 2013    SKIN CANCER ON LEFT ANKLE    Diverticulosis     Hyperlipidemia     Hypertension     Hypothyroidism     Psoriasis     S/P angioplasty with stent 05/06/2021    3 stents to LAD, 1 stent to OM, 2 stents to diag.  per Dr. Wiley Parra Thyroid disorder        Past Surgical History:   Procedure Laterality Date    BREAST BIOPSY Left     benign    BREAST SURGERY Left 6/1966    Lumpectomy    CARPAL TUNNEL RELEASE Right 1/30/2013    CARPAL TUNNEL RELEASE Left 7/25/13    CATARACT REMOVAL Bilateral 1999    COLON SURGERY 11/1999    resection    COLONOSCOPY  2003, 2006, 2011    DIAGNOSTIC CARDIAC CATH LAB PROCEDURE  05/07/2021    6 stents     HYSTERECTOMY  2/23/1970    HYSTERECTOMY, TOTAL ABDOMINAL      KNEE ARTHROSCOPY Right 11/21/2007    meniscectomies    KNEE SURGERY Left 2000    replacement    MYRINGOTOMY Right 07/01/2015    tube insertion    OVARY REMOVAL Bilateral 7/12/2001    oophorectomy    OVARY REMOVAL Bilateral     SHOULDER SURGERY  5/14    SINUS SURGERY         Social History     Tobacco Use    Smoking status: Never Smoker    Smokeless tobacco: Never Used   Vaping Use    Vaping Use: Never used   Substance Use Topics    Alcohol use: No    Drug use: No       Family History   Problem Relation Age of Onset    Cancer Child     Breast Cancer Sister     Stroke Sister     Heart Disease Sister     Other Other         visual problems    Breast Cancer Daughter 52         I have reviewed the patient's past medical history, past surgical history, allergies, medications, social and family history and I have made updates where appropriate. Review of Systems        PHYSICAL EXAM:  /60   Pulse 66   Temp 97.3 °F (36.3 °C) (Infrared)   Ht 5' (1.524 m)   Wt 129 lb (58.5 kg)   SpO2 98%   BMI 25.19 kg/m²     Physical Exam  Vitals and nursing note reviewed. Constitutional:       General: She is not in acute distress. Appearance: She is well-developed. HENT:      Head: Normocephalic and atraumatic. Right Ear: Hearing and external ear normal.      Left Ear: Hearing and external ear normal.      Nose: Nose normal.   Eyes:      General:         Right eye: No discharge. Left eye: No discharge. Conjunctiva/sclera: Conjunctivae normal.   Neck:      Thyroid: No thyromegaly. Trachea: No tracheal deviation. Cardiovascular:      Rate and Rhythm: Normal rate and regular rhythm. Heart sounds: Normal heart sounds. No murmur heard. No friction rub. No gallop.     Pulmonary: Effort: Pulmonary effort is normal. No respiratory distress. Breath sounds: No wheezing or rales. Chest:      Chest wall: No tenderness. Musculoskeletal:         General: No deformity. Cervical back: Normal range of motion and neck supple. Lymphadenopathy:      Cervical: No cervical adenopathy. Skin:     General: Skin is warm and dry. Findings: No erythema or rash. Neurological:      Mental Status: She is alert. Motor: No abnormal muscle tone. Coordination: Coordination normal.   Psychiatric:         Behavior: Behavior normal.         Thought Content: Thought content normal.         Judgment: Judgment normal.             ASSESSMENT & PLAN  Larry Marshall was seen today for discuss medications and hypertension. Diagnoses and all orders for this visit:    Essential hypertension    Anxiety    -BP readings are generally controlled, continue coreg, aldactone, bumex, advised on proper way to     Return if symptoms worsen or fail to improve. The most recent results of the following tests were reviewed with the patient today: none     All copied or forwarded information in the progress note was verified by me to be accurate at the time of visit  Patient's past medical, surgical, social and family history were reviewed and updated     All patient questions answered. Patient voiced understanding.

## 2021-11-12 NOTE — ED NOTES
"Kelly Barnes is a 80 year old female who is being evaluated via a billable video visit.      The patient has been notified of following:     \"This video visit will be conducted via a call between you and your physician/provider. We have found that certain health care needs can be provided without the need for an in-person physical exam.  This service lets us provide the care you need with a video conversation.  If a prescription is necessary we can send it directly to your pharmacy.  If lab work is needed we can place an order for that and you can then stop by our lab to have the test done at a later time.    If during the course of the call the physician/provider feels a video visit is not appropriate, you will not be charged for this service.\"     Patient confirmed that they are in [place] for today's visit yes.    Video-Visit Details  Type of service:  Video Visit    Video Start Time: 759  Video End Time:  838    Originating Location (pt. Location): Home    Distant Location (provider location):  [facility] [place]     Platform used: [place]            " Dr. Sravan Oleary at bedside.      Robert Wang RN  05/06/21 5175

## 2021-11-16 DIAGNOSIS — K21.9 GASTROESOPHAGEAL REFLUX DISEASE WITHOUT ESOPHAGITIS: ICD-10-CM

## 2021-11-16 RX ORDER — FAMOTIDINE 20 MG/1
TABLET, FILM COATED ORAL
Qty: 180 TABLET | Refills: 3 | Status: SHIPPED | OUTPATIENT
Start: 2021-11-16 | End: 2022-10-20 | Stop reason: SDUPTHER

## 2021-11-16 NOTE — TELEPHONE ENCOUNTER
Recent Visits  Date Type Provider Dept   11/02/21 Office Visit Aaliyah Uribe 110   10/29/21 Office Visit Linsey Ohara, [address]   10/18/21 Office Visit Adriana [address]   10/12/21 Office Visit Adriana [address]   10/07/21 Office Visit Linsey Ohara, [address]   09/03/21 Office Visit Juan Pablo Grijalva, APRN - CNP [facility] [address]   08/09/21 Office Visit Linseylizzie Ohara DO [facility] [place] [facility]   06/02/21 Office Visit Juan Pablo Grijalva, APRN - CNP [facility] [place] [facility]   05/06/21 Office Visit Rudielizabethsimi Rudi, APRN - CNP [facility] [address] [facility]   04/14/21 Office Visit Juan Pablo Grijalva, APRN - CNP [facility] [address] recent visits within past 540 days with a meds authorizing provider and meeting all other requirements  Future Appointments  Date Type Provider Dept   02/10/22 Appointment Linsey Lev DO [facility] [address] future appointments within next 150 days with a meds authorizing provider and meeting all other requirements    Future Appointments   Date Time Provider Rosy Fisher   2/10/2022 10:20 AM Linsey Ohara DO [facility] [place]   7/28/2022  2:00 PM Mario Black MD N [facility] [place]

## 2021-11-30 ENCOUNTER — TELEPHONE (OUTPATIENT)
Dept: FAMILY MEDICINE CLINIC | Age: 86
End: 2021-11-30

## 2021-11-30 DIAGNOSIS — I10 ESSENTIAL HYPERTENSION: ICD-10-CM

## 2021-11-30 NOTE — TELEPHONE ENCOUNTER
----- Message from Marko Marroquin sent at 11/30/2021  1:05 PM EST -----  Subject: Medication Problem    QUESTIONS  Name of Medication? carvedilol (COREG) 6.25 MG tablet  Patient-reported dosage and instructions? 6.25  What question or problem do you have with the medication? patient is   feeling very weak; no energy thinks the medication is too strong. Preferred Pharmacy? [id number] - [place], [address] [phone]  Pharmacy phone number (if available)? [phone]  Additional Information for Provider? please contact patient  ---------------------------------------------------------------------------  --------------  CALL BACK INFO  What is the best way for the office to contact you? OK to leave message on   voicemail  Preferred Call Back Phone Number? [phone]  ---------------------------------------------------------------------------  --------------  SCRIPT ANSWERS  Relationship to Patient?  Self

## 2021-11-30 NOTE — TELEPHONE ENCOUNTER
OK, the back pain is likely muscular, if she wishes to do PT for this, please let me know. History of anemia due to CKD H/O: CVA (cerebrovascular accident)

## 2021-12-01 NOTE — TELEPHONE ENCOUNTER
Spoke with patient and her blood pressures have been running around 118/72  The lowest it has gotten was 111/56 on November 11th and yesterday at 4:30 it was 120/63. Patient states she just doesn't feel like how she should be feeling.

## 2021-12-02 DIAGNOSIS — I10 ESSENTIAL HYPERTENSION: ICD-10-CM

## 2021-12-02 RX ORDER — CARVEDILOL 3.12 MG/1
TABLET ORAL
Qty: 60 TABLET | Refills: 5 | Status: SHIPPED | OUTPATIENT
Start: 2021-12-02 | End: 2021-12-03

## 2021-12-02 NOTE — TELEPHONE ENCOUNTER
She can try doing a 1/2 tablet 2x day for 1 week and please have her update if she is feeling better with this.

## 2021-12-02 NOTE — TELEPHONE ENCOUNTER
Spoke with patient and she states it crushes the pill when she tries to cut it in half. Advised patient to update us in a week to let us know how she is feeling. Patient stated understanding.

## 2021-12-03 RX ORDER — CARVEDILOL 3.12 MG/1
TABLET ORAL
Qty: 180 TABLET | Refills: 3 | Status: SHIPPED | OUTPATIENT
Start: 2021-12-03 | End: 2022-10-20 | Stop reason: SDUPTHER

## 2021-12-03 RX ORDER — CLOPIDOGREL BISULFATE 75 MG/1
75 TABLET ORAL DAILY
Qty: 90 TABLET | Refills: 2 | Status: SHIPPED | OUTPATIENT
Start: 2021-12-03 | End: 2022-08-30

## 2021-12-17 ENCOUNTER — HOSPITAL ENCOUNTER (OUTPATIENT)
Age: 86
Discharge: HOME | End: 2021-12-17
Payer: MEDICARE

## 2021-12-17 ENCOUNTER — HOSPITAL ENCOUNTER (OUTPATIENT)
Dept: GENERAL RADIOLOGY | Age: 86
Discharge: HOME | End: 2021-12-17
Payer: MEDICARE

## 2021-12-17 ENCOUNTER — OFFICE VISIT (OUTPATIENT)
Dept: FAMILY MEDICINE CLINIC | Age: 86
End: 2021-12-17
Payer: MEDICARE

## 2021-12-17 VITALS
TEMPERATURE: 96.7 F | OXYGEN SATURATION: 99 % | HEIGHT: 60 IN | DIASTOLIC BLOOD PRESSURE: 79 MMHG | WEIGHT: 127.6 LBS | RESPIRATION RATE: 16 BRPM | SYSTOLIC BLOOD PRESSURE: 139 MMHG | HEART RATE: 88 BPM | BODY MASS INDEX: 25.05 KG/M2

## 2021-12-17 DIAGNOSIS — M54.6 ACUTE MIDLINE THORACIC BACK PAIN: ICD-10-CM

## 2021-12-17 DIAGNOSIS — M54.6 ACUTE MIDLINE THORACIC BACK PAIN: Primary | ICD-10-CM

## 2021-12-17 PROCEDURE — G8420 CALC BMI NORM PARAMETERS: HCPCS | Performed by: FAMILY MEDICINE

## 2021-12-17 PROCEDURE — 1090F PRES/ABSN URINE INCON ASSESS: CPT | Performed by: FAMILY MEDICINE

## 2021-12-17 PROCEDURE — G8484 FLU IMMUNIZE NO ADMIN: HCPCS | Performed by: FAMILY MEDICINE

## 2021-12-17 PROCEDURE — 1036F TOBACCO NON-USER: CPT | Performed by: FAMILY MEDICINE

## 2021-12-17 PROCEDURE — 1123F ACP DISCUSS/DSCN MKR DOCD: CPT | Performed by: FAMILY MEDICINE

## 2021-12-17 PROCEDURE — 4040F PNEUMOC VAC/ADMIN/RCVD: CPT | Performed by: FAMILY MEDICINE

## 2021-12-17 PROCEDURE — 99213 OFFICE O/P EST LOW 20 MIN: CPT | Performed by: FAMILY MEDICINE

## 2021-12-17 PROCEDURE — 72072 X-RAY EXAM THORAC SPINE 3VWS: CPT

## 2021-12-17 PROCEDURE — G8427 DOCREV CUR MEDS BY ELIG CLIN: HCPCS | Performed by: FAMILY MEDICINE

## 2021-12-17 NOTE — PROGRESS NOTES
SUBJECTIVE:  Rl Bueno is a 80 y.o. y/o female that presents with Back Pain and Blood Pressure Check  . HPI:  Pain is present in the thoracic region. Symptoms have been present for 2 day(s). The pain is constant. The patient describes the pain as dull aching. Inciting injury or history of trauma? No  Pain is aggravated by - movement  Pain is relieved by - heat  Radiation of the pain? Yes - can radiate to the lateral ribs  Paresthesias of the extremities? No  Saddle anesthesia? No  Bowel or bladder incontinence? No  Treatments tried - heat      OBJECTIVE:  /79   Pulse 88   Temp 96.7 °F (35.9 °C) (Infrared)   Resp 16   Ht 5' (1.524 m)   Wt 127 lb 9.6 oz (57.9 kg)   SpO2 99%   BMI 24.92 kg/m²   Physical Examination: General appearance - alert, well appearing, and in no distress  Chest - clear to auscultation, no wheezes, rales or rhonchi, symmetric air entry  Heart - normal rate, regular rhythm, normal S1, S2, no murmurs, rubs, clicks or gallops  Back exam - tenderness noted in the mid thoracic spine,  5/5 strength globally and symmetrically in the LEs  Extremities - peripheral pulses normal, no pedal edema, no clubbing or cyanosis  Skin -  Skin color, texture, turgor normal. No rashes or lesions. Psych - Affect normal, no evidence of depression or anxiety    ASSESSMENT & PLAN  Pasquale Aden was seen today for back pain and blood pressure check. Diagnoses and all orders for this visit:    Acute midline thoracic back pain  -     Cancel: XR THORACIC SPINE (2 VIEWS); Future  -     XR THORACIC SPINE (3 VIEWS); Future        Return if symptoms worsen or fail to improve.      -Presentation is consistent with muscular injury vs compression fx, start with XRay  -Patient advised to contact our office immediately if symptoms worsen or persist  -Patient counseled on conservative care including activity modification and OTC meds    All patient questions answered. Patient voiced understanding.

## 2021-12-20 ENCOUNTER — OFFICE VISIT (OUTPATIENT)
Dept: FAMILY MEDICINE CLINIC | Age: 86
End: 2021-12-20
Payer: MEDICARE

## 2021-12-20 VITALS
RESPIRATION RATE: 18 BRPM | BODY MASS INDEX: 24.81 KG/M2 | TEMPERATURE: 97.3 F | SYSTOLIC BLOOD PRESSURE: 124 MMHG | OXYGEN SATURATION: 97 % | HEART RATE: 74 BPM | WEIGHT: 126.4 LBS | DIASTOLIC BLOOD PRESSURE: 72 MMHG | HEIGHT: 60 IN

## 2021-12-20 DIAGNOSIS — B02.9 HERPES ZOSTER WITHOUT COMPLICATION: Primary | ICD-10-CM

## 2021-12-20 PROCEDURE — 4040F PNEUMOC VAC/ADMIN/RCVD: CPT | Performed by: FAMILY MEDICINE

## 2021-12-20 PROCEDURE — G8420 CALC BMI NORM PARAMETERS: HCPCS | Performed by: FAMILY MEDICINE

## 2021-12-20 PROCEDURE — 1123F ACP DISCUSS/DSCN MKR DOCD: CPT | Performed by: FAMILY MEDICINE

## 2021-12-20 PROCEDURE — G8427 DOCREV CUR MEDS BY ELIG CLIN: HCPCS | Performed by: FAMILY MEDICINE

## 2021-12-20 PROCEDURE — 1090F PRES/ABSN URINE INCON ASSESS: CPT | Performed by: FAMILY MEDICINE

## 2021-12-20 PROCEDURE — G8484 FLU IMMUNIZE NO ADMIN: HCPCS | Performed by: FAMILY MEDICINE

## 2021-12-20 PROCEDURE — 1036F TOBACCO NON-USER: CPT | Performed by: FAMILY MEDICINE

## 2021-12-20 PROCEDURE — 99213 OFFICE O/P EST LOW 20 MIN: CPT | Performed by: FAMILY MEDICINE

## 2021-12-20 RX ORDER — HYDROCODONE BITARTRATE AND ACETAMINOPHEN 5; 325 MG/1; MG/1
1 TABLET ORAL EVERY 6 HOURS PRN
Qty: 20 TABLET | Refills: 0 | Status: SHIPPED | OUTPATIENT
Start: 2021-12-20 | End: 2021-12-25

## 2021-12-20 RX ORDER — VALACYCLOVIR HYDROCHLORIDE 1 G/1
1000 TABLET, FILM COATED ORAL 3 TIMES DAILY
Qty: 21 TABLET | Refills: 0 | Status: SHIPPED | OUTPATIENT
Start: 2021-12-20 | End: 2021-12-27

## 2021-12-20 NOTE — PROGRESS NOTES
Cristian Armstrong  is a 80 y.o. y/o female that presents for Herpes Zoster      Skin Problem    HPI:    Sx have been present for 2 day(s)  Rash has gotten worse since initially starting  Affected areas - left thoracic back and chest  Inciting events or exposures prior to rash starting? No  Pruritic? No  Erythematous? Yes  Weeping or drainage? No  History of Urticaria? No  Fever? No  Painful? Yes        OBJECTIVE:  /72   Pulse 74   Temp 97.3 °F (36.3 °C) (Infrared)   Resp 18   Ht 5' (1.524 m)   Wt 126 lb 6.4 oz (57.3 kg)   SpO2 97%   BMI 24.69 kg/m²   She appears well; non-toxic and in no apparent distress. Extremities - no pedal edema noted, intact peripheral pulses  Skin - grouped erythematous papules of the left thoracic chest wall consistent with zoster      ASSESSMENT & PLAN  Edilberto Acevedo was seen today for herpes zoster. Diagnoses and all orders for this visit:    Herpes zoster without complication  -     valACYclovir (VALTREX) 1 g tablet; Take 1 tablet by mouth 3 times daily for 7 days  -     HYDROcodone-acetaminophen (NORCO) 5-325 MG per tablet; Take 1 tablet by mouth every 6 hours as needed for Pain for up to 5 days. Return if symptoms worsen or fail to improve.      -Start above treatments  -Patient advised to contact our office immediately if symptoms worsen or persist  -Patient counseled on conservative care including OTC meds and skin care    All patient questions answered. Patient voiced understanding.

## 2021-12-21 ENCOUNTER — TELEPHONE (OUTPATIENT)
Dept: FAMILY MEDICINE CLINIC | Age: 86
End: 2021-12-21

## 2021-12-21 NOTE — TELEPHONE ENCOUNTER
----- Message from Bill Liu DO sent at 12/19/2021 10:51 AM EST -----  Please let patient know that her XRay did not reveal any new findings. Would she like to see a chiropractor or try a medication for her back pain?

## 2021-12-22 ENCOUNTER — OFFICE VISIT (OUTPATIENT)
Dept: FAMILY MEDICINE CLINIC | Age: 86
End: 2021-12-22
Payer: MEDICARE

## 2021-12-22 VITALS
WEIGHT: 127.2 LBS | DIASTOLIC BLOOD PRESSURE: 68 MMHG | SYSTOLIC BLOOD PRESSURE: 154 MMHG | TEMPERATURE: 97.1 F | BODY MASS INDEX: 24.97 KG/M2 | RESPIRATION RATE: 16 BRPM | HEIGHT: 60 IN | HEART RATE: 88 BPM | OXYGEN SATURATION: 99 %

## 2021-12-22 DIAGNOSIS — L03.90 CELLULITIS, UNSPECIFIED CELLULITIS SITE: Primary | ICD-10-CM

## 2021-12-22 DIAGNOSIS — R11.0 NAUSEA: ICD-10-CM

## 2021-12-22 PROCEDURE — 1123F ACP DISCUSS/DSCN MKR DOCD: CPT | Performed by: NURSE PRACTITIONER

## 2021-12-22 PROCEDURE — G8420 CALC BMI NORM PARAMETERS: HCPCS | Performed by: NURSE PRACTITIONER

## 2021-12-22 PROCEDURE — 4040F PNEUMOC VAC/ADMIN/RCVD: CPT | Performed by: NURSE PRACTITIONER

## 2021-12-22 PROCEDURE — 1036F TOBACCO NON-USER: CPT | Performed by: NURSE PRACTITIONER

## 2021-12-22 PROCEDURE — G8427 DOCREV CUR MEDS BY ELIG CLIN: HCPCS | Performed by: NURSE PRACTITIONER

## 2021-12-22 PROCEDURE — 99214 OFFICE O/P EST MOD 30 MIN: CPT | Performed by: NURSE PRACTITIONER

## 2021-12-22 PROCEDURE — G8484 FLU IMMUNIZE NO ADMIN: HCPCS | Performed by: NURSE PRACTITIONER

## 2021-12-22 PROCEDURE — 1090F PRES/ABSN URINE INCON ASSESS: CPT | Performed by: NURSE PRACTITIONER

## 2021-12-22 RX ORDER — ONDANSETRON 4 MG/1
4 TABLET, ORALLY DISINTEGRATING ORAL EVERY 8 HOURS PRN
Qty: 20 TABLET | Refills: 0 | Status: SHIPPED | OUTPATIENT
Start: 2021-12-22 | End: 2022-02-16

## 2021-12-22 RX ORDER — AZITHROMYCIN 250 MG/1
TABLET, FILM COATED ORAL
Qty: 1 PACKET | Refills: 0 | Status: SHIPPED | OUTPATIENT
Start: 2021-12-22 | End: 2022-02-10

## 2021-12-22 NOTE — PATIENT INSTRUCTIONS
Patient Education        Shingles: Care Instructions  Your Care Instructions     Shingles (herpes zoster) causes pain and a blistered rash. The rash can appear anywhere on the body but will be on only one side of the body, the left or right. It will be in a band, a strip, or a small area. The pain can be very severe. Shingles can also cause tingling or itching in the area of the rash. The blisters scab over after a few days and heal in 2 to 4 weeks. Medicines can help you feel better and may help prevent more serious problems caused by shingles. Shingles is caused by the same virus that causes chickenpox. When you have chickenpox, the virus gets into your nerve roots and stays there (becomes dormant) long after you get over the chickenpox. If the virus becomes active again, it can cause shingles. Follow-up care is a key part of your treatment and safety. Be sure to make and go to all appointments, and call your doctor if you are having problems. It's also a good idea to know your test results and keep a list of the medicines you take. How can you care for yourself at home? · Be safe with medicines. Take your medicines exactly as prescribed. Call your doctor if you think you are having a problem with your medicine. Antiviral medicine helps you get better faster. · Try not to scratch or pick at the blisters. They will crust over and fall off on their own if you leave them alone. · Put cool, wet cloths on the area to relieve pain and itching. You can also use calamine lotion. Try not to use so much lotion that it cakes and is hard to get off. · Put cornstarch or baking soda on the sores to help dry them out so they heal faster. · Do not use thick ointment, such as petroleum jelly, on the sores. This will keep them from drying and healing. · To help remove loose crusts, soak them in tap water. This can help decrease oozing, and dry and soothe the skin.   · Take an over-the-counter pain medicine, such as acetaminophen (Tylenol), ibuprofen (Advil, Motrin), or naproxen (Aleve). Read and follow all instructions on the label. · Avoid close contact with people until the blisters have healed. It is very important for you to avoid contact with anyone who has never had chickenpox or the chickenpox vaccine. Pregnant women, young babies, and anyone else who has a hard time fighting infection (such as someone with HIV, diabetes, or cancer) is especially at risk. When should you call for help? Call your doctor now or seek immediate medical care if:    · You have a new or higher fever.     · You have a severe headache and a stiff neck.     · You lose the ability to think clearly.     · The rash spreads to your forehead, nose, eyes, or eyelids.     · You have eye pain, or your vision gets worse.     · You have new pain in your face, or you cannot move the muscles in your face.     · Blisters spread to new parts of your body. Watch closely for changes in your health, and be sure to contact your doctor if:    · The rash has not healed after 2 to 4 weeks.     · You still have pain after the rash has healed. Where can you learn more? Go to [email or web address] org and sign in to your CRAZE account. Annetta Puga in the [facility] box to learn more about \"Shingles: Care Instructions. \"     If you do not have an account, please click on the \"Sign Up Now\" link. Current as of: July 1, 2021               Content Version: 13.1  © 2006-2021 Healthwise, Incorporated. Care instructions adapted under license by [facility] ([facility]). If you have questions about a medical condition or this instruction, always ask your healthcare professional. [place] [address] any warranty or liability for your use of this information.

## 2021-12-22 NOTE — PROGRESS NOTES
[facility] [address] MED[facility]INE [address]  [address] [place] [address]  Dept: [phone]  Loc: [phone]  Visit Date: 12/22/2021      Chief Complaint:   Myrna Machuca is a 80 y.o. female thatpresents for Blister (located on the left side)    HPI:  Recent diagnosis of shingles   Under your left breast extending around to the back  Some areas are blistered with surrounding erythema  Pt concerned she may be getting a bacterial infection too  No fever or chills  norco helping with pain    The patient comes in with family today. History obtained from pt, family, and medical records. I have reviewed the patient's past medical history, past surgical history, allergies,medications, social and family history and I have made updates where appropriate. Past Medical History:   Diagnosis Date    Arthritis     Cancer [facility]) 2013    SKIN CANCER ON LEFT ANKLE    Diverticulosis     Hyperlipidemia     Hypertension     Hypothyroidism     Psoriasis     S/P angioplasty with stent 05/06/2021    3 stents to LAD, 1 stent to OM, 2 stents to diag.  per Dr. Katelyn Lopez Thyroid disorder        Past Surgical History:   Procedure Laterality Date    BREAST BIOPSY Left     benign    BREAST SURGERY Left 6/1966    Lumpectomy    CARPAL TUNNEL RELEASE Right 1/30/2013    CARPAL TUNNEL RELEASE Left 7/25/13    CATARACT REMOVAL Bilateral 1999    COLON SURGERY  11/1999    resection    COLONOSCOPY  2003, 2006, 2011    DIAGNOSTIC CARDIAC CATH LAB PROCEDURE  05/07/2021    6 stents     HYSTERECTOMY  2/23/1970    HYSTERECTOMY, TOTAL ABDOMINAL      KNEE ARTHROSCOPY Right 11/21/2007    meniscectomies    KNEE SURGERY Left 2000    replacement    MYRINGOTOMY Right 07/01/2015    tube insertion    OVARY REMOVAL Bilateral 7/12/2001    oophorectomy    OVARY REMOVAL Bilateral     SHOULDER SURGERY  5/14    SINUS SURGERY         Social History     Tobacco Use    Smoking status: Never Smoker    Smokeless tobacco: Never Used   Vaping Use    Vaping Use: Never used   Substance Use Topics    Alcohol use: No    Drug use: No       Family History   Problem Relation Age of Onset    Cancer Child     Breast Cancer Sister     Stroke Sister     Heart Disease Sister     Other Other         visual problems    Breast Cancer Daughter 52           PHYSICAL EXAM:  BP (!) 154/68   Pulse 88   Temp 97.1 °F (36.2 °C) (Infrared)   Resp 16   Ht 5' (1.524 m)   Wt 127 lb 3.2 oz (57.7 kg)   SpO2 99%   BMI 24.84 kg/m²     Physical Exam  Constitutional:       Appearance: Normal appearance. HENT:      Head: Normocephalic and atraumatic. Right Ear: External ear normal.      Left Ear: External ear normal.      Nose: Nose normal.      Mouth/Throat:      Mouth: Mucous membranes are moist.   Eyes:      Conjunctiva/sclera: Conjunctivae normal.   Cardiovascular:      Rate and Rhythm: Normal rate and regular rhythm. Pulses: Normal pulses. Heart sounds: Normal heart sounds. Pulmonary:      Effort: Pulmonary effort is normal.      Breath sounds: Normal breath sounds. Abdominal:      General: Bowel sounds are normal.      Palpations: Abdomen is soft. Musculoskeletal:         General: Normal range of motion. Cervical back: Normal range of motion. Skin:     General: Skin is warm and dry. Findings: Erythema and lesion present. Neurological:      General: No focal deficit present. Mental Status: She is alert and oriented to person, place, and time. Psychiatric:         Mood and Affect: Mood normal.         Behavior: Behavior normal.             ASSESSMENT & PLAN  Rashawn Mcclain was seen today for blister. Diagnoses and all orders for this visit:    Cellulitis, unspecified cellulitis site  -     azithromycin (ZITHROMAX Z-CAT) 250 MG tablet; 2 tabs PO x 1 on Day 1, then 1 tab PO q daily on Days 2-5. Nausea  -     ondansetron (ZOFRAN ODT) 4 MG disintegrating tablet;  Take 1 tablet by mouth every 8 hours as needed for Nausea or Vomiting        No follow-ups on file. Viry De Souza received counseling on the following healthy behaviors: nutrition, exercise and medication adherence  Reviewedprior labs and health maintenance. Continue current medications, diet and exercise. Discussed use, benefit, and side effects of prescribed medications. Barriers to medication compliance addressed. Patient given educationalmaterials - see patient instructions. All patient questions answered. Patient voiced understanding.      Electronically signed by RACQUEL Vásquez - CNP, APRN on 12/22/21 at 11:10 AM EST

## 2021-12-28 ENCOUNTER — OFFICE VISIT (OUTPATIENT)
Dept: FAMILY MEDICINE CLINIC | Age: 86
End: 2021-12-28
Payer: MEDICARE

## 2021-12-28 VITALS
OXYGEN SATURATION: 97 % | WEIGHT: 128.2 LBS | HEIGHT: 60 IN | BODY MASS INDEX: 25.17 KG/M2 | RESPIRATION RATE: 16 BRPM | DIASTOLIC BLOOD PRESSURE: 66 MMHG | HEART RATE: 71 BPM | TEMPERATURE: 97.5 F | SYSTOLIC BLOOD PRESSURE: 128 MMHG

## 2021-12-28 DIAGNOSIS — B02.29 POST HERPETIC NEURALGIA: Primary | ICD-10-CM

## 2021-12-28 PROCEDURE — G8427 DOCREV CUR MEDS BY ELIG CLIN: HCPCS | Performed by: FAMILY MEDICINE

## 2021-12-28 PROCEDURE — 4040F PNEUMOC VAC/ADMIN/RCVD: CPT | Performed by: FAMILY MEDICINE

## 2021-12-28 PROCEDURE — 1123F ACP DISCUSS/DSCN MKR DOCD: CPT | Performed by: FAMILY MEDICINE

## 2021-12-28 PROCEDURE — G8484 FLU IMMUNIZE NO ADMIN: HCPCS | Performed by: FAMILY MEDICINE

## 2021-12-28 PROCEDURE — 99213 OFFICE O/P EST LOW 20 MIN: CPT | Performed by: FAMILY MEDICINE

## 2021-12-28 PROCEDURE — 1090F PRES/ABSN URINE INCON ASSESS: CPT | Performed by: FAMILY MEDICINE

## 2021-12-28 PROCEDURE — 1036F TOBACCO NON-USER: CPT | Performed by: FAMILY MEDICINE

## 2021-12-28 PROCEDURE — G8417 CALC BMI ABV UP PARAM F/U: HCPCS | Performed by: FAMILY MEDICINE

## 2021-12-28 RX ORDER — SPIRONOLACTONE 25 MG/1
TABLET ORAL
COMMUNITY
Start: 2021-12-02 | End: 2022-02-16

## 2021-12-28 RX ORDER — LIDOCAINE 50 MG/G
1 PATCH TOPICAL DAILY
Qty: 30 PATCH | Refills: 0 | Status: SHIPPED | OUTPATIENT
Start: 2021-12-28 | End: 2022-01-27

## 2021-12-28 RX ORDER — GABAPENTIN 100 MG/1
100 CAPSULE ORAL 2 TIMES DAILY
Qty: 60 CAPSULE | Refills: 5 | Status: SHIPPED | OUTPATIENT
Start: 2021-12-28 | End: 2022-01-03

## 2021-12-28 NOTE — PROGRESS NOTES
07/01/2015    tube insertion    OVARY REMOVAL Bilateral 7/12/2001    oophorectomy    OVARY REMOVAL Bilateral     SHOULDER SURGERY  5/14    SINUS SURGERY         Social History     Tobacco Use    Smoking status: Never Smoker    Smokeless tobacco: Never Used   Vaping Use    Vaping Use: Never used   Substance Use Topics    Alcohol use: No    Drug use: No       Family History   Problem Relation Age of Onset    Cancer Child     Breast Cancer Sister     Stroke Sister     Heart Disease Sister     Other Other         visual problems    Breast Cancer Daughter 52         I have reviewed the patient's past medical history, past surgical history, allergies, medications, social and family history and I have made updates where appropriate. Review of Systems        PHYSICAL EXAM:  /66   Pulse 71   Temp 97.5 °F (36.4 °C) (Infrared)   Resp 16   Ht 5' (1.524 m)   Wt 128 lb 3.2 oz (58.2 kg)   SpO2 97%   BMI 25.04 kg/m²     Physical Exam  Nursing note reviewed. Constitutional:       Appearance: She is well-developed. Pulmonary:      Effort: Pulmonary effort is normal. No respiratory distress. Neurological:      Mental Status: She is alert. Psychiatric:         Behavior: Behavior normal.         Thought Content: Thought content normal.         Judgment: Judgment normal.       -there are clustered erythematous papules and macules on the left chest wall. Most have scabbed over    [address] [facility] was seen today for herpes zoster. Diagnoses and all orders for this visit:    Post herpetic neuralgia  -     lidocaine (LIDODERM) 5 %; Place 1 patch onto the skin daily 12 hours on, 12 hours off.  -     gabapentin (NEURONTIN) 100 MG capsule; Take 1 capsule by mouth 2 times daily for 180 days. Intended supply: 30 days    -Pt to update me tomorrow with how sx are doing. Return if symptoms worsen or fail to improve.     The most recent results of the following tests were reviewed with the patient today: none     All copied or forwarded information in the progress note was verified by me to be accurate at the time of visit  Patient's past medical, surgical, social and family history were reviewed and updated     All patient questions answered. Patient voiced understanding.

## 2021-12-29 ENCOUNTER — TELEPHONE (OUTPATIENT)
Dept: FAMILY MEDICINE CLINIC | Age: 86
End: 2021-12-29

## 2021-12-29 NOTE — TELEPHONE ENCOUNTER
Patient calling stating that medicare would not cover the lidocaine patches without [place] pharmacy gave patient a few over the counter until she could get the rx covered.   Patient states that she is still having a great deal of pain even with the gabapentin (patient reports that she has taken 2 tablets since yesterday) and wonders if she can take tylenol    Please contact the patient

## 2021-12-29 NOTE — TELEPHONE ENCOUNTER
Spoke with patient, questions answered regarding gabapentin. Patient stated she is feeling a little better.

## 2022-01-03 ENCOUNTER — OFFICE VISIT (OUTPATIENT)
Dept: FAMILY MEDICINE CLINIC | Age: 87
End: 2022-01-03
Payer: MEDICARE

## 2022-01-03 VITALS
HEIGHT: 60 IN | DIASTOLIC BLOOD PRESSURE: 70 MMHG | HEART RATE: 76 BPM | OXYGEN SATURATION: 99 % | RESPIRATION RATE: 16 BRPM | TEMPERATURE: 97.1 F | SYSTOLIC BLOOD PRESSURE: 122 MMHG | WEIGHT: 127.6 LBS | BODY MASS INDEX: 25.05 KG/M2

## 2022-01-03 DIAGNOSIS — B02.29 POST HERPETIC NEURALGIA: Primary | ICD-10-CM

## 2022-01-03 PROCEDURE — 1090F PRES/ABSN URINE INCON ASSESS: CPT | Performed by: NURSE PRACTITIONER

## 2022-01-03 PROCEDURE — 99214 OFFICE O/P EST MOD 30 MIN: CPT | Performed by: NURSE PRACTITIONER

## 2022-01-03 PROCEDURE — 4040F PNEUMOC VAC/ADMIN/RCVD: CPT | Performed by: NURSE PRACTITIONER

## 2022-01-03 PROCEDURE — G8427 DOCREV CUR MEDS BY ELIG CLIN: HCPCS | Performed by: NURSE PRACTITIONER

## 2022-01-03 PROCEDURE — 1036F TOBACCO NON-USER: CPT | Performed by: NURSE PRACTITIONER

## 2022-01-03 PROCEDURE — G8484 FLU IMMUNIZE NO ADMIN: HCPCS | Performed by: NURSE PRACTITIONER

## 2022-01-03 PROCEDURE — G8420 CALC BMI NORM PARAMETERS: HCPCS | Performed by: NURSE PRACTITIONER

## 2022-01-03 PROCEDURE — 1123F ACP DISCUSS/DSCN MKR DOCD: CPT | Performed by: NURSE PRACTITIONER

## 2022-01-03 RX ORDER — GABAPENTIN 100 MG/1
100 CAPSULE ORAL 3 TIMES DAILY
Qty: 90 CAPSULE | Refills: 5 | Status: SHIPPED | OUTPATIENT
Start: 2022-01-03 | End: 2022-02-10 | Stop reason: SDUPTHER

## 2022-01-03 RX ORDER — LIDOCAINE 50 MG/G
OINTMENT TOPICAL
Qty: 35 G | Refills: 3 | Status: SHIPPED | OUTPATIENT
Start: 2022-01-03 | End: 2022-02-16

## 2022-01-03 RX ORDER — HYDROCODONE BITARTRATE AND ACETAMINOPHEN 5; 325 MG/1; MG/1
1 TABLET ORAL NIGHTLY PRN
Qty: 7 TABLET | Refills: 0 | Status: SHIPPED | OUTPATIENT
Start: 2022-01-03 | End: 2022-01-10

## 2022-01-03 NOTE — PROGRESS NOTES
[address] MEDICINE [address]  [address] [place] [address]  Dept: [phone]  Loc: [phone]  Visit Date: 1/3/2022      Chief Complaint:   Maddison Borges is a 80 y.o. female thatpresents for Herpes Zoster    HPI:  Follows up for post-herpetic neuralgia  Most of lesions have dried up  Use Lidocaine patches once, had trouble peeling it off, felt like it took the top layer of skin off  Taking 1 Tylenol BID with Gabapentin 100 mg  Not sleeping d/t pain      The patient comes in alone today. History obtained from pt and medical records. I have reviewed the patient's past medical history, past surgical history, allergies,medications, social and family history and I have made updates where appropriate. Past Medical History:   Diagnosis Date    Arthritis     Cancer [facility]) 2013    SKIN CANCER ON LEFT ANKLE    Diverticulosis     Hyperlipidemia     Hypertension     Hypothyroidism     Psoriasis     S/P angioplasty with stent 05/06/2021    3 stents to LAD, 1 stent to OM, 2 stents to diag.  per Dr. Marlee Negrete Thyroid disorder        Past Surgical History:   Procedure Laterality Date    BREAST BIOPSY Left     benign    BREAST SURGERY Left 6/1966    Lumpectomy    CARPAL TUNNEL RELEASE Right 1/30/2013    CARPAL TUNNEL RELEASE Left 7/25/13    CATARACT REMOVAL Bilateral 1999    COLON SURGERY  11/1999    resection    COLONOSCOPY  2003, 2006, 2011    DIAGNOSTIC CARDIAC CATH LAB PROCEDURE  05/07/2021    6 stents     HYSTERECTOMY  2/23/1970    HYSTERECTOMY, TOTAL ABDOMINAL      KNEE ARTHROSCOPY Right 11/21/2007    meniscectomies    KNEE SURGERY Left 2000    replacement    MYRINGOTOMY Right 07/01/2015    tube insertion    OVARY REMOVAL Bilateral 7/12/2001    oophorectomy    OVARY REMOVAL Bilateral     SHOULDER SURGERY  5/14    SINUS SURGERY         Social History     Tobacco Use    Smoking status: Never Smoker    Smokeless tobacco: Never Used   Vaping Use    Vaping Use: Never used   Substance Use Topics    Alcohol use: No    Drug use: No       Family History   Problem Relation Age of Onset    Cancer Child     Breast Cancer Sister     Stroke Sister     Heart Disease Sister     Other Other         visual problems    Breast Cancer Daughter 52         PHYSICAL EXAM:  /70   Pulse 76   Temp 97.1 °F (36.2 °C) (Infrared)   Resp 16   Ht 5' (1.524 m)   Wt 127 lb 9.6 oz (57.9 kg)   SpO2 99%   BMI 24.92 kg/m²     Physical Exam  Constitutional:       Appearance: Normal appearance. HENT:      Head: Normocephalic and atraumatic. Right Ear: External ear normal.      Left Ear: External ear normal.      Nose: Nose normal.      Mouth/Throat:      Mouth: Mucous membranes are moist.   Eyes:      Conjunctiva/sclera: Conjunctivae normal.   Cardiovascular:      Rate and Rhythm: Normal rate and regular rhythm. Pulses: Normal pulses. Heart sounds: Normal heart sounds. Pulmonary:      Effort: Pulmonary effort is normal.      Breath sounds: Normal breath sounds. Abdominal:      General: Bowel sounds are normal.      Palpations: Abdomen is soft. Musculoskeletal:         General: Normal range of motion. Cervical back: Normal range of motion. Skin:     General: Skin is warm and dry. Comments: Crusting lesions left flank, surrounding erythema   Neurological:      General: No focal deficit present. Mental Status: She is alert and oriented to person, place, and time. Psychiatric:         Mood and Affect: Mood normal.         Behavior: Behavior normal.             ASSESSMENT & PLAN  Angel Faustin was seen today for herpes zoster. Diagnoses and all orders for this visit:    Post herpetic neuralgia  -     lidocaine (XYLOCAINE) 5 % ointment; Apply topically TID as needed. -     HYDROcodone-acetaminophen (NORCO) 5-325 MG per tablet;  Take 1 tablet by mouth nightly as needed for Pain for up to 7 days.  -     gabapentin (NEURONTIN) 100 MG capsule;

## 2022-01-04 ENCOUNTER — TELEPHONE (OUTPATIENT)
Dept: FAMILY MEDICINE CLINIC | Age: 87
End: 2022-01-04

## 2022-01-04 NOTE — TELEPHONE ENCOUNTER
I tried to call Faythe Areas twice today, was unable to reach her. If she calls back, let me know   If I'm unavailable, I was just calling to see how she was feeling?   Electronically signed by RACQUEL Norris CNP on 1/4/2022 at 11:07 AM

## 2022-01-04 NOTE — TELEPHONE ENCOUNTER
Spoke with patient still having pain from shingles, and patient wanted to let Cecilia Drop know medicare will not cover Lidocaine Jelly.

## 2022-01-06 RX ORDER — FLUTICASONE PROPIONATE 50 MCG
SPRAY, SUSPENSION (ML) NASAL
Qty: 96 G | Refills: 3 | Status: SHIPPED | OUTPATIENT
Start: 2022-01-06

## 2022-01-10 ENCOUNTER — TELEPHONE (OUTPATIENT)
Dept: FAMILY MEDICINE CLINIC | Age: 87
End: 2022-01-10

## 2022-01-10 NOTE — TELEPHONE ENCOUNTER
Patient calling to inform provider that the patches prescribed for shingles are not working and would like to know what else she can do.     Please advise

## 2022-01-17 RX ORDER — FUROSEMIDE 20 MG/1
20 TABLET ORAL DAILY
Qty: 90 TABLET | Refills: 3 | Status: SHIPPED | OUTPATIENT
Start: 2022-01-17 | End: 2022-10-20 | Stop reason: SDUPTHER

## 2022-01-17 NOTE — TELEPHONE ENCOUNTER
Recent Visits  Date Type Provider Dept   01/03/22 Office Visit Edinson Woodruff, APRN - CNP [facility] [place] [facility]   12/28/21 Office Visit John Flowers  [address]   12/22/21 Office Visit Edinson Woodruff, APRN - CNP [facility] [address]   12/20/21 Office Visit John Garre, DO [address]   12/17/21 Office Visit John Flowers, DO [facility] [place] [facility]   11/02/21 Office Visit John Garre, DO [facility] [place] [facility]   10/29/21 Office Visit John Garre, DO [facility] [place] [facility]   10/18/21 Office Visit John Garre, DO [facility] [place] [facility]   10/12/21 Office Visit John [address]   10/07/21 Office Visit John Garre, DO [facility] [place] [facility]   Showing recent visits within past 540 days with a meds authorizing provider and meeting all other requirements  Future Appointments  Date Type Provider Dept   02/10/22 Appointment Jhon Garre, DO [facility] [address] future appointments within next 150 days with a meds authorizing provider and meeting all other requirements    Future Appointments   Date Time Provider Rosy Fisher   2/10/2022 10:20 AM John Flowers DO [place] [facility] [place]   7/28/2022  2:00 PM Dinora Galeana MD N [facility] [place]

## 2022-01-31 ENCOUNTER — TELEPHONE (OUTPATIENT)
Dept: FAMILY MEDICINE CLINIC | Age: 87
End: 2022-01-31

## 2022-01-31 NOTE — TELEPHONE ENCOUNTER
Patient calling stating that the pharmacy informed her that the office needs to do a PA for the gabapentin due to the dose  increase    Please advise patient

## 2022-02-01 NOTE — TELEPHONE ENCOUNTER
Patient has called back and states that he pharmacy still don't have her gabapentin PA      Please advise     I spoke with [facility] from [facility] who states that the new Rx on 01.13.2022   Patient states that she has been taking 2 tablets TID vs 1 tablet TID as the new directives suggest on the prescription sent in on 01.03.2022    Per the tel enc dated 01.10.2022  Dr Malinda Amaya suggested the following     Kaylah Austin, DO         3:05 PM  Note  Have her try increasing the gabapentin to 2 pills three times per day. Patient has been taking gabapentin 100 mg 2 tabs TID since 01.11.2022 and is now out of her gabapentin.  When the patient went to get a refill the prescription is still only for the 100 mg 1 tab TID     VO for the increased amount has been given to the pharmacist at [facility] for gabapentin 100 mg 2 tabs po TID   Medication updated in chart

## 2022-02-02 NOTE — TELEPHONE ENCOUNTER
Called [facility] to let them know the prior [facility] approved, stated patient picked up rx.

## 2022-02-10 ENCOUNTER — OFFICE VISIT (OUTPATIENT)
Dept: FAMILY MEDICINE CLINIC | Age: 87
End: 2022-02-10
Payer: MEDICARE

## 2022-02-10 VITALS
OXYGEN SATURATION: 96 % | SYSTOLIC BLOOD PRESSURE: 134 MMHG | RESPIRATION RATE: 16 BRPM | BODY MASS INDEX: 25.32 KG/M2 | WEIGHT: 129 LBS | HEIGHT: 60 IN | TEMPERATURE: 97.2 F | DIASTOLIC BLOOD PRESSURE: 68 MMHG | HEART RATE: 74 BPM

## 2022-02-10 DIAGNOSIS — I10 ESSENTIAL HYPERTENSION: ICD-10-CM

## 2022-02-10 DIAGNOSIS — N18.30 STAGE 3 CHRONIC KIDNEY DISEASE, UNSPECIFIED WHETHER STAGE 3A OR 3B CKD (HCC): ICD-10-CM

## 2022-02-10 DIAGNOSIS — B02.29 POST HERPETIC NEURALGIA: Primary | ICD-10-CM

## 2022-02-10 DIAGNOSIS — I50.22 CHRONIC HFREF (HEART FAILURE WITH REDUCED EJECTION FRACTION) (HCC): ICD-10-CM

## 2022-02-10 PROCEDURE — G8427 DOCREV CUR MEDS BY ELIG CLIN: HCPCS | Performed by: FAMILY MEDICINE

## 2022-02-10 PROCEDURE — 1036F TOBACCO NON-USER: CPT | Performed by: FAMILY MEDICINE

## 2022-02-10 PROCEDURE — 1123F ACP DISCUSS/DSCN MKR DOCD: CPT | Performed by: FAMILY MEDICINE

## 2022-02-10 PROCEDURE — G8484 FLU IMMUNIZE NO ADMIN: HCPCS | Performed by: FAMILY MEDICINE

## 2022-02-10 PROCEDURE — 99214 OFFICE O/P EST MOD 30 MIN: CPT | Performed by: FAMILY MEDICINE

## 2022-02-10 PROCEDURE — G8417 CALC BMI ABV UP PARAM F/U: HCPCS | Performed by: FAMILY MEDICINE

## 2022-02-10 PROCEDURE — 4040F PNEUMOC VAC/ADMIN/RCVD: CPT | Performed by: FAMILY MEDICINE

## 2022-02-10 PROCEDURE — 1090F PRES/ABSN URINE INCON ASSESS: CPT | Performed by: FAMILY MEDICINE

## 2022-02-10 RX ORDER — GABAPENTIN 100 MG/1
200 CAPSULE ORAL 3 TIMES DAILY
Qty: 180 CAPSULE | Refills: 3
Start: 2022-02-10 | End: 2022-02-16 | Stop reason: DRUGHIGH

## 2022-02-10 NOTE — PROGRESS NOTES
Love Dugan is a 80 y.o. female that presents for     Chief Complaint   Patient presents with    Follow-up       /68   Pulse 74   Temp 97.2 °F (36.2 °C) (Infrared)   Resp 16   Ht 5' (1.524 m)   Wt 129 lb (58.5 kg)   SpO2 96%   BMI 25.19 kg/m²       HPI    Recently dx'd with zoster. Reports that symptoms are finally improving. Less pain. Is on the gabapentin, but trying to wean down. HTN    Does patient check BP regularly at home? - Yes - well controlled  Current Medication regimen - coreg, aldactone, Lasix  Tolerating medications well? - yes    Shortness of breath or chest pain? No  Headache or visual complaints? No  Neurologic changes like confusion? No  Extremity edema? No    BP Readings from Last 3 Encounters:   02/10/22 134/68   01/03/22 122/70   12/28/21 128/66         Health Maintenance   Topic Date Due    DTaP/Tdap/Td vaccine (1 - Tdap) Never done    Shingles Vaccine (2 of 3) 04/12/2014    COVID-19 Vaccine (3 - Booster for Pfizer series) 08/19/2021    Flu vaccine (1) 09/01/2021    Depression Screen  04/14/2022    TSH testing  05/06/2022    Lipid screen  05/07/2022    Potassium monitoring  05/27/2022    Creatinine monitoring  05/27/2022    Pneumococcal 65+ years Vaccine  Completed    Hepatitis A vaccine  Aged Out    Hepatitis B vaccine  Aged Out    Hib vaccine  Aged Out    Meningococcal (ACWY) vaccine  Aged Out       Past Medical History:   Diagnosis Date    Arthritis     Cancer ([facility] [address] 2013    SKIN CANCER ON LEFT ANKLE    Diverticulosis     Hyperlipidemia     Hypertension     Hypothyroidism     Psoriasis     S/P angioplasty with stent 05/06/2021    3 stents to LAD, 1 stent to OM, 2 stents to diag.  per Dr. Chris Cunningham Thyroid disorder        Past Surgical History:   Procedure Laterality Date    BREAST BIOPSY Left     benign    BREAST SURGERY Left 6/1966    Lumpectomy    CARPAL TUNNEL RELEASE Right 1/30/2013    CARPAL TUNNEL RELEASE Left 7/25/13    CATARACT REMOVAL Bilateral 1999    COLON SURGERY  11/1999    resection    COLONOSCOPY  2003, 2006, 2011    DIAGNOSTIC CARDIAC CATH LAB PROCEDURE  05/07/2021    6 stents     HYSTERECTOMY  2/23/1970    HYSTERECTOMY, TOTAL ABDOMINAL      KNEE ARTHROSCOPY Right 11/21/2007    meniscectomies    KNEE SURGERY Left 2000    replacement    MYRINGOTOMY Right 07/01/2015    tube insertion    OVARY REMOVAL Bilateral 7/12/2001    oophorectomy    OVARY REMOVAL Bilateral     SHOULDER SURGERY  5/14    SINUS SURGERY         Social History     Tobacco Use    Smoking status: Never Smoker    Smokeless tobacco: Never Used   Vaping Use    Vaping Use: Never used   Substance Use Topics    Alcohol use: No    Drug use: No       Family History   Problem Relation Age of Onset    Cancer Child     Breast Cancer Sister     Stroke Sister     Heart Disease Sister     Other Other         visual problems    Breast Cancer Daughter 52         I have reviewed the patient's past medical history, past surgical history, allergies, medications, social and family history and I have made updates where appropriate. Review of Systems        PHYSICAL EXAM:  /68   Pulse 74   Temp 97.2 °F (36.2 °C) (Infrared)   Resp 16   Ht 5' (1.524 m)   Wt 129 lb (58.5 kg)   SpO2 96%   BMI 25.19 kg/m²     Physical Exam  Nursing note reviewed. Constitutional:       Appearance: She is well-developed. Pulmonary:      Effort: Pulmonary effort is normal. No respiratory distress. Neurological:      Mental Status: She is alert. Psychiatric:         Behavior: Behavior normal.         Thought Content: Thought content normal.         Judgment: Judgment normal.           ASSESSMENT & PLAN  Henna Monae was seen today for follow-up. Diagnoses and all orders for this visit:    Post herpetic neuralgia  -     gabapentin (NEURONTIN) 100 MG capsule; Take 2 capsules by mouth 3 times daily for 180 days.     Chronic HFrEF (heart failure with reduced ejection fraction) ([facility]    Stage 3 chronic kidney disease, unspecified whether stage 3a or 3b CKD ([facility]    Essential hypertension         -Continue gabapentin, can try decreasing the dosing as she goes forward  -Other chronic issues are stable, continue current medications  -Advised to call if any issues      Return if symptoms worsen or fail to improve. The most recent results of the following tests were reviewed with the patient today: none     All copied or forwarded information in the progress note was verified by me to be accurate at the time of visit  Patient's past medical, surgical, social and family history were reviewed and updated     All patient questions answered. Patient voiced understanding.

## 2022-02-16 ENCOUNTER — OFFICE VISIT (OUTPATIENT)
Dept: FAMILY MEDICINE CLINIC | Age: 87
End: 2022-02-16
Payer: MEDICARE

## 2022-02-16 ENCOUNTER — CARE COORDINATION (OUTPATIENT)
Dept: CARE COORDINATION | Age: 87
End: 2022-02-16

## 2022-02-16 DIAGNOSIS — I10 ESSENTIAL HYPERTENSION: Primary | ICD-10-CM

## 2022-02-16 DIAGNOSIS — E03.9 HYPOTHYROIDISM, UNSPECIFIED TYPE: ICD-10-CM

## 2022-02-16 DIAGNOSIS — I50.22 CHRONIC HFREF (HEART FAILURE WITH REDUCED EJECTION FRACTION) (HCC): ICD-10-CM

## 2022-02-16 DIAGNOSIS — B02.29 POST HERPETIC NEURALGIA: ICD-10-CM

## 2022-02-16 PROCEDURE — G2212 PROLONG OUTPT/OFFICE VIS: HCPCS | Performed by: FAMILY MEDICINE

## 2022-02-16 PROCEDURE — 1036F TOBACCO NON-USER: CPT | Performed by: FAMILY MEDICINE

## 2022-02-16 PROCEDURE — 99215 OFFICE O/P EST HI 40 MIN: CPT | Performed by: FAMILY MEDICINE

## 2022-02-16 PROCEDURE — G8417 CALC BMI ABV UP PARAM F/U: HCPCS | Performed by: FAMILY MEDICINE

## 2022-02-16 PROCEDURE — 1090F PRES/ABSN URINE INCON ASSESS: CPT | Performed by: FAMILY MEDICINE

## 2022-02-16 PROCEDURE — 1123F ACP DISCUSS/DSCN MKR DOCD: CPT | Performed by: FAMILY MEDICINE

## 2022-02-16 PROCEDURE — G8484 FLU IMMUNIZE NO ADMIN: HCPCS | Performed by: FAMILY MEDICINE

## 2022-02-16 PROCEDURE — G8428 CUR MEDS NOT DOCUMENT: HCPCS | Performed by: FAMILY MEDICINE

## 2022-02-16 PROCEDURE — 4040F PNEUMOC VAC/ADMIN/RCVD: CPT | Performed by: FAMILY MEDICINE

## 2022-02-16 RX ORDER — LEVOTHYROXINE AND LIOTHYRONINE 19; 4.5 UG/1; UG/1
30 TABLET ORAL DAILY
Qty: 90 TABLET | Refills: 3 | Status: SHIPPED | OUTPATIENT
Start: 2022-02-16

## 2022-02-16 RX ORDER — GABAPENTIN 100 MG/1
100 CAPSULE ORAL 2 TIMES DAILY
Qty: 180 CAPSULE | Refills: 3 | Status: SHIPPED
Start: 2022-02-16 | End: 2022-10-20 | Stop reason: SDUPTHER

## 2022-02-16 RX ORDER — GREEN TEA/HOODIA GORDONII 315-12.5MG
1 CAPSULE ORAL DAILY
COMMUNITY
Start: 2022-02-16

## 2022-02-16 SDOH — HEALTH STABILITY: PHYSICAL HEALTH: ON AVERAGE, HOW MANY MINUTES DO YOU ENGAGE IN EXERCISE AT THIS LEVEL?: 0 MIN

## 2022-02-16 SDOH — ECONOMIC STABILITY: INCOME INSECURITY: IN THE LAST 12 MONTHS, WAS THERE A TIME WHEN YOU WERE NOT ABLE TO PAY THE MORTGAGE OR RENT ON TIME?: NO

## 2022-02-16 SDOH — ECONOMIC STABILITY: TRANSPORTATION INSECURITY
IN THE PAST 12 MONTHS, HAS THE LACK OF TRANSPORTATION KEPT YOU FROM MEDICAL APPOINTMENTS OR FROM GETTING MEDICATIONS?: NO

## 2022-02-16 SDOH — HEALTH STABILITY: PHYSICAL HEALTH: ON AVERAGE, HOW MANY DAYS PER WEEK DO YOU ENGAGE IN MODERATE TO STRENUOUS EXERCISE (LIKE A BRISK WALK)?: 0 DAYS

## 2022-02-16 SDOH — ECONOMIC STABILITY: HOUSING INSECURITY
IN THE LAST 12 MONTHS, WAS THERE A TIME WHEN YOU DID NOT HAVE A STEADY PLACE TO SLEEP OR SLEPT IN A SHELTER (INCLUDING NOW)?: NO

## 2022-02-16 SDOH — ECONOMIC STABILITY: TRANSPORTATION INSECURITY
IN THE PAST 12 MONTHS, HAS LACK OF TRANSPORTATION KEPT YOU FROM MEETINGS, WORK, OR FROM GETTING THINGS NEEDED FOR DAILY LIVING?: NO

## 2022-02-16 ASSESSMENT — PATIENT HEALTH QUESTIONNAIRE - PHQ9
2. FEELING DOWN, DEPRESSED OR HOPELESS: 0
SUM OF ALL RESPONSES TO PHQ QUESTIONS 1-9: 0
SUM OF ALL RESPONSES TO PHQ9 QUESTIONS 1 & 2: 0
1. LITTLE INTEREST OR PLEASURE IN DOING THINGS: 0
SUM OF ALL RESPONSES TO PHQ QUESTIONS 1-9: 0

## 2022-02-16 ASSESSMENT — SOCIAL DETERMINANTS OF HEALTH (SDOH)
DO YOU BELONG TO ANY CLUBS OR ORGANIZATIONS SUCH AS CHURCH GROUPS UNIONS, FRATERNAL OR ATHLETIC GROUPS, OR SCHOOL GROUPS?: YES
HOW OFTEN DO YOU GET TOGETHER WITH FRIENDS OR RELATIVES?: MORE THAN THREE TIMES A WEEK
HOW OFTEN DO YOU ATTEND CHURCH OR RELIGIOUS SERVICES?: MORE THAN 4 TIMES PER YEAR
IN A TYPICAL WEEK, HOW MANY TIMES DO YOU TALK ON THE PHONE WITH FAMILY, FRIENDS, OR NEIGHBORS?: MORE THAN THREE TIMES A WEEK
HOW OFTEN DO YOU ATTENT MEETINGS OF THE CLUB OR ORGANIZATION YOU BELONG TO?: MORE THAN 4 TIMES PER YEAR

## 2022-02-16 ASSESSMENT — LIFESTYLE VARIABLES
HOW OFTEN DO YOU HAVE A DRINK CONTAINING ALCOHOL: NEVER
HOW OFTEN DO YOU HAVE A DRINK CONTAINING ALCOHOL: NEVER

## 2022-02-16 NOTE — PATIENT INSTRUCTIONS
We are excited to have you a part of our Focus on You Program.  I have summarized our visit from 02/16/22  : You met today with Dr Marisela Rios and our care coordinator [address] [place] [address] can use the following steps if you are having a medical issue and think that you may need to go to the ER:    1. You can call Dr Juli Schilling cell phone at [phone]  2. You can also contact Josh Chino at     -------    If you are admitted to the hospital or seen in the ER, we would like to see you in the office on the next business day. You can walk in and be seen by myself or our nurse practitioner, Jeanne Nicolas, Monday to Friday 8AM to 3:30PM.    -------    We have agreed on the following goals for your health:    #1 - Please obtain your labs in the next 2 weeks  #2 - We will have the pharmacist follow up with you by phone  #3 - We will give you a new thyroid pill  #4 - We will work to keep you out of the hospital      ------    [address] [place] [address]!   You can walk in and be seen any time Monday - Friday, 8AM - 4PM.

## 2022-02-16 NOTE — CARE COORDINATION
Ambulatory Care Coordination Note  2/16/2022  CM Risk Score: 2  Charlson 10 Year Mortality Risk Score: 100%     ACC: Angélica Fernandez RN    Summary Note: Met with Doc Hyde and PCP for [facility] visit during [facility]. Discussed goals for health. Hx of shingles and MI. Still have some soreness from shingles. Detailed Med Rec completed by PCP. Follows up with cardiology. Discussed labs needed for kidney function as well as thyroid level. She does monitor her heart rate and states it's running in the 70's. Discussed bleeding precautions with use of Plavix. No barriers with medication affordability at this time. Pharmacy referral placed. No medication affordability issues yet but might have with new prescription plan. Discussed services in home. She is currently active with [employer] and [employer]. Gets M[employer] program.  Family and friends assist with transportation but has [employer] if needed. Discussed detailed ACP paperwork, Living Will, Code status and medical decision maker. Has a scale at home, will start weighing self daily. Zone tool given at [address] [facility] [address] [address] visit.     Plan-  -reinforce education completed at [address] [facility] visit  -reinforce importance of utilizing PCP, PCP office, myself and walk in clinic for issues that can be resolved in office  -collaborate with [employer] and [employer] as needed  -referral placed with pharmacy  -encourage daily weight tracking and reporting  -reinforce use of CHF zone tool    Congestive Heart Failure Assessment    Are you currently restricting fluids?: No Restriction  Do you understand a low sodium diet?: Yes  Do you understand how to read food labels?: Yes  How many restaurant meals do you eat per week?: 1-2  Do you salt your food before tasting it?: No         Symptoms:  CHF associated angina: Neg, CHF associated dyspnea on exertion: Neg, CHF associated fatigue: Neg, CHF associated leg swelling: Pos, CHF associated orthostatic hypotension: Neg, CHF associated PND: Neg, CHF associated shortness of breath: Neg, CHF associated weakness: Neg (Comment: this is her baseline)      Symptom course: stable  Patient-reported weight (lb): 129                     Prior to Admission medications    Medication Sig Start Date End Date Taking? Authorizing Provider   gabapentin (NEURONTIN) 100 MG capsule Take 2 capsules by mouth 3 times daily for 180 days. 2/10/22 8/9/22  Angela Parra DO   furosemide (LASIX) 20 MG tablet Take 1 tablet by mouth daily 1/17/22   Angela Parra DO   fluticasone (FLONASE) 50 MCG/ACT nasal spray INSTILL 2 SPRAYS INTO EACH NOSTRIL TWICE DAILY 1/6/22   Angela Parra DO   lidocaine (XYLOCAINE) 5 % ointment Apply topically TID as needed. 1/3/22   RACQUEL Quinteros CNP   spironolactone (ALDACTONE) 25 MG tablet TAKE 1 TABLET BY MOUTH DAILY. 12/2/21   Historical Provider, MD   ondansetron (ZOFRAN ODT) 4 MG disintegrating tablet Take 1 tablet by mouth every 8 hours as needed for Nausea or Vomiting 12/22/21   RACQUEL Quinteros CNP   carvedilol (COREG) 3.125 MG tablet TAKE 1 TABLET BY MOUTH TWICE DAILY WITH MEALS 12/3/21   Angela Parra DO   clopidogrel (PLAVIX) 75 MG tablet TAKE 1 TABLET BY MOUTH DAILY 12/3/21   Krystle Carrasco MD   famotidine (PEPCID) 20 MG tablet TAKE 1 TABLET BY MOUTH TWICE DAILY 11/16/21   Angela Parra DO   ARMOUR THYROID 60 MG tablet TAKE 1/2 TABLET BY MOUTH DAILY 11/1/21   Edson Correia DO   ASPIRIN LOW DOSE 81 MG EC tablet TAKE 1 TABLET BY MOUTH EVERY DAY 6/16/21   Historical Provider, MD   Ascorbic Acid (VITAMIN C) 1000 MG tablet Take 1,000 mg by mouth daily    Historical Provider, MD   blood glucose monitor strips Check blood sugar q daily, Dx R73.01 7/6/21   Angela Parra DO   atorvastatin (LIPITOR) 80 MG tablet Take 1 tablet by mouth nightly 6/21/21   Krystle Carrasco MD   aspirin 81 MG chewable tablet Take 1 tablet by mouth daily  Patient not taking: Reported on 10/18/2021 6/21/21   Krystle Carrasco MD   nitroGLYCERIN (NITROSTAT) 0.4 MG SL tablet up to max of 3 total doses.  If no relief after 1 dose, call 911. 5/10/21   Ovi Oseguera MD   LORazepam (ATIVAN) 1 MG tablet Take 0.5 mg by mouth every 6 hours as needed for Anxiety. Pt usually only takes a half tab when needed    Historical Provider, MD   spironolactone (ALDACTONE) 50 MG tablet Take 1 tablet by mouth daily 4/14/21   RACQUEL Ramires - CNP   montelukast (SINGULAIR) 10 MG tablet TAKE 1 TABLET BY MOUTH DAILY 12/29/20   Sandra Verma DO   melatonin 3 MG TABS tablet Take 3 mg by mouth daily    Historical Provider, MD   b complex vitamins capsule Take 1 capsule by mouth daily    Historical Provider, MD   polyethyl glycol-propyl glycol 0.4-0.3 % (SYSTANE) 0.4-0.3 % ophthalmic solution 1 drop as needed for Dry Eyes    Historical Provider, MD   docusate sodium (COLACE) 100 MG capsule Take 100 mg by mouth as needed for Constipation    Historical Provider, MD   OLIVE LEAF PO Take 450 mg by mouth daily    Historical Provider, MD   Handicap Placard [address] [facility] by Does not apply route Expires 9/6/2022 9/6/17   Sandra Verma DO   AYR SALINE NASAL NO-DRIP GEL Use 1 spray in each nostril 4 times a day  Patient taking differently: as needed Use 1 spray in each nostril 4 times a day  12/12/16   Marcella Jimenez MD   Blood Glucose Monitoring Suppl (TRUE METRIX METER) W/DEVICE KIT 1 Device by Does not apply route daily Check blood sugar q daily, Dx E11.9 7/15/16   Sandra Verma DO   Multiple Vitamins-Minerals (THERAPEUTIC MULTIVITAMIN-MINERALS) tablet Take 1 tablet by mouth daily. Historical Provider, MD   fish oil-omega-3 fatty acids 1000 MG capsule Take 2 g by mouth daily.       Historical Provider, MD   CALCIUM CITRATE Take 600 mg by mouth daily     Historical Provider, MD       Future Appointments   Date Time Provider Rosy Natalia   2/16/2022  2:40 PM Sandra Verma DO [facility] [address] [place] [address]   7/28/2022  2:00 PM Stephie Hair MD N [facility]

## 2022-02-16 NOTE — PROGRESS NOTES
FOC[facility] ON YOU VISIT  02/16/22      Chief Complaint   Patient presents with    Other     Joe         Participants:  Renny Montero DO, Mehdi Langston (Care Coordinator), Meron Irwin      Patient presents for a comprehensive review of their care. Issues addressed today include Social Determinants of Health, Advanced Care Planning, recent utilization and review of their health issues. What are your goals for your health in the next year? \"Do what I can to say healthy. Stay out the hospital\"    What is your biggest concern for your health in the next year? \"Probably my heart\"      HPI    Meron Greenbergcandecarmita reports the following acute issues today:  none    Has patient had any ER visits in the past 12 months?  no    Has patient had any hospital admissions in the past 12 months?  yes - 5/21 due to NSTEMI    Patient was provided guidance on reducing unnecessary utilization and an ER avoidance plan. Medications    Current Outpatient Medications   Medication Sig Dispense Refill    gabapentin (NEURONTIN) 100 MG capsule Take 1 capsule by mouth 2 times daily for 180 days.  180 capsule 3    thyroid (ARMOUR THYROID) 30 MG tablet Take 1 tablet by mouth daily 90 tablet 3    Probiotic Acidophilus (FLORANEX) TABS Take 1 tablet by mouth daily      furosemide (LASIX) 20 MG tablet Take 1 tablet by mouth daily 90 tablet 3    fluticasone (FLONASE) 50 MCG/ACT nasal spray INSTILL 2 SPRAYS INTO EACH NOSTRIL TWICE DAILY 96 g 3    carvedilol (COREG) 3.125 MG tablet TAKE 1 TABLET BY MOUTH TWICE DAILY WITH MEALS 180 tablet 3    clopidogrel (PLAVIX) 75 MG tablet TAKE 1 TABLET BY MOUTH DAILY 90 tablet 2    famotidine (PEPCID) 20 MG tablet TAKE 1 TABLET BY MOUTH TWICE DAILY 180 tablet 3    ASPIRIN LOW DOSE 81 MG EC tablet TAKE 1 TABLET BY MOUTH EVERY DAY      Ascorbic Acid (VITAMIN C) 1000 MG tablet Take 1,000 mg by mouth daily      blood glucose monitor strips Check blood sugar q daily, Dx R73.01 100 strip 3    atorvastatin (LIPITOR) 80 MG tablet Take 1 tablet by mouth nightly 30 tablet 6    nitroGLYCERIN (NITROSTAT) 0.4 MG SL tablet up to max of 3 total doses. If no relief after 1 dose, call 911. 25 tablet 0    LORazepam (ATIVAN) 1 MG tablet Take 0.5 mg by mouth every 6 hours as needed for Anxiety. Pt usually only takes a half tab when needed      spironolactone (ALDACTONE) 50 MG tablet Take 1 tablet by mouth daily 90 tablet 3    montelukast (SINGULAIR) 10 MG tablet TAKE 1 TABLET BY MOUTH DAILY 90 tablet 3    melatonin 3 MG TABS tablet Take 3 mg by mouth daily      b complex vitamins capsule Take 1 capsule by mouth daily      polyethyl glycol-propyl glycol 0.4-0.3 % (SYSTANE) 0.4-0.3 % ophthalmic solution 1 drop as needed for Dry Eyes      docusate sodium (COLACE) 100 MG capsule Take 100 mg by mouth as needed for Constipation      OLIVE LEAF PO Take 450 mg by mouth daily      Handicap Placard MISC by Does not apply route Expires 9/6/2022 1 each 0    AYR SALINE NASAL NO-DRIP GEL Use 1 spray in each nostril 4 times a day (Patient taking differently: as needed Use 1 spray in each nostril 4 times a day ) 1 Tube 3    Blood Glucose Monitoring Suppl (TRUE METRIX METER) W/DEVICE KIT 1 Device by Does not apply route daily Check blood sugar q daily, Dx E11.9 1 kit 0    Multiple Vitamins-Minerals (THERAPEUTIC MULTIVITAMIN-MINERALS) tablet Take 1 tablet by mouth daily.  fish oil-omega-3 fatty acids 1000 MG capsule Take 2 g by mouth daily.  CALCIUM CITRATE Take 600 mg by mouth daily        No current facility-administered medications for this visit. Patients medications were reviewed with them today. Are you having any difficulty with the cost of your medications? No    Are you taking all of your medications? Yes    Has patient had any recent medication changes? Yes, updated today    Do you understand why you are on each of your medications? Yes    Was a clinical pharmacy referral placed today? Yes        Social Determinants of Health    PHQ2/9 completed?  normal    Alcohol Misuse Screening completed?  normal    Mental Health needs identified - none    Does patient use tobacco?  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 Lives by self    What is the highest level of school you have completed or the highest degree you have received? High School    How hard is it for you to pay for the very basics like food, housing, medical care, and heating? Not hard at all    Within the past 12 months, how often were you concerned that your food would run out before you had money to buy more? Never    Within the past 12 months, how often has the food you bought not last and you didn't have money to get more? Never    In the past 12 months, how often has lack of transportation kept you from medical appointments or from getting medication? Never    In the past 12 months, has lack of transportation kept you from meetings, work, or getting things needed for daily living? Never    How often do you feel stressed, tense, restless, nervous, or anxious, or unable to sleep at night? Sometimes    In a typical week, how often do you meet or talk with friends or family?  more than three times per week    How many times per month do you attend religous services? Every Sunday    Do you belong to any clubs or organizations such as Shinto groups, unions, fraternal or athletic groups, or school groups? Yes, Shinto groups    Within the last year, have you been afraid of anyone in your life? No    Within the last year, have you been humiliated or emotionally abused in other ways by anyone in your life? No    Within the last year, have you been kicked, hit, slapped, or otherwise physically hurt by anyone in your life? No    Resources for identified for [facility] were discussed with patient today.       ACP     Code status:  DNR CCA     Living Will:  Patient does have a living will.       Healthcare decision maker was updated or verified in chart today. CPR:  In the event sudden cardiac or respiratory arrest, she does not wish to have CPR performed. Intubation:  If patient were to be in a situation in which mechanical ventilation may be considered, she  does not wish to be intubated and have mechanical ventilation. Other ACP concerns discussed today:  has living will and recent code status discussion     Has patient been referred to palliative care? No         Ambulatory Sensitive Conditions    Does patient have a history of COPD, CHF or Diabetes? yes - CHF    CHF    Most Recent EF -40-45%  Does patient have a scale at home? Yes  Does patient weight themselves daily? No  Have they been given the CHF zone tool? Yes  Does patient follow with a cardiologist?  Yes  Does patient follow with the CHF clinic? No    Is the patient on the following medications? ASA -  Yes  ACE/ARB -  No  Beta Blocker - Yes  Statin -  Yes          Experience of Care    'Do you feel that all of your health care needs are being addressed?'  - Yes    'Is there anything that we can do to better address your health?' - no    Has patient completed initial survey for the [place] [id number] program?  Yes        ASSESSMENT & Angelica Hernandez was seen today for other. Diagnoses and all orders for this visit:    Essential hypertension  -     Comprehensive Metabolic Panel; Future  -     thyroid (ARMOUR THYROID) 30 MG tablet; Take 1 tablet by mouth daily  -     T4, Free; Future  -     TSH; Future  -     Referral - Comprehensive Med Review ([facility] Patients - Clinical Pharmacy)  -     CBC with Auto Differential; Future    Post herpetic neuralgia  -     gabapentin (NEURONTIN) 100 MG capsule; Take 1 capsule by mouth 2 times daily for 180 days. Hypothyroidism, unspecified type  -     Comprehensive Metabolic Panel; Future  -     thyroid (ARMOUR THYROID) 30 MG tablet;  Take 1 tablet by mouth daily  -     T4, Free; Future  - TSH; Future  -     Referral - Comprehensive Med Review ([facility] Patients - Clinical Pharmacy)  -     CBC with Auto Differential; Future    Chronic HFrEF (heart failure with reduced ejection fraction) ([facility])  -     Comprehensive Metabolic Panel; Future  -     thyroid (ARMOUR THYROID) 30 MG tablet; Take 1 tablet by mouth daily  -     T4, Free; Future  -     TSH; Future  -     Referral - Comprehensive Med Review ([facility] Patients - Clinical Pharmacy)  -     Lipid Panel; Future  -     CBC with Auto Differential; Future        Return in about 4 months (around 6/16/2022).     Goals for Patient:    #1 - Please obtain your labs in the next 2 weeks  #2 - We will have the pharmacist follow up with you by phone  #3 - We will give you a new thyroid pill  #4 - We will work to keep you out of the hospital    Next planned Joe visit - 4 months    -On the date of the visit, I have spent 75 minutes in face to face time with patient as well as reviewing previous notes and test results.  -Time spent today discussing advanced care planning outside of the time used for E/M code billing:  <16 minutes (Non-Billable)

## 2022-02-16 NOTE — CARE COORDINATION
Dr. Tiffanie Centeno, I have enrolled [facility] into Care Coordination via [facility] program.  Please approve Plan of Care using smart phrase . [email or web address]. Thanks!

## 2022-02-18 ENCOUNTER — TELEPHONE (OUTPATIENT)
Dept: PHARMACY | Facility: CLINIC | Age: 87
End: 2022-02-18

## 2022-02-22 ENCOUNTER — SCHEDULED TELEPHONE ENCOUNTER (OUTPATIENT)
Dept: PHARMACY | Facility: CLINIC | Age: 87
End: 2022-02-22

## 2022-02-22 ENCOUNTER — HOSPITAL ENCOUNTER (OUTPATIENT)
Age: 87
Discharge: HOME | End: 2022-02-22
Payer: MEDICARE

## 2022-02-22 DIAGNOSIS — I50.22 CHRONIC HFREF (HEART FAILURE WITH REDUCED EJECTION FRACTION) (HCC): ICD-10-CM

## 2022-02-22 DIAGNOSIS — I10 ESSENTIAL HYPERTENSION: ICD-10-CM

## 2022-02-22 DIAGNOSIS — E03.9 HYPOTHYROIDISM, UNSPECIFIED TYPE: ICD-10-CM

## 2022-02-22 LAB
ALBUMIN SERPL-MCNC: 4.1 G/DL (ref 3.5–5.1)
ALP BLD-CCNC: 102 U/L (ref 38–126)
ALT SERPL-CCNC: 23 U/L (ref 11–66)
ANION GAP SERPL CALCULATED.3IONS-SCNC: 11 MEQ/L (ref 8–16)
AST SERPL-CCNC: 25 U/L (ref 5–40)
BASOPHILS # BLD: 0.4 %
BASOPHILS ABSOLUTE: 0 THOU/MM3 (ref 0–0.1)
BILIRUB SERPL-MCNC: 0.6 MG/DL (ref 0.3–1.2)
BUN BLDV-MCNC: 13 MG/DL (ref 7–22)
CALCIUM SERPL-MCNC: 9.6 MG/DL (ref 8.5–10.5)
CHLORIDE BLD-SCNC: 105 MEQ/L (ref 98–111)
CHOLESTEROL, TOTAL: 112 MG/DL (ref 100–199)
CO2: 27 MEQ/L (ref 23–33)
CREAT SERPL-MCNC: 0.8 MG/DL (ref 0.4–1.2)
EOSINOPHIL # BLD: 1.1 %
EOSINOPHILS ABSOLUTE: 0.1 THOU/MM3 (ref 0–0.4)
ERYTHROCYTE [DISTWIDTH] IN BLOOD BY AUTOMATED COUNT: 14.7 % (ref 11.5–14.5)
ERYTHROCYTE [DISTWIDTH] IN BLOOD BY AUTOMATED COUNT: 58.1 FL (ref 35–45)
GFR SERPL CREATININE-BSD FRML MDRD: 66 ML/MIN/1.73M2
GLUCOSE BLD-MCNC: 113 MG/DL (ref 70–108)
HCT VFR BLD CALC: 45.4 % (ref 37–47)
HDLC SERPL-MCNC: 45 MG/DL
HEMOGLOBIN: 14.5 GM/DL (ref 12–16)
IMMATURE GRANS (ABS): 0.02 THOU/MM3 (ref 0–0.07)
IMMATURE GRANULOCYTES: 0.3 %
LDL CHOLESTEROL CALCULATED: 52 MG/DL
LYMPHOCYTES # BLD: 18.5 %
LYMPHOCYTES ABSOLUTE: 1.4 THOU/MM3 (ref 1–4.8)
MCH RBC QN AUTO: 34 PG (ref 26–33)
MCHC RBC AUTO-ENTMCNC: 31.9 GM/DL (ref 32.2–35.5)
MCV RBC AUTO: 106.3 FL (ref 81–99)
MONOCYTES # BLD: 9.7 %
MONOCYTES ABSOLUTE: 0.7 THOU/MM3 (ref 0.4–1.3)
NUCLEATED RED BLOOD CELLS: 0 /100 WBC
PLATELET # BLD: 172 THOU/MM3 (ref 130–400)
PMV BLD AUTO: 10.2 FL (ref 9.4–12.4)
POTASSIUM SERPL-SCNC: 4.5 MEQ/L (ref 3.5–5.2)
RBC # BLD: 4.27 MILL/MM3 (ref 4.2–5.4)
SEG NEUTROPHILS: 70 %
SEGMENTED NEUTROPHILS ABSOLUTE COUNT: 5.1 THOU/MM3 (ref 1.8–7.7)
SODIUM BLD-SCNC: 143 MEQ/L (ref 135–145)
T4 FREE: 1.21 NG/DL (ref 0.93–1.76)
TOTAL PROTEIN: 6.9 G/DL (ref 6.1–8)
TRIGL SERPL-MCNC: 75 MG/DL (ref 0–199)
TSH SERPL DL<=0.05 MIU/L-ACNC: 2.38 UIU/ML (ref 0.4–4.2)
WBC # BLD: 7.3 THOU/MM3 (ref 4.8–10.8)

## 2022-02-22 PROCEDURE — 36415 COLL VENOUS BLD VENIPUNCTURE: CPT

## 2022-02-22 PROCEDURE — 85025 COMPLETE CBC W/AUTO DIFF WBC: CPT

## 2022-02-22 PROCEDURE — 84443 ASSAY THYROID STIM HORMONE: CPT

## 2022-02-22 PROCEDURE — 80061 LIPID PANEL: CPT

## 2022-02-22 PROCEDURE — 80053 COMPREHEN METABOLIC PANEL: CPT

## 2022-02-22 PROCEDURE — 84439 ASSAY OF FREE THYROXINE: CPT

## 2022-02-22 NOTE — LETTER
[place]  [address], [address]        Ranjith Garcia   [address] [place] [address]  [address] [facility] [place] [address]           02/24/22     Dear Ranjith Garcia,    We recently completed a medication review with you, and wanted to provide you with a summary for your records. · You had been taking Spironolactone 25mg by mouth once daily. I confirmed with Dr. King Sharp that you should be taking spironolactone 50mg by mouth once daily. Since you did not have any 50mg tablets at home [facility] to have the 50mg tablets ready for you to . · You said that you have been having issues with belching which is worse when you eat dairy. We discussed avoiding dairy and possibly trying gas X.  · Bring your nitroglycerine SL tablets with you to the pharmacy next time you goes in for assistance with finding the expiration date and to get a new refill if you need it.  It is important to make sure you have unexpired nitroglycerine SL tablets on hand in case you have chest pain      Sincerely,   Nasir Flynn [address] // Department, toll free [phone], Option 1

## 2022-02-22 NOTE — TELEPHONE ENCOUNTER
Dr. Vinicio Vega, DO, - Completed medication review with patient. Patient has been taking spironolactone 25mg PO QD. At last office visit with you medication list reflected spironolactone 50mg PO QD. Which dose of spironolactone should the patient be taking? I can reach out to patient and provide education. Last visit: 2/16/2022    See encounter note(s) below for complete details. Please let me know if you have any questions.       Thank you,  Nasir Antonio 122 // Department, toll free [phone], Option 1      =======================================================      [facility] CLINICAL PHARMACY REVIEW: Referral  =======================================================  Spoke with patient    Medication Sig Comments    gabapentin (NEURONTIN) 100 MG capsule Take 1 capsule by mouth 2 times daily for 180 days. -Daily; taking for shingles pain    thyroid (ARMOUR THYROID) 30 MG tablet Take 1 tablet by mouth daily -Takes as soon as she wakes up (around 4am. Takes other AM medications around 8am)    Probiotic Acidophilus (FLORANEX) TABS Take 1 tablet by mouth daily     furosemide (LASIX) 20 MG tablet Take 1 tablet by mouth daily     fluticasone (FLONASE) 50 MCG/ACT nasal spray INSTILL 2 SPRAYS INTO EACH NOSTRIL TWICE DAILY - 1 spray in each nostril BID    carvedilol (COREG) 3.125 MG tablet TAKE 1 TABLET BY MOUTH TWICE DAILY WITH MEALS     clopidogrel (PLAVIX) 75 MG tablet TAKE 1 TABLET BY MOUTH DAILY     famotidine (PEPCID) 20 MG tablet TAKE 1 TABLET BY MOUTH TWICE DAILY     ASPIRIN LOW DOSE 81 MG EC tablet TAKE 1 TABLET BY MOUTH EVERY DAY     Ascorbic Acid (VITAMIN C) 1000 MG tablet Take 1,000 mg by mouth daily     blood glucose monitor strips Check blood sugar q daily, Dx R73.01 - Checks 2-3 times per week  - Fasting ~  - Felt tired at 84    atorvastatin (LIPITOR) 80 MG tablet Take 1 tablet by mouth nightly     nitroGLYCERIN (NITROSTAT) 0.4 MG SL tablet up to max of 3 total doses. If no relief after 1 dose, call 911.  LORazepam (ATIVAN) 1 MG tablet Take 0.5 mg by mouth every 6 hours as needed for Anxiety. Pt usually only takes a half tab when needed - has not needed in almost a year    spironolactone (ALDACTONE) 50 MG tablet Take 1 tablet by mouth daily - has been taking 25 mg po qd      montelukast (SINGULAIR) 10 MG tablet TAKE 1 TABLET BY MOUTH DAILY     melatonin 3 MG TABS tablet   Take 3 mg by mouth daily - Takes PRN with benefit    b complex vitamins capsule Take 1 capsule by mouth daily     polyethyl glycol-propyl glycol 0.4-0.3 % (SYSTANE) 0.4-0.3 % ophthalmic solution 1 drop as needed for Dry Eyes -Uses BID both eyes    docusate sodium (COLACE) 100 MG capsule Take 100 mg by mouth as needed for Constipation -BID    OLIVE LEAF PO Take 450 mg by mouth daily -Will take BID if feels a cold coming on    Handicap Placard MISC by Does not apply route Expires 9/6/2022     AYR SALINE NASAL NO-DRIP GEL Use 1 spray in each nostril 4 times a day (Patient taking differently: as needed Use 1 spray in each nostril 4 times a day ) - Not using    Blood Glucose Monitoring Suppl (TRUE METRIX METER) W/DEVICE KIT 1 Device by Does not apply route daily Check blood sugar q daily, Dx E11.9     Multiple Vitamins-Minerals (THERAPEUTIC MULTIVITAMIN-MINERALS) tablet Take 1 tablet by mouth daily.  fish oil-omega-3 fatty acids 1000 MG capsule Take 2 g by mouth daily.   -Takes 1 cap daily    CALCIUM CITRATE Take 600 mg by mouth daily  -BID   Has a large bottle of 500mg caridad +10mcg D3 which she will begin taking this new bottle when she runs out of current bottle     Additional Medications:   Metamucil PRN   Zinc 50mg PO QD    CrCl cannot be calculated (Patient's most recent lab result is older than the maximum 120 days allowed. ).      Assessment/Plan:    Identified medication discrepancies/issues:   · Has been having issues with belching

## 2022-02-23 ENCOUNTER — TELEPHONE (OUTPATIENT)
Dept: FAMILY MEDICINE CLINIC | Age: 87
End: 2022-02-23

## 2022-02-23 ENCOUNTER — CARE COORDINATION (OUTPATIENT)
Dept: CARE COORDINATION | Age: 87
End: 2022-02-23

## 2022-02-23 RX ORDER — ZINC GLUCONATE 50 MG
50 TABLET ORAL DAILY
COMMUNITY

## 2022-02-23 NOTE — RESULT ENCOUNTER NOTE
Please let patient know that their labs were normal.  Have them continue their current medications. Please let me know if they have any questions.

## 2022-02-23 NOTE — TELEPHONE ENCOUNTER
----- Message from John Flowers DO sent at 2/23/2022 12:21 PM EST -----  Please let patient know that their labs were normal.  Have them continue their current medications. Please let me know if they have any questions.

## 2022-02-23 NOTE — TELEPHONE ENCOUNTER
Let patient know her labs were stable and to continue her current medications. Patient would like to know if she should be checking her blood sugar every day.

## 2022-02-23 NOTE — CARE COORDINATION
Ambulatory Care Coordination Note  2/23/2022  CM Risk Score: 2  Charlson 10 Year Mortality Risk Score: 100%     ACC: Micah Gutiérrez RN    Summary Note: Spoke with Anyi Arreola for continued Care Coordination for [facility] visit follow up. She is doing well for this review. Celebrated her 89th bday with friends and family. She was very happy with [facility] visit. She has been in touch with pharmacy referral that was placed and has been monitoring her weight daily. Reports her weight has been stable at 129. Discussed blood sugar tracking. Her A1C is at goal and states she has been checking them daily. I informed her that she doesn't need to check them daily but should keep track once weekly or so or when she doesn't feel well. Had a long discussion on low blood sugar readings, how to treat and the use of glucagon tabs to keep in her purse in the event she is out. Reports blood sugars of 104-112. Continues with shingles pain. States she tried wearing her bra but it has been bothersome. She is going to take her gabapentin as ordered and allow time for this pain/discomfort to reduce. Shingles have resolved on the outside.   Plan  -reinforce education completed at [facility] visit  -encourage daily weight tracking and reporting  -reinforce use of zone tools  -collaborate with pharmacy as needed  General Assessment    Do you have any symptoms that are causing concern?: Yes  Progression since Onset: Intermittent - Waxing/Waning  Reported Symptoms: Pain, Other (Comment: shingles pain)           Congestive Heart Failure Assessment    Are you currently restricting fluids?: No Restriction  Do you understand a low sodium diet?: Yes  Do you understand how to read food labels?: Yes  How many restaurant meals do you eat per week?: 1-2  Do you salt your food before tasting it?: No         Symptoms:  CHF associated angina: Neg, CHF associated dyspnea on exertion: Neg, CHF associated fatigue: Neg, CHF associated leg swelling: Neg, CHF associated orthostatic hypotension: Neg, CHF associated PND: Neg, CHF associated shortness of breath: Neg, CHF associated weakness: Neg      Symptom course: stable  Weight trend: stable                 Care Coordination Interventions    Program Enrollment: Complex Care  Referral from Primary Care Provider: Yes  Suggested Interventions and [address]: Declined  Meals on Wheels: Completed  Medication Assistance Program:  (Comment: not yet but may run into issues with new prescription card)  Medi Set or Pill Pack: Completed  Occupational Therapy: Not Started  Pharmacist: Completed (Comment: pharmacy referral placed 2-16-22)  Physical Therapy: Not Started  Senior Services: Completed (Comment: active with [employer] and [employer])  Social Work: Not Started  Zone Management Tools: Completed  Other Services or Interventions: Currently active with [employer] and AAA         Goals Addressed                    This Visit's Progress      Conditions and Symptoms (pt-stated)   On track      I will schedule office visits, as directed by my provider. I will keep my appointment or reschedule if I have to cancel. I will notify my provider of any barriers to my plan of care. I will follow my Zone Management tool to seek urgent or emergent care. I will notify my provider of any symptoms that indicate a worsening of my condition. CHF  Barriers: need for additional education and support  Plan for overcoming my barriers: family and ACM support  Confidence: 9/10  Anticipated Goal Completion Date: 5/16/22              Prior to Admission medications    Medication Sig Start Date End Date Taking? Authorizing Provider   gabapentin (NEURONTIN) 100 MG capsule Take 1 capsule by mouth 2 times daily for 180 days.  2/16/22 8/15/22 Yes Miguel Salcido DO   thyroid [facility] THYROID) 30 MG tablet Take 1 tablet by mouth daily 2/16/22  Yes Miguel Salcido DO   Probiotic Acidophilus [facility]) TABS Take 1 tablet by mouth daily 2/16/22  Yes Roxy Renteria ROJELIO Correia DO   furosemide (LASIX) 20 MG tablet Take 1 tablet by mouth daily 1/17/22  Yes Edson Correia DO   fluticasone (FLONASE) 50 MCG/ACT nasal spray INSTILL 2 SPRAYS INTO EACH NOSTRIL TWICE DAILY 1/6/22  Yes Edson Correia DO   carvedilol (COREG) 3.125 MG tablet TAKE 1 TABLET BY MOUTH TWICE DAILY WITH MEALS 12/3/21  Yes Miguel Salcido DO   clopidogrel (PLAVIX) 75 MG tablet TAKE 1 TABLET BY MOUTH DAILY 12/3/21  Yes Carloina Riojas MD   famotidine (PEPCID) 20 MG tablet TAKE 1 TABLET BY MOUTH TWICE DAILY 11/16/21  Yes Edson Correia DO   ASPIRIN LOW DOSE 81 MG EC tablet TAKE 1 TABLET BY MOUTH EVERY DAY 6/16/21  Yes Historical Provider, MD   Ascorbic Acid (VITAMIN C) 1000 MG tablet Take 1,000 mg by mouth daily   Yes Historical Provider, MD   blood glucose monitor strips Check blood sugar q daily, Dx R73.01 7/6/21  Yes Miguel Salcido DO   atorvastatin (LIPITOR) 80 MG tablet Take 1 tablet by mouth nightly 6/21/21  Yes Carolina Riojas MD   nitroGLYCERIN (NITROSTAT) 0.4 MG SL tablet up to max of 3 total doses. If no relief after 1 dose, call 911. 5/10/21  Yes Maxime Linder MD   LORazepam (ATIVAN) 1 MG tablet Take 0.5 mg by mouth every 6 hours as needed for Anxiety.  Pt usually only takes a half tab when needed   Yes Historical Provider, MD   spironolactone (ALDACTONE) 50 MG tablet Take 1 tablet by mouth daily 4/14/21  Yes Yobany Skaggs APRN - CNP   montelukast (SINGULAIR) 10 MG tablet TAKE 1 TABLET BY MOUTH DAILY 12/29/20  Yes Edson Correia DO   melatonin 3 MG TABS tablet Take 3 mg by mouth daily   Yes Historical Provider, MD   b complex vitamins capsule Take 1 capsule by mouth daily   Yes Historical Provider, MD   polyethyl glycol-propyl glycol 0.4-0.3 % (SYSTANE) 0.4-0.3 % ophthalmic solution 1 drop as needed for Dry Eyes   Yes Historical Provider, MD   docusate sodium (COLACE) 100 MG capsule Take 100 mg by mouth as needed for Constipation   Yes Historical Provider, MD   OLIVE LEAF PO Take 450 mg by mouth daily Yes Historical Provider, MD   Handicap Placard [facility] by Does not apply route Expires 9/6/2022 9/6/17  Yes Edson Correia, DO   AYR SALINE NASAL NO-DRIP GEL Use 1 spray in each nostril 4 times a day  Patient taking differently: as needed Use 1 spray in each nostril 4 times a day  12/12/16  Yes Kota Townsend MD   Blood Glucose Monitoring Suppl (TRUE METRIX METER) W/DEVICE KIT 1 Device by Does not apply route daily Check blood sugar q daily, Dx E11.9 7/15/16  Yes Sarbjit Connolly, DO   Multiple Vitamins-Minerals (THERAPEUTIC MULTIVITAMIN-MINERALS) tablet Take 1 tablet by mouth daily. Yes Historical Provider, MD   fish oil-omega-3 fatty acids 1000 MG capsule Take 2 g by mouth daily.      Yes Historical Provider, MD   CALCIUM CITRATE Take 600 mg by mouth daily    Yes Historical Provider, MD       Future Appointments   Date Time Provider Rosy Natalia   7/28/2022  2:00 PM Carley Vaca MD N [facility] [place]

## 2022-02-24 NOTE — TELEPHONE ENCOUNTER
Doned by provider with no response. Called Robert Patton DO's office and spoke with nurse Medina. Per Dr. Keely Simons, spironolactone dose was clarified with patient at last office visit. Patient should be taking spironolactone 50mg PO QD. Will reach out to patient and provide education. Called patient and informed her that she should be taking the spironolactone 50mg PO QD. Patient checked her medication bottles and does not have the 50mg on hand. Advised to take two 25mg tabs PO QD until she can get to the pharmacy to pickup 50mg strength. Patient voiced understanding. Called [facility] pharmacy to have spironolactone 50mg refilled.     Nasir Ledezma [address] // Department, toll free [phone], Option 1     =======================================================  For Pharmacy Admin Tracking Only     Recommendation Provided To: Pharmacy: 1   Intervention Detail: Refill(s) Provided   Gap Closed?: Yes    Intervention Accepted By: Pharmacy: 1   Time Spent (min): 90

## 2022-02-28 ENCOUNTER — OFFICE VISIT (OUTPATIENT)
Dept: FAMILY MEDICINE CLINIC | Age: 87
End: 2022-02-28
Payer: MEDICARE

## 2022-02-28 VITALS
OXYGEN SATURATION: 99 % | BODY MASS INDEX: 25.36 KG/M2 | HEART RATE: 70 BPM | RESPIRATION RATE: 16 BRPM | WEIGHT: 129.2 LBS | SYSTOLIC BLOOD PRESSURE: 124 MMHG | TEMPERATURE: 97 F | DIASTOLIC BLOOD PRESSURE: 72 MMHG | HEIGHT: 60 IN

## 2022-02-28 DIAGNOSIS — I10 ESSENTIAL HYPERTENSION: Primary | ICD-10-CM

## 2022-02-28 PROCEDURE — 1090F PRES/ABSN URINE INCON ASSESS: CPT | Performed by: FAMILY MEDICINE

## 2022-02-28 PROCEDURE — 99213 OFFICE O/P EST LOW 20 MIN: CPT | Performed by: FAMILY MEDICINE

## 2022-02-28 PROCEDURE — G8427 DOCREV CUR MEDS BY ELIG CLIN: HCPCS | Performed by: FAMILY MEDICINE

## 2022-02-28 PROCEDURE — 4040F PNEUMOC VAC/ADMIN/RCVD: CPT | Performed by: FAMILY MEDICINE

## 2022-02-28 PROCEDURE — 1123F ACP DISCUSS/DSCN MKR DOCD: CPT | Performed by: FAMILY MEDICINE

## 2022-02-28 PROCEDURE — 1036F TOBACCO NON-USER: CPT | Performed by: FAMILY MEDICINE

## 2022-02-28 PROCEDURE — G8484 FLU IMMUNIZE NO ADMIN: HCPCS | Performed by: FAMILY MEDICINE

## 2022-02-28 PROCEDURE — G8417 CALC BMI ABV UP PARAM F/U: HCPCS | Performed by: FAMILY MEDICINE

## 2022-02-28 NOTE — PROGRESS NOTES
Blade Cherry is a 80 y.o. female that presents for     Chief Complaint   Patient presents with    Hypertension     172/73       /72   Pulse 70   Temp 97 °F (36.1 °C) (Infrared)   Resp 16   Ht 5' (1.524 m)   Wt 129 lb 3.2 oz (58.6 kg)   SpO2 99%   BMI 25.23 kg/m²       HPI    HTN    Does patient check BP regularly at home? - Yes - BP this AM was 172/73. No new changes to meds. Her readings out side of today have all been <140/90  Current Medication regimen - lasix, aldactone, coreg  Tolerating medications well? - yes    Shortness of breath or chest pain? No  Headache or visual complaints? No  Neurologic changes like confusion? No  Extremity edema? No    BP Readings from Last 3 Encounters:   02/28/22 124/72   02/10/22 134/68   01/03/22 122/70         Health Maintenance   Topic Date Due    DTaP/Tdap/Td vaccine (1 - Tdap) Never done    Shingles Vaccine (2 of 3) 04/12/2014    COVID-19 Vaccine (3 - Booster for Pfizer series) 08/19/2021    Flu vaccine (1) 09/01/2021    Depression Screen  02/16/2023    Lipid screen  02/22/2023    TSH testing  02/22/2023    Potassium monitoring  02/22/2023    Creatinine monitoring  02/22/2023    Pneumococcal 65+ years Vaccine  Completed    Hepatitis A vaccine  Aged Out    Hepatitis B vaccine  Aged Out    Hib vaccine  Aged Out    Meningococcal (ACWY) vaccine  Aged Out       Past Medical History:   Diagnosis Date    Arthritis     Cancer ([facility] [address] 2013    SKIN CANCER ON LEFT ANKLE    Diverticulosis     Hyperlipidemia     Hypertension     Hypothyroidism     Psoriasis     S/P angioplasty with stent 05/06/2021    3 stents to LAD, 1 stent to OM, 2 stents to diag.  per Dr. Jean Marie Lenz Thyroid disorder        Past Surgical History:   Procedure Laterality Date    BREAST BIOPSY Left     benign    BREAST SURGERY Left 6/1966    Lumpectomy    CARPAL TUNNEL RELEASE Right 1/30/2013    CARPAL TUNNEL RELEASE Left 7/25/13    CATARACT REMOVAL Bilateral 1999  COLON SURGERY  11/1999    resection    COLONOSCOPY  2003, 2006, 2011    DIAGNOSTIC CARDIAC CATH LAB PROCEDURE  05/07/2021    6 stents     HYSTERECTOMY  2/23/1970    HYSTERECTOMY, TOTAL ABDOMINAL      KNEE ARTHROSCOPY Right 11/21/2007    meniscectomies    KNEE SURGERY Left 2000    replacement    MYRINGOTOMY Right 07/01/2015    tube insertion    OVARY REMOVAL Bilateral 7/12/2001    oophorectomy    OVARY REMOVAL Bilateral     SHOULDER SURGERY  5/14    SINUS SURGERY         Social History     Tobacco Use    Smoking status: Never Smoker    Smokeless tobacco: Never Used   Vaping Use    Vaping Use: Never used   Substance Use Topics    Alcohol use: No    Drug use: No       Family History   Problem Relation Age of Onset    Cancer Child     Breast Cancer Sister     Stroke Sister     Heart Disease Sister     Other Other         visual problems    Breast Cancer Daughter 52         I have reviewed the patient's past medical history, past surgical history, allergies, medications, social and family history and I have made updates where appropriate. Review of Systems        PHYSICAL EXAM:  /72   Pulse 70   Temp 97 °F (36.1 °C) (Infrared)   Resp 16   Ht 5' (1.524 m)   Wt 129 lb 3.2 oz (58.6 kg)   SpO2 99%   BMI 25.23 kg/m²     Physical Exam  Vitals and nursing note reviewed. Constitutional:       General: She is not in acute distress. Appearance: She is well-developed. HENT:      Head: Normocephalic and atraumatic. Right Ear: Hearing and external ear normal.      Left Ear: Hearing and external ear normal.      Nose: Nose normal.   Eyes:      General:         Right eye: No discharge. Left eye: No discharge. Conjunctiva/sclera: Conjunctivae normal.   Neck:      Thyroid: No thyromegaly. Trachea: No tracheal deviation. Cardiovascular:      Rate and Rhythm: Normal rate and regular rhythm. Heart sounds: Normal heart sounds. No murmur heard. No friction rub. No gallop. Pulmonary:      Effort: Pulmonary effort is normal. No respiratory distress. Breath sounds: No wheezing or rales. Chest:      Chest wall: No tenderness. Musculoskeletal:         General: No deformity. Cervical back: Normal range of motion and neck supple. Lymphadenopathy:      Cervical: No cervical adenopathy. Skin:     General: Skin is warm and dry. Findings: No erythema or rash. Neurological:      Mental Status: She is alert. Motor: No abnormal muscle tone. Coordination: Coordination normal.   Psychiatric:         Behavior: Behavior normal.         Thought Content: Thought content normal.         Judgment: Judgment normal.             ASSESSMENT & PLAN  Bam Saldana was seen today for hypertension. Diagnoses and all orders for this visit:    Essential hypertension        Return if symptoms worsen or fail to improve. No red flag sx, Continue coreg, aldactone, lasix, has been tolerating well and Follow up with our practice if no improvement or worsening sx. The most recent results of the following tests were reviewed with the patient today: none     All copied or forwarded information in the progress note was verified by me to be accurate at the time of visit  Patient's past medical, surgical, social and family history were reviewed and updated     All patient questions answered. Patient voiced understanding.

## 2022-03-02 ENCOUNTER — CARE COORDINATION (OUTPATIENT)
Dept: CARE COORDINATION | Age: 87
End: 2022-03-02

## 2022-03-02 NOTE — CARE COORDINATION
Ambulatory Care Coordination Note  3/2/2022  CM Risk Score: 2  Charlson 10 Year Mortality Risk Score: 100%     ACC: Micheal Wilkins, RN    Summary Note: Spoke with Chuchokirsty Kateryna for continued [facility] follow up call. States she utilized walk in clinic for higher blood pressure and was found to be normal in clinic. State she feels her blood pressure cuff might be off or is finicky at times. Reports better blood pressure readings now. Chuchokirsty Avendaño asked for detailed explanation regarding CHF, fluid restriction and monitoring of  Weight. I re educated her on when to report changes in her weight, edema, shortness of breath. She has zone tool from PCP office appt. She states she understands everything that was taught and teach back method applied. She weights 129# today which is her baseline.   Plan  -reinforce education completed  -encourage blood pressure and blood sugar tracking and reporting  Encourage use of zone tools  -reinforce use of PCP, myself, office and walk in clinic to address issues that can be addressed in office to prevent unnecessary utilization  Congestive Heart Failure Assessment    Are you currently restricting fluids?: No Restriction  Do you understand a low sodium diet?: Yes  Do you understand how to read food labels?: Yes  How many restaurant meals do you eat per week?: 1-2  Do you salt your food before tasting it?: No         Symptoms:  CHF associated angina: Neg, CHF associated dyspnea on exertion: Neg, CHF associated fatigue: Neg, CHF associated leg swelling: Pos, CHF associated orthostatic hypotension: Neg, CHF associated PND: Neg, CHF associated shortness of breath: Neg, CHF associated weakness: Neg      Symptom course: stable  Patient-reported weight (lb): 129               Care Coordination Interventions    Program Enrollment: Complex Care  Referral from Primary Care Provider: Yes  Suggested Interventions and [address]: Declined  Meals on Wheels: Completed  Medication Assistance Program:  (Comment: not yet but may run into issues with new prescription card)  Medi Set or Pill Pack: Completed  Occupational Therapy: Not Started  Pharmacist: Completed (Comment: pharmacy referral placed 2-16-22)  Physical Therapy: Not Started  Senior Services: Completed (Comment: active with AAA and COA)  Social Work: Not Started  Zone Management Tools: Completed  Other Services or Interventions: Currently active with COA and AAA         Goals Addressed                    This Visit's Progress      Conditions and Symptoms (pt-stated)   On track      I will schedule office visits, as directed by my provider. I will keep my appointment or reschedule if I have to cancel. I will notify my provider of any barriers to my plan of care. I will follow my Zone Management tool to seek urgent or emergent care. I will notify my provider of any symptoms that indicate a worsening of my condition. CHF  Barriers: need for additional education and support  Plan for overcoming my barriers: family and ACM support  Confidence: 9/10  Anticipated Goal Completion Date: 5/16/22              Prior to Admission medications    Medication Sig Start Date End Date Taking? Authorizing Provider   zinc 50 MG TABS tablet Take 50 mg by mouth daily   Yes Historical Provider, MD   Psyllium (METAMUCIL FREE & NATURAL PO) Take by mouth as needed   Yes Historical Provider, MD   gabapentin (NEURONTIN) 100 MG capsule Take 1 capsule by mouth 2 times daily for 180 days.  2/16/22 8/15/22 Yes Sarbjit Connolly DO   thyroid [facility] THYROID) 30 MG tablet Take 1 tablet by mouth daily 2/16/22  Yes Sarbjit Connolly DO   Probiotic Acidophilus [facility]) TABS Take 1 tablet by mouth daily 2/16/22  Yes Sarbjit Connolly DO   furosemide (LASIX) 20 MG tablet Take 1 tablet by mouth daily 1/17/22  Yes Edson Correia,    fluticasone (FLONASE) 50 MCG/ACT nasal spray INSTILL 2 SPRAYS INTO EACH NOSTRIL TWICE DAILY 1/6/22  Yes Sarbjit Connolly DO carvedilol (COREG) 3.125 MG tablet TAKE 1 TABLET BY MOUTH TWICE DAILY WITH MEALS 12/3/21  Yes Be Tran DO   clopidogrel (PLAVIX) 75 MG tablet TAKE 1 TABLET BY MOUTH DAILY 12/3/21  Yes Leola Dunlap MD   famotidine (PEPCID) 20 MG tablet TAKE 1 TABLET BY MOUTH TWICE DAILY 11/16/21  Yes Edson Correia DO   ASPIRIN LOW DOSE 81 MG EC tablet TAKE 1 TABLET BY MOUTH EVERY DAY 6/16/21  Yes Historical Provider, MD   Ascorbic Acid (VITAMIN C) 1000 MG tablet Take 1,000 mg by mouth daily   Yes Historical Provider, MD   blood glucose monitor strips Check blood sugar q daily, Dx R73.01 7/6/21  Yes Be Tran DO   atorvastatin (LIPITOR) 80 MG tablet Take 1 tablet by mouth nightly 6/21/21  Yes Leola Dunlap MD   nitroGLYCERIN (NITROSTAT) 0.4 MG SL tablet up to max of 3 total doses. If no relief after 1 dose, call 911. 5/10/21  Yes Donnie Cabezas MD   LORazepam (ATIVAN) 1 MG tablet Take 0.5 mg by mouth every 6 hours as needed for Anxiety.  Pt usually only takes a half tab when needed   Yes Historical Provider, MD   spironolactone (ALDACTONE) 50 MG tablet Take 1 tablet by mouth daily  Patient taking differently: Take 25 mg by mouth daily  4/14/21  Yes RACQUEL Fried - CNP   montelukast (SINGULAIR) 10 MG tablet TAKE 1 TABLET BY MOUTH DAILY 12/29/20  Yes Edson Correia DO   melatonin 3 MG TABS tablet Take 3 mg by mouth nightly as needed    Yes Historical Provider, MD   b complex vitamins capsule Take 1 capsule by mouth daily   Yes Historical Provider, MD   polyethyl glycol-propyl glycol 0.4-0.3 % (SYSTANE) 0.4-0.3 % ophthalmic solution 1 drop in the morning and at bedtime Both eyes   Yes Historical Provider, MD   docusate sodium (COLACE) 100 MG capsule Take 100 mg by mouth 2 times daily    Yes Historical Provider, MD   OLIVE LEAF PO Take 450 mg by mouth daily   Yes Historical Provider, MD   Handicap Placard MISC by Does not apply route Expires 9/6/2022 9/6/17  Yes Be Tran DO   Blood Glucose Monitoring Suppl (TRUE METRIX METER) W/DEVICE KIT 1 Device by Does not apply route daily Check blood sugar q daily, Dx E11.9 7/15/16  Yes Yvette Bolanos, DO   Multiple Vitamins-Minerals (THERAPEUTIC MULTIVITAMIN-MINERALS) tablet Take 1 tablet by mouth daily.    Yes Historical Provider, MD   fish oil-omega-3 fatty acids 1000 MG capsule Take 1 g by mouth daily    Yes Historical Provider, MD   CALCIUM CITRATE Take 600 mg by mouth in the morning and at bedtime    Yes Historical Provider, MD       Future Appointments   Date Time Provider Rosy Natalia   7/28/2022  2:00 PM Jeff Gutierrez MD [id number] [facility]

## 2022-03-08 ENCOUNTER — CARE COORDINATION (OUTPATIENT)
Dept: CARE COORDINATION | Age: 87
End: 2022-03-08

## 2022-03-08 RX ORDER — ATORVASTATIN CALCIUM 80 MG/1
80 TABLET, FILM COATED ORAL NIGHTLY
Qty: 90 TABLET | Refills: 3 | Status: SHIPPED | OUTPATIENT
Start: 2022-03-08

## 2022-03-08 NOTE — CARE COORDINATION
Ambulatory Care Coordination Note  3/8/2022  CM Risk Score: 2  Charlson 10 Year Mortality Risk Score: 100%     ACC: Felipe Peguero RN    Summary Note: Received a call from  [place]. She had some questions regarding her health. States she is down to one gabapentin daily now for s/p shingles pain. Discussed thyroid medication and cost.  I gave her a Good RX coupon and text her the coupon to use. States she is having hip and shoulder pain which is her hx. She has followed up with Dr. Irvin Barraza and Dr. Mary Hi at [facility] and is going to call today to make appts with them to see if they can give her any more injections.   Discussed milk products as she had some questions regarding which milk to drink  Needs refill of atorvastatin which I will ask PCP to send over  States she did get glucagon tablets from pharmacy and is keeping those for any hypoglycemia  States her weight has been running 129# and she is checking it daily    Plan  -reinforce education completed  -Good RX coupon sent via text for her to use for her thyroid medication  -will complete med refill request  -collaborate with [facility] as needed  -encourage daily weights and use of zone tools to prevent CHF exacerbation  Congestive Heart Failure Assessment    Are you currently restricting fluids?: No Restriction  Do you understand a low sodium diet?: Yes  Do you understand how to read food labels?: Yes  How many restaurant meals do you eat per week?: 1-2  Do you salt your food before tasting it?: No         Symptoms:         and   General Assessment    Do you have any symptoms that are causing concern?: Yes  Progression since Onset: Unchanged  Reported Symptoms: Pain (Comment: shoulder and hip pain-following up with [facility])                   Care Coordination Interventions    Program Enrollment: Complex Care  Referral from Primary Care Provider: Yes  Suggested Interventions and [address]: Declined  Meals on Wheels: Completed  Medication Assistance Program:  (Comment: not yet but may run into issues with new prescription card)  Medi Set or Pill Pack: Completed  Occupational Therapy: Not Started  Pharmacist: Completed (Comment: pharmacy referral placed 2-16-22)  Physical Therapy: Not Started  Senior Services: Completed (Comment: active with AAA and COA)  Social Work: Not Started  Zone Management Tools: Completed  Other Services or Interventions: Currently active with COA and AAA         Goals Addressed    None         Prior to Admission medications    Medication Sig Start Date End Date Taking? Authorizing Provider   zinc 50 MG TABS tablet Take 50 mg by mouth daily   Yes Historical Provider, MD   Psyllium (METAMUCIL FREE & NATURAL PO) Take by mouth as needed   Yes Historical Provider, MD   gabapentin (NEURONTIN) 100 MG capsule Take 1 capsule by mouth 2 times daily for 180 days.  2/16/22 8/15/22 Yes Yvette Bolanos DO   thyroid (ARMOUR THYROID) 30 MG tablet Take 1 tablet by mouth daily 2/16/22  Yes Yvette Bolanos DO   Probiotic Acidophilus [facility]) TABS Take 1 tablet by mouth daily 2/16/22  Yes Yvette Bolanos DO   furosemide (LASIX) 20 MG tablet Take 1 tablet by mouth daily 1/17/22  Yes Edson Correia DO   fluticasone (FLONASE) 50 MCG/ACT nasal spray INSTILL 2 SPRAYS INTO EACH NOSTRIL TWICE DAILY 1/6/22  Yes Edson Correia DO   carvedilol (COREG) 3.125 MG tablet TAKE 1 TABLET BY MOUTH TWICE DAILY WITH MEALS 12/3/21  Yes Yvette Bolanos DO   clopidogrel (PLAVIX) 75 MG tablet TAKE 1 TABLET BY MOUTH DAILY 12/3/21  Yes Jeff Gutierrez MD   famotidine (PEPCID) 20 MG tablet TAKE 1 TABLET BY MOUTH TWICE DAILY 11/16/21  Yes Edson Correia DO   ASPIRIN LOW DOSE 81 MG EC tablet TAKE 1 TABLET BY MOUTH EVERY DAY 6/16/21  Yes Historical Provider, MD   Ascorbic Acid (VITAMIN C) 1000 MG tablet Take 1,000 mg by mouth daily   Yes Historical Provider, MD   blood glucose monitor strips Check blood sugar q daily, Dx R73.01 7/6/21  Yes Edson Correia DO   atorvastatin (LIPITOR) 80 MG tablet Take 1 tablet by mouth nightly 6/21/21  Yes Marylee Lower, MD   nitroGLYCERIN (NITROSTAT) 0.4 MG SL tablet up to max of 3 total doses. If no relief after 1 dose, call 911. 5/10/21  Yes Raven Horn MD   LORazepam (ATIVAN) 1 MG tablet Take 0.5 mg by mouth every 6 hours as needed for Anxiety. Pt usually only takes a half tab when needed   Yes Historical Provider, MD   spironolactone (ALDACTONE) 50 MG tablet Take 1 tablet by mouth daily  Patient taking differently: Take 25 mg by mouth daily  4/14/21  Yes RACQUEL Galindo - CNP   montelukast (SINGULAIR) 10 MG tablet TAKE 1 TABLET BY MOUTH DAILY 12/29/20  Yes Edson Correia, DO   melatonin 3 MG TABS tablet Take 3 mg by mouth nightly as needed    Yes Historical Provider, MD   b complex vitamins capsule Take 1 capsule by mouth daily   Yes Historical Provider, MD   polyethyl glycol-propyl glycol 0.4-0.3 % (SYSTANE) 0.4-0.3 % ophthalmic solution 1 drop in the morning and at bedtime Both eyes   Yes Historical Provider, MD   docusate sodium (COLACE) 100 MG capsule Take 100 mg by mouth 2 times daily    Yes Historical Provider, MD   OLIVE LEAF PO Take 450 mg by mouth daily   Yes Historical Provider, MD   Handicap Placard [address] [facility] by Does not apply route Expires 9/6/2022 9/6/17  Yes Edson Correia, DO   Blood Glucose Monitoring Suppl (TRUE METRIX METER) W/DEVICE KIT 1 Device by Does not apply route daily Check blood sugar q daily, Dx E11.9 7/15/16  Yes Rubia Collins, DO   Multiple Vitamins-Minerals (THERAPEUTIC MULTIVITAMIN-MINERALS) tablet Take 1 tablet by mouth daily.    Yes Historical Provider, MD   fish oil-omega-3 fatty acids 1000 MG capsule Take 1 g by mouth daily    Yes Historical Provider, MD   CALCIUM CITRATE Take 600 mg by mouth in the morning and at bedtime    Yes Historical Provider, MD       Future Appointments   Date Time Provider Rosy Fisher   7/28/2022  2:00 PM Marylee Lower, MD [address] [facility]

## 2022-03-10 ENCOUNTER — CARE COORDINATION (OUTPATIENT)
Dept: CARE COORDINATION | Age: 87
End: 2022-03-10

## 2022-03-10 NOTE — CARE COORDINATION
Ambulatory Care Coordination Note  3/10/2022  CM Risk Score: 2  Charlson 10 Year Mortality Risk Score: 100%     ACC: Anthony Brasher, SCOOBY    Summary Note: Kaela Cummings called asking a question about her hemorrhoids. Encouraged her to use walk in clinic to have this addressed. She states she will go to walk in clinic tomorrow. I also informed her of the CHF initiative via [address] [place] states she doesn't use MyChart. I discussed the information with her and informed her I would have it placed in the mail for her. Care Coordination Interventions    Program Enrollment: Complex Care  Referral from Primary Care Provider: Yes  Suggested Interventions and [address]: Declined  Meals on Wheels: Completed  Medication Assistance Program:  (Comment: not yet but may run into issues with new prescription card)  Medi Set or Pill Pack: Completed  Occupational Therapy: Not Started  Pharmacist: Completed (Comment: pharmacy referral placed 2-16-22)  Physical Therapy: Not Started  Senior Services: Completed (Comment: active with AAA and COA)  Social Work: Not Started  Zone Management Tools: Completed  Other Services or Interventions: Currently active with COA and AAA         Goals Addressed    None         Prior to Admission medications    Medication Sig Start Date End Date Taking? Authorizing Provider   atorvastatin (LIPITOR) 80 MG tablet Take 1 tablet by mouth nightly 3/8/22   Homero Germain, DO   zinc 50 MG TABS tablet Take 50 mg by mouth daily    Historical Provider, MD   Psyllium (METAMUCIL FREE & NATURAL PO) Take by mouth as needed    Historical Provider, MD   gabapentin (NEURONTIN) 100 MG capsule Take 1 capsule by mouth 2 times daily for 180 days.  2/16/22 8/15/22  Homero Germain, DO   thyroid [facility] THYROID) 30 MG tablet Take 1 tablet by mouth daily 2/16/22   Homero Germain, DO   Probiotic Acidophilus [facility]) TABS Take 1 tablet by mouth daily 2/16/22   Homero Germain, DO   furosemide Yunior Aleman, DO   Blood Glucose Monitoring Suppl (TRUE METRIX METER) W/DEVICE KIT 1 Device by Does not apply route daily Check blood sugar q daily, Dx E11.9 7/15/16   Yunior Aleman, DO   Multiple Vitamins-Minerals (THERAPEUTIC MULTIVITAMIN-MINERALS) tablet Take 1 tablet by mouth daily.     Historical Provider, MD   fish oil-omega-3 fatty acids 1000 MG capsule Take 1 g by mouth daily     Historical Provider, MD   CALCIUM CITRATE Take 600 mg by mouth in the morning and at bedtime     Historical Provider, MD       Future Appointments   Date Time Provider Rosy Natalia   7/28/2022  2:00 PM J Carlos Jernigan MD [address] [facility]

## 2022-03-11 ENCOUNTER — OFFICE VISIT (OUTPATIENT)
Dept: FAMILY MEDICINE CLINIC | Age: 87
End: 2022-03-11
Payer: MEDICARE

## 2022-03-11 VITALS
DIASTOLIC BLOOD PRESSURE: 58 MMHG | TEMPERATURE: 97.2 F | BODY MASS INDEX: 25.48 KG/M2 | WEIGHT: 129.8 LBS | HEART RATE: 72 BPM | HEIGHT: 60 IN | RESPIRATION RATE: 16 BRPM | SYSTOLIC BLOOD PRESSURE: 128 MMHG | OXYGEN SATURATION: 99 %

## 2022-03-11 DIAGNOSIS — N39.46 MIXED INCONTINENCE: Primary | ICD-10-CM

## 2022-03-11 DIAGNOSIS — R35.0 URINARY FREQUENCY: ICD-10-CM

## 2022-03-11 DIAGNOSIS — R39.15 URINARY URGENCY: ICD-10-CM

## 2022-03-11 PROCEDURE — 0509F URINE INCON PLAN DOCD: CPT | Performed by: NURSE PRACTITIONER

## 2022-03-11 PROCEDURE — 4040F PNEUMOC VAC/ADMIN/RCVD: CPT | Performed by: NURSE PRACTITIONER

## 2022-03-11 PROCEDURE — G8427 DOCREV CUR MEDS BY ELIG CLIN: HCPCS | Performed by: NURSE PRACTITIONER

## 2022-03-11 PROCEDURE — G8417 CALC BMI ABV UP PARAM F/U: HCPCS | Performed by: NURSE PRACTITIONER

## 2022-03-11 PROCEDURE — 1090F PRES/ABSN URINE INCON ASSESS: CPT | Performed by: NURSE PRACTITIONER

## 2022-03-11 PROCEDURE — G8484 FLU IMMUNIZE NO ADMIN: HCPCS | Performed by: NURSE PRACTITIONER

## 2022-03-11 PROCEDURE — 99214 OFFICE O/P EST MOD 30 MIN: CPT | Performed by: NURSE PRACTITIONER

## 2022-03-11 PROCEDURE — 1123F ACP DISCUSS/DSCN MKR DOCD: CPT | Performed by: NURSE PRACTITIONER

## 2022-03-11 PROCEDURE — 1036F TOBACCO NON-USER: CPT | Performed by: NURSE PRACTITIONER

## 2022-03-11 NOTE — PROGRESS NOTES
Jacquie Wright is a 80 y.o. female thatpresents for Hemorrhoids      History obtained today from Patient.    hemorrhoids   Had them x 68 years  Waxes and wanes in severity  Uses a numbing cream she had at home  Occasionally will see a few drops of blood on her toilet paper   Takes Metamucil a few times a week for constipation prevention    Urinary leakage  Had for years, gradually getting worse  Discussed this with Dr. Jacey Dove in the past, wanted to hold off on meds until now  Notes leakage is worse first thing in the morning  Throughout the day will have episodes of urge incontinence  No gross hematuria, recurrent UTI, urinary retention    I have reviewed the patient's past medical history, past surgical history, allergies,medications, social and family history and I have made updates where appropriate. Past Medical History:   Diagnosis Date    Arthritis     Cancer [facility]) 2013    SKIN CANCER ON LEFT ANKLE    Diverticulosis     Hyperlipidemia     Hypertension     Hypothyroidism     Psoriasis     S/P angioplasty with stent 05/06/2021    3 stents to LAD, 1 stent to OM, 2 stents to diag. per Dr. Nivia Mcclain Shinroxanne     Thyroid disorder        Social History     Tobacco Use    Smoking status: Never Smoker    Smokeless tobacco: Never Used   Vaping Use    Vaping Use: Never used   Substance Use Topics    Alcohol use: No    Drug use: No       Family History   Problem Relation Age of Onset    Cancer Child     Breast Cancer Sister     Stroke Sister     Heart Disease Sister     Other Other         visual problems    Breast Cancer Daughter 52         Review of Systems        PHYSICAL EXAM:  BP (!) 128/58   Pulse 72   Temp 97.2 °F (36.2 °C) (Infrared)   Resp 16   Ht 5' (1.524 m)   Wt 129 lb 12.8 oz (58.9 kg)   SpO2 99%   BMI 25.35 kg/m²     Physical Exam  Constitutional:       Appearance: Normal appearance. HENT:      Head: Normocephalic and atraumatic.       Right Ear: External ear normal.      Left Ear: External ear normal.      Nose: Nose normal.      Mouth/Throat:      Mouth: Mucous membranes are moist.   Eyes:      Conjunctiva/sclera: Conjunctivae normal.   Cardiovascular:      Rate and Rhythm: Normal rate and regular rhythm. Pulses: Normal pulses. Heart sounds: Normal heart sounds. Pulmonary:      Effort: Pulmonary effort is normal.      Breath sounds: Normal breath sounds. Abdominal:      General: Bowel sounds are normal.      Palpations: Abdomen is soft. Genitourinary:     Rectum: External hemorrhoid present. Musculoskeletal:         General: Normal range of motion. Cervical back: Normal range of motion. Skin:     General: Skin is warm and dry. Neurological:      General: No focal deficit present. Mental Status: She is alert and oriented to person, place, and time. Psychiatric:         Mood and Affect: Mood normal.         Behavior: Behavior normal.           ASSESSMENT & PLAN  Luther Rodriguez was seen today for hemorrhoids. Diagnoses and all orders for this visit:    Mixed incontinence  -     mirabegron (MYRBETRIQ) 50 MG TB24; Take 50 mg by mouth daily    Urinary frequency  -     mirabegron (MYRBETRIQ) 50 MG TB24; Take 50 mg by mouth daily    Urinary urgency  -     mirabegron (MYRBETRIQ) 50 MG TB24; Take 50 mg by mouth daily    will call us with GI MD she wants to see for hemorrhoids  Encouraged her to try Prep H  Will update me on Myrbetriq    No follow-ups on file. Start above treatments and Referred to GI    All copied or forwarded information in the progress note was verified by me to be accurate at the time of visit  Patient's past medical, surgical, social and family history were reviewed and updated     All patient questions answered. Patient voiced understanding.

## 2022-03-22 ENCOUNTER — CARE COORDINATION (OUTPATIENT)
Dept: CARE COORDINATION | Age: 87
End: 2022-03-22

## 2022-03-22 NOTE — TELEPHONE ENCOUNTER
With some of her allergies and health conditions, we are a little limited as far as pain management. I recommend that she start with Tylenol ES q 6 hrs prn first and if that doesn't work, let me know and we can talk about a stronger medication.

## 2022-03-22 NOTE — CARE COORDINATION
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 She states she has an appt with [facility], Dr. Irvin Barraza scheduled for next week to discuss possible injections. She would like your recommendation for something for pain either called in or over the counter to take to help manage her pain. She rates her pain 4/10 during the day and 8/10 at night. She stopped taking her gabapapentin a little over a week ago for her shingles pain and feels better being off the gabapentin as it made her tired. She is asking for your recommendation. Please advise. [facility] is [facility]. Thank you!

## 2022-03-22 NOTE — CARE COORDINATION
Ambulatory Care Coordination Note  3/22/2022  CM Risk Score: 2  Charlson 10 Year Mortality Risk Score: 100%     ACC: Daphne Connolly RN    Summary Note: Spoke with Grecia Knox for continued [facility] follow up call. She has some questions and need for something for pain due to increased hip and shoulder pain. Has appt with [facility], Dr. Keshia West scheduled for next week  Had injections in rt hip 9-22-21 and shoulder 12-9-21. Having increased pain in left hip which is what she is seeing [facility] for. States pain is worse at night rating 8/10 and 4/10 during daytime  Is no longer taking gapapentin for shingles pain. Has been off that for a little over a week.   Discussed daily weight  She states she no longer has zone tool for CHF as discussed during PCP visit so I will have one mailed to her to use as reference  I explained zone tool during call    Plan  -mail out zone tool for CHF  -encourage daily weight tracking and reporting  -encourage blood pressure tracking and reporting  -collaborate with [facility] as needed  -ask PCP for intervention for pain management  Congestive Heart Failure Assessment    Are you currently restricting fluids?: No Restriction  Do you understand a low sodium diet?: Yes  Do you understand how to read food labels?: Yes  How many restaurant meals do you eat per week?: 1-2  Do you salt your food before tasting it?: No     No patient-reported symptoms      Symptoms:     Patient-reported weight (lb): 129      and   General Assessment    Do you have any symptoms that are causing concern?: Yes  Progression since Onset: Unchanged  Reported Symptoms: Pain               Care Coordination Interventions    Program Enrollment: Complex Care  Referral from Primary Care Provider: Yes  Suggested Interventions and [facility] [address]: Declined  Meals on Wheels: Completed  Medication Assistance Program:  (Comment: not yet but may run into issues with new prescription card)  Medi Set or Pill Pack: Completed  Occupational Therapy: Not Started  Pharmacist: Completed (Comment: pharmacy referral placed 2-16-22)  Physical Therapy: Not Started  Senior Services: Completed (Comment: active with AAA and COA)  Social Work: Not Started  Zone Management Tools: Completed  Other Services or Interventions: Currently active with COA and AAA         Goals Addressed                    This Visit's Progress      Conditions and Symptoms (pt-stated)   On track      I will schedule office visits, as directed by my provider. I will keep my appointment or reschedule if I have to cancel. I will notify my provider of any barriers to my plan of care. I will follow my Zone Management tool to seek urgent or emergent care. I will notify my provider of any symptoms that indicate a worsening of my condition. CHF  Barriers: need for additional education and support  Plan for overcoming my barriers: family and ACM support  Confidence: 9/10  Anticipated Goal Completion Date: 5/16/22              Prior to Admission medications    Medication Sig Start Date End Date Taking? Authorizing Provider   mirabegron (MYRBETRIQ) 50 MG TB24 Take 50 mg by mouth daily 3/11/22  Yes Jeet Oneil APRN - CNP   atorvastatin (LIPITOR) 80 MG tablet Take 1 tablet by mouth nightly 3/8/22  Yes Shari Zhong, DO   zinc 50 MG TABS tablet Take 50 mg by mouth daily   Yes Historical Provider, MD   Psyllium (METAMUCIL FREE & NATURAL PO) Take by mouth as needed   Yes Historical Provider, MD   gabapentin (NEURONTIN) 100 MG capsule Take 1 capsule by mouth 2 times daily for 180 days.  2/16/22 8/15/22 Yes Shari Zhong DO   thyroid [facility] THYROID) 30 MG tablet Take 1 tablet by mouth daily 2/16/22  Yes Shari Zhong DO   Probiotic Acidophilus [facility]) TABS Take 1 tablet by mouth daily 2/16/22  Yes Shari Zhong DO   furosemide (LASIX) 20 MG tablet Take 1 tablet by mouth daily 1/17/22  Yes Edson Correia, DO   fluticasone (FLONASE) 50 MCG/ACT nasal spray INSTILL 2 SPRAYS INTO EACH NOSTRIL TWICE DAILY 1/6/22  Yes Edson Correia, DO   carvedilol (COREG) 3.125 MG tablet TAKE 1 TABLET BY MOUTH TWICE DAILY WITH MEALS 12/3/21  Yes Shari Zhong, DO   clopidogrel (PLAVIX) 75 MG tablet TAKE 1 TABLET BY MOUTH DAILY 12/3/21  Yes William Moreno MD   famotidine (PEPCID) 20 MG tablet TAKE 1 TABLET BY MOUTH TWICE DAILY 11/16/21  Yes Edson Correia, DO   ASPIRIN LOW DOSE 81 MG EC tablet TAKE 1 TABLET BY MOUTH EVERY DAY 6/16/21  Yes Historical Provider, MD   Ascorbic Acid (VITAMIN C) 1000 MG tablet Take 1,000 mg by mouth daily   Yes Historical Provider, MD   blood glucose monitor strips Check blood sugar q daily, Dx R73.01 7/6/21  Yes Edson Correia, DO   nitroGLYCERIN (NITROSTAT) 0.4 MG SL tablet up to max of 3 total doses. If no relief after 1 dose, call 911. 5/10/21  Yes Janeth Lanier MD   LORazepam (ATIVAN) 1 MG tablet Take 0.5 mg by mouth every 6 hours as needed for Anxiety.  Pt usually only takes a half tab when needed   Yes Historical Provider, MD   spironolactone (ALDACTONE) 50 MG tablet Take 1 tablet by mouth daily  Patient taking differently: Take 25 mg by mouth daily  4/14/21  Yes RACQUEL Bowman - CNP   montelukast (SINGULAIR) 10 MG tablet TAKE 1 TABLET BY MOUTH DAILY 12/29/20  Yes Edson Correia, DO   melatonin 3 MG TABS tablet Take 3 mg by mouth nightly as needed    Yes Historical Provider, MD   b complex vitamins capsule Take 1 capsule by mouth daily   Yes Historical Provider, MD   polyethyl glycol-propyl glycol 0.4-0.3 % (SYSTANE) 0.4-0.3 % ophthalmic solution 1 drop in the morning and at bedtime Both eyes   Yes Historical Provider, MD   docusate sodium (COLACE) 100 MG capsule Take 100 mg by mouth 2 times daily    Yes Historical Provider, MD   OLIVE LEAF PO Take 450 mg by mouth daily   Yes Historical Provider, MD   Handicap Placard MISC by Does not apply route Expires 9/6/2022 9/6/17  Yes Edson Correia, DO   Blood Glucose Monitoring Suppl (TRUE METRIX METER) W/DEVICE KIT 1 Device by Does not apply route daily Check blood sugar q daily, Dx E11.9 7/15/16  Yes Sushil Funes, DO   Multiple Vitamins-Minerals (THERAPEUTIC MULTIVITAMIN-MINERALS) tablet Take 1 tablet by mouth daily.    Yes Historical Provider, MD   fish oil-omega-3 fatty acids 1000 MG capsule Take 1 g by mouth daily    Yes Historical Provider, MD   CALCIUM CITRATE Take 600 mg by mouth in the morning and at bedtime    Yes Historical Provider, MD       Future Appointments   Date Time Provider Rosy Natalia   7/28/2022  2:00 PM Tino Zhang MD [id number] [facility]

## 2022-03-25 ENCOUNTER — CARE COORDINATION (OUTPATIENT)
Dept: CARE COORDINATION | Age: 87
End: 2022-03-25

## 2022-03-25 ENCOUNTER — OFFICE VISIT (OUTPATIENT)
Dept: FAMILY MEDICINE CLINIC | Age: 87
End: 2022-03-25
Payer: MEDICARE

## 2022-03-25 VITALS
BODY MASS INDEX: 25.32 KG/M2 | DIASTOLIC BLOOD PRESSURE: 70 MMHG | SYSTOLIC BLOOD PRESSURE: 120 MMHG | TEMPERATURE: 97.8 F | HEART RATE: 73 BPM | HEIGHT: 60 IN | OXYGEN SATURATION: 97 % | WEIGHT: 129 LBS

## 2022-03-25 DIAGNOSIS — I10 ESSENTIAL HYPERTENSION: Primary | ICD-10-CM

## 2022-03-25 DIAGNOSIS — M15.9 PRIMARY OSTEOARTHRITIS INVOLVING MULTIPLE JOINTS: ICD-10-CM

## 2022-03-25 PROCEDURE — 99213 OFFICE O/P EST LOW 20 MIN: CPT | Performed by: FAMILY MEDICINE

## 2022-03-25 PROCEDURE — G8417 CALC BMI ABV UP PARAM F/U: HCPCS | Performed by: FAMILY MEDICINE

## 2022-03-25 PROCEDURE — 1123F ACP DISCUSS/DSCN MKR DOCD: CPT | Performed by: FAMILY MEDICINE

## 2022-03-25 PROCEDURE — 1090F PRES/ABSN URINE INCON ASSESS: CPT | Performed by: FAMILY MEDICINE

## 2022-03-25 PROCEDURE — 4040F PNEUMOC VAC/ADMIN/RCVD: CPT | Performed by: FAMILY MEDICINE

## 2022-03-25 PROCEDURE — G8427 DOCREV CUR MEDS BY ELIG CLIN: HCPCS | Performed by: FAMILY MEDICINE

## 2022-03-25 PROCEDURE — 1036F TOBACCO NON-USER: CPT | Performed by: FAMILY MEDICINE

## 2022-03-25 PROCEDURE — G8484 FLU IMMUNIZE NO ADMIN: HCPCS | Performed by: FAMILY MEDICINE

## 2022-03-25 NOTE — PROGRESS NOTES
Madeleine Clark is a 80 y.o. female that presents for     Chief Complaint   Patient presents with    Hypertension    Arthritis     hip and shoulder       /70   Pulse 73   Temp 97.8 °F (36.6 °C) (Infrared)   Ht 5' (1.524 m)   Wt 129 lb (58.5 kg)   SpO2 97%   BMI 25.19 kg/m²       HPI    HTN    Does patient check BP regularly at home? - Yes - pt concerned about elevated readings at home. Noted that her cuff was reading higher than what we measured. Her BP has consistently been well controlled. Has been having more pain int he hip and shoulder recently. Sees Dr Stephenie Clement in 5 days for a joint injection. Current Medication regimen - coreg, lasix, aldactone  Tolerating medications well? - yes    Shortness of breath or chest pain? No  Headache or visual complaints? No  Neurologic changes like confusion? No  Extremity edema? Yes, chronic issue, has been stable    BP Readings from Last 3 Encounters:   03/25/22 120/70   03/11/22 (!) 128/58   02/28/22 124/72         Health Maintenance   Topic Date Due    DTaP/Tdap/Td vaccine (1 - Tdap) Never done    Shingles Vaccine (2 of 3) 04/12/2014    COVID-19 Vaccine (3 - Booster for Pfizer series) 08/19/2021    Flu vaccine (1) 09/01/2021    Depression Screen  02/16/2023    Lipid screen  02/22/2023    TSH testing  02/22/2023    Potassium monitoring  02/22/2023    Creatinine monitoring  02/22/2023    Pneumococcal 65+ years Vaccine  Completed    Hepatitis A vaccine  Aged Out    Hepatitis B vaccine  Aged Out    Hib vaccine  Aged Out    Meningococcal (ACWY) vaccine  Aged Out       Past Medical History:   Diagnosis Date    Arthritis     Cancer ([facility] [address] 2013    SKIN CANCER ON LEFT ANKLE    Diverticulosis     Hyperlipidemia     Hypertension     Hypothyroidism     Psoriasis     S/P angioplasty with stent 05/06/2021    3 stents to LAD, 1 stent to OM, 2 stents to diag.  per Dr. Judi Bose     Thyroid disorder        Past Surgical History:   Procedure Laterality Date    BREAST BIOPSY Left     benign    BREAST SURGERY Left 6/1966    Lumpectomy    CARPAL TUNNEL RELEASE Right 1/30/2013    CARPAL TUNNEL RELEASE Left 7/25/13    CATARACT REMOVAL Bilateral 1999    COLON SURGERY  11/1999    resection    COLONOSCOPY  2003, 2006, 2011    DIAGNOSTIC CARDIAC CATH LAB PROCEDURE  05/07/2021    6 stents     HYSTERECTOMY  2/23/1970    HYSTERECTOMY, TOTAL ABDOMINAL      KNEE ARTHROSCOPY Right 11/21/2007    meniscectomies    KNEE SURGERY Left 2000    replacement    MYRINGOTOMY Right 07/01/2015    tube insertion    OVARY REMOVAL Bilateral 7/12/2001    oophorectomy    OVARY REMOVAL Bilateral     SHOULDER SURGERY  5/14    SINUS SURGERY         Social History     Tobacco Use    Smoking status: Never Smoker    Smokeless tobacco: Never Used   Vaping Use    Vaping Use: Never used   Substance Use Topics    Alcohol use: No    Drug use: No       Family History   Problem Relation Age of Onset    Cancer Child     Breast Cancer Sister     Stroke Sister     Heart Disease Sister     Other Other         visual problems    Breast Cancer Daughter 52         I have reviewed the patient's past medical history, past surgical history, allergies, medications, social and family history and I have made updates where appropriate. Review of Systems        PHYSICAL EXAM:  /70   Pulse 73   Temp 97.8 °F (36.6 °C) (Infrared)   Ht 5' (1.524 m)   Wt 129 lb (58.5 kg)   SpO2 97%   BMI 25.19 kg/m²     Physical Exam  Nursing note reviewed. Constitutional:       Appearance: She is well-developed. Pulmonary:      Effort: Pulmonary effort is normal. No respiratory distress. Neurological:      Mental Status: She is alert. Psychiatric:         Behavior: Behavior normal.         Thought Content: Thought content normal.         Judgment: Judgment normal.             ASSESSMENT & PLAN  Hari Winters was seen today for hypertension and arthritis.     Diagnoses and all orders for this visit:    Essential hypertension    Primary osteoarthritis involving multiple joints        No follow-ups on file. Symptoms consistent with controlled HTN and Continue coreg, lasix, aldactone, has been tolerating well    The most recent results of the following tests were reviewed with the patient today: none     All copied or forwarded information in the progress note was verified by me to be accurate at the time of visit  Patient's past medical, surgical, social and family history were reviewed and updated     All patient questions answered. Patient voiced understanding.

## 2022-03-30 DIAGNOSIS — R09.82 POST-NASAL DRIP: ICD-10-CM

## 2022-03-30 RX ORDER — MONTELUKAST SODIUM 10 MG/1
TABLET ORAL
Qty: 30 TABLET | OUTPATIENT
Start: 2022-03-30

## 2022-04-04 ENCOUNTER — CARE COORDINATION (OUTPATIENT)
Dept: CARE COORDINATION | Age: 87
End: 2022-04-04

## 2022-04-04 DIAGNOSIS — R09.82 POST-NASAL DRIP: ICD-10-CM

## 2022-04-04 RX ORDER — MONTELUKAST SODIUM 10 MG/1
10 TABLET ORAL DAILY
Qty: 90 TABLET | Refills: 3 | Status: SHIPPED | OUTPATIENT
Start: 2022-04-04

## 2022-04-04 NOTE — CARE COORDINATION
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 She states she feels that it caused her to be constipated and now that she is no longer taking it, her bowels are moving normally again. She wanted to let you know. Thank you!

## 2022-04-04 NOTE — CARE COORDINATION
Ambulatory Care Coordination Note  4/4/2022  CM Risk Score: 2  Charlson 10 Year Mortality Risk Score: 100%     ACC: Kit Calabrese, RN    Summary Note: Spoke with Chidi Courtney. She had some questions regarding her medications, compression socks and needing a refill on her medication. States she stopped taking Myrbetriq because she felt like it worse causing worsening constipation. She has stopped taking it and states her bowels are back to normal.  Needing refill on singulair. States last provider that ordered that was Dr[facility] Lino Baron in [facility]. I will ask PCP to refill that for her. She also states she is going to talk to pharmacy today about compression hose/socks and see if they have any recommendations. CHF at baseline.     Plan-  -ask for medication refill  -ensure she was able to get compression socks/hose  -inform PCP of medication stopped  -encourage daily weight tracking and reporting  -encourage use of zone tools  -reinforce use of myself, PCP, PCP office and walk in clinic for issues that can be resolved in office to prevent unnecessary utilization  Congestive Heart Failure Assessment    Are you currently restricting fluids?: No Restriction  Do you understand a low sodium diet?: Yes  Do you understand how to read food labels?: Yes  How many restaurant meals do you eat per week?: 1-2  Do you salt your food before tasting it?: No     No patient-reported symptoms      Symptoms:                  Care Coordination Interventions    Program Enrollment: Complex Care  Referral from Primary Care Provider: Yes  Suggested Interventions and [address]: Declined  Meals on Wheels: Completed  Medication Assistance Program:  (Comment: not yet but may run into issues with new prescription card)  Medi Set or Pill Pack: Completed  Occupational Therapy: Not Started  Pharmacist: Completed (Comment: pharmacy referral placed 2-16-22)  Physical Therapy: Not Started  Senior Services: Completed (Comment: active with AAA and COA)  Social Work: Not Started  Zone Management Tools: Completed  Other Services or Interventions: Currently active with COA and AAA         Goals Addressed                    This Visit's Progress      Conditions and Symptoms (pt-stated)   On track      I will schedule office visits, as directed by my provider. I will keep my appointment or reschedule if I have to cancel. I will notify my provider of any barriers to my plan of care. I will follow my Zone Management tool to seek urgent or emergent care. I will notify my provider of any symptoms that indicate a worsening of my condition. CHF  Barriers: need for additional education and support  Plan for overcoming my barriers: family and ACM support  Confidence: 9/10  Anticipated Goal Completion Date: 5/16/22              Prior to Admission medications    Medication Sig Start Date End Date Taking? Authorizing Provider   mirabegron (MYRBETRIQ) 50 MG TB24 Take 50 mg by mouth daily 3/11/22  Yes RACQUEL Adan CNP   atorvastatin (LIPITOR) 80 MG tablet Take 1 tablet by mouth nightly 3/8/22  Yes Marcelina Counter, DO   zinc 50 MG TABS tablet Take 50 mg by mouth daily   Yes Historical Provider, MD   Psyllium (METAMUCIL FREE & NATURAL PO) Take by mouth as needed   Yes Historical Provider, MD   gabapentin (NEURONTIN) 100 MG capsule Take 1 capsule by mouth 2 times daily for 180 days.  2/16/22 8/15/22 Yes Marcelina Counter, DO   thyroid (ARMOUR THYROID) 30 MG tablet Take 1 tablet by mouth daily 2/16/22  Yes Marcelina Counter, DO   Probiotic Acidophilus [facility]) TABS Take 1 tablet by mouth daily 2/16/22  Yes Marcelina Counter, DO   furosemide (LASIX) 20 MG tablet Take 1 tablet by mouth daily 1/17/22  Yes Edson Correia, DO   fluticasone (FLONASE) 50 MCG/ACT nasal spray INSTILL 2 SPRAYS INTO EACH NOSTRIL TWICE DAILY 1/6/22  Yes Edson Correia, DO   carvedilol (COREG) 3.125 MG tablet TAKE 1 TABLET BY MOUTH TWICE DAILY WITH MEALS 12/3/21  Yes Marcelina Counter, DO clopidogrel (PLAVIX) 75 MG tablet TAKE 1 TABLET BY MOUTH DAILY 12/3/21  Yes Jonas Merino MD   famotidine (PEPCID) 20 MG tablet TAKE 1 TABLET BY MOUTH TWICE DAILY 11/16/21  Yes Edson Correia, DO   ASPIRIN LOW DOSE 81 MG EC tablet TAKE 1 TABLET BY MOUTH EVERY DAY 6/16/21  Yes Historical Provider, MD   Ascorbic Acid (VITAMIN C) 1000 MG tablet Take 1,000 mg by mouth daily   Yes Historical Provider, MD   blood glucose monitor strips Check blood sugar q daily, Dx R73.01 7/6/21  Yes Edson Correia, DO   nitroGLYCERIN (NITROSTAT) 0.4 MG SL tablet up to max of 3 total doses. If no relief after 1 dose, call 911. 5/10/21  Yes Jill Quiñones MD   LORazepam (ATIVAN) 1 MG tablet Take 0.5 mg by mouth every 6 hours as needed for Anxiety.  Pt usually only takes a half tab when needed   Yes Historical Provider, MD   spironolactone (ALDACTONE) 50 MG tablet Take 1 tablet by mouth daily  Patient taking differently: Take 25 mg by mouth daily  4/14/21  Yes Zina Adrian APRN - CNP   montelukast (SINGULAIR) 10 MG tablet TAKE 1 TABLET BY MOUTH DAILY 12/29/20  Yes Edson Correia, DO   melatonin 3 MG TABS tablet Take 3 mg by mouth nightly as needed    Yes Historical Provider, MD   b complex vitamins capsule Take 1 capsule by mouth daily   Yes Historical Provider, MD   polyethyl glycol-propyl glycol 0.4-0.3 % (SYSTANE) 0.4-0.3 % ophthalmic solution 1 drop in the morning and at bedtime Both eyes   Yes Historical Provider, MD   docusate sodium (COLACE) 100 MG capsule Take 100 mg by mouth 2 times daily    Yes Historical Provider, MD   OLIVE LEAF PO Take 450 mg by mouth daily   Yes Historical Provider, MD   Handicap Placard [address] [facility] by Does not apply route Expires 9/6/2022 9/6/17  Yes Edson Correia, DO   Blood Glucose Monitoring Suppl (TRUE METRIX METER) W/DEVICE KIT 1 Device by Does not apply route daily Check blood sugar q daily, Dx E11.9 7/15/16  Yes Geronimo Davila, DO   Multiple Vitamins-Minerals (THERAPEUTIC MULTIVITAMIN-MINERALS) tablet Take 1 tablet by mouth daily.    Yes Historical Provider, MD   fish oil-omega-3 fatty acids 1000 MG capsule Take 1 g by mouth daily    Yes Historical Provider, MD   CALCIUM CITRATE Take 600 mg by mouth in the morning and at bedtime    Yes Historical Provider, MD       Future Appointments   Date Time Provider Rosy Fisher   7/5/2022 12:45 PM Loyda Eid MD N [facility] Heart [facility] - [place]

## 2022-04-08 ENCOUNTER — CARE COORDINATION (OUTPATIENT)
Dept: CARE COORDINATION | Age: 87
End: 2022-04-08

## 2022-04-08 NOTE — CARE COORDINATION
2-16-22)  Physical Therapy: Not Started  Senior Services: Completed (Comment: active with AAA and COA)  Social Work: Not Started  Zone Management Tools: Completed  Other Services or Interventions: Currently active with COA and AAA         Goals Addressed                    This Visit's Progress      Conditions and Symptoms (pt-stated)   On track      I will schedule office visits, as directed by my provider. I will keep my appointment or reschedule if I have to cancel. I will notify my provider of any barriers to my plan of care. I will follow my Zone Management tool to seek urgent or emergent care. I will notify my provider of any symptoms that indicate a worsening of my condition. CHF  Barriers: need for additional education and support  Plan for overcoming my barriers: family and ACM support  Confidence: 9/10  Anticipated Goal Completion Date: 5/16/22              Prior to Admission medications    Medication Sig Start Date End Date Taking? Authorizing Provider   montelukast (SINGULAIR) 10 MG tablet Take 1 tablet by mouth daily 4/4/22  Yes Akosua Breath, DO   mirabegron (MYRBETRIQ) 50 MG TB24 Take 50 mg by mouth daily 3/11/22  Yes RACQUEL Ramírez CNP   atorvastatin (LIPITOR) 80 MG tablet Take 1 tablet by mouth nightly 3/8/22  Yes Akosua Breath, DO   zinc 50 MG TABS tablet Take 50 mg by mouth daily   Yes Historical Provider, MD   Psyllium (METAMUCIL FREE & NATURAL PO) Take by mouth as needed   Yes Historical Provider, MD   gabapentin (NEURONTIN) 100 MG capsule Take 1 capsule by mouth 2 times daily for 180 days.  2/16/22 8/15/22 Yes Akosua Breath, DO   thyroid [facility] THYROID) 30 MG tablet Take 1 tablet by mouth daily 2/16/22  Yes Akosua Breath, DO   Probiotic Acidophilus [facility]) TABS Take 1 tablet by mouth daily 2/16/22  Yes Akosua Breath, DO   furosemide (LASIX) 20 MG tablet Take 1 tablet by mouth daily 1/17/22  Yes Edson Correia, DO   fluticasone (FLONASE) 50 MCG/ACT nasal spray INSTILL 2 SPRAYS INTO EACH NOSTRIL TWICE DAILY 1/6/22  Yes Edson Correia, DO   carvedilol (COREG) 3.125 MG tablet TAKE 1 TABLET BY MOUTH TWICE DAILY WITH MEALS 12/3/21  Yes Marcelina Counter, DO   clopidogrel (PLAVIX) 75 MG tablet TAKE 1 TABLET BY MOUTH DAILY 12/3/21  Yes Beka Vera MD   famotidine (PEPCID) 20 MG tablet TAKE 1 TABLET BY MOUTH TWICE DAILY 11/16/21  Yes Edson Correia, DO   ASPIRIN LOW DOSE 81 MG EC tablet TAKE 1 TABLET BY MOUTH EVERY DAY 6/16/21  Yes Historical Provider, MD   Ascorbic Acid (VITAMIN C) 1000 MG tablet Take 1,000 mg by mouth daily   Yes Historical Provider, MD   blood glucose monitor strips Check blood sugar q daily, Dx R73.01 7/6/21  Yes Edson Correia, DO   nitroGLYCERIN (NITROSTAT) 0.4 MG SL tablet up to max of 3 total doses. If no relief after 1 dose, call 911. 5/10/21  Yes Andrea Carlisle MD   LORazepam (ATIVAN) 1 MG tablet Take 0.5 mg by mouth every 6 hours as needed for Anxiety.  Pt usually only takes a half tab when needed   Yes Historical Provider, MD   spironolactone (ALDACTONE) 50 MG tablet Take 1 tablet by mouth daily  Patient taking differently: Take 25 mg by mouth daily  4/14/21  Yes RACQUEL Adan CNP   melatonin 3 MG TABS tablet Take 3 mg by mouth nightly as needed    Yes Historical Provider, MD   b complex vitamins capsule Take 1 capsule by mouth daily   Yes Historical Provider, MD   polyethyl glycol-propyl glycol 0.4-0.3 % (SYSTANE) 0.4-0.3 % ophthalmic solution 1 drop in the morning and at bedtime Both eyes   Yes Historical Provider, MD   docusate sodium (COLACE) 100 MG capsule Take 100 mg by mouth 2 times daily    Yes Historical Provider, MD   OLIVE LEAF PO Take 450 mg by mouth daily   Yes Historical Provider, MD   Handicap Placard [address] [facility] by Does not apply route Expires 9/6/2022 9/6/17  Yes Marcelina Counter, DO   Blood Glucose Monitoring Suppl (TRUE METRIX METER) W/DEVICE KIT 1 Device by Does not apply route daily Check blood sugar q daily, Dx E11.9 7/15/16  Yes Marcelina Counter, DO Multiple Vitamins-Minerals (THERAPEUTIC MULTIVITAMIN-MINERALS) tablet Take 1 tablet by mouth daily.    Yes Historical Provider, MD   fish oil-omega-3 fatty acids 1000 MG capsule Take 1 g by mouth daily    Yes Historical Provider, MD   CALCIUM CITRATE Take 600 mg by mouth in the morning and at bedtime    Yes Historical Provider, MD       Future Appointments   Date Time Provider Rosy Fisher   7/5/2022 12:45 PM Mode Medina MD N [facility] Heart [facility] [email or web address]

## 2022-04-18 ENCOUNTER — CARE COORDINATION (OUTPATIENT)
Dept: CARE COORDINATION | Age: 87
End: 2022-04-18

## 2022-04-18 NOTE — CARE COORDINATION
Ambulatory Care Coordination Note  4/18/2022  CM Risk Score: 2  Charlson 10 Year Mortality Risk Score: 100%     ACC: Ronny Severin, RN    Summary Note: Marlyn Barba called today with some questions and updates for ACM. Marlyn Barba is part of [address] [place] [address] program.  States she was able to find her compression socks and ended up getting them from [employer]. She is very pleased with them and noticed a big difference in how her feet feel. States she has been having increased heart burn and has been using Rolaids prn. She states she would like to give it a week to see if it goes away before any additional PCP intervention for this. States she has no pain in her shoulder anymore following injection from [facility]. She is very thankful for this relief in pain. She is scheduled to have an injection in her hip at [facility] on 4-29-22 and is hoping for the same relief.   CHF at baseline    Plan  -reinforce education completed at [address] [place] [address] visit  -encourage daily weight tracking and reporting  -ensure heartburn has resolved with no need for additional intervention  -reinforce importance of early symptom recognition and reporting, utilizing myself, PCP, office and walk in clinic to address issues to prevent unnecessary hospitalizations  Congestive Heart Failure Assessment    Are you currently restricting fluids?: No Restriction  Do you understand a low sodium diet?: Yes  Do you understand how to read food labels?: Yes  How many restaurant meals do you eat per week?: 1-2  Do you salt your food before tasting it?: No         Symptoms:  CHF associated angina: Neg, CHF associated dyspnea on exertion: Neg, CHF associated fatigue: Neg, CHF associated leg swelling: Neg, CHF associated orthostatic hypotension: Neg, CHF associated PND: Neg, CHF associated shortness of breath: Neg, CHF associated weakness: Neg      Symptom course: stable  Salt intake watch compared to last visit: stable                 Care Coordination Interventions    Program Enrollment: Complex Care  Referral from Primary Care Provider: Yes  Suggested Interventions and [address]: Declined  Meals on Wheels: Completed  Medication Assistance Program:  (Comment: not yet but may run into issues with new prescription card)  Medi Set or Pill Pack: Completed  Occupational Therapy: Not Started  Pharmacist: Completed (Comment: pharmacy referral placed 2-16-22)  Physical Therapy: Not Started  Senior Services: Completed (Comment: active with AAA and [employer])  Social Work: Not Started  Zone Management Tools: Completed  Other Services or Interventions: Currently active with COA and AAA         Goals Addressed                    This Visit's Progress      Conditions and Symptoms (pt-stated)   On track      I will schedule office visits, as directed by my provider. I will keep my appointment or reschedule if I have to cancel. I will notify my provider of any barriers to my plan of care. I will follow my Zone Management tool to seek urgent or emergent care. I will notify my provider of any symptoms that indicate a worsening of my condition. CHF  Barriers: need for additional education and support  Plan for overcoming my barriers: family and ACM support  Confidence: 9/10  Anticipated Goal Completion Date: 5/16/22              Prior to Admission medications    Medication Sig Start Date End Date Taking? Authorizing Provider   montelukast (SINGULAIR) 10 MG tablet Take 1 tablet by mouth daily 4/4/22  Yes Alfred Maker, DO   mirabegron (MYRBETRIQ) 50 MG TB24 Take 50 mg by mouth daily 3/11/22  Yes RACQUEL Osei - CNP   atorvastatin (LIPITOR) 80 MG tablet Take 1 tablet by mouth nightly 3/8/22  Yes Alfred Dinhr, DO   zinc 50 MG TABS tablet Take 50 mg by mouth daily   Yes Historical Provider, MD   Psyllium (METAMUCIL FREE & NATURAL PO) Take by mouth as needed   Yes Historical Provider, MD   gabapentin (NEURONTIN) 100 MG capsule Take 1 capsule by mouth 2 times daily for 180 days.  2/16/22 8/15/22 Yes Suzanne Monk DO   thyroid (ARMOUR THYROID) 30 MG tablet Take 1 tablet by mouth daily 2/16/22  Yes Suzanne Monk DO   Probiotic Acidophilus [facility]) TABS Take 1 tablet by mouth daily 2/16/22  Yes Suzanne Monk DO   furosemide (LASIX) 20 MG tablet Take 1 tablet by mouth daily 1/17/22  Yes Edson Correia DO   fluticasone (FLONASE) 50 MCG/ACT nasal spray INSTILL 2 SPRAYS INTO EACH NOSTRIL TWICE DAILY 1/6/22  Yes Edson Correia DO   carvedilol (COREG) 3.125 MG tablet TAKE 1 TABLET BY MOUTH TWICE DAILY WITH MEALS 12/3/21  Yes Suzanne Monk DO   clopidogrel (PLAVIX) 75 MG tablet TAKE 1 TABLET BY MOUTH DAILY 12/3/21  Yes Edith Freitas MD   famotidine (PEPCID) 20 MG tablet TAKE 1 TABLET BY MOUTH TWICE DAILY 11/16/21  Yes Edson Correia DO   ASPIRIN LOW DOSE 81 MG EC tablet TAKE 1 TABLET BY MOUTH EVERY DAY 6/16/21  Yes Historical Provider, MD   Ascorbic Acid (VITAMIN C) 1000 MG tablet Take 1,000 mg by mouth daily   Yes Historical Provider, MD   blood glucose monitor strips Check blood sugar q daily, Dx R73.01 7/6/21  Yes Edson Correia, DO   nitroGLYCERIN (NITROSTAT) 0.4 MG SL tablet up to max of 3 total doses. If no relief after 1 dose, call 911. 5/10/21  Yes Vinayak Owens MD   LORazepam (ATIVAN) 1 MG tablet Take 0.5 mg by mouth every 6 hours as needed for Anxiety.  Pt usually only takes a half tab when needed   Yes Historical Provider, MD   spironolactone (ALDACTONE) 50 MG tablet Take 1 tablet by mouth daily  Patient taking differently: Take 25 mg by mouth daily  4/14/21  Yes RACQUEL Wright - CNP   melatonin 3 MG TABS tablet Take 3 mg by mouth nightly as needed    Yes Historical Provider, MD   b complex vitamins capsule Take 1 capsule by mouth daily   Yes Historical Provider, MD   polyethyl glycol-propyl glycol 0.4-0.3 % (SYSTANE) 0.4-0.3 % ophthalmic solution 1 drop in the morning and at bedtime Both eyes   Yes Historical Provider, MD   docusate sodium (COLACE) 100 MG capsule Take 100 mg by mouth 2 times daily    Yes Historical Provider, MD   OLIVE LEAF PO Take 450 mg by mouth daily   Yes Historical Provider, MD   Handicap Placard [facility] by Does not apply route Expires 9/6/2022 9/6/17  Yes Roda Olszewski, DO   Blood Glucose Monitoring Suppl (TRUE METRIX METER) W/DEVICE KIT 1 Device by Does not apply route daily Check blood sugar q daily, Dx E11.9 7/15/16  Yes Roda Olszewski, DO   Multiple Vitamins-Minerals (THERAPEUTIC MULTIVITAMIN-MINERALS) tablet Take 1 tablet by mouth daily.    Yes Historical Provider, MD   fish oil-omega-3 fatty acids 1000 MG capsule Take 1 g by mouth daily    Yes Historical Provider, MD   CALCIUM CITRATE Take 600 mg by mouth in the morning and at bedtime    Yes Historical Provider, MD       Future Appointments   Date Time Provider Rosy Fisher   7/5/2022 12:45 PM Yoandy Garcia MD N [facility]

## 2022-04-20 ENCOUNTER — OFFICE VISIT (OUTPATIENT)
Dept: FAMILY MEDICINE CLINIC | Age: 87
End: 2022-04-20
Payer: MEDICARE

## 2022-04-20 VITALS
HEIGHT: 60 IN | DIASTOLIC BLOOD PRESSURE: 68 MMHG | HEART RATE: 69 BPM | TEMPERATURE: 96.7 F | RESPIRATION RATE: 16 BRPM | OXYGEN SATURATION: 100 % | BODY MASS INDEX: 24.74 KG/M2 | SYSTOLIC BLOOD PRESSURE: 144 MMHG | WEIGHT: 126 LBS

## 2022-04-20 DIAGNOSIS — R09.82 POST-NASAL DRIP: ICD-10-CM

## 2022-04-20 DIAGNOSIS — I10 HYPERTENSION, UNSPECIFIED TYPE: Primary | ICD-10-CM

## 2022-04-20 DIAGNOSIS — F41.9 ANXIETY: ICD-10-CM

## 2022-04-20 PROCEDURE — G8420 CALC BMI NORM PARAMETERS: HCPCS | Performed by: NURSE PRACTITIONER

## 2022-04-20 PROCEDURE — G8427 DOCREV CUR MEDS BY ELIG CLIN: HCPCS | Performed by: NURSE PRACTITIONER

## 2022-04-20 PROCEDURE — 1123F ACP DISCUSS/DSCN MKR DOCD: CPT | Performed by: NURSE PRACTITIONER

## 2022-04-20 PROCEDURE — 1090F PRES/ABSN URINE INCON ASSESS: CPT | Performed by: NURSE PRACTITIONER

## 2022-04-20 PROCEDURE — 4040F PNEUMOC VAC/ADMIN/RCVD: CPT | Performed by: NURSE PRACTITIONER

## 2022-04-20 PROCEDURE — 1036F TOBACCO NON-USER: CPT | Performed by: NURSE PRACTITIONER

## 2022-04-20 PROCEDURE — 99213 OFFICE O/P EST LOW 20 MIN: CPT | Performed by: NURSE PRACTITIONER

## 2022-04-20 RX ORDER — LORAZEPAM 0.5 MG/1
TABLET ORAL
Qty: 40 TABLET | Refills: 0 | Status: SHIPPED | OUTPATIENT
Start: 2022-04-20 | End: 2022-10-20 | Stop reason: SDUPTHER

## 2022-04-20 ASSESSMENT — ENCOUNTER SYMPTOMS
SORE THROAT: 1
SINUS PRESSURE: 1
SHORTNESS OF BREATH: 1
RHINORRHEA: 1

## 2022-04-20 NOTE — PROGRESS NOTES
Bobbi Cunha is a 80 y.o. female thatpresents for Hypertension      History obtained today from Patient. HTN    Does patient check BP regularly at home? - Yes  Current Medication regimen - Coreg, Lasix, aldactone,   Tolerating medications well? - yes    Shortness of breath or chest pain? No  Headache or visual complaints? No  Neurologic changes like confusion? No  Extremity edema? Yes    Recent steroid injection in left shoulder. 2 weeks ago. Having elevated BPs on home cuff. URI    HPI:      Symptoms have been present for 2 day(s). Symptoms are unchanged since they initially started. Fever? No  Runny nose or congestion? Yes  Cough? No  Sore throat? Yes  Shortness of breath/Wheezing? No  Other associated symptoms?  headaches      Known COVID exposures or RFs? No  Smoker? No  Preexisting Respiratory conditions?  none        BP Readings from Last 3 Encounters:   04/20/22 (!) 144/68   03/25/22 120/70   03/11/22 (!) 128/58       I have reviewed the patient's past medical history, past surgical history, allergies,medications, social and family history and I have made updates where appropriate. Past Medical History:   Diagnosis Date    Arthritis     Cancer [facility]) 2013    SKIN CANCER ON LEFT ANKLE    Diverticulosis     Hyperlipidemia     Hypertension     Hypothyroidism     Psoriasis     S/P angioplasty with stent 05/06/2021    3 stents to LAD, 1 stent to OM, 2 stents to diag.  per Dr. Júnior Bose     Thyroid disorder        Social History     Tobacco Use    Smoking status: Never Smoker    Smokeless tobacco: Never Used   Vaping Use    Vaping Use: Never used   Substance Use Topics    Alcohol use: No    Drug use: No       Family History   Problem Relation Age of Onset    Cancer Child     Breast Cancer Sister     Stroke Sister     Heart Disease Sister     Other Other         visual problems    Breast Cancer Daughter 52         Review of Systems   Constitutional: Negative for activity change. HENT: Positive for congestion, rhinorrhea, sinus pressure and sore throat. Respiratory: Positive for shortness of breath. Cardiovascular: Negative for chest pain. Genitourinary: Negative. Psychiatric/Behavioral: Negative. PHYSICAL EXAM:  BP (!) 144/68   Pulse 69   Temp 96.7 °F (35.9 °C) (Infrared)   Resp 16   Ht 5' (1.524 m)   Wt 126 lb (57.2 kg)   SpO2 100%   BMI 24.61 kg/m²     Physical Exam  HENT:      Mouth/Throat:      Pharynx: Posterior oropharyngeal erythema present. No oropharyngeal exudate. Cardiovascular:      Rate and Rhythm: Normal rate and regular rhythm. Pulmonary:      Effort: Pulmonary effort is normal.      Breath sounds: Normal breath sounds. Abdominal:      General: Abdomen is flat. Palpations: Abdomen is soft. Musculoskeletal:      Right lower leg: Edema present. Left lower leg: No edema. Skin:     General: Skin is warm and dry. Findings: Ecchymosis present. Neurological:      Mental Status: She is alert. ASSESSMENT & PLAN  Gavin Rea was seen today for hypertension. Diagnoses and all orders for this visit:    Hypertension, unspecified type    Post-nasal drip        No follow-ups on file. Check BP for next week, if still remains elevated, contact office. Continue danette hose  Can do Claritin and Coricidin for nasal congestion, and cough. If no improvement, contact office. No red flag sx and Follow up with our practice if no improvement or worsening sx. All copied or forwarded information in the progress note was verified by me to be accurate at the time of visit  Patient's past medical, surgical, social and family history were reviewed and updated     All patient questions answered. Patient voiced understanding.

## 2022-04-22 ENCOUNTER — CARE COORDINATION (OUTPATIENT)
Dept: CARE COORDINATION | Age: 87
End: 2022-04-22

## 2022-04-22 DIAGNOSIS — I10 HYPERTENSION, UNSPECIFIED TYPE: Primary | ICD-10-CM

## 2022-04-22 RX ORDER — BLOOD PRESSURE TEST KIT
KIT MISCELLANEOUS
Qty: 1 KIT | Refills: 0 | Status: SHIPPED | OUTPATIENT
Start: 2022-04-22

## 2022-04-22 RX ORDER — BLOOD PRESSURE TEST KIT
KIT MISCELLANEOUS
Qty: 1 KIT | Refills: 0 | Status: SHIPPED | OUTPATIENT
Start: 2022-04-22 | End: 2022-04-22 | Stop reason: SDUPTHER

## 2022-04-22 NOTE — CARE COORDINATION
Dr. Hussein Bartlett,      I am arranging for a blood pressure monitor to be delivered to [address] [place] [address] [facility].  If you agree, I need PCP to send an order in [employer] to [address] [place] [address] for them to process the order for the blood pressure cuff/monitor. Thank you!

## 2022-04-28 ENCOUNTER — CARE COORDINATION (OUTPATIENT)
Dept: CARE COORDINATION | Age: 87
End: 2022-04-28

## 2022-04-28 NOTE — CARE COORDINATION
COA)  Social Work: Not Started  Zone Management Tools: Completed  Other Services or Interventions: Currently active with COA and AAA         Goals Addressed                    This Visit's Progress      Conditions and Symptoms (pt-stated)   On track      I will schedule office visits, as directed by my provider. I will keep my appointment or reschedule if I have to cancel. I will notify my provider of any barriers to my plan of care. I will follow my Zone Management tool to seek urgent or emergent care. I will notify my provider of any symptoms that indicate a worsening of my condition. CHF  Barriers: need for additional education and support  Plan for overcoming my barriers: family and ACM support  Confidence: 9/10  Anticipated Goal Completion Date: 5/16/22              Prior to Admission medications    Medication Sig Start Date End Date Taking? Authorizing Provider   Blood Pressure KIT Check blood pressure q daily 4/22/22  Yes Edson Correia, DO   LORazepam (ATIVAN) 0.5 MG tablet TAKE 1 TABLET BY MOUTH EVERY 12 HOURS AS NEEDED FOR ANXIETY 4/20/22 5/20/22 Yes Edson Correia DO   montelukast (SINGULAIR) 10 MG tablet Take 1 tablet by mouth daily 4/4/22  Yes Gabriel Perry DO   mirabegron (MYRBETRIQ) 50 MG TB24 Take 50 mg by mouth daily 3/11/22  Yes RACQUEL Jaffe CNP   atorvastatin (LIPITOR) 80 MG tablet Take 1 tablet by mouth nightly 3/8/22  Yes Gabriel Perry DO   zinc 50 MG TABS tablet Take 50 mg by mouth daily   Yes Historical Provider, MD   Psyllium (METAMUCIL FREE & NATURAL PO) Take by mouth as needed   Yes Historical Provider, MD   gabapentin (NEURONTIN) 100 MG capsule Take 1 capsule by mouth 2 times daily for 180 days.  2/16/22 8/15/22 Yes Gabriel Perry DO   thyroid [facility] THYROID) 30 MG tablet Take 1 tablet by mouth daily 2/16/22  Yes Gabriel Perry DO   Probiotic Acidophilus [facility]) TABS Take 1 tablet by mouth daily 2/16/22  Yes Gabriel Perry DO   furosemide (LASIX) 20 MG tablet Take 1 tablet by mouth daily 1/17/22  Yes Edson Correia, DO   fluticasone (FLONASE) 50 MCG/ACT nasal spray INSTILL 2 SPRAYS INTO EACH NOSTRIL TWICE DAILY 1/6/22  Yes Edson Correia, DO   carvedilol (COREG) 3.125 MG tablet TAKE 1 TABLET BY MOUTH TWICE DAILY WITH MEALS 12/3/21  Yes Antionette Alaniz, DO   clopidogrel (PLAVIX) 75 MG tablet TAKE 1 TABLET BY MOUTH DAILY 12/3/21  Yes Markie Giron MD   famotidine (PEPCID) 20 MG tablet TAKE 1 TABLET BY MOUTH TWICE DAILY 11/16/21  Yes Edson Correia DO   ASPIRIN LOW DOSE 81 MG EC tablet TAKE 1 TABLET BY MOUTH EVERY DAY 6/16/21  Yes Historical Provider, MD   Ascorbic Acid (VITAMIN C) 1000 MG tablet Take 1,000 mg by mouth daily   Yes Historical Provider, MD   blood glucose monitor strips Check blood sugar q daily, Dx R73.01 7/6/21  Yes Edson Correia, DO   nitroGLYCERIN (NITROSTAT) 0.4 MG SL tablet up to max of 3 total doses.  If no relief after 1 dose, call 911. 5/10/21  Yes Freddy Garza MD   spironolactone (ALDACTONE) 50 MG tablet Take 1 tablet by mouth daily  Patient taking differently: Take 25 mg by mouth daily  4/14/21  Yes RACQUEL Shaw CNP   melatonin 3 MG TABS tablet Take 3 mg by mouth nightly as needed    Yes Historical Provider, MD   b complex vitamins capsule Take 1 capsule by mouth daily   Yes Historical Provider, MD   polyethyl glycol-propyl glycol 0.4-0.3 % (SYSTANE) 0.4-0.3 % ophthalmic solution 1 drop in the morning and at bedtime Both eyes   Yes Historical Provider, MD   docusate sodium (COLACE) 100 MG capsule Take 100 mg by mouth 2 times daily    Yes Historical Provider, MD   OLIVE LEAF PO Take 450 mg by mouth daily   Yes Historical Provider, MD   Handicap Placard MISC by Does not apply route Expires 9/6/2022 9/6/17  Yes Antionette Alaniz, DO   Blood Glucose Monitoring Suppl (TRUE METRIX METER) W/DEVICE KIT 1 Device by Does not apply route daily Check blood sugar q daily, Dx E11.9 7/15/16  Yes Antionette Alaniz, DO   Multiple Vitamins-Minerals (THERAPEUTIC MULTIVITAMIN-MINERALS) tablet Take 1 tablet by mouth daily.    Yes Historical Provider, MD   fish oil-omega-3 fatty acids 1000 MG capsule Take 1 g by mouth daily    Yes Historical Provider, MD   CALCIUM CITRATE Take 600 mg by mouth in the morning and at bedtime    Yes Historical Provider, MD       Future Appointments   Date Time Provider Rosy Natalia   7/5/2022 12:45 PM Sandra Roberts MD N [facility]

## 2022-05-10 ENCOUNTER — NURSE ONLY (OUTPATIENT)
Dept: FAMILY MEDICINE CLINIC | Age: 87
End: 2022-05-10

## 2022-05-10 NOTE — PROGRESS NOTES
Patient came in for nurse visit for blood pressure check and to compare to her new digital blood pressure machine.

## 2022-05-13 ENCOUNTER — OFFICE VISIT (OUTPATIENT)
Dept: FAMILY MEDICINE CLINIC | Age: 87
End: 2022-05-13
Payer: MEDICARE

## 2022-05-13 ENCOUNTER — CARE COORDINATION (OUTPATIENT)
Dept: CARE COORDINATION | Age: 87
End: 2022-05-13

## 2022-05-13 VITALS — WEIGHT: 129 LBS | BODY MASS INDEX: 25.19 KG/M2 | DIASTOLIC BLOOD PRESSURE: 61 MMHG | SYSTOLIC BLOOD PRESSURE: 134 MMHG

## 2022-05-13 DIAGNOSIS — T14.8XXA BRUISING: Primary | ICD-10-CM

## 2022-05-13 DIAGNOSIS — T50.905A MEDICATION SIDE EFFECT, INITIAL ENCOUNTER: ICD-10-CM

## 2022-05-13 PROCEDURE — G8417 CALC BMI ABV UP PARAM F/U: HCPCS | Performed by: FAMILY MEDICINE

## 2022-05-13 PROCEDURE — 1090F PRES/ABSN URINE INCON ASSESS: CPT | Performed by: FAMILY MEDICINE

## 2022-05-13 PROCEDURE — 1036F TOBACCO NON-USER: CPT | Performed by: FAMILY MEDICINE

## 2022-05-13 PROCEDURE — G8428 CUR MEDS NOT DOCUMENT: HCPCS | Performed by: FAMILY MEDICINE

## 2022-05-13 PROCEDURE — 1123F ACP DISCUSS/DSCN MKR DOCD: CPT | Performed by: FAMILY MEDICINE

## 2022-05-13 PROCEDURE — 4040F PNEUMOC VAC/ADMIN/RCVD: CPT | Performed by: FAMILY MEDICINE

## 2022-05-13 PROCEDURE — 99213 OFFICE O/P EST LOW 20 MIN: CPT | Performed by: FAMILY MEDICINE

## 2022-05-13 NOTE — PROGRESS NOTES
Ellis Mcneill is a 80 y.o. female that presents for     Chief Complaint   Patient presents with    Bleeding/Bruising       /61   Wt 129 lb (58.5 kg)   BMI 25.19 kg/m²       HPI    Notes that she is having bruising of the LEs bilaterally. States that these seem to be spreading. Feels like they are occurring spontaneously. Bruises can last for several months. Health Maintenance   Topic Date Due    DTaP/Tdap/Td vaccine (1 - Tdap) Never done    Shingles vaccine (2 of 3) 04/12/2014    COVID-19 Vaccine (3 - Booster for Pfizer series) 08/19/2021    Flu vaccine (Season Ended) 09/01/2022    Depression Screen  02/16/2023    Lipids  02/22/2023    Pneumococcal 65+ years Vaccine  Completed    Hepatitis A vaccine  Aged Out    Hepatitis B vaccine  Aged Out    Hib vaccine  Aged Out    Meningococcal (ACWY) vaccine  Aged Out       Past Medical History:   Diagnosis Date    Arthritis     Cancer ([facility] [address] 2013    SKIN CANCER ON LEFT ANKLE    Diverticulosis     Hyperlipidemia     Hypertension     Hypothyroidism     Psoriasis     S/P angioplasty with stent 05/06/2021    3 stents to LAD, 1 stent to OM, 2 stents to diag.  per Dr. Laura Stephens Thyroid disorder        Past Surgical History:   Procedure Laterality Date    BREAST BIOPSY Left     benign    BREAST SURGERY Left 6/1966    Lumpectomy    CARPAL TUNNEL RELEASE Right 1/30/2013    CARPAL TUNNEL RELEASE Left 7/25/13    CATARACT REMOVAL Bilateral 1999    COLON SURGERY  11/1999    resection    COLONOSCOPY  2003, 2006, 2011    DIAGNOSTIC CARDIAC CATH LAB PROCEDURE  05/07/2021    6 stents     HYSTERECTOMY  2/23/1970    HYSTERECTOMY, TOTAL ABDOMINAL      KNEE ARTHROSCOPY Right 11/21/2007    meniscectomies    KNEE SURGERY Left 2000    replacement    MYRINGOTOMY Right 07/01/2015    tube insertion    OVARY REMOVAL Bilateral 7/12/2001    oophorectomy    OVARY REMOVAL Bilateral     SHOULDER SURGERY  5/14    SINUS SURGERY         Social History     Tobacco Use    Smoking status: Never Smoker    Smokeless tobacco: Never Used   Vaping Use    Vaping Use: Never used   Substance Use Topics    Alcohol use: No    Drug use: No       Family History   Problem Relation Age of Onset    Cancer Child     Breast Cancer Sister     Stroke Sister     Heart Disease Sister     Other Other         visual problems    Breast Cancer Daughter 52         I have reviewed the patient's past medical history, past surgical history, allergies, medications, social and family history and I have made updates where appropriate. Review of Systems        PHYSICAL EXAM:  /61   Wt 129 lb (58.5 kg)   BMI 25.19 kg/m²     Physical Exam  Vitals and nursing note reviewed. Constitutional:       General: She is not in acute distress. Appearance: She is well-developed. HENT:      Head: Normocephalic and atraumatic. Right Ear: Hearing and external ear normal.      Left Ear: Hearing and external ear normal.      Nose: Nose normal.   Eyes:      General:         Right eye: No discharge. Left eye: No discharge. Conjunctiva/sclera: Conjunctivae normal.   Neck:      Thyroid: No thyromegaly. Trachea: No tracheal deviation. Cardiovascular:      Rate and Rhythm: Normal rate and regular rhythm. Heart sounds: Normal heart sounds. No murmur heard. No friction rub. No gallop. Pulmonary:      Effort: Pulmonary effort is normal. No respiratory distress. Breath sounds: No wheezing or rales. Chest:      Chest wall: No tenderness. Musculoskeletal:         General: No deformity. Cervical back: Normal range of motion and neck supple. Lymphadenopathy:      Cervical: No cervical adenopathy. Skin:     General: Skin is warm and dry. Findings: Bruising (multiple bruises of various sizes and stages of healing noted on the LEs bilaterally, no swelling or tenderness noted) present. No erythema or rash.    Neurological:      Mental Status: She is alert. Motor: No abnormal muscle tone. Coordination: Coordination normal.   Psychiatric:         Behavior: Behavior normal.         Thought Content: Thought content normal.         Judgment: Judgment normal.             ASSESSMENT & PLAN  Judd Martini was seen today for bleeding/bruising. Diagnoses and all orders for this visit:    Bruising    Medication side effect, initial encounter        Return if symptoms worsen or fail to improve. Symptoms consistent with bruising from her ASA and plavix, we discussed that this is cosmetic and benefit of continuing these outweighs the risks at this time and she agrees. Continue these, will let me know if any worsening sx. The most recent results of the following tests were reviewed with the patient today: CBC     All copied or forwarded information in the progress note was verified by me to be accurate at the time of visit  Patient's past medical, surgical, social and family history were reviewed and updated     All patient questions answered. Patient voiced understanding.

## 2022-05-13 NOTE — CARE COORDINATION
Ambulatory Care Coordination Note  2022  CM Risk Score: 2  Charlson 10 Year Mortality Risk Score: 100%     ACC: Garret Mckeon RN    Summary Note: Spoke with Jose Luis for continued [facility] followup and CHF Summer initiative call. She is stable for this review. She utilized walk in clinic at [facility] for bruising on her legs. States bruising started since wearing compression stockings  Discussed CHF Summer initiative. Information was sent via [address] Jose Luis is asking that information to be send via mail. I placed the request to have the information also sent my mail  Blood pressure stable  No new barriers at this time    Plan  -reinforce education completed at [address] visit  -reinforce CHF summer initiative information  -encourage blood pressure tracking and reporting  -encourage use of [facility] walk in clinic  Congestive Heart Failure Assessment    Are you currently restricting fluids?: No Restriction  Do you understand a low sodium diet?: Yes  Do you understand how to read food labels?: Yes  How many restaurant meals do you eat per week?: 1-2  Do you salt your food before tasting it?: No     No patient-reported symptoms      Symptoms:         and   General Assessment    Do you have any symptoms that are causing concern?: Yes  Progression since Onset: Unchanged  Reported Symptoms: Other (Comment: Bruising on legs)     Heart Failure Education outreach Date/Time: 2022 11:29 AM    Ambulatory Care Manager (ACM) contacted the patient by telephone to perform Ambulatory Care Coordination. Verified name and  with patient as identifiers. Provided introduction to self, and explanation of the Ambulatory Care Manager's role. ACM reviewed that a Health Healthy tips for the Summer packet has been mailed to the them. ACM reviewed CHF zones, daily weights, fluid restriction, the importance of low sodium diet and healthy tips packet with the patient.  Instructed patient to call their PCP if they have a weight gain of 3 lbs in 2 days or 5 lbs in a week. Patient reminded that there is a physician on call 24 hours a day / 7 days a week should the patient have questions or concerns. The patient verbalized understanding. Care Coordination Interventions    Program Enrollment: Complex Care  Referral from Primary Care Provider: Yes  Suggested Interventions and [address]: Declined  Meals on Wheels: Completed  Medication Assistance Program:  (Comment: not yet but may run into issues with new prescription card)  Medi Set or Pill Pack: Completed  Occupational Therapy: Not Started  Pharmacist: Completed (Comment: pharmacy referral placed 2-16-22)  Physical Therapy: Not Started  Senior Services: Completed (Comment: active with AAA and COA)  Social Work: Not Started  Zone Management Tools: Completed  Other Services or Interventions: Currently active with COA and AAA         Goals Addressed                    This Visit's Progress      Conditions and Symptoms (pt-stated)   On track      I will schedule office visits, as directed by my provider. I will keep my appointment or reschedule if I have to cancel. I will notify my provider of any barriers to my plan of care. I will follow my Zone Management tool to seek urgent or emergent care. I will notify my provider of any symptoms that indicate a worsening of my condition. CHF  Barriers: need for additional education and support  Plan for overcoming my barriers: family and ACM support  Confidence: 9/10  Anticipated Goal Completion Date: 5/16/22 Update 6/13/22              Prior to Admission medications    Medication Sig Start Date End Date Taking?  Authorizing Provider   Blood Pressure KIT Check blood pressure q daily 4/22/22  Yes Edson Correia, DO   LORazepam (ATIVAN) 0.5 MG tablet TAKE 1 TABLET BY MOUTH EVERY 12 HOURS AS NEEDED FOR ANXIETY 4/20/22 5/20/22 Yes Edson Correia, DO   montelukast (SINGULAIR) 10 MG tablet Take 1 tablet by mouth daily 4/4/22  Yes Kennedi Sosa DO   mirabegron (MYRBETRIQ) 50 MG TB24 Take 50 mg by mouth daily 3/11/22  Yes RACQUEL Rajan CNP   atorvastatin (LIPITOR) 80 MG tablet Take 1 tablet by mouth nightly 3/8/22  Yes Kennedi Sosa DO   zinc 50 MG TABS tablet Take 50 mg by mouth daily   Yes Historical Provider, MD   Psyllium (METAMUCIL FREE & NATURAL PO) Take by mouth as needed   Yes Historical Provider, MD   gabapentin (NEURONTIN) 100 MG capsule Take 1 capsule by mouth 2 times daily for 180 days. 2/16/22 8/15/22 Yes Kennedi Sosa DO   thyroid (ARMOUR THYROID) 30 MG tablet Take 1 tablet by mouth daily 2/16/22  Yes Kennedi Sosa DO   Probiotic Acidophilus [facility]) TABS Take 1 tablet by mouth daily 2/16/22  Yes Kennedi Sosa DO   furosemide (LASIX) 20 MG tablet Take 1 tablet by mouth daily 1/17/22  Yes Edson Correia DO   fluticasone (FLONASE) 50 MCG/ACT nasal spray INSTILL 2 SPRAYS INTO EACH NOSTRIL TWICE DAILY 1/6/22  Yes Edson Correia DO   carvedilol (COREG) 3.125 MG tablet TAKE 1 TABLET BY MOUTH TWICE DAILY WITH MEALS 12/3/21  Yes Kennedi Sosa DO   clopidogrel (PLAVIX) 75 MG tablet TAKE 1 TABLET BY MOUTH DAILY 12/3/21  Yes Kaykay Aiken MD   famotidine (PEPCID) 20 MG tablet TAKE 1 TABLET BY MOUTH TWICE DAILY 11/16/21  Yes Edson Correia DO   ASPIRIN LOW DOSE 81 MG EC tablet TAKE 1 TABLET BY MOUTH EVERY DAY 6/16/21  Yes Historical Provider, MD   Ascorbic Acid (VITAMIN C) 1000 MG tablet Take 1,000 mg by mouth daily   Yes Historical Provider, MD   blood glucose monitor strips Check blood sugar q daily, Dx R73.01 7/6/21  Yes Edson Correia DO   nitroGLYCERIN (NITROSTAT) 0.4 MG SL tablet up to max of 3 total doses.  If no relief after 1 dose, call 911. 5/10/21  Yes Vale Martinez MD   spironolactone (ALDACTONE) 50 MG tablet Take 1 tablet by mouth daily  Patient taking differently: Take 25 mg by mouth daily  4/14/21  Yes RACQUEL Rajan CNP   melatonin 3 MG TABS tablet Take 3 mg by mouth nightly as needed    Yes Historical Provider, MD   b complex vitamins capsule Take 1 capsule by mouth daily   Yes Historical Provider, MD   polyethyl glycol-propyl glycol 0.4-0.3 % (SYSTANE) 0.4-0.3 % ophthalmic solution 1 drop in the morning and at bedtime Both eyes   Yes Historical Provider, MD   docusate sodium (COLACE) 100 MG capsule Take 100 mg by mouth 2 times daily    Yes Historical Provider, MD   OLIVE LEAF PO Take 450 mg by mouth daily   Yes Historical Provider, MD   Handicap Placard [address] [facility] by Does not apply route Expires 9/6/2022 9/6/17  Yes Edson Correia, DO   Blood Glucose Monitoring Suppl (TRUE METRIX METER) W/DEVICE KIT 1 Device by Does not apply route daily Check blood sugar q daily, Dx E11.9 7/15/16  Yes Katie Duke, DO   Multiple Vitamins-Minerals (THERAPEUTIC MULTIVITAMIN-MINERALS) tablet Take 1 tablet by mouth daily.    Yes Historical Provider, MD   fish oil-omega-3 fatty acids 1000 MG capsule Take 1 g by mouth daily    Yes Historical Provider, MD   CALCIUM CITRATE Take 600 mg by mouth in the morning and at bedtime    Yes Historical Provider, MD       Future Appointments   Date Time Provider Rosy Natalia   7/5/2022 12:45 PM Kim Gar MD N [facility] Heart [facility] [place]

## 2022-05-20 ENCOUNTER — NURSE ONLY (OUTPATIENT)
Dept: FAMILY MEDICINE CLINIC | Age: 87
End: 2022-05-20

## 2022-05-20 VITALS — DIASTOLIC BLOOD PRESSURE: 60 MMHG | SYSTOLIC BLOOD PRESSURE: 139 MMHG

## 2022-05-23 ENCOUNTER — CARE COORDINATION (OUTPATIENT)
Dept: CARE COORDINATION | Age: 87
End: 2022-05-23

## 2022-05-23 DIAGNOSIS — I10 ESSENTIAL HYPERTENSION: ICD-10-CM

## 2022-05-23 RX ORDER — SPIRONOLACTONE 50 MG/1
50 TABLET, FILM COATED ORAL DAILY
Qty: 90 TABLET | Refills: 3 | Status: SHIPPED | OUTPATIENT
Start: 2022-05-23

## 2022-05-23 NOTE — CARE COORDINATION
Ambulatory Care Coordination Note  5/23/2022  CM Risk Score: 2  Charlson 10 Year Mortality Risk Score: 100%     ACC: Joaquina Tay, RN    Summary Note: Spoke with Ijeoma Bolanos for continued Care Coordination support through [address] visit  Ijeoma Bolanos states she is doing well for this review  She was asking for refill of diuretic medication but I informed her that the medication has already been refilled and sent to her pharmacy with 3 refills  She states she is doing well but feels she may need to steroid injection in her right hip now as well  She is going to call [facility] to get that set up  Blood pressures are doing good  Shingles soreness is resolved    Plan  -ensure medication sent to pharmacy  -encourage blood pressure tracking and reporting  -reinforce importance of early symptom recognition and reporting to prevent exacerbation  -reinforce education completed at [address] visit  Congestive Heart Failure Assessment    Are you currently restricting fluids?: No Restriction  Do you understand a low sodium diet?: Yes  Do you understand how to read food labels?: Yes  How many restaurant meals do you eat per week?: 1-2  Do you salt your food before tasting it?: No     No patient-reported symptoms      Symptoms:              Lab Results     None          Care Coordination Interventions    Program Enrollment: Complex Care  Referral from Primary Care Provider: Yes  Suggested Interventions and [address]: Declined  Meals on Wheels: Completed  Medication Assistance Program:  (Comment: not yet but may run into issues with new prescription card)  Medi Set or Pill Pack: Completed  Occupational Therapy: Not Started  Pharmacist: Completed (Comment: pharmacy referral placed 2-16-22)  Physical Therapy: Not Started  Senior Services: Completed (Comment: active with AAA and COA)  Social Work: Not Started  Zone Management Tools: Completed  Other Services or Interventions: Currently active with COA and AAA         Goals Addressed                    This Visit's Progress      Conditions and Symptoms (pt-stated)   On track      I will schedule office visits, as directed by my provider. I will keep my appointment or reschedule if I have to cancel. I will notify my provider of any barriers to my plan of care. I will follow my Zone Management tool to seek urgent or emergent care. I will notify my provider of any symptoms that indicate a worsening of my condition. CHF  Barriers: need for additional education and support  Plan for overcoming my barriers: family and ACM support  Confidence: 9/10  Anticipated Goal Completion Date: 5/16/22 Update 6/13/22              Prior to Admission medications    Medication Sig Start Date End Date Taking? Authorizing Provider   spironolactone (ALDACTONE) 50 MG tablet TAKE 1 TABLET BY MOUTH DAILY 5/23/22  Yes Gabriel Perry DO   Blood Pressure KIT Check blood pressure q daily 4/22/22  Yes Gabriel Perry DO   montelukast (SINGULAIR) 10 MG tablet Take 1 tablet by mouth daily 4/4/22  Yes Gabriel Perry DO   mirabegron (MYRBETRIQ) 50 MG TB24 Take 50 mg by mouth daily 3/11/22  Yes RACQUEL Jaffe CNP   atorvastatin (LIPITOR) 80 MG tablet Take 1 tablet by mouth nightly 3/8/22  Yes Gabriel Perry DO   zinc 50 MG TABS tablet Take 50 mg by mouth daily   Yes Historical Provider, MD   Psyllium (METAMUCIL FREE & NATURAL PO) Take by mouth as needed   Yes Historical Provider, MD   gabapentin (NEURONTIN) 100 MG capsule Take 1 capsule by mouth 2 times daily for 180 days.  2/16/22 8/15/22 Yes Gabriel Perry DO   thyroid [facility] THYROID) 30 MG tablet Take 1 tablet by mouth daily 2/16/22  Yes Gabriel Perry DO   Probiotic Acidophilus [facility]) TABS Take 1 tablet by mouth daily 2/16/22  Yes Gabriel Perry DO   furosemide (LASIX) 20 MG tablet Take 1 tablet by mouth daily 1/17/22  Yes Edson Correia DO   fluticasone (FLONASE) 50 MCG/ACT nasal spray INSTILL 2 SPRAYS INTO EACH NOSTRIL TWICE DAILY 1/6/22  Yes Shu Portillo Bren, DO   carvedilol (COREG) 3.125 MG tablet TAKE 1 TABLET BY MOUTH TWICE DAILY WITH MEALS 12/3/21  Yes Axel Rouse DO   clopidogrel (PLAVIX) 75 MG tablet TAKE 1 TABLET BY MOUTH DAILY 12/3/21  Yes Marilou White MD   famotidine (PEPCID) 20 MG tablet TAKE 1 TABLET BY MOUTH TWICE DAILY 11/16/21  Yes Edson Correia, DO   ASPIRIN LOW DOSE 81 MG EC tablet TAKE 1 TABLET BY MOUTH EVERY DAY 6/16/21  Yes Historical Provider, MD   Ascorbic Acid (VITAMIN C) 1000 MG tablet Take 1,000 mg by mouth daily   Yes Historical Provider, MD   blood glucose monitor strips Check blood sugar q daily, Dx R73.01 7/6/21  Yes Edson Correia, DO   nitroGLYCERIN (NITROSTAT) 0.4 MG SL tablet up to max of 3 total doses. If no relief after 1 dose, call 911. 5/10/21  Yes Jose Guadalupe De Leon MD   melatonin 3 MG TABS tablet Take 3 mg by mouth nightly as needed    Yes Historical Provider, MD   b complex vitamins capsule Take 1 capsule by mouth daily   Yes Historical Provider, MD   polyethyl glycol-propyl glycol 0.4-0.3 % (SYSTANE) 0.4-0.3 % ophthalmic solution 1 drop in the morning and at bedtime Both eyes   Yes Historical Provider, MD   docusate sodium (COLACE) 100 MG capsule Take 100 mg by mouth 2 times daily    Yes Historical Provider, MD   OLIVE LEAF PO Take 450 mg by mouth daily   Yes Historical Provider, MD   Handicap Placard [address] [facility] by Does not apply route Expires 9/6/2022 9/6/17  Yes Axel Rouse, DO   Blood Glucose Monitoring Suppl (TRUE METRIX METER) W/DEVICE KIT 1 Device by Does not apply route daily Check blood sugar q daily, Dx E11.9 7/15/16  Yes Axel Rouse, DO   Multiple Vitamins-Minerals (THERAPEUTIC MULTIVITAMIN-MINERALS) tablet Take 1 tablet by mouth daily.    Yes Historical Provider, MD   fish oil-omega-3 fatty acids 1000 MG capsule Take 1 g by mouth daily    Yes Historical Provider, MD   CALCIUM CITRATE Take 600 mg by mouth in the morning and at bedtime    Yes Historical Provider, MD       Future Appointments   Date Time Provider Rosy Fisher   7/5/2022 12:45 PM Erma Nava MD N [facility] Heart [facility] [place]

## 2022-06-06 ENCOUNTER — CARE COORDINATION (OUTPATIENT)
Dept: CARE COORDINATION | Age: 87
End: 2022-06-06

## 2022-06-06 NOTE — CARE COORDINATION
Ambulatory Care Coordination Note  6/6/2022  CM Risk Score: 2  Charlson 10 Year Mortality Risk Score: 100%     ACC: Jeanine Bahena RN    Summary Note: Spoke with Jonathan Hardwick for continued Care Coordination follow up from [address] [facility] visit. States she is having some bilateral hip pain that is tolerable some days but other days will flare up bad  States she is doing stretching exercises which does help  She has a call into [facility] to receive steroid injections to bilateral hips. States they will only do one hip at a time. She is scheduled with [facility] July 7th and 14th to have that completed  She reports she has been using Tylenol which has not been effective. She would like know if she can take Tylenol Arthritis and would like PCP approval before starting that  I will ask PCP for recommendations  Blood pressure running normal  Denies changes from baseline with CHF    Plan  -ask PCP for recommendation for Tylenol Arthritis  -ensure appts completed for hip injections at [facility]  -encourage blood pressure tracking and reporting  -encourage use of Walk in clinic as needed for issues that can be treated there  -reinforce education completed at [address] [facility] visit.   Encourage use of PCP, myself, walk in clinic and Same Day appts to prevent unnecessary hospitalizations  Congestive Heart Failure Assessment    Are you currently restricting fluids?: No Restriction  Do you understand a low sodium diet?: Yes  Do you understand how to read food labels?: Yes  How many restaurant meals do you eat per week?: 1-2  Do you salt your food before tasting it?: No     No patient-reported symptoms      Symptoms:         and   General Assessment    Do you have any symptoms that are causing concern?: Yes  Progression since Onset: Intermittent - Waxing/Waning  Reported Symptoms: Pain, Other (Comment: bilateral hip pain)           Lab Results     None          Care Coordination Interventions    Program Enrollment: Complex Care  Referral from Primary Care Provider: Yes  Suggested Interventions and Community Resources  Home Health Services: Declined  Meals on Wheels: Completed  Medication Assistance Program:  (Comment: not yet but may run into issues with new prescription card)  Medi Set or Pill Pack: Completed  Occupational Therapy: Not Started  Pharmacist: Completed (Comment: pharmacy referral placed 2-16-22)  Physical Therapy: Not Started  Senior Services: Completed (Comment: active with AAA and COA)  Social Work: Not Started  Zone Management Tools: Completed  Other Services or Interventions: Currently active with COA and AAA         Goals Addressed                    This Visit's Progress      Conditions and Symptoms (pt-stated)   On track      I will schedule office visits, as directed by my provider. I will keep my appointment or reschedule if I have to cancel. I will notify my provider of any barriers to my plan of care. I will follow my Zone Management tool to seek urgent or emergent care. I will notify my provider of any symptoms that indicate a worsening of my condition. CHF  Barriers: need for additional education and support  Plan for overcoming my barriers: family and ACM support  Confidence: 9/10  Anticipated Goal Completion Date: 5/16/22 Update 6/13/22              Prior to Admission medications    Medication Sig Start Date End Date Taking?  Authorizing Provider   spironolactone (ALDACTONE) 50 MG tablet TAKE 1 TABLET BY MOUTH DAILY 5/23/22  Yes Katie Duke DO   Blood Pressure KIT Check blood pressure q daily 4/22/22  Yes Katie Duke DO   montelukast (SINGULAIR) 10 MG tablet Take 1 tablet by mouth daily 4/4/22  Yes Katie Duke DO   mirabegron (MYRBETRIQ) 50 MG TB24 Take 50 mg by mouth daily 3/11/22  Yes Catarina Topete APRN - CNP   atorvastatin (LIPITOR) 80 MG tablet Take 1 tablet by mouth nightly 3/8/22  Yes Katie Duke DO   zinc 50 MG TABS tablet Take 50 mg by mouth daily   Yes Historical Provider, MD   Psyllium (METAMUCIL FREE & NATURAL PO) Take by mouth as needed   Yes Historical Provider, MD   gabapentin (NEURONTIN) 100 MG capsule Take 1 capsule by mouth 2 times daily for 180 days. 2/16/22 8/15/22 Yes Mike Wing DO   thyroid (ARMOUR THYROID) 30 MG tablet Take 1 tablet by mouth daily 2/16/22  Yes Mike Wing DO   Probiotic Acidophilus [facility]) TABS Take 1 tablet by mouth daily 2/16/22  Yes Mike Wing DO   furosemide (LASIX) 20 MG tablet Take 1 tablet by mouth daily 1/17/22  Yes Edson Correia DO   fluticasone (FLONASE) 50 MCG/ACT nasal spray INSTILL 2 SPRAYS INTO EACH NOSTRIL TWICE DAILY 1/6/22  Yes Edson Correia DO   carvedilol (COREG) 3.125 MG tablet TAKE 1 TABLET BY MOUTH TWICE DAILY WITH MEALS 12/3/21  Yes Mike Wing DO   clopidogrel (PLAVIX) 75 MG tablet TAKE 1 TABLET BY MOUTH DAILY 12/3/21  Yes Rajesh Yadav MD   famotidine (PEPCID) 20 MG tablet TAKE 1 TABLET BY MOUTH TWICE DAILY 11/16/21  Yes Edson Correia DO   ASPIRIN LOW DOSE 81 MG EC tablet TAKE 1 TABLET BY MOUTH EVERY DAY 6/16/21  Yes Historical Provider, MD   Ascorbic Acid (VITAMIN C) 1000 MG tablet Take 1,000 mg by mouth daily   Yes Historical Provider, MD   blood glucose monitor strips Check blood sugar q daily, Dx R73.01 7/6/21  Yes Edson Correia DO   nitroGLYCERIN (NITROSTAT) 0.4 MG SL tablet up to max of 3 total doses.  If no relief after 1 dose, call 911. 5/10/21  Yes Janice Jane MD   melatonin 3 MG TABS tablet Take 3 mg by mouth nightly as needed    Yes Historical Provider, MD   b complex vitamins capsule Take 1 capsule by mouth daily   Yes Historical Provider, MD   polyethyl glycol-propyl glycol 0.4-0.3 % (SYSTANE) 0.4-0.3 % ophthalmic solution 1 drop in the morning and at bedtime Both eyes   Yes Historical Provider, MD   docusate sodium (COLACE) 100 MG capsule Take 100 mg by mouth 2 times daily    Yes Historical Provider, MD   OLIVE LEAF PO Take 450 mg by mouth daily   Yes Historical Provider, MD   Handicap Placard [address] [facility] by Does not apply route Expires 9/6/2022 9/6/17  Yes Akosua Breath, DO   Blood Glucose Monitoring Suppl (TRUE METRIX METER) W/DEVICE KIT 1 Device by Does not apply route daily Check blood sugar q daily, Dx E11.9 7/15/16  Yes Akosua Breath, DO   Multiple Vitamins-Minerals (THERAPEUTIC MULTIVITAMIN-MINERALS) tablet Take 1 tablet by mouth daily.    Yes Historical Provider, MD   fish oil-omega-3 fatty acids 1000 MG capsule Take 1 g by mouth daily    Yes Historical Provider, MD   CALCIUM CITRATE Take 600 mg by mouth in the morning and at bedtime    Yes Historical Provider, MD       Future Appointments   Date Time Provider Rosy Fisher   7/5/2022 12:45 PM [facility] [address] [facility]

## 2022-06-06 NOTE — CARE COORDINATION
Infant received in bassinet on room air. Temps stable. Breathing easy and unlabored. Some acrocyanosis noted in lower extremities. Full range of motion with less than 3 second cap refill. Infant voiding and stooling with abdominal girths stable.  Tolerating Kristel alegre. Thank you.

## 2022-06-06 NOTE — CARE COORDINATION
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 States she is scheduled to receive steroid injections to her hips on July 7th and 14th at [facility]. She has been taking Tylenol with no relief. She doesn't want anything strong ordered and is wanting to know if she should try the over the counter Tylenol Arthritis to \"take the edge off\" until her injections? She would like PCP approval to take Tylenol Arthritis and states if you agree, she will purchase it over the counter. Please advise. Thank you!

## 2022-06-15 ENCOUNTER — CARE COORDINATION (OUTPATIENT)
Dept: CARE COORDINATION | Age: 87
End: 2022-06-15

## 2022-06-15 NOTE — CARE COORDINATION
Ambulatory Care Coordination Note  6/15/2022  CM Risk Score: 2  Charlson 10 Year Mortality Risk Score: 100%     ACC: Fab Vera RN    Summary Note: Spoke with Eric Arroyo for continued follow up from [facility] visit.   She called with several questions that she wanted answered  She is scheduled to have her steroid injection at [facility] on July 7th and 14th with Dr. Jj Lancaster  She has been taking Tylenol Arthritis which she states has been helpful  She has information that needs to be turned into PCP office including handicapped placard paperwork that needs completed and will be dropping off a blood pressure log to be scanned into her chart  She is treating hemorrhoids that she has had for over 50 years and states they do not bother her but she is going to start using Preparation H again as that has helped her in the past to shrink them  She also has pain ointment as needed to apply to them if needed  Overall she is doing well  Continues to wear compression stockings and feels an improvement since wearing them    Plan  Reinforce education completed at [facility] visit  Ensure appt with [facility] for hip injections to help relieve pain  Work towards maintenance program for continued monitoring to prevent utilization  Encourage follow up with walk in clinic, ACM, PCP for any needs  Congestive Heart Failure Assessment    Are you currently restricting fluids?: No Restriction  Do you understand a low sodium diet?: Yes  Do you understand how to read food labels?: Yes  How many restaurant meals do you eat per week?: 1-2  Do you salt your food before tasting it?: No     No patient-reported symptoms      Symptoms:              Lab Results     None          Care Coordination Interventions    Program Enrollment: Complex Care  Referral from Primary Care Provider: Yes  Suggested Interventions and [address]: Declined  Meals on Wheels: Completed  Medication Assistance Program:  (Comment: not yet but may run into issues with DO   thyroid (ARMOUR THYROID) 30 MG tablet Take 1 tablet by mouth daily 2/16/22  Yes Marcelina Counter, DO   Probiotic Acidophilus [facility]) TABS Take 1 tablet by mouth daily 2/16/22  Yes Marcelina Counter, DO   furosemide (LASIX) 20 MG tablet Take 1 tablet by mouth daily 1/17/22  Yes Edson Correia, DO   fluticasone (FLONASE) 50 MCG/ACT nasal spray INSTILL 2 SPRAYS INTO EACH NOSTRIL TWICE DAILY 1/6/22  Yes Edson Correia, DO   carvedilol (COREG) 3.125 MG tablet TAKE 1 TABLET BY MOUTH TWICE DAILY WITH MEALS 12/3/21  Yes Marcelina Counter, DO   clopidogrel (PLAVIX) 75 MG tablet TAKE 1 TABLET BY MOUTH DAILY 12/3/21  Yes Beka Vera MD   famotidine (PEPCID) 20 MG tablet TAKE 1 TABLET BY MOUTH TWICE DAILY 11/16/21  Yes Edson Correia, DO   ASPIRIN LOW DOSE 81 MG EC tablet TAKE 1 TABLET BY MOUTH EVERY DAY 6/16/21  Yes Historical Provider, MD   Ascorbic Acid (VITAMIN C) 1000 MG tablet Take 1,000 mg by mouth daily   Yes Historical Provider, MD   blood glucose monitor strips Check blood sugar q daily, Dx R73.01 7/6/21  Yes Edson Correia, DO   nitroGLYCERIN (NITROSTAT) 0.4 MG SL tablet up to max of 3 total doses.  If no relief after 1 dose, call 911. 5/10/21  Yes Andrea Carlisle MD   melatonin 3 MG TABS tablet Take 3 mg by mouth nightly as needed    Yes Historical Provider, MD   b complex vitamins capsule Take 1 capsule by mouth daily   Yes Historical Provider, MD   polyethyl glycol-propyl glycol 0.4-0.3 % (SYSTANE) 0.4-0.3 % ophthalmic solution 1 drop in the morning and at bedtime Both eyes   Yes Historical Provider, MD   docusate sodium (COLACE) 100 MG capsule Take 100 mg by mouth 2 times daily    Yes Historical Provider, MD   OLIVE LEAF PO Take 450 mg by mouth daily   Yes Historical Provider, MD   Handicap Placard MISC by Does not apply route Expires 9/6/2022 9/6/17  Yes Marcelina Counter, DO   Blood Glucose Monitoring Suppl (TRUE METRIX METER) W/DEVICE KIT 1 Device by Does not apply route daily Check blood sugar q daily, Dx E11.9 7/15/16  Yes

## 2022-06-20 DIAGNOSIS — M15.9 PRIMARY OSTEOARTHRITIS INVOLVING MULTIPLE JOINTS: Primary | ICD-10-CM

## 2022-07-01 ENCOUNTER — CARE COORDINATION (OUTPATIENT)
Dept: CARE COORDINATION | Age: 87
End: 2022-07-01

## 2022-07-01 NOTE — CARE COORDINATION
Hussein Birmingham  July 1, 2022    Initial Referral Reason: Ramone David is a very healthy 80year old that takes great care of herself and likes information to keep her healthy. She states she would like to talk to someone about some ideas on what to eat to help keep her healthy and maintain her blood pressure and blood sugar levels. Patient Care Team:  Titi Pérez DO as PCP - General (Family Medicine)  Titi Pérze DO as PCP - [facility] Empaneled Provider  Prem Wall (Audiology)  Jie Galvez RN as [facility]  Brendon Jiménez MD as Cardiologist (Cardiology)  Torrey Franco RD, LD as Dietitian (Dietitian Registered)    Past Medical History:    Current Outpatient Medications   Medication Sig Dispense Refill    Handicap Placard MISC by Does not apply route Expires 6/20/27 1 each 0    spironolactone (ALDACTONE) 50 MG tablet TAKE 1 TABLET BY MOUTH DAILY 90 tablet 3    Blood Pressure KIT Check blood pressure q daily 1 kit 0    montelukast (SINGULAIR) 10 MG tablet Take 1 tablet by mouth daily 90 tablet 3    mirabegron (MYRBETRIQ) 50 MG TB24 Take 50 mg by mouth daily 28 tablet 0    atorvastatin (LIPITOR) 80 MG tablet Take 1 tablet by mouth nightly 90 tablet 3    zinc 50 MG TABS tablet Take 50 mg by mouth daily      Psyllium (METAMUCIL FREE & NATURAL PO) Take by mouth as needed      gabapentin (NEURONTIN) 100 MG capsule Take 1 capsule by mouth 2 times daily for 180 days.  180 capsule 3    thyroid (ARMOUR THYROID) 30 MG tablet Take 1 tablet by mouth daily 90 tablet 3    Probiotic Acidophilus (FLORANEX) TABS Take 1 tablet by mouth daily      furosemide (LASIX) 20 MG tablet Take 1 tablet by mouth daily 90 tablet 3    fluticasone (FLONASE) 50 MCG/ACT nasal spray INSTILL 2 SPRAYS INTO EACH NOSTRIL TWICE DAILY 96 g 3    carvedilol (COREG) 3.125 MG tablet TAKE 1 TABLET BY MOUTH TWICE DAILY WITH MEALS 180 tablet 3    clopidogrel (PLAVIX) 75 MG tablet TAKE 1 TABLET BY MOUTH DAILY 90 tablet 2    famotidine (PEPCID) 20 MG tablet TAKE 1 TABLET BY MOUTH TWICE DAILY 180 tablet 3    ASPIRIN LOW DOSE 81 MG EC tablet TAKE 1 TABLET BY MOUTH EVERY DAY      Ascorbic Acid (VITAMIN C) 1000 MG tablet Take 1,000 mg by mouth daily      blood glucose monitor strips Check blood sugar q daily, Dx R73.01 100 strip 3    nitroGLYCERIN (NITROSTAT) 0.4 MG SL tablet up to max of 3 total doses. If no relief after 1 dose, call 911. 25 tablet 0    melatonin 3 MG TABS tablet Take 3 mg by mouth nightly as needed       b complex vitamins capsule Take 1 capsule by mouth daily      polyethyl glycol-propyl glycol 0.4-0.3 % (SYSTANE) 0.4-0.3 % ophthalmic solution 1 drop in the morning and at bedtime Both eyes      docusate sodium (COLACE) 100 MG capsule Take 100 mg by mouth 2 times daily       OLIVE LEAF PO Take 450 mg by mouth daily      Blood Glucose Monitoring Suppl (TRUE METRIX METER) W/DEVICE KIT 1 Device by Does not apply route daily Check blood sugar q daily, Dx E11.9 1 kit 0    Multiple Vitamins-Minerals (THERAPEUTIC MULTIVITAMIN-MINERALS) tablet Take 1 tablet by mouth daily.  fish oil-omega-3 fatty acids 1000 MG capsule Take 1 g by mouth daily       CALCIUM CITRATE Take 600 mg by mouth in the morning and at bedtime        No current facility-administered medications for this visit.        Biochemical Data, Medical Tests and Procedures:    Lab Results   Component Value Date    LABA1C 5.6 05/09/2021     Lab Results   Component Value Date     05/06/2017       Lab Results   Component Value Date    CHOL 112 02/22/2022    CHOL 184 05/07/2021    CHOL 193 12/22/2020     Lab Results   Component Value Date    TRIG 75 02/22/2022    TRIG 82 05/07/2021    TRIG 146 12/22/2020     Lab Results   Component Value Date    HDL 45 02/22/2022    HDL 62 05/07/2021    HDL 43 12/22/2020     Lab Results   Component Value Date    LDLCALC 52 02/22/2022    LDLCALC 106 05/07/2021    LDLCALC 121 12/22/2020       Anthropometric Measurements:    Height: 60 inches (152.4 cm)  Weight: 129 lb (58.5 kg)  BMI: 25.19 (overweight)  IBW: 100 lb (45.5 kg) +-10%  %IBW: 129%     Physical Exam Findings:  Deferred    Nutrition Interview: RD called pt, explained reason for call and role in care. Patient states she is afraid to eat ever since her heart attack. Patient states she needs someone to tell her what is good for her to eat because she has trouble knowing what to eat. Patient states she eats 3 meals/day. See food recall below. RD explained the importance of following a heart healthy diet. Explained the importance of eating three balanced meals and incorporating a variety of fruits, vegetables, whole grains and lean protein. Explained the components of a balanced meal using the MyPlate [id number]-9/2 plate fruits and/or vegetables, 1/4 plate protein and 1/4 plate starchy carbohydrates with 8 oz glass of low fat milk if desired. Provided an example of a balanced meal: baked chicken with broccoli and baked potato. RD discussed choosing lean cuts of protein such as boneless/skinless chicken, fish, ground turkey, etc.- patient states she buys the 90% lean beef for hamburgers. RD explained the importance of watching sodium to help better control blood pressure. Explained the American Heart Association recommends no more than 2300 mg of sodium per day, which is equivalent to 1 teaspoon of salt to put it into perspective. Discussed avoiding the salt shaker when eating/cooking- RD encouraged pt to put it in a place that is not accessible and to use alternatives such as Mrs. Dash, black pepper, rosemary, etc. Explained how sodium is hidden in a lot of foods and the importance of reading food labels-choosing foods with 300 mg of sodium or less per serving. Discussed the importance of watching portion sizes and explained the sodium content will double if you eat more than the serving size listed on the food label.  Reviewed foods high in sodium to avoid/limit Discussed ways to establish applying concepts of alternatives and choices regarding implementation of diet. Explored ideas for small, incremental goals to initiate behavior change. Monitoring and Evaluation:    Indicator/Goal Criteria   #1 Eat balanced meals consistently throughout the day. #1 Make meals balanced using MyPlate. #2 Monitor sodium intake. #2 Avoid the salt shaker and read food labels. Follow Up: RD will call pt in 2-3 weeks to follow up and make sure pt received handouts in mail. RD will answer any nutrition related questions at this time.      [address] [facility], [facility] 14

## 2022-07-01 NOTE — CARE COORDINATION
Gretchen Knapp states she would like to speak to a registered dietician. States she runs out of ideas of what to eat and wants to stay as healthy as possible. I will place referral to [address] [place] [address], [address] [place] [address]. Referral placed to [address] [place] [address] RD for support.

## 2022-07-01 NOTE — CARE COORDINATION
Heart Failure Education outreach Date/Time: 2022 9:14 AM    Ambulatory Care Manager (ACM) contacted the patient by telephone to perform Ambulatory Care Coordination. Verified name and  with patient as identifiers. Provided introduction to self, and explanation of the Ambulatory Care Manager's role. ACM reviewed that a Health Healthy tips for the Summer packet has been sent to [employer]. ACM reviewed CHF zones, daily weights, fluid restriction, the importance of low sodium diet and healthy tips packet with the patient. Instructed patient to call their PCP if they have a weight gain of 3 lbs in 2 days or 5 lbs in a week. Patient reminded that there is a physician on call 24 hours a day / 7 days a week should the patient have questions or concerns. The patient verbalized understanding.

## 2022-07-05 ENCOUNTER — OFFICE VISIT (OUTPATIENT)
Dept: CARDIOLOGY CLINIC | Age: 87
End: 2022-07-05
Payer: MEDICARE

## 2022-07-05 VITALS
HEIGHT: 60 IN | DIASTOLIC BLOOD PRESSURE: 65 MMHG | WEIGHT: 131.4 LBS | BODY MASS INDEX: 25.8 KG/M2 | SYSTOLIC BLOOD PRESSURE: 142 MMHG | HEART RATE: 72 BPM

## 2022-07-05 DIAGNOSIS — I25.5 ISCHEMIC CARDIOMYOPATHY: Primary | ICD-10-CM

## 2022-07-05 DIAGNOSIS — I35.1 AORTIC VALVE INSUFFICIENCY, ETIOLOGY OF CARDIAC VALVE DISEASE UNSPECIFIED: ICD-10-CM

## 2022-07-05 DIAGNOSIS — I20.8 OTHER FORMS OF ANGINA PECTORIS (HCC): ICD-10-CM

## 2022-07-05 PROCEDURE — 1036F TOBACCO NON-USER: CPT | Performed by: INTERNAL MEDICINE

## 2022-07-05 PROCEDURE — G8427 DOCREV CUR MEDS BY ELIG CLIN: HCPCS | Performed by: INTERNAL MEDICINE

## 2022-07-05 PROCEDURE — 1090F PRES/ABSN URINE INCON ASSESS: CPT | Performed by: INTERNAL MEDICINE

## 2022-07-05 PROCEDURE — G8417 CALC BMI ABV UP PARAM F/U: HCPCS | Performed by: INTERNAL MEDICINE

## 2022-07-05 PROCEDURE — 93000 ELECTROCARDIOGRAM COMPLETE: CPT | Performed by: INTERNAL MEDICINE

## 2022-07-05 PROCEDURE — 99214 OFFICE O/P EST MOD 30 MIN: CPT | Performed by: INTERNAL MEDICINE

## 2022-07-05 PROCEDURE — 1123F ACP DISCUSS/DSCN MKR DOCD: CPT | Performed by: INTERNAL MEDICINE

## 2022-07-05 RX ORDER — NITROGLYCERIN 0.4 MG/1
TABLET SUBLINGUAL
Qty: 25 TABLET | Refills: 1 | Status: SHIPPED | OUTPATIENT
Start: 2022-07-05

## 2022-07-05 RX ORDER — SENNOSIDES 8.6 MG
650 CAPSULE ORAL EVERY 8 HOURS PRN
COMMUNITY

## 2022-07-05 NOTE — PROGRESS NOTES
[facility] [address]  [place] [address]  Dept: [phone]  Dept Fax: [phone]  Loc: [phone]    Visit Date: 7/5/2022    Ms. Adis Clien is a 80 y.o. female  who presented for:  Chief Complaint   Patient presents with    1 Year Follow Up     NSTEMI    HPI:   MARCOS   Yisel Tucker is a pleasant 80year old female patient who  has a past medical history of Arthritis, Cancer ([address] Diverticulosis, Hyperlipidemia, Hypertension, Hypothyroidism, Psoriasis, S/P angioplasty with stent, Shingles, and Thyroid disorder. On 5/2021, she was admitted to [facility] with NSTEMI. Patient underwent cardiac cath on 5/6/2021 which showed severe LAD 80-90% and DX 90% disease, Ostial OM 90%, RCA non-dominant. She underwent PCI of the LAD x 3 JUSTUS, PCI of the OM x 1 JUSTUS. Procedure was complicated by loss of side branch (D1) and no re-flow in LAD. IABP was placed post PCI. Echo 5/2021 revealed EF 40-45%, Mild MR. The patient has been treated for shingles. She has occasional chest pain, atypical, described as sharp.       Current Outpatient Medications:     acetaminophen (TYLENOL 8 HOUR ARTHRITIS PAIN) 650 MG extended release tablet, Take 650 mg by mouth every 8 hours as needed for Pain, Disp: , Rfl:     Handicap Placard [facility], by Does not apply route Expires 6/20/27, Disp: 1 each, Rfl: 0    spironolactone (ALDACTONE) 50 MG tablet, TAKE 1 TABLET BY MOUTH DAILY, Disp: 90 tablet, Rfl: 3    Blood Pressure KIT, Check blood pressure q daily, Disp: 1 kit, Rfl: 0    montelukast (SINGULAIR) 10 MG tablet, Take 1 tablet by mouth daily, Disp: 90 tablet, Rfl: 3    mirabegron (MYRBETRIQ) 50 MG TB24, Take 50 mg by mouth daily, Disp: 28 tablet, Rfl: 0    atorvastatin (LIPITOR) 80 MG tablet, Take 1 tablet by mouth nightly, Disp: 90 tablet, Rfl: 3    zinc 50 MG TABS tablet, Take 50 mg by mouth daily, Disp: , Rfl:     Psyllium (METAMUCIL FREE & NATURAL PO), Take by mouth as needed, Disp: , Rfl:     gabapentin (NEURONTIN) 100 MG capsule, Take 1 capsule by mouth 2 times daily for 180 days. , Disp: 180 capsule, Rfl: 3    thyroid (ARMOUR THYROID) 30 MG tablet, Take 1 tablet by mouth daily, Disp: 90 tablet, Rfl: 3    Probiotic Acidophilus (FLORANEX) TABS, Take 1 tablet by mouth daily, Disp: , Rfl:     furosemide (LASIX) 20 MG tablet, Take 1 tablet by mouth daily, Disp: 90 tablet, Rfl: 3    fluticasone (FLONASE) 50 MCG/ACT nasal spray, INSTILL 2 SPRAYS INTO EACH NOSTRIL TWICE DAILY, Disp: 96 g, Rfl: 3    carvedilol (COREG) 3.125 MG tablet, TAKE 1 TABLET BY MOUTH TWICE DAILY WITH MEALS, Disp: 180 tablet, Rfl: 3    clopidogrel (PLAVIX) 75 MG tablet, TAKE 1 TABLET BY MOUTH DAILY, Disp: 90 tablet, Rfl: 2    famotidine (PEPCID) 20 MG tablet, TAKE 1 TABLET BY MOUTH TWICE DAILY, Disp: 180 tablet, Rfl: 3    ASPIRIN LOW DOSE 81 MG EC tablet, TAKE 1 TABLET BY MOUTH EVERY DAY, Disp: , Rfl:     Ascorbic Acid (VITAMIN C) 1000 MG tablet, Take 1,000 mg by mouth daily, Disp: , Rfl:     blood glucose monitor strips, Check blood sugar q daily, Dx R73.01, Disp: 100 strip, Rfl: 3    nitroGLYCERIN (NITROSTAT) 0.4 MG SL tablet, up to max of 3 total doses.  If no relief after 1 dose, call 911., Disp: 25 tablet, Rfl: 0    melatonin 3 MG TABS tablet, Take 3 mg by mouth nightly as needed , Disp: , Rfl:     b complex vitamins capsule, Take 1 capsule by mouth daily, Disp: , Rfl:     polyethyl glycol-propyl glycol 0.4-0.3 % (SYSTANE) 0.4-0.3 % ophthalmic solution, 1 drop in the morning and at bedtime Both eyes, Disp: , Rfl:     docusate sodium (COLACE) 100 MG capsule, Take 100 mg by mouth 2 times daily , Disp: , Rfl:     OLIVE LEAF PO, Take 450 mg by mouth daily, Disp: , Rfl:     Blood Glucose Monitoring Suppl (TRUE METRIX METER) W/DEVICE KIT, 1 Device by Does not apply route daily Check blood sugar q daily, Dx E11.9, Disp: 1 kit, Rfl: 0    Multiple Vitamins-Minerals (THERAPEUTIC MULTIVITAMIN-MINERALS) tablet, Take 1 tablet by mouth daily. , Disp: , Rfl:     fish oil-omega-3 fatty acids 1000 MG capsule, Take 1 g by mouth daily , Disp: , Rfl:     CALCIUM CITRATE, Take 600 mg by mouth in the morning and at bedtime , Disp: , Rfl:     Past Medical History  Cayla Núñez  has a past medical history of Arthritis, Cancer ([address] Diverticulosis, Hyperlipidemia, Hypertension, Hypothyroidism, Psoriasis, S/P angioplasty with stent, Shingles, and Thyroid disorder. Social History  Cayla Núñez  reports that she has never smoked. She has never used smokeless tobacco. She reports that she does not drink alcohol and does not use drugs. Family History  Cayla Núñez family history includes Breast Cancer in her sister; Breast Cancer (age of onset: 52) in her daughter; Cancer in her child; Heart Disease in her sister; Other in an other family member; Stroke in her sister. Past Surgical History   Past Surgical History:   Procedure Laterality Date    BREAST BIOPSY Left     benign    BREAST SURGERY Left 6/1966    Lumpectomy    CARPAL TUNNEL RELEASE Right 1/30/2013    CARPAL TUNNEL RELEASE Left 7/25/13    CATARACT REMOVAL Bilateral 1999    COLON SURGERY  11/1999    resection    COLONOSCOPY  2003, 2006, 2011    DIAGNOSTIC CARDIAC CATH LAB PROCEDURE  05/07/2021    6 stents     HYSTERECTOMY  2/23/1970    KNEE ARTHROSCOPY Right 11/21/2007    meniscectomies    KNEE SURGERY Left 2000    replacement    MYRINGOTOMY Right 07/01/2015    tube insertion    OOPHORECTOMY Bilateral 7/12/2001    oophorectomy    OOPHORECTOMY Bilateral     SHOULDER SURGERY  5/14    SINUS SURGERY      TOTAL ABDOMINAL HYSTERECTOMY         Review of Systems   Constitutional: Negative for chills and fever  HENT: Negative for congestion, sinus pressure, sneezing and sore throat. Eyes: Negative for pain, discharge, redness and itching.    Respiratory: Negative for apnea, cough  Gastrointestinal: Negative for blood in stool, constipation, diarrhea   Endocrine: Negative for cold intolerance, heat intolerance, polydipsia. Genitourinary: Negative for dysuria, enuresis, flank pain and hematuria. Musculoskeletal: Negative for arthralgias, joint swelling and neck pain. Neurological: Negative for numbness and headaches. Psychiatric/Behavioral: Negative for agitation, confusion, decreased concentration and dysphoric mood. Objective:     BP (!) 142/65   Pulse 72   Ht 5' (1.524 m)   Wt 131 lb 6.4 oz (59.6 kg)   BMI 25.66 kg/m²     Wt Readings from Last 3 Encounters:   07/05/22 131 lb 6.4 oz (59.6 kg)   05/13/22 129 lb (58.5 kg)   04/20/22 126 lb (57.2 kg)     BP Readings from Last 3 Encounters:   07/05/22 (!) 142/65   05/20/22 139/60   05/13/22 134/61       Nursing note and vitals reviewed. Physical Exam   Constitutional: Oriented to person, place, and time. Appears well-developed and well-nourished. ENT: Moist mucous membranes. No bleeding. Tongue is midline. Head: Normocephalic and atraumatic. Eyes: EOM are normal. Pupils are equal, round, and reactive to light. Neck: Normal range of motion. Neck supple. No JVD present. Cardiovascular: Normal rate, regular rhythm, murmur, no rubs, and intact distal pulses. Pulmonary/Chest: Effort normal and breath sounds normal. No respiratory distress. No wheezes. No rales. Abdominal: Soft. Bowel sounds are normal. No distension. There is no tenderness. Musculoskeletal: Normal range of motion. no edema. Neurological: Alert and oriented to person, place, and time. No cranial nerve deficit. Coordination normal.   Skin: Skin is warm and dry. Psychiatric: Normal mood and affect.        No results found for: CKTOTAL, CKMB, CKMBINDEX    Lab Results   Component Value Date/Time    WBC 7.3 02/22/2022 08:23 AM    RBC 4.27 02/22/2022 08:23 AM    HGB 14.5 02/22/2022 08:23 AM    HCT 45.4 02/22/2022 08:23 AM    .3 02/22/2022 08:23 AM    MCH 34.0 02/22/2022 08:23 AM    MCHC 31.9 02/22/2022 08:23 AM    RDW 13.8 11/02/2019 06:30 AM     02/22/2022 08:23 AM    MPV 10.2 02/22/2022 08:23 AM       Lab Results   Component Value Date/Time     02/22/2022 08:23 AM    K 4.5 02/22/2022 08:23 AM    K 4.1 05/10/2021 03:40 AM     02/22/2022 08:23 AM    CO2 27 02/22/2022 08:23 AM    BUN 13 02/22/2022 08:23 AM    LABALBU 4.1 02/22/2022 08:23 AM    LABALBU 4.1 03/15/2012 07:25 AM    CREATININE 0.8 02/22/2022 08:23 AM    CALCIUM 9.6 02/22/2022 08:23 AM    LABGLOM 66 02/22/2022 08:23 AM    GLUCOSE 113 02/22/2022 08:23 AM    GLUCOSE 97 11/02/2019 06:30 AM       Lab Results   Component Value Date/Time    ALKPHOS 102 02/22/2022 08:23 AM    ALT 23 02/22/2022 08:23 AM    AST 25 02/22/2022 08:23 AM    PROT 6.9 02/22/2022 08:23 AM    BILITOT 0.6 02/22/2022 08:23 AM    BILIDIR 0.1 11/02/2019 06:30 AM    LABALBU 4.1 02/22/2022 08:23 AM    LABALBU 4.1 03/15/2012 07:25 AM       Lab Results   Component Value Date/Time    MG 2.2 05/27/2021 10:30 AM       Lab Results   Component Value Date    INR 1.00 05/06/2021         Lab Results   Component Value Date/Time    LABA1C 5.6 05/09/2021 03:31 AM       Lab Results   Component Value Date/Time    TRIG 75 02/22/2022 08:23 AM    HDL 45 02/22/2022 08:23 AM    LDLCALC 52 02/22/2022 08:23 AM       Lab Results   Component Value Date/Time    TSH 2.380 02/22/2022 08:23 AM         Testing Reviewed:      I have individually reviewed the cardiac test below:    ECHO: Results for orders placed during the hospital encounter of 05/06/21    Echocardiogram 2D/ M-Mode/ Colorflow/ Do    Narrative  Transthoracic Echocardiography Report (TTE)    Demographics    Patient Name    Adalid Abreu Gender               Female    MR #            [id number]      Race                     Ethnicity    Account #       [de-identified]      Room Number          0001    Accession       [id number]     Date of Study        05/06/2021  Number    Date of Birth   02/21/1925     Referring Physician  Prakash SERRATO Edd Linder MD    Age             80 year(s)     Karina Young, [facility],  RVT    Interpreting         Aure Tanner MD  Physician    Procedure    Type of Study    TTE procedure:ECHOCARDIOGRAM COMPLETE 2D W DOPPLER W COLOR. Procedure Date  Date: 05/06/2021 Start: 05:10 PM    Study Location: Cath lab  Technical Quality: Limited visualization due to poor acoustical window. Indications:NSTEMI. Additional Medical History:Hypertension, Hyperlipidemia, Thyroid disease,  Arthritis, Skin cancer. Patient Status: STAT    Height: 60 inches Weight: 130 pounds BSA: 1.55 m^2 BMI: 25.39 kg/m^2    BP: 160/78 mmHg    Conclusions    Summary  Normal left ventricular size and systolic function. There was mild-moderate hypokinesis of the mid to distal anteroseptal  wall, mid to distal lateral wall  Wall thickness was within normal limits. Ejection fraction was estimated at 40-45%. There was trace-mild aortic regurgitation. IVC size is within normal limits with normal respiratory phasic changes. Signature    ----------------------------------------------------------------  Electronically signed by Aure Tanner MD (Interpreting  physician) on 05/07/2021 at 08:07 AM  ----------------------------------------------------------------    Findings    Mitral Valve  The mitral valve structure was normal with normal leaflet separation. DOPPLER: The transmitral velocity was within the normal range with no  evidence for mitral stenosis. There was no evidence of mitral  regurgitation. Aortic Valve  The aortic valve was trileaflet with normal thickness and cuspal  separation. DOPPLER: Transaortic velocity was within the normal range with  no evidence of aortic stenosis. There was trace-mild aortic regurgitation. Tricuspid Valve  The tricuspid valve structure was normal with normal leaflet separation. DOPPLER: There was no evidence of tricuspid stenosis.  There was no  evidence of tricuspid regurgitation. Pulmonic Valve  The pulmonic valve leaflets were not well seen. DOPPLER: The transpulmonic  velocity was within the normal range with no evidence for regurgitation. Left Atrium  Left atrial size was normal.    Left Ventricle  Normal left ventricular size and systolic function. There was mild-moderate hypokinesis of the mid to distal anteroseptal  wall, mid to distal lateral wall  Wall thickness was within normal limits. Ejection fraction was estimated at 40-45%. Right Atrium  Right atrial size was normal.    Right Ventricle  The right ventricular size was normal with normal systolic function and  wall thickness. Pericardial Effusion  The pericardium was normal in appearance with no evidence of a pericardial  effusion. Pleural Effusion  No evidence of pleural effusion. Aorta / Great Vessels  IVC size is within normal limits with normal respiratory phasic changes.     M-Mode/2D Measurements & Calculations    LV Diastolic   LV Systolic Dimension:    AV Cusp Separation: 1.5 cmLA  Dimension: 4.2 3.3 cm                    Dimension: 2.5 cmAO Root  cm             LV Volume Diastolic: 46.6 Dimension: 2.9 cmLA Area: 13.8  LV FS:21.4 %   ml                        cm^2  LV PW          LV Volume Systolic: 92.4  Diastolic: 0.7 ml  cm             LV EDV/LV EDV Index: 51.6  Septum         ml/33 m^2LV ESV/LV ESV    RV Diastolic Dimension: 1.6 cm  Diastolic: 0.7 Index: 23.4 ml/19 m^2  cm             EF Calculated: 42.6 %     LA/Aorta: 0.86    LV Length: 6.7 cm         LA volume/Index: 30.1 ml /19m^2    LV Area  Diastolic:  51.5 cm^2  LV Area  Systolic: 48.4  cm^2    Doppler Measurements & Calculations    MV Peak E-Wave: 66 cm/s   AV Peak Velocity: 139   LVOT Peak Velocity: 72  MV Peak A-Wave: 81.4 cm/s cm/s                    cm/s  MV E/A Ratio: 0.81        AV Peak Gradient: 7.73  LVOT Peak Gradient: 2  MV Peak Gradient: 1.74    mmHg                    mmHg  mmHg    MV Deceleration Time: 77  msec  IVRT: 53 msec  PV Peak Velocity: 64.3  cm/s  AV DVI (Vmax):0.52      PV Peak Gradient: 1.65  mmHg  MR Velocity: 379 cm/s    [email or web address] [id number]       Ekg:   EKG Interpretation:  normal EKG, normal sinus rhythm, nonspecific ST and T waves changes, unchanged from previous tracings. Cath:  CORONARY ANGIOGRAM:     LEFT MAIN:  Patent without any significant obstruction.     LAD:  Ostium has 30% stenosis in the mid section of the LAD.  There is  severe diffuse disease proximally totalling 70% to 80% in the mid,  distal 80% to 90% severe stenosis seen.  Distal LAD has mild luminal  irregularities, no significant obstruction. Paco Christina is a large diagonal  Branch, this branch was subtotally occluded.  In the mid section of  the ostium, it has about 80% to 90% stenosis.     LCX:  Proximally, it has no significant obstruction.  Gives rise to a  large OM1 that has a 99% stenosis.  AV groove branch continues without  any significant obstruction.  There is OM2 without any significant  obstruction.  There is left PDA without any significant obstruction.     RAMUS INTERMEDIUS:  Subtotally occluded ramus intermedius that is about  less than 1.5 mm in diameter at the proximal end.     RCA:  There is a proximal 50% stenosis.  It is a nondominant vessel with  a small marginal branch.     INTERVENTION:  Given the findings and presentation, I elected to proceed  with intervention.  I exchanged out for 6-Georgian EBU 3.5 guiding  catheter.  I cannulated the left main without complication.  Heparin IV  was given.   ACT was confirmed to be above 250 seconds.  I wired the  lesion using a Runthrough wire only to the apex.  I predilated the  lesion using a 2.5 x 12 NC balloon at 5 inflations at 6 to 8  atmospheres.  I then passed a Xience [place] JUSTUS 2.5 x 38, the wire would  not cross.  I then used 2.5 x 12 NC balloon and predilated the lesion  distally up to 6 to 8 atmospheres.  I then passed the 2.5 x [address] and deployed this into the most distal normal segment.  This  was deployed at 9 atmospheres.  I then passed a 3.0 x [address] and deployed this in overlapping fashion with the first stent  deployed at 12 atmospheres.  Then, I saw that the diagonal branch was in  jeopardy, however, this was extremely small, we did cross the stent with  the [facility] wire to ensure that it was able to be saved if needed. However, at that time, the patient started having extravasation  anterolaterally and was complaining of back pain that was reminiscent of  her presentation.  The diagonal branch had basically a NATHANIEL-1 flow.  I  then after crossing it used a 2.0 x 20 NC balloon and dilated the  proximal and mid diagonal branch to ensure flow.  Thereafter, we had  reasonable flow in the diagonal branch.  I then stented the proximal LAD  with a 3.5 x 18 Xience [place] JUSTUS at 12 atmospheres in overlapping  fashion with the second stent deployed.  I then postdilated the stent  distally with the 2.5 x 20 NC balloon from 18 to 24 atmospheres and the  proximal stent with the 3.5 x 20 from 16 to 20 atmospheres.  Post PCI  angiography demonstrated NATHANIEL-0 flow with no reflow, all the way to the  distal LAD.  Again IC adenosine to help improve the flow to the LAD  which did improve to the NATHANIEL-2 flow.  However, there was significant  anterolateral ST elevation.  There was a very severe circumflex lesion  about 90% in the OM1 left branch into the superior inferior branch.    Given these findings, I was able to stent this branch.  I redirected the  [facility] wire into the OM1 branch, predilated the lesion with a 2.0 x 8  NC balloon up to 16 atmospheres and passed the 2.5 x 12 Xience Nicole  JUSTUS and stented the first OM1.  There was actually a good flow into both  the superior and inferior branches.  The patient then again was showing  more ST elevations.  I readvanced the Runthrough wire into the LAD, and  there was significant no reflow in the LAD again.  At this time, I  passed over-the-wire 1.5 balloon with the Runthrough wire into the  distal LAD.  I injected into coronary distal IC adenosine to help  improve the flow.  While that was occurring, I then went back and  postdilated the OM branch with the 2.5 x 20 NC balloon.  Once this was  done, I went back and tried to save the diagonal branch once more with  the 2.0 x 12 Mini TREK balloon up to 10 to 12 atmospheres to ensure  flow, however, the diagonal branch would not stay open no matter what I  did.  I did Kissing balloon inflation of the diagonal branch as well as  into the LAD, but again the diagonal branch would not stay open.  There  were ST elevations in the anterolateral segment on the anterolateral  ECG.  Therefore, I attempted multiple inflations while in the diagonal  branch at 10 to 12 atmospheres to avoid dissection but still no flow  could be restored.  The patient was having back pain reminiscent of her  chest pain and anterolateral ST elevation.  Given these findings, I  attempted to stent the vessel with 2.25 x 35 Xience Nicole JUSTUS deployed  at 7 atmospheres and then used a 2.25 x 23 Xience Nicole JUSTUS to the  ostium of the diagonal branch in overlapping fashion with the first  stent deployed. Jolayne Cancel was significantly no reflow in the diagonal  branch and again no perfusion that was obvious in this branch.  At this  point, the LAD had improved flow.  Diagonal branch was having no  significant reflow and the circumflex was open, therefore I elected no  further intervention to the diagonal branch.  Continue management of the  LAD and circumflex by placement of the balloon pump.  I took a final  angiogram demonstrating NATHANIEL-2 flow in the LAD and NATHANIEL-3 flow in the  circumflex and NATHANIEL-0 flow in the diagonal branch.  I then took a  femoral angiogram that demonstrated common femoral artery puncture.  I  exchanged out for a 7. 5-Hungarian 40 mL IABP balloon pump sheath that was  inserted over the 0.035 guidewire.  I prepped a 40 mL IABP balloon  catheter per manufacture's recommendations.  This was inserted over a  0.25 guidewire into the ascending aorta.  The guidewire was removed,  balloon pump was deaired appropriately prior to insertion and balloon  pump was then initiated.  IV heparin was initiated as well.  IV  nitroglycerin was also initiated.  Balloon pump was then sutured in  place.     MEDICATIONS:  See EMR.     ACCESS:  See above.     ESTIMATED BLOOD LOSS:  Less than 50 mL.     SUMMARY:  Successful PCI of the LAD with three overlapping drug-eluting  stents; successful PCI of the OM1 with one drug-eluting stent; diagonal  PCI with two drug-eluting stents with no reflow; IABP insertion.       AssessmentPlan:   Stephen Ortiz is a pleasant 80year old female patient who  has a past medical history of Arthritis, Cancer ([address] Diverticulosis, Hyperlipidemia, Hypertension, Hypothyroidism, Psoriasis, S/P angioplasty with stent, Shingles, and Thyroid disorder. On 5/2021, she was admitted to [facility] with NSTEMI. Patient underwent cardiac cath on 5/6/2021 which showed severe LAD 80-90% and DX 90% disease, Ostial OM 90%, RCA non-dominant. She underwent PCI of the LAD x 3 JUSTUS, PCI of the OM x 1 JUSTUS. Procedure was complicated by loss of side branch (D1) and no re-flow in LAD. IABP was placed post PCI. Echo 5/2021 revealed EF 40-45%, Mild MR. The patient has been treated for shingles. She has occasional chest pain, atypical, described as sharp. 1. Chest pain, atypical   2. NSTEMI  3. PCI LAD, OM 5/2021  4. 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 HFrEF  6. S/P acute respiratory failure 2/2 CHF   7. HTN  8. Dyslipidemia  9.  Hypothyroidism      Chest pain is atypical   Patient was advised to report to the ER if has recurrent symptoms with specific instructions given about severity and duration of symptoms   Will need to investigate for underlying structural heart disease, will proceed with a transthoracic echocardiogram    Cont DAPT   The patient was instructed to check the blood pressure at home, and record the readings. Patient will call office with blood pressure readings, will adjust patient's antihypertensive medications as needed accordingly    Hyperlipidemia: on statins, followed periodically. Patient need periodic lipid and liver profile   LIMIT Na   ON LASIX   Daily weight   Echo    Coreg        Above findings and plan of care were discussed with patient in details, patient's questions were answered.      Disposition:  RTC in 6 months             Electronically signed by Dwaine Gifford MD, [facility] [place], [place]    7/5/2022 at 12:58 PM EDT

## 2022-07-12 ENCOUNTER — APPOINTMENT (OUTPATIENT)
Dept: GENERAL RADIOLOGY | Age: 87
End: 2022-07-12
Payer: MEDICARE

## 2022-07-12 ENCOUNTER — HOSPITAL ENCOUNTER (EMERGENCY)
Age: 87
Discharge: HOME | End: 2022-07-12
Payer: MEDICARE

## 2022-07-12 VITALS
TEMPERATURE: 98.6 F | HEIGHT: 60 IN | RESPIRATION RATE: 22 BRPM | DIASTOLIC BLOOD PRESSURE: 66 MMHG | SYSTOLIC BLOOD PRESSURE: 136 MMHG | BODY MASS INDEX: 25.72 KG/M2 | OXYGEN SATURATION: 97 % | WEIGHT: 131 LBS | HEART RATE: 75 BPM

## 2022-07-12 DIAGNOSIS — N30.00 ACUTE CYSTITIS WITHOUT HEMATURIA: ICD-10-CM

## 2022-07-12 DIAGNOSIS — I10 HYPERTENSION, UNSPECIFIED TYPE: Primary | ICD-10-CM

## 2022-07-12 LAB
ALBUMIN SERPL-MCNC: 4.5 G/DL (ref 3.5–5.1)
ALP BLD-CCNC: 156 U/L (ref 38–126)
ALT SERPL-CCNC: 51 U/L (ref 11–66)
ANION GAP SERPL CALCULATED.3IONS-SCNC: 11 MEQ/L (ref 8–16)
AST SERPL-CCNC: 36 U/L (ref 5–40)
BACTERIA: ABNORMAL
BASOPHILS # BLD: 0.5 %
BASOPHILS ABSOLUTE: 0 THOU/MM3 (ref 0–0.1)
BILIRUB SERPL-MCNC: 0.3 MG/DL (ref 0.3–1.2)
BILIRUBIN DIRECT: < 0.2 MG/DL (ref 0–0.3)
BILIRUBIN URINE: NEGATIVE
BLOOD, URINE: ABNORMAL
BUN BLDV-MCNC: 24 MG/DL (ref 7–22)
CALCIUM SERPL-MCNC: 10.1 MG/DL (ref 8.5–10.5)
CASTS: ABNORMAL /LPF
CASTS: ABNORMAL /LPF
CHARACTER, URINE: CLEAR
CHLORIDE BLD-SCNC: 103 MEQ/L (ref 98–111)
CO2: 27 MEQ/L (ref 23–33)
COLOR: YELLOW
CREAT SERPL-MCNC: 0.9 MG/DL (ref 0.4–1.2)
CRYSTALS: ABNORMAL
EOSINOPHIL # BLD: 0.8 %
EOSINOPHILS ABSOLUTE: 0.1 THOU/MM3 (ref 0–0.4)
EPITHELIAL CELLS, UA: ABNORMAL /HPF
ERYTHROCYTE [DISTWIDTH] IN BLOOD BY AUTOMATED COUNT: 13.8 % (ref 11.5–14.5)
ERYTHROCYTE [DISTWIDTH] IN BLOOD BY AUTOMATED COUNT: 50.6 FL (ref 35–45)
GFR SERPL CREATININE-BSD FRML MDRD: 58 ML/MIN/1.73M2
GLUCOSE BLD-MCNC: 123 MG/DL (ref 70–108)
GLUCOSE, URINE: NEGATIVE MG/DL
HCT VFR BLD CALC: 46 % (ref 37–47)
HEMOGLOBIN: 15.1 GM/DL (ref 12–16)
IMMATURE GRANS (ABS): 0.06 THOU/MM3 (ref 0–0.07)
IMMATURE GRANULOCYTES: 0.7 %
KETONES, URINE: NEGATIVE
LEUKOCYTE ESTERASE, URINE: ABNORMAL
LYMPHOCYTES # BLD: 19.3 %
LYMPHOCYTES ABSOLUTE: 1.7 THOU/MM3 (ref 1–4.8)
MCH RBC QN AUTO: 32.6 PG (ref 26–33)
MCHC RBC AUTO-ENTMCNC: 32.8 GM/DL (ref 32.2–35.5)
MCV RBC AUTO: 99.4 FL (ref 81–99)
MISCELLANEOUS LAB TEST RESULT: ABNORMAL
MONOCYTES # BLD: 9.7 %
MONOCYTES ABSOLUTE: 0.8 THOU/MM3 (ref 0.4–1.3)
NITRITE, URINE: NEGATIVE
NUCLEATED RED BLOOD CELLS: 0 /100 WBC
OSMOLALITY CALCULATION: 286.7 MOSMOL/KG (ref 275–300)
PH UA: 6 (ref 5–9)
PLATELET # BLD: 204 THOU/MM3 (ref 130–400)
PMV BLD AUTO: 9.7 FL (ref 9.4–12.4)
POTASSIUM REFLEX MAGNESIUM: 4.7 MEQ/L (ref 3.5–5.2)
PRO-BNP: 1192 PG/ML (ref 0–1800)
PROTEIN UA: NEGATIVE MG/DL
RBC # BLD: 4.63 MILL/MM3 (ref 4.2–5.4)
RBC URINE: ABNORMAL /HPF
RENAL EPITHELIAL, UA: ABNORMAL
SEG NEUTROPHILS: 69 %
SEGMENTED NEUTROPHILS ABSOLUTE COUNT: 5.9 THOU/MM3 (ref 1.8–7.7)
SODIUM BLD-SCNC: 141 MEQ/L (ref 135–145)
SPECIFIC GRAVITY UA: 1.01 (ref 1–1.03)
TOTAL PROTEIN: 7.4 G/DL (ref 6.1–8)
TROPONIN T: < 0.01 NG/ML
UROBILINOGEN, URINE: 0.2 EU/DL (ref 0–1)
WBC # BLD: 8.6 THOU/MM3 (ref 4.8–10.8)
WBC UA: ABNORMAL /HPF
YEAST: ABNORMAL

## 2022-07-12 PROCEDURE — 83880 ASSAY OF NATRIURETIC PEPTIDE: CPT

## 2022-07-12 PROCEDURE — 85025 COMPLETE CBC W/AUTO DIFF WBC: CPT

## 2022-07-12 PROCEDURE — 36415 COLL VENOUS BLD VENIPUNCTURE: CPT

## 2022-07-12 PROCEDURE — 87086 URINE CULTURE/COLONY COUNT: CPT

## 2022-07-12 PROCEDURE — 6370000000 HC RX 637 (ALT 250 FOR IP): Performed by: NURSE PRACTITIONER

## 2022-07-12 PROCEDURE — 71045 X-RAY EXAM CHEST 1 VIEW: CPT

## 2022-07-12 PROCEDURE — 80076 HEPATIC FUNCTION PANEL: CPT

## 2022-07-12 PROCEDURE — 81001 URINALYSIS AUTO W/SCOPE: CPT

## 2022-07-12 PROCEDURE — 84484 ASSAY OF TROPONIN QUANT: CPT

## 2022-07-12 PROCEDURE — 73030 X-RAY EXAM OF SHOULDER: CPT

## 2022-07-12 PROCEDURE — 87186 SC STD MICRODIL/AGAR DIL: CPT

## 2022-07-12 PROCEDURE — 80048 BASIC METABOLIC PNL TOTAL CA: CPT

## 2022-07-12 PROCEDURE — 99284 EMERGENCY DEPT VISIT MOD MDM: CPT

## 2022-07-12 PROCEDURE — 87077 CULTURE AEROBIC IDENTIFY: CPT

## 2022-07-12 PROCEDURE — 93005 ELECTROCARDIOGRAM TRACING: CPT | Performed by: NURSE PRACTITIONER

## 2022-07-12 RX ORDER — CEPHALEXIN 250 MG/1
500 CAPSULE ORAL ONCE
Status: COMPLETED | OUTPATIENT
Start: 2022-07-12 | End: 2022-07-12

## 2022-07-12 RX ORDER — CEPHALEXIN 500 MG/1
500 CAPSULE ORAL 2 TIMES DAILY
Qty: 10 CAPSULE | Refills: 0 | Status: SHIPPED | OUTPATIENT
Start: 2022-07-12 | End: 2022-07-17

## 2022-07-12 RX ADMIN — CEPHALEXIN 500 MG: 250 CAPSULE ORAL at 22:00

## 2022-07-12 ASSESSMENT — ENCOUNTER SYMPTOMS
SHORTNESS OF BREATH: 0
ABDOMINAL PAIN: 0
RHINORRHEA: 0

## 2022-07-12 ASSESSMENT — PAIN - FUNCTIONAL ASSESSMENT: PAIN_FUNCTIONAL_ASSESSMENT: 0-10

## 2022-07-12 ASSESSMENT — PAIN SCALES - GENERAL: PAINLEVEL_OUTOF10: 6

## 2022-07-12 ASSESSMENT — PAIN DESCRIPTION - LOCATION: LOCATION: SHOULDER

## 2022-07-12 ASSESSMENT — PAIN DESCRIPTION - ORIENTATION: ORIENTATION: RIGHT

## 2022-07-12 NOTE — ED NOTES
Pt to ED c/o hypertension and right shoulder pain. Pt states she woke up this morning and realized her right shoulder was sore. Pt states it went away for awhile and then came back so she checked her BP and it was high. Pt states she took her medications today. EKG complete. Monitor applied.       Michael GALEJXVENU, SCOOBY  07/12/22 1953

## 2022-07-13 ENCOUNTER — CARE COORDINATION (OUTPATIENT)
Dept: CARE COORDINATION | Age: 87
End: 2022-07-13

## 2022-07-13 LAB
EKG ATRIAL RATE: 81 BPM
EKG P AXIS: 67 DEGREES
EKG P-R INTERVAL: 160 MS
EKG Q-T INTERVAL: 388 MS
EKG QRS DURATION: 82 MS
EKG QTC CALCULATION (BAZETT): 450 MS
EKG R AXIS: -21 DEGREES
EKG T AXIS: 84 DEGREES
EKG VENTRICULAR RATE: 81 BPM

## 2022-07-13 PROCEDURE — 93010 ELECTROCARDIOGRAM REPORT: CPT | Performed by: INTERNAL MEDICINE

## 2022-07-13 NOTE — ED NOTES
Pt resting on cot with unlabored respirations and family at bedside. Pt and family updated on POC.       Wendy NEGRO RN  07/12/22 203

## 2022-07-13 NOTE — CARE COORDINATION
Ambulatory Care Coordination  ED Follow up Call    Reason for ED visit:  HTN   Status:     improved    7/10 starting 127/69, 142/75, 130/71  7/12 124/66 am, 197/93 noon, Friend who is a nurse took her to ED.  7/13 146/72 am at office    She had a recent hip injection. She feels relief. Wants to start walking. Advised start slowly with 10-15 minutes. Started keflex for UTI. Denies any odor, discolored. She has slight pressure. She is taking regular meds as well. Weight stable at 129, denies leg swelling, SOB. Reviewed zones. Taking lasix and spironolactone. Plan -   7/15 PCP follow up  7/19 echo  Check BP daily  Stay hydrated  Early symptom recognition and reporting to prevent ED and admissions  CHF zones  Low salt diet    Did you call your PCP prior to going to the ED? No      Did you receive a discharge instructions from the Emergency Room? Yes  Review of Instructions:     Understands what to report/when to return?:  Yes   Understands discharge instructions?:  Yes   Following discharge instructions?:  Yes   If not why? Are there any new complaints of pain? No  New Pain Meds? N/A    Constipation prophylaxis needed? N/A    If you have a wound is the dressing clean, dry, and intact? N/A  Understands wound care regimen? N/A    Are there any other complaints/concerns that you wish to tell your provider? FU appts/Provider:    Future Appointments   Date Time Provider Rosy Fisher   7/15/2022  1:00 PM DO ALEX Monson PCT [facility] [place]   7/19/2022 10:15 AM STR ECHO RM1 [facility] ECHO [place] HOD   1/17/2023  1:15 PM Idalia Jimenez MD N [facility] [place]           New Medications?:   Yes      Medication Reconciliation by phone - Yes  Understands Medications? Yes  Taking Medications? Yes  Can you swallow your pills? Yes    Any further needs in the home i.e. Equipment?   No    Link to services in community?:  N/A   Which services:

## 2022-07-13 NOTE — ED PROVIDER NOTES
R-3**Patient requests 90 days supply**Normal      clopidogrel (PLAVIX) 75 MG tablet TAKE 1 TABLET BY MOUTH DAILY, Disp-90 tablet, R-2Normal      famotidine (PEPCID) 20 MG tablet TAKE 1 TABLET BY MOUTH TWICE DAILY, Disp-180 tablet, R-3Normal      ASPIRIN LOW DOSE 81 MG EC tablet TAKE 1 TABLET BY MOUTH EVERY DAY, DAWHistorical Med      Ascorbic Acid (VITAMIN C) 1000 MG tablet Take 1,000 mg by mouth dailyHistorical Med      blood glucose monitor strips Check blood sugar q daily, Dx R73.01, Disp-100 strip, R-3, Normal      melatonin 3 MG TABS tablet Take 3 mg by mouth nightly as needed Historical Med      b complex vitamins capsule Take 1 capsule by mouth dailyHistorical Med      polyethyl glycol-propyl glycol 0.4-0.3 % (SYSTANE) 0.4-0.3 % ophthalmic solution 1 drop in the morning and at bedtime Both eyesHistorical Med      docusate sodium (COLACE) 100 MG capsule Take 100 mg by mouth 2 times daily Historical Med      OLIVE LEAF PO Take 450 mg by mouth dailyHistorical Med      Blood Glucose Monitoring Suppl (TRUE METRIX METER) W/DEVICE KIT 1 Device by Does not apply route daily Check blood sugar q daily, Dx E11.9, Disp-1 kit, R-0      Multiple Vitamins-Minerals (THERAPEUTIC MULTIVITAMIN-MINERALS) tablet Take 1 tablet by mouth daily.       fish oil-omega-3 fatty acids 1000 MG capsule Take 1 g by mouth daily Historical Med      CALCIUM CITRATE Take 600 mg by mouth in the morning and at bedtime Historical Med             ALLERGIES       Allergies   Allergen Reactions    Prednisone      GERD symptoms, shakes    Actonel [Risedronate Sodium] Other (See Comments)     GI    Baclofen Other (See Comments)     Confusion     Bactrim [Sulfamethoxazole-Trimethoprim] Nausea Only           Cardizem [Diltiazem] Other (See Comments)     Raises BP    Celebrex [Celecoxib] Other (See Comments)     Raises BP    Dicloxacillin Other (See Comments)     Heartburn    Doxycycline Other (See Comments)     pulse    Evista [Raloxifene] Fatigue      Lopid [Gemfibrozil]      Fatigue      Questran [Cholestyramine]      constipation      Synthroid [Levothyroxine Sodium]      GERD, Leg pain    Zestoretic [Lisinopril-Hydrochlorothiazide]      Raises B.P.    Zetia [Ezetimibe]      aches       FAMILY HISTORY       Family History   Problem Relation Age of Onset    Cancer Child     Breast Cancer Sister     Stroke Sister     Heart Disease Sister     Other Other         visual problems    Breast Cancer Daughter 52        SOCIAL HISTORY       Social History     Socioeconomic History    Marital status:      Spouse name: None    Number of children: None    Years of education: None    Highest education level: None   Occupational History    None   Tobacco Use    Smoking status: Never Smoker    Smokeless tobacco: Never Used   Vaping Use    Vaping Use: Never used   Substance and Sexual Activity    Alcohol use: No    Drug use: No    Sexual activity: None   Other Topics Concern    None   Social History Narrative    Family and friends helps transport    Currently active with [employer] and [employer], gets MOW    Denies the need for additional support in the home at this time     Social Determinants of Health     Financial Resource Strain:     Difficulty of Paying Living Expenses: Not on file   Food Insecurity:     Worried About Running Out of Food in the Last Year: Not on file    Gonzalez of Food in the Last Year: Not on file   Transportation Needs: No Transportation Needs    Lack of Transportation (Medical): No    Lack of Transportation (Non-Medical): No   Physical Activity: Inactive    Days of Exercise per Week: 0 days    Minutes of Exercise per Session: 0 min   Stress: No Stress Concern Present    Feeling of Stress : Only a little   Social Connections:  Moderately Integrated    Frequency of Communication with Friends and Family: More than three times a week    Frequency of Social Gatherings with Friends and Family: More than three times a week    Attends [employer]ical Services: More than 4 times per year    Active Member of Clubs or Organizations: Yes    Attends Club or Organization Meetings: More than 4 times per year    Marital Status:    Intimate Partner Violence:     Fear of Current or Ex-Partner: Not on file    Emotionally Abused: Not on file    Physically Abused: Not on file    Sexually Abused: Not on file   Housing Stability: Unknown    Unable to Pay for Housing in the Last Year: No    Number of [place] in the Last Year: Not on file    Unstable Housing in the Last Year: No       REVIEW OF SYSTEMS     Review of Systems   Constitutional: Negative for fever. HENT: Negative for rhinorrhea. Eyes: Negative for visual disturbance. Eye heaviness    Respiratory: Negative for shortness of breath. Cardiovascular: Negative for chest pain. Gastrointestinal: Negative for abdominal pain. Musculoskeletal: Negative for neck stiffness. Neurological: Negative for dizziness, syncope and light-headedness. Hematological: Does not bruise/bleed easily. Psychiatric/Behavioral: Negative for confusion. Except as noted above the remainder of the review of systems was reviewed and is negative. SCREENINGS        [place] Coma Scale  Eye Opening: Spontaneous  Best Verbal Response: Oriented  Best Motor Response: Obeys commands  [place] Coma Scale Score: 15               PHYSICAL EXAM    (up to 7 for level 4, 8 or more for level 5)     ED Triage Vitals [02/19/22 1512]   BP Temp Temp Source Pulse Resp SpO2 Height Weight   (!) 134/95 98.2 °F (36.8 °C) Oral 124 16 100 % -- --       Physical Exam  Constitutional:       Appearance: Normal appearance. HENT:      Head: Normocephalic. Eyes:      Extraocular Movements: Extraocular movements intact. Conjunctiva/sclera: Conjunctivae normal.      Pupils: Pupils are equal, round, and reactive to light. Cardiovascular:      Rate and Rhythm: Normal rate.    Pulmonary:      Effort: Pulmonary effort is normal.   Abdominal:      Palpations: Abdomen is soft. Musculoskeletal:         General: Normal range of motion. Cervical back: Normal range of motion. Skin:     General: Skin is warm. Neurological:      General: No focal deficit present. Mental Status: She is alert. Psychiatric:         Mood and Affect: Mood normal.           DIAGNOSTIC RESULTS     EKG:(none if blank)  All EKGs are interpreted by the Emergency Department Physician who either signs or Co-signs this chart in the absence of a cardiologist.        RADIOLOGY: (none if blank)   I directly visualized the following images and reviewed the radiologist interpretations. Interpretation per the Radiologist below, if available at the time of this note:  XR SHOULDER RIGHT (MIN 2 VIEWS)   Final Result   Impression:   1. Right reverse total shoulder arthroplasty. No periprosthetic fracture. Alignment appears anatomic on the abduction and AP views however on the Y    view there appears to be mild anterior subluxation but not pierre    dislocation. Please correlate with physical exam.      This document has been electronically signed by: Faviola Temple MD on    07/12/2022 08:46 PM      XR CHEST PORTABLE   Final Result   Impression:   1. Stable chest x-ray.  No acute infiltrate      This document has been electronically signed by: Faviola Temple MD on    07/12/2022 08:45 PM          LABS:  Labs Reviewed   CBC WITH AUTO DIFFERENTIAL - Abnormal; Notable for the following components:       Result Value    MCV 99.4 (*)     RDW-SD 50.6 (*)     All other components within normal limits   BASIC METABOLIC PANEL W/ REFLEX TO MG FOR LOW K - Abnormal; Notable for the following components:    Glucose 123 (*)     BUN 24 (*)     All other components within normal limits   HEPATIC FUNCTION PANEL - Abnormal; Notable for the following components:    Alkaline Phosphatase 156 (*)     All other components within normal limits   URINALYSIS WITH MICROSCOPIC - Abnormal; Notable for the following components:    Blood, Urine SMALL (*)     Leukocyte Esterase, Urine LARGE (*)     All other components within normal limits   GLOMERULAR FILTRATION RATE, ESTIMATED - Abnormal; Notable for the following components:    Est, Glom Filt Rate 58 (*)     All other components within normal limits   COVID-19, RAPID   CULTURE, URINE   TROPONIN   BRAIN NATRIURETIC PEPTIDE   ANION GAP   OSMOLALITY       All other labs were within normal range or not returned as of this dictation. Please note, any cultures that may have been sent were not resulted at the time of this patient visit. EMERGENCY DEPARTMENT COURSE and Medical Decision Making:     Vitals:    Vitals:    07/12/22 1935 07/12/22 2032 07/12/22 2117   BP: (!) 196/87 (!) 177/64 136/66   Pulse: 85 77 75   Resp: 18 22 22   Temp: 98.6 °F (37 °C)     TempSrc: Oral     SpO2: 97% 97% 97%   Weight: 131 lb (59.4 kg)     Height: 5' (1.524 m)         PROCEDURES: (None if blank)  Procedures    ED Course as of 07/12/22 2344 Tu Jul 12, 2022 2137 Reviewed case with Dr Jayleen GORDON for discharge. [NW]      ED Course User Index  [NW] Imelda Rojo APRN - CNP     MDM  Number of Diagnoses or Management Options  Acute cystitis without hematuria: new, needed workup  Hypertension, unspecified type: established, worsening     Amount and/or Complexity of Data Reviewed  Clinical lab tests: ordered and reviewed  Tests in the radiology section of CPT®: ordered and reviewed  Tests in the medicine section of CPT®: ordered and reviewed  Discuss the patient with other providers: yes  Independent visualization of images, tracings, or specimens: yes    Risk of Complications, Morbidity, and/or Mortality  Presenting problems: low  Diagnostic procedures: moderate  Management options: low        Patient that presents to ER with HTN of 197/97.   Differential diagnosis includes but not limited to hypertension, NSTEMI, pneumonia, urinary tract infection, electrolyte abnormality or arrhythmia. Labs, EKG and chest x-ray are all reassuring. Patient does appear to have a mild urinary tract infection. Patient be treated with oral antibiotics. Patient's blood pressure came down without intervention and was 136/66 prior to discharge. Patient will be discharged home to follow-up with primary care. Patient instructed to return to ER for worsening symptoms, inability to keep liquids down, inability to urinate for greater than 8 hours or difficulty breathing. Follow-up with your primary care provider. ED Medications administered this visit:    Medications   cephALEXin (KEFLEX) capsule 500 mg (500 mg Oral Given 7/12/22 2200)         FINAL IMPRESSION      1. Hypertension, unspecified type    2.  Acute cystitis without hematuria          DISPOSITION/PLAN   DISPOSITION Decision To Discharge 07/12/2022 09:38:37 PM      PATIENT REFERRED TO:  Serafin Gillespie DO  [address] [place] [address] Dr Saadia Aguirre  [phone]            DISCHARGE MEDICATIONS:  Discharge Medication List as of 7/12/2022  9:38 PM      START taking these medications    Details   cephALEXin (KEFLEX) 500 MG capsule Take 1 capsule by mouth 2 times daily for 5 days, Disp-10 capsule, R-0Normal                    RACQUEL Shay CNP (electronically signed)            RACQUEL Shay CNP  07/12/22 8636

## 2022-07-13 NOTE — ED NOTES
Pt refusing covid swab at this time. Provider notified. Pt respirations unlabored.       Rhina Ba WALKER RN  07/12/22 2122

## 2022-07-15 ENCOUNTER — OFFICE VISIT (OUTPATIENT)
Dept: FAMILY MEDICINE CLINIC | Age: 87
End: 2022-07-15
Payer: MEDICARE

## 2022-07-15 VITALS
OXYGEN SATURATION: 98 % | HEIGHT: 60 IN | TEMPERATURE: 97.6 F | DIASTOLIC BLOOD PRESSURE: 50 MMHG | BODY MASS INDEX: 25.48 KG/M2 | SYSTOLIC BLOOD PRESSURE: 118 MMHG | HEART RATE: 78 BPM | RESPIRATION RATE: 16 BRPM | WEIGHT: 129.8 LBS

## 2022-07-15 DIAGNOSIS — Z00.00 MEDICARE ANNUAL WELLNESS VISIT, SUBSEQUENT: Primary | ICD-10-CM

## 2022-07-15 DIAGNOSIS — I10 ESSENTIAL HYPERTENSION: ICD-10-CM

## 2022-07-15 LAB
ORGANISM: ABNORMAL
URINE CULTURE, ROUTINE: ABNORMAL
URINE CULTURE, ROUTINE: ABNORMAL

## 2022-07-15 PROCEDURE — 1123F ACP DISCUSS/DSCN MKR DOCD: CPT | Performed by: FAMILY MEDICINE

## 2022-07-15 PROCEDURE — G0439 PPPS, SUBSEQ VISIT: HCPCS | Performed by: FAMILY MEDICINE

## 2022-07-15 SDOH — ECONOMIC STABILITY: FOOD INSECURITY: WITHIN THE PAST 12 MONTHS, THE FOOD YOU BOUGHT JUST DIDN'T LAST AND YOU DIDN'T HAVE MONEY TO GET MORE.: NEVER TRUE

## 2022-07-15 SDOH — ECONOMIC STABILITY: FOOD INSECURITY: WITHIN THE PAST 12 MONTHS, YOU WORRIED THAT YOUR FOOD WOULD RUN OUT BEFORE YOU GOT MONEY TO BUY MORE.: NEVER TRUE

## 2022-07-15 ASSESSMENT — PATIENT HEALTH QUESTIONNAIRE - PHQ9
SUM OF ALL RESPONSES TO PHQ QUESTIONS 1-9: 0
1. LITTLE INTEREST OR PLEASURE IN DOING THINGS: 0
2. FEELING DOWN, DEPRESSED OR HOPELESS: 0
SUM OF ALL RESPONSES TO PHQ QUESTIONS 1-9: 0
SUM OF ALL RESPONSES TO PHQ9 QUESTIONS 1 & 2: 0
SUM OF ALL RESPONSES TO PHQ QUESTIONS 1-9: 0
SUM OF ALL RESPONSES TO PHQ QUESTIONS 1-9: 0

## 2022-07-15 ASSESSMENT — LIFESTYLE VARIABLES
HOW OFTEN DO YOU HAVE A DRINK CONTAINING ALCOHOL: NEVER
HOW MANY STANDARD DRINKS CONTAINING ALCOHOL DO YOU HAVE ON A TYPICAL DAY: PATIENT DOES NOT DRINK

## 2022-07-15 ASSESSMENT — SOCIAL DETERMINANTS OF HEALTH (SDOH): HOW HARD IS IT FOR YOU TO PAY FOR THE VERY BASICS LIKE FOOD, HOUSING, MEDICAL CARE, AND HEATING?: NOT VERY HARD

## 2022-07-15 NOTE — PATIENT INSTRUCTIONS
Personalized Preventive Plan for Cesario Duarte - 7/15/2022  Medicare offers a range of preventive health benefits. Some of the tests and screenings are paid in full while other may be subject to a deductible, co-insurance, and/or copay. Some of these benefits include a comprehensive review of your medical history including lifestyle, illnesses that may run in your family, and various assessments and screenings as appropriate. After reviewing your medical record and screening and assessments performed today your provider may have ordered immunizations, labs, imaging, and/or referrals for you. A list of these orders (if applicable) as well as your Preventive Care list are included within your After Visit Summary for your review. Other Preventive Recommendations:    A preventive eye exam performed by an eye specialist is recommended every 1-2 years to screen for glaucoma; cataracts, macular degeneration, and other eye disorders. A preventive dental visit is recommended every 6 months. Try to get at least 150 minutes of exercise per week or 10,000 steps per day on a pedometer . Order or download the FREE \"Exercise & Physical Activity: Your Everyday Guide\" from [employer]. Call 4-702.703.8279 or search [employer] online. You need 1037-9939 mg of calcium and 6243-5505 IU of vitamin D per day. It is possible to meet your calcium requirement with diet alone, but a vitamin D supplement is usually necessary to meet this goal.  When exposed to the sun, use a sunscreen that protects against both UVA and UVB radiation with an SPF of 30 or greater. Reapply every 2 to 3 hours or after sweating, drying off with a towel, or swimming. Always wear a seat belt when traveling in a car. Always wear a helmet when riding a bicycle or motorcycle.

## 2022-07-15 NOTE — PROGRESS NOTES
Kit Gupta is a 80 y.o. female that presents for Medicare AWV and Follow-up ( Emergency Room)        ER Follow Up:  Patient presents for ER follow up. Patient recently seen in ED for evaluation and treatment of HTN. Symptoms prior to presenting to ER:  Headache and BP was elevated    ER Course:  Patient that presents to ER with HTN of 197/97. Differential diagnosis includes but not limited to hypertension, NSTEMI, pneumonia, urinary tract infection, electrolyte abnormality or arrhythmia. Labs, EKG and chest x-ray are all reassuring. Patient does appear to have a mild urinary tract infection. Patient be treated with oral antibiotics. Patient's blood pressure came down without intervention and was 136/66 prior to discharge. Patient will be discharged home to follow-up with primary care. Patient instructed to return to ER for worsening symptoms, inability to keep liquids down, inability to urinate for greater than 8 hours or difficulty breathing. Follow-up with your primary care provider. Medications/Treatments at discharge:  Keflex    Clinical course since discharge:  Has been feeling well since discharge. Her BP readings have been normal since discharge. PHYSICAL EXAM:  Blood pressure (!) 118/50, pulse 78, temperature 97.6 °F (36.4 °C), temperature source Infrared, resp. rate 16, height 5' (1.524 m), weight 129 lb 12.8 oz (58.9 kg), SpO2 98 %, not currently breastfeeding. GEN: No acute distress  HEENT:  NCAT, PERRL, EOMI, Nares clear, turbinates pink, mucosa is moist.  Oropharynx  is clear. Hearing grossly intact. Dentition is normal for age. Neck: No lymphadenopathy or masses. Thyroid not palpable; no nodules or masses. Heart: RRR. S1 and S2 normal, no murmurs, clicks, gallops or rubs. Lungs:  CTAB,  No wheezing, ronchi, or rales. Normal symmetric air entry throughout both lung fields. Abdomen:  Soft, non tender, non distended. No rebound or guarding.   No organomegaly. Extremities:  No gross deformity, erythema or edema of the lower extremities. Skin: No pathologic lesions or significant rash. Psych:  Affect appropriate. Thought process is normal without evidence of depression or psychosis. Good insight and appropriae interaction. Cognition and memory appear to be intact. ASSESSMENT & PLAN  Dorina Marcelo was seen today for medicare awv and follow-up. Diagnoses and all orders for this visit:    Medicare annual wellness visit, subsequent    Essential hypertension    -BPs stable, elevation likely related to HA, continue lasix, coreg, aldactone  -Other chronic issues are stable, continue current medications  -Advised to call if any issues      Return in about 3 months (around 10/15/2022), or if symptoms worsen or fail to improve.

## 2022-07-15 NOTE — PROGRESS NOTES
Medicare Annual Wellness Visit    Saralee Bence is here for Medicare AWV and Follow-up ( Emergency Room)    Assessment & Plan   Medicare annual wellness visit, subsequent    Recommendations for Preventive Services Due: see orders and patient instructions/AVS.  Recommended screening schedule for the next 5-10 years is provided to the patient in written form: see Patient Instructions/AVS.     No follow-ups on file. Subjective       Patient's complete Health Risk Assessment and screening values have been reviewed and are found in Flowsheets. The following problems were reviewed today and where indicated follow up appointments were made and/or referrals ordered. Positive Risk Factor Screenings with Interventions:              Health Habits/Nutrition:  Physical Activity: Inactive    Days of Exercise per Week: 0 days    Minutes of Exercise per Session: 0 min     Have you lost any weight without trying in the past 3 months?: (!) Yes  Body mass index: (!) 25.35  Have you seen the dentist within the past year?: Yes  Health Habits/Nutrition Interventions: Will continue to monitor    Hearing/Vision:  Do you or your family notice any trouble with your hearing that hasn't been managed with hearing aids?: (!) Yes  Do you have difficulty driving, watching TV, or doing any of your daily activities because of your eyesight?: No  Have you had an eye exam within the past year?: Appointment is scheduled  No results found. Hearing/Vision Interventions:  Hearing concerns:  patient declines any further evaluation/treatment for hearing issues            Objective   Vitals:    07/15/22 1314   BP: (!) 118/50   Pulse: 78   Resp: 16   Temp: 97.6 °F (36.4 °C)   TempSrc: Infrared   SpO2: 98%   Weight: 129 lb 12.8 oz (58.9 kg)   Height: 5' (1.524 m)      Body mass index is 25.35 kg/m².              Allergies   Allergen Reactions    Prednisone      GERD symptoms, shakes    Actonel [Risedronate Sodium] Other (See Comments)     GI Baclofen Other (See Comments)     Confusion     Bactrim [Sulfamethoxazole-Trimethoprim] Nausea Only           Cardizem [Diltiazem] Other (See Comments)     Raises BP    Celebrex [Celecoxib] Other (See Comments)     Raises BP    Dicloxacillin Other (See Comments)     Heartburn    Doxycycline Other (See Comments)     pulse    Evista [Raloxifene]      Fatigue      Lopid [Gemfibrozil]      Fatigue      Questran [Cholestyramine]      constipation      Synthroid [Levothyroxine Sodium]      GERD, Leg pain    Zestoretic [Lisinopril-Hydrochlorothiazide]      Raises B. P.    Zetia [Ezetimibe]      aches     Prior to Visit Medications    Medication Sig Taking? Authorizing Provider   cephALEXin (KEFLEX) 500 MG capsule Take 1 capsule by mouth 2 times daily for 5 days  RACQUEL Zarco CNP   acetaminophen (TYLENOL 8 HOUR ARTHRITIS PAIN) 650 MG extended release tablet Take 650 mg by mouth every 8 hours as needed for Pain  Historical Provider, MD   nitroGLYCERIN (NITROSTAT) 0.4 MG SL tablet up to max of 3 total doses. If no relief after 1 dose, call 911. Nidia Pineda MD   Handicap [facility] MISC by Does not apply route Expires 6/20/27  Christian Nick DO   spironolactone (ALDACTONE) 50 MG tablet TAKE 1 TABLET BY MOUTH DAILY  Nida Correia,    Blood Pressure KIT Check blood pressure q daily  Edson Correia DO   montelukast (SINGULAIR) 10 MG tablet Take 1 tablet by mouth daily  Christian Nick DO   mirabegron (MYRBETRIQ) 50 MG TB24 Take 50 mg by mouth daily  RACQUEL Pham CNP   atorvastatin (LIPITOR) 80 MG tablet Take 1 tablet by mouth nightly  Christian Nick DO   zinc 50 MG TABS tablet Take 50 mg by mouth daily  Historical Provider, MD   Psyllium (METAMUCIL FREE & NATURAL PO) Take by mouth as needed  Historical Provider, MD   gabapentin (NEURONTIN) 100 MG capsule Take 1 capsule by mouth 2 times daily for 180 days.   Christian Nick DO   thyroid (ARMOUR THYROID) 30 MG tablet Take 1 tablet by mouth daily  Christian Nick DO Probiotic Acidophilus (FLORANEX) TABS Take 1 tablet by mouth daily  Cherri Flores DO   furosemide (LASIX) 20 MG tablet Take 1 tablet by mouth daily  Edson ROJELIO Correia DO   fluticasone (FLONASE) 50 MCG/ACT nasal spray INSTILL 2 SPRAYS INTO EACH NOSTRIL TWICE DAILY  Edson ROJELIO Correia DO   carvedilol (COREG) 3.125 MG tablet TAKE 1 TABLET BY MOUTH TWICE DAILY WITH MEALS  Edson ROJELIO Correia DO   clopidogrel (PLAVIX) 75 MG tablet TAKE 1 TABLET BY MOUTH DAILY  Christopher Hwang MD   famotidine (PEPCID) 20 MG tablet TAKE 1 TABLET BY MOUTH TWICE DAILY  Edson ROJELIO Correia DO   ASPIRIN LOW DOSE 81 MG EC tablet TAKE 1 TABLET BY MOUTH EVERY DAY  Historical Provider, MD   Ascorbic Acid (VITAMIN C) 1000 MG tablet Take 1,000 mg by mouth daily  Historical Provider, MD   blood glucose monitor strips Check blood sugar q daily, Dx R73.01  Edson Correia DO   melatonin 3 MG TABS tablet Take 3 mg by mouth nightly as needed   Historical Provider, MD   b complex vitamins capsule Take 1 capsule by mouth daily  Historical Provider, MD   polyethyl glycol-propyl glycol 0.4-0.3 % (SYSTANE) 0.4-0.3 % ophthalmic solution 1 drop in the morning and at bedtime Both eyes  Historical Provider, MD   docusate sodium (COLACE) 100 MG capsule Take 100 mg by mouth 2 times daily   Historical Provider, MD   OLIVE LEAF PO Take 450 mg by mouth daily  Historical Provider, MD   Blood Glucose Monitoring Suppl (TRUE METRIX METER) W/DEVICE KIT 1 Device by Does not apply route daily Check blood sugar q daily, Dx E11.9  Cherri Flores DO   Multiple Vitamins-Minerals (THERAPEUTIC MULTIVITAMIN-MINERALS) tablet Take 1 tablet by mouth daily.   Historical Provider, MD   fish oil-omega-3 fatty acids 1000 MG capsule Take 1 g by mouth daily   Historical Provider, MD   CALCIUM CITRATE Take 600 mg by mouth in the morning and at bedtime   Historical Provider, MD [place] (Including outside providers/suppliers regularly involved in providing care):   Patient Care Team:  Cherri Flores DO as PCP - General (Family Medicine)  Charlee Bautista DO as PCP - [facility] Empaneled Provider  Prem Bronson (Audiology)  Romana Galvez RN as [facility]  Andrew Eaton MD as Cardiologist (Cardiology)  Diane Morse RD, LD as Dietitian (Dietitian Registered)     Reviewed and updated this visit:  Tobacco  Allergies  Meds  Med Hx  Surg Hx  Soc Hx  Fam Hx

## 2022-07-19 ENCOUNTER — CARE COORDINATION (OUTPATIENT)
Dept: CARE COORDINATION | Age: 87
End: 2022-07-19

## 2022-07-19 ENCOUNTER — HOSPITAL ENCOUNTER (OUTPATIENT)
Dept: NON INVASIVE DIAGNOSTICS | Age: 87
Discharge: HOME | End: 2022-07-19
Payer: MEDICARE

## 2022-07-19 DIAGNOSIS — I25.5 ISCHEMIC CARDIOMYOPATHY: ICD-10-CM

## 2022-07-19 DIAGNOSIS — I20.8 OTHER FORMS OF ANGINA PECTORIS (HCC): ICD-10-CM

## 2022-07-19 DIAGNOSIS — I35.1 AORTIC VALVE INSUFFICIENCY, ETIOLOGY OF CARDIAC VALVE DISEASE UNSPECIFIED: ICD-10-CM

## 2022-07-19 LAB
LV EF: 48 %
LVEF MODALITY: NORMAL

## 2022-07-19 PROCEDURE — 93306 TTE W/DOPPLER COMPLETE: CPT

## 2022-07-20 ENCOUNTER — TELEPHONE (OUTPATIENT)
Dept: PHARMACY | Age: 87
End: 2022-07-20

## 2022-07-20 ENCOUNTER — CARE COORDINATION (OUTPATIENT)
Dept: CARE COORDINATION | Age: 87
End: 2022-07-20

## 2022-07-20 NOTE — CARE COORDINATION
Unable to reach and unable to leave voicemail on mobile phone to complete Care Coordination follow up call. Ambulatory Care Coordination Note  7/20/2022    ACC: Rima Ortiz, RN    Summary Note: Spoke with Kristal Hung for continued Care Coordination follow up through [facility] visit.   She was in ED last week for HTN  She completed follow up appt with PCP  Blood pressures running 105-63, 113-64 and 125/66  Completed antibiotic for UTI  Pain is managed now and she is holding off on second shot to her hip  She has a question regarding shingles vaccine which I will ask PCP for recommendations  Denies headache  Has been able to be more active with pain being controlled  Working with RD for dietary support    Plan  Reinforce education completed at [facility] visit  Reinforced to call PCP or myself before ED visit per [facility] recommendations to help reduce unnecessary utilization  Ask PCP regarding shingles vaccine  Reinforce importance of early symptom recognition and reporting to prevent exacerbation and unnecessary hospitalization  Congestive Heart Failure Assessment    Are you currently restricting fluids?: No Restriction  Do you understand a low sodium diet?: Yes  Do you understand how to read food labels?: Yes  How many restaurant meals do you eat per week?: 1-2  Do you salt your food before tasting it?: No         Symptoms:           and   General Assessment    Do you have any symptoms that are causing concern?: Yes  Progression since Onset: Resolved  Reported Symptoms: Other (Comment: UTI)             Lab Results       None            Care Coordination Interventions    Program Enrollment: Complex Care  Referral from Primary Care Provider: Yes  Suggested Interventions and [facility] [place]: Declined  Meals on Wheels: Completed  Medication Assistance Program:  (Comment: not yet but may run into issues with new prescription card)  Medi Set or Pill Pack: Completed  Occupational Therapy: Not Started  Pharmacist: Completed (Comment: pharmacy referral placed 2-16-22)  Physical Therapy: Not Started  Registered Dietician: Completed  Senior Services: Completed (Comment: active with AAA and [employer])  Social Work: Not Started  Zone Management Tools: Completed  Other Services or Interventions: Currently active with COA and AAA          Goals Addressed    None         Prior to Admission medications    Medication Sig Start Date End Date Taking? Authorizing Provider   acetaminophen (TYLENOL 8 HOUR ARTHRITIS PAIN) 650 MG extended release tablet Take 650 mg by mouth every 8 hours as needed for Pain    Historical Provider, MD   nitroGLYCERIN (NITROSTAT) 0.4 MG SL tablet up to max of 3 total doses. If no relief after 1 dose, call 911. 7/5/22   Luara Greenfield MD   Handicap Placard MISC by Does not apply route Expires 6/20/27 6/20/22   Rich Hines DO   spironolactone (ALDACTONE) 50 MG tablet TAKE 1 TABLET BY MOUTH DAILY 5/23/22   Rich Hines DO   Blood Pressure KIT Check blood pressure q daily 4/22/22   Rich Hines DO   montelukast (SINGULAIR) 10 MG tablet Take 1 tablet by mouth daily 4/4/22   Rich Hines DO   mirabegron (MYRBETRIQ) 50 MG TB24 Take 50 mg by mouth daily 3/11/22   RACQUEL Gupta - CNP   atorvastatin (LIPITOR) 80 MG tablet Take 1 tablet by mouth nightly 3/8/22   Rich Hines DO   zinc 50 MG TABS tablet Take 50 mg by mouth daily    Historical Provider, MD   Psyllium (METAMUCIL FREE & NATURAL PO) Take by mouth as needed    Historical Provider, MD   gabapentin (NEURONTIN) 100 MG capsule Take 1 capsule by mouth 2 times daily for 180 days.  2/16/22 8/15/22  Rich Hines DO   thyroid [facility] THYROID) 30 MG tablet Take 1 tablet by mouth daily 2/16/22   Rich Hines DO   Probiotic Acidophilus [facility]) TABS Take 1 tablet by mouth daily 2/16/22   Rich Hines DO   furosemide (LASIX) 20 MG tablet Take 1 tablet by mouth daily 1/17/22   Rich Hines DO   fluticasone (FLONASE) 50 MCG/ACT nasal spray INSTILL 2 SPRAYS INTO EACH NOSTRIL TWICE DAILY 1/6/22   Zacarias Harper, DO   carvedilol (COREG) 3.125 MG tablet TAKE 1 TABLET BY MOUTH TWICE DAILY WITH MEALS 12/3/21   Zacarias Harper DO   clopidogrel (PLAVIX) 75 MG tablet TAKE 1 TABLET BY MOUTH DAILY 12/3/21   Buckley Sacks, MD   famotidine (PEPCID) 20 MG tablet TAKE 1 TABLET BY MOUTH TWICE DAILY 11/16/21   Edson Correia DO   ASPIRIN LOW DOSE 81 MG EC tablet TAKE 1 TABLET BY MOUTH EVERY DAY 6/16/21   Historical Provider, MD   Ascorbic Acid (VITAMIN C) 1000 MG tablet Take 1,000 mg by mouth daily    Historical Provider, MD   blood glucose monitor strips Check blood sugar q daily, Dx R73.01 7/6/21   Zacarias Harper DO   melatonin 3 MG TABS tablet Take 3 mg by mouth nightly as needed     Historical Provider, MD   b complex vitamins capsule Take 1 capsule by mouth daily    Historical Provider, MD   polyethyl glycol-propyl glycol 0.4-0.3 % (SYSTANE) 0.4-0.3 % ophthalmic solution 1 drop in the morning and at bedtime Both eyes    Historical Provider, MD   docusate sodium (COLACE) 100 MG capsule Take 100 mg by mouth 2 times daily     Historical Provider, MD   OLIVE LEAF PO Take 450 mg by mouth daily    Historical Provider, MD   Blood Glucose Monitoring Suppl (TRUE METRIX METER) W/DEVICE KIT 1 Device by Does not apply route daily Check blood sugar q daily, Dx E11.9 7/15/16   Zacarias Harper DO   Multiple Vitamins-Minerals (THERAPEUTIC MULTIVITAMIN-MINERALS) tablet Take 1 tablet by mouth daily.     Historical Provider, MD   fish oil-omega-3 fatty acids 1000 MG capsule Take 1 g by mouth daily     Historical Provider, MD   CALCIUM CITRATE Take 600 mg by mouth in the morning and at bedtime     Historical Provider, MD       Future Appointments   Date Time Provider Rosy Fisher   7/21/2022 10:20 AM STR MAMMOGRAPHY RM2  Rubye Cart WOMEN STR Radiolog   10/20/2022 10:00 AM Zacarias Harper DO [place] PCT [facility] [address] [place] [address]   1/17/2023  1:15 PM Buckley Sacks, MD N SRPX Heart [address]

## 2022-07-20 NOTE — CARE COORDINATION
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 States she believes the last time she received it was 2014. Please advise. Thank you!

## 2022-07-21 ENCOUNTER — HOSPITAL ENCOUNTER (OUTPATIENT)
Dept: WOMENS IMAGING | Age: 87
Discharge: HOME | End: 2022-07-21
Payer: MEDICARE

## 2022-07-21 DIAGNOSIS — Z12.31 VISIT FOR SCREENING MAMMOGRAM: ICD-10-CM

## 2022-07-21 PROCEDURE — 77063 BREAST TOMOSYNTHESIS BI: CPT

## 2022-07-22 ENCOUNTER — OFFICE VISIT (OUTPATIENT)
Dept: FAMILY MEDICINE CLINIC | Age: 87
End: 2022-07-22
Payer: MEDICARE

## 2022-07-22 VITALS
WEIGHT: 129.2 LBS | HEIGHT: 60 IN | TEMPERATURE: 96.8 F | RESPIRATION RATE: 14 BRPM | DIASTOLIC BLOOD PRESSURE: 52 MMHG | BODY MASS INDEX: 25.36 KG/M2 | SYSTOLIC BLOOD PRESSURE: 110 MMHG | HEART RATE: 75 BPM

## 2022-07-22 DIAGNOSIS — N30.90 CYSTITIS: Primary | ICD-10-CM

## 2022-07-22 LAB
BILIRUBIN URINE: NEGATIVE
BLOOD URINE, POC: ABNORMAL
CHARACTER, URINE: CLEAR
COLOR, URINE: YELLOW
GLUCOSE URINE: NEGATIVE MG/DL
KETONES, URINE: NEGATIVE
LEUKOCYTE CLUMPS, URINE: ABNORMAL
NITRITE, URINE: NEGATIVE
PH, URINE: 6.5 (ref 5–9)
PROTEIN, URINE: NEGATIVE MG/DL
SPECIFIC GRAVITY, URINE: 1.01 (ref 1–1.03)
UROBILINOGEN, URINE: 0.2 EU/DL (ref 0–1)

## 2022-07-22 PROCEDURE — 1123F ACP DISCUSS/DSCN MKR DOCD: CPT | Performed by: NURSE PRACTITIONER

## 2022-07-22 PROCEDURE — 99214 OFFICE O/P EST MOD 30 MIN: CPT | Performed by: NURSE PRACTITIONER

## 2022-07-22 RX ORDER — CEFDINIR 300 MG/1
300 CAPSULE ORAL DAILY
Qty: 5 CAPSULE | Refills: 0 | Status: SHIPPED | OUTPATIENT
Start: 2022-07-22 | End: 2022-07-29 | Stop reason: SDUPTHER

## 2022-07-22 NOTE — PROGRESS NOTES
SUBJECTIVE:  Mary Hayden is a 80 y.o. female for   Chief Complaint   Patient presents with    Cystitis    Fatigue    Eye Problem     watery    Sinus Problem       HPI:    Symptoms present for 10+ days. Symptoms are unchanged since they initially started. Dysuria? Yes - pressure  Hematuria? No  Increased urinary frequency? Yes  Abdominal discomfort? No  CVA pain? No  Hx of UTIs? Yes - recently treated for UTI, doesn't feel it went away, feels about the same as before    Patient Active Problem List   Diagnosis    Deviated nasal septum    Hypertrophy of nasal turbinates    Sensorineural hearing loss    Mixed hearing loss    Hypertension    Hypothyroidism    Prediabetes    Pure hypercholesterolemia    Decreased ROM of left shoulder    Acute pain of left shoulder    NSTEMI (non-ST elevated myocardial infarction) ([facility])    Hyperglycemia    Leukocytosis    Prolonged Q-T interval on ECG    Hyperlipidemia    Acute kidney injury superimposed on CKD ([facility])    Acute respiratory failure with hypoxia ([facility])    Ischemic cardiomyopathy    Aortic valve regurgitation    High anion gap metabolic acidosis    Physical deconditioning    Right flank pain    Chronic HFrEF (heart failure with reduced ejection fraction) ([facility])           OBJECTIVE:  BP (!) 110/52   Pulse 75   Temp 96.8 °F (36 °C) (Infrared)   Resp 14   Ht 5' (1.524 m)   Wt 129 lb 3.2 oz (58.6 kg)   BMI 25.23 kg/m²   She appears well; non-toxic and in no apparent distress. Physical Examination: Chest - clear to auscultation, no wheezes, rales or rhonchi, symmetric air entry  Abdomen - soft, nontender, nondistended, no masses or organomegaly, no CVA tenderness  Extremities - peripheral pulses normal, no pedal edema, no clubbing or cyanosis  Psych -  Affect appropriate. Thought process is normal without evidence of depression or psychosis. Good insight and appropriae interaction. Cognition and memory appear to be intact.       ASSESSMENT & PLAN  Chetan Connors was seen today for cystitis, fatigue, eye problem and sinus problem. Diagnoses and all orders for this visit:    Cystitis  -     cefdinir (OMNICEF) 300 MG capsule; Take 1 capsule by mouth in the morning for 5 days. -     Culture, Urine    Other orders  -     POCT Urinalysis No Micro (Auto)    UA was positive trace blood and small leuks      No follow-ups on file.      -Start above treatments  -Urine culture was sent today  -Patient advised to contact our office immediately if symptoms worsen or persist  -Patient counseled on conservative care including fluids, rest and OTC meds      All patient questions answered. Patient voiced understanding.

## 2022-07-24 LAB
ORGANISM: ABNORMAL
URINE CULTURE, ROUTINE: ABNORMAL

## 2022-07-25 ENCOUNTER — TELEPHONE (OUTPATIENT)
Dept: FAMILY MEDICINE CLINIC | Age: 87
End: 2022-07-25

## 2022-07-25 NOTE — TELEPHONE ENCOUNTER
----- Message from RACQUEL Chaudhari CNP sent at 7/24/2022  8:58 PM EDT -----  Urine came back positive for infection. Finish full course of antibiotics.   Electronically signed by RACQUEL Chaudhari CNP on 7/24/2022 at 8:58 PM

## 2022-07-26 ENCOUNTER — CARE COORDINATION (OUTPATIENT)
Dept: CARE COORDINATION | Age: 87
End: 2022-07-26

## 2022-07-26 NOTE — CARE COORDINATION
throughout the day. #1 Make meals balanced using MyPlate. #1 Pt is focusing on making meals balanced and incorporating a variety of foods. #2  Monitor sodium intake. #2 Avoid the salt shaker and read food labels. #2 Pt is watching sodium intake daily. Plan of Care:  RD encouraged pt to keep working toward goals set. RD will follow up with pt to discuss any questions pt has and check the progress toward goals. Follow Up:    RD will call pt in 2 weeks to follow up and answer any nutrition related questions at that time.      [address]

## 2022-07-29 ENCOUNTER — NURSE ONLY (OUTPATIENT)
Dept: FAMILY MEDICINE CLINIC | Age: 87
End: 2022-07-29

## 2022-07-29 DIAGNOSIS — N30.90 CYSTITIS: Primary | ICD-10-CM

## 2022-07-29 DIAGNOSIS — N30.90 CYSTITIS: ICD-10-CM

## 2022-07-29 LAB
BILIRUBIN URINE: NEGATIVE
BLOOD URINE, POC: ABNORMAL
CHARACTER, URINE: CLEAR
COLOR, URINE: YELLOW
GLUCOSE URINE: NEGATIVE MG/DL
KETONES, URINE: NEGATIVE
LEUKOCYTE CLUMPS, URINE: ABNORMAL
NITRITE, URINE: NEGATIVE
PH, URINE: 6 (ref 5–9)
PROTEIN, URINE: NEGATIVE MG/DL
SPECIFIC GRAVITY, URINE: 1.01 (ref 1–1.03)
UROBILINOGEN, URINE: 0.2 EU/DL (ref 0–1)

## 2022-07-29 RX ORDER — CEFDINIR 300 MG/1
300 CAPSULE ORAL DAILY
Qty: 5 CAPSULE | Refills: 0 | Status: SHIPPED | OUTPATIENT
Start: 2022-07-29 | End: 2022-08-03

## 2022-07-29 NOTE — PROGRESS NOTES
Urine rechecked per pt request  Positive for blood and leuks  Will send in another course of atb  Recommend in the future no need to check urine unless she becomes symptomatic again

## 2022-08-03 ENCOUNTER — CARE COORDINATION (OUTPATIENT)
Dept: CARE COORDINATION | Age: 87
End: 2022-08-03

## 2022-08-03 NOTE — CARE COORDINATION
reschedule if I have to cancel. I will notify my provider of any barriers to my plan of care. I will follow my Zone Management tool to seek urgent or emergent care. I will notify my provider of any symptoms that indicate a worsening of my condition. CHF  Barriers: need for additional education and support  Plan for overcoming my barriers: family and ACM support  Confidence: 9/10  Anticipated Goal Completion Date: 5/16/22 Update 6/13/22 Update 7-15-22 Update 9-3-22                Prior to Admission medications    Medication Sig Start Date End Date Taking? Authorizing Provider   cefdinir (OMNICEF) 300 MG capsule Take 1 capsule by mouth in the morning for 5 days. 7/29/22 8/3/22 Yes RACQUEL Hill CNP   acetaminophen (TYLENOL) 650 MG extended release tablet Take 650 mg by mouth every 8 hours as needed for Pain   Yes Historical Provider, MD   nitroGLYCERIN (NITROSTAT) 0.4 MG SL tablet up to max of 3 total doses. If no relief after 1 dose, call 911. 7/5/22  Yes Nica Lopez MD   Handicap Placard MISC by Does not apply route Expires 6/20/27 6/20/22  Yes Edson Correia, DO   spironolactone (ALDACTONE) 50 MG tablet TAKE 1 TABLET BY MOUTH DAILY 5/23/22  Yes Khoi Saldana DO   Blood Pressure KIT Check blood pressure q daily 4/22/22  Yes Khoi Saldana DO   montelukast (SINGULAIR) 10 MG tablet Take 1 tablet by mouth daily 4/4/22  Yes Khoi Saldana DO   mirabegron (MYRBETRIQ) 50 MG TB24 Take 50 mg by mouth daily 3/11/22  Yes RACQUEL Hill CNP   atorvastatin (LIPITOR) 80 MG tablet Take 1 tablet by mouth nightly 3/8/22  Yes Khoi Saldana DO   zinc 50 MG TABS tablet Take 50 mg by mouth daily   Yes Historical Provider, MD   Psyllium (METAMUCIL FREE & NATURAL PO) Take by mouth as needed   Yes Historical Provider, MD   gabapentin (NEURONTIN) 100 MG capsule Take 1 capsule by mouth 2 times daily for 180 days.  2/16/22 8/15/22 Yes Khoi Saldana DO   thyroid (ARMOUR THYROID) 30 MG tablet Take 1 tablet by mouth daily 2/16/22  Yes Bill Liu DO   Probiotic Acidophilus [facility]) TABS Take 1 tablet by mouth daily 2/16/22  Yes Bill Liu DO   furosemide (LASIX) 20 MG tablet Take 1 tablet by mouth daily 1/17/22  Yes Edson Correia DO   fluticasone (FLONASE) 50 MCG/ACT nasal spray INSTILL 2 SPRAYS INTO EACH NOSTRIL TWICE DAILY 1/6/22  Yes Edson Correia DO   carvedilol (COREG) 3.125 MG tablet TAKE 1 TABLET BY MOUTH TWICE DAILY WITH MEALS 12/3/21  Yes Bill Liu DO   clopidogrel (PLAVIX) 75 MG tablet TAKE 1 TABLET BY MOUTH DAILY 12/3/21  Yes Wily Finch MD   famotidine (PEPCID) 20 MG tablet TAKE 1 TABLET BY MOUTH TWICE DAILY 11/16/21  Yes Edson Correia DO   ASPIRIN LOW DOSE 81 MG EC tablet TAKE 1 TABLET BY MOUTH EVERY DAY 6/16/21  Yes Historical Provider, MD   Ascorbic Acid (VITAMIN C) 1000 MG tablet Take 1,000 mg by mouth daily   Yes Historical Provider, MD   blood glucose monitor strips Check blood sugar q daily, Dx R73.01 7/6/21  Yes Edson Correia DO   melatonin 3 MG TABS tablet Take 3 mg by mouth nightly as needed    Yes Historical Provider, MD   b complex vitamins capsule Take 1 capsule by mouth daily   Yes Historical Provider, MD   polyethyl glycol-propyl glycol 0.4-0.3 % (SYSTANE) 0.4-0.3 % ophthalmic solution 1 drop in the morning and at bedtime Both eyes   Yes Historical Provider, MD   docusate sodium (COLACE) 100 MG capsule Take 100 mg by mouth in the morning and 100 mg before bedtime. Yes Historical Provider, MD   OLIVE LEAF PO Take 450 mg by mouth daily   Yes Historical Provider, MD   Blood Glucose Monitoring Suppl (TRUE METRIX METER) W/DEVICE KIT 1 Device by Does not apply route daily Check blood sugar q daily, Dx E11.9 7/15/16  Yes Bill Liu DO   Multiple Vitamins-Minerals (THERAPEUTIC MULTIVITAMIN-MINERALS) tablet Take 1 tablet by mouth daily.    Yes Historical Provider, MD   fish oil-omega-3 fatty acids 1000 MG capsule Take 1 g by mouth daily    Yes Historical Provider, MD   CALCIUM CITRATE Take 600 mg by mouth in the morning and at bedtime    Yes Historical Provider, MD       Future Appointments   Date Time Provider Rosy Natalia   10/20/2022 10:00 AM DO [facility]   1/17/2023  1:15 PM [facility]   7/24/2023 11:00 AM STR MAMMOGRAPHY RM2  L[facility] WOMEN STR Radiolog

## 2022-08-09 ENCOUNTER — CARE COORDINATION (OUTPATIENT)
Dept: CARE COORDINATION | Age: 87
End: 2022-08-09

## 2022-08-09 NOTE — CARE COORDINATION
[address] regarding Dietitian follow up. RD unable to leave VM with call back number- patient's VM is full and not accepting messages at this time. RD will outreach within one week and will follow up as appropriate.          [address] [facility], [address] [place] [address]

## 2022-08-16 ENCOUNTER — CARE COORDINATION (OUTPATIENT)
Dept: CARE COORDINATION | Age: 87
End: 2022-08-16

## 2022-08-16 NOTE — CARE COORDINATION
Contacted Razia Murphy and left voicemail regarding Dietitian follow up. Left call back number. RD spoke with patient for initial nutrition assessment on 7/1/22 and has been following up with patient. RD outreached 8/9/22 and today 8/16/22. RD will notify [facility]. RD will continue to follow/assist with patient return call.        [address] [facility], [address] [facility]

## 2022-08-16 NOTE — CARE COORDINATION
Ambulatory Care Coordination Note  8/16/2022    ACC: Tom Riojas RN    Summary Note: Spoke with Lavell Ramos for continued Care coordination follow up. Informed her I would be discharging her at this time  I will be placing her on [facility] maintenance program  Armida ORTEGA has been in contact with her  Informed her to keep my contact number and call me with any questions or changes    Lab Results       None            Care Coordination Interventions    Program Enrollment: Complex Care  Referral from Primary Care Provider: Yes  Suggested Interventions and [address]: Declined  Meals on Wheels: Completed  Medication Assistance Program:  (Comment: not yet but may run into issues with new prescription card)  Medi Set or Pill Pack: Completed  Occupational Therapy: Not Started  Pharmacist: Completed (Comment: pharmacy referral placed 2-16-22)  Physical Therapy: Not Started  Registered Dietician: Completed  Senior Services: Completed (Comment: active with [employer] and [employer])  Social Work: Not Started  Zone Management Tools: Completed  Other Services or Interventions: Currently active with [employer]A and AAA          Goals Addressed    None         Prior to Admission medications    Medication Sig Start Date End Date Taking? Authorizing Provider   acetaminophen (TYLENOL) 650 MG extended release tablet Take 650 mg by mouth every 8 hours as needed for Pain    Historical Provider, MD   nitroGLYCERIN (NITROSTAT) 0.4 MG SL tablet up to max of 3 total doses.  If no relief after 1 dose, call 911. 7/5/22   Jaye Garza MD   Handicap [facility] by Does not apply route Expires 6/20/27 6/20/22   Cherri Flores DO   spironolactone (ALDACTONE) 50 MG tablet TAKE 1 TABLET BY MOUTH DAILY 5/23/22   Cherri Flores DO   Blood Pressure KIT Check blood pressure q daily 4/22/22   Cherri Flores DO   montelukast (SINGULAIR) 10 MG tablet Take 1 tablet by mouth daily 4/4/22   Cherri Flores DO   mirabegron (MYRBETRIQ) 50 MG TB24 Take 50 mg by daily    Historical Provider, MD   Blood Glucose Monitoring Suppl (TRUE METRIX METER) W/DEVICE KIT 1 Device by Does not apply route daily Check blood sugar q daily, Dx E11.9 7/15/16   Linsey Ohara DO   Multiple Vitamins-Minerals (THERAPEUTIC MULTIVITAMIN-MINERALS) tablet Take 1 tablet by mouth daily.     Historical Provider, MD   fish oil-omega-3 fatty acids 1000 MG capsule Take 1 g by mouth daily     Historical Provider, MD   CALCIUM CITRATE Take 600 mg by mouth in the morning and at bedtime     Historical Provider, MD       Future Appointments   Date Time Provider Rosy Fisher   10/20/2022 10:00 AM Linsey Ohara DO [facility] P - [facility]   1/17/2023  1:15 PM Mario Black MD N [facility] P - [facility]   7/24/2023 11:00 AM STR MAMMOGRAPHY RM2  [facility] STR Radiolog

## 2022-08-17 ENCOUNTER — CARE COORDINATION (OUTPATIENT)
Dept: CARE COORDINATION | Age: 87
End: 2022-08-17

## 2022-08-17 NOTE — CARE COORDINATION
I received a text message from Joslyn 137 me that she will have to pay $400 out of pocket in order to receive the shingles vaccine because she has not met her deductible. I called [facility] Dept of Health to see if they can give the vaccines for cheaper or if they bill through medical insurance versus prescription insurance. Left a message with Janes to confirm this question.

## 2022-08-18 NOTE — CARE COORDINATION
Spoke with [facility] Dept. Informed me that the shingles vaccine will be billed through prescription coverage and if she has a deductible she will have to pay out of pocket until she meets her deductible. The health dept informed me the cost of their vaccine is $261. I informed Emry Side of this and she states she will more than likely go through the pharmacy for her vaccine.

## 2022-08-30 RX ORDER — CLOPIDOGREL BISULFATE 75 MG/1
75 TABLET ORAL DAILY
Qty: 90 TABLET | Refills: 2 | Status: SHIPPED | OUTPATIENT
Start: 2022-08-30

## 2022-09-01 ENCOUNTER — CARE COORDINATION (OUTPATIENT)
Dept: CARE COORDINATION | Age: 87
End: 2022-09-01

## 2022-09-23 ENCOUNTER — CARE COORDINATION (OUTPATIENT)
Dept: CARE COORDINATION | Age: 87
End: 2022-09-23

## 2022-10-05 ENCOUNTER — CARE COORDINATION (OUTPATIENT)
Dept: CARE COORDINATION | Age: 87
End: 2022-10-05

## 2022-10-12 ENCOUNTER — CARE COORDINATION (OUTPATIENT)
Dept: CARE COORDINATION | Age: 87
End: 2022-10-12

## 2022-10-12 NOTE — CARE COORDINATION
Received a call from [place]. Wanted to update me on her readings including blood pressure of 111/58, blood sugar was 86. Confirmed date and time of PCP appt coming up this month. She states she is doing well and wanted to update me on a few issues. No new interventions needed at this time.

## 2022-10-19 ENCOUNTER — CARE COORDINATION (OUTPATIENT)
Dept: CARE COORDINATION | Age: 87
End: 2022-10-19

## 2022-10-19 NOTE — CARE COORDINATION
Randal Hou called for a few questions. She is scheduled for a PCP appt tomorrow. She has some concerns about possible bladder prolapse and states she will address this with PCP tomorrow at her appt. She is very thankful to have this connection from [facility] visit. I encouraged her to call with any questions or additional support needed.

## 2022-10-20 ENCOUNTER — OFFICE VISIT (OUTPATIENT)
Dept: FAMILY MEDICINE CLINIC | Age: 87
End: 2022-10-20
Payer: MEDICARE

## 2022-10-20 VITALS
HEIGHT: 60 IN | DIASTOLIC BLOOD PRESSURE: 66 MMHG | RESPIRATION RATE: 16 BRPM | SYSTOLIC BLOOD PRESSURE: 136 MMHG | TEMPERATURE: 96.8 F | HEART RATE: 72 BPM | BODY MASS INDEX: 25.84 KG/M2 | WEIGHT: 131.6 LBS

## 2022-10-20 DIAGNOSIS — K21.9 GASTROESOPHAGEAL REFLUX DISEASE WITHOUT ESOPHAGITIS: ICD-10-CM

## 2022-10-20 DIAGNOSIS — B02.29 POST HERPETIC NEURALGIA: ICD-10-CM

## 2022-10-20 DIAGNOSIS — I10 HYPERTENSION, UNSPECIFIED TYPE: Primary | ICD-10-CM

## 2022-10-20 DIAGNOSIS — I10 ESSENTIAL HYPERTENSION: ICD-10-CM

## 2022-10-20 DIAGNOSIS — Z23 NEED FOR IMMUNIZATION AGAINST INFLUENZA: ICD-10-CM

## 2022-10-20 DIAGNOSIS — F41.9 ANXIETY: ICD-10-CM

## 2022-10-20 PROCEDURE — G8484 FLU IMMUNIZE NO ADMIN: HCPCS | Performed by: FAMILY MEDICINE

## 2022-10-20 PROCEDURE — 1090F PRES/ABSN URINE INCON ASSESS: CPT | Performed by: FAMILY MEDICINE

## 2022-10-20 PROCEDURE — 90694 VACC AIIV4 NO PRSRV 0.5ML IM: CPT | Performed by: FAMILY MEDICINE

## 2022-10-20 PROCEDURE — G8427 DOCREV CUR MEDS BY ELIG CLIN: HCPCS | Performed by: FAMILY MEDICINE

## 2022-10-20 PROCEDURE — 99213 OFFICE O/P EST LOW 20 MIN: CPT | Performed by: FAMILY MEDICINE

## 2022-10-20 PROCEDURE — 1036F TOBACCO NON-USER: CPT | Performed by: FAMILY MEDICINE

## 2022-10-20 PROCEDURE — 1123F ACP DISCUSS/DSCN MKR DOCD: CPT | Performed by: FAMILY MEDICINE

## 2022-10-20 PROCEDURE — G8417 CALC BMI ABV UP PARAM F/U: HCPCS | Performed by: FAMILY MEDICINE

## 2022-10-20 PROCEDURE — G0008 ADMIN INFLUENZA VIRUS VAC: HCPCS | Performed by: FAMILY MEDICINE

## 2022-10-20 RX ORDER — FAMOTIDINE 20 MG/1
TABLET, FILM COATED ORAL
Qty: 180 TABLET | Refills: 3 | Status: SHIPPED | OUTPATIENT
Start: 2022-10-20

## 2022-10-20 RX ORDER — CARVEDILOL 3.12 MG/1
3.12 TABLET ORAL 2 TIMES DAILY WITH MEALS
Qty: 180 TABLET | Refills: 3 | Status: SHIPPED | OUTPATIENT
Start: 2022-10-20

## 2022-10-20 RX ORDER — LORAZEPAM 0.5 MG/1
0.5 TABLET ORAL EVERY 8 HOURS PRN
Qty: 40 TABLET | Refills: 0 | Status: SHIPPED | OUTPATIENT
Start: 2022-10-20 | End: 2022-11-19

## 2022-10-20 RX ORDER — FUROSEMIDE 20 MG/1
20 TABLET ORAL DAILY
Qty: 90 TABLET | Refills: 3 | Status: SHIPPED | OUTPATIENT
Start: 2022-10-20

## 2022-10-20 RX ORDER — GABAPENTIN 100 MG/1
200 CAPSULE ORAL NIGHTLY
Qty: 180 CAPSULE | Refills: 3 | Status: SHIPPED | OUTPATIENT
Start: 2022-10-20 | End: 2023-04-18

## 2022-10-20 NOTE — PROGRESS NOTES
Vaccine Information Sheet, \"Influenza - Inactivated\"  given to Charo Levine, or parent/legal guardian of  Charo Levine and verbalized understanding. Patient responses:    Have you ever had a reaction to a flu vaccine? No  Do you have an allergy to eggs, neomycin or polymixin? No  Do you have an allergy to Thimerosal, contact lens solution, or Merthiolate? No  Have you ever had Guillian Cook Syndrome? No  Do you have any current illness? No  Do you have a temperature above 100 degrees? No  Are you pregnant? No  If pregnant, permission obtained from physician? N/A  Do you have an active neurological disorder? No      Flu vaccine given per order. Please see immunization tab.

## 2022-10-20 NOTE — PROGRESS NOTES
01/30/2013    CARPAL TUNNEL RELEASE Left 07/25/2013    CATARACT REMOVAL Bilateral 1999    COLON SURGERY  11/1999    resection    COLONOSCOPY  2003, 2006, 2011    DIAGNOSTIC CARDIAC CATH LAB PROCEDURE  05/07/2021    6 stents     HYSTERECTOMY (CERVIX STATUS UNKNOWN)  02/23/1970    KNEE ARTHROSCOPY Right 11/21/2007    meniscectomies    KNEE SURGERY Left 2000    replacement    MYRINGOTOMY Right 07/01/2015    tube insertion    OVARY REMOVAL Bilateral 07/12/2001    oophorectomy    SHOULDER SURGERY  05/2014    SINUS SURGERY         Social History     Tobacco Use    Smoking status: Never    Smokeless tobacco: Never   Vaping Use    Vaping Use: Never used   Substance Use Topics    Alcohol use: No    Drug use: No       Family History   Problem Relation Age of Onset    Breast Cancer Sister 68    Stroke Sister     Heart Disease Sister     Cancer Child     Other Other         visual problems         I have reviewed the patient's past medical history, past surgical history, allergies, medications, social and family history and I have made updates where appropriate. Review of Systems        PHYSICAL EXAM:  /66 (Site: Right Upper Arm, Position: Sitting, Cuff Size: Medium Adult)   Pulse 72   Temp 96.8 °F (36 °C) (Temporal)   Resp 16   Ht 5' (1.524 m)   Wt 131 lb 9.6 oz (59.7 kg)   BMI 25.70 kg/m²     Physical Exam  Vitals and nursing note reviewed. Constitutional:       General: She is not in acute distress. Appearance: She is well-developed. HENT:      Head: Normocephalic and atraumatic. Right Ear: Hearing and external ear normal.      Left Ear: Hearing and external ear normal.      Nose: Nose normal.   Eyes:      General:         Right eye: No discharge. Left eye: No discharge. Conjunctiva/sclera: Conjunctivae normal.   Neck:      Thyroid: No thyromegaly. Trachea: No tracheal deviation. Cardiovascular:      Rate and Rhythm: Normal rate and regular rhythm.       Heart sounds: Normal heart sounds. No murmur heard. No friction rub. No gallop. Pulmonary:      Effort: Pulmonary effort is normal. No respiratory distress. Breath sounds: No wheezing or rales. Chest:      Chest wall: No tenderness. Musculoskeletal:         General: No deformity. Cervical back: Normal range of motion and neck supple. Lymphadenopathy:      Cervical: No cervical adenopathy. Skin:     General: Skin is warm and dry. Findings: No erythema or rash. Neurological:      Mental Status: She is alert. Motor: No abnormal muscle tone. Coordination: Coordination normal.   Psychiatric:         Behavior: Behavior normal.         Thought Content: Thought content normal.         Judgment: Judgment normal.           ASSESSMENT & PLAN  Noemi Rubio was seen today for 3 month follow-up. Diagnoses and all orders for this visit:    Hypertension, unspecified type    Post herpetic neuralgia  -     gabapentin (NEURONTIN) 100 MG capsule; Take 2 capsules by mouth nightly for 180 days. Essential hypertension  -     carvedilol (COREG) 3.125 MG tablet; Take 1 tablet by mouth 2 times daily (with meals)  -     furosemide (LASIX) 20 MG tablet; Take 1 tablet by mouth daily    Anxiety  -     LORazepam (ATIVAN) 0.5 MG tablet; Take 1 tablet by mouth every 8 hours as needed for Anxiety for up to 30 days. Gastroesophageal reflux disease without esophagitis  -     famotidine (PEPCID) 20 MG tablet; TAKE 1 TABLET BY MOUTH TWICE DAILY    -Increase gabapentin to 200mg qhs  -Other chronic issues are stable, continue current medications  -Advised to call if any issues      Return in about 3 months (around 1/20/2023).     No red flag sx    The most recent results of the following tests were reviewed with the patient today: Lipid Panel     All copied or forwarded information in the progress note was verified by me to be accurate at the time of visit  Patient's past medical, surgical, social and family history were reviewed and updated     All patient questions answered. Patient voiced understanding.

## 2022-10-21 ENCOUNTER — CARE COORDINATION (OUTPATIENT)
Dept: CARE COORDINATION | Age: 87
End: 2022-10-21

## 2022-10-24 ENCOUNTER — OFFICE VISIT (OUTPATIENT)
Dept: FAMILY MEDICINE CLINIC | Age: 87
End: 2022-10-24
Payer: MEDICARE

## 2022-10-24 VITALS
TEMPERATURE: 98 F | OXYGEN SATURATION: 99 % | WEIGHT: 130.4 LBS | HEART RATE: 86 BPM | SYSTOLIC BLOOD PRESSURE: 128 MMHG | BODY MASS INDEX: 25.6 KG/M2 | HEIGHT: 60 IN | RESPIRATION RATE: 14 BRPM | DIASTOLIC BLOOD PRESSURE: 64 MMHG

## 2022-10-24 DIAGNOSIS — F41.9 ANXIETY: ICD-10-CM

## 2022-10-24 DIAGNOSIS — M15.9 PRIMARY OSTEOARTHRITIS INVOLVING MULTIPLE JOINTS: Primary | ICD-10-CM

## 2022-10-24 DIAGNOSIS — K21.9 GASTROESOPHAGEAL REFLUX DISEASE WITHOUT ESOPHAGITIS: ICD-10-CM

## 2022-10-24 PROCEDURE — G8417 CALC BMI ABV UP PARAM F/U: HCPCS | Performed by: FAMILY MEDICINE

## 2022-10-24 PROCEDURE — 99213 OFFICE O/P EST LOW 20 MIN: CPT | Performed by: FAMILY MEDICINE

## 2022-10-24 PROCEDURE — 1090F PRES/ABSN URINE INCON ASSESS: CPT | Performed by: FAMILY MEDICINE

## 2022-10-24 PROCEDURE — G8484 FLU IMMUNIZE NO ADMIN: HCPCS | Performed by: FAMILY MEDICINE

## 2022-10-24 PROCEDURE — G8427 DOCREV CUR MEDS BY ELIG CLIN: HCPCS | Performed by: FAMILY MEDICINE

## 2022-10-24 PROCEDURE — 1036F TOBACCO NON-USER: CPT | Performed by: FAMILY MEDICINE

## 2022-10-24 PROCEDURE — 1123F ACP DISCUSS/DSCN MKR DOCD: CPT | Performed by: FAMILY MEDICINE
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
## 2022-11-04 ENCOUNTER — CARE COORDINATION (OUTPATIENT)
Dept: CARE COORDINATION | Age: 87
End: 2022-11-04

## 2022-11-11 ENCOUNTER — CARE COORDINATION (OUTPATIENT)
Dept: CARE COORDINATION | Age: 87
End: 2022-11-11

## 2022-11-11 NOTE — CARE COORDINATION
Rosemary Valera called and had a question about her AVS from her last office visit. I was able to answer her question as PCP took a medication off her med list because she was not taking it. She had some medication questions which I was able to answer for her during call.

## 2022-11-14 ENCOUNTER — HOSPITAL ENCOUNTER (OUTPATIENT)
Dept: PHYSICAL THERAPY | Age: 87
Setting detail: THERAPIES SERIES
Discharge: HOME | End: 2022-11-14
Payer: MEDICARE

## 2022-11-14 PROCEDURE — 97535 SELF CARE MNGMENT TRAINING: CPT

## 2022-11-14 PROCEDURE — 97165 OT EVAL LOW COMPLEX 30 MIN: CPT
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
## 2022-11-16 ENCOUNTER — CARE COORDINATION (OUTPATIENT)
Dept: CARE COORDINATION | Age: 87
End: 2022-11-16

## 2022-11-16 NOTE — CARE COORDINATION
Elia Rodriguez called and wanted to let me know that she completed her simulated driving test and felt that she did really well. She was pleased with it and wanted to update me. No additional issues at this time.

## 2022-11-21 ENCOUNTER — CARE COORDINATION (OUTPATIENT)
Dept: CARE COORDINATION | Age: 87
End: 2022-11-21

## 2022-12-01 ENCOUNTER — CARE COORDINATION (OUTPATIENT)
Dept: CARE COORDINATION | Age: 87
End: 2022-12-01

## 2022-12-01 DIAGNOSIS — R04.0 EPISTAXIS: Primary | ICD-10-CM

## 2022-12-01 NOTE — CARE COORDINATION
Dr. Ally Samuels, I am re enrolling back into Care Coordination to provide additional support. She is asking for PCP referral to [id number] [facility], to see Pastora Burciaga and hospitals office requested new referral since she has not been there in 4 years. She would like to see them as she had a nose bleed this morning and wants to make sure everything is okay. If you agree, could you please place the referral.  Thank you!

## 2022-12-01 NOTE — CARE COORDINATION
Ambulatory Care Coordination Note  12/1/2022    ACC: Carley Mukherjee, RN    Spoke with Bonnie Brown. She has been calling [facility] with several concerns and questions so I will re enroll for additional support and followup  Discussed questions and concerns today. States she didn't sleep well and had a nose bleed this morning  States nose bleed was quick and I believe it to be from being dry as she states her mouth was dry this morning also  Blood pressure 154/74 and 126/60. She continues to follow up with [facility] and will be making appt with ENT for yearly follow up  She completed the simulation driving test and passed with no issues  Chronic conditions are at baseline  She was thankful that [facility] will be following up again  Bonnie Stephanie called back and states she will be needing a referral to [facility] ENT    Plan  Ask PCP for referral for ENT  Re enroll to provide support and education to help manage health  Encourage blood pressure tracking and reporting  General Assessment    Do you have any symptoms that are causing concern?: Yes  Progression since Onset: Resolved  Reported Symptoms: Other (Comment: nose bleed)           Offered patient enrollment in the Remote Patient Monitoring (RPM) program for in-home monitoring: NA. Ambulatory Care Coordination Assessment    Care Coordination Protocol  Referral from Primary Care Provider: No  Week 1 - Initial Assessment     Do you have all of your prescriptions and are they filled?: Yes  Barriers to medication adherence: None  Are you able to afford your medications?: Yes  How often do you have trouble taking your medications the way you have been told to take them?: I always take them as prescribed. Do you have Home O2 Therapy?: No      Ability to seek help/take action for Emergent Urgent situations i.e. fire, crime, inclement weather or health crisis. : Independent  Ability to ambulate to restroom: Independent  Ability handle personal hygeine needs (bathing/dressing/grooming): Independent  Ability to manage Medications: Independent  Ability to prepare Food Preparation: Independent  Ability to maintain home (clean home, laundry): Independent  Ability to drive and/or has transportation: Dependent  Ability to do shopping: Independent  Ability to manage finances: Independent  Is patient able to live independently?: Yes     Current Housing: Apartment              Are you experiencing loss of meaning?: No  Are you experiencing loss of hope and peace?: No     Thinking about your patient's physical health needs, are there any symptoms or problems (risk indicators) you are unsure about that require further investigation?: Mild vague physical symptoms or problems; but do not impact on daily life or are not of concern to patient   Are the patients physical health problems impacting on their mental well-being?: Mild impact on mental well-being e.g. \"\"feeling fed-up\"\", \"\"reduced enjoyment\"\"   Are there any problems with your patients lifestyle behaviors (alcohol, drugs, diet, exercise) that are impacting on physical or mental well-being?: Some mild concern of potential negative impact on well-being   Do you have any other concerns about your patients mental well-being?  How would you rate their severity and impact on the patient?: Mild problems - don't interfere with function   How would you rate their home environment in terms of safety and stability (including domestic violence, insecure housing, neighbor harassment)?: Safe, stable, but with some inconsistency   How do daily activities impact on the patient's well-being? (include current or anticipated unemployment, work, caregiving, access to transportation or other): Some general dissatisfaction but no concern   How would you rate their social network (family, work, friends)?: Adequate participation with social networks   How would you rate their financial resources (including ability to afford all required medical care)?: Financially secure, resources adequate, no identified problems   How wells does the patient now understand their health and well-being (symptoms, signs or risk factors) and what they need to do to manage their health?: Reasonable to good understanding and already engages in managing health or is willing to undertake better management   How well do you think your patient can engage in healthcare discussions? (Barriers include language, deafness, aphasia, alcohol or drug problems, learning difficulties, concentration): Clear and open communication, no identified barriers   Do other services need to be involved to help this patient?: Other care/services not required at this time   Are current services involved with this patient well-coordinated? (Include coordination with other services you are now recommendation): All required care/services in place and well-coordinated   Suggested Interventions and Community Resources                  Prior to Admission medications    Medication Sig Start Date End Date Taking? Authorizing Provider   gabapentin (NEURONTIN) 100 MG capsule Take 2 capsules by mouth nightly for 180 days. 10/20/22 4/18/23 Yes Edson Correia, DO   carvedilol (COREG) 3.125 MG tablet Take 1 tablet by mouth 2 times daily (with meals) 10/20/22  Yes Rafat Anderson DO   furosemide (LASIX) 20 MG tablet Take 1 tablet by mouth daily 10/20/22  Yes Edson Correia DO   famotidine (PEPCID) 20 MG tablet TAKE 1 TABLET BY MOUTH TWICE DAILY 10/20/22  Yes Rafat Anderson DO   clopidogrel (PLAVIX) 75 MG tablet TAKE 1 TABLET BY MOUTH DAILY 8/30/22  Yes Yobany Pandey MD   acetaminophen (TYLENOL) 650 MG extended release tablet Take 650 mg by mouth every 8 hours as needed for Pain   Yes Historical Provider, MD   nitroGLYCERIN (NITROSTAT) 0.4 MG SL tablet up to max of 3 total doses.  If no relief after 1 dose, call 911. 7/5/22  Yes Yobany Pandey MD   Handicap Laxmiard MISC by Does not apply route Expires 6/20/27 6/20/22  Yes Rafat Anderson, DO spironolactone (ALDACTONE) 50 MG tablet TAKE 1 TABLET BY MOUTH DAILY 5/23/22  Yes Jayleen Vieira DO   Blood Pressure KIT Check blood pressure q daily 4/22/22  Yes Jayleen Vieira DO   montelukast (SINGULAIR) 10 MG tablet Take 1 tablet by mouth daily 4/4/22  Yes Jayleen Vieira DO   atorvastatin (LIPITOR) 80 MG tablet Take 1 tablet by mouth nightly 3/8/22  Yes Jayleen Vieira DO   zinc 50 MG TABS tablet Take 50 mg by mouth daily   Yes Historical Provider, MD   Psyllium (METAMUCIL FREE & NATURAL PO) Take by mouth as needed   Yes Historical Provider, MD   thyroid [facility] THYROID) 30 MG tablet Take 1 tablet by mouth daily 2/16/22  Yes Jayleen Vieira DO   Probiotic [facility]) TABS Take 1 tablet by mouth daily 2/16/22  Yes Edson Correia DO   fluticasone (FLONASE) 50 MCG/ACT nasal spray INSTILL 2 SPRAYS INTO EACH NOSTRIL TWICE DAILY 1/6/22  Yes Jayleen Vieira DO   ASPIRIN LOW DOSE 81 MG EC tablet as needed 6/16/21  Yes Historical Provider, MD   Ascorbic Acid (VITAMIN C) 1000 MG tablet Take 1,000 mg by mouth daily   Yes Historical Provider, MD   blood glucose monitor strips Check blood sugar q daily, Dx R73.01 7/6/21  Yes Edson Correia DO   melatonin 3 MG TABS tablet Take 3 mg by mouth nightly as needed    Yes Historical Provider, MD   b complex vitamins capsule Take 1 capsule by mouth daily   Yes Historical Provider, MD   polyethyl glycol-propyl glycol 0.4-0.3 % (SYSTANE) 0.4-0.3 % ophthalmic solution 1 drop in the morning and at bedtime Both eyes   Yes Historical Provider, MD   docusate sodium (COLACE) 100 MG capsule Take 100 mg by mouth in the morning and 100 mg before bedtime.    Yes Historical Provider, MD   OLIVE LEAF PO Take 450 mg by mouth daily   Yes Historical Provider, MD   Blood Glucose Monitoring Suppl (TRUE METRIX METER) W/DEVICE KIT 1 Device by Does not apply route daily Check blood sugar q daily, Dx E11.9 7/15/16  Yes Jayleen Vieira DO   Multiple Vitamins-Minerals (THERAPEUTIC MULTIVITAMIN-MINERALS) tablet Take 1 tablet by mouth daily.    Yes Historical Provider, MD   fish oil-omega-3 fatty acids 1000 MG capsule Take 1 g by mouth daily    Yes Historical Provider, MD   CALCIUM CITRATE Take 600 mg by mouth in the morning and at bedtime    Yes Historical Provider, MD       Future Appointments   Date Time Provider Rosy Natalia   1/17/2023  1:15 PM Yobany Pandey MD N SRPX Heart [facility] [place]  [place] [address]   1/26/2023 10:00 AM DO ALEX Allen PCT [facility] [place]   7/24/2023 11:00 AM STR MAMMOGRAPHY RM2  [facility] WOMEN STR Radiolog

## 2022-12-06 ENCOUNTER — CARE COORDINATION (OUTPATIENT)
Dept: CARE COORDINATION | Age: 87
End: 2022-12-06

## 2022-12-06 NOTE — CARE COORDINATION
Nigel Valadez called and reports her blood pressure readings which are 152/? And 160/? Tanda Bun She is at her daughters house baking today. She asked if she could take 1/2 of an Ativan. I informed her to drive home before taking the ativan as it may cause her to be drowsy and to check her blood pressure again this evening to see if it comes down.

## 2022-12-06 NOTE — CARE COORDINATION
Remote Patient Kit Ordering Note      Date/Time:  12/6/2022 3:28 PM      [] CCSS confirmed patient shipping address  [] Patient will receive package over the next 2-4 business days. Someone 21 years or older must be present to sign for UPS delivery. [] Patient to contact virtual installation-specific phone number listed in the patient instructions. [] If the patient does not contact HRS within 24 hours, an [address] will call the patient directly: If the patient does not answer, HRS will follow up with the clinical team notifying them about the unsuccessful attempt to contact the patient. HRS will make three call attempts to the patient. [] LPN will contact patient once equipment is active to welcome them to the program.                                                         [] Hours of RPM monitoring - Monday-Friday [id number]                     All questions answered at this time. LPN made aware the RPM kit has been ordered. CCSS notified patient of RPM equipment order. HC/ CCSS attempted to contact patient in regards to RPM Kit order. Patient is unavailable at this time. Left HIPAA compliant message with [facility]. contact information, reason for call and request for call back. Will expect a return call. If no call back is received, HC will attempt to reach out again tomorrow. HC successfully placed RPM kit order.

## 2022-12-06 NOTE — CARE COORDINATION
Remote Patient Monitoring Enrollment Note      Date/Time:  12/6/2022 3:09 PM    Offered patient enrollment in the [facility] Remote Patient Monitoring (RPM) program for in home monitoring for CHF and HTN. Patient accepted RPM services. Patient will be monitoring the following daily:  blood pressure reading  blood pressure heart rate  weight  Survey response    ACM reviewed the information below with patient:    Emergency Contact (name and contact number): Bette Stevens use phone number [phone] to enroll    Charmayne Nodal [phone]    [x] A member from the care coordination team will reach out to notify the patient once the RPM kit is ordered. [x] Once the kit is delivered, the [facility] team will contact the patient after UPS deliver to assist with set up. [x] Determined BP cuff size: regular (9.05\"-15.74\")      [x] Determined weight scale: regular (<330lbs)                                                 [x] Hours of ACM monitoring - Monday-Friday [id number]                         All questions about RPM program answered at this time.

## 2022-12-06 NOTE — CARE COORDINATION
Ambulatory Care Coordination Note  2022    ACC: Jewel Harding, RN    Spoke with daughter for continued Care Coordination followup  Wanted to make sure she was having no additional nose bleeds  States only had the one bloody nose and no additional episodes at this time  CHF [place] initiative information sent out and aware of this information as she has received it in the past    Plan  CHF [place] initiative information given  Identify any additional nose bleeds and report to PCP  Reinforce education completed and reinforce the importance of early symptom recognition and reporting  Heart Failure Education outreach Date/Time: 2022 10:01 AM    Ambulatory Care Manager (ACM) contacted the patient by telephone to perform Ambulatory Care Coordination. Verified name and  with patient as identifiers. Provided introduction to self, and explanation of the Ambulatory Care Manager's role. ACM reviewed that a Health Healthy tips for the Holiday packet has been sent to [employer]. ACM reviewed CHF zones, daily weights, fluid restriction, the importance of low sodium diet, and healthy tips packet with the patient. Instructed patient to call their PCP if they have a weight gain of 3 lbs in 2 days or 5 lbs in a week. Patient reminded that there is a physician on call 24 hours a day / 7 days a week should the patient have questions or concerns. The patient verbalized understanding.     Congestive Heart Failure Assessment    Are you currently restricting fluids?: No Restriction  Do you understand a low sodium diet?: Yes  Do you understand how to read food labels?: Yes  How many restaurant meals do you eat per week?: 1-2  Do you salt your food before tasting it?: No         Symptoms:              General Assessment    Do you have any symptoms that are causing concern?: Yes  Progression since Onset: Resolved  Reported Symptoms: Other (Comment: nose bleeds)           Offered patient enrollment in the Remote Patient Monitoring (RPM) program for in-home monitoring: NA. Lab Results       None            Care Coordination Interventions    Referral from Primary Care Provider: No  Suggested Interventions and Community Resources          Goals Addressed                      This Visit's Progress      Conditions and Symptoms (pt-stated)   On track      I will schedule office visits, as directed by my provider. I will keep my appointment or reschedule if I have to cancel. I will notify my provider of any barriers to my plan of care. I will follow my Zone Management tool to seek urgent or emergent care. I will notify my provider of any symptoms that indicate a worsening of my condition. Barriers: need for additional support and education  Plan for overcoming my barriers: family and ACM support  Confidence: 9/10  Anticipated Goal Completion Date: 3/1/23                Prior to Admission medications    Medication Sig Start Date End Date Taking? Authorizing Provider   gabapentin (NEURONTIN) 100 MG capsule Take 2 capsules by mouth nightly for 180 days. 10/20/22 4/18/23 Yes Edson Correia DO   carvedilol (COREG) 3.125 MG tablet Take 1 tablet by mouth 2 times daily (with meals) 10/20/22  Yes Rafat Anderson DO   furosemide (LASIX) 20 MG tablet Take 1 tablet by mouth daily 10/20/22  Yes Edson Correia DO   famotidine (PEPCID) 20 MG tablet TAKE 1 TABLET BY MOUTH TWICE DAILY 10/20/22  Yes Rafat Anderson DO   clopidogrel (PLAVIX) 75 MG tablet TAKE 1 TABLET BY MOUTH DAILY 8/30/22  Yes Yobany Pandey MD   acetaminophen (TYLENOL) 650 MG extended release tablet Take 650 mg by mouth every 8 hours as needed for Pain   Yes Historical Provider, MD   nitroGLYCERIN (NITROSTAT) 0.4 MG SL tablet up to max of 3 total doses.  If no relief after 1 dose, call 911. 7/5/22  Yes Yobany Pandey MD   Handicap Laxmiard MISC by Does not apply route Expires 6/20/27 6/20/22  Yes Edson Correia DO   spironolactone (ALDACTONE) 50 MG tablet TAKE 1 TABLET BY MOUTH DAILY 5/23/22  Yes Thomas Nichols DO   Blood Pressure KIT Check blood pressure q daily 4/22/22  Yes Thomas Nichols DO   montelukast (SINGULAIR) 10 MG tablet Take 1 tablet by mouth daily 4/4/22  Yes Thomas Nichols DO   atorvastatin (LIPITOR) 80 MG tablet Take 1 tablet by mouth nightly 3/8/22  Yes Thomas Nichols DO   zinc 50 MG TABS tablet Take 50 mg by mouth daily   Yes Historical Provider, MD   Psyllium (METAMUCIL FREE & NATURAL PO) Take by mouth as needed   Yes Historical Provider, MD   thyroid [facility] THYROID) 30 MG tablet Take 1 tablet by mouth daily 2/16/22  Yes Thomas Nichols DO   Probiotic Acidophilus [facility]) TABS Take 1 tablet by mouth daily 2/16/22  Yes Edson Correia,    fluticasone (FLONASE) 50 MCG/ACT nasal spray INSTILL 2 SPRAYS INTO EACH NOSTRIL TWICE DAILY 1/6/22  Yes Thomas Nichols DO   ASPIRIN LOW DOSE 81 MG EC tablet as needed 6/16/21  Yes Historical Provider, MD   Ascorbic Acid (VITAMIN C) 1000 MG tablet Take 1,000 mg by mouth daily   Yes Historical Provider, MD   blood glucose monitor strips Check blood sugar q daily, Dx R73.01 7/6/21  Yes Edson Correia DO   melatonin 3 MG TABS tablet Take 3 mg by mouth nightly as needed    Yes Historical Provider, MD   b complex vitamins capsule Take 1 capsule by mouth daily   Yes Historical Provider, MD   polyethyl glycol-propyl glycol 0.4-0.3 % (SYSTANE) 0.4-0.3 % ophthalmic solution 1 drop in the morning and at bedtime Both eyes   Yes Historical Provider, MD   docusate sodium (COLACE) 100 MG capsule Take 100 mg by mouth in the morning and 100 mg before bedtime. Yes Historical Provider, MD   OLIVE LEAF PO Take 450 mg by mouth daily   Yes Historical Provider, MD   Blood Glucose Monitoring Suppl (TRUE METRIX METER) W/DEVICE KIT 1 Device by Does not apply route daily Check blood sugar q daily, Dx E11.9 7/15/16  Yes Thomas Nichols DO   Multiple Vitamins-Minerals (THERAPEUTIC MULTIVITAMIN-MINERALS) tablet Take 1 tablet by mouth daily.    Yes Historical Provider, MD   fish oil-omega-3 fatty acids 1000 MG capsule Take 1 g by mouth daily    Yes Historical Provider, MD   CALCIUM CITRATE Take 600 mg by mouth in the morning and at bedtime    Yes Historical Provider, MD       Future Appointments   Date Time Provider Rosy Fisher   1/17/2023  1:15 PM Alfonso Lynn MD N SRPX Heart [facility]   1/25/2023  9:00 AM RACQUEL Deng - CNP N ENT [facility]   1/26/2023 10:00 AM DO ALEX Hsieh Cap PCT [facility]   7/24/2023 11:00 AM STR MAMMOGRAPHY RM2  [facility]OM[facility] STR Radiolog

## 2022-12-07 ENCOUNTER — CARE COORDINATION (OUTPATIENT)
Dept: CARE COORDINATION | Age: 87
End: 2022-12-07

## 2022-12-07 NOTE — CARE COORDINATION
Remote Patient Kit Ordering Note      Date/Time:  12/7/2022 1:26 PM      [] CCSS confirmed patient shipping address  [] Patient will receive package over the next 2-4 business days. Someone 21 years or older must be present to sign for UPS delivery. [] Patient to contact virtual installation-specific phone number listed in the patient instructions. [] If the patient does not contact HRS within 24 hours, an [address] will call the patient directly: If the patient does not answer, HRS will follow up with the clinical team notifying them about the unsuccessful attempt to contact the patient. HRS will make three call attempts to the patient. [] LPN will contact patient once equipment is active to welcome them to the program.                                                         [] Hours of RPM monitoring - Monday-Friday [id number]                     All questions answered at this time. LPN made aware the RPM kit has been ordered. CCSS notified patient of RPM equipment order. HC attempted to contact patient in regards to RPM kit order. Patient is unavailable at this time. Unable to leave Left HIPAA compliant message due to voicemail box is full. Kit has been ordered. This was 2nd attempt to reach out to patient.

## 2022-12-13 ENCOUNTER — CARE COORDINATION (OUTPATIENT)
Dept: CASE MANAGEMENT | Age: 87
End: 2022-12-13

## 2022-12-13 NOTE — CARE COORDINATION
Remote Patient Monitoring Note      Date/Time:  2022 2:07 PM     GREEN  ALERT    LPN reviewed patients reported daily Remote Patient Monitoring metrics. All reported metrics are within alert parameters. Plan/Follow Up: Will continue to review, monitor and address alerts with follow up based on severity of symptoms and risk factors  Current Patient Metrics ---- Blood Pressure: 139/74, 69bpm Pulseox: 98%, 75bpm Survey: C Weight: 128.6lbs Note Created at: 2022 02:11 PM ET ---- Time-Spent: 5 minutes 0 seconds    Remote Patient Monitoring Welcome Note  Date/Time:  2022 2:25 PM     Verified patients name and  as identifiers. Completed and confirmed the following:     Emergency Contact: Allie Cordon [phone]    [x] Patient received all RPM equipment (tablet, scale, blood pressure device and cuff, and pulse oximeter)  Cuff Size: Small  []  Regular   [x]  Large  []   Weight Scale: Regular   [x]  Bariatric  []               [x] Instructed patient keep box for use when returning equipment                                                          [x] Reviewed Patient Welcome Letter with patient                         [x] Reviewed expectations for patient and care team  [x] Reviewed RPM consent form         [x] Instructed patient to keep scale on flat surface                                                         [x] Instructed patient to keep tablet plugged in at all times                         [x] Instructed how to contact IT support (number listed on welcome letter)  [x] Provided Remote Patient Monitoring care  information                 All questions answered at this time.      Current Patient Metrics ---- Blood Pressure: 139/74, 69bpm Pulseox: 98%, 75bpm Survey: C Weight: 128.6lbs Note Created at: 2022 02:28 PM ET ---- Time-Spent: 10 minutes 0 seconds

## 2022-12-14 ENCOUNTER — CARE COORDINATION (OUTPATIENT)
Dept: CASE MANAGEMENT | Age: 87
End: 2022-12-14

## 2022-12-14 ENCOUNTER — CARE COORDINATION (OUTPATIENT)
Dept: CARE COORDINATION | Age: 87
End: 2022-12-14

## 2022-12-14 NOTE — CARE COORDINATION
Remote Patient Monitoring Note      Date/Time:  12/14/2022 4:09 PM    LPN reviewed patients reported daily Remote Patient Monitoring metrics. All reported metrics are within alert parameters. Plan/Follow Up:  Will continue to review, monitor and address alerts with follow up based on severity of symptoms and risk factors  Current Patient Metrics ---- Blood Pressure: 134/82, 67bpm Pulseox: 98%, 78bpm Survey: C Weight: -lbs Note Created at: 12/14/2022 04:10 PM ET ---- Time-Spent: 3 minutes 0 seconds

## 2022-12-15 ENCOUNTER — OFFICE VISIT (OUTPATIENT)
Dept: FAMILY MEDICINE CLINIC | Age: 87
End: 2022-12-15

## 2022-12-15 ENCOUNTER — CARE COORDINATION (OUTPATIENT)
Dept: CASE MANAGEMENT | Age: 87
End: 2022-12-15

## 2022-12-15 VITALS
OXYGEN SATURATION: 98 % | HEART RATE: 76 BPM | HEIGHT: 60 IN | RESPIRATION RATE: 16 BRPM | TEMPERATURE: 97.9 F | SYSTOLIC BLOOD PRESSURE: 137 MMHG | WEIGHT: 129.2 LBS | DIASTOLIC BLOOD PRESSURE: 58 MMHG | BODY MASS INDEX: 25.36 KG/M2

## 2022-12-15 DIAGNOSIS — I10 ESSENTIAL HYPERTENSION: ICD-10-CM

## 2022-12-15 DIAGNOSIS — I50.22 CHRONIC HFREF (HEART FAILURE WITH REDUCED EJECTION FRACTION) (HCC): ICD-10-CM

## 2022-12-15 DIAGNOSIS — R30.0 DYSURIA: Primary | ICD-10-CM

## 2022-12-15 LAB
BILIRUBIN URINE: NEGATIVE
BLOOD URINE, POC: ABNORMAL
CHARACTER, URINE: CLEAR
COLOR, URINE: YELLOW
GLUCOSE URINE: NEGATIVE MG/DL
KETONES, URINE: NEGATIVE
LEUKOCYTE CLUMPS, URINE: ABNORMAL
NITRITE, URINE: NEGATIVE
PH, URINE: 7 (ref 5–9)
PROTEIN, URINE: NEGATIVE MG/DL
SPECIFIC GRAVITY, URINE: 1.01 (ref 1–1.03)
UROBILINOGEN, URINE: 0.2 EU/DL (ref 0–1)

## 2022-12-15 RX ORDER — CEPHALEXIN 500 MG/1
500 CAPSULE ORAL 2 TIMES DAILY
Qty: 10 CAPSULE | Refills: 0 | Status: SHIPPED | OUTPATIENT
Start: 2022-12-15 | End: 2022-12-20

## 2022-12-15 NOTE — PROGRESS NOTES
Ines Carmichael is a 80 y.o. female that presents for     Chief Complaint   Patient presents with    Hypertension    Urinary Pain       BP (!) 137/58   Pulse 76   Temp 97.9 °F (36.6 °C) (Infrared)   Resp 16   Ht 5' (1.524 m)   Wt 129 lb 3.2 oz (58.6 kg)   SpO2 98%   BMI 25.23 kg/m²       HPI      Has noted a 5lb weight gain in the past week per home scale. Denies any increased LE edema, fatigue, orthopnea, SOB, chest pain. HTN was elevated today, but has been well controlled. Urinary Symptoms    HPI:    Symptoms present for 3 days. Symptoms are unchanged since they initially started. Dysuria? Yes  Hematuria? No  Increased urinary frequency? Yes  Abdominal discomfort? No  CVA pain? No  Hx of UTIs? Yes  Sexually active? No  LMP? No LMP recorded. Patient has had a hysterectomy. Health Maintenance   Topic Date Due    DTaP/Tdap/Td vaccine (1 - Tdap) Never done    Shingles vaccine (2 of 3) 04/12/2014    COVID-19 Vaccine (3 - Booster for Pfizer series) 05/14/2021    Lipids  02/22/2023    Depression Screen  07/15/2023    Annual Wellness Visit (AWV)  07/16/2023    Flu vaccine  Completed    Pneumococcal 65+ years Vaccine  Completed    Hepatitis A vaccine  Aged Out    Hib vaccine  Aged Out    Meningococcal (ACWY) vaccine  Aged Out       Past Medical History:   Diagnosis Date    Arthritis     Cancer ([facility] [address] 2013    SKIN CANCER ON LEFT ANKLE    Diverticulosis     Hyperlipidemia     Hypertension     Hypothyroidism     Psoriasis     S/P angioplasty with stent 05/06/2021    3 stents to LAD, 1 stent to OM, 2 stents to diag.  per Dr. Suleiman Cartagena    Shingles     Thyroid disorder        Past Surgical History:   Procedure Laterality Date    BREAST BIOPSY Left 02/15/2015    benign stereotactic biopsy    BREAST SURGERY Left 06/1966    benign excisional biopsy    CARPAL TUNNEL RELEASE Right 01/30/2013    CARPAL TUNNEL RELEASE Left 07/25/2013    CATARACT REMOVAL Bilateral 1999    COLON SURGERY  11/1999    resection COLONOSCOPY  2003, 2006, 2011    DIAGNOSTIC CARDIAC CATH LAB PROCEDURE  05/07/2021    6 stents     HYSTERECTOMY (CERVIX STATUS UNKNOWN)  02/23/1970    KNEE ARTHROSCOPY Right 11/21/2007    meniscectomies    KNEE SURGERY Left 2000    replacement    MYRINGOTOMY Right 07/01/2015    tube insertion    OVARY REMOVAL Bilateral 07/12/2001    oophorectomy    SHOULDER SURGERY  05/2014    SINUS SURGERY         Social History     Tobacco Use    Smoking status: Never    Smokeless tobacco: Never   Vaping Use    Vaping Use: Never used   Substance Use Topics    Alcohol use: No    Drug use: No       Family History   Problem Relation Age of Onset    Breast Cancer Sister 68    Stroke Sister     Heart Disease Sister     Cancer Child     Other Other         visual problems         I have reviewed the patient's past medical history, past surgical history, allergies, medications, social and family history and I have made updates where appropriate. Review of Systems        PHYSICAL EXAM:  BP (!) 137/58   Pulse 76   Temp 97.9 °F (36.6 °C) (Infrared)   Resp 16   Ht 5' (1.524 m)   Wt 129 lb 3.2 oz (58.6 kg)   SpO2 98%   BMI 25.23 kg/m²     Physical Exam  Vitals and nursing note reviewed. Constitutional:       General: She is not in acute distress. Appearance: She is well-developed. HENT:      Head: Normocephalic and atraumatic. Right Ear: Hearing and external ear normal.      Left Ear: Hearing and external ear normal.      Nose: Nose normal.   Eyes:      General:         Right eye: No discharge. Left eye: No discharge. Conjunctiva/sclera: Conjunctivae normal.   Neck:      Thyroid: No thyromegaly. Trachea: No tracheal deviation. Cardiovascular:      Rate and Rhythm: Normal rate and regular rhythm. Heart sounds: Normal heart sounds. No murmur heard. No friction rub. No gallop. Pulmonary:      Effort: Pulmonary effort is normal. No respiratory distress. Breath sounds: No wheezing or rales. Chest:      Chest wall: No tenderness. Musculoskeletal:         General: No deformity. Cervical back: Normal range of motion and neck supple. Right lower leg: Edema present. Left lower leg: Edema present. Lymphadenopathy:      Cervical: No cervical adenopathy. Skin:     General: Skin is warm and dry. Findings: No erythema or rash. Neurological:      Mental Status: She is alert. Motor: No abnormal muscle tone. Coordination: Coordination normal.   Psychiatric:         Behavior: Behavior normal.         Thought Content: Thought content normal.         Judgment: Judgment normal.           ASSESSMENT & PLAN  Henri Dasilva was seen today for hypertension and urinary pain. Diagnoses and all orders for this visit:    Dysuria  -     Culture, Urine; Future  -     cephALEXin (KEFLEX) 500 MG capsule; Take 1 capsule by mouth 2 times daily for 5 days    Essential hypertension    Chronic HFrEF (heart failure with reduced ejection fraction) (HCC)  -     POCT Urinalysis No Micro (Auto)    -Take extra dose of Lasix tonight, she will return tomorrow for a recheck    Return in about 1 day (around 12/16/2022). No red flag sx    The most recent results of the following tests were reviewed with the patient today: none     All copied or forwarded information in the progress note was verified by me to be accurate at the time of visit  Patient's past medical, surgical, social and family history were reviewed and updated     All patient questions answered. Patient voiced understanding.

## 2022-12-15 NOTE — CARE COORDINATION
Remote Alert Monitoring Note      Date/Time:  12/15/2022 3:40 PM    LPN contacted patient by telephone regarding red alert received for blood pressure reading (184/82). Verified patients name and  as identifiers. Background: Enrolled in [facility] for HTN, CHF  Refer to 911 immediately if:  Patient unresponsive or unable to provide history  Change in cognition or sudden confusion  Patient unable to respond in complete sentences  Intense chest pain/tightness  Any concern for any clinical emergency  Red Alert: Provider response time of 1 hr required for any red alert requiring intervention  Yellow Alert: Provider response time of 3hr required for any escalated yellow alert    BP Triage  Are you having any Chest Pain? no   Are you having any Shortness of Breath? no   Do you have a headache or have any vision changes? no   Are you having any numbness or tingling? no   Are you having any other health concerns or issues? no       Clinical Interventions: Reviewed and followed up on alerts and treatments-called and spoke with patient. She was at doctors today and has an appointment tomorrow morning and BP was 137/58 there earlier today. Patient states she has UTI and that makes here BP go up. She has her son help her check her VS and he isn't there now so she is unable to recheck it at this time. On ABX for UTI and taking all her medications as directed. Will not escalate to PCP as she was just there and BP was normal. Will see PCP in AM.    Education of patient/family/caregiver/guardian to support self-management-Instructed to rest and take things slow at this time. Patient Denies CP, SOB, headache, Numbness and tingling, vision changes. Patient expresses understanding. Plan/Follow Up: Will continue to review, monitor and address alerts with follow up based on severity of symptoms and risk factors.   Current Patient Metrics ---- Blood Pressure: 184/82, 88bpm Pulseox: 98%, 79bpm Survey: C Weight: 128.0lbs Note Created at: 12/15/2022 04:10 PM ET ---- Time-Spent: 20 minutes 0 seconds

## 2022-12-16 ENCOUNTER — OFFICE VISIT (OUTPATIENT)
Dept: FAMILY MEDICINE CLINIC | Age: 87
End: 2022-12-16

## 2022-12-16 ENCOUNTER — CARE COORDINATION (OUTPATIENT)
Dept: CARE COORDINATION | Age: 87
End: 2022-12-16

## 2022-12-16 VITALS
RESPIRATION RATE: 16 BRPM | TEMPERATURE: 97.9 F | HEIGHT: 60 IN | WEIGHT: 128.4 LBS | BODY MASS INDEX: 25.21 KG/M2 | DIASTOLIC BLOOD PRESSURE: 54 MMHG | OXYGEN SATURATION: 98 % | SYSTOLIC BLOOD PRESSURE: 142 MMHG | HEART RATE: 69 BPM

## 2022-12-16 DIAGNOSIS — I50.22 CHRONIC HFREF (HEART FAILURE WITH REDUCED EJECTION FRACTION) (HCC): ICD-10-CM

## 2022-12-16 DIAGNOSIS — I10 ESSENTIAL HYPERTENSION: Primary | ICD-10-CM

## 2022-12-16 NOTE — PROGRESS NOTES
Wesley Valdez is a 80 y.o. female that presents for     Chief Complaint   Patient presents with    Follow-up       BP (!) 142/54   Pulse 69   Temp 97.9 °F (36.6 °C) (Infrared)   Resp 16   Ht 5' (1.524 m)   Wt 128 lb 6.4 oz (58.2 kg)   SpO2 98%   BMI 25.08 kg/m²       HPI    1 day follow up of HTN and weight gain. BPs better today. Weight is down 2 lbs today. Took extra lasix yesterday. Feels well overall. Health Maintenance   Topic Date Due    DTaP/Tdap/Td vaccine (1 - Tdap) Never done    Shingles vaccine (2 of 3) 04/12/2014    COVID-19 Vaccine (3 - Booster for Pfizer series) 05/14/2021    Lipids  02/22/2023    Depression Screen  07/15/2023    Annual Wellness Visit (AWV)  07/16/2023    Flu vaccine  Completed    Pneumococcal 65+ years Vaccine  Completed    Hepatitis A vaccine  Aged Out    Hib vaccine  Aged Out    Meningococcal (ACWY) vaccine  Aged Out       Past Medical History:   Diagnosis Date    Arthritis     Cancer ([facility] [address] 2013    SKIN CANCER ON LEFT ANKLE    Diverticulosis     Hyperlipidemia     Hypertension     Hypothyroidism     Psoriasis     S/P angioplasty with stent 05/06/2021    3 stents to LAD, 1 stent to OM, 2 stents to diag.  per Dr. Julien Bound    Shingles     Thyroid disorder        Past Surgical History:   Procedure Laterality Date    BREAST BIOPSY Left 02/15/2015    benign stereotactic biopsy    BREAST SURGERY Left 06/1966    benign excisional biopsy    CARPAL TUNNEL RELEASE Right 01/30/2013    CARPAL TUNNEL RELEASE Left 07/25/2013    CATARACT REMOVAL Bilateral 1999    COLON SURGERY  11/1999    resection    COLONOSCOPY  2003, 2006, 2011    DIAGNOSTIC CARDIAC CATH LAB PROCEDURE  05/07/2021    6 stents     HYSTERECTOMY (CERVIX STATUS UNKNOWN)  02/23/1970    KNEE ARTHROSCOPY Right 11/21/2007    meniscectomies    KNEE SURGERY Left 2000    replacement    MYRINGOTOMY Right 07/01/2015    tube insertion    OVARY REMOVAL Bilateral 07/12/2001    oophorectomy    SHOULDER SURGERY  05/2014    SINUS SURGERY Social History     Tobacco Use    Smoking status: Never    Smokeless tobacco: Never   Vaping Use    Vaping Use: Never used   Substance Use Topics    Alcohol use: No    Drug use: No       Family History   Problem Relation Age of Onset    Breast Cancer Sister 68    Stroke Sister     Heart Disease Sister     Cancer Child     Other Other         visual problems         I have reviewed the patient's past medical history, past surgical history, allergies, medications, social and family history and I have made updates where appropriate. Review of Systems        PHYSICAL EXAM:  BP (!) 142/54   Pulse 69   Temp 97.9 °F (36.6 °C) (Infrared)   Resp 16   Ht 5' (1.524 m)   Wt 128 lb 6.4 oz (58.2 kg)   SpO2 98%   BMI 25.08 kg/m²     Physical Exam  Vitals and nursing note reviewed. Constitutional:       General: She is not in acute distress. Appearance: She is well-developed. HENT:      Head: Normocephalic and atraumatic. Right Ear: Hearing and external ear normal.      Left Ear: Hearing and external ear normal.      Nose: Nose normal.   Eyes:      General:         Right eye: No discharge. Left eye: No discharge. Conjunctiva/sclera: Conjunctivae normal.   Neck:      Thyroid: No thyromegaly. Trachea: No tracheal deviation. Cardiovascular:      Rate and Rhythm: Normal rate and regular rhythm. Heart sounds: Normal heart sounds. No murmur heard. No friction rub. No gallop. Pulmonary:      Effort: Pulmonary effort is normal. No respiratory distress. Breath sounds: No wheezing or rales. Chest:      Chest wall: No tenderness. Musculoskeletal:         General: No deformity. Cervical back: Normal range of motion and neck supple. Lymphadenopathy:      Cervical: No cervical adenopathy. Skin:     General: Skin is warm and dry. Findings: No erythema or rash. Neurological:      Mental Status: She is alert. Motor: No abnormal muscle tone.       Coordination: Coordination normal.   Psychiatric:         Behavior: Behavior normal.         Thought Content: Thought content normal.         Judgment: Judgment normal.           ASSESSMENT & PLAN  Eve Lopez was seen today for follow-up. Diagnoses and all orders for this visit:    Essential hypertension    Chronic HFrEF (heart failure with reduced ejection fraction) ([facility])    -Resume normal dosing of Lasix for CHF  -Other chronic issues are stable, continue current medications  -Advised to call if any issues      Return if symptoms worsen or fail to improve. No red flag sx    The most recent results of the following tests were reviewed with the patient today: none     All copied or forwarded information in the progress note was verified by me to be accurate at the time of visit  Patient's past medical, surgical, social and family history were reviewed and updated     All patient questions answered. Patient voiced understanding.

## 2022-12-16 NOTE — CARE COORDINATION
Remote Patient Monitoring Note      Date/Time:  12/16/2022 12:56 PM    CCSS reviewed patients reported daily Remote Patient Monitoring metrics. All reported metrics are within alert parameters. Plan/Follow Up:  Will continue to review, monitor and address alerts with follow up based on severity of symptoms and risk factors   Current Patient Metrics ---- Blood Pressure: 119/67, 74bpm Pulseox: 97%, 87bpm Survey: C Weight: 124.4lbs Note Created at: 12/16/2022 12:56 PM ET ---- Time-Spent: 2 minutes 0 seconds

## 2022-12-17 LAB
ORGANISM: ABNORMAL
URINE CULTURE, ROUTINE: ABNORMAL

## 2022-12-19 ENCOUNTER — CARE COORDINATION (OUTPATIENT)
Dept: CARE COORDINATION | Age: 87
End: 2022-12-19

## 2022-12-19 NOTE — CARE COORDINATION
Remote Patient Monitoring Note      Date/Time:  12/19/2022 10:57 AM    LPN reviewed patients reported daily Remote Patient Monitoring metrics. All reported metrics are within alert parameters. Plan/Follow Up:  Will continue to review, monitor and address alerts with follow up based on severity of symptoms and risk factors    - Current Patient Metrics ---- Blood Pressure: 136/81, 71bpm Pulseox: 96%, 78bpm Survey: C Weight: 123.8lbs Note Created at: 12/19/2022 10:58 AM ET ---- Time-Spent: 2 minutes 0 seconds    Aurora Hernandez, [address] [facility] Transition Nurse/ RPM  [phone]

## 2022-12-20 ENCOUNTER — CARE COORDINATION (OUTPATIENT)
Dept: CARE COORDINATION | Age: 87
End: 2022-12-20

## 2022-12-20 NOTE — CARE COORDINATION
Remote Patient Monitoring Note      Date/Time:  12/20/2022 8:12 AM    LPN reviewed patients reported daily Remote Patient Monitoring metrics. All reported metrics are within alert parameters. Plan/Follow Up:  Will continue to review, monitor and address alerts with follow up based on severity of symptoms and risk factors     Current Patient Metrics ---- Blood Pressure: 115/71, 67bpm Pulseox: 98%, 68bpm Survey: - Weight: 124.2lbs Note Created at: 12/20/2022 08:12 AM ET ---- Time-Spent: 2 minutes 0 seconds    Lottie Boudreaux, [id number] [facility] Transition Nurse/ RPM  [phone]

## 2022-12-21 ENCOUNTER — CARE COORDINATION (OUTPATIENT)
Dept: CASE MANAGEMENT | Age: 87
End: 2022-12-21

## 2022-12-21 ENCOUNTER — OFFICE VISIT (OUTPATIENT)
Dept: FAMILY MEDICINE CLINIC | Age: 87
End: 2022-12-21

## 2022-12-21 VITALS
OXYGEN SATURATION: 99 % | BODY MASS INDEX: 25.13 KG/M2 | HEART RATE: 73 BPM | HEIGHT: 60 IN | SYSTOLIC BLOOD PRESSURE: 142 MMHG | DIASTOLIC BLOOD PRESSURE: 84 MMHG | WEIGHT: 128 LBS | RESPIRATION RATE: 16 BRPM | TEMPERATURE: 97 F

## 2022-12-21 DIAGNOSIS — H66.93 BILATERAL ACUTE OTITIS MEDIA: ICD-10-CM

## 2022-12-21 DIAGNOSIS — R09.81 NASAL CONGESTION: Primary | ICD-10-CM

## 2022-12-21 LAB
INFLUENZA A ANTIBODY: NEGATIVE
INFLUENZA B ANTIBODY: NEGATIVE

## 2022-12-21 RX ORDER — CEFDINIR 300 MG/1
300 CAPSULE ORAL DAILY
Qty: 7 CAPSULE | Refills: 0 | Status: SHIPPED | OUTPATIENT
Start: 2022-12-21 | End: 2022-12-28

## 2022-12-21 NOTE — CARE COORDINATION
Remote Patient Monitoring Note      Date/Time:  12/21/2022 10:32 AM    LPN reviewed patients reported daily Remote Patient Monitoring metrics. All reported metrics are within alert parameters. Plan/Follow Up:  Will continue to review, monitor and address alerts with follow up based on severity of symptoms and risk factors  Current Patient Metrics ---- Blood Pressure: 112/68, 71bpm Pulseox: 97%, 84bpm Survey: - Weight: 123.4lbs Note Created at: 12/21/2022 10:33 AM ET ---- Time-Spent: 3 minutes 0 seconds

## 2022-12-21 NOTE — PROGRESS NOTES
Payton Epstein is a 80 y.o. female thatpresents for Nasal Congestion      History obtained today from Patient. Congestion for days  Notes sinus pressure, headache  Ear fullness  No fever, chills, cough, sob        I have reviewed the patient's past medical history, past surgical history, allergies,medications, social and family history and I have made updates where appropriate. Past Medical History:   Diagnosis Date    Arthritis     Cancer ([address] 2013    SKIN CANCER ON LEFT ANKLE    Diverticulosis     Hyperlipidemia     Hypertension     Hypothyroidism     Psoriasis     S/P angioplasty with stent 05/06/2021    3 stents to LAD, 1 stent to OM, 2 stents to diag. per Dr. Jasmin Bose     Thyroid disorder        Social History     Tobacco Use    Smoking status: Never    Smokeless tobacco: Never   Vaping Use    Vaping Use: Never used   Substance Use Topics    Alcohol use: No    Drug use: No       Family History   Problem Relation Age of Onset    Breast Cancer Sister 68    Stroke Sister     Heart Disease Sister     Cancer Child     Other Other         visual problems         Review of Systems        PHYSICAL EXAM:  BP (!) 142/84   Pulse 73   Temp 97 °F (36.1 °C) (Infrared)   Resp 16   Ht 5' (1.524 m)   Wt 128 lb (58.1 kg)   SpO2 99%   BMI 25.00 kg/m²     Physical Exam  Constitutional:       Appearance: Normal appearance. HENT:      Head: Normocephalic and atraumatic. Right Ear: External ear normal. Tympanic membrane is bulging. Left Ear: External ear normal. Tympanic membrane is bulging. Nose: Nose normal.      Mouth/Throat:      Mouth: Mucous membranes are moist.   Eyes:      Conjunctiva/sclera: Conjunctivae normal.   Cardiovascular:      Rate and Rhythm: Normal rate and regular rhythm. Pulses: Normal pulses. Heart sounds: Normal heart sounds. Pulmonary:      Effort: Pulmonary effort is normal.      Breath sounds: Normal breath sounds.    Abdominal:      General: Bowel sounds are normal.      Palpations: Abdomen is soft. Musculoskeletal:         General: Normal range of motion. Cervical back: Normal range of motion. Skin:     General: Skin is warm and dry. Neurological:      General: No focal deficit present. Mental Status: She is alert and oriented to person, place, and time. Psychiatric:         Mood and Affect: Mood normal.         Behavior: Behavior normal.         ASSESSMENT & PLAN  Tanmay Martinez was seen today for nasal congestion. Diagnoses and all orders for this visit:    Nasal congestion  -     cefdinir (OMNICEF) 300 MG capsule; Take 1 capsule by mouth daily for 7 days  -     POCT Influenza A/B    Bilateral acute otitis media  -     cefdinir (OMNICEF) 300 MG capsule; Take 1 capsule by mouth daily for 7 days    Testing was negative    No follow-ups on file. Start above treatments    All copied or forwarded information in the progress note was verified by me to be accurate at the time of visit  Patient's past medical, surgical, social and family history were reviewed and updated     All patient questions answered. Patient voiced understanding.

## 2022-12-21 NOTE — PATIENT INSTRUCTIONS
Discussed with patient  Just had some questions about her recent blood work results and some questions about medications  Prescribed tizanidine was not sure what it was for.   UTI Prevention:    Drink plenty of water per day   *helps flush the bladder out and keep bacteria counts low    Start taking these medications daily:  Probiotic (with lactobacillus acidophilus) 1 capsule daily  Cranberry Extract 1 tablet daily    *would have to drink 1 gallon of Cranberry Juice to get the equivalent of 1 tab   *doesn't have all of the sugar and acid that the juice does, which can be irritating to the bladder  D-mannose 1500 mg daily   *probably the most important one of the three   *[facility], not all pharmacies carry it    During course of infection try to limit bladder irritating foods/drinks:  -caffeine containing beverages (coffee, tea, energy drinks, soda)  -citrus containing food and beverages (andria, limes, flavored drinks, etc)  -tomato based products (tomato juice, pizza sauce, spaghetti sauce, etc)

## 2022-12-22 ENCOUNTER — CARE COORDINATION (OUTPATIENT)
Dept: CASE MANAGEMENT | Age: 87
End: 2022-12-22

## 2022-12-22 NOTE — CARE COORDINATION
Remote Patient Monitoring Note      Date/Time:  12/22/2022 12:00 PM    LPN reviewed patients reported daily Remote Patient Monitoring metrics. All reported metrics are within alert parameters. Plan/Follow Up:  Will continue to review, monitor and address alerts with follow up based on severity of symptoms and risk factors  Current Patient Metrics ---- Blood Pressure: 143/71, 71bpm Pulseox: 98%, 78bpm Survey: C Weight: 124.0lbs Note Created at: 12/22/2022 12:01 PM ET ---- Time-Spent: 3 minutes 0 seconds

## 2022-12-23 ENCOUNTER — CARE COORDINATION (OUTPATIENT)
Dept: CARE COORDINATION | Age: 87
End: 2022-12-23

## 2022-12-23 NOTE — CARE COORDINATION
Remote Patient Monitoring Note      Date/Time:  12/23/2022 11:50 AM    CCSS reviewed patients reported daily Remote Patient Monitoring metrics. All reported metrics are within alert parameters. Plan/Follow Up:  Will continue to review, monitor and address alerts with follow up based on severity of symptoms and risk factors  Current Patient Metrics ---- Blood Pressure: 140/80, 73bpm Pulseox: 96%, 79bpm Survey: C Weight: 123.8lbs Note Created at: 12/23/2022 11:50 AM ET ---- Time-Spent: 2 minutes 0 seconds

## 2022-12-27 ENCOUNTER — CARE COORDINATION (OUTPATIENT)
Dept: CARE COORDINATION | Age: 87
End: 2022-12-27

## 2022-12-27 NOTE — CARE COORDINATION
Remote Patient Monitoring Note      Date/Time:  12/27/2022 1:45 PM    CCSS reviewed patients reported daily Remote Patient Monitoring metrics. All reported metrics are within alert parameters. Plan/Follow Up:  Will continue to review, monitor and address alerts with follow up based on severity of symptoms and risk factors  Current Patient Metrics ---- Blood Pressure: 140/79, 70bpm Pulseox: 98%, 73bpm Survey: C Weight: 124.4lbs Note Created at: 12/27/2022 01:45 PM ET ---- Time-Spent: 2 minutes 0 seconds

## 2022-12-28 ENCOUNTER — CARE COORDINATION (OUTPATIENT)
Dept: CARE COORDINATION | Age: 87
End: 2022-12-28

## 2022-12-28 NOTE — CARE COORDINATION
Remote Patient Monitoring Note  Date/Time:  12/28/2022 12:52 PM  EMTP reviewed patients reported daily Remote Patient Monitoring metrics. All reported metrics are within alert parameters. Plan/Follow Up:  Will continue to review, monitor and address alerts with follow up based on severity of symptoms and risk factors  Current Patient Metrics ---- Blood Pressure: 134/80, 69bpm Pulseox: 96%, 72bpm Survey: C Weight: 123.8lbs Note Created at: 12/28/2022 12:54 PM ET ---- Time-Spent: 2 minutes 0 seconds

## 2022-12-29 ENCOUNTER — CARE COORDINATION (OUTPATIENT)
Dept: CARE COORDINATION | Age: 87
End: 2022-12-29

## 2022-12-29 NOTE — CARE COORDINATION
Remote Patient Monitoring Note      Date/Time:  12/29/2022 12:20 PM    EMTP reviewed patients reported daily Remote Patient Monitoring metrics. All reported metrics are within alert parameters. Plan/Follow Up:  Will continue to review, monitor and address alerts with follow up based on severity of symptoms and risk factors Current Patient Metrics ---- Blood Pressure: 134/71, 67bpm Pulseox: 98%, 70bpm Survey: - Weight: 125.0lbs Note Created at: 12/29/2022 12:20 PM ET ---- Time-Spent: 2 minutes 0 seconds

## 2022-12-30 ENCOUNTER — CARE COORDINATION (OUTPATIENT)
Dept: CARE COORDINATION | Age: 87
End: 2022-12-30

## 2022-12-30 NOTE — CARE COORDINATION
Remote Patient Monitoring Note      Date/Time:  12/30/2022 12:02 PM    CCSS reviewed patients reported daily Remote Patient Monitoring metrics. All reported metrics are within alert parameters. Plan/Follow Up:  Will continue to review, monitor and address alerts with follow up based on severity of symptoms and risk factors  Current Patient Metrics ---- Blood Pressure: 128/71, 70bpm Pulseox: 97%, 77bpm Survey: C Weight: 124.6lbs Note Created at: 12/30/2022 12:02 PM ET ---- Time-Spent: 2 minutes 0 seconds

## 2023-01-03 ENCOUNTER — CARE COORDINATION (OUTPATIENT)
Dept: CARE COORDINATION | Age: 88
End: 2023-01-03

## 2023-01-03 NOTE — CARE COORDINATION
Ambulatory Care Coordination Note  1/3/2023    ACC: Bri Killian, RN      Spoke with Widny Morfin for continued Care Coordination follow up  States she is doing well and sx's of sinus infection have resolved  States she is doing well and really likes the RPM program  Vitals appear to be doing well and daily weight being tracked  Discussed PCP leaving office, she is very upset over this. Reassurance given that she can continue to be a patient at [facility] with a new provider. She has some hip pain which is chronic for her. She follows up with [facility] for injections to assist with pain management    Plan  Reinforce education completed  Collaborate with RPM as needed  Encourage vital signs tracking using RPM system  Reinforce importance of early symptom recognition and reporting to prevent exacerbation and unnecessary hospitalization  Congestive Heart Failure Assessment    Are you currently restricting fluids?: No Restriction  Do you understand a low sodium diet?: Yes  Do you understand how to read food labels?: Yes  How many restaurant meals do you eat per week?: 1-2  Do you salt your food before tasting it?: No         Symptoms:  CHF associated angina: Neg, CHF associated dyspnea on exertion: Neg, CHF associated fatigue: Neg, CHF associated leg swelling: Neg, CHF associated orthostatic hypotension: Neg, CHF associated PND: Neg, CHF associated shortness of breath: Neg, CHF associated weakness: Neg      Patient-reported weight (lb):  (Comment: monitoring daily on RPM program)         Offered patient enrollment in the Remote Patient Monitoring (RPM) program for in-home monitoring: Yes, patient enrolled.     Lab Results       None            Care Coordination Interventions    Referral from Primary Care Provider: No  Suggested Interventions and Community Resources          Goals Addressed                      This Visit's Progress      Conditions and Symptoms (pt-stated)   On track      I will schedule office visits, as directed by my provider. I will keep my appointment or reschedule if I have to cancel. I will notify my provider of any barriers to my plan of care. I will follow my Zone Management tool to seek urgent or emergent care. I will notify my provider of any symptoms that indicate a worsening of my condition. Barriers: need for additional support and education  Plan for overcoming my barriers: family and ACM support  Confidence: 9/10  Anticipated Goal Completion Date: 3/1/23                Prior to Admission medications    Medication Sig Start Date End Date Taking? Authorizing Provider   gabapentin (NEURONTIN) 100 MG capsule Take 2 capsules by mouth nightly for 180 days. 10/20/22 4/18/23 Yes Edson Correia DO   carvedilol (COREG) 3.125 MG tablet Take 1 tablet by mouth 2 times daily (with meals) 10/20/22  Yes Dia Frye DO   furosemide (LASIX) 20 MG tablet Take 1 tablet by mouth daily 10/20/22  Yes Edson Correia DO   famotidine (PEPCID) 20 MG tablet TAKE 1 TABLET BY MOUTH TWICE DAILY 10/20/22  Yes Dia Frye DO   clopidogrel (PLAVIX) 75 MG tablet TAKE 1 TABLET BY MOUTH DAILY 8/30/22  Yes Celio Sylvester MD   acetaminophen (TYLENOL) 650 MG extended release tablet Take 650 mg by mouth every 8 hours as needed for Pain   Yes Historical Provider, MD   nitroGLYCERIN (NITROSTAT) 0.4 MG SL tablet up to max of 3 total doses.  If no relief after 1 dose, call 911. 7/5/22  Yes Celio Sylvester MD   Handicap Placard MISC by Does not apply route Expires 6/20/27 6/20/22  Yes Edson Correia DO   spironolactone (ALDACTONE) 50 MG tablet TAKE 1 TABLET BY MOUTH DAILY 5/23/22  Yes Dia Frye DO   Blood Pressure KIT Check blood pressure q daily 4/22/22  Yes Dia Frye DO   montelukast (SINGULAIR) 10 MG tablet Take 1 tablet by mouth daily 4/4/22  Yes Dia Frye DO   atorvastatin (LIPITOR) 80 MG tablet Take 1 tablet by mouth nightly 3/8/22  Yes Dia Frye DO   zinc 50 MG TABS tablet Take 50 mg by mouth daily   Yes Historical Provider, MD   Psyllium (METAMUCIL FREE & NATURAL PO) Take by mouth as needed   Yes Historical Provider, MD   thyroid [facility] THYROID) 30 MG tablet Take 1 tablet by mouth daily 2/16/22  Yes Ana White DO   Probiotic [facility]ophilus [facility] TABS Take 1 tablet by mouth daily 2/16/22  Yes Edson Correia DO   fluticasone (FLONASE) 50 MCG/ACT nasal spray INSTILL 2 SPRAYS INTO EACH NOSTRIL TWICE DAILY 1/6/22  Yes Ana White DO   ASPIRIN LOW DOSE 81 MG EC tablet as needed 6/16/21  Yes Historical Provider, MD   Ascorbic Acid (VITAMIN C) 1000 MG tablet Take 1,000 mg by mouth daily   Yes Historical Provider, MD   blood glucose monitor strips Check blood sugar q daily, Dx R73.01 7/6/21  Yes Edson Correia DO   melatonin 3 MG TABS tablet Take 3 mg by mouth nightly as needed    Yes Historical Provider, MD   b complex vitamins capsule Take 1 capsule by mouth daily   Yes Historical Provider, MD   polyethyl glycol-propyl glycol 0.4-0.3 % (SYSTANE) 0.4-0.3 % ophthalmic solution 1 drop in the morning and at bedtime Both eyes   Yes Historical Provider, MD   docusate sodium (COLACE) 100 MG capsule Take 100 mg by mouth in the morning and 100 mg before bedtime. Yes Historical Provider, MD   OLIVE LEAF PO Take 450 mg by mouth daily   Yes Historical Provider, MD   Blood Glucose Monitoring Suppl (TRUE METRIX METER) W/DEVICE KIT 1 Device by Does not apply route daily Check blood sugar q daily, Dx E11.9 7/15/16  Yes Ana White,    Multiple Vitamins-Minerals (THERAPEUTIC MULTIVITAMIN-MINERALS) tablet Take 1 tablet by mouth daily.    Yes Historical Provider, MD   fish oil-omega-3 fatty acids 1000 MG capsule Take 1 g by mouth daily    Yes Historical Provider, MD   CALCIUM CITRATE Take 600 mg by mouth in the morning and at bedtime    Yes Historical Provider, MD       Future Appointments   Date Time Provider Rosy Fisher   1/17/2023  1:15 PM Ellie Tapia MD N SRPX Heart [facility] - [email or web address]   1/25/2023  9:00 AM Do Ybarra APRN - CNP N ENT [facility] - [address] [facility]   1/26/2023 10:00 AM DO ALEX Caballero [facility] - [address] [facility]   7/24/2023 11:00 AM STR MAMMOGRAPHY RM2  [facility]

## 2023-01-03 NOTE — CARE COORDINATION
Remote Patient Monitoring Note      Date/Time:  1/3/2023 12:40 PM    EMTP reviewed patients reported daily Remote Patient Monitoring metrics. All reported metrics are within alert parameters. Plan/Follow Up:  Will continue to review, monitor and address alerts with follow up based on severity of symptoms and risk factors Current Patient Metrics ---- Blood Pressure: 132/73, 65bpm Pulseox: 98%, 71bpm Survey: C Weight: 124.8lbs Note Created at: 01/03/2023 12:40 PM ET ---- Time-Spent: 2 minutes 0 seconds

## 2023-01-04 ENCOUNTER — CARE COORDINATION (OUTPATIENT)
Dept: CARE COORDINATION | Age: 88
End: 2023-01-04

## 2023-01-04 NOTE — CARE COORDINATION
Remote Patient Monitoring Note      Date/Time:  1/4/2023 1:10 PM    EMTP reviewed patients reported daily Remote Patient Monitoring metrics. All reported metrics are within alert parameters. Plan/Follow Up:  Will continue to review, monitor and address alerts with follow up based on severity of symptoms and risk factors-- Current Patient Metrics ---- Blood Pressure: 150/77, 70bpm Pulseox: 98%, 71bpm Survey: C Weight: 124.6lbs Note Created at: 01/04/2023 01:11 PM ET ---- Time-Spent: 2 minutes 0 seconds

## 2023-01-05 ENCOUNTER — CARE COORDINATION (OUTPATIENT)
Dept: CARE COORDINATION | Age: 88
End: 2023-01-05

## 2023-01-05 NOTE — CARE COORDINATION
Remote Patient Monitoring Note      Date/Time:  1/5/2023 2:53 PM    EMTP reviewed patients reported daily Remote Patient Monitoring metrics. All reported metrics are within alert parameters. Plan/Follow Up:  Will continue to review, monitor and address alerts with follow up based on severity of symptoms and risk factors-- Current Patient Metrics ---- Blood Pressure: 132/73, 66bpm Pulseox: 98%, 76bpm Survey: - Weight: 125.8lbs Note Created at: 01/05/2023 02:53 PM ET ---- Time-Spent: 2 minutes 0 seconds

## 2023-01-06 ENCOUNTER — CARE COORDINATION (OUTPATIENT)
Dept: CASE MANAGEMENT | Age: 88
End: 2023-01-06

## 2023-01-06 NOTE — CARE COORDINATION
Remote Patient Monitoring Note      Date/Time:  1/6/2023 4:29 PM    LPN reviewed patients reported daily Remote Patient Monitoring metrics. All reported metrics are within alert parameters. Plan/Follow Up:  Will continue to review, monitor and address alerts with follow up based on severity of symptoms and risk factors  Current Patient Metrics ---- Blood Pressure: 118/72, 72bpm Pulseox: 98%, 88bpm Survey: C Weight: 125.2lbs Note Created at: 01/06/2023 04:30 PM ET ---- Time-Spent: 2 minutes 0 seconds

## 2023-01-09 ENCOUNTER — CARE COORDINATION (OUTPATIENT)
Dept: CASE MANAGEMENT | Age: 88
End: 2023-01-09

## 2023-01-09 NOTE — CARE COORDINATION
Remote Patient Monitoring Note      Date/Time:  1/9/2023 10:09 AM    LPN reviewed patients reported daily Remote Patient Monitoring metrics. All reported metrics are within alert parameters. Plan/Follow Up:  Will continue to review, monitor and address alerts with follow up based on severity of symptoms and risk factors  Current Patient Metrics ---- Blood Pressure: 127/72, 71bpm Pulseox: 96%, 72bpm Survey: C Weight: 124.8lbs Note Created at: 01/09/2023 10:10 AM ET ---- Time-Spent: 2 minutes 0 seconds

## 2023-01-10 ENCOUNTER — CARE COORDINATION (OUTPATIENT)
Dept: CASE MANAGEMENT | Age: 88
End: 2023-01-10

## 2023-01-10 NOTE — CARE COORDINATION
Remote Patient Monitoring Note      Date/Time:  1/10/2023 3:19 PM    LPN reviewed patients reported daily Remote Patient Monitoring metrics. All reported metrics are within alert parameters. Plan/Follow Up:  Will continue to review, monitor and address alerts with follow up based on severity of symptoms and risk factors   Current Patient Metrics ---- Blood Pressure: 112/72, 70bpm Pulseox: 98%, 70bpm Survey: C Weight: 124.6lbs Note Created at: 01/10/2023 03:19 PM ET ---- Time-Spent: 2 minutes 0 seconds

## 2023-01-11 ENCOUNTER — CARE COORDINATION (OUTPATIENT)
Dept: CARE COORDINATION | Age: 88
End: 2023-01-11

## 2023-01-11 NOTE — CARE COORDINATION
Remote Patient Monitoring Note      Date/Time:  1/11/2023 10:56 AM    LPN reviewed patients reported daily Remote Patient Monitoring metrics. All reported metrics are within alert parameters. Plan/Follow Up:  Will continue to review, monitor and address alerts with follow up based on severity of symptoms and risk factors    Current Patient Metrics ---- Blood Pressure: 112/69, 69bpm Pulseox: 98%, 80bpm Survey: C Weight: 125.0lbs Note Created at: 01/11/2023 10:56 AM ET ---- Time-Spent: 3 minutes 0 seconds    Zurdo Cole, [address] [facility] Transition Nurse/ RPM  [phone]

## 2023-01-12 ENCOUNTER — CARE COORDINATION (OUTPATIENT)
Dept: CARE COORDINATION | Age: 88
End: 2023-01-12

## 2023-01-12 NOTE — CARE COORDINATION
Remote Patient Monitoring Note      Date/Time:  1/12/2023 2:41 PM    EMTP reviewed patients reported daily Remote Patient Monitoring metrics. All reported metrics are within alert parameters. Plan/Follow Up:  Will continue to review, monitor and address alerts with follow up based on severity of symptoms and risk factors--- Current Patient Metrics ---- Blood Pressure: 116/65, 59bpm Pulseox: 97%, 65bpm Survey: C Weight: 125.6lbs Note Created at: 01/12/2023 02:41 PM ET ---- Time-Spent: 2 minutes 0 seconds

## 2023-01-13 ENCOUNTER — CARE COORDINATION (OUTPATIENT)
Dept: CARE COORDINATION | Age: 88
End: 2023-01-13

## 2023-01-13 NOTE — CARE COORDINATION
Remote Patient Monitoring Note      Date/Time:  1/13/2023 1:50 PM    EMTP reviewed patients reported daily Remote Patient Monitoring metrics. All reported metrics are within alert parameters. Plan/Follow Up:  Will continue to review, monitor and address alerts with follow up based on severity of symptoms and risk factors--- Current Patient Metrics ---- Blood Pressure: 135/74, 73bpm Pulseox: 96%, 76bpm Survey: C Weight: 125.0lbs Note Created at: 01/13/2023 01:51 PM ET ---- Time-Spent: 2 minutes 0 seconds

## 2023-01-16 ENCOUNTER — CARE COORDINATION (OUTPATIENT)
Dept: CARE COORDINATION | Age: 88
End: 2023-01-16

## 2023-01-16 NOTE — PROGRESS NOTES
[address] [facility] [place] [address]  [place] [address]  Dept: [phone]  Dept Fax: [phone]  Loc: [phone]    Visit Date: 1/17/2023    Ms. Maikel Vences is a 80 y.o. female  who presented for:  H/O NSTEMI  CAD  CHF  Swelling   HPI:   MARCOS Churchill is a pleasant 80year old female patient who  has a past medical history of Arthritis, Cancer ([address] Diverticulosis, Hyperlipidemia, Hypertension, Hypothyroidism, Psoriasis, S/P angioplasty with stent, Shingles, and Thyroid disorder. On 5/2021, she was [facility] with [facility]. Patient underwent cardiac cath on 5/6/2021 which showed severe LAD 80-90% and DX 90% disease, Ostial OM 90%, RCA non-dominant. She underwent PCI of the LAD x 3 JUSTUS, PCI of the OM x 1 JUSTUS. Procedure was complicated by loss of side branch (D1) and no re-flow in LAD. IABP was placed post PCI. Echo 7/2022 revealed an EF 45-50%, Mild MR, Mild AI. The patient reports some occasional leg swelling. She also reports 3-4 Ib weight gain. Patient denies chest pain, shortness of breath, dyspnea on exertion. /92, HR 76. Current Outpatient Medications:     gabapentin (NEURONTIN) 100 MG capsule, Take 2 capsules by mouth nightly for 180 days. , Disp: 180 capsule, Rfl: 3    carvedilol (COREG) 3.125 MG tablet, Take 1 tablet by mouth 2 times daily (with meals), Disp: 180 tablet, Rfl: 3    furosemide (LASIX) 20 MG tablet, Take 1 tablet by mouth daily, Disp: 90 tablet, Rfl: 3    famotidine (PEPCID) 20 MG tablet, TAKE 1 TABLET BY MOUTH TWICE DAILY, Disp: 180 tablet, Rfl: 3    clopidogrel (PLAVIX) 75 MG tablet, TAKE 1 TABLET BY MOUTH DAILY, Disp: 90 tablet, Rfl: 2    acetaminophen (TYLENOL) 650 MG extended release tablet, Take 650 mg by mouth every 8 hours as needed for Pain, Disp: , Rfl:     nitroGLYCERIN (NITROSTAT) 0.4 MG SL tablet, up to max of 3 total doses.  If no relief after 1 dose, call 911., Disp: 25 tablet, Rfl: 1 Handicap Placard [facility], by Does not apply route Expires 6/20/27, Disp: 1 each, Rfl: 0    spironolactone (ALDACTONE) 50 MG tablet, TAKE 1 TABLET BY MOUTH DAILY, Disp: 90 tablet, Rfl: 3    Blood Pressure KIT, Check blood pressure q daily, Disp: 1 kit, Rfl: 0    montelukast (SINGULAIR) 10 MG tablet, Take 1 tablet by mouth daily, Disp: 90 tablet, Rfl: 3    atorvastatin (LIPITOR) 80 MG tablet, Take 1 tablet by mouth nightly, Disp: 90 tablet, Rfl: 3    zinc 50 MG TABS tablet, Take 50 mg by mouth daily, Disp: , Rfl:     Psyllium (METAMUCIL FREE & NATURAL PO), Take by mouth as needed, Disp: , Rfl:     thyroid (ARMOUR THYROID) 30 MG tablet, Take 1 tablet by mouth daily, Disp: 90 tablet, Rfl: 3    Probiotic Acidophilus (FLORANEX) TABS, Take 1 tablet by mouth daily, Disp: , Rfl:     fluticasone (FLONASE) 50 MCG/ACT nasal spray, INSTILL 2 SPRAYS INTO EACH NOSTRIL TWICE DAILY, Disp: 96 g, Rfl: 3    ASPIRIN LOW DOSE 81 MG EC tablet, as needed, Disp: , Rfl:     Ascorbic Acid (VITAMIN C) 1000 MG tablet, Take 1,000 mg by mouth daily, Disp: , Rfl:     blood glucose monitor strips, Check blood sugar q daily, Dx R73.01, Disp: 100 strip, Rfl: 3    melatonin 3 MG TABS tablet, Take 3 mg by mouth nightly as needed , Disp: , Rfl:     b complex vitamins capsule, Take 1 capsule by mouth daily, Disp: , Rfl:     polyethyl glycol-propyl glycol 0.4-0.3 % (SYSTANE) 0.4-0.3 % ophthalmic solution, 1 drop in the morning and at bedtime Both eyes, Disp: , Rfl:     docusate sodium (COLACE) 100 MG capsule, Take 100 mg by mouth in the morning and 100 mg before bedtime. , Disp: , Rfl:     OLIVE LEAF PO, Take 450 mg by mouth daily, Disp: , Rfl:     Blood Glucose Monitoring Suppl (TRUE METRIX METER) W/DEVICE KIT, 1 Device by Does not apply route daily Check blood sugar q daily, Dx E11.9, Disp: 1 kit, Rfl: 0    Multiple Vitamins-Minerals (THERAPEUTIC MULTIVITAMIN-MINERALS) tablet, Take 1 tablet by mouth daily. , Disp: , Rfl:     fish oil-omega-3 fatty acids 1000 MG capsule, Take 1 g by mouth daily , Disp: , Rfl:     CALCIUM CITRATE, Take 600 mg by mouth in the morning and at bedtime , Disp: , Rfl:     Past Medical History  Gavin Rea  has a past medical history of Arthritis, Cancer ([address] Diverticulosis, Hyperlipidemia, Hypertension, Hypothyroidism, Psoriasis, S/P angioplasty with stent, Shingles, and Thyroid disorder. Social History  Gavin Rea  reports that she has never smoked. She has never used smokeless tobacco. She reports that she does not drink alcohol and does not use drugs. Family History  Gavin Rea family history includes Breast Cancer (age of onset: 68) in her sister; Cancer in her child; Heart Disease in her sister; Other in an other family member; Stroke in her sister. Past Surgical History   Past Surgical History:   Procedure Laterality Date    BREAST BIOPSY Left 02/15/2015    benign stereotactic biopsy    BREAST SURGERY Left 06/1966    benign excisional biopsy    CARPAL TUNNEL RELEASE Right 01/30/2013    CARPAL TUNNEL RELEASE Left 07/25/2013    CATARACT REMOVAL Bilateral 1999    COLON SURGERY  11/1999    resection    COLONOSCOPY  2003, 2006, 2011    DIAGNOSTIC CARDIAC CATH LAB PROCEDURE  05/07/2021    6 stents     HYSTERECTOMY (CERVIX STATUS UNKNOWN)  02/23/1970    KNEE ARTHROSCOPY Right 11/21/2007    meniscectomies    KNEE SURGERY Left 2000    replacement    MYRINGOTOMY Right 07/01/2015    tube insertion    OVARY REMOVAL Bilateral 07/12/2001    oophorectomy    SHOULDER SURGERY  05/2014    SINUS SURGERY         Review of Systems   Constitutional: Negative for chills and fever  HENT: Negative for congestion, sinus pressure, sneezing and sore throat. Eyes: Negative for pain, discharge, redness and itching. Respiratory: Negative for apnea, cough  Gastrointestinal: Negative for blood in stool, constipation, diarrhea   Endocrine: Negative for cold intolerance, heat intolerance, polydipsia.   Genitourinary: Negative for dysuria, enuresis, flank pain and hematuria. Musculoskeletal: Negative for arthralgias, joint swelling and neck pain. Neurological: Negative for numbness and headaches. Psychiatric/Behavioral: Negative for agitation, confusion, decreased concentration and dysphoric mood. Objective: There were no vitals taken for this visit. Wt Readings from Last 3 Encounters:   12/21/22 128 lb (58.1 kg)   12/16/22 128 lb 6.4 oz (58.2 kg)   12/15/22 129 lb 3.2 oz (58.6 kg)     BP Readings from Last 3 Encounters:   12/21/22 (!) 142/84   12/16/22 (!) 142/54   12/15/22 (!) 137/58       Nursing note and vitals reviewed. Physical Exam   Constitutional: Oriented to person, place, and time. Appears well-developed and well-nourished. ENT: Moist mucous membranes. No bleeding. Tongue is midline. Head: Normocephalic and atraumatic. Eyes: EOM are normal. Pupils are equal, round, and reactive to light. Neck: Normal range of motion. Neck supple. No JVD present. Cardiovascular: Normal rate, regular rhythm, murmur, no rubs, and intact distal pulses. Pulmonary/Chest: Effort normal and breath sounds normal. No respiratory distress. No wheezes. No rales. Abdominal: Soft. Bowel sounds are normal. No distension. There is no tenderness. Musculoskeletal: Normal range of motion. Trace pitting edema. Neurological: Alert and oriented to person, place, and time. No cranial nerve deficit. Coordination normal.   Skin: Skin is warm and dry. Psychiatric: Normal mood and affect.        No results found for: CKTOTAL, CKMB, CKMBINDEX    Lab Results   Component Value Date/Time    WBC 8.6 07/12/2022 08:46 PM    RBC 4.63 07/12/2022 08:46 PM    HGB 15.1 07/12/2022 08:46 PM    HCT 46.0 07/12/2022 08:46 PM    MCV 99.4 07/12/2022 08:46 PM    MCH 32.6 07/12/2022 08:46 PM    MCHC 32.8 07/12/2022 08:46 PM    RDW 13.8 11/02/2019 06:30 AM     07/12/2022 08:46 PM    MPV 9.7 07/12/2022 08:46 PM       Lab Results   Component Value Date/Time     07/12/2022 08:46 PM    K 4.7 07/12/2022 08:46 PM     07/12/2022 08:46 PM    CO2 27 07/12/2022 08:46 PM    BUN 24 07/12/2022 08:46 PM    LABALBU 4.5 07/12/2022 08:46 PM    LABALBU 4.1 03/15/2012 07:25 AM    CREATININE 0.9 07/12/2022 08:46 PM    CALCIUM 10.1 07/12/2022 08:46 PM    LABGLOM 58 07/12/2022 08:46 PM    GLUCOSE 123 07/12/2022 08:46 PM    GLUCOSE 97 11/02/2019 06:30 AM       Lab Results   Component Value Date/Time    ALKPHOS 156 07/12/2022 08:46 PM    ALT 51 07/12/2022 08:46 PM    AST 36 07/12/2022 08:46 PM    PROT 7.4 07/12/2022 08:46 PM    BILITOT 0.3 07/12/2022 08:46 PM    BILIDIR <0.2 07/12/2022 08:46 PM    LABALBU 4.5 07/12/2022 08:46 PM    LABALBU 4.1 03/15/2012 07:25 AM       Lab Results   Component Value Date/Time    MG 2.2 05/27/2021 10:30 AM       Lab Results   Component Value Date    INR 1.00 05/06/2021         Lab Results   Component Value Date/Time    LABA1C 5.6 05/09/2021 03:31 AM       Lab Results   Component Value Date/Time    TRIG 75 02/22/2022 08:23 AM    HDL 45 02/22/2022 08:23 AM    LDLCALC 52 02/22/2022 08:23 AM       Lab Results   Component Value Date/Time    TSH 2.380 02/22/2022 08:23 AM         Testing Reviewed:      I have individually reviewed the cardiac test below:    ECHO 7/2022   Summary   Left ventricle size is normal.   Normal left ventricular wall thickness. There was mild global hypokinesis of the left ventricle. Ejection fraction is visually estimated in the range of 45% to 50%. Doppler parameters were consistent with abnormal left ventricular   relaxation (grade 1 diastolic dysfunction). Mild mitral regurgitation   Mild aortic regurgitation   Mild tricuspid regurgitation.       Signature      ----------------------------------------------------------------   Electronically signed by Ismael Anderson MD (Interpreting   physician) on 07/19/2022 at 09:50 PM   ----------------------------------------------------------------    Cath:5/2021  CORONARY ANGIOGRAM:     LEFT MAIN:  Patent without any significant obstruction.     LAD:  Ostium has 30% stenosis in the mid section of the LAD.  There is  severe diffuse disease proximally totalling 70% to 80% in the mid,  distal 80% to 90% severe stenosis seen.  Distal LAD has mild luminal  irregularities, no significant obstruction.  There is a large diagonal  Branch, this branch was subtotally occluded.  In the mid section of  the ostium, it has about 80% to 90% stenosis.     LCX:  Proximally, it has no significant obstruction.  Gives rise to a  large OM1 that has a 99% stenosis.  AV groove branch continues without  any significant obstruction.  There is OM2 without any significant  obstruction.  There is left PDA without any significant obstruction.     RAMUS INTERMEDIUS:  Subtotally occluded ramus intermedius that is about  less than 1.5 mm in diameter at the proximal end.     RCA:  There is a proximal 50% stenosis.  It is a nondominant vessel with  a small marginal branch.     INTERVENTION:  Given the findings and presentation, I elected to proceed  with intervention.  I exchanged out for 6-Tajik EBU 3.5 guiding  catheter.  I cannulated the left main without complication.  Heparin IV  was given.  ACT was confirmed to be above 250 seconds.  I wired the  lesion using a Runthrough wire only to the apex.  I predilated the  lesion using a 2.5 x 12 NC balloon at 5 inflations at 6 to 8  atmospheres.  I then passed a Xience Nicole JUSTUS 2.5 x 38, the wire would  not cross.  I then used 2.5 x 12 NC balloon and predilated the lesion  distally up to 6 to 8 atmospheres.  I then passed the 2.5 x 38 Xience  Nicole JUSTUS and deployed this into the most distal normal segment.  This  was deployed at 9 atmospheres.  I then passed a 3.0 x 38 Xience Nicole  JUSTUS and deployed this in overlapping fashion with the first stent  deployed at 12 atmospheres.  Then, I saw that the diagonal branch was in  jeopardy, however, this was extremely small, we did cross the stent with  the  Fielder wire to ensure that it was able to be saved if needed. However, at that time, the patient started having extravasation  anterolaterally and was complaining of back pain that was reminiscent of  her presentation. The diagonal branch had basically a NATHANIEL-1 flow. I  then after crossing it used a 2.0 x 20 NC balloon and dilated the  proximal and mid diagonal branch to ensure flow. Thereafter, we had  reasonable flow in the diagonal branch. I then stented the proximal LAD  with a 3.5 x 18 Xience Nicole JUSTUS at 12 atmospheres in overlapping  fashion with the second stent deployed. I then postdilated the stent  distally with the 2.5 x 20 NC balloon from 18 to 24 atmospheres and the  proximal stent with the 3.5 x 20 from 16 to 20 atmospheres. Post PCI  angiography demonstrated NATHANIEL-0 flow with no reflow, all the way to the  distal LAD. Again IC adenosine to help improve the flow to the LAD  which did improve to the NATHANIEL-2 flow. However, there was significant  anterolateral ST elevation. There was a very severe circumflex lesion  about 90% in the OM1 left branch into the superior inferior branch. Given these findings, I was able to stent this branch. I redirected the  [facility] wire into the OM1 branch, predilated the lesion with a 2.0 x 8  NC balloon up to 16 atmospheres and passed the 2.5 x 12 Xience Nicole  JUSTUS and stented the first OM1. There was actually a good flow into both  the superior and inferior branches. The patient then again was showing  more ST elevations. I readvanced the Runthrough wire into the LAD, and  there was significant no reflow in the LAD again. At this time, I  passed over-the-wire 1.5 balloon with the Runthrough wire into the  distal LAD. I injected into coronary distal IC adenosine to help  improve the flow. While that was occurring, I then went back and  postdilated the OM branch with the 2.5 x 20 NC balloon.   Once this was  done, I went back and tried to save the diagonal branch once more with  the 2.0 x 12 Mini TREK balloon up to 10 to 12 atmospheres to ensure  flow, however, the diagonal branch would not stay open no matter what I  did. I did Kissing balloon inflation of the diagonal branch as well as  into the LAD, but again the diagonal branch would not stay open. There  were ST elevations in the anterolateral segment on the anterolateral  ECG. Therefore, I attempted multiple inflations while in the diagonal  branch at 10 to 12 atmospheres to avoid dissection but still no flow  could be restored. The patient was having back pain reminiscent of her  chest pain and anterolateral ST elevation. Given these findings, I  attempted to stent the vessel with 2.25 x [address] [facility] JUSTUS deployed  at 7 atmospheres and then used a 2.25 x [address] JUSTUS to the  ostium of the diagonal branch in overlapping fashion with the first  stent deployed. There was significantly no reflow in the diagonal  branch and again no perfusion that was obvious in this branch. At this  point, the LAD had improved flow. Diagonal branch was having no  significant reflow and the circumflex was open, therefore I elected no  further intervention to the diagonal branch. Continue management of the  LAD and circumflex by placement of the balloon pump. I took a final  angiogram demonstrating NATHANIEL-2 flow in the LAD and NATHANIEL-3 flow in the  circumflex and NATHANIEL-0 flow in the diagonal branch. I then took a  femoral angiogram that demonstrated common femoral artery puncture. I  exchanged out for a 7. 5-Martiniquais 40 mL IABP balloon pump sheath that was  inserted over the 0.035 guidewire. I prepped a 40 mL IABP balloon  catheter per manufacture's recommendations. This was inserted over a  0.25 guidewire into the ascending aorta. The guidewire was removed,  balloon pump was deaired appropriately prior to insertion and balloon  pump was then initiated. IV heparin was initiated as well.   IV  nitroglycerin was also initiated. Balloon pump was then sutured in  place. MEDICATIONS:  See EMR. ACCESS:  See above. ESTIMATED BLOOD LOSS:  Less than 50 mL. SUMMARY:  Successful PCI of the LAD with three overlapping drug-eluting  stents; successful PCI of the OM1 with one drug-eluting stent; diagonal  PCI with two drug-eluting stents with no reflow; IABP insertion. AssessmentPlan:     Chino Breaux is a pleasant 80year old female patient who  has a past medical history of Arthritis, Cancer ([address] Diverticulosis, Hyperlipidemia, Hypertension, Hypothyroidism, Psoriasis, S/P angioplasty with stent, Shingles, and Thyroid disorder. On 5/2021, she was admitted to [facility] with NSTEMI. Patient underwent cardiac cath on 5/6/2021 which showed severe LAD 80-90% and DX 90% disease, Ostial OM 90%, RCA non-dominant. She underwent PCI of the LAD x 3 JUSTUS, PCI of the OM x 1 JUSTUS. Procedure was complicated by loss of side branch (D1) and no re-flow in LAD. IABP was placed post PCI. Echo 7/2022 revealed an EF 45-50%, Mild MR, Mild AI. The patient reports some occasional leg swelling. She also reports 3-4 Ib weight gain. Patient denies chest pain, shortness of breath, dyspnea on exertion. /92, HR 76.   Leg swelling   NSTEMI Hx  PCI LAD, OM 5/2021  Mild ischemic CMP, HFmrEF  HTN  Dyslipidemia  Hypothyroidism     Prior h/o CAD, complex PCI of LAD and OM  Patient denies chest pain  Aggressive medical therapy and risk factor modification for CAD is indicated   Plavix  ASA  No bleeding issues   On Lipitor 80 mg po daily , fish oil   LDL 52  Coreg  The patient was instructed to check the blood pressure at home, and record the readings.  Patient will call office with blood pressure readings, will adjust patient's antihypertensive medications as needed accordingly   Has h/o CHF  Echo 7/2022 revealed an EF 45-50%, Mild MR, Mild AI  On Lasix, Aldactone   Has mild signs of volume overload   Leg swelling   Limit Na intake  Increased lasix to 40 mg po daily  Cr 0.7  K 4.7  Check BMP, Mg in one week      Above findings and plan of care were discussed with patient in details, patient's questions were answered.      Disposition:  RTC in 6 months             Electronically signed by Cedrick Beyer MD, [facility]    1/16/2023 at 4:05 PM EST

## 2023-01-16 NOTE — CARE COORDINATION
Remote Patient Monitoring Note      Date/Time:  1/16/2023 3:10 PM    EMTP reviewed patients reported daily Remote Patient Monitoring metrics. All reported metrics are within alert parameters. Plan/Follow Up:  Will continue to review, monitor and address alerts with follow up based on severity of symptoms and risk factors--- Current Patient Metrics ---- Blood Pressure: 116/68, 72bpm Pulseox: 96%, 87bpm Survey: C Weight: 125.6lbs Note Created at: 01/16/2023 03:10 PM ET ---- Time-Spent: 2 minutes 0 seconds

## 2023-01-17 ENCOUNTER — CARE COORDINATION (OUTPATIENT)
Dept: CARE COORDINATION | Age: 88
End: 2023-01-17

## 2023-01-17 ENCOUNTER — OFFICE VISIT (OUTPATIENT)
Dept: CARDIOLOGY CLINIC | Age: 88
End: 2023-01-17
Payer: MEDICARE

## 2023-01-17 VITALS
HEIGHT: 60 IN | HEART RATE: 76 BPM | WEIGHT: 128.6 LBS | BODY MASS INDEX: 25.25 KG/M2 | SYSTOLIC BLOOD PRESSURE: 150 MMHG | DIASTOLIC BLOOD PRESSURE: 92 MMHG

## 2023-01-17 DIAGNOSIS — I50.32 CHRONIC HEART FAILURE WITH PRESERVED EJECTION FRACTION (HFPEF) (HCC): Primary | ICD-10-CM

## 2023-01-17 PROCEDURE — G8417 CALC BMI ABV UP PARAM F/U: HCPCS | Performed by: INTERNAL MEDICINE

## 2023-01-17 PROCEDURE — 1123F ACP DISCUSS/DSCN MKR DOCD: CPT | Performed by: INTERNAL MEDICINE

## 2023-01-17 PROCEDURE — G8484 FLU IMMUNIZE NO ADMIN: HCPCS | Performed by: INTERNAL MEDICINE

## 2023-01-17 PROCEDURE — 1090F PRES/ABSN URINE INCON ASSESS: CPT | Performed by: INTERNAL MEDICINE

## 2023-01-17 PROCEDURE — G8427 DOCREV CUR MEDS BY ELIG CLIN: HCPCS | Performed by: INTERNAL MEDICINE

## 2023-01-17 PROCEDURE — 99214 OFFICE O/P EST MOD 30 MIN: CPT | Performed by: INTERNAL MEDICINE

## 2023-01-17 PROCEDURE — 1036F TOBACCO NON-USER: CPT | Performed by: INTERNAL MEDICINE

## 2023-01-17 RX ORDER — FUROSEMIDE 40 MG/1
40 TABLET ORAL DAILY
Qty: 90 TABLET | Refills: 1 | Status: SHIPPED | OUTPATIENT
Start: 2023-01-17

## 2023-01-17 NOTE — CARE COORDINATION
Attempted to reach patient for continued Care Coordination follow up and education. Patient was unavailable at the time of my call, and voicemail is full.

## 2023-01-17 NOTE — PROGRESS NOTES
Pt C/O light headed at times, some swelling in ankles, some fatigue      Pt denies CP, SOB, Headache, dizziness, heart palpitations,

## 2023-01-17 NOTE — CARE COORDINATION
Remote Patient Monitoring Note      Date/Time:  1/17/2023 3:10 PM    EMTP reviewed patients reported daily Remote Patient Monitoring metrics. All reported metrics are within alert parameters. Plan/Follow Up:  Will continue to review, monitor and address alerts with follow up based on severity of symptoms and risk factors-- Current Patient Metrics ---- Blood Pressure: 140/71, 75bpm Pulseox: 98%, 80bpm Survey: C Weight: 125.0lbs Note Created at: 01/17/2023 03:10 PM ET ---- Time-Spent: 2 minutes 0 seconds

## 2023-01-18 ENCOUNTER — CARE COORDINATION (OUTPATIENT)
Dept: CARE COORDINATION | Age: 88
End: 2023-01-18

## 2023-01-18 NOTE — CARE COORDINATION
[place] also had some questions on salt intake and better foods to eat that are low sodium.   I will reach out to [facility] RD to provide support

## 2023-01-18 NOTE — CARE COORDINATION
[address] regarding Dietitian referral. Pt answered, RD explained reason for call and role in care. Pt states she is driving at time of call and will call RD back later today. RD will follow/assist with patient return call.      [address] [facility], [address] [place] [address]

## 2023-01-18 NOTE — CARE COORDINATION
Remote Patient Monitoring Note      Date/Time:  1/18/2023 1:09 PM    EMTP reviewed patients reported daily Remote Patient Monitoring metrics. All reported metrics are within alert parameters. Plan/Follow Up:  Will continue to review, monitor and address alerts with follow up based on severity of symptoms and risk factors--- Current Patient Metrics ---- Blood Pressure: 114/68, 71bpm Pulseox: 97%, 74bpm Survey: C Weight: 125.2lbs Note Created at: 01/18/2023 01:09 PM ET ---- Time-Spent: 2 minutes 0 seconds

## 2023-01-18 NOTE — CARE COORDINATION
Alfred Stone called and wanted to clarify if her lab work is fasting that specialty office ordered. I informed the [facility] is a fasting lab.

## 2023-01-18 NOTE — CARE COORDINATION
Eleazar Lopez  January 18, 2023    Initial Referral Reason: CHF- Low Sodium Diet Education     Patient Care Team:  Alfred Sebastian DO as PCP - General (Family Medicine)  Alfred Sebastian DO as PCP - REHABILITATION [facility] Provider  Prem Rinaldi (Audiology)  Tree Abdul MD as Cardiologist (Cardiology)  Prakash Etienne, RN as [facility], [facility] as Dietitian (Dietitian Registered)    Past Medical History:    Current Outpatient Medications   Medication Sig Dispense Refill    furosemide (LASIX) 40 MG tablet Take 1 tablet by mouth daily 90 tablet 1    carvedilol (COREG) 3.125 MG tablet Take 1 tablet by mouth 2 times daily (with meals) 180 tablet 3    famotidine (PEPCID) 20 MG tablet TAKE 1 TABLET BY MOUTH TWICE DAILY 180 tablet 3    clopidogrel (PLAVIX) 75 MG tablet TAKE 1 TABLET BY MOUTH DAILY 90 tablet 2    acetaminophen (TYLENOL) 650 MG extended release tablet Take 650 mg by mouth every 8 hours as needed for Pain      nitroGLYCERIN (NITROSTAT) 0.4 MG SL tablet up to max of 3 total doses.  If no relief after 1 dose, call 911. 25 tablet 1    Handicap Placard MISC by Does not apply route Expires 6/20/27 1 each 0    spironolactone (ALDACTONE) 50 MG tablet TAKE 1 TABLET BY MOUTH DAILY 90 tablet 3    Blood Pressure KIT Check blood pressure q daily 1 kit 0    montelukast (SINGULAIR) 10 MG tablet Take 1 tablet by mouth daily 90 tablet 3    atorvastatin (LIPITOR) 80 MG tablet Take 1 tablet by mouth nightly 90 tablet 3    Psyllium (METAMUCIL FREE & NATURAL PO) Take by mouth as needed      thyroid (ARMOUR THYROID) 30 MG tablet Take 1 tablet by mouth daily 90 tablet 3    Probiotic Acidophilus (FLORANEX) TABS Take 1 tablet by mouth daily      fluticasone (FLONASE) 50 MCG/ACT nasal spray INSTILL 2 SPRAYS INTO EACH NOSTRIL TWICE DAILY 96 g 3    ASPIRIN LOW DOSE 81 MG EC tablet as needed      Ascorbic Acid (VITAMIN C) 1000 MG tablet Take 1,000 mg by mouth daily      blood glucose monitor strips Check blood sugar q daily, Dx R73.01 100 strip 3    melatonin 3 MG TABS tablet Take 3 mg by mouth nightly as needed       b complex vitamins capsule Take 1 capsule by mouth daily      polyethyl glycol-propyl glycol 0.4-0.3 % (SYSTANE) 0.4-0.3 % ophthalmic solution 1 drop in the morning and at bedtime Both eyes      docusate sodium (COLACE) 100 MG capsule Take 100 mg by mouth in the morning and 100 mg before bedtime. OLIVE LEAF PO Take 450 mg by mouth daily      Blood Glucose Monitoring Suppl (TRUE METRIX METER) W/DEVICE KIT 1 Device by Does not apply route daily Check blood sugar q daily, Dx E11.9 1 kit 0    Multiple Vitamins-Minerals (THERAPEUTIC MULTIVITAMIN-MINERALS) tablet Take 1 tablet by mouth daily. fish oil-omega-3 fatty acids 1000 MG capsule Take 1 g by mouth daily       CALCIUM CITRATE Take 600 mg by mouth in the morning and at bedtime        No current facility-administered medications for this visit.        Biochemical Data, Medical Tests and Procedures:    Lab Results   Component Value Date    LABA1C 5.6 05/09/2021     Lab Results   Component Value Date     05/06/2017       Lab Results   Component Value Date    CHOL 112 02/22/2022    CHOL 184 05/07/2021    CHOL 193 12/22/2020     Lab Results   Component Value Date    TRIG 75 02/22/2022    TRIG 82 05/07/2021    TRIG 146 12/22/2020     Lab Results   Component Value Date    HDL 45 02/22/2022    HDL 62 05/07/2021    HDL 43 12/22/2020     Lab Results   Component Value Date    LDLCALC 52 02/22/2022    LDLCALC 106 05/07/2021    [address] 12/22/2020       Anthropometric Measurements:    Height: 60 inches (152.4 cm)  Weight: 128 lb (58.3 kg)  BMI: 25.12 (overweight)  IBW: 100 lb (45.5 kg) +-10%  %IBW: 128%    Physical Exam Findings:  Deferred    Nutrition Interview:   RD received referral from Majo WATSON:  Patient is being referred to central dietitian for the following reason(s) and diagnoses: CHF     Preferred time to reach patient (if specified): Pia Cordoba would like you to call her on her cell phone which is provided in the chart     Information provided (if any): Pia Cordoba is wanting some information on low sodium food/snack options to help manage her CHF. Could you please reach out and provide support! Thank you! RD called pt, explained reason for call and role in care. Patient states she is trying to eat better to stay healthy. RD explained the importance of watching sodium to prevent body from holding extra fluid. Explained the American Heart Association recommends no more than 2300 mg of sodium per day, which is equivalent to 1 teaspoon of salt to put it into perspective. RD explained the nutrition plan for heart failure usually limits the sodium from food and beverages to no more than 2000 mg per day. Discussed avoiding the salt shaker when eating/cooking- pt states she does not use the salt shaker. Explained how sodium is hidden in a lot of foods and the importance of reading food labels-choosing foods with 140 mg of sodium or less per serving or ones with the AHA heart check rossana, which identifies the food as heart healthy. Patient asked specifically about crackers she bought- RD reviewed the nutrition label with patient. Patient states a serving size is 5 crackers and sodium listed is 120 mg. RD explained 5 crackers- 120 mg of sodium and 10 crackers would be 240 mg of sodium. Pt verbalizes understanding. Discussed draining/rinsing canned goods to decrease sodium content or buying no salt added. RD reviewed the importance of weighing self daily. Reviewed to call PCP if pt has a 3 lb weight gain in one day or a 5 lb weight gain in 2 days. Pt states she is monitoring her weight daily, no weight provided per pt. Per chart review, patient is enrolled in RPM and her weight today documented as 125.2 lbs. Patient had questions about blood sugar control. RD noted patient's current A1C is 5.6% as of 5/9/21.  RD explained the importance of eating balanced meals/snacks consistently throughout the day to help manage BS. Explained the components of a balanced meal using MyPlate and provided examples. Explained the importance of not skipping meals- discussed eating a small snack or supplementing with Glucerna as a meal replacement, snack or to help supplement a meal. RD will mail Glucerna coupons. Patient asked specifically about fruit. RD explained the importance of incorporating a serving of protein and serving of carbohydrate when snacking. Discussed how eating a carbohydrate alone such as a banana versus a banana with peanut butter will affect blood sugar differently. Reviewed protein sources and provided examples. Pt verbalizes understanding. Reviewed signs/symptoms of hypoglycemia. If hypoglycemia (<70): rule of 15-consume 15 g of CHO (rapid acting CHO- 3 to 4 glucose tablets, 4 oz fruit juice/regular soda or candy), wait 15 minutes then recheck blood sugar, if still low then repeat rule. Pt verbalizes understanding. Pt has no additional nutrition related questions at this time. RD offered to mail educational handouts to pt to reinforce concepts discussed during phone conversation, pt accepted and very appreciative. RD verified address. Nutrition Diagnosis:  #1 Problem Food and nutrition-related knowledge deficit       Etiology related to lack of prior nutrition related education regarding CHF       Signs/Symptoms as evidenced by referral for low sodium nutrition education     Nutrition Intervention:     Estimated Needs  cardiac diet providing 1400 kcals ([address] based on CBW). Estimated daily CHO Needs: 190 g (based on 45-65% total calorie intake)  Estimated daily Protein Needs: 47-58 g (based on 0.8-1.0 g/kg based on CBW)  Estimated daily Fluid Needs: per MD    #1 Nutrition Information: Provided patient with Meal Planner Diabetes, Power of Protein, Hypoglycemia, Low Sodium Nutrition Therapy, Sodium Free Flavoring Tips handouts.  For reinforcement of concepts discussed during nutrition interview. #2 Nutrition Counseling: Used open-ended questions to assess patients perceived susceptibility and severity of disease state. Discussed potential impact of health threat on patient's lifestyle. Used open-ended questions to assess patient's perception regarding benefits of and barriers to implementation of nutrition therapy. #3 Nutrition Education: Clearly defined the benefits of nutrition therapy. Summarized and affirmed positive aspects of current nutrition patterns. Provided education regarding value of adherence to cardiac diet. Discussed ways to establish applying concepts of alternatives and choices regarding implementation of diet. Explored ideas for small, incremental goals to initiate behavior change. Nutrition Monitoring and Evaluation:     Indicator/Goal Criteria   #1 Eat balanced meals consistently throughout the day. #1 Focus on eating 3 meals/day and make these meals balanced using the MyPlate [id number]-9/4 plate fruits and/or vegetables, 1/4 plate protein and 1/4 plate starchy carbohydrates with 8 oz glass of low fat milk if desired. #2 Monitor daily sodium intake. Keep sodium from food and beverages to no more than 2000 mg/day. #2 Avoid the salt shaker. Read food labels to help choose lower sodium options (<140 mg of sodium per serving). #3 Monitor daily weights. #3 Weigh self daily and keep a log. Notify MD of sudden weight gain. Follow Up: RD will call pt in 2-3 weeks to follow up and make sure pt received handouts in mail. RD will answer any nutrition related questions at this time.      [address] [facility], [facility] 14

## 2023-01-19 ENCOUNTER — HOSPITAL ENCOUNTER (OUTPATIENT)
Age: 88
Discharge: HOME | End: 2023-01-19
Payer: MEDICARE

## 2023-01-19 ENCOUNTER — CARE COORDINATION (OUTPATIENT)
Dept: CARE COORDINATION | Age: 88
End: 2023-01-19

## 2023-01-19 DIAGNOSIS — I50.32 CHRONIC HEART FAILURE WITH PRESERVED EJECTION FRACTION (HFPEF) (HCC): ICD-10-CM

## 2023-01-19 LAB
ANION GAP SERPL CALCULATED.3IONS-SCNC: 8 MEQ/L (ref 8–16)
BUN BLDV-MCNC: 20 MG/DL (ref 7–22)
CALCIUM SERPL-MCNC: 9.2 MG/DL (ref 8.5–10.5)
CHLORIDE BLD-SCNC: 105 MEQ/L (ref 98–111)
CO2: 29 MEQ/L (ref 23–33)
CREAT SERPL-MCNC: 1 MG/DL (ref 0.4–1.2)
GFR SERPL CREATININE-BSD FRML MDRD: 51 ML/MIN/1.73M2
GLUCOSE BLD-MCNC: 112 MG/DL (ref 70–108)
MAGNESIUM: 2.3 MG/DL (ref 1.6–2.4)
POTASSIUM SERPL-SCNC: 5.1 MEQ/L (ref 3.5–5.2)
SODIUM BLD-SCNC: 142 MEQ/L (ref 135–145)

## 2023-01-19 PROCEDURE — 36415 COLL VENOUS BLD VENIPUNCTURE: CPT

## 2023-01-19 PROCEDURE — 80048 BASIC METABOLIC PNL TOTAL CA: CPT

## 2023-01-19 PROCEDURE — 83735 ASSAY OF MAGNESIUM: CPT

## 2023-01-19 NOTE — CARE COORDINATION
Remote Patient Monitoring Note      Date/Time:  1/19/2023 2:22 PM    EMTP reviewed patients reported daily Remote Patient Monitoring metrics. All reported metrics are within alert parameters. Plan/Follow Up:  Will continue to review, monitor and address alerts with follow up based on severity of symptoms and risk factors--- Current Patient Metrics ---- Blood Pressure: 110/65, 70bpm Pulseox: 98%, 90bpm Survey: C Weight: 125.4lbs Note Created at: 01/19/2023 02:22 PM ET ---- Time-Spent: 2 minutes 0 seconds

## 2023-01-20 ENCOUNTER — CARE COORDINATION (OUTPATIENT)
Dept: CARE COORDINATION | Age: 88
End: 2023-01-20

## 2023-01-20 NOTE — CARE COORDINATION
Remote Patient Monitoring Note      Date/Time:  1/20/2023 3:40 PM    EMTP reviewed patients reported daily Remote Patient Monitoring metrics. All reported metrics are within alert parameters. Plan/Follow Up:  Will continue to review, monitor and address alerts with follow up based on severity of symptoms and risk factors--- Current Patient Metrics ---- Blood Pressure: 115/65, 72bpm Pulseox: 97%, 82bpm Survey: C Weight: 125.0lbs Note Created at: 01/20/2023 03:40 PM ET ---- Time-Spent: 2 minutes 0 seconds

## 2023-01-23 ENCOUNTER — CARE COORDINATION (OUTPATIENT)
Dept: CARE COORDINATION | Age: 88
End: 2023-01-23

## 2023-01-23 NOTE — CARE COORDINATION
Remote Patient Monitoring Note      Date/Time:  1/23/2023 1:33 PM    CCSS reviewed patients reported daily Remote Patient Monitoring metrics. All reported metrics are within alert parameters. Plan/Follow Up:  Will continue to review, monitor and address alerts with follow up based on severity of symptoms and risk factors   Current Patient Metrics ---- Blood Pressure: 124/76, 75bpm Pulseox: 96%, 84bpm Survey: C Weight: 124.0lbs Note Created at: 01/23/2023 01:33 PM ET ---- Time-Spent: 2 minutes 0 seconds

## 2023-01-24 ENCOUNTER — CARE COORDINATION (OUTPATIENT)
Dept: CARE COORDINATION | Age: 88
End: 2023-01-24

## 2023-01-24 NOTE — CARE COORDINATION
Remote Patient Monitoring Note      Date/Time:  1/24/2023 9:30 AM    EMTP reviewed patients reported daily Remote Patient Monitoring metrics. All reported metrics are within alert parameters. Plan/Follow Up:  Will continue to review, monitor and address alerts with follow up based on severity of symptoms and risk factors-- Current Patient Metrics ---- Blood Pressure: 134/78, 69bpm Pulseox: 97%, 77bpm Survey: C Weight: 124.4lbs Note Created at: 01/24/2023 09:30 AM ET ---- Time-Spent: 2 minutes 0 seconds

## 2023-01-25 ENCOUNTER — CARE COORDINATION (OUTPATIENT)
Dept: CASE MANAGEMENT | Age: 88
End: 2023-01-25

## 2023-01-25 ENCOUNTER — CARE COORDINATION (OUTPATIENT)
Dept: CARE COORDINATION | Age: 88
End: 2023-01-25

## 2023-01-25 NOTE — CARE COORDINATION
Remote Patient Monitoring Note      Date/Time:  1/25/2023 9:06 AM    LPN reviewed patients reported daily Remote Patient Monitoring metrics. All reported metrics are within alert parameters. Plan/Follow Up:  Will continue to review, monitor and address alerts with follow up based on severity of symptoms and risk factors  Current Patient Metrics ---- Blood Pressure: 126/72, 71bpm Pulseox: 98%, 82bpm Survey: C Weight: 125.0lbs Note Created at: 01/25/2023 09:07 AM ET ---- Time-Spent: 2 minutes 0 seconds

## 2023-01-25 NOTE — CARE COORDINATION
Dr. Christopher Soto called. She has an appt scheduled with you tomorrow but is concerned about going out in the snowy weather. She would like to cancel her appt tomorrow and reschedule with you for another time. She would really like to see you for this appt and not one of the practitioners. Please advise. Thank you!

## 2023-01-25 NOTE — CARE COORDINATION
Update, Tree Jiang called and would like to keep her appt as scheduled for tomorrow. States she has family that is going to take her there.

## 2023-01-26 ENCOUNTER — CARE COORDINATION (OUTPATIENT)
Dept: CARE COORDINATION | Age: 88
End: 2023-01-26

## 2023-01-26 NOTE — CARE COORDINATION
Remote Patient Monitoring Note      Date/Time:  1/26/2023 12:34 PM    EMTP reviewed patients reported daily Remote Patient Monitoring metrics.  All reported metrics are within alert parameters.    Plan/Follow Up: Will continue to review, monitor and address alerts with follow up based on severity of symptoms and risk factors-- Current Patient Metrics ---- Blood Pressure: 119/78, 72bpm Pulseox: 97%, 78bpm Survey: C Weight: 125.4lbs Note Created at: 01/26/2023 12:34 PM ET ---- Time-Spent: 2 minutes 0 seconds

## 2023-01-27 ENCOUNTER — CARE COORDINATION (OUTPATIENT)
Dept: CARE COORDINATION | Age: 88
End: 2023-01-27

## 2023-01-27 NOTE — CARE COORDINATION
Remote Patient Monitoring Note      Date/Time:  1/27/2023 10:57 AM    EMTP reviewed patients reported daily Remote Patient Monitoring metrics. All reported metrics are within alert parameters. Plan/Follow Up:  Will continue to review, monitor and address alerts with follow up based on severity of symptoms and risk factors--- Current Patient Metrics ---- Blood Pressure: 108/69, 71bpm Pulseox: 97%, 85bpm Survey: C Weight: 125.4lbs Note Created at: 01/27/2023 10:57 AM ET ---- Time-Spent: 2 minutes 0 seconds

## 2023-01-30 ENCOUNTER — CARE COORDINATION (OUTPATIENT)
Dept: CARE COORDINATION | Age: 88
End: 2023-01-30

## 2023-01-30 ENCOUNTER — OFFICE VISIT (OUTPATIENT)
Dept: FAMILY MEDICINE CLINIC | Age: 88
End: 2023-01-30

## 2023-01-30 VITALS
HEART RATE: 70 BPM | BODY MASS INDEX: 25.13 KG/M2 | RESPIRATION RATE: 16 BRPM | TEMPERATURE: 97 F | OXYGEN SATURATION: 100 % | SYSTOLIC BLOOD PRESSURE: 140 MMHG | DIASTOLIC BLOOD PRESSURE: 68 MMHG | WEIGHT: 128 LBS | HEIGHT: 60 IN

## 2023-01-30 DIAGNOSIS — K21.9 GASTROESOPHAGEAL REFLUX DISEASE WITHOUT ESOPHAGITIS: ICD-10-CM

## 2023-01-30 DIAGNOSIS — I10 ESSENTIAL HYPERTENSION: ICD-10-CM

## 2023-01-30 DIAGNOSIS — E03.9 HYPOTHYROIDISM, UNSPECIFIED TYPE: ICD-10-CM

## 2023-01-30 DIAGNOSIS — I20.8 OTHER FORMS OF ANGINA PECTORIS (HCC): Primary | ICD-10-CM

## 2023-01-30 PROBLEM — I20.89 OTHER FORMS OF ANGINA PECTORIS: Status: ACTIVE | Noted: 2023-01-30

## 2023-01-30 ASSESSMENT — PATIENT HEALTH QUESTIONNAIRE - PHQ9
SUM OF ALL RESPONSES TO PHQ9 QUESTIONS 1 & 2: 0
SUM OF ALL RESPONSES TO PHQ QUESTIONS 1-9: 0
SUM OF ALL RESPONSES TO PHQ QUESTIONS 1-9: 0
2. FEELING DOWN, DEPRESSED OR HOPELESS: 0
SUM OF ALL RESPONSES TO PHQ QUESTIONS 1-9: 0
SUM OF ALL RESPONSES TO PHQ QUESTIONS 1-9: 0
1. LITTLE INTEREST OR PLEASURE IN DOING THINGS: 0

## 2023-01-30 NOTE — CARE COORDINATION
Remote Patient Monitoring Note      Date/Time:  1/30/2023 1:30 PM    EMTP reviewed patients reported daily Remote Patient Monitoring metrics. All reported metrics are within alert parameters. Plan/Follow Up:  Will continue to review, monitor and address alerts with follow up based on severity of symptoms and risk factors--- Current Patient Metrics ---- Blood Pressure: 137/72, 71bpm Pulseox: 98%, 87bpm Survey: C Weight: 125.6lbs Note Created at: 01/30/2023 01:31 PM ET ---- Time-Spent: 2 minutes 0 seconds

## 2023-01-30 NOTE — PROGRESS NOTES
Christina Natarajan is a 80 y.o. female that presents for     Chief Complaint   Patient presents with    3 Month Follow-Up       BP (!) 140/68   Pulse 70   Temp 97 °F (36.1 °C) (Infrared)   Resp 16   Ht 5' (1.524 m)   Wt 128 lb (58.1 kg)   SpO2 100%   BMI 25.00 kg/m²       HPI    HTN    Does patient check BP regularly at home? - Yes - readings reviewed today, very well controlled  Current Medication regimen - Lasix, aldactone  Tolerating medications well? - yes    Shortness of breath or chest pain? No  Headache or visual complaints? No  Neurologic changes like confusion? No  Extremity edema? No    BP Readings from Last 3 Encounters:   01/30/23 (!) 140/68   01/17/23 (!) 150/92   12/21/22 (!) 142/84         Health Maintenance   Topic Date Due    DTaP/Tdap/Td vaccine (1 - Tdap) Never done    Shingles vaccine (2 of 3) 04/12/2014    COVID-19 Vaccine (3 - Booster for Pfizer series) 05/14/2021    Lipids  02/22/2023    Depression Screen  07/15/2023    Annual Wellness Visit (AWV)  07/16/2023    Flu vaccine  Completed    Pneumococcal 65+ years Vaccine  Completed    Hepatitis A vaccine  Aged Out    Hib vaccine  Aged Out    Meningococcal (ACWY) vaccine  Aged Out       Past Medical History:   Diagnosis Date    Arthritis     Cancer ([facility] [address] 2013    SKIN CANCER ON LEFT ANKLE    Diverticulosis     Hyperlipidemia     Hypertension     Hypothyroidism     Psoriasis     S/P angioplasty with stent 05/06/2021    3 stents to LAD, 1 stent to OM, 2 stents to diag.  per Dr. Fox Nichols    Shingles     Thyroid disorder        Past Surgical History:   Procedure Laterality Date    BREAST BIOPSY Left 02/15/2015    benign stereotactic biopsy    BREAST SURGERY Left 06/1966    benign excisional biopsy    CARPAL TUNNEL RELEASE Right 01/30/2013    CARPAL TUNNEL RELEASE Left 07/25/2013    CATARACT REMOVAL Bilateral 1999    COLON SURGERY  11/1999    resection    COLONOSCOPY  2003, 2006, 2011    DIAGNOSTIC CARDIAC CATH LAB PROCEDURE  05/07/2021    6 stents HYSTERECTOMY (CERVIX STATUS UNKNOWN)  02/23/1970    KNEE ARTHROSCOPY Right 11/21/2007    meniscectomies    KNEE SURGERY Left 2000    replacement    MYRINGOTOMY Right 07/01/2015    tube insertion    OVARY REMOVAL Bilateral 07/12/2001    oophorectomy    SHOULDER SURGERY  05/2014    SINUS SURGERY         Social History     Tobacco Use    Smoking status: Never    Smokeless tobacco: Never   Vaping Use    Vaping Use: Never used   Substance Use Topics    Alcohol use: No    Drug use: No       Family History   Problem Relation Age of Onset    Breast Cancer Sister 68    Stroke Sister     Heart Disease Sister     Cancer Child     Other Other         visual problems         I have reviewed the patient's past medical history, past surgical history, allergies, medications, social and family history and I have made updates where appropriate. Review of Systems        PHYSICAL EXAM:  BP (!) 140/68   Pulse 70   Temp 97 °F (36.1 °C) (Infrared)   Resp 16   Ht 5' (1.524 m)   Wt 128 lb (58.1 kg)   SpO2 100%   BMI 25.00 kg/m²     Physical Exam  Vitals and nursing note reviewed. Constitutional:       General: She is not in acute distress. Appearance: She is well-developed. HENT:      Head: Normocephalic and atraumatic. Right Ear: Hearing and external ear normal.      Left Ear: Hearing and external ear normal.      Nose: Nose normal.   Eyes:      General:         Right eye: No discharge. Left eye: No discharge. Conjunctiva/sclera: Conjunctivae normal.   Neck:      Thyroid: No thyromegaly. Trachea: No tracheal deviation. Cardiovascular:      Rate and Rhythm: Normal rate and regular rhythm. Heart sounds: Normal heart sounds. No murmur heard. No friction rub. No gallop. Pulmonary:      Effort: Pulmonary effort is normal. No respiratory distress. Breath sounds: No wheezing or rales. Chest:      Chest wall: No tenderness. Musculoskeletal:         General: No deformity.       Cervical back: Normal range of motion and neck supple. Lymphadenopathy:      Cervical: No cervical adenopathy. Skin:     General: Skin is warm and dry. Findings: No erythema or rash. Neurological:      Mental Status: She is alert. Motor: No abnormal muscle tone. Coordination: Coordination normal.   Psychiatric:         Behavior: Behavior normal.         Thought Content: Thought content normal.         Judgment: Judgment normal.           ASSESSMENT & PLAN  Randal Hou was seen today for 3 month follow-up. Diagnoses and all orders for this visit:    Other forms of angina pectoris [facility])    Essential hypertension    Gastroesophageal reflux disease without esophagitis    Hypothyroidism, unspecified type    HTN controlled, cont coreg, aldactone, lasix  -Other chronic issues are stable, continue current medications  -Advised to call if any issues      Return in about 3 months (around 4/30/2023). No red flag sx    The most recent results of the following tests were reviewed with the patient today: BMP     All copied or forwarded information in the progress note was verified by me to be accurate at the time of visit  Patient's past medical, surgical, social and family history were reviewed and updated     All patient questions answered. Patient voiced understanding.

## 2023-01-31 ENCOUNTER — CARE COORDINATION (OUTPATIENT)
Dept: CARE COORDINATION | Age: 88
End: 2023-01-31

## 2023-01-31 NOTE — CARE COORDINATION
Remote Patient Monitoring Note      Date/Time:  1/31/2023 1:20 PM    EMTP reviewed patients reported daily Remote Patient Monitoring metrics. All reported metrics are within alert parameters. Plan/Follow Up:  Will continue to review, monitor and address alerts with follow up based on severity of symptoms and risk factors--- Current Patient Metrics ---- Blood Pressure: 123/70, 71bpm Pulseox: 97%, 87bpm Survey: - Weight: 125.2lbs Note Created at: 01/31/2023 01:20 PM ET ---- Time-Spent: 2 minutes 0 seconds

## 2023-01-31 NOTE — CARE COORDINATION
Bernardino Welch  1/31/2023    Registered Dietitian Progress Note for Care Coordination    Assessment: Randa Murguia is a 80 y.o. female. RD referred for CHF- Low Sodium Diet Education. RD spoke with patient for initial nutrition assessment on 1/18/23. RD called to follow up with pt today 1/31/23. RD discussed previous goals with pt. Patient states she received the handouts and coupons in the mail and she was really excited. Patient states she found the information very helpful and she is going grocery shopping this week. Reviewed the importance of eating consistently throughout the day. Discussed making meals balanced using the MyPlate method and incorporating a variety of fruits, vegetables, whole grains and lean protein. Patient states she is going to buy bananas, grapes, watermelon, strawberries, cantaloupe and squash. Reviewed the importance of following a low sodium diet. Patient is reading food labels closely and buying lower sodium options. Patient is monitoring her weight daily, per pt this AM .2 lb. No sudden weight gain reported per pt. RD noted patient is enrolled in RPM. Patient has no nutrition related questions or concerns at this time. Barriers to meeting goals: overwhelmed by complexity of regimen      Nutrition Monitoring and Evaluation  Indicator/Goal Criteria Progress   #1 Eat balanced meals consistently throughout the day. #1 Focus on eating 3 meals/day and make these meals balanced using the MyPlate [id number]-2/3 plate fruits and/or vegetables, 1/4 plate protein and 1/4 plate starchy carbohydrates with 8 oz glass of low fat milk if desired. #1 Patient is eating 3 meals/day and making meals more balanced using MyPlate. #2  Monitor daily sodium intake. Keep sodium from food and beverages to no more than 2000 mg/day. #2 Avoid the salt shaker. Read food labels to help choose lower sodium options (<140 mg of sodium per serving). #2 Pt is following a low sodium diet.    #3  Monitor daily weights. #3 Weigh self daily and keep a log. Notify MD of sudden weight gain. #3 Pt is weighing self daily. Pt states this AM .2 lb. Plan of Care:  RD encouraged pt to keep working toward goals set. RD will follow up with pt to discuss any questions pt has and check the progress toward goals. Follow Up:    RD will call pt in 2-3 weeks to follow up and answer any nutrition related questions at that time.      [address] [facility], [address] [place] [address]

## 2023-02-01 ENCOUNTER — CARE COORDINATION (OUTPATIENT)
Dept: CARE COORDINATION | Age: 88
End: 2023-02-01

## 2023-02-01 NOTE — CARE COORDINATION
Remote Patient Monitoring Note      Date/Time:  2/1/2023 2:33 PM    EMTP reviewed patients reported daily Remote Patient Monitoring metrics. All reported metrics are within alert parameters. Plan/Follow Up:  Will continue to review, monitor and address alerts with follow up based on severity of symptoms and risk factors--- Current Patient Metrics ---- Blood Pressure: 122/60, 81bpm Pulseox: 98%, 82bpm Survey: C Weight: 125.6lbs Note Created at: 02/01/2023 02:34 PM ET ---- Time-Spent: 2 minutes 0 seconds

## 2023-02-02 ENCOUNTER — CARE COORDINATION (OUTPATIENT)
Dept: CARE COORDINATION | Age: 88
End: 2023-02-02

## 2023-02-02 NOTE — CARE COORDINATION
Remote Patient Monitoring Note      Date/Time:  2/2/2023 12:43 PM    CCSS reviewed patients reported daily Remote Patient Monitoring metrics. All reported metrics are within alert parameters. Plan/Follow Up:  Will continue to review, monitor and address alerts with follow up based on severity of symptoms and risk factors  Current Patient Metrics ---- Blood Pressure: 128/69, 70bpm Pulseox: 98%, 78bpm Survey: C Weight: 126.2lbs Note Created at: 02/02/2023 12:43 PM ET ---- Time-Spent: 2 minutes 0 seconds

## 2023-02-03 ENCOUNTER — CARE COORDINATION (OUTPATIENT)
Dept: CARE COORDINATION | Age: 88
End: 2023-02-03

## 2023-02-03 NOTE — CARE COORDINATION
Remote Patient Monitoring Note      Date/Time:  2/3/2023 11:24 AM    CCSS reviewed patients reported daily Remote Patient Monitoring metrics. All reported metrics are within alert parameters. Plan/Follow Up:  Will continue to review, monitor and address alerts with follow up based on severity of symptoms and risk factors   Current Patient Metrics ---- Blood Pressure: 118/68, 76bpm Pulseox: 98%, 85bpm Survey: C Weight: 125.6lbs Note Created at: 02/03/2023 11:24 AM ET ---- Time-Spent: 2 minutes 0 seconds

## 2023-02-06 ENCOUNTER — CARE COORDINATION (OUTPATIENT)
Dept: CARE COORDINATION | Age: 88
End: 2023-02-06

## 2023-02-06 NOTE — CARE COORDINATION
Remote Patient Monitoring Note      Date/Time:  2/6/2023 3:27 PM    EMTP reviewed patients reported daily Remote Patient Monitoring metrics. All reported metrics are within alert parameters. Plan/Follow Up:  Will continue to review, monitor and address alerts with follow up based on severity of symptoms and risk factors--- Current Patient Metrics ---- Blood Pressure: 117/58, 68bpm Pulseox: 97%, 84bpm Survey: C Weight: 126.6lbs Note Created at: 02/06/2023 03:27 PM ET ---- Time-Spent: 2 minutes 0 seconds

## 2023-02-07 ENCOUNTER — CARE COORDINATION (OUTPATIENT)
Dept: CARE COORDINATION | Age: 88
End: 2023-02-07

## 2023-02-07 NOTE — CARE COORDINATION
Remote Patient Monitoring Note      Date/Time:  2/7/2023 1:11 PM    EMTP reviewed patients reported daily Remote Patient Monitoring metrics. All reported metrics are within alert parameters. Plan/Follow Up:  Will continue to review, monitor and address alerts with follow up based on severity of symptoms and risk factors---- Current Patient Metrics ---- Blood Pressure: 111/57, 72bpm Pulseox: 96%, 79bpm Survey: C Weight: 126.4lbs Note Created at: 02/07/2023 01:12 PM ET ---- Time-Spent: 2 minutes 0 seconds

## 2023-02-08 ENCOUNTER — OFFICE VISIT (OUTPATIENT)
Dept: FAMILY MEDICINE CLINIC | Age: 88
End: 2023-02-08

## 2023-02-08 ENCOUNTER — CARE COORDINATION (OUTPATIENT)
Dept: CARE COORDINATION | Age: 88
End: 2023-02-08

## 2023-02-08 VITALS
TEMPERATURE: 97.1 F | HEIGHT: 60 IN | OXYGEN SATURATION: 98 % | WEIGHT: 129 LBS | HEART RATE: 76 BPM | BODY MASS INDEX: 25.32 KG/M2 | SYSTOLIC BLOOD PRESSURE: 132 MMHG | DIASTOLIC BLOOD PRESSURE: 60 MMHG | RESPIRATION RATE: 16 BRPM

## 2023-02-08 DIAGNOSIS — N30.90 CYSTITIS: Primary | ICD-10-CM

## 2023-02-08 LAB
BILIRUBIN URINE: NEGATIVE
BLOOD URINE, POC: ABNORMAL
CHARACTER, URINE: ABNORMAL
COLOR, URINE: ABNORMAL
GLUCOSE URINE: NEGATIVE MG/DL
KETONES, URINE: NEGATIVE
LEUKOCYTE CLUMPS, URINE: ABNORMAL
NITRITE, URINE: NEGATIVE
PH, URINE: 6 (ref 5–9)
PROTEIN, URINE: NEGATIVE MG/DL
SPECIFIC GRAVITY, URINE: 1.02 (ref 1–1.03)
UROBILINOGEN, URINE: 0.2 EU/DL (ref 0–1)

## 2023-02-08 RX ORDER — CEFDINIR 300 MG/1
300 CAPSULE ORAL DAILY
Qty: 7 CAPSULE | Refills: 0 | Status: SHIPPED | OUTPATIENT
Start: 2023-02-08 | End: 2023-02-15

## 2023-02-08 NOTE — PATIENT INSTRUCTIONS
UTI Prevention:    Drink plenty of water per day   *helps flush the bladder out and keep bacteria counts low    Start taking these medications daily:  Probiotic (with lactobacillus acidophilus) 1 capsule daily  Cranberry Extract 1 tablet daily    *would have to drink 1 gallon of Cranberry Juice to get the equivalent of 1 tab   *doesn't have all of the sugar and acid that the juice does, which can be irritating to the bladder  D-mannose 1500 mg daily   *probably the most important one of the three   *[facility], not all pharmacies carry it    During course of infection try to limit bladder irritating foods/drinks:  -caffeine containing beverages (coffee, tea, energy drinks, soda)  -citrus containing food and beverages (andria, limes, flavored drinks, etc)  -tomato based products (tomato juice, pizza sauce, spaghetti sauce, etc)

## 2023-02-08 NOTE — PROGRESS NOTES
SUBJECTIVE:  Johana Kurtz is a 80 y.o. female for   Chief Complaint   Patient presents with    Urinary Pain       HPI:    Symptoms present for few days. Symptoms are worse since they initially started. Dysuria? Yes  Hematuria? No  Increased urinary frequency? Yes  Abdominal discomfort? No  CVA pain? No  Hx of UTIs? Yes    Patient Active Problem List   Diagnosis    Deviated nasal septum    Hypertrophy of nasal turbinates    Sensorineural hearing loss    Mixed hearing loss    Hypertension    Hypothyroidism    Prediabetes    Pure hypercholesterolemia    Decreased ROM of left shoulder    Acute pain of left shoulder    NSTEMI (non-ST elevated myocardial infarction) ([facility])    Hyperglycemia    Leukocytosis    Prolonged Q-T interval on ECG    Hyperlipidemia    Acute kidney injury superimposed on CKD ([address]    Acute respiratory failure with hypoxia ([facility])    Ischemic cardiomyopathy    Aortic valve regurgitation    High anion gap metabolic acidosis    Physical deconditioning    Right flank pain    Chronic HFrEF (heart failure with reduced ejection fraction) ([facility]    Other forms of angina pectoris ([facility])           OBJECTIVE:  /60   Pulse 76   Temp 97.1 °F (36.2 °C) (Infrared)   Resp 16   Ht 5' (1.524 m)   Wt 129 lb (58.5 kg)   SpO2 98%   BMI 25.19 kg/m²   She appears well; non-toxic and in no apparent distress. Physical Examination: Chest - clear to auscultation, no wheezes, rales or rhonchi, symmetric air entry  Abdomen - soft, nontender, nondistended, no masses or organomegaly, no CVA tenderness  Extremities - peripheral pulses normal, no pedal edema, no clubbing or cyanosis  Psych -  Affect appropriate. Thought process is normal without evidence of depression or psychosis. Good insight and appropriae interaction. Cognition and memory appear to be intact. ASSESSMENT & PLAN  Cortney Jenkins was seen today for urinary pain.     Diagnoses and all orders for this visit:    Cystitis  -     cefdinir (OMNICEF) 300 MG capsule; Take 1 capsule by mouth daily for 7 days  -     Culture, Urine    Couldn't urinate today, will have her drop her urine off to the lab    No follow-ups on file.      -Start above treatments  -Urine culture will be sent today  -Patient advised to contact our office immediately if symptoms worsen or persist  -Patient counseled on conservative care including fluids, rest and OTC meds      All patient questions answered. Patient voiced understanding.

## 2023-02-08 NOTE — CARE COORDINATION
Remote Patient Monitoring Note      Date/Time:  2/8/2023 2:47 PM    EMTP reviewed patients reported daily Remote Patient Monitoring metrics. All reported metrics are within alert parameters. Plan/Follow Up:  Will continue to review, monitor and address alerts with follow up based on severity of symptoms and risk factors--- Current Patient Metrics ---- Blood Pressure: 146/69, 67bpm Pulseox: 98%, 87bpm Survey: C Weight: 126.2lbs Note Created at: 02/08/2023 02:48 PM ET ---- Time-Spent: 2 minutes 0 seconds

## 2023-02-09 ENCOUNTER — CARE COORDINATION (OUTPATIENT)
Dept: CARE COORDINATION | Age: 88
End: 2023-02-09

## 2023-02-09 NOTE — CARE COORDINATION
Remote Patient Monitoring Note      Date/Time:  2/9/2023 9:14 AM    EMTP reviewed patients reported daily Remote Patient Monitoring metrics. All reported metrics are within alert parameters. Plan/Follow Up:  Will continue to review, monitor and address alerts with follow up based on severity of symptoms and risk factors--- Current Patient Metrics ---- Blood Pressure: 121/58, 77bpm Pulseox: 98%, 93bpm Survey: C Weight: 126.0lbs Note Created at: 02/09/2023 09:15 AM ET ---- Time-Spent: 2 minutes 0 seconds

## 2023-02-10 ENCOUNTER — CARE COORDINATION (OUTPATIENT)
Dept: CARE COORDINATION | Age: 88
End: 2023-02-10

## 2023-02-10 NOTE — CARE COORDINATION
Remote Patient Monitoring Note      Date/Time:  2/10/2023 9:28 AM    CCSS reviewed patients reported daily Remote Patient Monitoring metrics. All reported metrics are within alert parameters. Plan/Follow Up:  Will continue to review, monitor and address alerts with follow up based on severity of symptoms and risk factors  Current Patient Metrics ---- Blood Pressure: 107/60, 73bpm Pulseox: 98%, 96bpm Survey: - Weight: 125.6lbs Note Created at: 02/10/2023 09:28 AM ET ---- Time-Spent: 2 minutes 0 seconds

## 2023-02-11 LAB
BACTERIA UR CULT: ABNORMAL
ORGANISM: ABNORMAL

## 2023-02-13 ENCOUNTER — TELEPHONE (OUTPATIENT)
Dept: FAMILY MEDICINE CLINIC | Age: 88
End: 2023-02-13

## 2023-02-13 ENCOUNTER — CARE COORDINATION (OUTPATIENT)
Dept: CASE MANAGEMENT | Age: 88
End: 2023-02-13

## 2023-02-13 NOTE — CARE COORDINATION
Remote Patient Monitoring Note      Date/Time:  2/13/2023 11:34 AM    LPN reviewed patients reported daily Remote Patient Monitoring metrics. All reported metrics are within alert parameters. Plan/Follow Up:  Will continue to review, monitor and address alerts with follow up based on severity of symptoms and risk factors  Current Patient Metrics ---- Blood Pressure: 116/59, 74bpm Pulseox: 98%, 74bpm Survey: C Weight: 124.0lbs Note Created at: 02/13/2023 11:34 AM ET ---- Time-Spent: 2 minutes 0 seconds

## 2023-02-13 NOTE — TELEPHONE ENCOUNTER
----- Message from RACQUEL Chaudhari CNP sent at 2/12/2023  9:25 PM EST -----  Urine came back mixed growth meaning it was essentially inconclusive. Take full course of meds since she was symptomatic. Let us know if she isn't improving.

## 2023-02-14 ENCOUNTER — CARE COORDINATION (OUTPATIENT)
Dept: CARE COORDINATION | Age: 88
End: 2023-02-14

## 2023-02-14 ENCOUNTER — CARE COORDINATION (OUTPATIENT)
Dept: CASE MANAGEMENT | Age: 88
End: 2023-02-14

## 2023-02-14 NOTE — CARE COORDINATION
Remote Patient Monitoring Note      Date/Time:  2/14/2023 11:52 AM    LPN reviewed patients reported daily Remote Patient Monitoring metrics. All reported metrics are within alert parameters. Plan/Follow Up: Will continue to review, monitor and address alerts with follow up based on severity of symptoms and risk factors.     Current Patient Metrics ---- Blood Pressure: 112/57, 73bpm Pulseox: 98%, 79bpm Survey: C Weight: 124.8lbs Note Created at: 02/14/2023 11:52 AM ET ---- Time-Spent: 2 minutes 0 seconds

## 2023-02-14 NOTE — CARE COORDINATION
Ambulatory Care Coordination Note  2023    Patient Current Location:  Home: [address] [place] [id number]     ACM contacted the patient by telephone. Verified name and  with patient as identifiers. Provided introduction to self, and explanation of the ACM role. Challenges to be reviewed by the provider   Additional needs identified to be addressed with provider: No  none               Method of communication with provider: none. ACM: Hanna Trinidad, RN    Spoke with Faisaldo Javi she is doing well for this review  Received a shot for bursitis at [facility] and feeling better  States she is going to try to go to [facility] to get more exercises  States her blood sugars are running good  Feels UTI is improving and going to drop another urine sample  Continues to work with RPM  She continues to work with Florence Montemayor RD for dietary support    Plan  Continue RPM monitoring  Encourage blood sugar tracking and reporting  Ensure UTI resolved  Reinforce importance of early symptom recognition and reporting to prevent exacerbation and unnecessary hospitalization  Congestive Heart Failure Assessment    Are you currently restricting fluids?: No Restriction  Do you understand a low sodium diet?: Yes  Do you understand how to read food labels?: Yes  How many restaurant meals do you eat per week?: 1-2  Do you salt your food before tasting it?: No         Symptoms:  CHF associated angina: Neg, CHF associated dyspnea on exertion: Neg, CHF associated fatigue: Neg, CHF associated leg swelling: Neg, CHF associated orthostatic hypotension: Neg, CHF associated PND: Neg, CHF associated shortness of breath: Neg, CHF associated weakness: Neg      Symptom course: stable  Patient-reported weight (lb): 124  Weight trend: stable           Offered patient enrollment in the Remote Patient Monitoring (RPM) program for in-home monitoring: Yes, patient already enrolled.     Lab Results       None            Care Coordination Interventions    Referral from Primary Care Provider: No  Suggested Interventions and Community Resources          Goals Addressed                      This Visit's Progress      Conditions and Symptoms (pt-stated)   On track      I will schedule office visits, as directed by my provider. I will keep my appointment or reschedule if I have to cancel. I will notify my provider of any barriers to my plan of care. I will follow my Zone Management tool to seek urgent or emergent care. I will notify my provider of any symptoms that indicate a worsening of my condition.     Barriers: need for additional support and education  Plan for overcoming my barriers: family and ACM support  Confidence: 9/10  Anticipated Goal Completion Date: 3/1/23                Future Appointments   Date Time Provider Rosy Fisher   4/17/2023 10:00 AM RACQUEL Deng CNP [facility]NT [facility] [place]   5/1/2023 10:00 AM RACQUEL Phelps CNP [facility] [place] [address]   7/24/2023 11:00 AM STR MAMMOGRAPHY RM2  [facility] WOMEN STR Radiolog   7/25/2023  1:15 PM Buckley Sacks, MD N SRPX Heart [facility] [place] [address]   9/7/2023  1:00 PM Chantel Klein, [address] [place] [address]

## 2023-02-15 ENCOUNTER — CARE COORDINATION (OUTPATIENT)
Dept: CARE COORDINATION | Age: 88
End: 2023-02-15

## 2023-02-15 NOTE — CARE COORDINATION
CCSS reviewed patients reported daily Remote Patient Monitoring metrics. All reported metrics are within alert parameters. Plan/Follow Up:  Will continue to review, monitor and address alerts with follow up based on severity of symptoms and risk factors      Current Patient Metrics ---- Blood Pressure: 124/65, 75bpm Pulseox: 98%, 81bpm Survey: C Weight: 124.6lbs Note Created at: 02/15/2023 01:25 PM CT ---- Time-Spent: 2 minutes 0 seconds    Ramin Ramirez   Cell: [phone]

## 2023-02-16 ENCOUNTER — TELEPHONE (OUTPATIENT)
Dept: FAMILY MEDICINE CLINIC | Age: 88
End: 2023-02-16

## 2023-02-16 ENCOUNTER — CARE COORDINATION (OUTPATIENT)
Dept: CASE MANAGEMENT | Age: 88
End: 2023-02-16

## 2023-02-16 ENCOUNTER — NURSE ONLY (OUTPATIENT)
Dept: FAMILY MEDICINE CLINIC | Age: 88
End: 2023-02-16

## 2023-02-16 DIAGNOSIS — N30.90 CYSTITIS: ICD-10-CM

## 2023-02-16 DIAGNOSIS — R35.0 URINARY FREQUENCY: Primary | ICD-10-CM

## 2023-02-16 LAB
BILIRUBIN URINE: NEGATIVE
BLOOD URINE, POC: ABNORMAL
CHARACTER, URINE: CLEAR
COLOR, URINE: YELLOW
GLUCOSE URINE: NEGATIVE MG/DL
KETONES, URINE: NEGATIVE
LEUKOCYTE CLUMPS, URINE: ABNORMAL
NITRITE, URINE: NEGATIVE
PH, URINE: 5.5 (ref 5–9)
PROTEIN, URINE: NEGATIVE MG/DL
SPECIFIC GRAVITY, URINE: 1.01 (ref 1–1.03)
UROBILINOGEN, URINE: 0.2 EU/DL (ref 0–1)

## 2023-02-16 RX ORDER — CEFDINIR 300 MG/1
300 CAPSULE ORAL DAILY
Qty: 7 CAPSULE | Refills: 0 | Status: SHIPPED | OUTPATIENT
Start: 2023-02-16 | End: 2023-02-19

## 2023-02-16 NOTE — CARE COORDINATION
Remote Patient Monitoring Note      Date/Time:  2/16/2023 11:38 AM    LPN reviewed patients reported daily Remote Patient Monitoring metrics. All reported metrics are within alert parameters. Plan/Follow Up: Will continue to review, monitor and address alerts with follow up based on severity of symptoms and risk factors.     Current Patient Metrics ---- Blood Pressure: 125/79, 79bpm Pulseox: 98%, 81bpm Survey: C Weight: 124.6lbs Note Created at: 02/16/2023 11:39 AM ET ---- Time-Spent: 2 minutes 0 seconds

## 2023-02-17 ENCOUNTER — CARE COORDINATION (OUTPATIENT)
Dept: CARE COORDINATION | Age: 88
End: 2023-02-17

## 2023-02-17 NOTE — CARE COORDINATION
Remote Patient Monitoring Note      Date/Time:  2/17/2023 8:57 AM    CCSS reviewed patients reported daily Remote Patient Monitoring metrics. All reported metrics are within alert parameters. Plan/Follow Up:  Will continue to review, monitor and address alerts with follow up based on severity of symptoms and risk factors  Current Patient Metrics ---- Blood Pressure: 106/59, 78bpm Pulseox: 98%, 79bpm Survey: C Weight: 124.6lbs Note Created at: 02/17/2023 08:58 AM ET ---- Time-Spent: 2 minutes 0 seconds

## 2023-02-19 ENCOUNTER — TELEPHONE (OUTPATIENT)
Dept: FAMILY MEDICINE CLINIC | Age: 88
End: 2023-02-19

## 2023-02-19 LAB
BACTERIA UR CULT: ABNORMAL
ORGANISM: ABNORMAL
ORGANISM: ABNORMAL

## 2023-02-19 RX ORDER — CIPROFLOXACIN 250 MG/1
250 TABLET, FILM COATED ORAL 2 TIMES DAILY
Qty: 20 TABLET | Refills: 0 | Status: SHIPPED | OUTPATIENT
Start: 2023-02-19 | End: 2023-03-01

## 2023-02-20 ENCOUNTER — CARE COORDINATION (OUTPATIENT)
Dept: CARE COORDINATION | Age: 88
End: 2023-02-20

## 2023-02-20 NOTE — TELEPHONE ENCOUNTER
Pts urine with E coli and Pseudomonas. Cipro should cover both bacteria. Stop cefdinir and start Cipro. Please contact pt to let her know.   Thank you

## 2023-02-20 NOTE — CARE COORDINATION
Remote Patient Monitoring Note      Date/Time:  2/20/2023 9:13 AM    CCSS reviewed patients reported daily Remote Patient Monitoring metrics. All reported metrics are within alert parameters. Plan/Follow Up:  Will continue to review, monitor and address alerts with follow up based on severity of symptoms and risk factors   Current Patient Metrics ---- Blood Pressure: 108/56, 77bpm Pulseox: 97%, 82bpm Survey: C Weight: 125.0lbs Note Created at: 02/20/2023 09:13 AM ET ---- Time-Spent: 2 minutes 0 seconds

## 2023-02-21 ENCOUNTER — CARE COORDINATION (OUTPATIENT)
Dept: CARE COORDINATION | Age: 88
End: 2023-02-21

## 2023-02-21 NOTE — CARE COORDINATION
Remote Patient Monitoring Note      Date/Time:  2/21/2023 10:28 AM    CCSS reviewed patients reported daily Remote Patient Monitoring metrics. All reported metrics are within alert parameters. Plan/Follow Up:  Will continue to review, monitor and address alerts with follow up based on severity of symptoms and risk factors   Current Patient Metrics ---- Blood Pressure: 131/62, 71bpm Pulseox: 98%, 88bpm Survey: C Weight: 125.2lbs Note Created at: 02/21/2023 10:28 AM ET ---- Time-Spent: 2 minutes 0 seconds

## 2023-02-21 NOTE — CARE COORDINATION
Bernardino Welch  2/21/2023    Registered Dietitian Progress Note for Care Coordination    Assessment: Randa Murguia is a 80 y.o. female. RD referred for CHF- Low Sodium Diet Education. RD spoke with patient for initial nutrition assessment on 1/18/23 and has been following up with patient. RD called to follow up with pt today 2/21/23. RD discussed previous goals with pt. Patient is doing well and today is her birthday- pt states she is going to lunch with her friend. Reviewed the importance of following a low sodium diet and eating balanced meals/snacks consistently throughout the day. Patient states she is watching her sodium intake very closely. Patient states she is trying her best to eat healthy. Patient states for breakfast she has been eating oatmeal with flax seed and a few raisins. Patient states for an afternoon snack she has been making a smoothie with fruit and greek yogurt. RD reviewed the components of a balanced meal and snack. Discussed the importance of incorporating a protein source to meals and snacks. Pt verbalizes understanding. Patient is enrolled in RPM. Patient is monitoring her daily weight, per pt this AM  lb. No sudden weight gain reported per pt. Reviewed the importance of checking BS daily. Patient provided FBS readings, see below:    2/12 FBS 85 mg/dL   2/13  mg/dL   2/14  mg/dL   2/15  mg/dL   2/16  mg/dL   2/17  mg/dL   2/18  mg/dL   2/19  mg/dL   2/20  mg/dL   2/21  mg/dL     RD acknowledged patient's awesome work. Patient has no nutrition related questions or concerns at this time. Barriers to meeting goals: overwhelmed by complexity of regimen        Nutrition Monitoring and Evaluation  Indicator/Goal Criteria Progress   #1 Eat balanced meals consistently throughout the day.   #1 Focus on eating 3 meals/day and make these meals balanced using the MyPlate [id number] plate fruits and/or vegetables, 1/4 plate protein and 1/4 plate starchy carbohydrates with 8 oz glass of low fat milk if desired. #1 Patient is eating 3 meals/day and making meals more balanced using MyPlate. #2  Monitor daily sodium intake. Keep sodium from food and beverages to no more than 2000 mg/day. #2 Avoid the salt shaker. Read food labels to help choose lower sodium options (<140 mg of sodium per serving). #2 Pt is following a low sodium diet. #3  Monitor daily weights. #3 Weigh self daily and keep a log. Notify MD of sudden weight gain. #3 Pt is weighing self daily. Pt states this AM  lb. Plan of Care:  RD encouraged pt to keep working toward goals set. RD will follow up with pt to discuss any questions pt has and check the progress toward goals. Follow Up:    RD will call pt in 3-4 weeks to follow up and answer any nutrition related questions at that time.      [facility], [address] [place] [address]

## 2023-02-22 ENCOUNTER — CARE COORDINATION (OUTPATIENT)
Dept: CARE COORDINATION | Age: 88
End: 2023-02-22

## 2023-02-22 NOTE — CARE COORDINATION
Remote Patient Monitoring Note      Date/Time:  2/22/2023 8:53 AM    CCSS reviewed patients reported daily Remote Patient Monitoring metrics. All reported metrics are within alert parameters. Plan/Follow Up:  Will continue to review, monitor and address alerts with follow up based on severity of symptoms and risk factors   Current Patient Metrics ---- Blood Pressure: 132/67, 72bpm Pulseox: 97%, 87bpm Survey: C Weight: 124.6lbs Note Created at: 02/22/2023 08:53 AM ET ---- Time-Spent: 2 minutes 0 seconds

## 2023-02-23 ENCOUNTER — CARE COORDINATION (OUTPATIENT)
Dept: CARE COORDINATION | Age: 88
End: 2023-02-23

## 2023-02-23 NOTE — CARE COORDINATION
Ambulatory Care Coordination Note  2023    Patient Current Location:  Home: [address] [place] [id number]     ACM contacted the patient by telephone. Verified name and  with patient as identifiers. Provided introduction to self, and explanation of the ACM role. Challenges to be reviewed by the provider   Additional needs identified to be addressed with provider: No  none               Method of communication with provider: none. ACM: Pelon Jack RN    Spoke with Charlee Annika for continued Care Coordination  She is doing well for this review  Has a UTI that is resolving  She continues on antibiotic  Continues to take vitals, including blood sugars which was 109  She continues to work RD  Discussed that we will need to start looking at turning back in RPM as she has been using it for around 90 days and has been having green alerts. Plan  Start process of turning back in RPM  Ensure UTI sx's are resolved   Work towards graduation if no additional barriers or additional support needed    Offered patient enrollment in the Remote Patient Monitoring (RPM) program for in-home monitoring: Yes, patient already enrolled.   Congestive Heart Failure Assessment    Are you currently restricting fluids?: No Restriction  Do you understand a low sodium diet?: Yes  Do you understand how to read food labels?: Yes  How many restaurant meals do you eat per week?: 1-2  Do you salt your food before tasting it?: No         Symptoms:  CHF associated angina: Neg, CHF associated dyspnea on exertion: Neg, CHF associated fatigue: Neg, CHF associated leg swelling: Neg, CHF associated orthostatic hypotension: Neg, CHF associated PND: Neg, CHF associated shortness of breath: Neg, CHF associated weakness: Neg      Symptom course: stable  Patient-reported weight (lb):  (Comment: daily patient monitoring on RPM)  Weight trend: stable         Lab Results       None            Care Coordination Interventions    Referral from Primary Care Provider: No  Suggested Interventions and Community Resources          Goals Addressed                      This Visit's Progress      Conditions and Symptoms (pt-stated)   On track      I will schedule office visits, as directed by my provider. I will keep my appointment or reschedule if I have to cancel. I will notify my provider of any barriers to my plan of care. I will follow my Zone Management tool to seek urgent or emergent care. I will notify my provider of any symptoms that indicate a worsening of my condition.     Barriers: need for additional support and education  Plan for overcoming my barriers: family and ACM support  Confidence: 9/10  Anticipated Goal Completion Date: 3/1/23                Future Appointments   Date Time Provider Rosy Fisher   4/17/2023 10:00 AM RACQUEL Deng CNP ENT [facility] [place]   5/1/2023 10:00 AM RACQUEL Magana CNP [facility]   7/24/2023 11:00 AM STR MAMMOGRAPHY RM2  [facility] WOMEN STR Radiolog   7/25/2023  1:15 PM Linda Giang MD N SRPX Heart [facility] [place]   9/7/2023  1:00 PM Jil Mera, [address] [place] [address]

## 2023-02-23 NOTE — CARE COORDINATION
Remote Patient Monitoring Note      Date/Time:  2/23/2023 9:22 AM    CCSS reviewed patients reported daily Remote Patient Monitoring metrics. All reported metrics are within alert parameters. Plan/Follow Up:  Will continue to review, monitor and address alerts with follow up based on severity of symptoms and risk factors  Current Patient Metrics ---- Blood Pressure: 119/71, 71bpm Pulseox: 93%, 79bpm Survey: - Weight: 125.8lbs Note Created at: 02/23/2023 09:23 AM ET ---- Time-Spent: 2 minutes 0 seconds

## 2023-02-24 ENCOUNTER — CARE COORDINATION (OUTPATIENT)
Dept: CARE COORDINATION | Age: 88
End: 2023-02-24

## 2023-02-24 NOTE — CARE COORDINATION
Remote Patient Monitoring Note      Date/Time:  2/24/2023 12:08 PM    CCSS reviewed patients reported daily Remote Patient Monitoring metrics. All reported metrics are within alert parameters. Plan/Follow Up:  Will continue to review, monitor and address alerts with follow up based on severity of symptoms and risk factors  Current Patient Metrics ---- Blood Pressure: 109/63, 70bpm Pulseox: 98%, 81bpm Survey: C Weight: 125.4lbs Note Created at: 02/24/2023 12:08 PM ET ---- Time-Spent: 2 minutes 0 seconds

## 2023-02-27 ENCOUNTER — CARE COORDINATION (OUTPATIENT)
Dept: CASE MANAGEMENT | Age: 88
End: 2023-02-27

## 2023-02-27 ENCOUNTER — CARE COORDINATION (OUTPATIENT)
Dept: CARE COORDINATION | Age: 88
End: 2023-02-27

## 2023-02-27 NOTE — CARE COORDINATION
Remote Alert Monitoring Note      Date/Time:  2023 9:03 AM  Patient Current Location: [address] [place] [address] Dr    LPN contacted patient by telephone regarding red alert received for weight increase (4# over night). Verified patients name and  as identifiers. Background: HTN, CHF  Refer to 911 immediately if:  Patient unresponsive or unable to provide history  Change in cognition or sudden confusion  Patient unable to respond in complete sentences  Intense chest pain/tightness  Any concern for any clinical emergency  Red Alert: Provider response time of 1 hr required for any red alert requiring intervention  Yellow Alert: Provider response time of 3hr required for any escalated yellow alert    Weight Scale Triage  Was your weight obtained upon rising/waking today? YES   Was your weight obtained after voiding and/or use of the bathroom today? yes   Did you weigh yourself in the same amount of clothing today, compared to how you typically do? yes   Was the scale bumped or moved prior to today's weight? no   Is your scale on a flat/hard surface? yes   Did you obtain your weight with shoes on? no   If yes, is this something you normally do during your daily weights? no   Were you standing up straight on the scale today? yes   Were you leaning on anything while obtaining your weight today? no       Clinical Interventions: Reviewed and followed up on alerts and treatments-Patient had 4# weight gain over night. Denies CP, SOB, Increased NA intake. Has slight ankle edema as always. Just took her medications including Lasix 40 mg and Spironolactone 50 mg. Patient will call cardiologist office for weight gain. Sent E-mail to WALTER Early for [employer] Alert  Will escalate to Cardiologist Dr. Ashley Ha.  10:27 no response from Cardiologist at this time will Escalate to Reymundo Morel CNP. Education of patient/family/caregiver/guardian to support self-management-Call Cardiologist concerning Weight gain. Plan/Follow Up:  Will continue to review, monitor and address alerts with follow up based on severity of symptoms and risk factors.     Current Patient Metrics ---- Blood Pressure: 149/74, 75bpm Pulseox: 97%, 79bpm Survey: C Weight: 130.6lbs Note Created at: 02/27/2023 10:28 AM ET ---- Time-Spent: 1 hours 12 minutes 0 seconds

## 2023-02-27 NOTE — CARE COORDINATION
ACM received a call from [place]. States she is nervous because she has a red alert on RPM with weight gain of 4#. Denies changes from baseline. No shortness of breath, no edema. RPM has escalated to cardiology. Denies eating high sodium foods. States she will wait to hear back from cardiology office with any changes/interventions placed.

## 2023-02-28 ENCOUNTER — TELEPHONE (OUTPATIENT)
Dept: CARDIOLOGY CLINIC | Age: 88
End: 2023-02-28

## 2023-02-28 ENCOUNTER — CARE COORDINATION (OUTPATIENT)
Dept: CARE COORDINATION | Age: 88
End: 2023-02-28

## 2023-02-28 NOTE — CARE COORDINATION
Remote Patient Monitoring Note      Date/Time:  2/28/2023 8:38 AM    CCSS reviewed patients reported daily Remote Patient Monitoring metrics. All reported metrics are within alert parameters. Plan/Follow Up:  Will continue to review, monitor and address alerts with follow up based on severity of symptoms and risk factors   Current Patient Metrics ---- Blood Pressure: 111/64, 67bpm Pulseox: 97%, 78bpm Survey: - Weight: 126.4lbs Note Created at: 02/28/2023 08:38 AM ET ---- Time-Spent: 2 minutes 0 seconds

## 2023-02-28 NOTE — TELEPHONE ENCOUNTER
Clarice Rae MD  [facility] Clinical Staff 34 minutes ago (4:20 PM)     AM  Refer to CHF clinic          Note         Patient notified. Ordered and given to CHF Clinic.

## 2023-03-01 ENCOUNTER — OFFICE VISIT (OUTPATIENT)
Dept: CARDIOLOGY CLINIC | Age: 88
End: 2023-03-01
Payer: MEDICARE

## 2023-03-01 ENCOUNTER — CARE COORDINATION (OUTPATIENT)
Dept: CARE COORDINATION | Age: 88
End: 2023-03-01

## 2023-03-01 VITALS
WEIGHT: 128 LBS | DIASTOLIC BLOOD PRESSURE: 73 MMHG | SYSTOLIC BLOOD PRESSURE: 174 MMHG | HEART RATE: 80 BPM | HEIGHT: 60 IN | BODY MASS INDEX: 25.13 KG/M2

## 2023-03-01 DIAGNOSIS — I50.22 CHRONIC HFREF (HEART FAILURE WITH REDUCED EJECTION FRACTION) (HCC): Primary | ICD-10-CM

## 2023-03-01 PROCEDURE — G8484 FLU IMMUNIZE NO ADMIN: HCPCS | Performed by: INTERNAL MEDICINE

## 2023-03-01 PROCEDURE — 99214 OFFICE O/P EST MOD 30 MIN: CPT | Performed by: INTERNAL MEDICINE

## 2023-03-01 PROCEDURE — 1123F ACP DISCUSS/DSCN MKR DOCD: CPT | Performed by: INTERNAL MEDICINE

## 2023-03-01 PROCEDURE — G8417 CALC BMI ABV UP PARAM F/U: HCPCS | Performed by: INTERNAL MEDICINE

## 2023-03-01 PROCEDURE — G8427 DOCREV CUR MEDS BY ELIG CLIN: HCPCS | Performed by: INTERNAL MEDICINE

## 2023-03-01 PROCEDURE — 1090F PRES/ABSN URINE INCON ASSESS: CPT | Performed by: INTERNAL MEDICINE

## 2023-03-01 PROCEDURE — 1036F TOBACCO NON-USER: CPT | Performed by: INTERNAL MEDICINE

## 2023-03-01 RX ORDER — LORAZEPAM 0.5 MG/1
0.5 TABLET ORAL EVERY 6 HOURS PRN
COMMUNITY

## 2023-03-01 RX ORDER — ASPIRIN 81 MG/1
81 TABLET ORAL DAILY
Qty: 90 TABLET | Refills: 1 | Status: SHIPPED | OUTPATIENT
Start: 2023-03-01

## 2023-03-01 RX ORDER — LORATADINE 10 MG/1
10 CAPSULE, LIQUID FILLED ORAL DAILY
COMMUNITY

## 2023-03-01 NOTE — PROGRESS NOTES
Pt here for fu weight gain       Pt c/o 4 lb weight gain in one day,notice more heartburn and belching, blurred vision with head ache,       Pt continues with dizziness at times, hip pain,

## 2023-03-01 NOTE — PROGRESS NOTES
[id number] [facility] [address]  [facility] [address]  Dept: [phone]  Dept Fax: [phone]  Loc: [phone]    Visit Date: 3/1/2023    Ms. Miguel Finch is a 80 y.o. female  who presented for:  Visit requested for weight gain, CHF, leg swelling  CAD, h/o NSTEMI  HPI:   MARCOS Funez is a pleasant 80year old female patient who  has a past medical history of Arthritis, Cancer ([address] Diverticulosis, Hyperlipidemia, Hypertension, Hypothyroidism, Psoriasis, S/P angioplasty with stent, Shingles, and Thyroid disorder. On 5/2021, she was admitted to [facility] with NSTEMI. Patient underwent cardiac cath on 5/6/2021 which showed severe LAD 80-90% and DX 90% disease, Ostial OM 90%, RCA non-dominant. She underwent PCI of the LAD x 3 JUSTUS, PCI of the OM x 1 JUSTUS. Procedure was complicated by loss of side branch (D1) and no re-flow in LAD. IABP was placed post PCI. Echo 7/2022 revealed an EF 45-50%, Mild MR, Mild AI. On most recent office visit in 1/2023, patient was noted to have leg swelling and weight gain. Her Lasix dose was increased to 40 mg po daily on prior visit. Only mild swelling per patient. She is on Aldactone 50 mg po daily. Labs on 1/19/2023 revealed K of 5.1, Cr 1. Office visit was scheduled after patient was reported to have weight gain. Reports some weight gain, readings reviewed, weight ranging mostly between  Ib. Patient denies shortness of breath, dyspnea on exertion, orthopnea. Patient reports occasional atypical chest pains, retrosternal, nonexertional, non-radiating, occurs usually with certain movement or when she carries heavier objects.        Current Outpatient Medications:     ciprofloxacin (CIPRO) 250 MG tablet, Take 1 tablet by mouth 2 times daily for 10 days, Disp: 20 tablet, Rfl: 0    furosemide (LASIX) 40 MG tablet, Take 1 tablet by mouth daily, Disp: 90 tablet, Rfl: 1    carvedilol (COREG) 3.125 MG tablet, Take 1 tablet by mouth 2 times daily (with meals), Disp: 180 tablet, Rfl: 3    famotidine (PEPCID) 20 MG tablet, TAKE 1 TABLET BY MOUTH TWICE DAILY, Disp: 180 tablet, Rfl: 3    clopidogrel (PLAVIX) 75 MG tablet, TAKE 1 TABLET BY MOUTH DAILY, Disp: 90 tablet, Rfl: 2    acetaminophen (TYLENOL) 650 MG extended release tablet, Take 650 mg by mouth every 8 hours as needed for Pain, Disp: , Rfl:     nitroGLYCERIN (NITROSTAT) 0.4 MG SL tablet, up to max of 3 total doses. If no relief after 1 dose, call 911., Disp: 25 tablet, Rfl: 1    Handicap Placard MISC, by Does not apply route Expires 6/20/27, Disp: 1 each, Rfl: 0    spironolactone (ALDACTONE) 50 MG tablet, TAKE 1 TABLET BY MOUTH DAILY, Disp: 90 tablet, Rfl: 3    Blood Pressure KIT, Check blood pressure q daily, Disp: 1 kit, Rfl: 0    montelukast (SINGULAIR) 10 MG tablet, Take 1 tablet by mouth daily, Disp: 90 tablet, Rfl: 3    atorvastatin (LIPITOR) 80 MG tablet, Take 1 tablet by mouth nightly, Disp: 90 tablet, Rfl: 3    Psyllium (METAMUCIL FREE & NATURAL PO), Take by mouth as needed, Disp: , Rfl:     thyroid (ARMOUR THYROID) 30 MG tablet, Take 1 tablet by mouth daily, Disp: 90 tablet, Rfl: 3    Probiotic Acidophilus (FLORANEX) TABS, Take 1 tablet by mouth daily, Disp: , Rfl:     fluticasone (FLONASE) 50 MCG/ACT nasal spray, INSTILL 2 SPRAYS INTO EACH NOSTRIL TWICE DAILY, Disp: 96 g, Rfl: 3    ASPIRIN LOW DOSE 81 MG EC tablet, as needed, Disp: , Rfl:     Ascorbic Acid (VITAMIN C) 1000 MG tablet, Take 1,000 mg by mouth daily, Disp: , Rfl:     blood glucose monitor strips, Check blood sugar q daily, Dx R73.01, Disp: 100 strip, Rfl: 3    b complex vitamins capsule, Take 1 capsule by mouth daily, Disp: , Rfl:     polyethyl glycol-propyl glycol 0.4-0.3 % (SYSTANE) 0.4-0.3 % ophthalmic solution, 1 drop in the morning and at bedtime Both eyes, Disp: , Rfl:     docusate sodium (COLACE) 100 MG capsule, Take 100 mg by mouth in the morning and 100 mg before bedtime. , Disp: , Rfl:     OLIVE LEAF PO, Take 450 mg by mouth daily, Disp: , Rfl:     Blood Glucose Monitoring Suppl (TRUE METRIX METER) W/DEVICE KIT, 1 Device by Does not apply route daily Check blood sugar q daily, Dx E11.9, Disp: 1 kit, Rfl: 0    Multiple Vitamins-Minerals (THERAPEUTIC MULTIVITAMIN-MINERALS) tablet, Take 1 tablet by mouth daily. , Disp: , Rfl:     fish oil-omega-3 fatty acids 1000 MG capsule, Take 1 g by mouth daily , Disp: , Rfl:     CALCIUM CITRATE, Take 600 mg by mouth in the morning and at bedtime , Disp: , Rfl:     Past Medical History  Elsie Vela  has a past medical history of Arthritis, Cancer ([facility] [address] Diverticulosis, Hyperlipidemia, Hypertension, Hypothyroidism, Psoriasis, S/P angioplasty with stent, Shingles, and Thyroid disorder. Social History  Elsie Vela  reports that she has never smoked. She has never used smokeless tobacco. She reports that she does not drink alcohol and does not use drugs. Family History  Elsie Vela family history includes Breast Cancer (age of onset: 68) in her sister; Cancer in her child; Heart Disease in her sister; Other in an other family member; Stroke in her sister.     Past Surgical History   Past Surgical History:   Procedure Laterality Date    BREAST BIOPSY Left 02/15/2015    benign stereotactic biopsy    BREAST SURGERY Left 06/1966    benign excisional biopsy    CARPAL TUNNEL RELEASE Right 01/30/2013    CARPAL TUNNEL RELEASE Left 07/25/2013    CATARACT REMOVAL Bilateral 1999    COLON SURGERY  11/1999    resection    COLONOSCOPY  2003, 2006, 2011    DIAGNOSTIC CARDIAC CATH LAB PROCEDURE  05/07/2021    6 stents     HYSTERECTOMY (CERVIX STATUS UNKNOWN)  02/23/1970    KNEE ARTHROSCOPY Right 11/21/2007    meniscectomies    KNEE SURGERY Left 2000    replacement    MYRINGOTOMY Right 07/01/2015    tube insertion    OVARY REMOVAL Bilateral 07/12/2001    oophorectomy    SHOULDER SURGERY  05/2014    SINUS SURGERY         Review of Systems   Constitutional: Negative for chills and fever  HENT: Negative for congestion, sinus pressure, sneezing and sore throat. Eyes: Negative for pain, discharge, redness and itching. Respiratory: Negative for apnea, cough  Gastrointestinal: Negative for blood in stool, constipation, diarrhea   Endocrine: Negative for cold intolerance, heat intolerance, polydipsia. Genitourinary: Negative for dysuria, enuresis, flank pain and hematuria. Musculoskeletal: Negative for arthralgias, joint swelling and neck pain. Neurological: Negative for numbness and headaches. Psychiatric/Behavioral: Negative for agitation, confusion, decreased concentration and dysphoric mood. Objective: There were no vitals taken for this visit. Wt Readings from Last 3 Encounters:   02/08/23 129 lb (58.5 kg)   01/30/23 128 lb (58.1 kg)   01/17/23 128 lb 9.6 oz (58.3 kg)     BP Readings from Last 3 Encounters:   02/08/23 132/60   01/30/23 (!) 140/68   01/17/23 (!) 150/92       Nursing note and vitals reviewed. Physical Exam   Constitutional: Oriented to person, place, and time. Appears well-developed and well-nourished. ENT: Moist mucous membranes. No bleeding. Tongue is midline. Head: Normocephalic and atraumatic. Eyes: EOM are normal. Pupils are equal, round, and reactive to light. Neck: Normal range of motion. Neck supple. No JVD present. Cardiovascular: Normal rate, regular rhythm, murmur, no rubs, and intact distal pulses. Pulmonary/Chest: Effort normal and breath sounds normal. No respiratory distress. No wheezes. No rales. Abdominal: Soft. Bowel sounds are normal. No distension. There is no tenderness. Musculoskeletal: Normal range of motion. trace edema. Neurological: Alert and oriented to person, place, and time. No cranial nerve deficit. Coordination normal.   Skin: Skin is warm and dry. Psychiatric: Normal mood and affect.        No results found for: CKTOTAL, CKMB, CKMBINDEX    Lab Results   Component Value Date/Time    WBC 8.6 07/12/2022 08:46 PM    RBC 4.63 07/12/2022 08:46 PM    HGB 15.1 07/12/2022 08:46 PM    HCT 46.0 07/12/2022 08:46 PM    MCV 99.4 07/12/2022 08:46 PM    MCH 32.6 07/12/2022 08:46 PM    MCHC 32.8 07/12/2022 08:46 PM    RDW 13.8 11/02/2019 06:30 AM     07/12/2022 08:46 PM    MPV 9.7 07/12/2022 08:46 PM       Lab Results   Component Value Date/Time     01/19/2023 08:15 AM    K 5.1 01/19/2023 08:15 AM    K 4.7 07/12/2022 08:46 PM     01/19/2023 08:15 AM    CO2 29 01/19/2023 08:15 AM    BUN 20 01/19/2023 08:15 AM    LABALBU 4.5 07/12/2022 08:46 PM    LABALBU 4.1 03/15/2012 07:25 AM    CREATININE 1.0 01/19/2023 08:15 AM    CALCIUM 9.2 01/19/2023 08:15 AM    LABGLOM 51 01/19/2023 08:15 AM    GLUCOSE 112 01/19/2023 08:15 AM    GLUCOSE 97 11/02/2019 06:30 AM       Lab Results   Component Value Date/Time    ALKPHOS 156 07/12/2022 08:46 PM    ALT 51 07/12/2022 08:46 PM    AST 36 07/12/2022 08:46 PM    PROT 7.4 07/12/2022 08:46 PM    BILITOT 0.3 07/12/2022 08:46 PM    BILIDIR <0.2 07/12/2022 08:46 PM    LABALBU 4.5 07/12/2022 08:46 PM    LABALBU 4.1 03/15/2012 07:25 AM       Lab Results   Component Value Date/Time    MG 2.3 01/19/2023 08:15 AM       Lab Results   Component Value Date    INR 1.00 05/06/2021         Lab Results   Component Value Date/Time    LABA1C 5.6 05/09/2021 03:31 AM       Lab Results   Component Value Date/Time    TRIG 75 02/22/2022 08:23 AM    HDL 45 02/22/2022 08:23 AM    LDLCALC 52 02/22/2022 08:23 AM       Lab Results   Component Value Date/Time    TSH 2.380 02/22/2022 08:23 AM         Testing Reviewed:      I have individually reviewed the cardiac test below:    ECHO:   Results for orders placed or performed during the hospital encounter of 07/19/22   Echo 2D w doppler w color complete   Result Value Ref Range    Left Ventricular Ejection Fraction 48     LVEF MODALITY ECHO     Narrative    Transthoracic Echocardiography Report (TTE)     Demographics      Patient Name   Barb Hernandez  Gender               Female Saturnino Hurtado      MR #           [id number]     Race                                                    Ethnicity      Account #      [de-identified]     Room Number      Accession      [id number]    Date of Study        07/19/2022   Number      Date of Birth  02/21/1925    Referring Physician  Julio Bowers MD      Age            80 year(s)    Cesia Strong MD                                Physician     Procedure    Type of Study      TTE procedure:ECHOCARDIOGRAM COMPLETE 2D W DOPPLER W COLOR. Procedure Date  Date: 07/19/2022 Start: 10:09 AM    Study Location: Echo Lab  Technical Quality: Limited visualization due to poor acoustical window. Indications:Coronary artery disease. Additional Medical History:Hypertension, hyperlipidemia, thyroid disease,  arthritis, skin cancer, NSTEMI, coronary artery disease    Patient Status: Routine    Height: 60 inches Weight: 129 pounds BSA: 1.55 m^2 BMI: 25.19 kg/m^2    BP: 118/50 mmHg     Conclusions      Summary   Left ventricle size is normal.   Normal left ventricular wall thickness. There was mild global hypokinesis of the left ventricle. Ejection fraction is visually estimated in the range of 45% to 50%. Doppler parameters were consistent with abnormal left ventricular   relaxation (grade 1 diastolic dysfunction). Mild mitral regurgitation   Mild aortic regurgitation   Mild tricuspid regurgitation. Signature      ----------------------------------------------------------------   Electronically signed by Colin Kraus MD (Interpreting   physician) on 07/19/2022 at 09:50 PM   ----------------------------------------------------------------      Findings      Mitral Valve   Mild annular calcification. Mild mitral regurgitation is present.       Aortic Valve   Aortic valve appears tricuspid. Aortic valve leaflets are Mildly calcified. No evidence of aortic stenosis. Mild aortic regurgitation is noted. Tricuspid Valve   The tricuspid valve structure was normal with normal leaflet separation. DOPPLER: There was no evidence of tricuspid stenosis. There was evidence   of Mild tricuspid regurgitation. Pulmonic Valve   The pulmonic valve leaflets exhibited normal thickness, no calcification,   and normal cuspal separation. DOPPLER: The transpulmonic velocity was   within the normal range with no evidence for regurgitation. Left Atrium   Left atrial size was normal.      Left Ventricle   Left ventricle size is normal.   Normal left ventricular wall thickness. There was mild global hypokinesis of the left ventricle. Ejection fraction is visually estimated in the range of 45% to 50%. Doppler parameters were consistent with abnormal left ventricular   relaxation (grade 1 diastolic dysfunction). Right Atrium   Right atrial size was normal.      Right Ventricle   The right ventricular size was normal with normal systolic function and   wall thickness. Pericardial Effusion   The pericardium was normal in appearance with no evidence of a pericardial   effusion. Pleural Effusion   No evidence of pleural effusion. Aorta / Great Vessels   -Aortic root dimension within normal limits.   -The Pulmonary artery is within normal limits. -IVC size is within normal limits with normal respiratory phasic changes.      M-Mode/2D Measurements & Calculations      LV Diastolic    LV Systolic Dimension: 3  AV Cusp Separation: 1.5 cmLA   Dimension: 3.9  cm                        Dimension: 3 cmAO Root   cm              LV Volume Diastolic: 73.6 Dimension: 2.9 cmLA Area: 13   LV FS:23.1 %    ml                        cm^2   LV PW           LV Volume Systolic: 55.9   Diastolic: 0.8  ml   cm              LV EDV/LV EDV Index: 74.5   Septum          ml/48 m^2LV ESV/LV ESV    RV Diastolic Dimension: 2.5 cm   Diastolic: 0.8  Index: 32.6 ml/25 m^2   cm              EF Calculated: 47.3 %     LA/Aorta: 1.03                                             Ascending Aorta: 2.7 cm                   LV Length: 7.7 cm         LA volume/Index: 27.1 ml /17m^2      LV Area   Diastolic: 26   cm^2   LV Area   Systolic: 45.7   cm^2     Doppler Measurements & Calculations      MV Peak E-Wave: 65.6 AV Peak Velocity: 142 LVOT Peak Velocity: 77 cm/s   cm/s                 cm/s                  LVOT Mean Velocity: 59.7 cm/s   MV Peak A-Wave: 91.3 AV Peak Gradient:     LVOT Peak Gradient: 2 mmHgLVOT   cm/s                 8.07 mmHg             Mean Gradient: 2 mmHg   MV E/A Ratio: 0.72   AV Mean Velocity: 100   MV Peak Gradient:    cm/s                  TV Peak E-Wave: 30.4 cm/s   1.72 mmHg            AV Mean Gradient: 4   TV Peak A-Wave: 42 cm/s                        mmHg   MV Deceleration      AV VTI: 26.8 cm       TV Peak Gradient: 0.37 mmHg   Time: 294 msec                             TR Velocity:194 cm/s   MV P1/2t: 86 msec                          TR Gradient:15.05 mmHg   MVA by PHT:2.56 cm^2 LVOT VTI: 16.5 cm     PV Peak Velocity: 73.7 cm/s                        AV P1/2t: 754 msec    PV Peak Gradient: 2.17 mmHg   MV E' Septal         IVRT: 85 msec   Velocity: 3.3 cm/s   MV A' Septal   Velocity: 5.4 cm/s   AV DVI (VTI): 0.62AV   MV E' Lateral        DVI (Vmax):0.54   Velocity: 4.6 cm/s   MV A' Lateral   Velocity: 8.7 cm/s   E/E' septal: 19.88   E/E' lateral: 14.26   MR Velocity: 508   cm/s     [id number]   ]    Cath:5/2021  CORONARY ANGIOGRAM:     LEFT MAIN:  Patent without any significant obstruction. LAD:  Ostium has 30% stenosis in the mid section of the LAD.   There is  severe diffuse disease proximally totalling 70% to 80% in the mid,  distal 80% to 90% severe stenosis seen.  Distal LAD has mild luminal  irregularities, no significant obstruction. There is a large diagonal  Branch, this branch was subtotally occluded. In the mid section of  the ostium, it has about 80% to 90% stenosis. LCX:  Proximally, it has no significant obstruction. Gives rise to a  large OM1 that has a 99% stenosis. AV groove branch continues without  any significant obstruction. There is OM2 without any significant  obstruction. There is left PDA without any significant obstruction. RAMUS INTERMEDIUS:  Subtotally occluded ramus intermedius that is about  less than 1.5 mm in diameter at the proximal end. RCA:  There is a proximal 50% stenosis. It is a nondominant vessel with  a small marginal branch. SUMMARY:  Successful PCI of the LAD with three overlapping drug-eluting  stents; successful PCI of the OM1 with one drug-eluting stent; diagonal  PCI with two drug-eluting stents with no reflow; IABP insertion. AssessmentPlan:     Chest pain  Mild ischemic CMP, HFmrEF  NSTEMI Hx  PCI LAD, OM 5/2021  HTN  Dyslipidemia  Hypothyroidism     Has h/o CHF, mild cardiomyopathy. Echo 7/2022 revealed an EF 45-50%, Mild MR, Mild AI  On most recent office visit in 1/2023, patient was noted to have leg swelling and weight gain. Her Lasix dose was increased to 40 mg po daily. She is on Aldactone 50 mg po daily  Reports some weight gain, readings reviewed, weight ranging mostly between  Ib. Patient denies shortness of breath, dyspnea on exertion, orthopnea.    Labs on 1/19/2023 revealed K of 5.1, Cr 1  No significant edema   Advised to take additional dose of lasix 40 mg po for the next two days  Daily weight  Limit Na intake  Call office if swelling worsens, gains weight or develops new symptoms (ex SOB)  Refer to CHF clinic   BMP in one week   Prior h/o CAD, NSTEMI, complex PCI of LAD and OM  Const plavix, Lipitor   Not taking her ASA, will resume (DENIES ALLERGY, WAS ABLE TO TOLERATE IN THE PAST)  Patient reports occasional atypical chest pains, retrosternal, nonexertional, non-radiating, occurs usually with certain movement or when she carries heavier objects  Based on patient's risk factors and clinical presentation, stress test is indicated to investigate for possible underlying ischemic heart disease. Patient  agrees with that work up and its risks and benefits and potential need for additional testing if stress test is abnormal such as cardiac catheterization  Patient was advised to report to the ER if has recurrent symptoms with specific instructions given about severity and duration of symptoms      Above findings and plan of care were discussed with patient in details, patient's questions were answered.      Disposition:  RTC in 6 months             Electronically signed by Danii Carrizales MD, [facility]    3/1/2023 at 9:39 AM EST

## 2023-03-01 NOTE — CARE COORDINATION
Remote Patient Monitoring Note      Date/Time:  3/1/2023 8:19 AM    CCSS reviewed patients reported daily Remote Patient Monitoring metrics. All reported metrics are within alert parameters. Plan/Follow Up:  Will continue to review, monitor and address alerts with follow up based on severity of symptoms and risk factors  Current Patient Metrics ---- Blood Pressure: 132/69, 70bpm Pulseox: 98%, 75bpm Survey: C Weight: 126.6lbs Note Created at: 03/01/2023 08:19 AM ET ---- Time-Spent: 2 minutes 0 seconds

## 2023-03-02 ENCOUNTER — CARE COORDINATION (OUTPATIENT)
Dept: CASE MANAGEMENT | Age: 88
End: 2023-03-02

## 2023-03-02 NOTE — CARE COORDINATION
Remote Patient Monitoring Note      Date/Time:  3/2/2023 8:58 AM    LPN reviewed patients reported daily Remote Patient Monitoring metrics. All reported metrics are within alert parameters. Plan/Follow Up: Will continue to review, monitor and address alerts with follow up based on severity of symptoms and risk factors.     Current Patient Metrics ---- Blood Pressure: 116/68, 67bpm Pulseox: 98%, 85bpm Survey: C Weight: 126.2lbs Note Created at: 03/02/2023 08:59 AM ET ---- Time-Spent: 2 minutes 0 seconds

## 2023-03-03 ENCOUNTER — CARE COORDINATION (OUTPATIENT)
Dept: CARE COORDINATION | Age: 88
End: 2023-03-03

## 2023-03-03 NOTE — CARE COORDINATION
Remote Patient Monitoring Note      Date/Time:  3/3/2023 8:57 AM    CCSS reviewed patients reported daily Remote Patient Monitoring metrics. All reported metrics are within alert parameters. Plan/Follow Up:  Will continue to review, monitor and address alerts with follow up based on severity of symptoms and risk factors   Current Patient Metrics ---- Blood Pressure: 118/67, 69bpm Pulseox: 97%, 86bpm Survey: C Weight: 125.2lbs Note Created at: 03/03/2023 08:57 AM ET ---- Time-Spent: 2 minutes 0 seconds

## 2023-03-06 ENCOUNTER — TELEPHONE (OUTPATIENT)
Dept: FAMILY MEDICINE CLINIC | Age: 88
End: 2023-03-06

## 2023-03-06 ENCOUNTER — CARE COORDINATION (OUTPATIENT)
Dept: CARE COORDINATION | Age: 88
End: 2023-03-06

## 2023-03-06 ENCOUNTER — CARE COORDINATION (OUTPATIENT)
Dept: CASE MANAGEMENT | Age: 88
End: 2023-03-06

## 2023-03-06 DIAGNOSIS — R73.03 PREDIABETES: ICD-10-CM

## 2023-03-06 DIAGNOSIS — R11.0 NAUSEA: Primary | ICD-10-CM

## 2023-03-06 NOTE — CARE COORDINATION
Ambulatory Care Coordination Note  3/6/2023    Patient Current Location:  Home: [address] [place] [address] [address]  [address] [address]     ACM contacted the patient by telephone. Verified name and  with patient as identifiers. Provided introduction to self, and explanation of the ACM role. Challenges to be reviewed by the provider   Additional needs identified to be addressed with provider: No  none               Method of communication with provider: none. ACM: Jimy Matthews RN    Received a message from [place]  Discussed red alert from RPM  States she has been constipated and take Metamucil and will take MIralax today  Completed follow up with cardiology and scheduled for stress test  States she is going to take a urine sample into PCP office  States her glucometer dropped on the floor and she is not sure if it's working correctly. States reading today was 104.   Encouraged her to have nurse look at it at PCP office while taking in urine to see if it's broken  Butler states she is reading to discontinue RPM program.  She has been on program since Dec 1 and is reading to turn it back in    Plan  Send message to [facility] staff to send return label  Ensure no changes from baseline, she feels weight gain may be due to constipation  Encourage use of zone tools  Ensure completion of stress test    Congestive Heart Failure Assessment    Are you currently restricting fluids?: No Restriction  Do you understand a low sodium diet?: Yes  Do you understand how to read food labels?: Yes  How many restaurant meals do you eat per week?: 1-2  Do you salt your food before tasting it?: No         Symptoms:  CHF associated angina: Neg, CHF associated dyspnea on exertion: Neg, CHF associated fatigue: Neg, CHF associated leg swelling: Pos, CHF associated orthostatic hypotension: Neg, CHF associated PND: Neg, CHF associated shortness of breath: Neg, CHF associated weakness: Neg      Patient-reported weight (lb):  (Comment: being monitoring  by RPM daily)      and   General Assessment    Do you have any symptoms that are causing concern?: Yes  Progression since Onset: Unchanged  Reported Symptoms: Constipation           Offered patient enrollment in the Remote Patient Monitoring (RPM) program for in-home monitoring: Yes, patient already enrolled. Lab Results       None            Care Coordination Interventions    Referral from Primary Care Provider: No  Suggested Interventions and Community Resources          Goals Addressed                      This Visit's Progress      Conditions and Symptoms (pt-stated)         I will schedule office visits, as directed by my provider. I will keep my appointment or reschedule if I have to cancel. I will notify my provider of any barriers to my plan of care. I will follow my Zone Management tool to seek urgent or emergent care. I will notify my provider of any symptoms that indicate a worsening of my condition.     Barriers: need for additional support and education  Plan for overcoming my barriers: family and ACM support  Confidence: 9/10  Anticipated Goal Completion Date: 3/1/23 Update 4-6-23                Future Appointments   Date Time Provider Rosy Fisher   3/21/2023 10:00 AM STR NUC MED HC  INJECT  RM2 STRZ STRESS [place] HOD   3/21/2023 10:30 AM STR NUCLEAR MEDICINE HEART CENTER ROOM [facility] 94 HOD   3/21/2023 11:00 AM STR NM STRESS RM1 STRZ STRESS [place] HOD   3/21/2023 11:30 AM STR NUCLEAR MEDICINE HEART CENTER ROOM [address] [place] [address] 121 HOD   3/23/2023 10:00 AM Lynn Cheatham APRN - CNP N SRPX CHF [facility] [place]   4/17/2023 10:00 AM Do Ybarra APRN - CNP N ENT [facility] [place]   5/1/2023 10:00 AM Deann Lawrence APRN - CNP [facility] [place]   7/24/2023 11:00 AM STR MAMMOGRAPHY RM2  [place]OR[place] STRZ WOMEN STR Radiolog   7/25/2023  1:15 PM Keon Allen MD N SRPX Heart [facility] [place]   9/7/2023  1:00 PM Gosia Navarro DO [facility] Med [facility]. [facility] [place]   '

## 2023-03-06 NOTE — TELEPHONE ENCOUNTER
Spoke with patient and she is doing good. She was able to get her CHF appointment moved up to tomorrow. Let patient know her blood sugar monitor was sent to her pharmacy.

## 2023-03-06 NOTE — CARE COORDINATION
RPM team, Martina would like to turn back in RPM equipment at this time.  Could team please sent return label and get the process started.  Thank you!

## 2023-03-06 NOTE — CARE COORDINATION
EMTP placed call to patient to arrange RPM kit  through [facility]. Reviewed with patient how to pack equipment in original packing. Verified patient's availability to schedule UPS  time. UPS  time requested.  Anticipated  date range : Anytime

## 2023-03-06 NOTE — CARE COORDINATION
Remote Alert Monitoring Note      Date/Time:  3/6/2023 8:52 AM  Patient Current Location: [facility]    LPN contacted patient by telephone regarding red alert received for weight increase (4# over night). Verified patients name and  as identifiers. Background: HTN,  CHF  Refer to 911 immediately if:  Patient unresponsive or unable to provide history  Change in cognition or sudden confusion  Patient unable to respond in complete sentences  Intense chest pain/tightness  Any concern for any clinical emergency  Red Alert: Provider response time of 1 hr required for any red alert requiring intervention  Yellow Alert: Provider response time of 3hr required for any escalated yellow alert    Weight Scale Triage  Was your weight obtained upon rising/waking today? Yes   Was your weight obtained after voiding and/or use of the bathroom today? no   Did you weigh yourself in the same amount of clothing today, compared to how you typically do? yes   Was the scale bumped or moved prior to today's weight? no   Is your scale on a flat/hard surface? yes   Did you obtain your weight with shoes on? no   If yes, is this something you normally do during your daily weights? no   Were you standing up straight on the scale today? yes   Were you leaning on anything while obtaining your weight today? no       Clinical Interventions: Reviewed and followed up on alerts and treatments-Patient has 4# weight gain over night. Denies CP, SOB, Scale Problems. Is constipated and didn't weight after going to the bathroom. Has continued swelling in ankles as before - no worse. Had some soup recently to eat. Has appointment at CHF clinic 3/23/23. Took all her medications this morning including lasix 40 mg 1 daily and spironolactone 50 mg daily. Will send VS to PCP due to weight gain and patient has CHF. Please see VS in chart Note. No response from PCP in over an hour. Will escalate to Vivian Ledezma CNP.     Education of patient/family/caregiver/guardian to support self-management-Try to eat less salty foods. Plan/Follow Up: Will continue to review, monitor and address alerts with follow up based on severity of symptoms and risk factors.     Current Patient Metrics ---- Blood Pressure: 152/75, 81bpm Pulseox: 97%, 81bpm Survey: C Weight: 129.8lbs Note Created at: 03/06/2023 10:57 AM ET ---- Time-Spent: 1 hours 40 minutes 0 seconds

## 2023-03-07 ENCOUNTER — HOSPITAL ENCOUNTER (OUTPATIENT)
Age: 88
Discharge: HOME | End: 2023-03-07
Payer: MEDICARE

## 2023-03-07 ENCOUNTER — OFFICE VISIT (OUTPATIENT)
Dept: CARDIOLOGY CLINIC | Age: 88
End: 2023-03-07
Payer: MEDICARE

## 2023-03-07 VITALS
BODY MASS INDEX: 25.17 KG/M2 | HEART RATE: 76 BPM | WEIGHT: 128.2 LBS | OXYGEN SATURATION: 97 % | SYSTOLIC BLOOD PRESSURE: 132 MMHG | HEIGHT: 60 IN | DIASTOLIC BLOOD PRESSURE: 60 MMHG

## 2023-03-07 DIAGNOSIS — I50.22 CHRONIC HFREF (HEART FAILURE WITH REDUCED EJECTION FRACTION) (HCC): ICD-10-CM

## 2023-03-07 DIAGNOSIS — I50.22 CHF NYHA CLASS II, CHRONIC, SYSTOLIC (HCC): Primary | ICD-10-CM

## 2023-03-07 DIAGNOSIS — R60.0 LOWER EXTREMITY EDEMA: ICD-10-CM

## 2023-03-07 DIAGNOSIS — I25.10 CORONARY ARTERY DISEASE INVOLVING NATIVE HEART WITHOUT ANGINA PECTORIS, UNSPECIFIED VESSEL OR LESION TYPE: ICD-10-CM

## 2023-03-07 DIAGNOSIS — I10 ESSENTIAL HYPERTENSION: ICD-10-CM

## 2023-03-07 LAB
ANION GAP SERPL CALC-SCNC: 12 MEQ/L (ref 8–16)
BUN SERPL-MCNC: 22 MG/DL (ref 7–22)
CALCIUM SERPL-MCNC: 9.5 MG/DL (ref 8.5–10.5)
CHLORIDE SERPL-SCNC: 104 MEQ/L (ref 98–111)
CO2 SERPL-SCNC: 26 MEQ/L (ref 23–33)
CREAT SERPL-MCNC: 1.1 MG/DL (ref 0.4–1.2)
GFR SERPL CREATININE-BSD FRML MDRD: 45 ML/MIN/1.73M2
GLUCOSE SERPL-MCNC: 111 MG/DL (ref 70–108)
POTASSIUM SERPL-SCNC: 3.9 MEQ/L (ref 3.5–5.2)
SODIUM SERPL-SCNC: 142 MEQ/L (ref 135–145)

## 2023-03-07 PROCEDURE — G8484 FLU IMMUNIZE NO ADMIN: HCPCS | Performed by: NURSE PRACTITIONER

## 2023-03-07 PROCEDURE — 1123F ACP DISCUSS/DSCN MKR DOCD: CPT | Performed by: NURSE PRACTITIONER

## 2023-03-07 PROCEDURE — 36415 COLL VENOUS BLD VENIPUNCTURE: CPT

## 2023-03-07 PROCEDURE — 1090F PRES/ABSN URINE INCON ASSESS: CPT | Performed by: NURSE PRACTITIONER

## 2023-03-07 PROCEDURE — 1036F TOBACCO NON-USER: CPT | Performed by: NURSE PRACTITIONER

## 2023-03-07 PROCEDURE — 80048 BASIC METABOLIC PNL TOTAL CA: CPT

## 2023-03-07 PROCEDURE — 99215 OFFICE O/P EST HI 40 MIN: CPT | Performed by: NURSE PRACTITIONER

## 2023-03-07 PROCEDURE — G8427 DOCREV CUR MEDS BY ELIG CLIN: HCPCS | Performed by: NURSE PRACTITIONER

## 2023-03-07 PROCEDURE — G8417 CALC BMI ABV UP PARAM F/U: HCPCS | Performed by: NURSE PRACTITIONER

## 2023-03-07 ASSESSMENT — ENCOUNTER SYMPTOMS
ABDOMINAL DISTENTION: 1
COUGH: 0
CONSTIPATION: 1
SHORTNESS OF BREATH: 0

## 2023-03-07 NOTE — PROGRESS NOTES
Heart Failure Clinic       Visit Date: 3/7/2023  Cardiologist:  Dr. Hwang  Primary Care Physician: Dr. Fernanda Rowe, APRN - CNP  Referred by: Jaiden    Kristel Welch is a 89 y.o. female who presents today for:  Chief Complaint   Patient presents with    Congestive Heart Failure       HPI:   Kristel Welch is a 89 y.o. female who presents to the office for a NEW patient visit in the heart failure clinic.  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   extra Lasix x 2 days  Today: 128#  Feeling good overall   Walked from [employer], no break.  Lives alone, still drives.  Does house work, grocery shopping.   Notes mild ankle edema L>R, little worse by end day.    Very scant noted on exam today.   Mild bloating noted - c/o bowels irregular lately.  Trying some Metamucil  Denies SOB, orthopnea.  Chest pain described as mild center chest, worse w/ exertion.  She does note it feels little like nerve pain she had from shingles in past (same area)    Home weight: recently changed home scale - up few #  Home blood pressure: stable    Past Medical History:   Diagnosis Date    Arthritis     Cancer (HCC) 2013    SKIN CANCER ON LEFT ANKLE    Diverticulosis     Hyperlipidemia     Hypertension     Hypothyroidism     Psoriasis     S/P angioplasty with stent 05/06/2021    3 stents to LAD, 1 stent to OM, 2 stents to diag. per Dr. Jonathan Bose     Thyroid disorder     UTI (urinary tract infection)      Past Surgical History:   Procedure Laterality Date    BREAST BIOPSY Left 02/15/2015    benign stereotactic biopsy    BREAST SURGERY Left 06/1966    benign excisional biopsy    CARPAL TUNNEL RELEASE Right 01/30/2013    CARPAL TUNNEL RELEASE Left 07/25/2013    CATARACT REMOVAL Bilateral 1999    COLON SURGERY  11/1999    resection    COLONOSCOPY  2003,  2006, 2011    DIAGNOSTIC CARDIAC CATH LAB PROCEDURE  05/07/2021    6 stents     HYSTERECTOMY (CERVIX STATUS UNKNOWN)  02/23/1970    KNEE ARTHROSCOPY Right 11/21/2007    meniscectomies    KNEE SURGERY Left 2000    replacement    MYRINGOTOMY Right 07/01/2015    tube insertion    OVARY REMOVAL Bilateral 07/12/2001    oophorectomy    SHOULDER SURGERY  05/2014    SINUS SURGERY       Family History   Problem Relation Age of Onset    Breast Cancer Sister 68    Stroke Sister     Heart Disease Sister     Cancer Child     Other Other         visual problems     Social History     Tobacco Use    Smoking status: Never    Smokeless tobacco: Never   Substance Use Topics    Alcohol use: No     Current Outpatient Medications   Medication Sig Dispense Refill    Blood Glucose Monitoring Suppl (TRUE METRIX METER) w/Device KIT 1 Device by Does not apply route daily Check blood sugar q daily, Dx E11.9 1 kit 0    LORazepam (ATIVAN) 0.5 MG tablet Take 0.5 mg by mouth every 6 hours as needed for Anxiety. loratadine (CLARITIN) 10 MG capsule Take 10 mg by mouth daily      NONFORMULARY Super enzymes      aspirin EC 81 MG EC tablet Take 1 tablet by mouth daily 90 tablet 1    furosemide (LASIX) 40 MG tablet Take 1 tablet by mouth daily 90 tablet 1    carvedilol (COREG) 3.125 MG tablet Take 1 tablet by mouth 2 times daily (with meals) 180 tablet 3    famotidine (PEPCID) 20 MG tablet TAKE 1 TABLET BY MOUTH TWICE DAILY 180 tablet 3    clopidogrel (PLAVIX) 75 MG tablet TAKE 1 TABLET BY MOUTH DAILY 90 tablet 2    acetaminophen (TYLENOL) 650 MG extended release tablet Take 650 mg by mouth every 8 hours as needed for Pain      nitroGLYCERIN (NITROSTAT) 0.4 MG SL tablet up to max of 3 total doses.  If no relief after 1 dose, call 911. 25 tablet 1    Handicap Placard MISC by Does not apply route Expires 6/20/27 1 each 0    spironolactone (ALDACTONE) 50 MG tablet TAKE 1 TABLET BY MOUTH DAILY 90 tablet 3    Blood Pressure KIT Check blood pressure q daily 1 kit 0    montelukast (SINGULAIR) 10 MG tablet Take 1 tablet by mouth daily 90 tablet 3    atorvastatin (LIPITOR) 80 MG tablet Take 1 tablet by mouth nightly 90 tablet 3    Psyllium (METAMUCIL FREE & NATURAL PO) Take by mouth as needed      thyroid (ARMOUR THYROID) 30 MG tablet Take 1 tablet by mouth daily 90 tablet 3    Probiotic Acidophilus (FLORANEX) TABS Take 1 tablet by mouth daily      fluticasone (FLONASE) 50 MCG/ACT nasal spray INSTILL 2 SPRAYS INTO EACH NOSTRIL TWICE DAILY 96 g 3    Ascorbic Acid (VITAMIN C) 1000 MG tablet Take 1,000 mg by mouth daily      blood glucose monitor strips Check blood sugar q daily, Dx R73.01 100 strip 3    polyethyl glycol-propyl glycol 0.4-0.3 % (SYSTANE) 0.4-0.3 % ophthalmic solution 1 drop in the morning and at bedtime Both eyes      docusate sodium (COLACE) 100 MG capsule Take 100 mg by mouth in the morning and 100 mg before bedtime. OLIVE LEAF PO Take 450 mg by mouth daily      Multiple Vitamins-Minerals (THERAPEUTIC MULTIVITAMIN-MINERALS) tablet Take 1 tablet by mouth daily. fish oil-omega-3 fatty acids 1000 MG capsule Take 1 g by mouth daily       CALCIUM CITRATE Take 600 mg by mouth in the morning and at bedtime        No current facility-administered medications for this visit.      Allergies   Allergen Reactions    Prednisone      GERD symptoms, shakes    Actonel [Risedronate Sodium] Other (See Comments)     GI    Baclofen Other (See Comments)     Confusion     Bactrim [Sulfamethoxazole-Trimethoprim] Nausea Only           Cardizem [Diltiazem] Other (See Comments)     Raises BP    Celebrex [Celecoxib] Other (See Comments)     Raises BP    Dicloxacillin Other (See Comments)     Heartburn    Doxycycline Other (See Comments)     pulse    Evista [Raloxifene]      Fatigue      Lopid [Gemfibrozil]      Fatigue      Questran [Cholestyramine]      constipation      Synthroid [Levothyroxine Sodium]      GERD, Leg pain    Zestoretic [Lisinopril-Hydrochlorothiazide]      Raises B. P.    Zetia [Ezetimibe]      aches       SUBJECTIVE:   Review of Systems   Constitutional:  Negative for activity change, appetite change and fatigue. Respiratory:  Negative for cough and shortness of breath. Cardiovascular:  Positive for leg swelling (scant left). Negative for chest pain and palpitations. Gastrointestinal:  Positive for abdominal distention (mild) and constipation. Neurological:  Negative for weakness, light-headedness and headaches. Hematological:  Negative for adenopathy. Psychiatric/Behavioral:  Negative for sleep disturbance. OBJECTIVE:   Today's Vitals:  /60   Pulse 76   Ht 5' (1.524 m)   Wt 128 lb 3.2 oz (58.2 kg)   SpO2 97%   BMI 25.04 kg/m²     Physical Exam  Vitals reviewed. Constitutional:       General: She is not in acute distress. Appearance: Normal appearance. She is well-developed. She is not diaphoretic. HENT:      Head: Normocephalic and atraumatic. Eyes:      Conjunctiva/sclera: Conjunctivae normal.   Neck:      Comments: No JVD  Cardiovascular:      Rate and Rhythm: Normal rate and regular rhythm. Heart sounds: Normal heart sounds. No murmur heard. Pulmonary:      Effort: Pulmonary effort is normal. No respiratory distress. Breath sounds: Normal breath sounds. No wheezing or rales. Abdominal:      General: Bowel sounds are normal. There is no distension. Palpations: Abdomen is soft. Tenderness: There is no abdominal tenderness. Musculoskeletal:         General: Normal range of motion. Cervical back: Normal range of motion and neck supple. Right lower leg: No edema. Left lower leg: Edema (very scant left ankle, nonpitting) present. Skin:     General: Skin is warm and dry. Capillary Refill: Capillary refill takes less than 2 seconds. Neurological:      Mental Status: She is alert and oriented to person, place, and time.       Coordination: Coordination normal.   Psychiatric:         Behavior: Behavior normal.         Wt Readings from Last 3 Encounters:   03/07/23 128 lb 3.2 oz (58.2 kg)   03/01/23 128 lb (58.1 kg)   02/08/23 129 lb (58.5 kg)     BP Readings from Last 3 Encounters:   03/07/23 132/60   03/01/23 (!) 174/73   02/08/23 132/60     Pulse Readings from Last 3 Encounters:   03/07/23 76   03/01/23 80   02/08/23 76     Body mass index is 25.04 kg/m². ECHO:    Summary   Left ventricle size is normal.   Normal left ventricular wall thickness. There was mild global hypokinesis of the left ventricle. Ejection fraction is visually estimated in the range of 45% to 50%. Doppler parameters were consistent with abnormal left ventricular   relaxation (grade 1 diastolic dysfunction). Mild mitral regurgitation   Mild aortic regurgitation   Mild tricuspid regurgitation. Signature      ----------------------------------------------------------------   Electronically signed by Vaishali Samson MD (Interpreting   physician) on 07/19/2022 at 09:50 PM   ----------------------------------------------------------------    CATH  SUMMARY:  Successful PCI of the LAD with three overlapping drug-eluting  stents; successful PCI of the OM1 with one drug-eluting stent; diagonal  PCI with two drug-eluting stents with no reflow; IABP insertion. PLAN:  1. Bedrest.  2.  Optimal medical therapy. 3.  Routine access site care  4. ICU care. 5.  IV heparin. 6.  We will wean balloon pump in the next 24 hours as long as there is  no pain and the ST elevations have improved. 7. DAPT. 8.  IV Lasix diuresis. 9.  Chest x-ray. 10.  Guideline-directed therapy for CAD. 11.  Guideline-directed therapy for CHF. 12.  TTE. 13.  The patient remains critically ill, we will follow up in the ICU. All the above was explained to the patient and the patient's family. They were agreeable and amenable to the above plan.      Carolann Dickey MD     D: 05/07/2021 18:12:21 Results reviewed:  BNP: No results found for: BNP  CBC:   Lab Results   Component Value Date/Time    WBC 8.6 07/12/2022 08:46 PM    RBC 4.63 07/12/2022 08:46 PM    HGB 15.1 07/12/2022 08:46 PM    HCT 46.0 07/12/2022 08:46 PM     07/12/2022 08:46 PM     CMP:    Lab Results   Component Value Date/Time     03/07/2023 08:11 AM    K 3.9 03/07/2023 08:11 AM    K 4.7 07/12/2022 08:46 PM     03/07/2023 08:11 AM    CO2 26 03/07/2023 08:11 AM    BUN 22 03/07/2023 08:11 AM    CREATININE 1.1 03/07/2023 08:11 AM    AGRATIO 1.4 11/02/2019 06:30 AM    LABGLOM 45 03/07/2023 08:11 AM    GLUCOSE 111 03/07/2023 08:11 AM    GLUCOSE 97 11/02/2019 06:30 AM    CALCIUM 9.5 03/07/2023 08:11 AM     Hepatic Function Panel:    Lab Results   Component Value Date/Time    ALKPHOS 156 07/12/2022 08:46 PM    ALT 51 07/12/2022 08:46 PM    AST 36 07/12/2022 08:46 PM    PROT 7.4 07/12/2022 08:46 PM    BILITOT 0.3 07/12/2022 08:46 PM    BILIDIR <0.2 07/12/2022 08:46 PM    LABALBU 4.5 07/12/2022 08:46 PM    LABALBU 4.1 03/15/2012 07:25 AM     Magnesium:    Lab Results   Component Value Date/Time    MG 2.3 01/19/2023 08:15 AM     PT/INR:    Lab Results   Component Value Date/Time    INR 1.00 05/06/2021 11:18 AM     Lipids:    Lab Results   Component Value Date/Time    TRIG 75 02/22/2022 08:23 AM    HDL 45 02/22/2022 08:23 AM    LDLCALC 52 02/22/2022 08:23 AM       ASSESSMENT AND PLAN:      Diagnosis Orders   1. CHF NYHA class II, chronic, systolic ([facility] [address]        2. Essential hypertension        3. Coronary artery disease involving native heart without angina pectoris, unspecified vessel or lesion type        4. Lower extremity edema          Continue:  GDMT:   ACE/ARB/ARNI - no   BB - Coreg 3.125 BID   SGLT2 -  no  AA - Aldactone 50/day  Diuretic - Lasix 40/day (increased from 20/day 1/2023)  Vasodilator -   Other - Plavix     HFmrEF (45-50%), NYHA II  HTN - stable, controlled.     CAD s/p PCI (5/2021)   Atypical CP - scheduled for stress test 3/21  DOMINIK - stable. Looked good today. Discussed using compression hose for prevention. Nurse to measure. Bloating/constipation - recommend using Miralax daily till good BM then back off to QOD or PRN. Stable, appears Euvolemic  Lab reviewed - stable  Repeat blood work done today - no results yet  BP/HR stable   No med changes today - discussed extra Lasix use - for 2-3# wt gain or swelling  Continue diet/fluid adherence  Continue daily wts. F/U w/ Cardiology  F/U in clinic in 2 months    Tolerating above noted HF meds, no ill side effects noted. Will continue to monitor kidney function and electrolytes. Will optimize as tolerated. Pt is compliant w/ medications. Total visit time of 45 minutes has been spent with patient on education of symptoms, management, medication, and plan of care; as well as review of chart: labs, ECHO, radiology reports, etc.   I personally spent more then 50% of the appt time face to face with the patient. Daily weights  Fluid restriction of 2 Liters per day  Limit sodium in diet to around 1122-0655 mg/day  Monitor BP  Activity as tolerated     Patient was instructed to call the [facility] for any changes in symptoms as noted in AVS.      Return in about 2 months (around 5/7/2023). or sooner if needed     Patient given educational materials - see patient instructions. We discussed the importance of weighing oneself and recording daily. We also discussed the importance of a low sodium diet, higher sodium foods to avoid and better low sodium food options. Patient verbalizes understanding of plan of care using teach back method, and is agreeable to the treatment plan.        Electronically signed by RACQUEL Rinaldi CNP on 3/7/2023 at 4:42 PM

## 2023-03-07 NOTE — PATIENT INSTRUCTIONS
You may receive a survey regarding the care you received during your visit. Your input is valuable to us. We encourage you to complete and return your survey. We hope you will choose us in the future for your healthcare needs.     Continue:  Continue current medications  Daily weights and record  Fluid restriction of 2 Liters per day  Limit sodium in diet to around 5670-3029 mg/day  Monitor BP  Activity as tolerated     Call the Heart Failure Clinic for any of the following symptoms: [phone]  Weight gain of 2-3 pounds in 1 day or 5 pounds in 1 week  Increased shortness of breath  Shortness of breath while laying down  Cough  Chest pain  Swelling in feet, ankles or legs  Tenderness or bloating in the abdomen  Fatigue   Decreased appetite or nausea   Confusion        Try OTC Miralax for bowels

## 2023-03-08 ENCOUNTER — NURSE ONLY (OUTPATIENT)
Dept: FAMILY MEDICINE CLINIC | Age: 88
End: 2023-03-08

## 2023-03-08 ENCOUNTER — TELEPHONE (OUTPATIENT)
Dept: FAMILY MEDICINE CLINIC | Age: 88
End: 2023-03-08

## 2023-03-08 DIAGNOSIS — R30.0 DYSURIA: Primary | ICD-10-CM

## 2023-03-08 DIAGNOSIS — R35.0 URINARY FREQUENCY: Primary | ICD-10-CM

## 2023-03-08 LAB
BILIRUBIN URINE: NEGATIVE
BLOOD URINE, POC: NEGATIVE
CHARACTER, URINE: CLEAR
COLOR, URINE: ABNORMAL
GLUCOSE URINE: NEGATIVE MG/DL
KETONES, URINE: NEGATIVE
LEUKOCYTE CLUMPS, URINE: ABNORMAL
NITRITE, URINE: NEGATIVE
PH, URINE: 5.5 (ref 5–9)
PROTEIN, URINE: NEGATIVE MG/DL
SPECIFIC GRAVITY, URINE: 1.01 (ref 1–1.03)
UROBILINOGEN, URINE: 0.2 EU/DL (ref 0–1)

## 2023-03-09 ENCOUNTER — CARE COORDINATION (OUTPATIENT)
Dept: CARE COORDINATION | Age: 88
End: 2023-03-09

## 2023-03-09 DIAGNOSIS — I50.22 CHRONIC HFREF (HEART FAILURE WITH REDUCED EJECTION FRACTION) (HCC): Primary | ICD-10-CM

## 2023-03-09 RX ORDER — ATORVASTATIN CALCIUM 80 MG/1
80 TABLET, FILM COATED ORAL NIGHTLY
Qty: 90 TABLET | Refills: 3 | Status: SHIPPED | OUTPATIENT
Start: 2023-03-09

## 2023-03-09 RX ORDER — CIPROFLOXACIN 250 MG/1
250 TABLET, FILM COATED ORAL 2 TIMES DAILY
Qty: 14 TABLET | Refills: 0 | Status: SHIPPED | OUTPATIENT
Start: 2023-03-09 | End: 2023-03-16

## 2023-03-09 NOTE — TELEPHONE ENCOUNTER
Send for culture. Will start her on cipro until culture  comes back. See what her symptoms are.   Thank you

## 2023-03-09 NOTE — CARE COORDINATION
Lolita Cordoba, could you please place an order in Epic for a scale for Carlos David to check her weights due to CHF. Please have the order placed for [address] and I will be completing the [employer] to have a free scale sent to her home. Thank you!

## 2023-03-09 NOTE — CARE COORDINATION
Received a call from [place]. States she is having trouble getting atorvastin filled. I placed a call with pharmacy to find out barrier. Pharmacy states they just need a refill order sent over. Will ask PCP to complete this.

## 2023-03-10 LAB
BACTERIA UR CULT: ABNORMAL
ORGANISM: ABNORMAL

## 2023-03-10 NOTE — TELEPHONE ENCOUNTER
Noted.  Thank you! Neelima Mcginnis 78 voucher sent to [employer] to have scale delivered to her home. Thank you!

## 2023-03-10 NOTE — CARE COORDINATION
Remote Patient Monitoring Graduation      Date/Time:  3/10/2023 8:43 AM  Patient Current Location: [place]  Patient has graduated from the Remote Patient Monitoring program on 3/10/2023. RPM goals have been met at this time. Patient has been provided instruction on process to return RPM equipment and RPM has been deactivated. Patient has ACM's contact information for any further questions, concerns, or needs.

## 2023-03-14 ENCOUNTER — CARE COORDINATION (OUTPATIENT)
Dept: CARE COORDINATION | Age: 88
End: 2023-03-14

## 2023-03-14 NOTE — CARE COORDINATION
Ardyth Koyanagi Dunn  3/14/2023    Registered Dietitian Progress Note for Care Coordination    Assessment: Gideon Iverson is a 80 y.o. female. RD referred for CHF- Low Sodium Diet Education. RD spoke with patient for initial nutrition assessment on 1/18/23 and has been following up with patient. RD called to follow up with pt today 3/14/23. RD discussed previous goals with pt. Patient states she is doing well. RD reviewed the importance of eating balanced meals consistently throughout the day and following a low sodium diet. Patient states she is trying to eat better. Patient states last night for dinner she ate a pork chop, green beans, sweet potato and applesauce. Patient states she plans on going to the store today to buy vegetables and is going to make homemade vegetable soup. Patient states she has been making fruit smoothie with greek yogurt for an afternoon snack. RD acknowledged patient's awesome work. RD reviewed the importance of taking medicine as directed, monitoring daily weight and FBS. Patient states yesterday AM her FBS was 77 mg/dL and today FBS 96 mg/dL. RD reviewed if hypoglycemia (<70): rule of 15-consume 15 g of CHO (rapid acting CHO- 3 to 4 glucose tablets, 4 oz fruit juice/regular soda or candy), wait 15 minutes then recheck blood sugar, if still low then repeat rule. Patient verbalizes understanding and states she has glucose tablets on hand. Patient states she is weighing herself daily, no weight provided per pt. No sudden weight gain reported per pt. RPM has been deactivated. Per chart review, patient's weight documented on 3/7/23 was 128 lb. RD reiterated the importance of notifying MD of sudden weight gain. Patient verbalizes understanding. Patient has no nutrition related questions or concerns at this time.      Barriers to meeting goals: overwhelmed by complexity of regimen        Nutrition Monitoring and Evaluation  Indicator/Goal Criteria Progress   #1 Eat balanced meals consistently throughout the day.  #1 Focus on eating 3 meals/day and make these meals balanced using the MyPlate PWBECHSOP-5/1 plate fruits and/or vegetables, 1/4 plate protein and 1/4 plate starchy carbohydrates with 8 oz glass of low fat milk if desired. #1 Patient is eating 3 meals/day and making meals more balanced using MyPlate. #2  Monitor daily sodium intake. Keep sodium from food and beverages to no more than 2000 mg/day. #2 Avoid the salt shaker. Read food labels to help choose lower sodium options (<140 mg of sodium per serving). #2 Pt is following a low sodium diet. #3  Monitor daily weights. #3 Weigh self daily and keep a log. Notify MD of sudden weight gain. #3 Pt is weighing self daily. No weight provided per pt. Plan of Care:  RD encouraged pt to keep working toward goals set. RD will follow up with pt to discuss any questions pt has and check the progress toward goals. Follow Up:    RD will call pt in 3-4 weeks to follow up and answer any nutrition related questions at that time.      [address] [place] [address],   [phone]

## 2023-03-16 ENCOUNTER — CLINICAL DOCUMENTATION (OUTPATIENT)
Dept: SPIRITUAL SERVICES | Facility: CLINIC | Age: 88
End: 2023-03-16

## 2023-03-16 NOTE — ACP (ADVANCE CARE PLANNING)
Advance Care Planning   Advance Care Planning Note  Ambulatory [facility]    Date:  3/16/2023    Received request from patient. Consultation conversation participants:   Patient who understands ACP conversation     Goals of ACP Conversation:  Discuss advance care planning documents  Facilitate a discussion related to patient's goals of care as they align with the patient's values and beliefs. Health Care Decision Makers:      Primary Decision Maker: Ilya Welch - Child - [phone]    Secondary Decision Maker: Fidencio Recio Child - [phone]   Summary:  Completed New Documents  Updated Healthcare Decision Maker    Advance Care Planning Documents (Patient Wishes)  Currently on file:   Healthcare Power of /Advance Directive Appointment of [address]  Living Will/Advance Directive    Assessment:    met with Aris Jacobo to provide support for the completion / updating of Advance Directives documents as part of an [address] conversation. She was alert and oriented with decision-making capacity to express her wishes at this time. Interventions:  Provided education on documents for clarity and greater understanding  Assisted in the completion of documents according to patient's wishes at this time  Encouraged ongoing ACP conversation with future decision makers and loved ones    Care Preferences Communicated:  Ventilation:   If the patient, in their present state of health, suddenly became very ill and unable to breathe on their own,     the patient would NOT desire the use of a ventilator (breathing machine). If their health worsens and it becomes clear that the change of recovery is unlikely,     the patient would NOT desire the use of a ventilator (breathing machine).     Resuscitation:  In the event the patient's heart stopped as a result of an underlying serious health condition, the patient communicates a preference for      a natural death (no CPR).    Outcomes:  ACP Discussion: Completed  New advance directive completed. Returned original document(s) to patient, as well as copies for distribution to appointed agents  Copy of advance directive given to staff to scan into medical record. Patient / Healthcare Decision Maker Instructions:   Follow up with their individual provider to further discussion of code status    Electronically signed by Chaplain Oxana on 3/16/2023 at 2:04 PM.

## 2023-03-17 ENCOUNTER — TELEPHONE (OUTPATIENT)
Dept: FAMILY MEDICINE CLINIC | Age: 88
End: 2023-03-17

## 2023-03-17 NOTE — TELEPHONE ENCOUNTER
----- Message from RACQUEL Barlow CNP sent at 3/10/2023 12:40 PM EST -----  Urine culture showed mixed growth, see how pt is doing.

## 2023-03-20 ENCOUNTER — CARE COORDINATION (OUTPATIENT)
Dept: CARE COORDINATION | Age: 88
End: 2023-03-20

## 2023-03-20 NOTE — CARE COORDINATION
Johanna Waters completed ACP paperwork with [facility] but states she needs paperwork for code status/ DNR completed. Could you please reach out and provide support on this . Thank you!
None            Care Coordination Interventions    Referral from Primary Care Provider: No  Suggested Interventions and Community Resources          Goals Addressed                      This Visit's Progress      Conditions and Symptoms (pt-stated)   On track      I will schedule office visits, as directed by my provider. I will keep my appointment or reschedule if I have to cancel. I will notify my provider of any barriers to my plan of care. I will follow my Zone Management tool to seek urgent or emergent care. I will notify my provider of any symptoms that indicate a worsening of my condition.     Barriers: need for additional support and education  Plan for overcoming my barriers: family and ACM support  Confidence: 9/10  Anticipated Goal Completion Date: 3/1/23 Update 4-6-23                Future Appointments   Date Time Provider Rosy Fisher   3/21/2023 10:00 AM STR NUC MED HC  INJECT  RM2 STRZ STRESS [place] HOD   3/21/2023 10:30 AM STR [facility]LEAR [facility] ROOM 1 [address]   3/21/2023 11:00 AM  [place] HOD   3/21/2023 11:30 AM STR [facility] ROOM 1 [address]   4/17/2023 10:00 AM RACQUEL Deng CNP N ENT P - [place]   5/1/2023 10:00 AM [facility] [place]   5/8/2023 10:00 AM RACQUEL Ramos CNP N SRPX CHF [facility] - [place]   7/24/2023 11:00 AM STR MAMMOGRAPHY RM2  LORAD STRZ WOMEN STR Radiolog   7/25/2023  1:15 PM Aster Escobar MD N SRPX Heart [facility] - [place]   9/7/2023  1:00 PM Cedric Acosta DO [address] [place] [address]

## 2023-03-21 ENCOUNTER — HOSPITAL ENCOUNTER (OUTPATIENT)
Dept: NON INVASIVE DIAGNOSTICS | Age: 88
Discharge: HOME | End: 2023-03-21
Payer: MEDICARE

## 2023-03-21 ENCOUNTER — CARE COORDINATION (OUTPATIENT)
Dept: CARE COORDINATION | Age: 88
End: 2023-03-21

## 2023-03-21 DIAGNOSIS — I50.22 CHRONIC HFREF (HEART FAILURE WITH REDUCED EJECTION FRACTION) (HCC): ICD-10-CM

## 2023-03-21 PROCEDURE — A9500 TC99M SESTAMIBI: HCPCS | Performed by: INTERNAL MEDICINE

## 2023-03-21 PROCEDURE — 6360000002 HC RX W HCPCS

## 2023-03-21 PROCEDURE — 78452 HT MUSCLE IMAGE SPECT MULT: CPT | Performed by: INTERNAL MEDICINE

## 2023-03-21 PROCEDURE — 3430000000 HC RX DIAGNOSTIC RADIOPHARMACEUTICAL: Performed by: INTERNAL MEDICINE

## 2023-03-21 PROCEDURE — 93017 CV STRESS TEST TRACING ONLY: CPT | Performed by: INTERNAL MEDICINE

## 2023-03-21 RX ORDER — TETRAKIS(2-METHOXYISOBUTYLISOCYANIDE)COPPER(I) TETRAFLUOROBORATE 1 MG/ML
9.7 INJECTION, POWDER, LYOPHILIZED, FOR SOLUTION INTRAVENOUS
Status: COMPLETED | OUTPATIENT
Start: 2023-03-21 | End: 2023-03-21

## 2023-03-21 RX ORDER — TETRAKIS(2-METHOXYISOBUTYLISOCYANIDE)COPPER(I) TETRAFLUOROBORATE 1 MG/ML
32.5 INJECTION, POWDER, LYOPHILIZED, FOR SOLUTION INTRAVENOUS
Status: COMPLETED | OUTPATIENT
Start: 2023-03-21 | End: 2023-03-21

## 2023-03-21 RX ADMIN — Medication 9.7 MILLICURIE: at 10:00

## 2023-03-21 RX ADMIN — Medication 32.5 MILLICURIE: at 10:53

## 2023-03-22 ENCOUNTER — TELEPHONE (OUTPATIENT)
Dept: CARDIOLOGY CLINIC | Age: 88
End: 2023-03-22

## 2023-03-22 NOTE — TELEPHONE ENCOUNTER
Spoke with POA and she can not bring patient in until April 5 or 6 and Dr. Antwon Restrepo is not in office those days. Bernard Knapp will call her son and have him call us to make an appointment.

## 2023-03-22 NOTE — TELEPHONE ENCOUNTER
Patient's son Franky Waldrop called back and was informed of stress test results and recommendations on scheduling OV. Franky Waldrop will call pt and inform her of recommendations and have her contact us.

## 2023-03-22 NOTE — TELEPHONE ENCOUNTER
----- Message from Carter Peter MD sent at 3/21/2023  5:41 PM EDT -----  -Stress test is abnormal and is suggestive of significant coronary artery \"blockages within coronary vessels\"  -Has h/o CAD, prior complex PCI  -LHC can be considered   -CHF symptoms, Chest pain noted on recent office visit  -Medications were adjusted, Lasix was increased   -She was referred to CHF clinic and her medications were adjusted   -Explain above to patient   -Schedule office visit soon (can see this week) to discuss findings, reassess and discuss treatment options.  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

## 2023-03-23 ENCOUNTER — CARE COORDINATION (OUTPATIENT)
Dept: CARE COORDINATION | Age: 88
End: 2023-03-23

## 2023-03-23 DIAGNOSIS — E03.9 HYPOTHYROIDISM, UNSPECIFIED TYPE: ICD-10-CM

## 2023-03-23 DIAGNOSIS — I50.22 CHRONIC HFREF (HEART FAILURE WITH REDUCED EJECTION FRACTION) (HCC): ICD-10-CM

## 2023-03-23 DIAGNOSIS — I10 ESSENTIAL HYPERTENSION: ICD-10-CM

## 2023-03-23 DIAGNOSIS — R09.82 POST-NASAL DRIP: ICD-10-CM

## 2023-03-23 RX ORDER — MONTELUKAST SODIUM 10 MG/1
10 TABLET ORAL DAILY
Qty: 90 TABLET | Refills: 3 | Status: SHIPPED | OUTPATIENT
Start: 2023-03-23

## 2023-03-23 RX ORDER — LEVOTHYROXINE AND LIOTHYRONINE 19; 4.5 UG/1; UG/1
30 TABLET ORAL DAILY
Qty: 90 TABLET | Refills: 3 | Status: SHIPPED | OUTPATIENT
Start: 2023-03-23

## 2023-03-23 NOTE — CARE COORDINATION
Zane Magdaleno called me stating she missed a call from you and your office regarding her stress test results and making an appt. She states that your call didn't go through and came up as a missed call. She would like you to call again if possible as she is eager to set up appt. She states the will answer your call. Thank you!

## 2023-03-23 NOTE — TELEPHONE ENCOUNTER
Patient requesting the following (pharmacy conf  Please approve or refuse Rx request:  Requested Prescriptions     Pending Prescriptions Disp Refills    montelukast (SINGULAIR) 10 MG tablet 90 tablet 3     Sig: Take 1 tablet by mouth daily    thyroid (RYAN THYROID) 30 MG tablet 90 tablet 3     Sig: Take 1 tablet by mouth daily       Next appointment:  5/1/2023 Physical Therapy    Room #: 1942/8675-66  Date: 2023       Patient Name: Chela Moreno  : 1969      MRN: [id number]     Patient unavailable for physical therapy eval due to off floor at [facility]. Will attempt PT evaluation at a later time. Thank you.        Yojana Gan, PT

## 2023-03-23 NOTE — TELEPHONE ENCOUNTER
Called pt back, appt was made a little later as she is not having any chest pain, and is feeling good. Pt will come with her son to next appt. Ok per Dr. Almaz Joshua.

## 2023-04-04 ENCOUNTER — CARE COORDINATION (OUTPATIENT)
Dept: CARE COORDINATION | Age: 88
End: 2023-04-04

## 2023-04-04 NOTE — CARE COORDINATION
Christofer Welch  4/4/2023    Registered Dietitian Progress Note for Care Coordination    Assessment: Pepito Diallo is a 80 y.o. female. RD referred for CHF- Low Sodium Diet Education. RD spoke with patient for initial nutrition assessment on 1/18/23 and has been following up with patient. RD called to follow up with pt today 4/4/23. RD discussed previous goals with pt. Patient states she is doing \"pretty good. \" Patient is eating consistently throughout the day and making meals balanced using MyPlate. Patient states the other day for dinner she made salmon in the air fryer with green beans, baked potato and applesauce. Patient states she has been making smoothies for a snack. Patient asked specifically about how much greek yogurt to use because her smoothies have been too thick. RD recommended 1/2 cup greek yogurt, 1 cup of milk and 1 cup of frozen fruit. Patient states she is drinking Ensure and Core Power Elite protein drinks weekly, per pt it takes her a week to drink one full bottle. Patient asked specifically about the [facility] PATRIC[facility] Core Power Elite drink- RD reviewed nutrition label with patient. RD reiterated the importance of taking medicine as directed, monitoring FBS and weight daily. Patient provided FBS and weight readings, see below:    3/29: FBS none and weight 129.2 lb  3/30: FBS 80 mg/dL and weight 129.4lb- per pt she ate at a buffet the night before   3/31: FBS 80 mg/dL and weight 129.2  lb  4/1:  mg/dL and weight 128.8 lb   4/2:  mg/dL and weight 128.4 lb   4/3 FBS 92 mg/dL and weight 128.8 lb   4/4 :  mg/dL and weight 128.8 lb - patient states she had a steroid shot in her hip    RD encouraged patient to continue to monitor FBS and daily weights. Reviewed when to notify MD. RD acknowledged the awesome work patient has made. Patient has no nutrition related questions or concerns at this time.      Barriers to meeting goals: overwhelmed by complexity of regimen        Nutrition Monitoring and

## 2023-04-06 ENCOUNTER — CARE COORDINATION (OUTPATIENT)
Dept: CARE COORDINATION | Age: 88
End: 2023-04-06

## 2023-04-06 NOTE — CARE COORDINATION
stable      and   General Assessment    Do you have any symptoms that are causing concern?: Yes  Progression since Onset: Gradually Improving  Reported Symptoms: Pain, Other (Comment: hip injections)           Offered patient enrollment in the Remote Patient Monitoring (RPM) program for in-home monitoring:  graduated . Lab Results       None            Care Coordination Interventions    Referral from Primary Care Provider: No  Suggested Interventions and Community Resources          Goals Addressed                      This Visit's Progress      Conditions and Symptoms (pt-stated)   On track      I will schedule office visits, as directed by my provider. I will keep my appointment or reschedule if I have to cancel. I will notify my provider of any barriers to my plan of care. I will follow my Zone Management tool to seek urgent or emergent care. I will notify my provider of any symptoms that indicate a worsening of my condition.     Barriers: need for additional support and education  Plan for overcoming my barriers: family and ACM support  Confidence: 9/10  Anticipated Goal Completion Date: 3/1/23 Update 4-6-23 Update 5-6-23                Future Appointments   Date Time Provider Rosy Fisher   4/17/2023 10:00 AM RACQUEL Ulloa - CNP N ENT [facility] - [place]   4/20/2023  1:00 PM MD CELINE Murry SRPX Heart [facility] - [place]   5/1/2023 10:00 AM RACQUEL Richard CNP [facility]HP - [place]   5/8/2023 10:00 AM RACQUEL Foster CNP N SRPX CHF M[facility] - [place]   7/24/2023 11:00 AM STR MAMMOGRAPHY RM2  LORAD STRZ WOMEN STR Radiolog   7/25/2023  1:15 PM MD CELINE Murry SRPX Heart [facility] [place]   9/7/2023  1:00 PM Eloise Tee, [facility] M[facility] [place]

## 2023-04-17 ENCOUNTER — CLINICAL DOCUMENTATION (OUTPATIENT)
Dept: SPIRITUAL SERVICES | Facility: CLINIC | Age: 88
End: 2023-04-17

## 2023-04-17 ENCOUNTER — CARE COORDINATION (OUTPATIENT)
Dept: CARE COORDINATION | Age: 88
End: 2023-04-17

## 2023-04-17 ENCOUNTER — OFFICE VISIT (OUTPATIENT)
Dept: ENT CLINIC | Age: 88
End: 2023-04-17
Payer: MEDICARE

## 2023-04-17 VITALS
RESPIRATION RATE: 16 BRPM | SYSTOLIC BLOOD PRESSURE: 118 MMHG | HEART RATE: 83 BPM | WEIGHT: 129.8 LBS | HEIGHT: 60 IN | BODY MASS INDEX: 25.48 KG/M2 | TEMPERATURE: 97.2 F | DIASTOLIC BLOOD PRESSURE: 66 MMHG | OXYGEN SATURATION: 96 %

## 2023-04-17 DIAGNOSIS — J34.89 NASAL MUCOSA DRY: Primary | ICD-10-CM

## 2023-04-17 PROCEDURE — G8417 CALC BMI ABV UP PARAM F/U: HCPCS | Performed by: NURSE PRACTITIONER

## 2023-04-17 PROCEDURE — 1123F ACP DISCUSS/DSCN MKR DOCD: CPT | Performed by: NURSE PRACTITIONER

## 2023-04-17 PROCEDURE — 99202 OFFICE O/P NEW SF 15 MIN: CPT | Performed by: NURSE PRACTITIONER

## 2023-04-17 PROCEDURE — 1090F PRES/ABSN URINE INCON ASSESS: CPT | Performed by: NURSE PRACTITIONER

## 2023-04-17 PROCEDURE — G8427 DOCREV CUR MEDS BY ELIG CLIN: HCPCS | Performed by: NURSE PRACTITIONER

## 2023-04-17 PROCEDURE — 1036F TOBACCO NON-USER: CPT | Performed by: NURSE PRACTITIONER

## 2023-04-17 NOTE — CARE COORDINATION
Ambulatory Care Coordination Note  2023    Patient Current Location:  Home: [address] [place] [address] [address]  [address] [address]     ACM contacted the patient by telephone. Verified name and  with patient as identifiers. Provided introduction to self, and explanation of the ACM role. Challenges to be reviewed by the provider   Additional needs identified to be addressed with provider: No  none               Method of communication with provider: none. ACM: Leidy Chen, RN    Spoke with Jennifer Zamora  She called concerned regarding her DNR paperwork because it has \"invalid\" written across the paper copy that she has on file  She states that Sinai Faustin helped her on  with this. She has some questions for Marguerite Segundo and I informed her I would ask Marguerite Segundo to reach out to her for support. Staff message sent to Marguerite Segundo  All chronic conditions at baseline  No changes in vitals    Plan  Reach out to Sinai Faustin for support with DNR questions  Encourage daily weight tracking and reporting  Reinforce importance of early symptom recognition and reporting  Work towards graduation    Congestive Heart Failure Assessment    Are you currently restricting fluids?: No Restriction  Do you understand a low sodium diet?: Yes  Do you understand how to read food labels?: Yes  How many restaurant meals do you eat per week?: 1-2  Do you salt your food before tasting it?: No         Symptoms:  CHF associated angina: Neg, CHF associated dyspnea on exertion: Pos, CHF associated fatigue: Neg, CHF associated leg swelling: Neg, CHF associated orthostatic hypotension: Neg, CHF associated PND: Neg, CHF associated shortness of breath: Neg, CHF associated weakness: Neg      Symptom course: stable  Patient-reported weight (lb): 128             Offered patient enrollment in the Remote Patient Monitoring (RPM) program for in-home monitoring:  graduated .     Lab Results       None            Care Coordination Interventions    Referral from Primary Care

## 2023-04-17 NOTE — ACP (ADVANCE CARE PLANNING)
received a request from patient's Care Coordinator, Ashley Frazier RN, to contact [place] to provide clarity and greater understanding to her Advance Directives documents currently on file.  reviewed the completed [address] recently updated, which includes specific Care Preferences communicated during our encounter. In addition,  communicated to patient that she is currently a Limited Code patient, per file. In addition,  communicated to patient that she has a DNR-CCA, per file.  advised patient to have her [address] (sons) communicate her current Care Preferences to the Medical Team upon any future admissions to be sure her wishes are honored, per file.

## 2023-04-20 ENCOUNTER — OFFICE VISIT (OUTPATIENT)
Dept: CARDIOLOGY CLINIC | Age: 88
End: 2023-04-20
Payer: MEDICARE

## 2023-04-20 VITALS
SYSTOLIC BLOOD PRESSURE: 126 MMHG | BODY MASS INDEX: 25.42 KG/M2 | HEART RATE: 77 BPM | DIASTOLIC BLOOD PRESSURE: 61 MMHG | HEIGHT: 60 IN | WEIGHT: 129.5 LBS

## 2023-04-20 DIAGNOSIS — I25.10 CAD IN NATIVE ARTERY: Primary | ICD-10-CM

## 2023-04-20 PROCEDURE — G8427 DOCREV CUR MEDS BY ELIG CLIN: HCPCS | Performed by: INTERNAL MEDICINE

## 2023-04-20 PROCEDURE — 1090F PRES/ABSN URINE INCON ASSESS: CPT | Performed by: INTERNAL MEDICINE

## 2023-04-20 PROCEDURE — 1036F TOBACCO NON-USER: CPT | Performed by: INTERNAL MEDICINE

## 2023-04-20 PROCEDURE — G8417 CALC BMI ABV UP PARAM F/U: HCPCS | Performed by: INTERNAL MEDICINE

## 2023-04-20 PROCEDURE — 99214 OFFICE O/P EST MOD 30 MIN: CPT | Performed by: INTERNAL MEDICINE

## 2023-04-20 PROCEDURE — 1123F ACP DISCUSS/DSCN MKR DOCD: CPT | Performed by: INTERNAL MEDICINE

## 2023-04-20 RX ORDER — ATORVASTATIN CALCIUM 40 MG/1
40 TABLET, FILM COATED ORAL DAILY
Qty: 30 TABLET | Refills: 3 | Status: SHIPPED | OUTPATIENT
Start: 2023-04-20 | End: 2023-04-20

## 2023-04-20 RX ORDER — ATORVASTATIN CALCIUM 40 MG/1
40 TABLET, FILM COATED ORAL DAILY
Qty: 90 TABLET | Refills: 1 | Status: SHIPPED | OUTPATIENT
Start: 2023-04-20

## 2023-04-20 NOTE — PROGRESS NOTES
Pt here for check up discuss stress    Pt continues with swelling in legs and feet,
METRIX METER) w/Device KIT, 1 Device by Does not apply route daily Check blood sugar q daily, Dx E11.9, Disp: 1 kit, Rfl: 0    LORazepam (ATIVAN) 0.5 MG tablet, Take 1 tablet by mouth every 6 hours as needed for Anxiety. , Disp: , Rfl:     loratadine (CLARITIN) 10 MG capsule, Take 1 capsule by mouth daily, Disp: , Rfl:     NONFORMULARY, Super enzymes, Disp: , Rfl:     aspirin EC 81 MG EC tablet, Take 1 tablet by mouth daily, Disp: 90 tablet, Rfl: 1    furosemide (LASIX) 40 MG tablet, Take 1 tablet by mouth daily, Disp: 90 tablet, Rfl: 1    carvedilol (COREG) 3.125 MG tablet, Take 1 tablet by mouth 2 times daily (with meals), Disp: 180 tablet, Rfl: 3    famotidine (PEPCID) 20 MG tablet, TAKE 1 TABLET BY MOUTH TWICE DAILY, Disp: 180 tablet, Rfl: 3    clopidogrel (PLAVIX) 75 MG tablet, TAKE 1 TABLET BY MOUTH DAILY, Disp: 90 tablet, Rfl: 2    acetaminophen (TYLENOL) 650 MG extended release tablet, Take 1 tablet by mouth every 8 hours as needed for Pain, Disp: , Rfl:     nitroGLYCERIN (NITROSTAT) 0.4 MG SL tablet, up to max of 3 total doses.  If no relief after 1 dose, call 911., Disp: 25 tablet, Rfl: 1    Handicap Placard MISC, by Does not apply route Expires 6/20/27, Disp: 1 each, Rfl: 0    spironolactone (ALDACTONE) 50 MG tablet, TAKE 1 TABLET BY MOUTH DAILY, Disp: 90 tablet, Rfl: 3    Blood Pressure KIT, Check blood pressure q daily, Disp: 1 kit, Rfl: 0    Ascorbic Acid (VITAMIN C) 1000 MG tablet, Take 1 tablet by mouth daily, Disp: , Rfl:     blood glucose monitor strips, Check blood sugar q daily, Dx R73.01, Disp: 100 strip, Rfl: 3    polyethyl glycol-propyl glycol 0.4-0.3 % (SYSTANE) 0.4-0.3 % ophthalmic solution, 1 drop in the morning and at bedtime Both eyes, Disp: , Rfl:     docusate sodium (COLACE) 100 MG capsule, Take 1 capsule by mouth 2 times daily, Disp: , Rfl:     OLIVE LEAF PO, Take 450 mg by mouth daily, Disp: , Rfl:     Multiple Vitamins-Minerals (THERAPEUTIC MULTIVITAMIN-MINERALS) tablet, Take 1 tablet
Unable to speak

## 2023-04-24 RX ORDER — CLOPIDOGREL BISULFATE 75 MG/1
75 TABLET ORAL DAILY
Qty: 90 TABLET | Refills: 2 | Status: SHIPPED | OUTPATIENT
Start: 2023-04-24

## 2023-04-25 ENCOUNTER — CARE COORDINATION (OUTPATIENT)
Dept: CARE COORDINATION | Age: 88
End: 2023-04-25

## 2023-04-25 NOTE — CARE COORDINATION
review, patient has OV with PCP on 5/1/23 and OV at CHF clinic on 5/8/23. Barriers to meeting goals: overwhelmed by complexity of regimen        Nutrition Monitoring and Evaluation  Indicator/Goal Criteria Progress   #1 Eat balanced meals consistently throughout the day. #1 Focus on eating 3 meals/day and make these meals balanced using the MyPlate [id number]-8/6 plate fruits and/or vegetables, 1/4 plate protein and 1/4 plate starchy carbohydrates with 8 oz glass of low fat milk if desired. #1 Patient is eating 3 meals/day and making meals balanced using MyPlate. #2  Monitor daily sodium intake. Keep sodium from food and beverages to no more than 2000 mg/day. #2 Avoid the salt shaker. Read food labels to help choose lower sodium options (<140 mg of sodium per serving). #2 Pt is following a low sodium diet. Patient is reading food labels closely. #3  Monitor daily weights. #3 Weigh self daily and keep a log. Notify MD of sudden weight gain. #3 Pt is weighing self daily. Patient states this AM .4 lbs. Plan of Care:  RD encouraged pt to keep working toward goals set. RD will follow up with pt to discuss any questions pt has and check the progress toward goals. Follow Up:    RD will call pt in 3 weeks to follow up and answer any nutrition related questions at that time.      [address] [place] [address],   [phone]

## 2023-04-27 DIAGNOSIS — I10 ESSENTIAL HYPERTENSION: ICD-10-CM

## 2023-04-27 RX ORDER — SPIRONOLACTONE 50 MG/1
50 TABLET, FILM COATED ORAL DAILY
Qty: 90 TABLET | Refills: 3 | Status: SHIPPED | OUTPATIENT
Start: 2023-04-27

## 2023-05-01 ENCOUNTER — CARE COORDINATION (OUTPATIENT)
Dept: CARE COORDINATION | Age: 88
End: 2023-05-01

## 2023-05-01 ENCOUNTER — OFFICE VISIT (OUTPATIENT)
Dept: FAMILY MEDICINE CLINIC | Age: 88
End: 2023-05-01
Payer: MEDICARE

## 2023-05-01 VITALS
HEART RATE: 68 BPM | WEIGHT: 129.6 LBS | RESPIRATION RATE: 16 BRPM | BODY MASS INDEX: 25.45 KG/M2 | SYSTOLIC BLOOD PRESSURE: 118 MMHG | DIASTOLIC BLOOD PRESSURE: 60 MMHG | HEIGHT: 60 IN | TEMPERATURE: 97.9 F

## 2023-05-01 DIAGNOSIS — K21.9 GASTROESOPHAGEAL REFLUX DISEASE WITHOUT ESOPHAGITIS: Primary | ICD-10-CM

## 2023-05-01 DIAGNOSIS — K21.9 GASTROESOPHAGEAL REFLUX DISEASE WITHOUT ESOPHAGITIS: ICD-10-CM

## 2023-05-01 PROCEDURE — 1123F ACP DISCUSS/DSCN MKR DOCD: CPT | Performed by: NURSE PRACTITIONER

## 2023-05-01 PROCEDURE — 1090F PRES/ABSN URINE INCON ASSESS: CPT | Performed by: NURSE PRACTITIONER

## 2023-05-01 PROCEDURE — G8427 DOCREV CUR MEDS BY ELIG CLIN: HCPCS | Performed by: NURSE PRACTITIONER

## 2023-05-01 PROCEDURE — 1036F TOBACCO NON-USER: CPT | Performed by: NURSE PRACTITIONER

## 2023-05-01 PROCEDURE — G8417 CALC BMI ABV UP PARAM F/U: HCPCS | Performed by: NURSE PRACTITIONER

## 2023-05-01 PROCEDURE — 99215 OFFICE O/P EST HI 40 MIN: CPT | Performed by: NURSE PRACTITIONER

## 2023-05-01 RX ORDER — OMEPRAZOLE 20 MG/1
20 CAPSULE, DELAYED RELEASE ORAL
Qty: 30 CAPSULE | Refills: 1 | Status: SHIPPED | OUTPATIENT
Start: 2023-05-01

## 2023-05-01 RX ORDER — OMEPRAZOLE 20 MG/1
CAPSULE, DELAYED RELEASE ORAL
Qty: 90 CAPSULE | OUTPATIENT
Start: 2023-05-01

## 2023-05-01 RX ORDER — FUROSEMIDE 20 MG/1
20 TABLET ORAL DAILY
COMMUNITY
Start: 2023-03-29

## 2023-05-01 NOTE — CARE COORDINATION
Joan Jennings have been working with [facility] on Care Coordination program to provide support and education to help manage chronic conditions. Pt has met those goals and all education has been completed with no new barriers noted. I would like to graduate pt from Care Coordination at this time pending PCP approval.  Do you approve of this discharge? Please advise. Thank you.

## 2023-05-01 NOTE — PROGRESS NOTES
developing a plan of care. All questions answered. Patient verbalized understanding. No follow-ups on file. Start above treatments    All copied or forwarded information in the progress note was verified by me to be accurate at the time of visit  Patient's past medical, surgical, social and family history were reviewed and updated     All patient questions answered. Patient voiced understanding.

## 2023-05-01 NOTE — PATIENT INSTRUCTIONS
Take Loratadine daily in the morning   Take Montelukast at nighttime    When taking meds in the morning, take the thyroid medicine first thing in the morning, then wait about an hour, then take Omeprazole and wait about 30 minutes before eating or taking the rest of your meds.

## 2023-05-07 ASSESSMENT — ENCOUNTER SYMPTOMS
SHORTNESS OF BREATH: 0
COUGH: 0

## 2023-05-08 ENCOUNTER — OFFICE VISIT (OUTPATIENT)
Dept: CARDIOLOGY CLINIC | Age: 88
End: 2023-05-08
Payer: MEDICARE

## 2023-05-08 VITALS
SYSTOLIC BLOOD PRESSURE: 126 MMHG | OXYGEN SATURATION: 97 % | DIASTOLIC BLOOD PRESSURE: 60 MMHG | HEART RATE: 83 BPM | WEIGHT: 130.4 LBS | HEIGHT: 60 IN | BODY MASS INDEX: 25.6 KG/M2

## 2023-05-08 DIAGNOSIS — I25.5 ISCHEMIC CARDIOMYOPATHY: ICD-10-CM

## 2023-05-08 DIAGNOSIS — I10 ESSENTIAL HYPERTENSION: ICD-10-CM

## 2023-05-08 DIAGNOSIS — I50.22 CHF NYHA CLASS II, CHRONIC, SYSTOLIC (HCC): Primary | ICD-10-CM

## 2023-05-08 DIAGNOSIS — I25.10 CORONARY ARTERY DISEASE INVOLVING NATIVE HEART WITHOUT ANGINA PECTORIS, UNSPECIFIED VESSEL OR LESION TYPE: ICD-10-CM

## 2023-05-08 PROCEDURE — 1123F ACP DISCUSS/DSCN MKR DOCD: CPT | Performed by: NURSE PRACTITIONER

## 2023-05-08 PROCEDURE — G8427 DOCREV CUR MEDS BY ELIG CLIN: HCPCS | Performed by: NURSE PRACTITIONER

## 2023-05-08 PROCEDURE — 99214 OFFICE O/P EST MOD 30 MIN: CPT | Performed by: NURSE PRACTITIONER

## 2023-05-08 PROCEDURE — 1090F PRES/ABSN URINE INCON ASSESS: CPT | Performed by: NURSE PRACTITIONER

## 2023-05-08 PROCEDURE — G8417 CALC BMI ABV UP PARAM F/U: HCPCS | Performed by: NURSE PRACTITIONER

## 2023-05-08 PROCEDURE — 1036F TOBACCO NON-USER: CPT | Performed by: NURSE PRACTITIONER

## 2023-05-08 ASSESSMENT — ENCOUNTER SYMPTOMS
ABDOMINAL DISTENTION: 0
CONSTIPATION: 0

## 2023-05-08 NOTE — PATIENT INSTRUCTIONS
You may receive a survey regarding the care you received during your visit. Your input is valuable to us. We encourage you to complete and return your survey. We hope you will choose us in the future for your healthcare needs.     Continue:  Continue current medications  Daily weights and record  Fluid restriction of 2 Liters per day  Limit sodium in diet to around 8785-5238 mg/day  Monitor BP  Activity as tolerated     Call the Heart Failure Clinic for any of the following symptoms: [phone]  Weight gain of 2-3 pounds in 1 day or 5 pounds in 1 week  Increased shortness of breath  Shortness of breath while laying down  Cough  Chest pain  Swelling in feet, ankles or legs  Tenderness or bloating in the abdomen  Fatigue   Decreased appetite or nausea   Confusion

## 2023-05-08 NOTE — PROGRESS NOTES
Heart Failure Clinic       Visit Date: 5/8/2023  Cardiologist:  Dr. Gian Reyes  Primary Care Physician: Dr. Sameer Velazquez, APRN - CNP  Referred by: Yisel Hatfield is a 80 y.o. female who presents today for:  Chief Complaint   Patient presents with    Congestive Heart Failure       HPI:   Kelly Alicea is a 80 y.o. female who presents to the office for a follow up patient visit in the heart failure clinic. 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 extra Lasix x 2 days  3/2023 - 128#  Feeling good overall   [facility]. Lives alone, still drives. Does house work, grocery shopping. Notes mild ankle edema L>R, little worse by end day. Very scant noted on exam today. Mild bloating noted - c/o bowels irregular lately. Trying some Metamucil  Denies SOB, orthopnea. Chest pain described as mild center chest, worse w/ exertion. She does note it feels little like nerve pain she had from shingles in past (same area)      TODAY, 5/2023 -  130#   Doing well. No fluid on exam.  Notes some very scant ankle edema. Continues w/ same chest pain - nerve like. No worse w/ exertion. Walked from back of parking lot today - no CP, some mild SOB. Appears Lasix decreased 20/day in past month - shes not exactly sure who did it. States was decreased d/t fatigue. Not wt gain or worsening s/s since decrease    Home weight: stable  Home blood pressure: stable - 90-100s    Past Medical History:   Diagnosis Date    Arthritis     Cancer ([facility] [address] 2013    SKIN CANCER ON LEFT ANKLE    Diverticulosis     Hyperlipidemia     Hypertension     Hypothyroidism     Psoriasis     S/P angioplasty with stent 05/06/2021    3 stents to LAD, 1 stent to OM, 2 stents to diag.  per Dr. Sana Ma    Shinroxanne     Thyroid disorder     UTI

## 2023-05-16 ENCOUNTER — CARE COORDINATION (OUTPATIENT)
Dept: CARE COORDINATION | Age: 88
End: 2023-05-16

## 2023-05-16 NOTE — CARE COORDINATION
Gabriel Welch  5/16/2023    Registered Dietitian Progress Note for Care Coordination    Assessment: Odalis Jennings is a 80 y.o. female. RD referred for CHF- Low Sodium Diet Education. RD spoke with patient for initial nutrition assessment on 1/18/23 and has been following up with patient. RD called to follow up with pt today 5/16/23. RD discussed previous goals with pt. Patient states she is doing \"pretty good. \" Per chart review, patient had OV with PCP on 5/1/23 and patient had OV with Cardio on 5/8/23. RD reiterated the importance of eating balanced meals, taking medicine as directed, checking BS and following a low sodium diet. Patient is eating balanced meals and following a low sodium diet. Patient states she has been eating chicken, salmon, homemade meatloaf, homemade chicken salad (chicken breast chopped up, light hannon and relish), homemade banana muffins with peanut butter, toast with peanut butter, eggs with onions and peppers, etc. Patient had a question on a food label for no sugar added sliced pears, RD reviewed nutrition label with patient. Patient states at night she has been eating 1/4 cup of cottage cheese with 3-4 slices of peaches or pears for a snack. Patient provided BS readings:     5/12/23 FBS 92 mg/dL  5/13/23  mg/dL  5/14/23 FBS 97 mg/dL   5/15/23 FBS 88 mg/dL  5/16/23 FBS 96 mg/dL     Patient has no nutrition related questions or concerns at this time. Per chart review, patient has OV with PCP on 5/31/23. RD offered to follow up with patient in 1 month, pt accepted and very appreciative. Nutrition Monitoring and Evaluation  Indicator/Goal Criteria Progress   #1 Eat balanced meals consistently throughout the day. #1 Focus on eating 3 meals/day and make these meals balanced using the MyPlate BMJARPCAD-6/4 plate fruits and/or vegetables, 1/4 plate protein and 1/4 plate starchy carbohydrates with 8 oz glass of low fat milk if desired.  #1 Patient is eating 3 meals/day and making meals

## 2023-05-31 ENCOUNTER — OFFICE VISIT (OUTPATIENT)
Dept: FAMILY MEDICINE CLINIC | Age: 88
End: 2023-05-31
Payer: MEDICARE

## 2023-05-31 VITALS
DIASTOLIC BLOOD PRESSURE: 58 MMHG | OXYGEN SATURATION: 98 % | SYSTOLIC BLOOD PRESSURE: 112 MMHG | WEIGHT: 132.2 LBS | TEMPERATURE: 97 F | HEART RATE: 67 BPM | BODY MASS INDEX: 25.95 KG/M2 | RESPIRATION RATE: 16 BRPM | HEIGHT: 60 IN

## 2023-05-31 DIAGNOSIS — K21.9 GASTROESOPHAGEAL REFLUX DISEASE WITHOUT ESOPHAGITIS: ICD-10-CM

## 2023-05-31 DIAGNOSIS — M81.0 OSTEOPOROSIS, UNSPECIFIED OSTEOPOROSIS TYPE, UNSPECIFIED PATHOLOGICAL FRACTURE PRESENCE: Primary | ICD-10-CM

## 2023-05-31 PROCEDURE — 1090F PRES/ABSN URINE INCON ASSESS: CPT | Performed by: NURSE PRACTITIONER

## 2023-05-31 PROCEDURE — G8417 CALC BMI ABV UP PARAM F/U: HCPCS | Performed by: NURSE PRACTITIONER

## 2023-05-31 PROCEDURE — 1123F ACP DISCUSS/DSCN MKR DOCD: CPT | Performed by: NURSE PRACTITIONER

## 2023-05-31 PROCEDURE — 99214 OFFICE O/P EST MOD 30 MIN: CPT | Performed by: NURSE PRACTITIONER

## 2023-05-31 PROCEDURE — 1036F TOBACCO NON-USER: CPT | Performed by: NURSE PRACTITIONER

## 2023-05-31 PROCEDURE — G8427 DOCREV CUR MEDS BY ELIG CLIN: HCPCS | Performed by: NURSE PRACTITIONER

## 2023-05-31 RX ORDER — OMEPRAZOLE 20 MG/1
20 CAPSULE, DELAYED RELEASE ORAL
Qty: 90 CAPSULE | Refills: 3 | Status: SHIPPED | OUTPATIENT
Start: 2023-05-31

## 2023-05-31 NOTE — PROGRESS NOTES
Isatu Sorto is a 80 y.o. female thatpresents for Follow-up      History obtained today from Patient. GERD has improved since starting Omeprazole  Would like to continue this, needs refill    Wondering if she should have a Dexa  Had one in 2016, declined Fosamax and Reclast to treat her osteoporosis  Was started on Ca and Vit D supplements    Wanting some info on dietary recs    I have reviewed the patient's past medical history, past surgical history, allergies,medications, social and family history and I have made updates where appropriate. Past Medical History:   Diagnosis Date    Arthritis     Cancer ([facility] [address] 2013    SKIN CANCER ON LEFT ANKLE    Diverticulosis     Hyperlipidemia     Hypertension     Hypothyroidism     Psoriasis     S/P angioplasty with stent 05/06/2021    3 stents to LAD, 1 stent to OM, 2 stents to diag. per Dr. Homero Bose     Thyroid disorder     UTI (urinary tract infection)        Social History     Tobacco Use    Smoking status: Never     Passive exposure: Never    Smokeless tobacco: Never   Vaping Use    Vaping Use: Never used   Substance Use Topics    Alcohol use: No    Drug use: No       Family History   Problem Relation Age of Onset    Breast Cancer Sister 68    Stroke Sister     Heart Disease Sister     Cancer Child     Other Other         visual problems         Review of Systems        PHYSICAL EXAM:  BP (!) 112/58   Pulse 67   Temp 97 °F (36.1 °C) (Infrared)   Resp 16   Ht 5' (1.524 m)   Wt 132 lb 3.2 oz (60 kg)   SpO2 98%   BMI 25.82 kg/m²     Physical Exam  Constitutional:       Appearance: Normal appearance. HENT:      Head: Normocephalic and atraumatic. Right Ear: External ear normal.      Left Ear: External ear normal.      Nose: Nose normal.      Mouth/Throat:      Mouth: Mucous membranes are moist.   Eyes:      Conjunctiva/sclera: Conjunctivae normal.   Cardiovascular:      Rate and Rhythm: Normal rate and regular rhythm. Pulses: Normal pulses.

## 2023-06-26 RX ORDER — ATORVASTATIN CALCIUM 40 MG/1
40 TABLET, FILM COATED ORAL DAILY
Qty: 90 TABLET | Refills: 0 | Status: SHIPPED | OUTPATIENT
Start: 2023-06-26

## 2023-06-30 ENCOUNTER — HOSPITAL ENCOUNTER (EMERGENCY)
Age: 88
Discharge: HOME | End: 2023-06-30
Payer: MEDICARE

## 2023-06-30 VITALS
TEMPERATURE: 98.2 F | BODY MASS INDEX: 25.32 KG/M2 | DIASTOLIC BLOOD PRESSURE: 70 MMHG | RESPIRATION RATE: 18 BRPM | OXYGEN SATURATION: 98 % | WEIGHT: 129 LBS | SYSTOLIC BLOOD PRESSURE: 155 MMHG | HEIGHT: 60 IN | HEART RATE: 74 BPM

## 2023-06-30 DIAGNOSIS — J06.9 VIRAL URI WITH COUGH: Primary | ICD-10-CM

## 2023-06-30 DIAGNOSIS — J02.9 VIRAL PHARYNGITIS: ICD-10-CM

## 2023-06-30 LAB
S PYO AG THROAT QL: NEGATIVE
SARS-COV-2 RDRP RESP QL NAA+PROBE: NOT  DETECTED

## 2023-06-30 PROCEDURE — 99213 OFFICE O/P EST LOW 20 MIN: CPT | Performed by: EMERGENCY MEDICINE

## 2023-06-30 PROCEDURE — 87635 SARS-COV-2 COVID-19 AMP PRB: CPT

## 2023-06-30 PROCEDURE — 99213 OFFICE O/P EST LOW 20 MIN: CPT

## 2023-06-30 PROCEDURE — 87651 STREP A DNA AMP PROBE: CPT

## 2023-06-30 RX ORDER — FLUTICASONE PROPIONATE 50 MCG
1 SPRAY, SUSPENSION (ML) NASAL DAILY
Qty: 16 G | Refills: 0 | Status: SHIPPED | OUTPATIENT
Start: 2023-06-30

## 2023-06-30 ASSESSMENT — ENCOUNTER SYMPTOMS
COUGH: 1
RHINORRHEA: 0
ABDOMINAL PAIN: 0
SORE THROAT: 1
SHORTNESS OF BREATH: 0
CHEST TIGHTNESS: 0
WHEEZING: 0

## 2023-06-30 ASSESSMENT — PAIN - FUNCTIONAL ASSESSMENT: PAIN_FUNCTIONAL_ASSESSMENT: NONE - DENIES PAIN

## 2023-07-05 ENCOUNTER — OFFICE VISIT (OUTPATIENT)
Dept: FAMILY MEDICINE CLINIC | Age: 88
End: 2023-07-05
Payer: MEDICARE

## 2023-07-05 VITALS
RESPIRATION RATE: 14 BRPM | HEART RATE: 70 BPM | BODY MASS INDEX: 25.05 KG/M2 | HEIGHT: 60 IN | WEIGHT: 127.6 LBS | SYSTOLIC BLOOD PRESSURE: 134 MMHG | DIASTOLIC BLOOD PRESSURE: 62 MMHG | TEMPERATURE: 98.2 F | OXYGEN SATURATION: 98 %

## 2023-07-05 DIAGNOSIS — J06.9 URI WITH COUGH AND CONGESTION: Primary | ICD-10-CM

## 2023-07-05 PROCEDURE — G8420 CALC BMI NORM PARAMETERS: HCPCS | Performed by: NURSE PRACTITIONER

## 2023-07-05 PROCEDURE — 1123F ACP DISCUSS/DSCN MKR DOCD: CPT | Performed by: NURSE PRACTITIONER

## 2023-07-05 PROCEDURE — G8427 DOCREV CUR MEDS BY ELIG CLIN: HCPCS | Performed by: NURSE PRACTITIONER

## 2023-07-05 PROCEDURE — 99213 OFFICE O/P EST LOW 20 MIN: CPT | Performed by: NURSE PRACTITIONER

## 2023-07-05 PROCEDURE — 1036F TOBACCO NON-USER: CPT | Performed by: NURSE PRACTITIONER

## 2023-07-05 PROCEDURE — 1090F PRES/ABSN URINE INCON ASSESS: CPT | Performed by: NURSE PRACTITIONER

## 2023-07-05 RX ORDER — CEFDINIR 300 MG/1
300 CAPSULE ORAL DAILY
Qty: 7 CAPSULE | Refills: 0 | Status: SHIPPED | OUTPATIENT
Start: 2023-07-05 | End: 2023-07-12

## 2023-07-05 ASSESSMENT — ENCOUNTER SYMPTOMS
SINUS PAIN: 1
SINUS PRESSURE: 1
RHINORRHEA: 1
SORE THROAT: 0

## 2023-07-07 ENCOUNTER — TELEPHONE (OUTPATIENT)
Dept: FAMILY MEDICINE CLINIC | Age: 88
End: 2023-07-07

## 2023-07-07 NOTE — TELEPHONE ENCOUNTER
----- Message from RACQUEL Theodore CNP sent at 7/5/2023 11:29 AM EDT -----  Fu on cough and throat congestion    Thank you

## 2023-07-07 NOTE — TELEPHONE ENCOUNTER
I called and spoke to [facility] and she states that she is starting to feel a lot better. Pt states that she is finally getting the phlegm up and it has helped a lot. Pt has no complaints at this time.

## 2023-07-13 ENCOUNTER — CARE COORDINATION (OUTPATIENT)
Dept: CARE COORDINATION | Age: 88
End: 2023-07-13

## 2023-07-13 NOTE — CARE COORDINATION
Crystal Welch  7/13/2023    Registered Dietitian Progress Note for Care Coordination    Assessment: Iwona Stone is a 80 y.o. female. RD referred for CHF- Low Sodium Diet Education. RD spoke with patient for initial nutrition assessment on 1/18/23 and has been following up with patient. RD called to follow up with pt today 7/13/23. RD discussed previous goals with pt. Patient states she is doing \"pretty good. \" Per chart review, patient had OV with PCP on 7/5/23 and patient's  lb. RD reiterated the importance of eating balanced meals, supplementing with ONS daily and following a low sodium diet closely. Patient states she has been making meals ahead of time and freezing them. Patient states she made an egg casserole with sausage, hamburger and onion to have for breakfast once in awhile. Patient states she made homemade meatloaf with mashed potatoes. Patient states for a snack she has been eating cottage cheese with lime jello, apples and carrots. Patient states she is drinking vanilla Glucerna (pt states she sips on a bottle throughout the week). RD reiterated the importance of meeting estimated nutrition needs to help prevent further weight loss. Recommended drinking one Glucerna daily to help better meet estimated nutrition needs daily. Patient states she has been going to the [facility] to walk on the treadmill- pt states she is walking about 2000 steps. RD acknowledged patient's awesome work. Patient has no nutrition related questions or concerns at this time. Nutrition Monitoring and Evaluation  Indicator/Goal Criteria Progress   #1 Eat balanced meals consistently throughout the day. #1 Focus on eating 3 meals/day and make these meals balanced using the MyPlate [id number]-6/2 plate fruits and/or vegetables, 1/4 plate protein and 1/4 plate starchy carbohydrates with 8 oz glass of low fat milk if desired. #1 Patient is eating 3 meals/day and making meals balanced using MyPlate.     #2  Monitor

## 2023-07-17 ENCOUNTER — HOSPITAL ENCOUNTER (OUTPATIENT)
Age: 88
Discharge: HOME | End: 2023-07-17
Payer: MEDICARE

## 2023-07-17 DIAGNOSIS — I50.22 CHF NYHA CLASS II, CHRONIC, SYSTOLIC (HCC): ICD-10-CM

## 2023-07-17 LAB
ANION GAP SERPL CALC-SCNC: 11 MEQ/L (ref 8–16)
BUN SERPL-MCNC: 22 MG/DL (ref 7–22)
CALCIUM SERPL-MCNC: 9.5 MG/DL (ref 8.5–10.5)
CHLORIDE SERPL-SCNC: 106 MEQ/L (ref 98–111)
CO2 SERPL-SCNC: 26 MEQ/L (ref 23–33)
CREAT SERPL-MCNC: 0.9 MG/DL (ref 0.4–1.2)
GFR SERPL CREATININE-BSD FRML MDRD: 58 ML/MIN/1.73M2
GLUCOSE SERPL-MCNC: 113 MG/DL (ref 70–108)
POTASSIUM SERPL-SCNC: 4.5 MEQ/L (ref 3.5–5.2)
SODIUM SERPL-SCNC: 143 MEQ/L (ref 135–145)

## 2023-07-17 PROCEDURE — 36415 COLL VENOUS BLD VENIPUNCTURE: CPT

## 2023-07-17 PROCEDURE — 80048 BASIC METABOLIC PNL TOTAL CA: CPT

## 2023-07-24 ENCOUNTER — HOSPITAL ENCOUNTER (OUTPATIENT)
Dept: WOMENS IMAGING | Age: 88
Discharge: HOME | End: 2023-07-24
Payer: MEDICARE

## 2023-07-24 DIAGNOSIS — M81.0 OSTEOPOROSIS, UNSPECIFIED OSTEOPOROSIS TYPE, UNSPECIFIED PATHOLOGICAL FRACTURE PRESENCE: ICD-10-CM

## 2023-07-24 DIAGNOSIS — Z12.31 VISIT FOR SCREENING MAMMOGRAM: ICD-10-CM

## 2023-07-24 PROCEDURE — 77063 BREAST TOMOSYNTHESIS BI: CPT

## 2023-07-24 PROCEDURE — 77080 DXA BONE DENSITY AXIAL: CPT

## 2023-07-24 RX ORDER — FUROSEMIDE 40 MG/1
40 TABLET ORAL DAILY
Qty: 90 TABLET | Refills: 1 | OUTPATIENT
Start: 2023-07-24

## 2023-07-25 ENCOUNTER — OFFICE VISIT (OUTPATIENT)
Dept: CARDIOLOGY CLINIC | Age: 88
End: 2023-07-25
Payer: MEDICARE

## 2023-07-25 ENCOUNTER — TELEPHONE (OUTPATIENT)
Dept: FAMILY MEDICINE CLINIC | Age: 88
End: 2023-07-25

## 2023-07-25 VITALS
SYSTOLIC BLOOD PRESSURE: 156 MMHG | HEART RATE: 75 BPM | HEIGHT: 60 IN | DIASTOLIC BLOOD PRESSURE: 63 MMHG | WEIGHT: 130 LBS | BODY MASS INDEX: 25.52 KG/M2

## 2023-07-25 DIAGNOSIS — M81.0 OSTEOPOROSIS, UNSPECIFIED OSTEOPOROSIS TYPE, UNSPECIFIED PATHOLOGICAL FRACTURE PRESENCE: Primary | ICD-10-CM

## 2023-07-25 DIAGNOSIS — R53.82 CHRONIC FATIGUE: Primary | ICD-10-CM

## 2023-07-25 PROCEDURE — G8417 CALC BMI ABV UP PARAM F/U: HCPCS | Performed by: INTERNAL MEDICINE

## 2023-07-25 PROCEDURE — 1123F ACP DISCUSS/DSCN MKR DOCD: CPT | Performed by: INTERNAL MEDICINE

## 2023-07-25 PROCEDURE — 99214 OFFICE O/P EST MOD 30 MIN: CPT | Performed by: INTERNAL MEDICINE

## 2023-07-25 PROCEDURE — G8427 DOCREV CUR MEDS BY ELIG CLIN: HCPCS | Performed by: INTERNAL MEDICINE

## 2023-07-25 PROCEDURE — 1090F PRES/ABSN URINE INCON ASSESS: CPT | Performed by: INTERNAL MEDICINE

## 2023-07-25 PROCEDURE — 93000 ELECTROCARDIOGRAM COMPLETE: CPT | Performed by: INTERNAL MEDICINE

## 2023-07-25 PROCEDURE — 1036F TOBACCO NON-USER: CPT | Performed by: INTERNAL MEDICINE

## 2023-07-25 NOTE — TELEPHONE ENCOUNTER
----- Message from RACQUEL Hackett CNP sent at 7/24/2023  5:24 PM EDT -----  Dexa showed osteoporosis   She has had some worsening of it since last Dexa scan completed.    Would she be interested in treatment

## 2023-07-25 NOTE — PROGRESS NOTES
Pt C/O swelling in feet, fatigued       Pt denies CP, SOB, Headache, dizziness, heart palpitations,
presence is advised     AssessmentPlan:     Abnormal stress test  Mild ischemic CMP, HFmrEF  NSTEMI Hx  PCI LAD, OM 5/2021  HTN  Dyslipidemia  Hypothyroidism     On 5/2021, [facility]. Patient underwent cardiac cath on 5/6/2021 which showed severe LAD 80-90% and DX 90% disease, Ostial OM 90%, RCA non-dominant. She underwent PCI of the LAD x 3 JUSTUS, PCI of the OM x 1 UJSTUS. Procedure was complicated by loss of side branch (D1) and no re-flow in LAD. IABP was placed post PCI  Echo 7/2022 revealed an EF 45-50%, Mild MR, Mild AI  Stress test 3/2023 was positive for ischemia. Stress test findings were d/w patient and her family. They wish for continued medical therapy and to avoid invasive procedures    Patient denies chest pain  Patient was advised to report to the ER if has recurrent symptoms with specific instructions given about severity and duration of symptoms  ASA  Lipitor  Hyperlipidemia: on statins, followed periodically. Patient need periodic lipid and liver profile  Coreg  Plavix  The patient was instructed to check the blood pressure at home, and record the readings. Patient will call office with blood pressure readings, will adjust patient's antihypertensive medications as needed accordingly   On aldactone, lasix  No signs of volume overload       Above findings and plan of care were discussed with patient in details, patient's questions were answered.      Disposition:  RTC in 12 months             Electronically signed by Remy Randhawa MD, [facility], [address] [place] [address]    7/25/2023 at 1:19 PM EDT

## 2023-07-27 RX ORDER — DENOSUMAB 60 MG/ML
60 INJECTION SUBCUTANEOUS ONCE
Qty: 1 ML | Refills: 0 | Status: SHIPPED | OUTPATIENT
Start: 2023-07-27 | End: 2023-07-27

## 2023-07-27 NOTE — TELEPHONE ENCOUNTER
Pt states she has taken actonel in the past, she didn't remember why she stopped it, but she does have GI issues, so that's probably true    She willing to try anything

## 2023-07-27 NOTE — TELEPHONE ENCOUNTER
Pt informed and verbalized understanding.    Pt is ok with prolia    Pt will go to [facility] for labs/orders in epic

## 2023-07-27 NOTE — TELEPHONE ENCOUNTER
We could try Prolia injection every 6 mos. She still needs a ca and vit d level checked in the coming days. She would likely have to go up to outpatient nursing for the injection.

## 2023-07-27 NOTE — TELEPHONE ENCOUNTER
Looks like in the past she took Actonel and had some kind of GI issues with it. The meds we would use to treat her osteoporosis are in the same class and could give her the same trouble. I reviewed her chart and don't see a record of her being prescribed Actonel but it could've been before she established with Dr. Andrea Espinal.   Does she remember taking a medication for osteoporosis in the past?

## 2023-07-28 ENCOUNTER — HOSPITAL ENCOUNTER (OUTPATIENT)
Age: 88
Discharge: HOME | End: 2023-07-28
Payer: MEDICARE

## 2023-07-28 DIAGNOSIS — M81.0 OSTEOPOROSIS, UNSPECIFIED OSTEOPOROSIS TYPE, UNSPECIFIED PATHOLOGICAL FRACTURE PRESENCE: ICD-10-CM

## 2023-07-28 DIAGNOSIS — M81.0 SENILE OSTEOPOROSIS: ICD-10-CM

## 2023-07-28 LAB
25(OH)D3 SERPL-MCNC: 33 NG/ML (ref 30–100)
CALCIUM SERPL-MCNC: 9.8 MG/DL (ref 8.5–10.5)

## 2023-07-28 PROCEDURE — 82310 ASSAY OF CALCIUM: CPT

## 2023-07-28 PROCEDURE — 36415 COLL VENOUS BLD VENIPUNCTURE: CPT

## 2023-07-28 PROCEDURE — 82306 VITAMIN D 25 HYDROXY: CPT

## 2023-08-02 ENCOUNTER — TELEPHONE (OUTPATIENT)
Dept: FAMILY MEDICINE CLINIC | Age: 88
End: 2023-08-02

## 2023-08-02 NOTE — TELEPHONE ENCOUNTER
Pt stopped by the office to discuss Prolia This will cost her $800 out of pocket, this is not affordable for the pt. Verito Rajwinder is asking for an alternative medication. Prolia has been canceled at the pharmacy. Spoke with Gloria at [facility] to cancel the prolia.       Please advise

## 2023-08-03 NOTE — TELEPHONE ENCOUNTER
We should avoid oral meds since she's had trouble with one of them in the past causing GI upset. She is not a candidate for Reclast because of her creatinine clearance. Evista is another option but she has that listed on her allergy list too for fatigue. This is likely why he recommended Vit D and Ca supplements to her in the past.  Would she want to continue Vit D and Ca supplementation?

## 2023-08-03 NOTE — TELEPHONE ENCOUNTER
Pt informed and verbalized understanding.    Pt is ok with CA and vit D  Pt also states she takes VIT C 1,000 mg daily

## 2023-08-07 DIAGNOSIS — K21.9 GASTROESOPHAGEAL REFLUX DISEASE WITHOUT ESOPHAGITIS: ICD-10-CM

## 2023-08-07 RX ORDER — OMEPRAZOLE 20 MG/1
CAPSULE, DELAYED RELEASE ORAL
Qty: 90 CAPSULE | Refills: 3 | Status: SHIPPED | OUTPATIENT
Start: 2023-08-07

## 2023-08-07 NOTE — TELEPHONE ENCOUNTER
Recent Visits  Date Type Provider Dept   07/05/23 Office Visit Beulah Romero, APRN - CNP [facility]   05/31/23 Office Visit Harish Tan, APRN - CNP [facility]   05/01/23 Office Visit Harish Tan, APRN - CNP [facility]   02/08/23 Office Visit Harish Tan, APRN - CNP [facility]   12/21/22 Office Visit Harish Tan, APRN - CNP [facility]   12/16/22 Office Visit Deretha Bors,  [address]   12/15/22 Office Visit Deretha Bors, DO [facility]   10/24/22 Office Visit Derethlizzie Marnies,  [address]   10/20/22 Office Visit Deretha Bors,  [address]   07/22/22 Office Visit Harish Tan, APRN - CNP [facility] [address] recent visits within past 540 days with a meds authorizing provider and meeting all other requirements  Future Appointments  Date Type Provider Dept   09/07/23 Appointment Jim Luis,  [facility]   Showing future appointments within next 150 days with a meds authorizing provider and meeting all other requirements     Future Appointments   Date Time Provider [address]   9/7/2023  1:00 PM Jim Luis [address] [place] [address] Dr Andres   10/26/2023 11:00 AM Dustin Walters APRN - CNP N [facility] CHF [facility]   7/23/2024  1:15 PM Yamilka Gupta MD N [facility] Heart [facility]   7/25/2024  1:00 PM STR MAMMOGRAPHY RM2  [facility] STR Radiolog

## 2023-08-18 RX ORDER — FUROSEMIDE 40 MG/1
40 TABLET ORAL DAILY
Qty: 90 TABLET | Refills: 3 | Status: SHIPPED | OUTPATIENT
Start: 2023-08-18

## 2023-08-21 ENCOUNTER — TELEPHONE (OUTPATIENT)
Dept: CARDIOLOGY CLINIC | Age: 88
End: 2023-08-21

## 2023-08-21 NOTE — TELEPHONE ENCOUNTER
Pre op Risk Assessment    Procedure [facility]'s   Physician [facility] dermatology   Date of surgery/procedure 8/29/2023    Last OV 7/25/2023  Last Stress 3/21/2023  Last Echo 7/19/2022  Last Cath 5/7/2021  Last Stent 5/7/2021  Is patient on blood thinners ASA & Plavix   Hold Meds/how many days ASA 7 days & Plavix 5 days    Fax: [phone]

## 2023-09-06 PROBLEM — I21.4 NSTEMI (NON-ST ELEVATED MYOCARDIAL INFARCTION) (HCC): Status: RESOLVED | Noted: 2021-05-06 | Resolved: 2023-09-06

## 2023-09-06 PROBLEM — M25.612 DECREASED ROM OF LEFT SHOULDER: Status: RESOLVED | Noted: 2021-04-14 | Resolved: 2023-09-06

## 2023-09-06 PROBLEM — I20.89 OTHER FORMS OF ANGINA PECTORIS: Status: RESOLVED | Noted: 2023-01-30 | Resolved: 2023-09-06

## 2023-09-06 PROBLEM — M25.512 ACUTE PAIN OF LEFT SHOULDER: Status: RESOLVED | Noted: 2021-04-14 | Resolved: 2023-09-06

## 2023-09-06 PROBLEM — I20.8 OTHER FORMS OF ANGINA PECTORIS: Status: RESOLVED | Noted: 2023-01-30 | Resolved: 2023-09-06

## 2023-09-06 PROBLEM — M81.0 SENILE OSTEOPOROSIS: Status: RESOLVED | Noted: 2023-07-28 | Resolved: 2023-09-06

## 2023-09-06 RX ORDER — FLUTICASONE PROPIONATE 50 MCG
1 SPRAY, SUSPENSION (ML) NASAL DAILY
Qty: 16 G | Refills: 0 | Status: SHIPPED | OUTPATIENT
Start: 2023-09-06

## 2023-09-06 NOTE — PROGRESS NOTES
Lopid [Gemfibrozil]      Fatigue      Questran [Cholestyramine]      constipation      Synthroid [Levothyroxine Sodium]      GERD, Leg pain    Zestoretic [Lisinopril-Hydrochlorothiazide]      [facility] [facility]    Zetia [Ezetimibe]      aches     Prior to Visit Medications    Medication Sig Taking? Authorizing Provider   denosumab (PROLIA) 60 MG/ML SOSY SC injection Inject 1 mL into the skin every 6 months Yes Martin Davenport DO   fluticasone (FLONASE) 50 MCG/ACT nasal spray 1 spray by Each Nostril route daily Yes RACQUEL Zepeda CNP   furosemide (LASIX) 40 MG tablet TAKE 1 TABLET BY MOUTH DAILY Yes Dariusz Sexton MD   omeprazole (PRILOSEC) 20 MG delayed release capsule TAKE 1 CAPSULE BY MOUTH EVERY MORNING BEFORE BREAKFAST Yes RACQUEL Zepeda CNP   atorvastatin (LIPITOR) 40 MG tablet TAKE 1 TABLET BY MOUTH DAILY Yes Dariusz Sexton MD   spironolactone (ALDACTONE) 50 MG tablet Take 1 tablet by mouth daily Yes RACQUEL Kc CNP   clopidogrel (PLAVIX) 75 MG tablet TAKE 1 TABLET BY MOUTH DAILY Yes Dariusz Sexton MD   NONFORMULARY Calcium / vit d3  1200 mg-25 mcgs Yes Historical Provider, MD   montelukast (SINGULAIR) 10 MG tablet Take 1 tablet by mouth daily Yes RACQUEL Kc CNP   thyroid (ARMOUR THYROID) 30 MG tablet Take 1 tablet by mouth daily Yes RACQUEL Theodore CNP   Misc. Devices (DIGITAL GLASS SCALE) MISC 1 each by Does not apply route every morning (before breakfast) Yes RACQUEL Zepeda CNP   Blood Glucose Monitoring Suppl (TRUE METRIX METER) w/Device KIT 1 Device by Does not apply route daily Check blood sugar q daily, Dx E11.9 Yes RACQUEL Zepeda CNP   LORazepam (ATIVAN) 0.5 MG tablet Take 1 tablet by mouth every 6 hours as needed for Anxiety.  Yes Historical Provider, MD   loratadine (CLARITIN) 10 MG capsule Take 1 capsule by mouth daily Yes Historical Provider, MD   aspirin EC 81 MG EC tablet Take 1 tablet by mouth daily Yes Christopher

## 2023-09-06 NOTE — TELEPHONE ENCOUNTER
Recent Visits  Date Type Provider Dept   07/05/23 Office Visit Aurora LEONARDO BahenaN - CNP [facility]   05/31/23 Office Visit Alysia Mckeon APRN - CNP [facility]   05/01/23 Office Visit Alysia Mckeon APRN - CNP [facility]   02/08/23 Office Visit Alysia Mckeon APRN - CNP [facility]   12/21/22 Office Visit RACQUEL Pope - CNP [facility]   12/16/22 Office Visit Krysta Lapidus,  [address]   12/15/22 Office Visit Krysta Lapidus, DO [facility]   10/24/22 Office Visit Krysta Lapidus,  [address]   10/20/22 Office Visit Krysta Lapidus,  [address]   07/22/22 Office Visit RACQUEL Pope - CNP [facility] [address] recent visits within past 540 days with a meds authorizing provider and meeting all other requirements  Future Appointments  Date Type Provider Dept   09/07/23 Appointment Aramis Stone DO [facility]   Showing future appointments within next 150 days with a meds authorizing provider and meeting all other requirements     Future Appointments   Date Time Provider [address]   9/7/2023  1:00 PM Aramis Stone [address] [place] [address] Dr Andres   10/26/2023 11:00 AM RACQUEL Somers CNP N [facility]   7/23/2024  1:15 PM John Zapata MD N [facility] Heart [facility]   7/25/2024  1:00 PM STR MAMMOGRAPHY RM2  [facility] STR Radiolog

## 2023-09-07 ENCOUNTER — OFFICE VISIT (OUTPATIENT)
Dept: FAMILY MEDICINE CLINIC | Age: 88
End: 2023-09-07

## 2023-09-07 VITALS
OXYGEN SATURATION: 96 % | DIASTOLIC BLOOD PRESSURE: 60 MMHG | RESPIRATION RATE: 16 BRPM | SYSTOLIC BLOOD PRESSURE: 120 MMHG | BODY MASS INDEX: 25.6 KG/M2 | WEIGHT: 130.4 LBS | TEMPERATURE: 97 F | HEIGHT: 60 IN | HEART RATE: 78 BPM

## 2023-09-07 DIAGNOSIS — N18.31 STAGE 3A CHRONIC KIDNEY DISEASE (HCC): ICD-10-CM

## 2023-09-07 DIAGNOSIS — I50.20 HEART FAILURE WITH REDUCED EJECTION FRACTION (HCC): ICD-10-CM

## 2023-09-07 DIAGNOSIS — E03.9 HYPOTHYROIDISM, UNSPECIFIED TYPE: ICD-10-CM

## 2023-09-07 DIAGNOSIS — E78.5 DYSLIPIDEMIA: ICD-10-CM

## 2023-09-07 DIAGNOSIS — R73.9 HYPERGLYCEMIA: ICD-10-CM

## 2023-09-07 DIAGNOSIS — I10 HYPERTENSION, ESSENTIAL: ICD-10-CM

## 2023-09-07 DIAGNOSIS — M81.0 OSTEOPOROSIS, POST-MENOPAUSAL: ICD-10-CM

## 2023-09-07 DIAGNOSIS — I25.10 ASHD (ARTERIOSCLEROTIC HEART DISEASE): ICD-10-CM

## 2023-09-07 DIAGNOSIS — Z00.00 MEDICARE ANNUAL WELLNESS VISIT, SUBSEQUENT: Primary | ICD-10-CM

## 2023-09-07 DIAGNOSIS — R73.03 PREDIABETES: ICD-10-CM

## 2023-09-07 DIAGNOSIS — F41.9 ANXIETY: ICD-10-CM

## 2023-09-07 SDOH — ECONOMIC STABILITY: FOOD INSECURITY: WITHIN THE PAST 12 MONTHS, YOU WORRIED THAT YOUR FOOD WOULD RUN OUT BEFORE YOU GOT MONEY TO BUY MORE.: NEVER TRUE

## 2023-09-07 SDOH — ECONOMIC STABILITY: INCOME INSECURITY: HOW HARD IS IT FOR YOU TO PAY FOR THE VERY BASICS LIKE FOOD, HOUSING, MEDICAL CARE, AND HEATING?: NOT HARD AT ALL

## 2023-09-07 SDOH — ECONOMIC STABILITY: FOOD INSECURITY: WITHIN THE PAST 12 MONTHS, THE FOOD YOU BOUGHT JUST DIDN'T LAST AND YOU DIDN'T HAVE MONEY TO GET MORE.: NEVER TRUE

## 2023-09-07 ASSESSMENT — PATIENT HEALTH QUESTIONNAIRE - PHQ9
SUM OF ALL RESPONSES TO PHQ QUESTIONS 1-9: 0
SUM OF ALL RESPONSES TO PHQ9 QUESTIONS 1 & 2: 0
1. LITTLE INTEREST OR PLEASURE IN DOING THINGS: 0
2. FEELING DOWN, DEPRESSED OR HOPELESS: 0

## 2023-09-18 ENCOUNTER — HOSPITAL ENCOUNTER (OUTPATIENT)
Age: 88
Discharge: HOME | End: 2023-09-18
Payer: MEDICARE

## 2023-09-18 DIAGNOSIS — R73.03 PREDIABETES: ICD-10-CM

## 2023-09-18 DIAGNOSIS — I10 HYPERTENSION, ESSENTIAL: ICD-10-CM

## 2023-09-18 DIAGNOSIS — R73.9 HYPERGLYCEMIA: ICD-10-CM

## 2023-09-18 LAB
BASOPHILS ABSOLUTE: 0 THOU/MM3 (ref 0–0.1)
BASOPHILS NFR BLD AUTO: 0.8 %
CHOLEST SERPL-MCNC: 128 MG/DL (ref 100–199)
DEPRECATED MEAN GLUCOSE BLD GHB EST-ACNC: 120 MG/DL (ref 70–126)
DEPRECATED RDW RBC AUTO: 49.1 FL (ref 35–45)
EOSINOPHIL NFR BLD AUTO: 1.4 %
EOSINOPHILS ABSOLUTE: 0.1 THOU/MM3 (ref 0–0.4)
ERYTHROCYTE [DISTWIDTH] IN BLOOD BY AUTOMATED COUNT: 13.6 % (ref 11.5–14.5)
HBA1C MFR BLD HPLC: 6 % (ref 4.4–6.4)
HCT VFR BLD AUTO: 42.1 % (ref 37–47)
HDLC SERPL-MCNC: 41 MG/DL
HGB BLD-MCNC: 13.5 GM/DL (ref 12–16)
IMM GRANULOCYTES # BLD AUTO: 0.01 THOU/MM3 (ref 0–0.07)
IMM GRANULOCYTES NFR BLD AUTO: 0.2 %
LDLC SERPL CALC-MCNC: 69 MG/DL
LYMPHOCYTES ABSOLUTE: 1 THOU/MM3 (ref 1–4.8)
LYMPHOCYTES NFR BLD AUTO: 21.3 %
MCH RBC QN AUTO: 32 PG (ref 26–33)
MCHC RBC AUTO-ENTMCNC: 32.1 GM/DL (ref 32.2–35.5)
MCV RBC AUTO: 99.8 FL (ref 81–99)
MONOCYTES ABSOLUTE: 0.5 THOU/MM3 (ref 0.4–1.3)
MONOCYTES NFR BLD AUTO: 10.3 %
NEUTROPHILS NFR BLD AUTO: 66 %
NRBC BLD AUTO-RTO: 0 /100 WBC
PLATELET # BLD AUTO: 154 THOU/MM3 (ref 130–400)
PMV BLD AUTO: 10.1 FL (ref 9.4–12.4)
RBC # BLD AUTO: 4.22 MILL/MM3 (ref 4.2–5.4)
SEGMENTED NEUTROPHILS ABSOLUTE COUNT: 3.2 THOU/MM3 (ref 1.8–7.7)
TRIGL SERPL-MCNC: 92 MG/DL (ref 0–199)
TSH SERPL DL<=0.005 MIU/L-ACNC: 2.81 UIU/ML (ref 0.4–4.2)
WBC # BLD AUTO: 4.8 THOU/MM3 (ref 4.8–10.8)

## 2023-09-18 PROCEDURE — 83036 HEMOGLOBIN GLYCOSYLATED A1C: CPT

## 2023-09-18 PROCEDURE — 80061 LIPID PANEL: CPT

## 2023-09-18 PROCEDURE — 36415 COLL VENOUS BLD VENIPUNCTURE: CPT

## 2023-09-18 PROCEDURE — 85025 COMPLETE CBC W/AUTO DIFF WBC: CPT

## 2023-09-18 PROCEDURE — 84443 ASSAY THYROID STIM HORMONE: CPT

## 2023-09-19 ENCOUNTER — TELEPHONE (OUTPATIENT)
Dept: FAMILY MEDICINE CLINIC | Age: 88
End: 2023-09-19

## 2023-09-19 NOTE — TELEPHONE ENCOUNTER
----- Message from Chalo Martin, DO sent at 9/18/2023  7:04 PM EDT -----  Please let pt know that labs overall look good  A1c is in the mild prediabetic range, but at 98, she is at very low risk of developing diabetes in the future. Labs are very reassuring  No changes  F/u as planned. Let me know if questions, thanks!

## 2023-09-27 DIAGNOSIS — M81.0 SENILE OSTEOPOROSIS: ICD-10-CM

## 2023-09-27 RX ORDER — ALBUTEROL SULFATE 90 UG/1
4 AEROSOL, METERED RESPIRATORY (INHALATION) PRN
OUTPATIENT
Start: 2023-09-27

## 2023-09-27 RX ORDER — SODIUM CHLORIDE 9 MG/ML
INJECTION, SOLUTION INTRAVENOUS CONTINUOUS
OUTPATIENT
Start: 2023-09-27

## 2023-09-27 RX ORDER — EPINEPHRINE 1 MG/ML
0.3 INJECTION, SOLUTION, CONCENTRATE INTRAVENOUS PRN
OUTPATIENT
Start: 2023-09-27

## 2023-09-27 RX ORDER — ACETAMINOPHEN 325 MG/1
650 TABLET ORAL
OUTPATIENT
Start: 2023-09-27

## 2023-09-27 RX ORDER — ONDANSETRON 2 MG/ML
8 INJECTION INTRAMUSCULAR; INTRAVENOUS
OUTPATIENT
Start: 2023-09-27

## 2023-09-27 RX ORDER — DIPHENHYDRAMINE HYDROCHLORIDE 50 MG/ML
50 INJECTION INTRAMUSCULAR; INTRAVENOUS
OUTPATIENT
Start: 2023-09-27

## 2023-10-06 ENCOUNTER — HOSPITAL ENCOUNTER (EMERGENCY)
Age: 88
Discharge: HOME | End: 2023-10-06
Payer: MEDICARE

## 2023-10-06 ENCOUNTER — APPOINTMENT (OUTPATIENT)
Dept: GENERAL RADIOLOGY | Age: 88
End: 2023-10-06
Payer: MEDICARE

## 2023-10-06 VITALS
RESPIRATION RATE: 20 BRPM | HEART RATE: 76 BPM | OXYGEN SATURATION: 100 % | SYSTOLIC BLOOD PRESSURE: 167 MMHG | DIASTOLIC BLOOD PRESSURE: 104 MMHG | TEMPERATURE: 97.4 F

## 2023-10-06 DIAGNOSIS — S22.050A COMPRESSION FRACTURE OF T5 VERTEBRA, INITIAL ENCOUNTER (HCC): Primary | ICD-10-CM

## 2023-10-06 PROCEDURE — 99213 OFFICE O/P EST LOW 20 MIN: CPT

## 2023-10-06 PROCEDURE — 99213 OFFICE O/P EST LOW 20 MIN: CPT | Performed by: NURSE PRACTITIONER

## 2023-10-06 PROCEDURE — 72072 X-RAY EXAM THORAC SPINE 3VWS: CPT

## 2023-10-06 RX ORDER — CYCLOBENZAPRINE HCL 5 MG
5 TABLET ORAL 2 TIMES DAILY PRN
Qty: 15 TABLET | Refills: 0 | Status: SHIPPED | OUTPATIENT
Start: 2023-10-06 | End: 2023-10-09 | Stop reason: ALTCHOICE

## 2023-10-06 RX ORDER — LIDOCAINE 50 MG/G
OINTMENT TOPICAL
Qty: 50 G | Refills: 0 | Status: SHIPPED | OUTPATIENT
Start: 2023-10-06

## 2023-10-06 ASSESSMENT — ENCOUNTER SYMPTOMS
SHORTNESS OF BREATH: 0
BACK PAIN: 1
VOMITING: 0
NAUSEA: 0

## 2023-10-06 NOTE — ED TRIAGE NOTES
Pt to UC with a family friend. Pt reports around 10 am she was shaking a rug and tripped. Pt fell back and now is c/o upper back pain. Pt reports she did hit her head but denies any pain, LOC, dizziness or nausea.

## 2023-10-09 ENCOUNTER — OFFICE VISIT (OUTPATIENT)
Dept: FAMILY MEDICINE CLINIC | Age: 88
End: 2023-10-09
Payer: MEDICARE

## 2023-10-09 VITALS
HEART RATE: 80 BPM | BODY MASS INDEX: 25.68 KG/M2 | WEIGHT: 130.8 LBS | OXYGEN SATURATION: 98 % | DIASTOLIC BLOOD PRESSURE: 70 MMHG | RESPIRATION RATE: 18 BRPM | HEIGHT: 60 IN | TEMPERATURE: 97.5 F | SYSTOLIC BLOOD PRESSURE: 136 MMHG

## 2023-10-09 DIAGNOSIS — M54.6 ACUTE BILATERAL THORACIC BACK PAIN: Primary | ICD-10-CM
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
## 2023-10-09 DIAGNOSIS — M81.0 OSTEOPOROSIS, POST-MENOPAUSAL: ICD-10-CM

## 2023-10-09 DIAGNOSIS — Z23 NEED FOR IMMUNIZATION AGAINST INFLUENZA: ICD-10-CM
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
## 2023-10-09 PROCEDURE — G8484 FLU IMMUNIZE NO ADMIN: HCPCS | Performed by: FAMILY MEDICINE

## 2023-10-09 PROCEDURE — G8427 DOCREV CUR MEDS BY ELIG CLIN: HCPCS | Performed by: FAMILY MEDICINE

## 2023-10-09 PROCEDURE — G0008 ADMIN INFLUENZA VIRUS VAC: HCPCS | Performed by: FAMILY MEDICINE

## 2023-10-09 PROCEDURE — G8417 CALC BMI ABV UP PARAM F/U: HCPCS | Performed by: FAMILY MEDICINE

## 2023-10-09 PROCEDURE — 1036F TOBACCO NON-USER: CPT | Performed by: FAMILY MEDICINE

## 2023-10-09 PROCEDURE — 1123F ACP DISCUSS/DSCN MKR DOCD: CPT | Performed by: FAMILY MEDICINE

## 2023-10-09 PROCEDURE — 99214 OFFICE O/P EST MOD 30 MIN: CPT | Performed by: FAMILY MEDICINE

## 2023-10-09 PROCEDURE — 90694 VACC AIIV4 NO PRSRV 0.5ML IM: CPT | Performed by: FAMILY MEDICINE

## 2023-10-09 PROCEDURE — 1090F PRES/ABSN URINE INCON ASSESS: CPT | Performed by: FAMILY MEDICINE

## 2023-10-09 RX ORDER — TRAMADOL HYDROCHLORIDE 50 MG/1
50 TABLET ORAL EVERY 6 HOURS PRN
Qty: 28 TABLET | Refills: 0 | Status: SHIPPED | OUTPATIENT
Start: 2023-10-09 | End: 2023-10-16

## 2023-10-09 NOTE — PROGRESS NOTES
Immunization(s) given during visit:    Immunizations Administered       Name Date Dose Route    Influenza, FLUAD, (age 72 y+), Adjuvanted, 0.5mL 10/9/2023 0.5 mL Intramuscular    Site: Deltoid- Left    Lot: [id number]    NDC: 89810-797-36            Most recent Vaccine Information Sheet dated 8/6/21 given to pt    Vaccine Information Sheet, \"Influenza - Inactivated\"  given to Lonny Emmanuel, or parent/legal guardian of  Lonny Emmanuel and verbalized understanding. Patient responses:    Have you ever had a reaction to a flu vaccine? No  Do you have an allergy to eggs, neomycin or polymixin? No  Do you have an allergy to Thimerosal, contact lens solution, or Merthiolate? No  Have you ever had [place] Syndrome? No  Do you have any current illness? No  Do you have a temperature above 100 degrees? No  Are you pregnant? No  If pregnant, permission obtained from physician? N/A  Do you have an active neurological disorder? No      Flu vaccine given per order. Please see immunization tab.
nourished  Eyes: pupils equal, round, and reactive to light, extraocular eye movements intact, conjunctivae and eye lids without erythema  ENT: external ear and ear canal clear bilaterally, TMs intact and regular, nose without deformity, nasal mucosa and turbinates normal without polyps, oropharynx normal, dentition is normal for age  Neck: supple and non-tender without mass, no thyromegaly or thyroid nodules, no cervical lymphadenopathy  Pulmonary/Chest: clear to auscultation bilaterally- no wheezes, rales or rhonchi, normal air movement, no respiratory distress or retractions  Cardiovascular: S1 and S2 auscultated w/ RRR. No murmurs, rubs, clicks, or gallops, distal pulses intact. Abdomen: soft, non-tender, non-distended, bowel sounds physiologic,  no rebound or guarding, no masses or hernias noted. Liver and spleen without enlargement. Extremities: no cyanosis, clubbing or edema of the lower extremities. Skin: warm and dry, no rash or erythema  THORACIC SPINE EXAM:  Examination of the Thoracic Spine shows:  Deformity is not noted. Soft Tissue Swelling is not noted. Erythema is not noted. Paraspinal Spasm is  noted. Tenderness to palpation is  noted. Sensation is  intact. Flexion does produce pain. Extension does produce pain. T spine xray, 06 OCT 2023  IMPRESSION:  The mild T7 compression fracture deformity is stable. Mild to moderate T5 compression fracture deformity with approximately 50% loss of vertebral body height is more prominent and age indeterminate. T spine xray, 18 DEC 2021  IMPRESSION:  1. No acute fracture of the thoracic spine. 2. Mild to moderate thoracic spondylosis. 3. Osteopenia. 4. Stable mild compression deformities of T6, T7 and T8.      ASSESSMENT & PLAN  1. Acute bilateral thoracic back pain    Reassuring exam  Xray with ? Worsening compression, but not clear on age  Discussed MRI/Kyphoplasty.  Pt states pain isn't that bad and doesn't want to anything too

## 2023-10-16 ENCOUNTER — TELEPHONE (OUTPATIENT)
Dept: FAMILY MEDICINE CLINIC | Age: 88
End: 2023-10-16

## 2023-10-16 DIAGNOSIS — K59.03 DRUG INDUCED CONSTIPATION: ICD-10-CM

## 2023-10-16 DIAGNOSIS — M54.6 ACUTE BILATERAL THORACIC BACK PAIN: Primary | ICD-10-CM

## 2023-10-16 RX ORDER — TRAMADOL HYDROCHLORIDE 50 MG/1
50 TABLET ORAL EVERY 6 HOURS PRN
Qty: 28 TABLET | Refills: 0 | Status: SHIPPED | OUTPATIENT
Start: 2023-10-16 | End: 2023-10-23

## 2023-10-16 RX ORDER — POLYETHYLENE GLYCOL 3350 17 G/17G
17 POWDER, FOR SOLUTION ORAL DAILY PRN
Qty: 510 G | Refills: 0 | Status: SHIPPED | OUTPATIENT
Start: 2023-10-16

## 2023-10-16 NOTE — TELEPHONE ENCOUNTER
Pt states she is calling PT today   The tramadol has helped and she would like another refill  She would also like a laxative d/t tramadol causing constipation    She also gets her prolia injection Tuesday

## 2023-10-16 NOTE — TELEPHONE ENCOUNTER
ASSESSMENT & PLAN   Diagnosis Orders   1. Acute bilateral thoracic back pain  traMADol (ULTRAM) 50 MG tablet      2. Drug induced constipation  polyethylene glycol (GLYCOLAX) 17 GM/SCOOP powder          OAARS reviewed and appropriate. Controlled Substances Monitoring: Periodic Controlled Substance Monitoring: Possible medication side effects, risk of tolerance/dependence & alternative treatments discussed., No signs of potential drug abuse or diversion identified. , Assessed functional status (ability to engage in work or other purposeful activities, the pain intensity and its interference with activities of daily living, quality of family life and social activities, and the physical activity) (Ania Sanchez DO)      Future Appointments   Date Time Provider [address]   10/17/2023  1:30 PM STR MINOR ROOM 8 [facility] OP [facility] [place] [facility]   10/26/2023 11:00 AM RACQUEL Mitchell - CNP [facility]   3/11/2024 12:40  [place] [facility] [address], [address]   7/23/2024  1:15 PM Pelon Pratt MD [facility]   7/25/2024  1:00 PM STR MAMMOGRAPHY [address] [facility] [address] STR Rad/Card

## 2023-10-17 ENCOUNTER — HOSPITAL ENCOUNTER (OUTPATIENT)
Dept: NURSING | Age: 88
Discharge: HOME | End: 2023-10-17
Payer: MEDICARE

## 2023-10-17 VITALS
SYSTOLIC BLOOD PRESSURE: 166 MMHG | OXYGEN SATURATION: 98 % | HEART RATE: 73 BPM | RESPIRATION RATE: 18 BRPM | WEIGHT: 130 LBS | TEMPERATURE: 97.7 F | BODY MASS INDEX: 25.52 KG/M2 | DIASTOLIC BLOOD PRESSURE: 72 MMHG

## 2023-10-17 DIAGNOSIS — M81.0 SENILE OSTEOPOROSIS: Primary | ICD-10-CM

## 2023-10-17 PROCEDURE — 96372 THER/PROPH/DIAG INJ SC/IM: CPT

## 2023-10-17 PROCEDURE — 6360000002 HC RX W HCPCS: Performed by: FAMILY MEDICINE

## 2023-10-17 RX ORDER — ACETAMINOPHEN 325 MG/1
650 TABLET ORAL
OUTPATIENT
Start: 2024-04-16

## 2023-10-17 RX ORDER — SODIUM CHLORIDE 9 MG/ML
INJECTION, SOLUTION INTRAVENOUS CONTINUOUS
OUTPATIENT
Start: 2024-04-16

## 2023-10-17 RX ORDER — ONDANSETRON 2 MG/ML
8 INJECTION INTRAMUSCULAR; INTRAVENOUS
OUTPATIENT
Start: 2024-04-16

## 2023-10-17 RX ORDER — DIPHENHYDRAMINE HYDROCHLORIDE 50 MG/ML
50 INJECTION INTRAMUSCULAR; INTRAVENOUS
OUTPATIENT
Start: 2024-04-16

## 2023-10-17 RX ORDER — EPINEPHRINE 1 MG/ML
0.3 INJECTION, SOLUTION INTRAMUSCULAR; SUBCUTANEOUS PRN
OUTPATIENT
Start: 2024-04-16

## 2023-10-17 RX ORDER — ALBUTEROL SULFATE 90 UG/1
4 AEROSOL, METERED RESPIRATORY (INHALATION) PRN
OUTPATIENT
Start: 2024-04-16

## 2023-10-17 RX ADMIN — DENOSUMAB 60 MG: 60 INJECTION SUBCUTANEOUS at 13:57

## 2023-10-17 ASSESSMENT — PAIN - FUNCTIONAL ASSESSMENT: PAIN_FUNCTIONAL_ASSESSMENT: NONE - DENIES PAIN

## 2023-10-17 NOTE — PROGRESS NOTES
__M__ Safety:       (Environmental)  Magness to environment  Ensure ID band is correct and in place/ allergy band as needed  Assess for fall risk  Initiate fall precautions as applicable (fall band, side rails, etc.)  Call light within reach  Bed in low position/ wheels locked    ___M_ Pain:       Assess pain level and characteristics  Administer analgesics as ordered  Assess effectiveness of pain management and report to MD as needed    _M___ Knowledge Deficit:  Assess baseline knowledge  Provide teaching at level of understanding  Provide teaching via preferred learning method  Evaluate teaching effectiveness  M  ____ Hemodynamic/Respiratory Status:       (Pre and Post Procedure Monitoring)  Assess/Monitor vital signs and LOC  Assess Baseline SpO2 prior to any sedation  Obtain weight/height  Assess vital signs/ LOC until patient meets discharge criteria  Monitor procedure site and notify MD of any issues

## 2023-10-17 NOTE — DISCHARGE INSTRUCTIONS
PROLIA DISCHARGE INSTRUCTIONS    DIET:  Drink extra fluids. ACTIVITY : Usual     1. Continue to take adequate calcium and vitamin D. Recommend calcium 1200 mg per day and vitamin D 400 - 800 units daily. 2.  If flu like symptoms - fever, muscle soreness or headache take Tylenol or Ibuprofen. Symptoms may last up to 3 days, sometimes 7 - 14 days. 3.  Follow up with ordering physician. 4.  Any problems return to emergency room. 5.  Continue usual medication. MEDICATION GIVEN TWICE A YEAR. YOUR NEXT PROLIA INJECTION IS SCHEDULED FOR THURSDAY APRIL 18TH AT 1:30 PM.  PLEASE CALL IF YOU HAVE ANY QUESTIONS [phone].

## 2023-10-25 ENCOUNTER — OFFICE VISIT (OUTPATIENT)
Dept: FAMILY MEDICINE CLINIC | Age: 88
End: 2023-10-25
Payer: MEDICARE

## 2023-10-25 VITALS
WEIGHT: 130.2 LBS | OXYGEN SATURATION: 98 % | SYSTOLIC BLOOD PRESSURE: 124 MMHG | RESPIRATION RATE: 18 BRPM | DIASTOLIC BLOOD PRESSURE: 64 MMHG | TEMPERATURE: 97.4 F | HEART RATE: 72 BPM | HEIGHT: 60 IN | BODY MASS INDEX: 25.56 KG/M2

## 2023-10-25 DIAGNOSIS — M81.0 OSTEOPOROSIS, POST-MENOPAUSAL: ICD-10-CM
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
## 2023-10-25 DIAGNOSIS — M54.6 ACUTE BILATERAL THORACIC BACK PAIN: Primary | ICD-10-CM

## 2023-10-25 PROBLEM — Z95.820 S/P ANGIOPLASTY WITH STENT: Status: ACTIVE | Noted: 2021-05-06

## 2023-10-25 PROBLEM — F41.9 ANXIETY: Chronic | Status: ACTIVE | Noted: 2023-10-25

## 2023-10-25 PROBLEM — I25.10 ASHD (ARTERIOSCLEROTIC HEART DISEASE): Chronic | Status: ACTIVE | Noted: 2023-10-25

## 2023-10-25 PROBLEM — M19.90 ARTHRITIS: Chronic | Status: ACTIVE | Noted: 2023-10-25

## 2023-10-25 PROBLEM — K21.9 GERD (GASTROESOPHAGEAL REFLUX DISEASE): Status: ACTIVE | Noted: 2023-10-25

## 2023-10-25 PROBLEM — L40.9 PSORIASIS: Status: ACTIVE | Noted: 2023-10-25

## 2023-10-25 PROBLEM — I50.22 CHRONIC HFREF (HEART FAILURE WITH REDUCED EJECTION FRACTION) (HCC): Status: RESOLVED | Noted: 2022-02-10 | Resolved: 2023-10-25

## 2023-10-25 PROBLEM — I25.2 HISTORY OF NON-ST ELEVATION MYOCARDIAL INFARCTION (NSTEMI): Chronic | Status: ACTIVE | Noted: 2021-05-06

## 2023-10-25 PROBLEM — Z86.19 HISTORY OF SHINGLES: Status: ACTIVE | Noted: 2023-10-25

## 2023-10-25 PROBLEM — I50.20 HEART FAILURE WITH REDUCED EJECTION FRACTION (HCC): Chronic | Status: ACTIVE | Noted: 2023-10-25

## 2023-10-25 PROBLEM — I25.10 ASHD (ARTERIOSCLEROTIC HEART DISEASE): Status: ACTIVE | Noted: 2023-10-25

## 2023-10-25 PROBLEM — Z86.19 HISTORY OF SHINGLES: Chronic | Status: ACTIVE | Noted: 2023-10-25

## 2023-10-25 PROBLEM — Z95.820 S/P ANGIOPLASTY WITH STENT: Chronic | Status: ACTIVE | Noted: 2021-05-06

## 2023-10-25 PROBLEM — L40.9 PSORIASIS: Chronic | Status: ACTIVE | Noted: 2023-10-25

## 2023-10-25 PROBLEM — K21.9 GERD (GASTROESOPHAGEAL REFLUX DISEASE): Chronic | Status: ACTIVE | Noted: 2023-10-25

## 2023-10-25 PROBLEM — I50.20 HEART FAILURE WITH REDUCED EJECTION FRACTION (HCC): Status: ACTIVE | Noted: 2023-10-25

## 2023-10-25 PROBLEM — K57.90 DIVERTICULOSIS: Chronic | Status: ACTIVE | Noted: 2023-10-25

## 2023-10-25 PROBLEM — F41.9 ANXIETY: Status: ACTIVE | Noted: 2023-10-25

## 2023-10-25 PROBLEM — I25.2 HISTORY OF NON-ST ELEVATION MYOCARDIAL INFARCTION (NSTEMI): Status: ACTIVE | Noted: 2021-05-06

## 2023-10-25 PROBLEM — K57.90 DIVERTICULOSIS: Status: ACTIVE | Noted: 2023-10-25

## 2023-10-25 PROBLEM — M19.90 ARTHRITIS: Status: ACTIVE | Noted: 2023-10-25

## 2023-10-25 PROCEDURE — G8417 CALC BMI ABV UP PARAM F/U: HCPCS | Performed by: FAMILY MEDICINE

## 2023-10-25 PROCEDURE — 1090F PRES/ABSN URINE INCON ASSESS: CPT | Performed by: FAMILY MEDICINE

## 2023-10-25 PROCEDURE — 1036F TOBACCO NON-USER: CPT | Performed by: FAMILY MEDICINE

## 2023-10-25 PROCEDURE — G8484 FLU IMMUNIZE NO ADMIN: HCPCS | Performed by: FAMILY MEDICINE

## 2023-10-25 PROCEDURE — 1123F ACP DISCUSS/DSCN MKR DOCD: CPT | Performed by: FAMILY MEDICINE

## 2023-10-25 PROCEDURE — G8427 DOCREV CUR MEDS BY ELIG CLIN: HCPCS | Performed by: FAMILY MEDICINE

## 2023-10-25 PROCEDURE — 99213 OFFICE O/P EST LOW 20 MIN: CPT | Performed by: FAMILY MEDICINE

## 2023-10-25 NOTE — PROGRESS NOTES
Chief Complaint   Patient presents with    Back Pain     Had a fall a few weeks ago and is still having back pain, therapy has made it worse     Osteoporosis     History obtained from the patient. SUBJECTIVE:  Malathi Mckeon is a 80 y.o. female that presents today for     UC f/u LAST VISIT:  See in UC on 10/6  Had a fall  Foot got stuck in a door  Handed on back  May have hit her head  No LOC  No headache  Has midline and R sided thoracic pain  Xray showed some age indeterminate fxrs at T6-T8. These were present on xray in 2021, though may be worse  Pain isn't too bad  Pt doesn't want to pursue w/u for Kyphoplasty  She would like to do some PT  Given flexeril in UC, not helping with pain  Tylenol is helping some  Asking for something a little stronger    UPDATE TODAY:   Here for f/u  Still having pain  Is improving but pt was worried  Did go to PT, but made it worse, so is going to hold  Did discuss MRI and possible kyphoplasty again, she does not think it's that bad.     Managing with Tylenol, lido crm and heat/ice  No more falls      -Osteoporosis:    Noted persistent, if worse, on recent DEXA, July 2023  On actonel in past, but did not tolerate  Completed 1st Prolia injection last wk, tolerated well      Age/Gender Health Maintenance    Lipid -   Lab Results   Component Value Date    CHOL 128 09/18/2023    CHOL 112 02/22/2022    CHOL 184 05/07/2021     Lab Results   Component Value Date    TRIG 92 09/18/2023    TRIG 75 02/22/2022    TRIG 82 05/07/2021     Lab Results   Component Value Date    HDL 41 09/18/2023    HDL 45 02/22/2022    HDL 62 05/07/2021     Lab Results   Component Value Date    LDLCALC 69 09/18/2023    LDLCALC 52 02/22/2022    LDLCALC 106 05/07/2021       DM Screen -   Lab Results   Component Value Date/Time    GLUCOSE 113 07/17/2023 08:09 AM    GLUCOSE 97 11/02/2019 06:30 AM     Lab Results   Component Value Date/Time    LABA1C 6.0 09/18/2023 08:08 AM    LABA1C 5.6 05/09/2021 03:31 AM

## 2023-10-26 ENCOUNTER — OFFICE VISIT (OUTPATIENT)
Dept: CARDIOLOGY CLINIC | Age: 88
End: 2023-10-26
Payer: MEDICARE

## 2023-10-26 VITALS
OXYGEN SATURATION: 99 % | BODY MASS INDEX: 25.72 KG/M2 | DIASTOLIC BLOOD PRESSURE: 66 MMHG | WEIGHT: 131 LBS | SYSTOLIC BLOOD PRESSURE: 136 MMHG | HEART RATE: 73 BPM

## 2023-10-26 DIAGNOSIS — I50.22 CHF NYHA CLASS II, CHRONIC, SYSTOLIC (HCC): Primary | ICD-10-CM

## 2023-10-26 DIAGNOSIS — I10 ESSENTIAL HYPERTENSION: ICD-10-CM

## 2023-10-26 DIAGNOSIS — I25.10 CAD IN NATIVE ARTERY: ICD-10-CM

## 2023-10-26 PROBLEM — I20.9 ANGINA PECTORIS, UNSPECIFIED (HCC): Status: ACTIVE | Noted: 2023-10-26

## 2023-10-26 PROBLEM — I25.119 ATHEROSCLEROTIC HEART DISEASE OF NATIVE CORONARY ARTERY WITH UNSPECIFIED ANGINA PECTORIS (HCC): Status: ACTIVE | Noted: 2023-10-26

## 2023-10-26 PROCEDURE — G8417 CALC BMI ABV UP PARAM F/U: HCPCS | Performed by: NURSE PRACTITIONER

## 2023-10-26 PROCEDURE — 1036F TOBACCO NON-USER: CPT | Performed by: NURSE PRACTITIONER

## 2023-10-26 PROCEDURE — G8427 DOCREV CUR MEDS BY ELIG CLIN: HCPCS | Performed by: NURSE PRACTITIONER

## 2023-10-26 PROCEDURE — G8484 FLU IMMUNIZE NO ADMIN: HCPCS | Performed by: NURSE PRACTITIONER

## 2023-10-26 PROCEDURE — 1090F PRES/ABSN URINE INCON ASSESS: CPT | Performed by: NURSE PRACTITIONER

## 2023-10-26 PROCEDURE — 99214 OFFICE O/P EST MOD 30 MIN: CPT | Performed by: NURSE PRACTITIONER

## 2023-10-26 PROCEDURE — 1123F ACP DISCUSS/DSCN MKR DOCD: CPT | Performed by: NURSE PRACTITIONER

## 2023-10-26 RX ORDER — ATORVASTATIN CALCIUM 40 MG/1
40 TABLET, FILM COATED ORAL DAILY
Qty: 90 TABLET | Refills: 0 | Status: SHIPPED | OUTPATIENT
Start: 2023-10-26

## 2023-10-26 RX ORDER — FUROSEMIDE 40 MG/1
40 TABLET ORAL DAILY
Qty: 90 TABLET | Refills: 3 | Status: SHIPPED | OUTPATIENT
Start: 2023-10-26

## 2023-10-26 RX ORDER — CLOPIDOGREL BISULFATE 75 MG/1
75 TABLET ORAL DAILY
Qty: 90 TABLET | Refills: 3 | Status: SHIPPED | OUTPATIENT
Start: 2023-10-26

## 2023-10-26 RX ORDER — SPIRONOLACTONE 50 MG/1
50 TABLET, FILM COATED ORAL DAILY
Qty: 90 TABLET | Refills: 3 | Status: SHIPPED | OUTPATIENT
Start: 2023-10-26

## 2023-10-26 RX ORDER — CARVEDILOL 3.12 MG/1
3.12 TABLET ORAL 2 TIMES DAILY WITH MEALS
Qty: 180 TABLET | Refills: 3 | Status: SHIPPED | OUTPATIENT
Start: 2023-10-26

## 2023-10-26 ASSESSMENT — ENCOUNTER SYMPTOMS
COUGH: 0
CONSTIPATION: 0
SHORTNESS OF BREATH: 0
ABDOMINAL DISTENTION: 0

## 2023-10-26 NOTE — PROGRESS NOTES
09/18/2023 08:08 AM    HGB 13.5 09/18/2023 08:08 AM    HCT 42.1 09/18/2023 08:08 AM     09/18/2023 08:08 AM     CMP:    Lab Results   Component Value Date/Time     07/17/2023 08:09 AM    K 4.5 07/17/2023 08:09 AM    K 4.7 07/12/2022 08:46 PM     07/17/2023 08:09 AM    CO2 26 07/17/2023 08:09 AM    BUN 22 07/17/2023 08:09 AM    CREATININE 0.9 07/17/2023 08:09 AM    AGRATIO 1.4 11/02/2019 06:30 AM    LABGLOM 58 07/17/2023 08:09 AM    GLUCOSE 113 07/17/2023 08:09 AM    GLUCOSE 97 11/02/2019 06:30 AM    CALCIUM 9.8 07/28/2023 08:09 AM     Hepatic Function Panel:    Lab Results   Component Value Date/Time    ALKPHOS 156 07/12/2022 08:46 PM    ALT 51 07/12/2022 08:46 PM    AST 36 07/12/2022 08:46 PM    PROT 7.4 07/12/2022 08:46 PM    BILITOT 0.3 07/12/2022 08:46 PM    BILIDIR <0.2 07/12/2022 08:46 PM    LABALBU 4.5 07/12/2022 08:46 PM    LABALBU 4.1 03/15/2012 07:25 AM     Magnesium:    Lab Results   Component Value Date/Time    MG 2.3 01/19/2023 08:15 AM     PT/INR:    Lab Results   Component Value Date/Time    INR 1.00 05/06/2021 11:18 AM     Lipids:    Lab Results   Component Value Date/Time    TRIG 92 09/18/2023 08:08 AM    HDL 41 09/18/2023 08:08 AM    LDLCALC 69 09/18/2023 08:08 AM       ASSESSMENT AND PLAN:      Diagnosis Orders   1. CHF NYHA class II, chronic, systolic (HCC)  Basic Metabolic Panel      2. Essential hypertension  carvedilol (COREG) 3.125 MG tablet    spironolactone (ALDACTONE) 50 MG tablet      3. CAD in native artery            Continue:  GDMT:   ACE/ARB/ARNI - no   BB - Coreg 3.125 BID   SGLT2 -  no  AA - Aldactone 50/day  Diuretic - Lasix 40/day  Vasodilator -   Other - Plavix     HFmrEF (45-50%), GRD 1 DD, NYHA II   ECHO 7/2022  HTN - stable, controlled. CAD s/p PCI (5/2021)   DOMINIK - stable. Continue to monitor.      Stable, appears Euvolemic  Lab reviewed - stable  Repeat blood work in March w/ PCP  BP/HR stable   No med changes today  Continue diet/fluid

## 2023-10-26 NOTE — PATIENT INSTRUCTIONS
You may receive a survey regarding the care you received during your visit. Your input is valuable to us. We encourage you to complete and return your survey. We hope you will choose us in the future for your healthcare needs. Your nurses today were Marybeth and TRYOGITECHve.   Office hours:   Mon-Thurs 8-4:30  Friday 8-12  Phone: [phone]    Continue:  Continue current medications  Daily weights and record  Fluid restriction of 2 Liters per day  Limit sodium in diet to around 5526-1222 mg/day  Monitor BP  Activity as tolerated     Call the [address] for any of the following symptoms:   Weight gain of 2-3 pounds in 1 day or 5 pounds in 1 week  Increased shortness of breath  Shortness of breath while laying down  Cough  Chest pain  Swelling in feet, ankles or legs  Bloating in abdomen  Fatigue

## 2023-11-02 ENCOUNTER — TELEPHONE (OUTPATIENT)
Dept: FAMILY MEDICINE CLINIC | Age: 88
End: 2023-11-02

## 2023-11-02 ENCOUNTER — OFFICE VISIT (OUTPATIENT)
Dept: FAMILY MEDICINE CLINIC | Age: 88
End: 2023-11-02

## 2023-11-02 VITALS
SYSTOLIC BLOOD PRESSURE: 128 MMHG | RESPIRATION RATE: 18 BRPM | BODY MASS INDEX: 25.8 KG/M2 | TEMPERATURE: 97.6 F | WEIGHT: 131.4 LBS | HEART RATE: 63 BPM | DIASTOLIC BLOOD PRESSURE: 84 MMHG | OXYGEN SATURATION: 96 % | HEIGHT: 60 IN

## 2023-11-02 DIAGNOSIS — M54.6 ACUTE BILATERAL THORACIC BACK PAIN: Primary | ICD-10-CM
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
## 2023-11-02 DIAGNOSIS — S22.060A COMPRESSION FRACTURE OF T7 VERTEBRA, INITIAL ENCOUNTER (HCC): ICD-10-CM
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
## 2023-11-02 DIAGNOSIS — S22.050A COMPRESSION FRACTURE OF T5 VERTEBRA, INITIAL ENCOUNTER (HCC): ICD-10-CM

## 2023-11-02 DIAGNOSIS — M81.0 OSTEOPOROSIS, POST-MENOPAUSAL: ICD-10-CM

## 2023-11-02 NOTE — PROGRESS NOTES
[facility]     PATIENT COUNSELING    Counseling was provided today regarding the following topics: Healthy eating habits, Regular exercise, substance abuse and healthy sleep habits. Barriers to learning and self management: none    Discussed use, benefit, and side effects of prescribed medications. Barriers to medication compliance addressed. All patient questions answered. Pt voiced understanding.        Electronically signed by Alex Reyna DO on 11/2/2023 at 3:18 PM
Average build

## 2023-11-02 NOTE — TELEPHONE ENCOUNTER
Pt is scheduled for a MRI @  main radiology 11/8/23 @ 0900  Pt is to arrive @ 0830  LMOM to return call at her earliest convenience

## 2023-11-08 ENCOUNTER — TELEPHONE (OUTPATIENT)
Dept: FAMILY MEDICINE CLINIC | Age: 88
End: 2023-11-08

## 2023-11-08 ENCOUNTER — HOSPITAL ENCOUNTER (OUTPATIENT)
Dept: MRI IMAGING | Age: 88
Discharge: HOME | End: 2023-11-08
Attending: FAMILY MEDICINE
Payer: MEDICARE

## 2023-11-08 DIAGNOSIS — S22.060A COMPRESSION FRACTURE OF T7 VERTEBRA, INITIAL ENCOUNTER (HCC): ICD-10-CM
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
## 2023-11-08 DIAGNOSIS — M81.0 OSTEOPOROSIS, POST-MENOPAUSAL: ICD-10-CM
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
## 2023-11-08 DIAGNOSIS — S22.050A COMPRESSION FRACTURE OF T5 VERTEBRA, INITIAL ENCOUNTER (HCC): Primary | ICD-10-CM

## 2023-11-08 DIAGNOSIS — M54.6 ACUTE BILATERAL THORACIC BACK PAIN: ICD-10-CM

## 2023-11-08 DIAGNOSIS — S22.050A COMPRESSION FRACTURE OF T5 VERTEBRA, INITIAL ENCOUNTER (HCC): ICD-10-CM

## 2023-11-08 PROCEDURE — 72146 MRI CHEST SPINE W/O DYE: CPT

## 2023-11-08 NOTE — TELEPHONE ENCOUNTER
----- Message from Leeanna Walls DO sent at 11/8/2023  3:35 PM EST -----  Please let pt know that MRI confirms acute compression fxr T5  Spoke with Dr. Jeremie Chavez, she would a good candidate for Kyphoplasty. His office will be reaching out so she can d/w him further. Let me know if questions, thanks!

## 2023-11-09 ENCOUNTER — TELEPHONE (OUTPATIENT)
Dept: CARDIOLOGY CLINIC | Age: 88
End: 2023-11-09

## 2023-11-09 ENCOUNTER — OFFICE VISIT (OUTPATIENT)
Dept: PHYSICAL MEDICINE AND REHAB | Age: 88
End: 2023-11-09
Payer: MEDICARE

## 2023-11-09 ENCOUNTER — TELEPHONE (OUTPATIENT)
Dept: PHYSICAL MEDICINE AND REHAB | Age: 88
End: 2023-11-09

## 2023-11-09 VITALS
HEIGHT: 60 IN | DIASTOLIC BLOOD PRESSURE: 62 MMHG | BODY MASS INDEX: 25.8 KG/M2 | SYSTOLIC BLOOD PRESSURE: 118 MMHG | WEIGHT: 131.39 LBS

## 2023-11-09 DIAGNOSIS — M80.00XA AGE-RELATED OSTEOPOROSIS WITH CURRENT PATHOLOGICAL FRACTURE, INITIAL ENCOUNTER: ICD-10-CM

## 2023-11-09 DIAGNOSIS — M51.34 DDD (DEGENERATIVE DISC DISEASE), THORACIC: ICD-10-CM

## 2023-11-09 DIAGNOSIS — M48.54XA PATHOLOGICAL COMPRESSION FRACTURE OF THORACIC VERTEBRA, INITIAL ENCOUNTER (HCC): Primary | ICD-10-CM

## 2023-11-09 PROCEDURE — G8417 CALC BMI ABV UP PARAM F/U: HCPCS | Performed by: PAIN MEDICINE

## 2023-11-09 PROCEDURE — G8427 DOCREV CUR MEDS BY ELIG CLIN: HCPCS | Performed by: PAIN MEDICINE

## 2023-11-09 PROCEDURE — 1090F PRES/ABSN URINE INCON ASSESS: CPT | Performed by: PAIN MEDICINE

## 2023-11-09 PROCEDURE — 1123F ACP DISCUSS/DSCN MKR DOCD: CPT | Performed by: PAIN MEDICINE

## 2023-11-09 PROCEDURE — G8484 FLU IMMUNIZE NO ADMIN: HCPCS | Performed by: PAIN MEDICINE

## 2023-11-09 PROCEDURE — 1036F TOBACCO NON-USER: CPT | Performed by: PAIN MEDICINE

## 2023-11-09 PROCEDURE — 99204 OFFICE O/P NEW MOD 45 MIN: CPT | Performed by: PAIN MEDICINE

## 2023-11-09 ASSESSMENT — ENCOUNTER SYMPTOMS
CONSTIPATION: 0
EYE PAIN: 0
ABDOMINAL PAIN: 0
COUGH: 0
WHEEZING: 0
ROS SKIN COMMENTS: HX SKIN CANCER
CHEST TIGHTNESS: 0
APNEA: 0
BACK PAIN: 1
DIARRHEA: 1
SHORTNESS OF BREATH: 0
RESPIRATORY NEGATIVE: 1

## 2023-11-09 NOTE — PROGRESS NOTES
[address]  [facility]  [facility]. [address] [place] [address]  Dept: [phone]  Dept Fax: [phone]: [phone]    Visit Date: 11/9/2023    Functionality Assessment/Goals Worksheet     On a scale of 0 (Does not Interfere) to 10 (Completely Interferes)     1. Which number describes how during the past week pain has interfered with       the following:  A. General Activity:  8  B. Mood: 4  C. Walking Ability:  9  D. Normal Work (Includes both work outside the home and housework):  9  E. Relations with Other People:   3  F. Sleep:   9  G. Enjoyment of Life:   7    2. Patient Prefers to Take their Pain Medications:     [x]  On a regular basis   []  Only when necessary    []  Does not take pain medications    3. What are the Patient's Goals/Expectations for Visiting Pain Management?      [x]  Learn about my pain    [x]  Receive Medication   []  Physical Therapy     []  Treat Depression   [x]  Receive Injections    []  Treat Sleep   []  Deal with Anxiety and Stress   []  Treat Opoid Dependence/Addiction   []  Other:
of motion. Negative right straight leg raise test and negative left straight leg raise test.        Back:         Legs:    Skin:     General: Skin is warm. Coloration: Skin is not pale. Findings: No erythema or rash. Neurological:      Mental Status: She is alert and oriented to person, place, and time. She is not disoriented. Cranial Nerves: No cranial nerve deficit. Sensory: No sensory deficit. Motor: No atrophy or abnormal muscle tone. Coordination: Coordination normal.      Gait: Gait abnormal.      Deep Tendon Reflexes: Babinski sign absent on the right side. Babinski sign absent on the left side. Reflex Scores:       Tricep reflexes are 1+ on the right side and 1+ on the left side. Bicep reflexes are 1+ on the right side and 1+ on the left side. Brachioradialis reflexes are 1+ on the right side and 1+ on the left side. Patellar reflexes are 1+ on the right side and 1+ on the left side. Achilles reflexes are 1+ on the right side and 1+ on the left side. Psychiatric:         Attention and Perception: Attention normal. She is attentive. Mood and Affect: Mood normal. Mood is not anxious or depressed. Affect is not labile, blunt, angry or inappropriate. Speech: Speech normal. She is communicative. Speech is not rapid and pressured, delayed, slurred or tangential.         Behavior: Behavior normal. Behavior is not agitated, slowed, aggressive, withdrawn, hyperactive or combative. Behavior is cooperative. Thought Content: Thought content normal. Thought content is not paranoid or delusional. Thought content does not include homicidal or suicidal ideation. Thought content does not include homicidal or suicidal plan. Cognition and Memory: Cognition normal. Memory is not impaired. She does not exhibit impaired recent memory or impaired remote memory. Judgment: Judgment normal. Judgment is not impulsive or inappropriate.

## 2023-11-09 NOTE — TELEPHONE ENCOUNTER
Pre op Risk Assessment    Procedure Kyphoplasty Thoracic   Physician: Shreya Smith  Date of surgery/procedure TBD    Last OV 07/25/2023  Last Stress 03/2023  Last Echo 07/2022  Last Cath 05/2021  Last Stent 05/2021  Is patient on blood thinners Plavix, ASA  Hold Meds/how many days ?      Fax to [phone]

## 2023-11-10 ENCOUNTER — HOSPITAL ENCOUNTER (OUTPATIENT)
Age: 88
Discharge: HOME | End: 2023-11-10
Payer: MEDICARE

## 2023-11-10 DIAGNOSIS — M48.54XA PATHOLOGICAL COMPRESSION FRACTURE OF THORACIC VERTEBRA, INITIAL ENCOUNTER (HCC): ICD-10-CM

## 2023-11-10 DIAGNOSIS — M80.00XA AGE-RELATED OSTEOPOROSIS WITH CURRENT PATHOLOGICAL FRACTURE, INITIAL ENCOUNTER: ICD-10-CM

## 2023-11-10 LAB
ANION GAP SERPL CALC-SCNC: 12 MEQ/L (ref 8–16)
BUN SERPL-MCNC: 23 MG/DL (ref 7–22)
CALCIUM SERPL-MCNC: 8.9 MG/DL (ref 8.5–10.5)
CHLORIDE SERPL-SCNC: 107 MEQ/L (ref 98–111)
CO2 SERPL-SCNC: 25 MEQ/L (ref 23–33)
CREAT SERPL-MCNC: 0.9 MG/DL (ref 0.4–1.2)
EKG ATRIAL RATE: 73 BPM
EKG P AXIS: 22 DEGREES
EKG P-R INTERVAL: 172 MS
EKG Q-T INTERVAL: 430 MS
EKG QRS DURATION: 84 MS
EKG QTC CALCULATION (BAZETT): 473 MS
EKG R AXIS: -45 DEGREES
EKG T AXIS: 79 DEGREES
EKG VENTRICULAR RATE: 73 BPM
GFR SERPL CREATININE-BSD FRML MDRD: 58 ML/MIN/1.73M2
GLUCOSE SERPL-MCNC: 110 MG/DL (ref 70–108)
POTASSIUM SERPL-SCNC: 4.5 MEQ/L (ref 3.5–5.2)
SODIUM SERPL-SCNC: 144 MEQ/L (ref 135–145)

## 2023-11-10 PROCEDURE — 36415 COLL VENOUS BLD VENIPUNCTURE: CPT

## 2023-11-10 PROCEDURE — 93005 ELECTROCARDIOGRAM TRACING: CPT | Performed by: PAIN MEDICINE

## 2023-11-10 PROCEDURE — 80048 BASIC METABOLIC PNL TOTAL CA: CPT

## 2023-11-10 PROCEDURE — 93010 ELECTROCARDIOGRAM REPORT: CPT | Performed by: INTERNAL MEDICINE

## 2023-11-10 NOTE — TELEPHONE ENCOUNTER
Call patient and inquire about any new cardiac events/complaints (ex. Chest pain, SOB, MCKEON. ..) since last visit  Her age and her risk factors puts at a relatively higher risk, plz explain to patient  If no new cardiac events/complaints reported and patient understands the relatively higher risk, no further cardiac studies are planned and patient has moderate cardiac risk    Ok to hold meds requested for 5-7 days

## 2023-11-13 NOTE — TELEPHONE ENCOUNTER
Pt called back, she states she forgot that she had an episode of left arm numbness which concerned her. She prefers to have an appointment to discuss further before proceeding with her procedure with Dr Giana Benton. Clearance form retracted. Appt scheduled with Dr See Dorsey.

## 2023-11-14 ENCOUNTER — OFFICE VISIT (OUTPATIENT)
Dept: CARDIOLOGY CLINIC | Age: 88
End: 2023-11-14
Payer: MEDICARE

## 2023-11-14 VITALS
SYSTOLIC BLOOD PRESSURE: 160 MMHG | WEIGHT: 137 LBS | BODY MASS INDEX: 26.9 KG/M2 | HEIGHT: 60 IN | DIASTOLIC BLOOD PRESSURE: 68 MMHG | HEART RATE: 76 BPM

## 2023-11-14 DIAGNOSIS — I25.10 CAD IN NATIVE ARTERY: Primary | ICD-10-CM

## 2023-11-14 PROCEDURE — 1090F PRES/ABSN URINE INCON ASSESS: CPT | Performed by: INTERNAL MEDICINE

## 2023-11-14 PROCEDURE — 1123F ACP DISCUSS/DSCN MKR DOCD: CPT | Performed by: INTERNAL MEDICINE

## 2023-11-14 PROCEDURE — G8427 DOCREV CUR MEDS BY ELIG CLIN: HCPCS | Performed by: INTERNAL MEDICINE

## 2023-11-14 PROCEDURE — G8417 CALC BMI ABV UP PARAM F/U: HCPCS | Performed by: INTERNAL MEDICINE

## 2023-11-14 PROCEDURE — G8484 FLU IMMUNIZE NO ADMIN: HCPCS | Performed by: INTERNAL MEDICINE

## 2023-11-14 PROCEDURE — 99214 OFFICE O/P EST MOD 30 MIN: CPT | Performed by: INTERNAL MEDICINE

## 2023-11-14 PROCEDURE — 1036F TOBACCO NON-USER: CPT | Performed by: INTERNAL MEDICINE

## 2023-11-14 NOTE — PROGRESS NOTES
chronic back pain  Patient states that she can do her ADLs, finish shopping at [address] without limitation. She live by herself  > 4 METS  Moderate risk   Patient accepts the risk of the planned procedure and understands the possibility of boby-operative cardiac event, including but not limited to MI, VT/VF, cardiac arrest, and death, and wishes to proceed with the procedure  Patient was advised to report to the ER if has recurrent symptoms with specific instructions given about severity and duration of symptoms    Above findings and plan of care were discussed with patient in details, patient's questions were answered.      Disposition:  RTC in 12 months             Electronically signed by Bryn Gonzales MD, [facility], [address]    11/14/2023 at 3:09 PM EST

## 2023-11-19 LAB
EKG ATRIAL RATE: 73 BPM
EKG P AXIS: 22 DEGREES
EKG P-R INTERVAL: 172 MS
EKG Q-T INTERVAL: 430 MS
EKG QRS DURATION: 84 MS
EKG QTC CALCULATION (BAZETT): 473 MS
EKG R AXIS: -45 DEGREES
EKG T AXIS: 79 DEGREES
EKG VENTRICULAR RATE: 73 BPM

## 2023-12-09 NOTE — TELEPHONE ENCOUNTER
Pharmacy Note  ED Culture Follow-up    Marvin Lizarraga is a 80 y.o. female. Allergies: Prednisone, Actonel [risedronate sodium], Baclofen, Bactrim [sulfamethoxazole-trimethoprim], Cardizem [diltiazem], Celebrex [celecoxib], Dicloxacillin, Doxycycline, Evista [raloxifene], Lopid [gemfibrozil], Questran [cholestyramine], Synthroid [levothyroxine sodium], Zestoretic [lisinopril-hydrochlorothiazide], and Zetia [ezetimibe]     Labs:  Lab Results   Component Value Date    BUN 24 (H) 07/12/2022    CREATININE 0.9 07/12/2022    WBC 8.6 07/12/2022     Estimated Creatinine Clearance: 29 mL/min (based on SCr of 0.9 mg/dL). Current antimicrobials:   Cephalexin 500 mg BID x 5 days    ASSESSMENT:  Micro results:   Urine culture: positive for pseudomonas aeruginosa     PLAN:  Need for intervention: Yes  Discussed with:  Dr. Jennifer Fonseca treatment:    Call and speak with patient to see if symptomatic. Patient response:   Called and spoke with patient. She is no longer having urinary tract symptoms. No need for additional antibiotics. Patient voiced understanding. Called/sent in prescription to: Not applicable    Please call with any questions.  Juan Daly, [address] [place] [address], PharmD 7:40 PM 7/20/2022
none

## 2023-12-27 ENCOUNTER — OFFICE VISIT (OUTPATIENT)
Dept: FAMILY MEDICINE CLINIC | Age: 88
End: 2023-12-27
Payer: MEDICARE

## 2023-12-27 ENCOUNTER — TELEPHONE (OUTPATIENT)
Dept: ENT CLINIC | Age: 88
End: 2023-12-27

## 2023-12-27 VITALS
SYSTOLIC BLOOD PRESSURE: 122 MMHG | TEMPERATURE: 97.3 F | WEIGHT: 130.8 LBS | BODY MASS INDEX: 25.68 KG/M2 | OXYGEN SATURATION: 96 % | HEIGHT: 60 IN | DIASTOLIC BLOOD PRESSURE: 74 MMHG | RESPIRATION RATE: 12 BRPM | HEART RATE: 75 BPM

## 2023-12-27 DIAGNOSIS — J01.90 ACUTE RHINOSINUSITIS: Primary | ICD-10-CM

## 2023-12-27 LAB
Lab: NORMAL
QC PASS/FAIL: NORMAL
SARS-COV-2 RDRP RESP QL NAA+PROBE: NEGATIVE
STREPTOCOCCUS A RNA: NEGATIVE

## 2023-12-27 PROCEDURE — 1090F PRES/ABSN URINE INCON ASSESS: CPT | Performed by: NURSE PRACTITIONER

## 2023-12-27 PROCEDURE — G8427 DOCREV CUR MEDS BY ELIG CLIN: HCPCS | Performed by: NURSE PRACTITIONER

## 2023-12-27 PROCEDURE — 87651 STREP A DNA AMP PROBE: CPT | Performed by: NURSE PRACTITIONER

## 2023-12-27 PROCEDURE — 87635 SARS-COV-2 COVID-19 AMP PRB: CPT | Performed by: NURSE PRACTITIONER

## 2023-12-27 PROCEDURE — 99213 OFFICE O/P EST LOW 20 MIN: CPT | Performed by: NURSE PRACTITIONER

## 2023-12-27 PROCEDURE — G8484 FLU IMMUNIZE NO ADMIN: HCPCS | Performed by: NURSE PRACTITIONER

## 2023-12-27 PROCEDURE — 1036F TOBACCO NON-USER: CPT | Performed by: NURSE PRACTITIONER

## 2023-12-27 PROCEDURE — G8417 CALC BMI ABV UP PARAM F/U: HCPCS | Performed by: NURSE PRACTITIONER

## 2023-12-27 PROCEDURE — 1123F ACP DISCUSS/DSCN MKR DOCD: CPT | Performed by: NURSE PRACTITIONER

## 2023-12-27 RX ORDER — CEFUROXIME AXETIL 500 MG/1
500 TABLET ORAL 2 TIMES DAILY
Qty: 14 TABLET | Refills: 0 | Status: SHIPPED | OUTPATIENT
Start: 2023-12-27 | End: 2024-01-03

## 2023-12-27 RX ORDER — FLUTICASONE PROPIONATE 50 MCG
2 SPRAY, SUSPENSION (ML) NASAL DAILY
Qty: 1 EACH | Refills: 1 | Status: SHIPPED | OUTPATIENT
Start: 2023-12-27

## 2023-12-27 NOTE — TELEPHONE ENCOUNTER
Patient called in stating she has had a sore throat for a couple of days. She also said she has had excessive phlegm that she has to cough up. She states she didn't get much sleep last night due to waking up to cough and get rid of the phlegm. Patient denies a fever or other symptoms. I informed patient we do not have same day open appointments for acute issues. I recommended she call her PCP to see if they could see her or for her to go to an Urgent Care. Told her seeing one of those would be better due to us not having the ability to test for the flu or COVID like they can. Patient verbalized understanding, said she would do one of those, and thanked me.

## 2023-12-27 NOTE — PROGRESS NOTES
SUBJECTIVE:  Larry Sidhu is a 80 y.o. y/o female that presents with Pharyngitis (Started a couple of days ago, sinus pressure, dry throat, wet/dry cough, headache, denies body aches, ear pain. Has not tried anything. )    HPI:      Symptoms have been present for 2 day(s). Symptoms are unchanged since they initially started. Fever? No  Runny nose or congestion? Yes   Cough? Yes  Sore throat? Yes  Headache, fatigue, joint pains, muscle aches? Yes - some sinus pressure  Shortness of breath/Wheezing? No  Nausea/Vomiting/Diarrhea? No  Double Sickening? No  Sick contacts? Yes  Known COVID exposures of RFs? No  Smoker? No  Preexisting Respiratory conditions? No    Patient has tried OTC meds without improvement. Past Medical History:   Diagnosis Date    Anxiety     Aortic valve regurgitation     Arthritis     ASHD (arteriosclerotic heart disease)     CKD (chronic kidney disease) stage 3, GFR 30-59 ml/min ([facility])     Diverticulosis     Dyslipidemia     GERD (gastroesophageal reflux disease)     Heart failure with reduced ejection fraction ([address])     History of non-ST elevation myocardial infarction (NSTEMI) 05/06/2021    History of shingles     History of skin cancer 2013    SKIN CANCER ON LEFT ANKLE    Hypertension, essential     Hypothyroidism     Osteoporosis, post-menopausal     Prediabetes 03/17/2016    Psoriasis     S/P angioplasty with stent 05/06/2021    3 stents to LAD, 1 stent to OM, 2 stents to diag. per Dr. Vira Maldonado History    Marital status:       Spouse name: Not on file    Number of children: Not on file    Years of education: Not on file    Highest education level: Not on file   Occupational History    Not on file   Tobacco Use    Smoking status: Never     Passive exposure: Never    Smokeless tobacco: Never   Vaping Use    Vaping Use: Never used   Substance and Sexual Activity    Alcohol use: No    Drug use: No    Sexual activity: Not Currently

## 2024-01-03 ENCOUNTER — TELEPHONE (OUTPATIENT)
Dept: FAMILY MEDICINE CLINIC | Age: 89
End: 2024-01-03

## 2024-01-03 DIAGNOSIS — J01.90 ACUTE RHINOSINUSITIS: Primary | ICD-10-CM

## 2024-01-03 RX ORDER — CEFUROXIME AXETIL 500 MG/1
500 TABLET ORAL 2 TIMES DAILY
Qty: 8 TABLET | Refills: 0 | Status: SHIPPED | OUTPATIENT
Start: 2024-01-03 | End: 2024-01-07

## 2024-01-03 NOTE — TELEPHONE ENCOUNTER
Pt states she has taken her last pill of the antibiotic yesterday, still has a raspy throat,sinus congestion, pt is also coughing up phlegm she is unable to cough it up, voice was hoarse for 2 days. Pt has taken ceftin 500 mg for 7 days, which again was completed yesterday.      Please advise    [facility] [address]

## 2024-01-03 NOTE — TELEPHONE ENCOUNTER
Ok  Will extend out the ATB 4 more days  F/u if no better  Let me know if questions, thanks!     Diagnosis Orders   1. Acute rhinosinusitis  cefUROXime (CEFTIN) 500 MG tablet

## 2024-02-16 ENCOUNTER — CARE COORDINATION (OUTPATIENT)
Dept: CARE COORDINATION | Age: 89
End: 2024-02-16

## 2024-02-16 NOTE — CARE COORDINATION
Per Armida Darling RDN dietician education mailed to patient.        Katelyn Sampson [facility]  CC   Medication Assistance  [facility] [place] [employer]    (P) [phone]  (F) [phone]

## 2024-02-20 ENCOUNTER — TELEPHONE (OUTPATIENT)
Dept: FAMILY MEDICINE CLINIC | Age: 89
End: 2024-02-20

## 2024-02-20 DIAGNOSIS — N39.0 ACUTE URINARY TRACT INFECTION: Primary | ICD-10-CM

## 2024-02-20 RX ORDER — NITROFURANTOIN 25; 75 MG/1; MG/1
100 CAPSULE ORAL 2 TIMES DAILY
Qty: 10 CAPSULE | Refills: 0 | Status: SHIPPED | OUTPATIENT
Start: 2024-02-20 | End: 2024-02-25

## 2024-02-20 NOTE — TELEPHONE ENCOUNTER
Per HIPAA, left detailed message with pt informing her of medication being sent to [facility]. Pt was informed to call the office after medication has been completed if symptoms do not get better.

## 2024-02-20 NOTE — TELEPHONE ENCOUNTER
Burning with urination, odor, and pain on the left side, symptoms started a couple days. No frequency, no blood, no urgency.    Please advise    [facility] [address]

## 2024-02-20 NOTE — TELEPHONE ENCOUNTER
Macrobid bid x 5 days  F/u if no better  Call any changes prior  Let me know if questions, thanks!     Diagnosis Orders   1. Acute urinary tract infection  nitrofurantoin, macrocrystal-monohydrate, (MACROBID) 100 MG capsule

## 2024-02-26 ENCOUNTER — OFFICE VISIT (OUTPATIENT)
Dept: FAMILY MEDICINE CLINIC | Age: 89
End: 2024-02-26
Payer: MEDICARE

## 2024-02-26 ENCOUNTER — HOSPITAL ENCOUNTER (EMERGENCY)
Age: 89
Discharge: HOME | End: 2024-02-26
Payer: MEDICARE

## 2024-02-26 VITALS
DIASTOLIC BLOOD PRESSURE: 68 MMHG | HEIGHT: 60 IN | SYSTOLIC BLOOD PRESSURE: 138 MMHG | OXYGEN SATURATION: 95 % | HEART RATE: 75 BPM | TEMPERATURE: 97.5 F | RESPIRATION RATE: 12 BRPM | BODY MASS INDEX: 25.01 KG/M2 | WEIGHT: 127.4 LBS

## 2024-02-26 VITALS
WEIGHT: 127 LBS | SYSTOLIC BLOOD PRESSURE: 152 MMHG | DIASTOLIC BLOOD PRESSURE: 86 MMHG | TEMPERATURE: 97.7 F | HEART RATE: 79 BPM | RESPIRATION RATE: 18 BRPM | OXYGEN SATURATION: 97 % | BODY MASS INDEX: 24.8 KG/M2

## 2024-02-26 DIAGNOSIS — N39.0 ACUTE URINARY TRACT INFECTION: Primary | ICD-10-CM

## 2024-02-26 DIAGNOSIS — M54.50 CHRONIC BILATERAL LOW BACK PAIN WITHOUT SCIATICA: Primary | ICD-10-CM

## 2024-02-26 DIAGNOSIS — G89.29 CHRONIC BILATERAL LOW BACK PAIN WITHOUT SCIATICA: Primary | ICD-10-CM

## 2024-02-26 DIAGNOSIS — R73.9 HYPERGLYCEMIA: Primary | ICD-10-CM

## 2024-02-26 DIAGNOSIS — R03.0 ELEVATED BLOOD PRESSURE READING WITHOUT DIAGNOSIS OF HYPERTENSION: ICD-10-CM

## 2024-02-26 PROCEDURE — 93005 ELECTROCARDIOGRAM TRACING: CPT

## 2024-02-26 PROCEDURE — 99213 OFFICE O/P EST LOW 20 MIN: CPT | Performed by: FAMILY MEDICINE

## 2024-02-26 PROCEDURE — 99213 OFFICE O/P EST LOW 20 MIN: CPT

## 2024-02-26 PROCEDURE — 1123F ACP DISCUSS/DSCN MKR DOCD: CPT | Performed by: FAMILY MEDICINE

## 2024-02-26 PROCEDURE — G8420 CALC BMI NORM PARAMETERS: HCPCS | Performed by: FAMILY MEDICINE

## 2024-02-26 PROCEDURE — G8427 DOCREV CUR MEDS BY ELIG CLIN: HCPCS | Performed by: FAMILY MEDICINE

## 2024-02-26 PROCEDURE — 1090F PRES/ABSN URINE INCON ASSESS: CPT | Performed by: FAMILY MEDICINE

## 2024-02-26 PROCEDURE — G8484 FLU IMMUNIZE NO ADMIN: HCPCS | Performed by: FAMILY MEDICINE

## 2024-02-26 PROCEDURE — 1036F TOBACCO NON-USER: CPT | Performed by: FAMILY MEDICINE

## 2024-02-26 RX ORDER — CEPHALEXIN 500 MG/1
500 CAPSULE ORAL 2 TIMES DAILY
Qty: 10 CAPSULE | Refills: 0 | Status: SHIPPED | OUTPATIENT
Start: 2024-02-26 | End: 2024-02-29 | Stop reason: ALTCHOICE

## 2024-02-26 ASSESSMENT — PATIENT HEALTH QUESTIONNAIRE - PHQ9
SUM OF ALL RESPONSES TO PHQ QUESTIONS 1-9: 0
SUM OF ALL RESPONSES TO PHQ QUESTIONS 1-9: 0
SUM OF ALL RESPONSES TO PHQ9 QUESTIONS 1 & 2: 0
SUM OF ALL RESPONSES TO PHQ QUESTIONS 1-9: 0
1. LITTLE INTEREST OR PLEASURE IN DOING THINGS: 0
SUM OF ALL RESPONSES TO PHQ QUESTIONS 1-9: 0
2. FEELING DOWN, DEPRESSED OR HOPELESS: 0

## 2024-02-26 ASSESSMENT — PAIN DESCRIPTION - LOCATION: LOCATION: BACK

## 2024-02-26 ASSESSMENT — PAIN - FUNCTIONAL ASSESSMENT: PAIN_FUNCTIONAL_ASSESSMENT: 0-10

## 2024-02-26 ASSESSMENT — PAIN DESCRIPTION - DESCRIPTORS: DESCRIPTORS: ACHING

## 2024-02-26 ASSESSMENT — ENCOUNTER SYMPTOMS
BACK PAIN: 1
CHEST TIGHTNESS: 0
SHORTNESS OF BREATH: 0
ABDOMINAL PAIN: 0

## 2024-02-26 ASSESSMENT — PAIN DESCRIPTION - ORIENTATION: ORIENTATION: POSTERIOR;UPPER

## 2024-02-26 ASSESSMENT — PAIN DESCRIPTION - FREQUENCY: FREQUENCY: CONTINUOUS

## 2024-02-26 ASSESSMENT — PAIN DESCRIPTION - PAIN TYPE: TYPE: CHRONIC PAIN

## 2024-02-26 NOTE — DISCHARGE INSTRUCTIONS
Rest, rotate ice and heat, Tylenol / Ibuprofen.  To call PCP tomorrow morning for follow up.    [Incontinence] : incontinence [see HPI] : see HPI [Erectile Dysfunction] : erectile dysfunction [Negative] : Psychiatric [Dysuria] : no dysuria [Hesitancy] : no urinary hesitancy [Nocturia] : no nocturia

## 2024-02-26 NOTE — ED PROVIDER NOTES
[facility]  Urgent Care Encounter       CHIEF COMPLAINT       Chief Complaint   Patient presents with    Back Pain     Thoracic - t5       Nurses Notes reviewed and I agree except as noted in the HPI.  HISTORY OF PRESENT ILLNESS   Kristel Welch is a 89 y.o. female who presents with concerns of right back pain. Rates pain a dull 5 out of 10. Reports pain is in mid back, radiates across right to left. Reports fell in October 2023, reports pain is chronic but improves with heat. Patient also reports concern for heartburn, states \"I just ate right before coming in\".   Reports was seen by PCP earlier today and was treated for UTI, reports decided to come to urgent care to ensure no kidney infection.     HPI    REVIEW OF SYSTEMS     Review of Systems   Constitutional:  Negative for fatigue and fever.   Respiratory:  Negative for chest tightness and shortness of breath.    Cardiovascular:  Negative for chest pain and palpitations.   Gastrointestinal:  Negative for abdominal pain.   Genitourinary:  Negative for flank pain.   Musculoskeletal:  Positive for back pain.   All other systems reviewed and are negative.      PAST MEDICAL HISTORY         Diagnosis Date    Anxiety     Aortic valve regurgitation     Arthritis     ASHD (arteriosclerotic heart disease)     CKD (chronic kidney disease) stage 3, GFR 30-59 ml/min ([facility])     Diverticulosis     Dyslipidemia     GERD (gastroesophageal reflux disease)     Heart failure with reduced ejection fraction ([facility])     History of non-ST elevation myocardial infarction (NSTEMI) 05/06/2021    History of shingles     History of skin cancer 2013    SKIN CANCER ON LEFT ANKLE    Hypertension, essential     Hypothyroidism     Osteoporosis, post-menopausal     Prediabetes 03/17/2016    Psoriasis     S/P angioplasty with stent 05/06/2021    3 stents to LAD, 1 stent to OM, 2 stents to diag. per Dr. Castillo       SURGICALHISTORY     Patient  has a past surgical history that  onset: 45) in her daughter; Breast Cancer (age of onset: 76) in her sister; Cancer in her child; Heart Disease in her sister; Other in an other family member; Stroke in her sister.    SOCIAL HISTORY     Patient  reports that she has never smoked. She has never been exposed to tobacco smoke. She has never used smokeless tobacco. She reports that she does not drink alcohol and does not use drugs.    PHYSICAL EXAM     ED TRIAGE VITALS  BP: (!) 152/86, Temp: 97.7 °F (36.5 °C), Pulse: 79, Respirations: 18, SpO2: 97 %,Estimated body mass index is 24.8 kg/m² as calculated from the following:    Height as of an earlier encounter on 2/26/24: 1.524 m (5').    Weight as of this encounter: 57.6 kg (127 lb).,No LMP recorded. Patient has had a hysterectomy.    Physical Exam  Vitals and nursing note reviewed.   Constitutional:       General: She is not in acute distress.     Appearance: Normal appearance. She is normal weight. She is not ill-appearing or diaphoretic.   Eyes:      Pupils: Pupils are equal, round, and reactive to light.   Cardiovascular:      Rate and Rhythm: Normal rate and regular rhythm.      Heart sounds: Normal heart sounds.   Pulmonary:      Effort: Pulmonary effort is normal.   Abdominal:      Tenderness: There is no right CVA tenderness or left CVA tenderness.   Musculoskeletal:         General: Tenderness present.      Cervical back: Normal.      Thoracic back: Tenderness present. No swelling, edema, deformity, signs of trauma, lacerations, spasms or bony tenderness. Normal range of motion. No scoliosis.      Lumbar back: Normal.        Back:       Comments: Dull pain, rating 5/10.    Skin:     General: Skin is warm and dry.      Capillary Refill: Capillary refill takes less than 2 seconds.      Findings: No rash.   Neurological:      General: No focal deficit present.      Mental Status: She is alert.   Psychiatric:         Mood and Affect: Mood normal.         DIAGNOSTIC RESULTS     Labs:No results found

## 2024-02-26 NOTE — PROGRESS NOTES
2 times daily for 5 days  Dispense: 10 capsule; Refill: 0      DISPOSITION    Return if symptoms worsen or fail to improve.    Kristel released without restrictions.    Future Appointments   Date Time Provider Department Center   3/11/2024 12:40 PM Martin Davenport DO Fam Med [facility]   4/18/2024  1:30 PM STR EXAM ROOM 5 [facility] OP NURS [place] [facility]   7/25/2024  1:00 PM STR MAMMOGRAPHY RM2  [facility] WOMEN STR Rad/Card   10/31/2024 11:30 AM Jie Multani, APRN - CNP N [facility] CHF [facility] [place]   12/3/2024  3:15 PM Christopher Hwang MD N SRPX Heart [facility]     PATIENT COUNSELING    Counseling was provided today regarding the following topics: Healthy eating habits, Regular exercise, substance abuse and healthy sleep habits.    Barriers to learning and self management: none    Discussed use, benefit, and side effects of prescribed medications.  Barriers to medication compliance addressed.  All patient questions answered.  Pt voiced understanding.       Electronically signed by Martin Davenport DO on 2/26/2024 at 11:30 AM

## 2024-02-27 ENCOUNTER — TELEPHONE (OUTPATIENT)
Dept: FAMILY MEDICINE CLINIC | Age: 89
End: 2024-02-27

## 2024-02-27 NOTE — ED NOTES
In pt's room for discharge, pt reports concern of BP being elevated. BP retaken and remains high. Pt reports that she is having \"acid reflux\" and having back pain as well in talking to her. I went to CNP with info and we decided to just check EKG to make sure symptoms are not heart related. EKG completed and taken to [facility] to see. Pt then discharged home.       Fernanda Mckeon, SCOOBY  02/26/24 [id number]

## 2024-02-27 NOTE — TELEPHONE ENCOUNTER
----- Message from Jumana Vargas sent at 2/27/2024  2:09 PM EST -----  Subject: Appointment Request    Reason for Call: Established Patient Appointment needed: Routine ED Follow   Up Visit    QUESTIONS    Reason for appointment request? No appointments available during search     Additional Information for Provider? Patient went to [facility]   on 2/26 for back pain. No appts for a f/u are available. Please call her   back to schedule. She also asked about her urine culture.   ---------------------------------------------------------------------------  --------------  CALL BACK INFO  [phone]; OK to leave message on voicemail  ---------------------------------------------------------------------------  --------------  SCRIPT ANSWERS

## 2024-02-27 NOTE — TELEPHONE ENCOUNTER
I contacted the patient and she is now scheduled for 02/29/24 at 9:40am. She is aware that our office will call her when her labs have been reviewed.

## 2024-02-28 ENCOUNTER — CLINICAL DOCUMENTATION ONLY (OUTPATIENT)
Facility: CLINIC | Age: 89
End: 2024-02-28

## 2024-02-28 ENCOUNTER — HOSPITAL ENCOUNTER (OUTPATIENT)
Age: 89
Discharge: HOME | End: 2024-02-28
Payer: MEDICARE

## 2024-02-28 DIAGNOSIS — R73.9 HYPERGLYCEMIA: ICD-10-CM

## 2024-02-28 LAB
BACTERIA UR CULT: ABNORMAL
BACTERIA UR CULT: ABNORMAL
DEPRECATED MEAN GLUCOSE BLD GHB EST-ACNC: 123 MG/DL (ref 70–126)
EKG ATRIAL RATE: 78 BPM
EKG P AXIS: 84 DEGREES
EKG P-R INTERVAL: 176 MS
EKG Q-T INTERVAL: 414 MS
EKG QRS DURATION: 86 MS
EKG QTC CALCULATION (BAZETT): 471 MS
EKG R AXIS: -34 DEGREES
EKG T AXIS: 79 DEGREES
EKG VENTRICULAR RATE: 78 BPM
HBA1C MFR BLD HPLC: 6.1 % (ref 4.4–6.4)
ORGANISM: ABNORMAL

## 2024-02-28 PROCEDURE — 36415 COLL VENOUS BLD VENIPUNCTURE: CPT

## 2024-02-28 PROCEDURE — 93010 ELECTROCARDIOGRAM REPORT: CPT | Performed by: INTERNAL MEDICINE

## 2024-02-28 PROCEDURE — 83036 HEMOGLOBIN GLYCOSYLATED A1C: CPT

## 2024-02-29 ENCOUNTER — TELEPHONE (OUTPATIENT)
Dept: FAMILY MEDICINE CLINIC | Age: 89
End: 2024-02-29

## 2024-02-29 DIAGNOSIS — N39.0 ACUTE URINARY TRACT INFECTION: Primary | ICD-10-CM

## 2024-02-29 RX ORDER — CIPROFLOXACIN 250 MG/1
250 TABLET, FILM COATED ORAL 2 TIMES DAILY
Qty: 6 TABLET | Refills: 0 | Status: SHIPPED | OUTPATIENT
Start: 2024-02-29 | End: 2024-03-03

## 2024-02-29 NOTE — TELEPHONE ENCOUNTER
----- Message from Martin Davenport, DO sent at 2/29/2024  5:54 AM EST -----  Please let pt know that urine culture is growing out a small amnt of bacteria likely not sensitive to the current ATB we have her on.   Recommend change to cipro 250mg bid x 3 days  Rx sent.  Let me know if questions, thanks!   Therapeutic Intensity

## 2024-02-29 NOTE — TELEPHONE ENCOUNTER
----- Message from Martin Davenport DO sent at 2/29/2024  5:55 AM EST -----  Please let pt know that A1c stable at 6.1  Let me know if questions, thanks!

## 2024-02-29 NOTE — TELEPHONE ENCOUNTER
Left message on answering machine requesting pt to call back at earliest convenience.     Please go over both messages when patient calls back

## 2024-03-04 ENCOUNTER — APPOINTMENT (OUTPATIENT)
Dept: CT IMAGING | Age: 89
DRG: 281 | End: 2024-03-04
Payer: MEDICARE

## 2024-03-04 ENCOUNTER — APPOINTMENT (OUTPATIENT)
Dept: GENERAL RADIOLOGY | Age: 89
DRG: 281 | End: 2024-03-04
Payer: MEDICARE

## 2024-03-04 ENCOUNTER — HOSPITAL ENCOUNTER (INPATIENT)
Age: 89
LOS: 1 days | Discharge: HOME | DRG: 281 | End: 2024-03-06
Attending: EMERGENCY MEDICINE | Admitting: STUDENT IN AN ORGANIZED HEALTH CARE EDUCATION/TRAINING PROGRAM
Payer: MEDICARE

## 2024-03-04 DIAGNOSIS — N18.30 STAGE 3 CHRONIC KIDNEY DISEASE, UNSPECIFIED WHETHER STAGE 3A OR 3B CKD (HCC): Chronic | ICD-10-CM

## 2024-03-04 DIAGNOSIS — M54.50 CHRONIC LOW BACK PAIN, UNSPECIFIED BACK PAIN LATERALITY, UNSPECIFIED WHETHER SCIATICA PRESENT: Primary | ICD-10-CM

## 2024-03-04 DIAGNOSIS — I25.119 ATHEROSCLEROSIS OF NATIVE CORONARY ARTERY OF NATIVE HEART WITH ANGINA PECTORIS (HCC): ICD-10-CM

## 2024-03-04 DIAGNOSIS — I21.4 NSTEMI (NON-ST ELEVATED MYOCARDIAL INFARCTION) (HCC): ICD-10-CM

## 2024-03-04 DIAGNOSIS — R79.89 ELEVATED TROPONIN: ICD-10-CM

## 2024-03-04 DIAGNOSIS — G89.29 CHRONIC LOW BACK PAIN, UNSPECIFIED BACK PAIN LATERALITY, UNSPECIFIED WHETHER SCIATICA PRESENT: Primary | ICD-10-CM

## 2024-03-04 LAB
ALBUMIN SERPL BCG-MCNC: 4.2 G/DL (ref 3.5–5.1)
ALP SERPL-CCNC: 77 U/L (ref 38–126)
ALT SERPL W/O P-5'-P-CCNC: 15 U/L (ref 11–66)
ANION GAP SERPL CALC-SCNC: 16 MEQ/L (ref 8–16)
APTT PPP: 125.9 SECONDS (ref 22–38)
APTT PPP: 52.9 SECONDS (ref 22–38)
AST SERPL-CCNC: 21 U/L (ref 5–40)
BACTERIA URNS QL MICRO: ABNORMAL /HPF
BASOPHILS ABSOLUTE: 0 THOU/MM3 (ref 0–0.1)
BASOPHILS NFR BLD AUTO: 0.6 %
BILIRUB CONJ SERPL-MCNC: < 0.2 MG/DL (ref 0–0.3)
BILIRUB SERPL-MCNC: 0.4 MG/DL (ref 0.3–1.2)
BILIRUB UR QL STRIP.AUTO: NEGATIVE
BUN SERPL-MCNC: 21 MG/DL (ref 7–22)
CALCIUM SERPL-MCNC: 9.4 MG/DL (ref 8.5–10.5)
CASTS #/AREA URNS LPF: ABNORMAL /LPF
CASTS 2: ABNORMAL /LPF
CHARACTER UR: CLEAR
CHLORIDE SERPL-SCNC: 107 MEQ/L (ref 98–111)
CHOLESTEROL, FASTING: 112 MG/DL (ref 100–199)
CO2 SERPL-SCNC: 22 MEQ/L (ref 23–33)
COLOR: YELLOW
CREAT SERPL-MCNC: 1 MG/DL (ref 0.4–1.2)
CRYSTALS URNS MICRO: ABNORMAL
DEPRECATED RDW RBC AUTO: 49.5 FL (ref 35–45)
EOSINOPHIL NFR BLD AUTO: 2.5 %
EOSINOPHILS ABSOLUTE: 0.1 THOU/MM3 (ref 0–0.4)
EPITHELIAL CELLS, UA: ABNORMAL /HPF
ERYTHROCYTE [DISTWIDTH] IN BLOOD BY AUTOMATED COUNT: 13.5 % (ref 11.5–14.5)
GFR SERPL CREATININE-BSD FRML MDRD: 51 ML/MIN/1.73M2
GLUCOSE BLD STRIP.AUTO-MCNC: 111 MG/DL (ref 70–108)
GLUCOSE SERPL-MCNC: 118 MG/DL (ref 70–108)
GLUCOSE UR QL STRIP.AUTO: NEGATIVE MG/DL
HCT VFR BLD AUTO: 42 % (ref 37–47)
HDLC SERPL-MCNC: 42 MG/DL
HEPARIN UNFRACTIONATED: 0.7 U/ML (ref 0.3–0.7)
HEPARIN UNFRACTIONATED: < 0.04 U/ML (ref 0.3–0.7)
HGB BLD-MCNC: 13.7 GM/DL (ref 12–16)
HGB UR QL STRIP.AUTO: ABNORMAL
IMM GRANULOCYTES # BLD AUTO: 0.01 THOU/MM3 (ref 0–0.07)
IMM GRANULOCYTES NFR BLD AUTO: 0.2 %
INR PPP: 1.07 (ref 0.85–1.13)
KETONES UR QL STRIP.AUTO: NEGATIVE
LDLC SERPL CALC-MCNC: 51 MG/DL
LYMPHOCYTES ABSOLUTE: 0.8 THOU/MM3 (ref 1–4.8)
LYMPHOCYTES NFR BLD AUTO: 17.5 %
MCH RBC QN AUTO: 32.5 PG (ref 26–33)
MCHC RBC AUTO-ENTMCNC: 32.6 GM/DL (ref 32.2–35.5)
MCV RBC AUTO: 99.8 FL (ref 81–99)
MISCELLANEOUS 2: ABNORMAL
MONOCYTES ABSOLUTE: 0.3 THOU/MM3 (ref 0.4–1.3)
MONOCYTES NFR BLD AUTO: 7 %
MRSA DNA SPEC QL NAA+PROBE: NEGATIVE
NEUTROPHILS NFR BLD AUTO: 72.2 %
NITRITE UR QL STRIP: NEGATIVE
NRBC BLD AUTO-RTO: 0 /100 WBC
NT-PROBNP SERPL IA-MCNC: 1197 PG/ML (ref 0–449)
OSMOLALITY SERPL CALC.SUM OF ELEC: 292.8 MOSMOL/KG (ref 275–300)
PH UR STRIP.AUTO: 6 [PH] (ref 5–9)
PLATELET # BLD AUTO: 151 THOU/MM3 (ref 130–400)
PMV BLD AUTO: 9.7 FL (ref 9.4–12.4)
POTASSIUM SERPL-SCNC: 3.8 MEQ/L (ref 3.5–5.2)
PROT SERPL-MCNC: 7 G/DL (ref 6.1–8)
PROT UR STRIP.AUTO-MCNC: NEGATIVE MG/DL
RBC # BLD AUTO: 4.21 MILL/MM3 (ref 4.2–5.4)
RBC URINE: ABNORMAL /HPF
RENAL EPI CELLS #/AREA URNS HPF: ABNORMAL /[HPF]
SEGMENTED NEUTROPHILS ABSOLUTE COUNT: 3.4 THOU/MM3 (ref 1.8–7.7)
SODIUM SERPL-SCNC: 145 MEQ/L (ref 135–145)
SP GR UR REFRACT.AUTO: 1.01 (ref 1–1.03)
T4 FREE SERPL-MCNC: 1.32 NG/DL (ref 0.93–1.68)
TRIGLYCERIDE, FASTING: 96 MG/DL (ref 0–199)
TROPONIN, HIGH SENSITIVITY: 23 NG/L (ref 0–12)
TROPONIN, HIGH SENSITIVITY: 55 NG/L (ref 0–12)
TROPONIN, HIGH SENSITIVITY: 59 NG/L (ref 0–12)
TROPONIN, HIGH SENSITIVITY: 63 NG/L (ref 0–12)
TSH SERPL DL<=0.005 MIU/L-ACNC: 4.63 UIU/ML (ref 0.4–4.2)
UROBILINOGEN, URINE: 0.2 EU/DL (ref 0–1)
WBC # BLD AUTO: 4.7 THOU/MM3 (ref 4.8–10.8)
WBC #/AREA URNS HPF: ABNORMAL /HPF
WBC #/AREA URNS HPF: ABNORMAL /[HPF]
YEAST LIKE FUNGI URNS QL MICRO: ABNORMAL

## 2024-03-04 PROCEDURE — 72131 CT LUMBAR SPINE W/O DYE: CPT

## 2024-03-04 PROCEDURE — 6370000000 HC RX 637 (ALT 250 FOR IP)

## 2024-03-04 PROCEDURE — 85730 THROMBOPLASTIN TIME PARTIAL: CPT

## 2024-03-04 PROCEDURE — 6360000002 HC RX W HCPCS: Performed by: STUDENT IN AN ORGANIZED HEALTH CARE EDUCATION/TRAINING PROGRAM

## 2024-03-04 PROCEDURE — G0378 HOSPITAL OBSERVATION PER HR: HCPCS

## 2024-03-04 PROCEDURE — 83880 ASSAY OF NATRIURETIC PEPTIDE: CPT

## 2024-03-04 PROCEDURE — 85610 PROTHROMBIN TIME: CPT

## 2024-03-04 PROCEDURE — 72125 CT NECK SPINE W/O DYE: CPT

## 2024-03-04 PROCEDURE — 96366 THER/PROPH/DIAG IV INF ADDON: CPT

## 2024-03-04 PROCEDURE — 6370000000 HC RX 637 (ALT 250 FOR IP): Performed by: PHYSICIAN ASSISTANT

## 2024-03-04 PROCEDURE — 80061 LIPID PANEL: CPT

## 2024-03-04 PROCEDURE — 80053 COMPREHEN METABOLIC PANEL: CPT

## 2024-03-04 PROCEDURE — 93005 ELECTROCARDIOGRAM TRACING: CPT

## 2024-03-04 PROCEDURE — 99285 EMERGENCY DEPT VISIT HI MDM: CPT

## 2024-03-04 PROCEDURE — 93010 ELECTROCARDIOGRAM REPORT: CPT | Performed by: NUCLEAR MEDICINE

## 2024-03-04 PROCEDURE — 87641 MR-STAPH DNA AMP PROBE: CPT

## 2024-03-04 PROCEDURE — 71045 X-RAY EXAM CHEST 1 VIEW: CPT

## 2024-03-04 PROCEDURE — 96365 THER/PROPH/DIAG IV INF INIT: CPT

## 2024-03-04 PROCEDURE — 81001 URINALYSIS AUTO W/SCOPE: CPT

## 2024-03-04 PROCEDURE — 85025 COMPLETE CBC W/AUTO DIFF WBC: CPT

## 2024-03-04 PROCEDURE — 87070 CULTURE OTHR SPECIMN AEROBIC: CPT

## 2024-03-04 PROCEDURE — 99223 1ST HOSP IP/OBS HIGH 75: CPT

## 2024-03-04 PROCEDURE — 82248 BILIRUBIN DIRECT: CPT

## 2024-03-04 PROCEDURE — 84484 ASSAY OF TROPONIN QUANT: CPT

## 2024-03-04 PROCEDURE — 82948 REAGENT STRIP/BLOOD GLUCOSE: CPT

## 2024-03-04 PROCEDURE — 36415 COLL VENOUS BLD VENIPUNCTURE: CPT

## 2024-03-04 PROCEDURE — 85520 HEPARIN ASSAY: CPT

## 2024-03-04 PROCEDURE — 70450 CT HEAD/BRAIN W/O DYE: CPT

## 2024-03-04 PROCEDURE — 93005 ELECTROCARDIOGRAM TRACING: CPT | Performed by: STUDENT IN AN ORGANIZED HEALTH CARE EDUCATION/TRAINING PROGRAM

## 2024-03-04 PROCEDURE — 84439 ASSAY OF FREE THYROXINE: CPT

## 2024-03-04 PROCEDURE — 72128 CT CHEST SPINE W/O DYE: CPT

## 2024-03-04 PROCEDURE — 6370000000 HC RX 637 (ALT 250 FOR IP): Performed by: STUDENT IN AN ORGANIZED HEALTH CARE EDUCATION/TRAINING PROGRAM

## 2024-03-04 PROCEDURE — 84443 ASSAY THYROID STIM HORMONE: CPT

## 2024-03-04 RX ORDER — ONDANSETRON 2 MG/ML
4 INJECTION INTRAMUSCULAR; INTRAVENOUS EVERY 6 HOURS PRN
Status: DISCONTINUED | OUTPATIENT
Start: 2024-03-04 | End: 2024-03-06 | Stop reason: HOSPADM

## 2024-03-04 RX ORDER — SODIUM CHLORIDE 9 MG/ML
INJECTION, SOLUTION INTRAVENOUS PRN
Status: DISCONTINUED | OUTPATIENT
Start: 2024-03-04 | End: 2024-03-06 | Stop reason: HOSPADM

## 2024-03-04 RX ORDER — CLOPIDOGREL BISULFATE 75 MG/1
75 TABLET ORAL DAILY
Status: DISCONTINUED | OUTPATIENT
Start: 2024-03-04 | End: 2024-03-06 | Stop reason: HOSPADM

## 2024-03-04 RX ORDER — SPIRONOLACTONE 25 MG/1
50 TABLET ORAL DAILY
Status: DISCONTINUED | OUTPATIENT
Start: 2024-03-04 | End: 2024-03-06 | Stop reason: HOSPADM

## 2024-03-04 RX ORDER — CARVEDILOL 3.12 MG/1
3.12 TABLET ORAL 2 TIMES DAILY WITH MEALS
Status: DISCONTINUED | OUTPATIENT
Start: 2024-03-04 | End: 2024-03-06 | Stop reason: HOSPADM

## 2024-03-04 RX ORDER — FLUTICASONE PROPIONATE 50 MCG
2 SPRAY, SUSPENSION (ML) NASAL DAILY
Status: DISCONTINUED | OUTPATIENT
Start: 2024-03-04 | End: 2024-03-06 | Stop reason: HOSPADM

## 2024-03-04 RX ORDER — ACETAMINOPHEN 500 MG
1000 TABLET ORAL ONCE
Status: COMPLETED | OUTPATIENT
Start: 2024-03-04 | End: 2024-03-04

## 2024-03-04 RX ORDER — THYROID 60 MG/1
30 TABLET ORAL DAILY
Status: DISCONTINUED | OUTPATIENT
Start: 2024-03-04 | End: 2024-03-06 | Stop reason: HOSPADM

## 2024-03-04 RX ORDER — LIDOCAINE 4 G/G
2 PATCH TOPICAL DAILY
Status: DISCONTINUED | OUTPATIENT
Start: 2024-03-05 | End: 2024-03-06 | Stop reason: HOSPADM

## 2024-03-04 RX ORDER — HEPARIN SODIUM 1000 [USP'U]/ML
60 INJECTION, SOLUTION INTRAVENOUS; SUBCUTANEOUS PRN
Status: DISCONTINUED | OUTPATIENT
Start: 2024-03-04 | End: 2024-03-05 | Stop reason: ALTCHOICE

## 2024-03-04 RX ORDER — ASPIRIN 81 MG/1
324 TABLET, CHEWABLE ORAL ONCE
Status: COMPLETED | OUTPATIENT
Start: 2024-03-04 | End: 2024-03-04

## 2024-03-04 RX ORDER — ACETAMINOPHEN 650 MG/1
650 SUPPOSITORY RECTAL EVERY 6 HOURS PRN
Status: DISCONTINUED | OUTPATIENT
Start: 2024-03-04 | End: 2024-03-06 | Stop reason: HOSPADM

## 2024-03-04 RX ORDER — HEPARIN SODIUM 10000 [USP'U]/100ML
5-30 INJECTION, SOLUTION INTRAVENOUS CONTINUOUS
Status: DISCONTINUED | OUTPATIENT
Start: 2024-03-04 | End: 2024-03-05

## 2024-03-04 RX ORDER — ATORVASTATIN CALCIUM 40 MG/1
40 TABLET, FILM COATED ORAL NIGHTLY
Status: DISCONTINUED | OUTPATIENT
Start: 2024-03-04 | End: 2024-03-06 | Stop reason: HOSPADM

## 2024-03-04 RX ORDER — HEPARIN SODIUM 1000 [USP'U]/ML
30 INJECTION, SOLUTION INTRAVENOUS; SUBCUTANEOUS PRN
Status: DISCONTINUED | OUTPATIENT
Start: 2024-03-04 | End: 2024-03-05 | Stop reason: ALTCHOICE

## 2024-03-04 RX ORDER — SODIUM CHLORIDE 0.9 % (FLUSH) 0.9 %
10 SYRINGE (ML) INJECTION PRN
Status: DISCONTINUED | OUTPATIENT
Start: 2024-03-04 | End: 2024-03-06 | Stop reason: HOSPADM

## 2024-03-04 RX ORDER — LIDOCAINE 4 G/G
1 PATCH TOPICAL ONCE
Status: COMPLETED | OUTPATIENT
Start: 2024-03-04 | End: 2024-03-04

## 2024-03-04 RX ORDER — POLYETHYLENE GLYCOL 3350 17 G/17G
17 POWDER, FOR SOLUTION ORAL DAILY PRN
Status: DISCONTINUED | OUTPATIENT
Start: 2024-03-04 | End: 2024-03-06 | Stop reason: HOSPADM

## 2024-03-04 RX ORDER — FUROSEMIDE 40 MG/1
40 TABLET ORAL DAILY
Status: DISCONTINUED | OUTPATIENT
Start: 2024-03-04 | End: 2024-03-06 | Stop reason: HOSPADM

## 2024-03-04 RX ORDER — SODIUM CHLORIDE 0.9 % (FLUSH) 0.9 %
10 SYRINGE (ML) INJECTION EVERY 12 HOURS SCHEDULED
Status: DISCONTINUED | OUTPATIENT
Start: 2024-03-04 | End: 2024-03-06 | Stop reason: HOSPADM

## 2024-03-04 RX ORDER — OMEGA-3/DHA/EPA/FISH OIL 300-1000MG
1 CAPSULE ORAL DAILY
Status: DISCONTINUED | OUTPATIENT
Start: 2024-03-04 | End: 2024-03-04 | Stop reason: CLARIF

## 2024-03-04 RX ORDER — ONDANSETRON 4 MG/1
4 TABLET, ORALLY DISINTEGRATING ORAL EVERY 8 HOURS PRN
Status: DISCONTINUED | OUTPATIENT
Start: 2024-03-04 | End: 2024-03-06 | Stop reason: HOSPADM

## 2024-03-04 RX ORDER — ACETAMINOPHEN 325 MG/1
650 TABLET ORAL EVERY 6 HOURS PRN
Status: DISCONTINUED | OUTPATIENT
Start: 2024-03-04 | End: 2024-03-06 | Stop reason: HOSPADM

## 2024-03-04 RX ORDER — MULTIVITAMIN WITH IRON
1 TABLET ORAL DAILY
Status: DISCONTINUED | OUTPATIENT
Start: 2024-03-04 | End: 2024-03-06 | Stop reason: HOSPADM

## 2024-03-04 RX ORDER — DOCUSATE SODIUM 100 MG/1
100 CAPSULE, LIQUID FILLED ORAL 2 TIMES DAILY
Status: DISCONTINUED | OUTPATIENT
Start: 2024-03-04 | End: 2024-03-06 | Stop reason: HOSPADM

## 2024-03-04 RX ORDER — HEPARIN SODIUM 1000 [USP'U]/ML
60 INJECTION, SOLUTION INTRAVENOUS; SUBCUTANEOUS ONCE
Status: COMPLETED | OUTPATIENT
Start: 2024-03-04 | End: 2024-03-04

## 2024-03-04 RX ORDER — PANTOPRAZOLE SODIUM 40 MG/1
40 TABLET, DELAYED RELEASE ORAL
Status: DISCONTINUED | OUTPATIENT
Start: 2024-03-05 | End: 2024-03-06 | Stop reason: HOSPADM

## 2024-03-04 RX ORDER — ALPRAZOLAM 0.25 MG/1
0.25 TABLET ORAL EVERY 4 HOURS PRN
Status: DISCONTINUED | OUTPATIENT
Start: 2024-03-04 | End: 2024-03-06 | Stop reason: HOSPADM

## 2024-03-04 RX ORDER — ASPIRIN 81 MG/1
81 TABLET ORAL DAILY
Status: DISCONTINUED | OUTPATIENT
Start: 2024-03-05 | End: 2024-03-06 | Stop reason: HOSPADM

## 2024-03-04 RX ADMIN — FLUTICASONE PROPIONATE 2 SPRAY: 50 SPRAY, METERED NASAL at 18:23

## 2024-03-04 RX ADMIN — ATORVASTATIN CALCIUM 40 MG: 40 TABLET, FILM COATED ORAL at 18:23

## 2024-03-04 RX ADMIN — LEVOTHYROXINE, LIOTHYRONINE 30 MG: 38; 9 TABLET ORAL at 18:18

## 2024-03-04 RX ADMIN — ACETAMINOPHEN 1000 MG: 500 TABLET ORAL at 08:41

## 2024-03-04 RX ADMIN — POLYETHYLENE GLYCOL 400 AND PROPYLENE GLYCOL 1 DROP: 4; 3 SOLUTION/ DROPS OPHTHALMIC at 18:24

## 2024-03-04 RX ADMIN — ASPIRIN 81 MG 324 MG: 81 TABLET ORAL at 14:04

## 2024-03-04 RX ADMIN — DOCUSATE SODIUM 100 MG: 100 CAPSULE, LIQUID FILLED ORAL at 20:09

## 2024-03-04 RX ADMIN — CLOPIDOGREL BISULFATE 75 MG: 75 TABLET ORAL at 18:19

## 2024-03-04 RX ADMIN — CARVEDILOL 3.12 MG: 3.12 TABLET, FILM COATED ORAL at 18:19

## 2024-03-04 RX ADMIN — HEPARIN SODIUM 3460 UNITS: 1000 INJECTION INTRAVENOUS; SUBCUTANEOUS at 21:17

## 2024-03-04 RX ADMIN — Medication 1 TABLET: at 18:19

## 2024-03-04 RX ADMIN — ALPRAZOLAM 0.25 MG: 0.25 TABLET ORAL at 21:59

## 2024-03-04 RX ADMIN — POLYETHYLENE GLYCOL 3350 17 G: 17 POWDER, FOR SOLUTION ORAL at 18:23

## 2024-03-04 RX ADMIN — HEPARIN SODIUM 3460 UNITS: 1000 INJECTION INTRAVENOUS; SUBCUTANEOUS at 13:55

## 2024-03-04 RX ADMIN — ACETAMINOPHEN 650 MG: 325 TABLET ORAL at 18:19

## 2024-03-04 RX ADMIN — HEPARIN SODIUM 12 UNITS/KG/HR: 10000 INJECTION, SOLUTION INTRAVENOUS at 14:03

## 2024-03-04 ASSESSMENT — PAIN SCALES - GENERAL
PAINLEVEL_OUTOF10: 6
PAINLEVEL_OUTOF10: 6
PAINLEVEL_OUTOF10: 5
PAINLEVEL_OUTOF10: 6
PAINLEVEL_OUTOF10: 4
PAINLEVEL_OUTOF10: 2
PAINLEVEL_OUTOF10: 7

## 2024-03-04 ASSESSMENT — PAIN - FUNCTIONAL ASSESSMENT
PAIN_FUNCTIONAL_ASSESSMENT: PREVENTS OR INTERFERES SOME ACTIVE ACTIVITIES AND ADLS
PAIN_FUNCTIONAL_ASSESSMENT: PREVENTS OR INTERFERES SOME ACTIVE ACTIVITIES AND ADLS
PAIN_FUNCTIONAL_ASSESSMENT: NONE - DENIES PAIN
PAIN_FUNCTIONAL_ASSESSMENT: 0-10
PAIN_FUNCTIONAL_ASSESSMENT: 0-10
PAIN_FUNCTIONAL_ASSESSMENT: PREVENTS OR INTERFERES SOME ACTIVE ACTIVITIES AND ADLS
PAIN_FUNCTIONAL_ASSESSMENT: 0-10

## 2024-03-04 ASSESSMENT — PAIN DESCRIPTION - ORIENTATION
ORIENTATION: LEFT;MID

## 2024-03-04 ASSESSMENT — ENCOUNTER SYMPTOMS
SHORTNESS OF BREATH: 0
COLOR CHANGE: 0
CHEST TIGHTNESS: 1
ABDOMINAL PAIN: 0
DIARRHEA: 0
NAUSEA: 1
RHINORRHEA: 0
CONSTIPATION: 0
VOMITING: 0
SORE THROAT: 0

## 2024-03-04 ASSESSMENT — PAIN DESCRIPTION - LOCATION
LOCATION: BACK

## 2024-03-04 ASSESSMENT — PAIN DESCRIPTION - PAIN TYPE
TYPE: ACUTE PAIN

## 2024-03-04 ASSESSMENT — PAIN DESCRIPTION - ONSET
ONSET: ON-GOING
ONSET: ON-GOING

## 2024-03-04 ASSESSMENT — PAIN DESCRIPTION - FREQUENCY
FREQUENCY: CONTINUOUS
FREQUENCY: CONTINUOUS

## 2024-03-04 ASSESSMENT — PAIN DESCRIPTION - DESCRIPTORS
DESCRIPTORS: ACHING

## 2024-03-04 ASSESSMENT — HEART SCORE: ECG: 1

## 2024-03-04 NOTE — ED NOTES
Patient assisted to restroom at this time and back into bed. Patient tolerates ambulation well at this time.

## 2024-03-04 NOTE — ED NOTES
This RN phoned Dominique DECKER on pts critical PTT results. Verbal order placed for PTT redraw. Pt in stable condition

## 2024-03-04 NOTE — ED TRIAGE NOTES
Patient presents to ED via [facility] from home with report of 7/10 back pain that has been bothering her the past 5 months. Patient is A/O x4 on arrival and states she has not been taking her blood pressure medicine the past few days. Patient also states she had a fall 5 months ago and that's when this back pain began and was treated in the ED previously for this problem. Respirations even and unlabored at this time.

## 2024-03-04 NOTE — ED PROVIDER NOTES

## 2024-03-04 NOTE — ED PROVIDER NOTES
reviewed and normal. Body mass index is 24.8 kg/m².     Vitals:    03/04/24 1322 03/04/24 1346 03/04/24 1431 03/04/24 1550   BP: (!) 141/61 (!) 148/53 (!) 127/52 131/63   Pulse: 77 80 71 75   Resp: 19 27 27 18   Temp:       TempSrc:       SpO2: 98% 96% 97% 95%   Weight:           Physical Exam  Vitals and nursing note reviewed.   Constitutional:       General: She is not in acute distress.     Appearance: Normal appearance. She is normal weight. She is not ill-appearing, toxic-appearing or diaphoretic.   HENT:      Head: Normocephalic and atraumatic.      Nose: Nose normal.      Mouth/Throat:      Mouth: Mucous membranes are moist.      Pharynx: Oropharynx is clear.   Eyes:      Conjunctiva/sclera: Conjunctivae normal.   Cardiovascular:      Rate and Rhythm: Normal rate and regular rhythm.      Pulses: Normal pulses.      Heart sounds: Normal heart sounds.   Pulmonary:      Effort: Pulmonary effort is normal.      Breath sounds: Normal breath sounds.   Abdominal:      General: Abdomen is flat. Bowel sounds are normal.      Palpations: Abdomen is soft.      Tenderness: There is no abdominal tenderness.   Musculoskeletal:      Cervical back: Normal range of motion. No swelling, deformity, tenderness or bony tenderness.      Thoracic back: Tenderness and bony tenderness present.      Lumbar back: Tenderness and bony tenderness present. No swelling or deformity.      Right lower leg: No edema.      Left lower leg: No edema.   Skin:     General: Skin is warm and dry.      Capillary Refill: Capillary refill takes less than 2 seconds.   Neurological:      Mental Status: She is alert and oriented to person, place, and time.          FORMAL DIAGNOSTIC RESULTS   RADIOLOGY: Interpretation per the Radiologist below, if available at the time of this note (none if blank):  XR CHEST PORTABLE   Final Result   1. Minimal left basilar atelectasis/infiltrate.   2. Mild stable cardiomegaly.               **This report has been created  resulted at the time of this patient visit. A negative COVID-19 test should be interpreted as COVID no longer suspected unless otherwise noted in this encounter documentation note)     MEDICAL DECISION MAKING   Initial Differential: Includes but is not limited to osteoarthritis, pathologic fracture, pain from known compression fractures in thoracic spine, new fractures.    Discrepancies:  None    Summary: This is a 89 y.o. old female here with ongoing lower back pain.  No new falls however given patient's symptoms and prior compression fractures proceeded with imaging of her spine as well as her head.  These did not show any new fractures.  When I went back to patient's room to discuss this with her and her family her family brought up that the last time patient had back pain like this she had a myocardial infarction.  We therefore discussed additional workup which would include troponin and repeat EKG.  Discussed that her troponin may be elevated due to her age and likely need to repeat a second troponin patient feeling fine with proceeding with this.  Initial troponin was 21, repeat level 3 hours later was 63.  Repeat ECG was stable.  Did not show any signs of acute myocardial infarction.  Given patient's increasing troponin level while here in the emergency department given her aspirin and consult the hospital service for consideration of admission.  We also started a heparin infusion.    Medical Decision Making  Problems Addressed:  Chronic low back pain, unspecified back pain laterality, unspecified whether sciatica present: undiagnosed new problem with uncertain prognosis  Elevated troponin: undiagnosed new problem with uncertain prognosis    Amount and/or Complexity of Data Reviewed  Independent Historian: caregiver  External Data Reviewed: notes.  Labs: ordered. Decision-making details documented in ED Course.  Radiology: ordered and independent interpretation performed.  ECG/medicine tests: ordered and

## 2024-03-04 NOTE — ED NOTES
ED to inpatient nurses report      Chief Complaint:  Chief Complaint   Patient presents with    Back Pain     Present to ED from: home    MOA:     LOC: alert and orientated to name, place, date  Mobility: Requires assistance * 1  Oxygen Baseline: 98% room air    Current needs required: none     Code Status:   Prior    What abnormal results were found and what did you give/do to treat them? Elevated trop.   Any procedures or intervention occur? N/a    Mental Status:  Level of Consciousness: Alert (0)    Psych Assessment:        Vitals:  Patient Vitals for the past 24 hrs:   BP Temp Temp src Pulse Resp SpO2 Weight   03/04/24 1224 (!) 121/54 -- -- 81 19 97 % --   03/04/24 1128 (!) 135/55 -- -- 71 19 97 % --   03/04/24 1031 (!) 118/53 -- -- 68 19 95 % --   03/04/24 0942 (!) 143/67 -- -- 78 17 97 % --   03/04/24 0840 (!) 114/96 -- -- 79 24 97 % --   03/04/24 0711 (!) 181/81 97.7 °F (36.5 °C) Oral 86 18 98 % 57.6 kg (127 lb)        LDAs:   Peripheral IV 03/04/24 Left Antecubital (Active)   Site Assessment Clean, dry & intact 03/04/24 1038       Ambulatory Status:  No data recorded    Diagnosis:  DISPOSITION     Final diagnoses:   None        Consults:  None     Pain Score:  Pain Assessment  Pain Assessment: 0-10  Pain Level: 4  Pain Location: Back    C-SSRS:   Risk of Suicide: No Risk    Sepsis Screening:  Sepsis Risk Score: 1.96    [place] Fall Risk:       Swallow Screening        Preferred Language:   English      ALLERGIES     Prednisone, Actonel [risedronate sodium], Baclofen, Bactrim [sulfamethoxazole-trimethoprim], Cardizem [diltiazem], Celebrex [celecoxib], Dicloxacillin, Doxycycline, Evista [raloxifene], Lopid [gemfibrozil], Questran [cholestyramine], Synthroid [levothyroxine sodium], Tramadol, Zestoretic [lisinopril-hydrochlorothiazide], and Zetia [ezetimibe]    SURGICAL HISTORY       Past Surgical History:   Procedure Laterality Date    BREAST BIOPSY Left 1966    benign    CARPAL TUNNEL RELEASE Right 01/30/2013     CARPAL TUNNEL RELEASE Left 07/25/2013    CATARACT REMOVAL Bilateral 1999    COLON SURGERY  11/1999    resection    COLONOSCOPY  2003, 2006, 2011    DIAGNOSTIC CARDIAC CATH LAB PROCEDURE  05/07/2021    6 stents     HYSTERECTOMY (CERVIX STATUS UNKNOWN)  02/23/1970    age 45    KNEE ARTHROSCOPY Right 11/21/2007    meniscectomies    KNEE SURGERY Left 2000    replacement    LUKE STEROTACTIC LOC BREAST BIOPSY LEFT Left 02/16/2005    benign stereotactic biopsy    MYRINGOTOMY Right 07/01/2015    tube insertion    OVARY REMOVAL Bilateral 2003    SHOULDER SURGERY  05/2014    SINUS SURGERY         PAST MEDICAL HISTORY       Past Medical History:   Diagnosis Date    Anxiety     Aortic valve regurgitation     Arthritis     ASHD (arteriosclerotic heart disease)     CKD (chronic kidney disease) stage 3, GFR 30-59 ml/min ([facility])     Diverticulosis     Dyslipidemia     GERD (gastroesophageal reflux disease)     Heart failure with reduced ejection fraction ([facility])     History of non-ST elevation myocardial infarction (NSTEMI) 05/06/2021    History of shingles     History of skin cancer 2013    SKIN CANCER ON LEFT ANKLE    Hypertension, essential     Hypothyroidism     Osteoporosis, post-menopausal     Prediabetes 03/17/2016    Psoriasis     S/P angioplasty with stent 05/06/2021    3 stents to LAD, 1 stent to OM, 2 stents to diag. per Dr. Castillo           Electronically signed by Sarah Morfin RN on 3/4/2024 at 12:25 PM

## 2024-03-04 NOTE — H&P
Hospitalist - History & Physical      Patient: Kristel Welch    Unit/Bed:21/021A  YOB: 1925  MRN: [id number]   Acct: [id number]   PCP: Martin Davenport DO    Date of Service: Pt seen/examined on 03/04/24  and Admitted to Observation with expected LOS less than two midnights due to medical therapy.     Chief Complaint: Chest discomfort, neck pain    Assessment and Plan:  NSTEMI, concern for type I:   Patient endorsing worsening chest discomfort with neck soreness and pain this morning, similar to past presentation of NSTEMI.  Afebrile, hemodynamically stable, in no acute distress, without chest pain at present.  Labs reveal no acute leukocytosis, troponin trend 23-> 63.  Started on heparin GGT, given  mg.  Cardiology Consulted, Dr. Castillo made aware.  N.p.o. today until seen by cardiology.  Repeat echo, trend troponin x 2, EKG, CXR.  Monitor symptoms closely.     CAD, s/p PCI:  PCI to ALD, OM 5/2021. Follows with Dr. Hwang. Pt reports chest discomfort similar to previous NSTEMI. Cardiology onboard.on ASA, statin.       HFmrEF:    Last echo 7/20/2022 reveals EF 45 to 50% with grade 1 diastolic dysfunction, mitral regurgitation, mild aortic regurgitation, mild tricuspid regurgitation.  Patient appears compensated today.  Holding Lasix and spironolactone upon admission given #1.  Daily weights, I's and O's.  Repeat echo for #1.    Hypothyroidism:    TSH with reflex ordered. Continue home meds.     Chronic back pain:   Much improved after lidocaine patches today.  Pain has been ongoing x 5 months.. Imaging today reveals old compression fracture of T5. Continue lidocaine patches.        History Of Present Illness:    Kristel is a 89-year-old  female with a past medical history of CAD, CKD who presents to [facility] ED today for the evaluation of chest discomfort, back pain and significantly worsened neck pain this morning.  Patient reports she has been dealing with back pain and  CT HEAD WO CONTRAST CLINICAL INFORMATION: Non-recent fall, confusion this morning.. COMPARISON: Head CT 12/31/2016. TECHNIQUE: Noncontrast 5 mm axial images were obtained through the brain. Sagittal and coronal reconstructions were obtained. . All CT scans at this facility use dose modulation, iterative reconstruction, and/or weight-based dosing when appropriate to reduce radiation dose to as low as reasonably achievable. FINDINGS: There is no hemorrhage. There are no intra-or extra-axial collections.  There is no hydrocephalus, midline shift or mass effect.  There is a mild amount of abnormal low attenuation in the white matter the brain suggesting chronic small vessel ischemic changes. The paranasal sinuses and mastoid air cells are normally aerated.  There is no suspicious calvarial abnormality.  There are vascular calcifications. There is no significant change in the appearance of the brain compared to the prior study.      No evidence of an acute process. No change from prior. **This report has been created using voice recognition software. It may contain minor errors which are inherent in voice recognition technology.** Final report electronically signed by Dr. Sara Burks on 3/4/2024 8:35 AM        EKG:  NSR  with left axis deviation, inverted t waves in lateral leads    Electronically signed by Dominique Castellanos PA-C on 3/4/2024 at 1:15 PM

## 2024-03-04 NOTE — PROGRESS NOTES
[facility]   PROGRESS NOTE      Patient: Kristel Welch  Room #: 4K-23/023-A            YOB: 1925  Age: 89 y.o.  Gender: female            Admit Date & Time: 3/4/2024  7:11 AM    Assessment:    The patient declined a visit at this time and welcomed later visits.     Interventions:  The patient was provided information about Spiritual Care being available.     Outcomes:  The  politely wished the patient a positive day.     Plan:  1.Spiritual care will continue to follow the patient according to [facility] spiritual care SOP.       Electronically signed by Chaplain Janelle, on 3/4/2024 at 6:31 PM.  Spiritual Care Department  [facility]  [phone]     03/04/24 1830   Encounter Summary   Encounter Overview/Reason  Initial Encounter   Service Provided For: Patient;Family;Patient and family together   Referral/Consult From: [facility]   Support System Family members   Last Encounter  03/04/24   Complexity of Encounter Low   Begin Time 1800   End Time  1805   Total Time Calculated 5 min   Spiritual/Emotional needs   Type Spiritual Support   Assessment/Intervention/Outcome   Assessment Calm;Coping;Hopeful   Intervention Active listening   Outcome Refused/Declined

## 2024-03-04 NOTE — PROGRESS NOTES
Pt admitted to 4K23 from ED.     Complaints: back pain.      IV Heparin infusing into the forearm left, condition patent and no redness.   IV site free of s/s of infection or infiltration.     Vital signs obtained. Assessment and data collection initiated. Two nurse skin assessment performed by Shanice ALMODOVAR and Maria Guadalupe ALMODOVAR.    Swallow Screen completed and documented for all patients ages 45 and older. yes  If patient fails bedside swallow, obtain order for speech therapy consult and keep patient NPO.    Policies and procedures for 4 explained. All questions answered with no further questions at this time. Fall prevention and safety brochure discussed with patient.  Bed alarm on. Call light in reach. Oriented to room.

## 2024-03-05 ENCOUNTER — APPOINTMENT (OUTPATIENT)
Dept: CT IMAGING | Age: 89
DRG: 281 | End: 2024-03-05
Payer: MEDICARE

## 2024-03-05 PROBLEM — M54.50 CHRONIC LOW BACK PAIN: Status: ACTIVE | Noted: 2024-03-05

## 2024-03-05 PROBLEM — G89.29 CHRONIC LOW BACK PAIN: Status: ACTIVE | Noted: 2024-03-05

## 2024-03-05 LAB
ANION GAP SERPL CALC-SCNC: 10 MEQ/L (ref 8–16)
BASOPHILS ABSOLUTE: 0 THOU/MM3 (ref 0–0.1)
BASOPHILS NFR BLD AUTO: 0.8 %
BUN SERPL-MCNC: 17 MG/DL (ref 7–22)
CALCIUM SERPL-MCNC: 8.7 MG/DL (ref 8.5–10.5)
CHLORIDE SERPL-SCNC: 110 MEQ/L (ref 98–111)
CO2 SERPL-SCNC: 24 MEQ/L (ref 23–33)
CREAT SERPL-MCNC: 0.9 MG/DL (ref 0.4–1.2)
DEPRECATED RDW RBC AUTO: 49.1 FL (ref 35–45)
EOSINOPHIL NFR BLD AUTO: 2.4 %
EOSINOPHILS ABSOLUTE: 0.1 THOU/MM3 (ref 0–0.4)
ERYTHROCYTE [DISTWIDTH] IN BLOOD BY AUTOMATED COUNT: 13.5 % (ref 11.5–14.5)
GFR SERPL CREATININE-BSD FRML MDRD: 57 ML/MIN/1.73M2
GLUCOSE SERPL-MCNC: 88 MG/DL (ref 70–108)
HCT VFR BLD AUTO: 37.3 % (ref 37–47)
HEPARIN UNFRACTIONATED: 0.62 U/ML (ref 0.3–0.7)
HEPARIN UNFRACTIONATED: 0.92 U/ML (ref 0.3–0.7)
HGB BLD-MCNC: 12.5 GM/DL (ref 12–16)
IMM GRANULOCYTES # BLD AUTO: 0.01 THOU/MM3 (ref 0–0.07)
IMM GRANULOCYTES NFR BLD AUTO: 0.2 %
LYMPHOCYTES ABSOLUTE: 1.5 THOU/MM3 (ref 1–4.8)
LYMPHOCYTES NFR BLD AUTO: 30.6 %
MCH RBC QN AUTO: 33.2 PG (ref 26–33)
MCHC RBC AUTO-ENTMCNC: 33.5 GM/DL (ref 32.2–35.5)
MCV RBC AUTO: 99.2 FL (ref 81–99)
MONOCYTES ABSOLUTE: 0.5 THOU/MM3 (ref 0.4–1.3)
MONOCYTES NFR BLD AUTO: 10.5 %
NEUTROPHILS NFR BLD AUTO: 55.5 %
NRBC BLD AUTO-RTO: 0 /100 WBC
PLATELET # BLD AUTO: 152 THOU/MM3 (ref 130–400)
PMV BLD AUTO: 9.9 FL (ref 9.4–12.4)
POTASSIUM SERPL-SCNC: 4.7 MEQ/L (ref 3.5–5.2)
RBC # BLD AUTO: 3.76 MILL/MM3 (ref 4.2–5.4)
SEGMENTED NEUTROPHILS ABSOLUTE COUNT: 2.7 THOU/MM3 (ref 1.8–7.7)
SODIUM SERPL-SCNC: 144 MEQ/L (ref 135–145)
WBC # BLD AUTO: 4.9 THOU/MM3 (ref 4.8–10.8)

## 2024-03-05 PROCEDURE — 93005 ELECTROCARDIOGRAM TRACING: CPT

## 2024-03-05 PROCEDURE — 80048 BASIC METABOLIC PNL TOTAL CA: CPT

## 2024-03-05 PROCEDURE — 99233 SBSQ HOSP IP/OBS HIGH 50: CPT | Performed by: STUDENT IN AN ORGANIZED HEALTH CARE EDUCATION/TRAINING PROGRAM

## 2024-03-05 PROCEDURE — 36415 COLL VENOUS BLD VENIPUNCTURE: CPT

## 2024-03-05 PROCEDURE — 85025 COMPLETE CBC W/AUTO DIFF WBC: CPT

## 2024-03-05 PROCEDURE — 6370000000 HC RX 637 (ALT 250 FOR IP)

## 2024-03-05 PROCEDURE — 93010 ELECTROCARDIOGRAM REPORT: CPT | Performed by: NUCLEAR MEDICINE

## 2024-03-05 PROCEDURE — 85520 HEPARIN ASSAY: CPT

## 2024-03-05 PROCEDURE — 96366 THER/PROPH/DIAG IV INF ADDON: CPT

## 2024-03-05 PROCEDURE — 2580000003 HC RX 258

## 2024-03-05 PROCEDURE — 99223 1ST HOSP IP/OBS HIGH 75: CPT | Performed by: INTERNAL MEDICINE

## 2024-03-05 PROCEDURE — 74176 CT ABD & PELVIS W/O CONTRAST: CPT

## 2024-03-05 PROCEDURE — 2060000000 HC ICU INTERMEDIATE R&B

## 2024-03-05 RX ORDER — ENOXAPARIN SODIUM 100 MG/ML
30 INJECTION SUBCUTANEOUS EVERY 24 HOURS
Status: DISCONTINUED | OUTPATIENT
Start: 2024-03-06 | End: 2024-03-06 | Stop reason: HOSPADM

## 2024-03-05 RX ADMIN — ACETAMINOPHEN 650 MG: 325 TABLET ORAL at 15:48

## 2024-03-05 RX ADMIN — Medication 1 TABLET: at 08:09

## 2024-03-05 RX ADMIN — DOCUSATE SODIUM 100 MG: 100 CAPSULE, LIQUID FILLED ORAL at 21:55

## 2024-03-05 RX ADMIN — ASPIRIN 81 MG: 81 TABLET, COATED ORAL at 08:06

## 2024-03-05 RX ADMIN — PANTOPRAZOLE SODIUM 40 MG: 40 TABLET, DELAYED RELEASE ORAL at 08:10

## 2024-03-05 RX ADMIN — ATORVASTATIN CALCIUM 40 MG: 40 TABLET, FILM COATED ORAL at 21:55

## 2024-03-05 RX ADMIN — SODIUM CHLORIDE, PRESERVATIVE FREE 10 ML: 5 INJECTION INTRAVENOUS at 21:55

## 2024-03-05 RX ADMIN — LEVOTHYROXINE, LIOTHYRONINE 30 MG: 38; 9 TABLET ORAL at 08:08

## 2024-03-05 RX ADMIN — CLOPIDOGREL BISULFATE 75 MG: 75 TABLET ORAL at 08:06

## 2024-03-05 RX ADMIN — ACETAMINOPHEN 650 MG: 325 TABLET ORAL at 08:06

## 2024-03-05 RX ADMIN — FLUTICASONE PROPIONATE 2 SPRAY: 50 SPRAY, METERED NASAL at 08:07

## 2024-03-05 RX ADMIN — ACETAMINOPHEN 650 MG: 325 TABLET ORAL at 00:56

## 2024-03-05 RX ADMIN — CARVEDILOL 3.12 MG: 3.12 TABLET, FILM COATED ORAL at 08:08

## 2024-03-05 RX ADMIN — ACETAMINOPHEN 650 MG: 325 TABLET ORAL at 21:54

## 2024-03-05 RX ADMIN — POLYETHYLENE GLYCOL 400 AND PROPYLENE GLYCOL 1 DROP: 4; 3 SOLUTION/ DROPS OPHTHALMIC at 21:55

## 2024-03-05 RX ADMIN — POLYETHYLENE GLYCOL 400 AND PROPYLENE GLYCOL 1 DROP: 4; 3 SOLUTION/ DROPS OPHTHALMIC at 08:07

## 2024-03-05 RX ADMIN — CARVEDILOL 3.12 MG: 3.12 TABLET, FILM COATED ORAL at 17:46

## 2024-03-05 ASSESSMENT — PAIN SCALES - GENERAL
PAINLEVEL_OUTOF10: 3
PAINLEVEL_OUTOF10: 5
PAINLEVEL_OUTOF10: 2
PAINLEVEL_OUTOF10: 2
PAINLEVEL_OUTOF10: 0
PAINLEVEL_OUTOF10: 2

## 2024-03-05 ASSESSMENT — PAIN - FUNCTIONAL ASSESSMENT
PAIN_FUNCTIONAL_ASSESSMENT: ACTIVITIES ARE NOT PREVENTED

## 2024-03-05 ASSESSMENT — PAIN DESCRIPTION - ORIENTATION
ORIENTATION: LEFT;MID;LOWER
ORIENTATION: LEFT;LOWER
ORIENTATION: MID;UPPER
ORIENTATION: LOWER;MID

## 2024-03-05 ASSESSMENT — PAIN DESCRIPTION - FREQUENCY
FREQUENCY: INTERMITTENT

## 2024-03-05 ASSESSMENT — PAIN DESCRIPTION - DESCRIPTORS
DESCRIPTORS: ACHING

## 2024-03-05 ASSESSMENT — PAIN DESCRIPTION - PAIN TYPE
TYPE: CHRONIC PAIN

## 2024-03-05 ASSESSMENT — PAIN DESCRIPTION - LOCATION
LOCATION: HEAD
LOCATION: BACK

## 2024-03-05 ASSESSMENT — PAIN DESCRIPTION - ONSET
ONSET: ON-GOING

## 2024-03-05 NOTE — PROGRESS NOTES
Internal Medicine Resident Progress Note    Name: Kristel Welch, female, : 1925, MRN: [id number]    PCP: Martin Davenport DO    Date of Admission: 3/4/2024  Date of Service: Pt seen/examined on 24      Assessment/Plan:  NSTEMI: Patient complaining of worsening chest discomfort.  No chest pain at time of evaluation, however troponin trend 23-63-59-55.  EKG shows T wave inversions in anterior lateral leads.  Patient seen and evaluated by cardiology do not plan on doing any, procedure  DC heparin drip  Advance diet  Repeat echo pending    History of CAD status post PCI   Aspirin, Plavix    HFrEF: Not in acute exacerbation.  Last EF 48 2022  Coreg 3.125  Hold Aldactone 50  Hold Lasix 40    Hypothyroidism: Continue home meds    Chronic back pain: Continue lidocaine patches imaging reveals old compression fracture of T5  CT abdomen pelvis without contrast ordered to evaluate for any renal stones, demonstrating no acute findings    Expected discharge date:  tbd    Disposition:   [x] Home  [] Inpatient Rehab  [] Psychiatric Unit  [] SNF  [] Long Term Care Facility  [] Other-    ===================================================================      Chief Complaint: Chest pain    Hospital Course:   89-year-old female PMH CAD, CKD, HFrEF    Presented to [facility] for chest pain, back pain and neck pain started morning of 3/4/2024.  Patient states that she has been dealing with intermittent back pain and chest pain for the past 5 months following a fall she sustained.  States the pain that she has been having reminded her of her previous NSTEMI so she does not go to the ER for evaluation.       Subjective (past 24 hours):   3/5/2024  Patient seen and evaluated at bedside, patient stating chest pain is completely resolved.  Denies any shortness of breath, palpitations, nausea, vomiting, headache, lightheadedness.  Stating she only has 6 been experiencing some back pain located around her mid thorax and  content.   Capillary Refill: Brisk,< 3 seconds.  Peripheral Pulses: +2 palpable, equal bilaterally.       Labs:   Recent Labs     03/04/24  0730 03/05/24  0319   WBC 4.7* 4.9   HGB 13.7 12.5   HCT 42.0 37.3    152     Recent Labs     03/04/24  0730 03/05/24  0319    144   K 3.8 4.7    110   CO2 22* 24   BUN 21 17   CREATININE 1.0 0.9   CALCIUM 9.4 8.7     Recent Labs     03/04/24  0730   AST 21   ALT 15   BILIDIR <0.2   BILITOT 0.4   ALKPHOS 77     Recent Labs     03/04/24  1436   INR 1.07     No results for input(s): \"TROPONINT\" in the last 72 hours.  No results for input(s): \"PROCAL\" in the last 72 hours.   Lab Results   Component Value Date/Time    NITRU NEGATIVE 03/04/2024 07:30 AM    WBCUA 0-2 03/04/2024 07:30 AM    BACTERIA NONE SEEN 03/04/2024 07:30 AM    RBCUA 0-2 03/04/2024 07:30 AM    BLOODU TRACE 03/04/2024 07:30 AM    SPECGRAV 1.008 07/12/2022 08:30 PM    GLUCOSEU NEGATIVE 03/04/2024 07:30 AM         DVT prophylaxis:    [x] Lovenox  [] SCDs  [] SQ Heparin  [] Encourage ambulation   [] Already on Anticoagulation  Diet: ADULT DIET; Regular; Low Fat/Low Chol/High Fiber/ALFONSO; Low Sodium (2 gm); 2000 ml  Code Status: DNR-CCA  PT/OT: yes  Tele: yes  IVF: none    Electronically signed by Blade Crane MD on 3/5/2024 at 3:17 PM    Case was discussed with Attending, Mauricio Berman MD

## 2024-03-05 NOTE — PLAN OF CARE
Problem: Skin/Tissue Integrity - Adult  Goal: Skin integrity remains intact  Outcome: Progressing  Flowsheets (Taken 3/5/2024 1215)  Skin Integrity Remains Intact: Monitor for areas of redness and/or skin breakdown     Problem: Musculoskeletal - Adult  Goal: Return mobility to safest level of function  Outcome: Progressing  Flowsheets (Taken 3/5/2024 1215)  Return Mobility to Safest Level of Function:   Assess patient stability and activity tolerance for standing, transferring and ambulating with or without assistive devices   Assist with transfers and ambulation using safe patient handling equipment as needed   Ensure adequate protection for wounds/incisions during mobilization   Instruct patient/family in ordered activity level     Problem: Musculoskeletal - Adult  Goal: Return ADL status to a safe level of function  Outcome: Progressing  Flowsheets (Taken 3/5/2024 1215)  Return ADL Status to a Safe Level of Function:   Administer medication as ordered   Assess activities of daily living deficits and provide assistive devices as needed     Problem: Cardiovascular - Adult  Goal: Maintains optimal cardiac output and hemodynamic stability  Outcome: Progressing  Flowsheets (Taken 3/5/2024 1215)  Maintains optimal cardiac output and hemodynamic stability:   Monitor blood pressure and heart rate   Monitor urine output and notify Licensed Independent Practitioner for values outside of normal range   Assess for signs of decreased cardiac output     Problem: Cardiovascular - Adult  Goal: Absence of cardiac dysrhythmias or at baseline  Outcome: Progressing  Flowsheets (Taken 3/5/2024 1215)  Absence of cardiac dysrhythmias or at baseline:   Monitor cardiac rate and rhythm   Assess for signs of decreased cardiac output   Administer antiarrhythmia medication and electrolyte replacement as ordered     Problem: Hematologic - Adult  Goal: Maintains hematologic stability  Outcome: Progressing  Flowsheets (Taken 3/5/2024  1215)  Maintains hematologic stability: Assess for signs and symptoms of bleeding or hemorrhage   Care plan reviewed with patient.  Patient verbalize understanding of the plan of care and contribute to goal setting.

## 2024-03-05 NOTE — CARE COORDINATION
3/5/24, 10:16 AM EST      DISCHARGE PLANNING EVALUATION    Kristel Welch  Admitted: 3/4/2024  Hospital Day: 0    Location: 4-23/023-A Reason for admit: Elevated troponin [R79.89]  NSTEMI (non-ST elevated myocardial infarction) (HCC) [I21.4]  Chronic low back pain, unspecified back pain laterality, unspecified whether sciatica present [M54.50, G89.29]    Past Medical History:   Diagnosis Date    Anxiety     Aortic valve regurgitation     Arthritis     ASHD (arteriosclerotic heart disease)     CKD (chronic kidney disease) stage 3, GFR 30-59 ml/min (HCC)     Diverticulosis     Dyslipidemia     GERD (gastroesophageal reflux disease)     Heart failure with reduced ejection fraction (HCC)     History of non-ST elevation myocardial infarction (NSTEMI) 05/06/2021    History of shingles     History of skin cancer 2013    SKIN CANCER ON LEFT ANKLE    Hypertension, essential     Hypothyroidism     Osteoporosis, post-menopausal     Prediabetes 03/17/2016    Psoriasis     S/P angioplasty with stent 05/06/2021    3 stents to LAD, 1 stent to OM, 2 stents to diag. per Dr. Castillo     Barriers to Discharge:   NSTEMI/Back Pain  History: CHF, CKD, Shingles, NSTEMI, Spinal Fracture, Fall  ECHO planned today  Heparin Gtt  Monitor possible Cardiology consult for cardiac cath needs  PCP: Martin Davenport DO    Readmission Risk Low 0-14, Mod 15-19), High > 20: No data recorded    Advance Directives:      Code Status: DNR-CCA   Patient's Primary Decision Maker is:      Primary Decision Maker: Ilya Welch - Child - [phone]    Secondary Decision Maker: Teofilo Welch - Child - [phone]    Patient Goals/Plan/Treatment Preferences: plans home alone, has rollator, COA for Meals Program, ERS, current CHF Clinic; son/{POA Ilya; denied need for HH at this time but monitor for possible HH; monitor; therapy following    Transportation/Food Security/Housekeeping Addressed: No issues identified.     If patient is discharged prior to next  notation, then this note serves as note for discharge by case management.

## 2024-03-06 ENCOUNTER — APPOINTMENT (OUTPATIENT)
Age: 89
DRG: 281 | End: 2024-03-06
Payer: MEDICARE

## 2024-03-06 VITALS
DIASTOLIC BLOOD PRESSURE: 80 MMHG | HEIGHT: 60 IN | RESPIRATION RATE: 17 BRPM | HEART RATE: 65 BPM | BODY MASS INDEX: 26.19 KG/M2 | OXYGEN SATURATION: 98 % | WEIGHT: 133.38 LBS | TEMPERATURE: 97.7 F | SYSTOLIC BLOOD PRESSURE: 123 MMHG

## 2024-03-06 LAB
ANION GAP SERPL CALC-SCNC: 9 MEQ/L (ref 8–16)
BACTERIA SPEC AEROBE CULT: NORMAL
BASOPHILS ABSOLUTE: 0 THOU/MM3 (ref 0–0.1)
BASOPHILS NFR BLD AUTO: 0.9 %
BUN SERPL-MCNC: 21 MG/DL (ref 7–22)
CALCIUM SERPL-MCNC: 8.7 MG/DL (ref 8.5–10.5)
CHLORIDE SERPL-SCNC: 108 MEQ/L (ref 98–111)
CO2 SERPL-SCNC: 22 MEQ/L (ref 23–33)
CREAT SERPL-MCNC: 0.9 MG/DL (ref 0.4–1.2)
DEPRECATED RDW RBC AUTO: 50.9 FL (ref 35–45)
ECHO AO ASC DIAM: 3 CM
ECHO AO ASCENDING AORTA INDEX: 1.91 CM/M2
ECHO AV AREA PEAK VELOCITY: 1.3 CM2
ECHO AV AREA VTI: 1.4 CM2
ECHO AV AREA/BSA PEAK VELOCITY: 0.8 CM2/M2
ECHO AV AREA/BSA VTI: 0.9 CM2/M2
ECHO AV CUSP MM: 1.4 CM
ECHO AV MEAN GRADIENT: 7 MMHG
ECHO AV MEAN VELOCITY: 1.3 M/S
ECHO AV PEAK GRADIENT: 11 MMHG
ECHO AV PEAK VELOCITY: 1.7 M/S
ECHO AV VELOCITY RATIO: 0.53
ECHO AV VTI: 38.1 CM
ECHO BSA: 1.55 M2
ECHO EST RA PRESSURE: 5 MMHG
ECHO LA AREA 2C: 8.4 CM2
ECHO LA AREA 4C: 9.1 CM2
ECHO LA DIAMETER INDEX: 1.85 CM/M2
ECHO LA DIAMETER: 2.9 CM
ECHO LA MAJOR AXIS: 3.1 CM
ECHO LA MINOR AXIS: 3.6 CM
ECHO LA VOL BP: 20 ML (ref 22–52)
ECHO LA VOL MOD A2C: 15 ML (ref 22–52)
ECHO LA VOL MOD A4C: 22 ML (ref 22–52)
ECHO LA VOL/BSA BIPLANE: 13 ML/M2 (ref 16–34)
ECHO LA VOLUME INDEX MOD A2C: 10 ML/M2 (ref 16–34)
ECHO LA VOLUME INDEX MOD A4C: 14 ML/M2 (ref 16–34)
ECHO LV E' LATERAL VELOCITY: 4 CM/S
ECHO LV E' SEPTAL VELOCITY: 6 CM/S
ECHO LV FRACTIONAL SHORTENING: 27 % (ref 28–44)
ECHO LV INTERNAL DIMENSION DIASTOLE INDEX: 2.36 CM/M2
ECHO LV INTERNAL DIMENSION DIASTOLIC: 3.7 CM (ref 3.9–5.3)
ECHO LV INTERNAL DIMENSION SYSTOLIC INDEX: 1.72 CM/M2
ECHO LV INTERNAL DIMENSION SYSTOLIC: 2.7 CM
ECHO LV ISOVOLUMETRIC RELAXATION TIME (IVRT): 102 MS
ECHO LV IVSD: 0.8 CM (ref 0.6–0.9)
ECHO LV MASS 2D: 82.3 G (ref 67–162)
ECHO LV MASS INDEX 2D: 52.4 G/M2 (ref 43–95)
ECHO LV POSTERIOR WALL DIASTOLIC: 0.8 CM (ref 0.6–0.9)
ECHO LV RELATIVE WALL THICKNESS RATIO: 0.43
ECHO LVOT AREA: 2.5 CM2
ECHO LVOT AV VTI INDEX: 0.54
ECHO LVOT DIAM: 1.8 CM
ECHO LVOT MEAN GRADIENT: 2 MMHG
ECHO LVOT PEAK GRADIENT: 3 MMHG
ECHO LVOT PEAK VELOCITY: 0.9 M/S
ECHO LVOT STROKE VOLUME INDEX: 33.5 ML/M2
ECHO LVOT SV: 52.6 ML
ECHO LVOT VTI: 20.7 CM
ECHO MV A VELOCITY: 0.99 M/S
ECHO MV E DECELERATION TIME (DT): 384 MS
ECHO MV E VELOCITY: 0.84 M/S
ECHO MV E/A RATIO: 0.85
ECHO MV E/E' LATERAL: 21
ECHO MV E/E' RATIO (AVERAGED): 17.5
ECHO MV REGURGITANT PEAK GRADIENT: 92 MMHG
ECHO MV REGURGITANT PEAK VELOCITY: 4.8 M/S
ECHO PV MAX VELOCITY: 0.7 M/S
ECHO PV PEAK GRADIENT: 2 MMHG
ECHO RIGHT VENTRICULAR SYSTOLIC PRESSURE (RVSP): 26 MMHG
ECHO RV INTERNAL DIMENSION: 2.1 CM
ECHO RV TAPSE: 1.9 CM (ref 1.7–?)
ECHO TV E WAVE: 0.4 M/S
ECHO TV REGURGITANT MAX VELOCITY: 2.28 M/S
ECHO TV REGURGITANT PEAK GRADIENT: 21 MMHG
EKG ATRIAL RATE: 69 BPM
EKG ATRIAL RATE: 77 BPM
EKG ATRIAL RATE: 77 BPM
EKG ATRIAL RATE: 79 BPM
EKG P AXIS: 73 DEGREES
EKG P AXIS: 74 DEGREES
EKG P AXIS: 77 DEGREES
EKG P AXIS: 79 DEGREES
EKG P-R INTERVAL: 168 MS
EKG P-R INTERVAL: 172 MS
EKG P-R INTERVAL: 172 MS
EKG P-R INTERVAL: 174 MS
EKG Q-T INTERVAL: 412 MS
EKG Q-T INTERVAL: 412 MS
EKG Q-T INTERVAL: 416 MS
EKG Q-T INTERVAL: 482 MS
EKG QRS DURATION: 74 MS
EKG QRS DURATION: 76 MS
EKG QRS DURATION: 76 MS
EKG QRS DURATION: 84 MS
EKG QTC CALCULATION (BAZETT): 466 MS
EKG QTC CALCULATION (BAZETT): 470 MS
EKG QTC CALCULATION (BAZETT): 472 MS
EKG QTC CALCULATION (BAZETT): 516 MS
EKG R AXIS: -37 DEGREES
EKG R AXIS: -40 DEGREES
EKG R AXIS: -45 DEGREES
EKG R AXIS: 7 DEGREES
EKG T AXIS: 116 DEGREES
EKG T AXIS: 70 DEGREES
EKG T AXIS: 83 DEGREES
EKG T AXIS: 99 DEGREES
EKG VENTRICULAR RATE: 69 BPM
EKG VENTRICULAR RATE: 77 BPM
EKG VENTRICULAR RATE: 77 BPM
EKG VENTRICULAR RATE: 79 BPM
EOSINOPHIL NFR BLD AUTO: 2.6 %
EOSINOPHILS ABSOLUTE: 0.1 THOU/MM3 (ref 0–0.4)
ERYTHROCYTE [DISTWIDTH] IN BLOOD BY AUTOMATED COUNT: 13.6 % (ref 11.5–14.5)
GFR SERPL CREATININE-BSD FRML MDRD: 57 ML/MIN/1.73M2
GLUCOSE SERPL-MCNC: 99 MG/DL (ref 70–108)
HCT VFR BLD AUTO: 38.1 % (ref 37–47)
HGB BLD-MCNC: 12.5 GM/DL (ref 12–16)
IMM GRANULOCYTES # BLD AUTO: 0.01 THOU/MM3 (ref 0–0.07)
IMM GRANULOCYTES NFR BLD AUTO: 0.3 %
LYMPHOCYTES ABSOLUTE: 0.9 THOU/MM3 (ref 1–4.8)
LYMPHOCYTES NFR BLD AUTO: 27.1 %
MCH RBC QN AUTO: 33.2 PG (ref 26–33)
MCHC RBC AUTO-ENTMCNC: 32.8 GM/DL (ref 32.2–35.5)
MCV RBC AUTO: 101.1 FL (ref 81–99)
MONOCYTES ABSOLUTE: 0.4 THOU/MM3 (ref 0.4–1.3)
MONOCYTES NFR BLD AUTO: 10.7 %
NEUTROPHILS NFR BLD AUTO: 58.4 %
NRBC BLD AUTO-RTO: 0 /100 WBC
PLATELET # BLD AUTO: 146 THOU/MM3 (ref 130–400)
PMV BLD AUTO: 10 FL (ref 9.4–12.4)
POTASSIUM SERPL-SCNC: 4.2 MEQ/L (ref 3.5–5.2)
RBC # BLD AUTO: 3.77 MILL/MM3 (ref 4.2–5.4)
SEGMENTED NEUTROPHILS ABSOLUTE COUNT: 2 THOU/MM3 (ref 1.8–7.7)
SODIUM SERPL-SCNC: 139 MEQ/L (ref 135–145)
WBC # BLD AUTO: 3.5 THOU/MM3 (ref 4.8–10.8)

## 2024-03-06 PROCEDURE — 99239 HOSP IP/OBS DSCHRG MGMT >30: CPT | Performed by: HOSPITALIST

## 2024-03-06 PROCEDURE — 97165 OT EVAL LOW COMPLEX 30 MIN: CPT

## 2024-03-06 PROCEDURE — 6370000000 HC RX 637 (ALT 250 FOR IP)

## 2024-03-06 PROCEDURE — 80048 BASIC METABOLIC PNL TOTAL CA: CPT

## 2024-03-06 PROCEDURE — 93306 TTE W/DOPPLER COMPLETE: CPT

## 2024-03-06 PROCEDURE — 36415 COLL VENOUS BLD VENIPUNCTURE: CPT

## 2024-03-06 PROCEDURE — 85025 COMPLETE CBC W/AUTO DIFF WBC: CPT

## 2024-03-06 PROCEDURE — 97535 SELF CARE MNGMENT TRAINING: CPT

## 2024-03-06 PROCEDURE — 2580000003 HC RX 258

## 2024-03-06 PROCEDURE — 93306 TTE W/DOPPLER COMPLETE: CPT | Performed by: INTERNAL MEDICINE

## 2024-03-06 RX ADMIN — PANTOPRAZOLE SODIUM 40 MG: 40 TABLET, DELAYED RELEASE ORAL at 06:13

## 2024-03-06 RX ADMIN — Medication 1 TABLET: at 08:50

## 2024-03-06 RX ADMIN — LEVOTHYROXINE, LIOTHYRONINE 30 MG: 38; 9 TABLET ORAL at 08:49

## 2024-03-06 RX ADMIN — ASPIRIN 81 MG: 81 TABLET, COATED ORAL at 08:50

## 2024-03-06 RX ADMIN — CLOPIDOGREL BISULFATE 75 MG: 75 TABLET ORAL at 08:50

## 2024-03-06 RX ADMIN — DOCUSATE SODIUM 100 MG: 100 CAPSULE, LIQUID FILLED ORAL at 08:53

## 2024-03-06 RX ADMIN — CARVEDILOL 3.12 MG: 3.12 TABLET, FILM COATED ORAL at 08:51

## 2024-03-06 RX ADMIN — POLYETHYLENE GLYCOL 400 AND PROPYLENE GLYCOL 1 DROP: 4; 3 SOLUTION/ DROPS OPHTHALMIC at 08:48

## 2024-03-06 RX ADMIN — FLUTICASONE PROPIONATE 2 SPRAY: 50 SPRAY, METERED NASAL at 08:48

## 2024-03-06 RX ADMIN — SODIUM CHLORIDE, PRESERVATIVE FREE 10 ML: 5 INJECTION INTRAVENOUS at 08:51

## 2024-03-06 ASSESSMENT — PAIN DESCRIPTION - ONSET: ONSET: ON-GOING

## 2024-03-06 ASSESSMENT — PAIN SCALES - GENERAL: PAINLEVEL_OUTOF10: 3

## 2024-03-06 ASSESSMENT — PAIN DESCRIPTION - PAIN TYPE: TYPE: CHRONIC PAIN

## 2024-03-06 ASSESSMENT — PAIN DESCRIPTION - ORIENTATION: ORIENTATION: LEFT;LOWER;MID

## 2024-03-06 ASSESSMENT — PAIN DESCRIPTION - FREQUENCY: FREQUENCY: INTERMITTENT

## 2024-03-06 ASSESSMENT — PAIN DESCRIPTION - DESCRIPTORS: DESCRIPTORS: ACHING

## 2024-03-06 ASSESSMENT — PAIN - FUNCTIONAL ASSESSMENT: PAIN_FUNCTIONAL_ASSESSMENT: ACTIVITIES ARE NOT PREVENTED

## 2024-03-06 ASSESSMENT — PAIN DESCRIPTION - LOCATION: LOCATION: BACK

## 2024-03-06 NOTE — PROGRESS NOTES
[facility]  INPATIENT OCCUPATIONAL THERAPY  STRZ ICU STEPDOWN TELEMETRY 4K  EVALUATION    Time:    Time In: 815  Time Out: 838  Timed Code Treatment Minutes: 8 Minutes  Minutes: 23          Date: 3/6/2024  Patient Name: Kristel Welch,   Gender: female      MRN: [id number]  : 1925  (89 y.o.)  Referring Practitioner: Garrett Neely MD  Diagnosis: Elevated Troponin  Additional Pertinent Hx: Per H & P: 89-year-old  female with a past medical history of CAD, CKD who presents to [facility] ED today 3/4 for the evaluation of chest discomfort, back pain and significantly worsened neck pain this morning.  Patient reports she has been dealing with back pain and intermittent chest tightness and discomfort x 5 months, following a fall she sustained.  Patient reports she woke up this morning having associated neck soreness and pain for which reminded her of her previous NSTEMI, and therefore decided to come to the ER to be evaluated.  Patient states she was supposed to get labs for her cardiologist this morning, however decided to come into the ER instead because of her symptoms.    Restrictions/Precautions:  Restrictions/Precautions: Fall Risk    Subjective  Chart Reviewed: Orders, Yes, Progress Notes, History and Physical  Patient assessed for rehabilitation services?: Yes  Family / Caregiver Present: No    Subjective: cooperative, up in recliner upon arrival of therapist    Pain: 0/10: reports achy     Vitals: Nurse checked vitals prior to session    Social/Functional History:  Lives With: Alone  Type of Home: Apartment  Home Layout: One level  Home Access:  (1)  Home Equipment: Walker, standard   Bathroom Shower/Tub: Tub/Shower unit  Bathroom Toilet: Handicap height  Bathroom Equipment: Grab bars in shower, Shower chair       ADL Assistance: Independent  Homemaking Assistance: Independent  Ambulation Assistance: Independent  Transfer Assistance: Independent    Active : Yes     Additional

## 2024-03-06 NOTE — PLAN OF CARE
Problem: Discharge Planning  Goal: Discharge to home or other facility with appropriate resources  Flowsheets (Taken 3/5/2024 2000)  Discharge to home or other facility with appropriate resources:   Identify barriers to discharge with patient and caregiver   Arrange for needed discharge resources and transportation as appropriate   Identify discharge learning needs (meds, wound care, etc)   Refer to discharge planning if patient needs post-hospital services based on physician order or complex needs related to functional status, cognitive ability or social support system     Problem: Pain  Goal: Verbalizes/displays adequate comfort level or baseline comfort level  Flowsheets (Taken 3/5/2024 2000)  Verbalizes/displays adequate comfort level or baseline comfort level:   Encourage patient to monitor pain and request assistance   Assess pain using appropriate pain scale   Administer analgesics based on type and severity of pain and evaluate response   Implement non-pharmacological measures as appropriate and evaluate response   Consider cultural and social influences on pain and pain management   Notify Licensed Independent Practitioner if interventions unsuccessful or patient reports new pain     Problem: Safety - Adult  Goal: Free from fall injury  Flowsheets (Taken 3/5/2024 2000)  Free From Fall Injury:   Instruct family/caregiver on patient safety   Based on caregiver fall risk screen, instruct family/caregiver to ask for assistance with transferring infant if caregiver noted to have fall risk factors     Problem: Chronic Conditions and Co-morbidities  Goal: Patient's chronic conditions and co-morbidity symptoms are monitored and maintained or improved  Flowsheets (Taken 3/5/2024 2000)  Care Plan - Patient's Chronic Conditions and Co-Morbidity Symptoms are Monitored and Maintained or Improved:   Monitor and assess patient's chronic conditions and comorbid symptoms for stability, deterioration, or improvement    Collaborate with multidisciplinary team to address chronic and comorbid conditions and prevent exacerbation or deterioration   Update acute care plan with appropriate goals if chronic or comorbid symptoms are exacerbated and prevent overall improvement and discharge     Problem: Skin/Tissue Integrity - Adult  Goal: Skin integrity remains intact  Flowsheets (Taken 3/5/2024 2000)  Skin Integrity Remains Intact:   Monitor for areas of redness and/or skin breakdown   Assess vascular access sites hourly     Problem: Musculoskeletal - Adult  Goal: Return mobility to safest level of function  Flowsheets (Taken 3/5/2024 2000)  Return Mobility to Safest Level of Function:   Assess patient stability and activity tolerance for standing, transferring and ambulating with or without assistive devices   Assist with transfers and ambulation using safe patient handling equipment as needed   Ensure adequate protection for wounds/incisions during mobilization   Obtain physical therapy/occupational therapy consults as needed   Apply continuous passive motion per provider or physical therapy orders to increase flexion toward goal   Instruct patient/family in ordered activity level  Goal: Return ADL status to a safe level of function  Flowsheets (Taken 3/5/2024 2000)  Return ADL Status to a Safe Level of Function:   Administer medication as ordered   Assess activities of daily living deficits and provide assistive devices as needed   Obtain physical therapy/occupational therapy consults as needed   Assist and instruct patient to increase activity and self care as tolerated     Problem: Cardiovascular - Adult  Goal: Maintains optimal cardiac output and hemodynamic stability  Flowsheets (Taken 3/5/2024 2000)  Maintains optimal cardiac output and hemodynamic stability:   Monitor blood pressure and heart rate   Monitor urine output and notify Licensed Independent Practitioner for values outside of normal range   Assess for signs of decreased

## 2024-03-06 NOTE — CONSULTS
atypical symptoms.   No further cardiac workup is needed at this time.    Follow up with primarily cardiologist on discharge.      Please do note hesitate to contact me for any further questions.   Thank you for the opportunity to be involved in this patient's care.    Code Status: DNR-CCA    Electronically signed by Moise Howard MD on 3/5/2024 at 8:57 PM

## 2024-03-06 NOTE — DISCHARGE SUMMARY
Resident Discharge Summary (Hospitalist)      Patient Identification:   Kristel Welch   : 1925  MRN: [id number]   Account: [id number]   Patient's PCP: Martin Davenport DO    Admit Date: 3/4/2024   Discharge Date: 3/6/2024      Admitting Physician: No admitting provider for patient encounter.  Discharge Physician: Garrett Neely MD       Discharge Diagnoses:  Cardiac Chest Pain  Abnormal Stress Test  CAD s/p PCI LAD with 3x JUSTUS, PCI OM1 with JUSTUS; Diagonal PCI with 2x JUSTUS May 2021  - Troponin Trend 63, 59, 55  - 5 month chest tightness and back pain  -Presenting EKG showing sinus rhythm with PVCs  - 2023 Nuclear Stress Test showing moderate sized, moderate in intensity, partially reversible myocardial perfusion defect of apical anterior and anterolateral walls for which patient and patient's family had opted for conservative management  - Patient admitted and started on heparin infusion  - Cardiology consulted and recommendation for continued medical management after discussion of risks/benefits/alternatives with the patient and patient's family.   - Patient discharged with no change to home ASA/Statin/BB/Plavix regimen    Diastolic Heart Failure with Preserved EF, not in acute exacerbation  - 2024 ECHO EF 50-55% with grade I diastolic dysfunction with mild aortic regurgitation, aortic stenosis (VTI 1.4cm2), mitral valve regurgitation  - GDMT: BB, MRA  - Recommendation for optimization of GDMT with outpatient cardiologist and HF clinic    Chronic:  Hypothyroidism  GERD  Unspecified Anxiety Disorder  Compression Fracture of Lumbar Spine  Osteoporosis  Renal Cyst  Splenic Granuloma, Calcified  HLD    Hospital Course:   This is a 89-year-old female with past medical history of CAD s/p stenting of LAD, OM1, diagonal May 2021.  Additional medical history is significant for hypothyroidism, compression fracture of thoracic spine at T5 and T7, lumbar degenerative disc disease, GERD, unspecified  of the superior endplate of T7.   3. No acute findings.               **This report has been created using voice recognition software. It may contain minor errors which are inherent in voice recognition technology.**      Final report electronically signed by Dr. Sara Burks on 3/4/2024 8:48 AM      CT LUMBAR SPINE WO CONTRAST   Final Result      1. No acute findings in the lumbar spine.   2. Degenerative changes the lumbar spine with moderate severity spinal canal stenosis at the L3-4 and L4-5 levels.               **This report has been created using voice recognition software. It may contain minor errors which are inherent in voice recognition technology.**      Final report electronically signed by Dr. Sara Burks on 3/4/2024 8:51 AM             Consults:   IP CONSULT TO CARDIOLOGY    Disposition: Home  Condition at Discharge: Stable    Code Status:  Prior     Patient Instructions:    Discharge lab work: CBC, BMP recommended in 1 week  Activity: activity as tolerated  Diet: No diet orders on file      Follow-up visits:   Martin Davenport,   [address] [place] [address]  [phone]    Schedule an appointment as soon as possible for a visit on 3/11/2024  Primary Care Physician,, Your appointment time is at 12:40pm, Please arrive 15 minutes prior to appointment time, Bring medications, photo ID and insurance card    Christopher Hwang MD  [address] [place] [address]  [place] [address]  [phone]    Schedule an appointment as soon as possible for a visit on 3/26/2024  Cardiology (with RACQUEL Nath), Your appointment time is at 8:30am, Please arrive 15 minutes prior to appointment time, Bring medications, photo ID and insurance card         Discharge Medications:      Medication List        CONTINUE taking these medications      acetaminophen 650 MG extended release tablet  Commonly known as: TYLENOL     aspirin EC 81 MG EC tablet  Take 1 tablet by mouth daily     atorvastatin 40 MG tablet  Commonly

## 2024-03-06 NOTE — PROGRESS NOTES
CLINICAL PHARMACY: DISCHARGE MED RECONCILIATION/REVIEW    [facility] Select Patient?: Yes  Total # of Interventions Recommended: 0   -   Total # Interventions Accepted: 0  Intervention Severity:   - Level 1 Intervention Present?: No   - Level 2 #: 0   - Level 3 #: 0   Time Spent (min): 5    Additional Documentation:    Ludy Pino, PharmD, BCPS  3/6/2024  10:05 AM

## 2024-03-07 ENCOUNTER — TELEPHONE (OUTPATIENT)
Dept: FAMILY MEDICINE CLINIC | Age: 89
End: 2024-03-07

## 2024-03-07 NOTE — TELEPHONE ENCOUNTER
Care Transitions Initial Follow Up Call    Outreach made within 2 business days of discharge: Yes    Patient: Kristel Welch Patient : 1925   MRN: [id number]  Reason for Admission: There are no discharge diagnoses documented for the most recent discharge.  Discharge Date: 3/6/24       Spoke with: Kristel    Discharge department/facility: [facility]    TCM Interactive Patient Contact:  Was patient able to fill all prescriptions: Yes  Was patient instructed to bring all medications to the follow-up visit: Yes  Is patient taking all medications as directed in the discharge summary? Yes  Does patient understand their discharge instructions: Yes  Does patient have questions or concerns that need addressed prior to 7-14 day follow up office visit: no    Scheduled appointment with PCP within 7-14 days    Follow Up  Future Appointments   Date Time Provider Department Center   3/11/2024 12:40 PM Martin Davenport,  [facility] [place]   3/26/2024  8:30 AM Sara Coy APRN - CNP N [facility] [place]   2024  1:30 PM STR EXAM ROOM 5 [facility]S [place] [facility]OD   2024  1:00 PM STR MAMMOGRAPHY RM2  [facility] Rad/Card   10/31/2024 11:30 AM Jie Multani, APRN - CNP N [facility] [place]   12/3/2024  3:15 PM Christopher Hwang MD N [facility] [place]       Dedra Moore LPN

## 2024-03-08 DIAGNOSIS — I10 ESSENTIAL HYPERTENSION: ICD-10-CM

## 2024-03-08 DIAGNOSIS — E03.9 HYPOTHYROIDISM, UNSPECIFIED TYPE: ICD-10-CM

## 2024-03-08 DIAGNOSIS — I50.22 CHRONIC HFREF (HEART FAILURE WITH REDUCED EJECTION FRACTION) (HCC): ICD-10-CM

## 2024-03-08 RX ORDER — THYROID 30 MG/1
30 TABLET ORAL DAILY
Qty: 90 TABLET | Refills: 3 | Status: SHIPPED | OUTPATIENT
Start: 2024-03-08

## 2024-03-08 NOTE — TELEPHONE ENCOUNTER
Recent Visits  Date Type Provider Dept   02/26/24 Office Visit Martin Davenport, DO [facility]   12/27/23 Office Visit Clarence Pearson, APRN - CNP [facility]   11/02/23 Office Visit Martin Davenport, DO [facility]   10/25/23 Office Visit Martin Davenport, DO [facility]   10/09/23 Office Visit Martin Davenport, DO [facility]   09/07/23 Office Visit Martin Davenport, DO [facility]   07/05/23 Office Visit Nikky Banks, APRN - CNP [facility]   05/31/23 Office Visit Fernanda Rowe, APRN - CNP [facility]   05/01/23 Office Visit Fernanda Rowe, APRN - CNP [facility]   02/08/23 Office Visit Fernanda Rowe, APRN - CNP [facility]   Showing recent visits within past 540 days with a meds authorizing provider and meeting all other requirements  Future Appointments  Date Type Provider Dept   03/11/24 Appointment Martin Davenport, DO [facility]   Showing future appointments within next 150 days with a meds authorizing provider and meeting all other requirements

## 2024-03-10 RX ORDER — TRIAMCINOLONE ACETONIDE 1 MG/G
CREAM TOPICAL
COMMUNITY
Start: 2024-02-26

## 2024-03-10 NOTE — PROGRESS NOTES
substance abuse and healthy sleep habits.    Barriers to learning and self management: none    Discussed use, benefit, and side effects of prescribed medications.  Barriers to medication compliance addressed.  All patient questions answered.  Pt voiced understanding.       Electronically signed by Martin Davenport DO on 3/11/2024 at 1:12 PM

## 2024-03-11 ENCOUNTER — OFFICE VISIT (OUTPATIENT)
Dept: FAMILY MEDICINE CLINIC | Age: 89
End: 2024-03-11

## 2024-03-11 VITALS
WEIGHT: 127.2 LBS | HEIGHT: 60 IN | BODY MASS INDEX: 24.97 KG/M2 | HEART RATE: 68 BPM | TEMPERATURE: 97 F | SYSTOLIC BLOOD PRESSURE: 130 MMHG | DIASTOLIC BLOOD PRESSURE: 72 MMHG | RESPIRATION RATE: 18 BRPM | OXYGEN SATURATION: 99 %

## 2024-03-11 DIAGNOSIS — R73.9 HYPERGLYCEMIA: ICD-10-CM

## 2024-03-11 DIAGNOSIS — R07.9 CHEST PAIN, UNSPECIFIED TYPE: Primary | ICD-10-CM

## 2024-03-11 DIAGNOSIS — E78.5 DYSLIPIDEMIA: Chronic | ICD-10-CM

## 2024-03-11 DIAGNOSIS — I25.10 ASHD (ARTERIOSCLEROTIC HEART DISEASE): ICD-10-CM

## 2024-03-11 DIAGNOSIS — I50.20 HEART FAILURE WITH REDUCED EJECTION FRACTION (HCC): ICD-10-CM

## 2024-03-11 DIAGNOSIS — E03.9 HYPOTHYROIDISM, UNSPECIFIED TYPE: ICD-10-CM

## 2024-03-11 DIAGNOSIS — M81.0 OSTEOPOROSIS, POST-MENOPAUSAL: Chronic | ICD-10-CM

## 2024-03-11 DIAGNOSIS — R73.03 PREDIABETES: Chronic | ICD-10-CM

## 2024-03-11 DIAGNOSIS — N18.31 STAGE 3A CHRONIC KIDNEY DISEASE (HCC): ICD-10-CM

## 2024-03-11 DIAGNOSIS — S22.050A COMPRESSION FRACTURE OF T5 VERTEBRA, INITIAL ENCOUNTER (HCC): ICD-10-CM

## 2024-03-11 DIAGNOSIS — F41.9 ANXIETY: Chronic | ICD-10-CM

## 2024-03-11 DIAGNOSIS — S22.060A COMPRESSION FRACTURE OF T7 VERTEBRA, INITIAL ENCOUNTER (HCC): ICD-10-CM

## 2024-03-11 DIAGNOSIS — I10 HYPERTENSION, ESSENTIAL: Chronic | ICD-10-CM

## 2024-03-12 NOTE — PROGRESS NOTES
Physician Progress Note      PATIENT:               JUAN MERINO  CSN #:                  [id number]  :                       1925  ADMIT DATE:       3/4/2024 7:11 AM  DISCH DATE:        3/6/2024 11:26 AM  RESPONDING  PROVIDER #:        Garrett Neely          QUERY TEXT:    Patient admitted with chest pain. Documentation reflects NSTEMI type1 in H &   P.  If possible, please document in the progress notes and discharge summary   if NSTEMI was:    The medical record reflects the following:  Risk Factors: Elderly, CAD, CHF  Clinical Indicators: documented-NSTEMI, concern for type I,Had stress test   that was abnormal 1 yr ago,At that time it was decided on conservative   management after discussing with family and patient  Treatment: Cardio consult, lab monitoring, imaging    Thank You!  Iesha Banda RN, CRCR  RN Clinical Documentation Integrity  Options provided:  -- NSTEMI confirmed after study  -- NSTEMI ruled out after study  -- Other - I will add my own diagnosis  -- Disagree - Not applicable / Not valid  -- Disagree - Clinically unable to determine / Unknown  -- Refer to Clinical Documentation Reviewer    PROVIDER RESPONSE TEXT:    Provider disagreed with this query.  Patient had known perfusion defect for which medication management had been   opted for 1 year prior. As such, this was not an acute coronary syndrome.   NSTEMI ruled out after study.    Query created by: Iesha Banda on 3/11/2024 9:21 AM      Electronically signed by:  Garrett Neely 3/12/2024 6:30 AM

## 2024-03-26 ENCOUNTER — OFFICE VISIT (OUTPATIENT)
Dept: CARDIOLOGY CLINIC | Age: 89
End: 2024-03-26
Payer: MEDICARE

## 2024-03-26 VITALS
HEIGHT: 60 IN | BODY MASS INDEX: 25.29 KG/M2 | SYSTOLIC BLOOD PRESSURE: 157 MMHG | DIASTOLIC BLOOD PRESSURE: 72 MMHG | WEIGHT: 128.8 LBS | HEART RATE: 74 BPM

## 2024-03-26 DIAGNOSIS — I25.10 CAD IN NATIVE ARTERY: Primary | ICD-10-CM

## 2024-03-26 DIAGNOSIS — I50.22 CHF NYHA CLASS II, CHRONIC, SYSTOLIC (HCC): ICD-10-CM

## 2024-03-26 DIAGNOSIS — I10 ESSENTIAL HYPERTENSION: ICD-10-CM

## 2024-03-26 PROCEDURE — 1090F PRES/ABSN URINE INCON ASSESS: CPT | Performed by: REGISTERED NURSE

## 2024-03-26 PROCEDURE — 1123F ACP DISCUSS/DSCN MKR DOCD: CPT | Performed by: REGISTERED NURSE

## 2024-03-26 PROCEDURE — 99214 OFFICE O/P EST MOD 30 MIN: CPT | Performed by: REGISTERED NURSE

## 2024-03-26 PROCEDURE — 1036F TOBACCO NON-USER: CPT | Performed by: REGISTERED NURSE

## 2024-03-26 PROCEDURE — G8417 CALC BMI ABV UP PARAM F/U: HCPCS | Performed by: REGISTERED NURSE

## 2024-03-26 PROCEDURE — G8427 DOCREV CUR MEDS BY ELIG CLIN: HCPCS | Performed by: REGISTERED NURSE

## 2024-03-26 PROCEDURE — G8484 FLU IMMUNIZE NO ADMIN: HCPCS | Performed by: REGISTERED NURSE

## 2024-03-26 PROCEDURE — 1111F DSCHRG MED/CURRENT MED MERGE: CPT | Performed by: REGISTERED NURSE

## 2024-03-26 RX ORDER — SPIRONOLACTONE 25 MG/1
25 TABLET ORAL DAILY
Qty: 30 TABLET | Refills: 3
Start: 2024-03-26

## 2024-03-26 NOTE — PROGRESS NOTES
Patient here for 3-4 week hospital follow up    EKG done 3/6/24    Denies any cardiac complaints   
common femoral artery puncture.  I  exchanged out for a 7.5-Faroese 40 mL IABP balloon pump sheath that was  inserted over the 0.035 guidewire.  I prepped a 40 mL IABP balloon  catheter per manufacture's recommendations.  This was inserted over a  0.25 guidewire into the ascending aorta.  The guidewire was removed,  balloon pump was deaired appropriately prior to insertion and balloon  pump was then initiated.  IV heparin was initiated as well.  IV  nitroglycerin was also initiated.  Balloon pump was then sutured in  place.     MEDICATIONS:  See EMR.     ACCESS:  See above.     ESTIMATED BLOOD LOSS:  Less than 50 mL.     SUMMARY:  Successful PCI of the LAD with three overlapping drug-eluting  stents; successful PCI of the OM1 with one drug-eluting stent; diagonal  PCI with two drug-eluting stents with no reflow; IABP insertion.             Assessment/Plan     Recent hospitalization for evaluation of chest pain. 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 No recurrence of chest discomfort. Active without symptoms. No evidence of hypervolemia on exam.   Some mild hypotension recently at home- few systolic BP in 80-90s.  Cut back on aldactone to 25 daily. Hold parameters given  ASA/plavix/statin/BB/lasix   Continue low Na diet  Daily weights- instructed to call office if gains >2 lbs in one day of >4 lbs in one week.      Disposition:  3 months    Patient

## 2024-03-26 NOTE — PATIENT INSTRUCTIONS
Reduce your spironolactone to 25mg daily (you can take 0.5 tablet a day of your current supply or your 50mg tablets). Your new prescription (when you refill your medications), will be for 25mg tablets- when you refill the prescription, you will take a full tablet.     If the top number of your blood pressure is less than 100, you may hold your spironolactone that day. Please call office if you are needing to hold the medication more than 1 day a week.    Continue other current medications as prescribed.    Continue the following:  Daily weights  Fluid restriction of 2 Liters per day  Limit sodium in diet to around 5568-7423 mg/day  Monitor BP  Activity as tolerated   Call office if you gain >2 lbs; or 4 lbs in 1 week, call office       Follow-up with your PCP as scheduled.    Follow-up with Dr. Hwang  in 3 months  as scheduled or sooner if need.

## 2024-04-03 RX ORDER — ATORVASTATIN CALCIUM 40 MG/1
40 TABLET, FILM COATED ORAL DAILY
Qty: 90 TABLET | Refills: 3 | Status: SHIPPED | OUTPATIENT
Start: 2024-04-03

## 2024-04-09 ENCOUNTER — TELEPHONE (OUTPATIENT)
Dept: FAMILY MEDICINE CLINIC | Age: 89
End: 2024-04-09

## 2024-04-09 DIAGNOSIS — J30.89 NON-SEASONAL ALLERGIC RHINITIS, UNSPECIFIED TRIGGER: Primary | ICD-10-CM

## 2024-04-09 DIAGNOSIS — R09.82 POST-NASAL DRIP: ICD-10-CM

## 2024-04-09 RX ORDER — MONTELUKAST SODIUM 10 MG/1
10 TABLET ORAL DAILY
Qty: 90 TABLET | Refills: 3 | OUTPATIENT
Start: 2024-04-09

## 2024-04-09 NOTE — TELEPHONE ENCOUNTER
Recent Visits  Date Type Provider Dept   03/11/24 Office Visit Martin Davenport, DO [facility]   02/26/24 Office Visit Martin Davenport, DO [facility]   12/27/23 Office Visit Clarence Pearson, APRN - CNP [facility]   11/02/23 Office Visit Martin Davenport, DO [facility]   10/25/23 Office Visit Martin Davenport, DO [facility]   10/09/23 Office Visit Martin Davenport, DO [facility]   09/07/23 Office Visit Martin Davenport, DO [facility]   07/05/23 Office Visit Nikky Banks, APRN - CNP [facility]   05/31/23 Office Visit Fernanda Rowe, APRN - CNP [facility]   05/01/23 Office Visit Fernanda Rowe, APRN - CNP [facility]   Showing recent visits within past 540 days with a meds authorizing provider and meeting all other requirements  Future Appointments  No visits were found meeting these conditions.  Showing future appointments within next 150 days with a meds authorizing provider and meeting all other requirements

## 2024-04-09 NOTE — TELEPHONE ENCOUNTER
Kristel Welch called requesting a refill on the following medications:  Requested Prescriptions     Pending Prescriptions Disp Refills    montelukast (SINGULAIR) 10 MG tablet 90 tablet 3     Sig: Take 1 tablet by mouth daily     Pharmacy verified:[facility]   .      Date of last visit: 3/26/24   Date of next visit (if applicable): 6/27/2024

## 2024-04-10 RX ORDER — MONTELUKAST SODIUM 10 MG/1
10 TABLET ORAL DAILY
Qty: 90 TABLET | Refills: 3 | Status: SHIPPED | OUTPATIENT
Start: 2024-04-10

## 2024-04-10 NOTE — TELEPHONE ENCOUNTER
Diagnosis Orders   1. Non-seasonal allergic rhinitis, unspecified trigger  montelukast (SINGULAIR) 10 MG tablet

## 2024-04-18 ENCOUNTER — HOSPITAL ENCOUNTER (OUTPATIENT)
Dept: NURSING | Age: 89
Discharge: HOME | End: 2024-04-18
Payer: MEDICARE

## 2024-04-18 VITALS
RESPIRATION RATE: 18 BRPM | DIASTOLIC BLOOD PRESSURE: 67 MMHG | HEART RATE: 73 BPM | TEMPERATURE: 97.9 F | SYSTOLIC BLOOD PRESSURE: 148 MMHG | OXYGEN SATURATION: 97 %

## 2024-04-18 DIAGNOSIS — M81.0 OSTEOPOROSIS, POST-MENOPAUSAL: Primary | ICD-10-CM

## 2024-04-18 PROCEDURE — 96372 THER/PROPH/DIAG INJ SC/IM: CPT

## 2024-04-18 PROCEDURE — 6360000002 HC RX W HCPCS: Performed by: FAMILY MEDICINE

## 2024-04-18 RX ORDER — EPINEPHRINE 1 MG/ML
0.3 INJECTION, SOLUTION INTRAMUSCULAR; SUBCUTANEOUS PRN
OUTPATIENT
Start: 2024-10-17

## 2024-04-18 RX ORDER — ONDANSETRON 2 MG/ML
8 INJECTION INTRAMUSCULAR; INTRAVENOUS
OUTPATIENT
Start: 2024-10-17

## 2024-04-18 RX ORDER — ALBUTEROL SULFATE 90 UG/1
4 AEROSOL, METERED RESPIRATORY (INHALATION) PRN
OUTPATIENT
Start: 2024-10-17

## 2024-04-18 RX ORDER — ACETAMINOPHEN 325 MG/1
650 TABLET ORAL
OUTPATIENT
Start: 2024-10-17

## 2024-04-18 RX ORDER — SODIUM CHLORIDE 9 MG/ML
INJECTION, SOLUTION INTRAVENOUS CONTINUOUS
OUTPATIENT
Start: 2024-10-17

## 2024-04-18 RX ORDER — DIPHENHYDRAMINE HYDROCHLORIDE 50 MG/ML
50 INJECTION INTRAMUSCULAR; INTRAVENOUS
OUTPATIENT
Start: 2024-10-17

## 2024-04-18 RX ADMIN — DENOSUMAB 60 MG: 60 INJECTION SUBCUTANEOUS at 13:47

## 2024-04-18 NOTE — PROGRESS NOTES
Kristel was here with her younger lady friend that drove her for prolia injection. Pt rights and responsibilities offered to her. Injection given and Kristel tolerated well. D/c instructions explained and Kristel and her friend verbalized understanding. Kristel ambulated out with friend for d/c today.           _m___ Safety:       (Environmental)  [place] to environment  Ensure ID band is correct and in place/ allergy band as needed  Assess for fall risk  Initiate fall precautions as applicable (fall band, side rails, etc.)  Call light within reach  Bed in low position/ wheels locked    __m__ Pain:       Assess pain level and characteristics  Administer analgesics as ordered  Assess effectiveness of pain management and report to MD as needed    _m___ Knowledge Deficit:  Assess baseline knowledge  Provide teaching at level of understanding  Provide teaching via preferred learning method  Evaluate teaching effectiveness    __m__ Hemodynamic/Respiratory Status:       (Pre and Post Procedure Monitoring)  Assess/Monitor vital signs and LOC  Assess Baseline SpO2 prior to any sedation  Obtain weight/height  Assess vital signs/ LOC until patient meets discharge criteria  Monitor procedure site and notify MD of any issues    
I certify as stated above.

## 2024-04-18 NOTE — DISCHARGE INSTRUCTIONS
Prolia injection discharge instructions:    Side effects: headache, joint pain, rash, swelling        This medication is given every 6 months. We will need a new order before we schedule your next one due around:     Please call [phone] with a any questions or concerns.

## 2024-04-22 ENCOUNTER — TELEPHONE (OUTPATIENT)
Dept: FAMILY MEDICINE CLINIC | Age: 89
End: 2024-04-22

## 2024-04-22 NOTE — TELEPHONE ENCOUNTER
Patient would also like to know how last urine culture resulted. Stating she is still having some difficulties

## 2024-04-22 NOTE — TELEPHONE ENCOUNTER
Kristel Welch  2024 11:16 AM   Telephone  MRN: [id number]  Description: Female : 1925 Provider: Martin Davenport DO Department: [facility]     Call Documentation    Martin Davenport DO at 2024 11:48 AM    Status: Signed   I will order it in SEPT when I see  We're good for now  Thanks!            Future Appointments   Date Time Provider Department Center   2024  9:00 AM Bhumi Rueda APRN - CNP N [facility] - [place]   2024  1:00 PM STR MAMMOGRAPHY RM2  [facility] STR Rad/Card   2024 10:20 AM Martin Davenport DO [facility] [place]   10/31/2024 11:30 AM Jie Multani APRN - CNP N [facility] - [place]   12/3/2024  3:15 PM Christopher Hwang MD N [facility] - [place]            Karie Salinas at 2024 11:16 AM    Status: Signed   NEEDS   denosumab (PROLIA) 60 MG/ML SOSY SC injection [[id number]]  RX FILLED due by 10/24         Care [facility]oor Phone Call    No notes of this type exist for this

## 2024-04-22 NOTE — TELEPHONE ENCOUNTER
I will order it in SEPT when I see  We're good for now  Thanks!    Future Appointments   Date Time Provider Department Center   6/27/2024  9:00 AM Bhumi Rueda, RACQUEL - CNP [facility] [place]   7/25/2024  1:00 PM STR MAMMOGRAPHY RM2  [facility] STR Rad/Card   9/12/2024 10:20 AM Martin Davenport, DO [facility] - [place]   10/31/2024 11:30 AM Jie Multani, RACQUEL - CNP N [facility] [place]   12/3/2024  3:15 PM Christopher Hwang MD N [facility] - [place]

## 2024-04-22 NOTE — TELEPHONE ENCOUNTER
Pt states when she urinates she will have a \"little prick\" every once in a while, this is not consistent.    Pt has no concerns at this time.    Pt was informed of the Prolia will be filled closer to the time of her appointment.

## 2024-05-06 RX ORDER — CLOPIDOGREL BISULFATE 75 MG/1
75 TABLET ORAL DAILY
Qty: 90 TABLET | Refills: 0 | Status: SHIPPED | OUTPATIENT
Start: 2024-05-06

## 2024-05-07 DIAGNOSIS — J01.90 ACUTE RHINOSINUSITIS: ICD-10-CM

## 2024-05-07 RX ORDER — FLUTICASONE PROPIONATE 50 MCG
2 SPRAY, SUSPENSION (ML) NASAL DAILY
Qty: 16 G | OUTPATIENT
Start: 2024-05-07

## 2024-05-07 NOTE — TELEPHONE ENCOUNTER
Recent Visits  Date Type Provider Dept   03/11/24 Office Visit Martin Davenport, DO [facility]   02/26/24 Office Visit Martin Davenport, DO [facility]   12/27/23 Office Visit Clarence Pearson, APRN - CNP [facility]   11/02/23 Office Visit Martin Davenport, DO [facility]   10/25/23 Office Visit Martin Davenport, DO [facility]   10/09/23 Office Visit Martin Davenport, DO [facility]   09/07/23 Office Visit Martin Davenport, DO [facility]   07/05/23 Office Visit Nikky Banks, APRN - CNP [facility]   05/31/23 Office Visit Fernanda Rowe, APRN - CNP [facility]   05/01/23 Office Visit Fernanda Rowe, APRN - CNP [facility]   Showing recent visits within past 540 days with a meds authorizing provider and meeting all other requirements  Future Appointments  Date Type Provider Dept   09/12/24 Appointment Martin Davenport, DO [facility]   Showing future appointments within next 150 days with a meds authorizing provider and meeting all other requirements

## 2024-05-08 ENCOUNTER — TELEPHONE (OUTPATIENT)
Dept: CARDIOLOGY CLINIC | Age: 89
End: 2024-05-08

## 2024-05-13 ENCOUNTER — CARE COORDINATION (OUTPATIENT)
Dept: CARE COORDINATION | Age: 89
End: 2024-05-13

## 2024-05-14 NOTE — CARE COORDINATION
RD was following patient for Care Coordination from 1/18/23-7/13/23. RD received a voicemail from patient on 5/13/24. RD returned patient call. Patient requested RD call back on Wednesday 5/15/24 in the afternoon. RD will outreach as requested and follow up as appropriate.     Armida Darling RDN, LD  [phone]

## 2024-05-15 ENCOUNTER — CARE COORDINATION (OUTPATIENT)
Dept: CARE COORDINATION | Age: 89
End: 2024-05-15

## 2024-05-15 NOTE — CARE COORDINATION
RD contacted Kristel LUC Welch regarding Dietitian follow up. Pt answered, RD explained reason for call and role in care. Patient is very appreciative of call. Patient states she wants to make sure she is following a healthy diet. Patient states for breakfast she has been eating rolled oats with flax seeds, stevia, cinnamon, whole wheat surrley toast with peanut butter and honey, ½ banana and ½ orange, low fat fair life milk OR making a fruit smoothie with strawberries, blueberries, banana, peanut butter, vanilla greek yogurt, milk and honey. Patient asked about uncured turkey pina versus cured turkey pina- RD recommended uncured turkey pina and discussed eating in moderation. Patient states for lunch she has been making egg salad with bell peppers, a little bit of hannon on whole wheat bread or whole wheat crackers OR homemade tuna salad with relish. Patient states for dinner she made a casserole with low sodium sausage, onion, green beans, bell peppers, carrots, garlic, no salt salt added chicken broth. Patient states she has been draining and rinsing sausage. Patient asked how to cook salmon- RD recommended cooking it in the air fryer. Patient asked about avocados- RD recommended making avocado toast with hard boiled eggs. Patient asked if she can add a little bit of stevia to her green tea. Patient states she is drinking either a Glucerna or Ensure once a day. Patient has no nutrition related questions or concerns at this time. RD reiterated the importance of eating balanced meals and snacks consistently throughout the day. Discussed incorporating a variety of fruits, vegetables, whole grains, lean protein and low fat dairy. Explained the importance of incorporating a protein source to each meal and snack. Reviewed protein source and provided examples. Patient verbalizes understanding. Patient requested a follow up call in 1 month. RD will outreach as requested and follow up as appropriate.     Armida Darling RDN,

## 2024-05-16 ENCOUNTER — CARE COORDINATION (OUTPATIENT)
Dept: CARE COORDINATION | Age: 89
End: 2024-05-16

## 2024-05-16 NOTE — CARE COORDINATION
Christopher Hwang MD Cardiology [phone]            Follow Up:   Plan for next ACM outreach in approximately 1 week to complete:  disease specific assessments  medication review   goal progression  education .   patient  is agreeable to this plan.

## 2024-05-20 ENCOUNTER — CARE COORDINATION (OUTPATIENT)
Dept: CARE COORDINATION | Age: 89
End: 2024-05-20

## 2024-05-20 NOTE — CARE COORDINATION
RENÉ received a msg from Jerman Hanna, RN that pt is interested in massages in her home. Inquired who could provide. RENÉ called Kiera Cedeño [phone] who has provided massages in the past. Kiera is \"to busy at this time and is not available.\"

## 2024-05-20 NOTE — CARE COORDINATION
RENÉ contacted Azeb Potts LMT at [phone] to inquire if she is interested in providing pt with massages in the home. Provided pt information ie, name, address, pt recent fall with neck and shoulder pain, age, wt. Advised Azeb to contact Amb. RN, Marilynn Griffin to discuss further and to contact pt with this information.

## 2024-05-23 ENCOUNTER — CARE COORDINATION (OUTPATIENT)
Dept: CARE COORDINATION | Age: 89
End: 2024-05-23

## 2024-05-23 NOTE — CARE COORDINATION
Ambulatory Care Coordination Note     2024 1:46 PM     Patient Current Location:  Home: [facility] [address]  [place] [address]     ACM contacted the patient by telephone. Verified name and  with patient as identifiers.         ACM: Rupal Griffin RN     Challenges to be reviewed by the provider   Additional needs identified to be addressed with provider No  none               Method of communication with provider: none.    Care Summary Note: Spoke with Martina for continued Care Coordination  States she did receive Azeb a massage therapist come to her home and give her a massage on her neck  Discussed RPM and she is wanting to re enroll as she has been having fluctuations in her blood pressure  States everything is stable for this week    Plan  Referral for RPM  Collaborate with Azeb -massage therapist  Reinforce importance of early symptom recognition and reporting to prevent exacerbation and unnecessary hospitalization      Offered patient enrollment in the Remote Patient Monitoring (RPM) program for in-home monitoring: Yes, patient enrolled today:     Remote Patient Monitoring Enrollment Note    Date/Time:  2024 1:54 PM    Offered patient enrollment in the [facility] Remote Patient Monitoring (RPM) program for in home monitoring for Kidney Disease; condition managed by PCP. CHF; condition managed by [facility] CHF clinic. HTN; condition managed by [facility] cardiology.  Patient accepted.    Patient will be monitoring the following daily:  Blood Pressure, Pulse ox, Weight, and Survey questions    ACM reviewed the information below with the patient:    Emergency Contact (name and contact number): Chasity Calderon [phone]    [x]  A member from the care coordination team will reach out to notify the patient once the RPM kit is ordered.  [x]  Once the kit is delivered, the HRS team will contact the patient after [employer] delivers to assist with set up.  [x]  Determined BP cuff size: regular

## 2024-05-30 ENCOUNTER — CARE COORDINATION (OUTPATIENT)
Dept: CARE COORDINATION | Age: 89
End: 2024-05-30

## 2024-05-30 NOTE — CARE COORDINATION
RPM team,       Kristel has not received her RPM equipment yet and is wondering if we can tell her when it will delivered.  She has been staying home everyday waiting for it to come.  Could you please inform me when [employer] will be delivering so I can let her know?  Thank you!

## 2024-05-31 ENCOUNTER — CARE COORDINATION (OUTPATIENT)
Dept: CARE COORDINATION | Age: 89
End: 2024-05-31

## 2024-05-31 NOTE — CARE COORDINATION
Remote Alert Monitoring Note      Date/Time:  2024 11:05 AM  Patient Current Location: Home: [address]  [place] [address]    LPN contacted patient by telephone regarding yellow alert received for blood pressure reading (178/). Verified patients name and  as identifiers.    Rpm alert to be reviewed by the provider                         Background: High Blood-Pressure, CHF, Kidney Disease     Refer to 911 immediately if:  Patient unresponsive or unable to provide history  Change in cognition or sudden confusion  Patient unable to respond in complete sentences  Intense chest pain/tightness  Any concern for any clinical emergency  Red Alert: Provider response time of 1 hr required for any red alert requiring intervention  Yellow Alert: Provider response time of 3hr required for any escalated yellow alert        BP Triage  Are you having any Chest Pain? no   Are you having any Shortness of Breath? no   Do you have a headache or have any vision changes? no   Are you having any numbness or tingling? no   Are you having any other health concerns or issues? no     Have you taken your medications as instructed by your doctor today? Yes       Clinical Interventions: Reviewed and followed up on alerts and treatments-. Pt is asymptomatic and in no apparent distress, speaking in full sentences.  Patient states she normally takes her BP prior to taking medications as she has parameters to follow.  If BP is low, she does not take her Spironolactone. Patient advised to continue to do so, she will just need to recheck 1 hour after taking her medications as well.  She voiced understanding.  She states she is going to go run some errands then is agreeable to relax for 15 minutes once she returns home prior to rechecking BP.  Will await update,        Plan/Follow Up: Will continue to review, monitor and address alerts with follow up based on severity of symptoms and risk factors.    ZAC Flower

## 2024-06-03 ENCOUNTER — CARE COORDINATION (OUTPATIENT)
Dept: CARE COORDINATION | Age: 89
End: 2024-06-03

## 2024-06-07 VITALS
DIASTOLIC BLOOD PRESSURE: 62 MMHG | WEIGHT: 127.2 LBS | HEART RATE: 63 BPM | BODY MASS INDEX: 24.84 KG/M2 | OXYGEN SATURATION: 95 % | SYSTOLIC BLOOD PRESSURE: 123 MMHG

## 2024-06-10 ENCOUNTER — CARE COORDINATION (OUTPATIENT)
Dept: CARE COORDINATION | Age: 89
End: 2024-06-10

## 2024-06-10 NOTE — PROGRESS NOTES
when appropriate to reduce radiation dose to as low as reasonably achievable.     FINDINGS:           The lumbar vertebral bodies are normally aligned.  There are no compression fractures.  No pars defects are noted.  No suspicious osseous lesions are present. There is osseous demineralization. There is loss of disc height at the L4-5 level. There is   some endplate sclerosis at this level.      There are no gross abnormalities within the spinal canal.     On the axial images, at T12-L1 and L1-L2, there are mild facet degenerative changes. There is no spinal canal stenosis. There is no foraminal stenosis.     At L2-3, there is a diffuse disc bulge. There are facet degenerative changes. There is thickening of ligamentum flavum. There is mild to moderate spinal canal stenosis. There is no foraminal stenosis.     At L3-4, there is a diffuse disc bulge. There are facet degenerative changes. There is thickening of ligamentum flavum. There is moderate severity spinal canal stenosis. There is mild right foraminal stenosis.     At L4-5, there is loss of disc height. There are facet degenerative changes. There is moderate stenosis of the thecal sac. There is stenosis of the subarticular recesses. There is relatively mild bilateral foraminal stenosis.     At L5-S1, there are facet degenerative changes. There is no disc abnormality. There is no spinal canal stenosis. There is no foraminal stenosis.     There are degenerative changes in the sacroiliac joints. There is some vacuum phenomena bilaterally.        IMPRESSION:     1. No acute findings in the lumbar spine.  2. Degenerative changes the lumbar spine with moderate severity spinal canal stenosis at the L3-4 and L4-5 levels.              **This report has been created using voice recognition software. It may contain minor errors which are inherent in voice recognition technology.**     Final report electronically signed by Dr. Sara Burks on 3/4/2024 8:51

## 2024-06-10 NOTE — CARE COORDINATION
Ambulatory Care Coordination Note     6/10/2024 12:52 PM     Patient Current Location:  Home: [facility] [address]  [place] [address]     ACM contacted the patient by telephone. Verified name and  with patient as identifiers.         ACM: Rupal Griffin RN     Challenges to be reviewed by the provider   Additional needs identified to be addressed with provider No  none               Method of communication with provider: none.    Care Summary Note: Spoke with Kristel for continued Care Coordination  She is doing well for review  She did receive new, smaller blood pressure cuff for RPM and is doing much better with that  Her readings are back down to normal  She is very please with RPM monitoring and states it gives her peace of mind  Has follow up appt scheduled with PCP tomorrow  States she will no longer be working with massage therapist  States she was having stress using [employer] phone line to make an appt for PCP as she could not understand the person answering.  I encouraged her to call myself to assist with setting up appt    Plan  Continue with RPM monitoring  Ensure follow up appts completed  Reinforce importance of early symptom recognition and reporting to prevent exacerbation and unnecessary hospitalization    Offered patient enrollment in the Remote Patient Monitoring (RPM) program for in-home monitoring: Yes, patient enrolled; current status is activated and monitoring.     Assessments Completed:   No changes since last call    Medications Reviewed:   Completed during this call    Advance Care Planning:   Not reviewed during this call     Care Planning:    Goals Addressed                      This Visit's Progress      Conditions and Symptoms (pt-stated)   On track      I will schedule office visits, as directed by my provider.  I will keep my appointment or reschedule if I have to cancel.  I will notify my provider of any barriers to my plan of care.  I will follow my Zone Management tool to

## 2024-06-11 ENCOUNTER — OFFICE VISIT (OUTPATIENT)
Dept: FAMILY MEDICINE CLINIC | Age: 89
End: 2024-06-11

## 2024-06-11 VITALS
SYSTOLIC BLOOD PRESSURE: 134 MMHG | OXYGEN SATURATION: 99 % | DIASTOLIC BLOOD PRESSURE: 78 MMHG | TEMPERATURE: 97.1 F | RESPIRATION RATE: 18 BRPM | WEIGHT: 131.2 LBS | HEIGHT: 60 IN | BODY MASS INDEX: 25.76 KG/M2 | HEART RATE: 64 BPM

## 2024-06-11 DIAGNOSIS — F41.9 ANXIETY: Chronic | ICD-10-CM

## 2024-06-11 DIAGNOSIS — M81.0 OSTEOPOROSIS, POST-MENOPAUSAL: Chronic | ICD-10-CM

## 2024-06-11 DIAGNOSIS — E03.9 HYPOTHYROIDISM, UNSPECIFIED TYPE: ICD-10-CM

## 2024-06-11 DIAGNOSIS — I50.20 HEART FAILURE WITH REDUCED EJECTION FRACTION (HCC): Chronic | ICD-10-CM

## 2024-06-11 DIAGNOSIS — R73.03 PREDIABETES: Chronic | ICD-10-CM

## 2024-06-11 DIAGNOSIS — I25.10 ASHD (ARTERIOSCLEROTIC HEART DISEASE): Chronic | ICD-10-CM

## 2024-06-11 DIAGNOSIS — R73.9 HYPERGLYCEMIA: ICD-10-CM

## 2024-06-11 DIAGNOSIS — S22.050S COMPRESSION FRACTURE OF T5 VERTEBRA, SEQUELA: ICD-10-CM

## 2024-06-11 DIAGNOSIS — E78.5 DYSLIPIDEMIA: Chronic | ICD-10-CM

## 2024-06-11 DIAGNOSIS — S22.060S COMPRESSION FRACTURE OF T7 VERTEBRA, SEQUELA: ICD-10-CM

## 2024-06-11 DIAGNOSIS — R07.9 CHEST PAIN, UNSPECIFIED TYPE: Primary | ICD-10-CM

## 2024-06-11 DIAGNOSIS — I10 HYPERTENSION, ESSENTIAL: Chronic | ICD-10-CM

## 2024-06-11 DIAGNOSIS — N18.31 STAGE 3A CHRONIC KIDNEY DISEASE (HCC): ICD-10-CM

## 2024-06-17 DIAGNOSIS — J01.90 ACUTE RHINOSINUSITIS: ICD-10-CM

## 2024-06-17 RX ORDER — FLUTICASONE PROPIONATE 50 MCG
2 SPRAY, SUSPENSION (ML) NASAL DAILY
Qty: 16 G | Refills: 5 | Status: SHIPPED | OUTPATIENT
Start: 2024-06-17

## 2024-06-17 NOTE — TELEPHONE ENCOUNTER
Recent Visits  Date Type Provider Dept   06/11/24 Office Visit Martin Davenport, DO [facility]   03/11/24 Office Visit Martin Davenport, DO [facility]   02/26/24 Office Visit Martin Davenport, DO [facility]   12/27/23 Office Visit Clarence Pearson, APRN - CNP [facility]   11/02/23 Office Visit Martin Davenport, DO [facility]   10/25/23 Office Visit Martin Davenport, DO [facility]   10/09/23 Office Visit Martin Davenport, DO [facility]   09/07/23 Office Visit Martin Davenport, DO [facility]   07/05/23 Office Visit Nikky Banks, APRN - CNP [facility]   05/31/23 Office Visit Fernanda Rowe, APRN - CNP [facility]   Showing recent visits within past 540 days with a meds authorizing provider and meeting all other requirements  Future Appointments  Date Type Provider Dept   09/12/24 Appointment Martin Davenport, DO [facility]   Showing future appointments within next 150 days with a meds authorizing provider and meeting all other requirements     
5

## 2024-06-18 ENCOUNTER — CARE COORDINATION (OUTPATIENT)
Dept: CARE COORDINATION | Age: 89
End: 2024-06-18

## 2024-06-18 NOTE — CARE COORDINATION
Kristel Welch  6/18/2024    Registered Dietitian Progress Note for Care Coordination    Assessment: Kristel is a 89 y.o. female. RD called to follow up with pt today 6/18/24. Per chart review, patient had OV with PCP on 6/11/24 and patient's weight documented as 131 lb. Patient states her fall has kind of messed up her meals and eating alone does not help. Patient states she is receiving 4 meals from  [facility] per month.  RD reviewed the components of a balanced meal using My[employer]. Discussed incorporating a variety of fruits, vegetables, whole grains, lean protein and low fat dairy. RD explained the importance of incorporating a protein source to each meal. Reviewed protein sources and provided tips for adding protein to breakfast such as eggs or triple zero oikos greek yogurt. Discussed trying ground turkey instead of ground beef. Patient states recently for dinner she made david fish with olive oil, salmon katrin and homemade potato soup. Patient states she made chicken salad with onion relish and hannon- RD discussed trying plain greek yogurt instead of hannon and adding avocado for a heart healthy fat.  Patient asked about specific fruits for diverticulosis- RD explained the importance of fiber and fluid to help promote regular BM. Patient has no additional nutrition related questions or concerns at this time. Per chart review, patient has OV with Cardiology on 6/27/24.     Follow Up:    RD will call pt in 3-4 weeks to follow up and answer any nutrition related questions at that time.     Armida Darling RDN, LD  [phone]

## 2024-06-24 ENCOUNTER — CARE COORDINATION (OUTPATIENT)
Dept: CARE COORDINATION | Age: 89
End: 2024-06-24

## 2024-06-24 NOTE — CARE COORDINATION
Ambulatory Care Coordination Note     2024 3:16 PM     Patient Current Location:  Home: [facility] [address]  [place] [address]     ACM contacted the patient by telephone. Verified name and  with patient as identifiers.         ACM: Rupal Griffin RN     Challenges to be reviewed by the provider   Additional needs identified to be addressed with provider No  none               Method of communication with provider: none.    Care Summary Note: Spoke with Martina for continued Care Coordination follow up  States she is doing well for review  Had pain injection completed and feeling well  Continues with RPM monitoring, blood pressures slightly elevated and feels it may be related to recent pain injection  She is asking about the results of a CT scan completed in March regarding a cyst on her kidney  Will ask PCP for his opinion on this    Plan  Continue with RPM monitoring  Ask PCP regarding CT scan in March for any additional follow up needed  Reinforce importance of early symptom recognition and reporting to prevent exacerbation and unnecessary hospitalization    Offered patient enrollment in the Remote Patient Monitoring (RPM) program for in-home monitoring: Yes, patient enrolled; current status is activated and monitoring.     Assessments Completed:   No changes since last call    Medications Reviewed:   Completed during this call    Advance Care Planning:   Not reviewed during this call     Care Planning:    Goals Addressed                      This Visit's Progress      Conditions and Symptoms (pt-stated)   On track      I will schedule office visits, as directed by my provider.  I will keep my appointment or reschedule if I have to cancel.  I will notify my provider of any barriers to my plan of care.  I will follow my Zone Management tool to seek urgent or emergent care.  I will notify my provider of any symptoms that indicate a worsening of my condition.    Barriers: need for additional support and

## 2024-06-24 NOTE — CARE COORDINATION
Kristel Sadler is asking for PCP opinion on CT scan completed on 3-4-24 where she was informed there was a small cyst on her kidney.  She is asking if she should be concerned about this or if there should be any follow up regarding this.  She states she was not informed by anyone at that time that she needed to be concerned but would like PCP recommendations.  Please advise.  Thank you!

## 2024-06-24 NOTE — TELEPHONE ENCOUNTER
It's a simple cyst, though typically do not need any type of f/u testing  Let me know if questions, thanks!

## 2024-06-24 NOTE — CARE COORDINATION
Left message to return phone call to complete Care Coordination follow up.  Continues to be active with RPM with no alerts noted.

## 2024-06-27 ENCOUNTER — OFFICE VISIT (OUTPATIENT)
Dept: CARDIOLOGY CLINIC | Age: 89
End: 2024-06-27
Payer: MEDICARE

## 2024-06-27 VITALS
HEART RATE: 66 BPM | WEIGHT: 128.8 LBS | BODY MASS INDEX: 25.29 KG/M2 | DIASTOLIC BLOOD PRESSURE: 73 MMHG | SYSTOLIC BLOOD PRESSURE: 184 MMHG | HEIGHT: 60 IN

## 2024-06-27 DIAGNOSIS — I10 ESSENTIAL HYPERTENSION: ICD-10-CM

## 2024-06-27 DIAGNOSIS — K21.9 GASTROESOPHAGEAL REFLUX DISEASE WITHOUT ESOPHAGITIS: ICD-10-CM

## 2024-06-27 DIAGNOSIS — Z95.820 S/P ANGIOPLASTY WITH STENT: Chronic | ICD-10-CM

## 2024-06-27 DIAGNOSIS — E78.5 DYSLIPIDEMIA: Chronic | ICD-10-CM

## 2024-06-27 DIAGNOSIS — I25.10 CORONARY ARTERY DISEASE INVOLVING NATIVE CORONARY ARTERY OF NATIVE HEART WITHOUT ANGINA PECTORIS: Primary | ICD-10-CM

## 2024-06-27 PROCEDURE — G8427 DOCREV CUR MEDS BY ELIG CLIN: HCPCS | Performed by: NURSE PRACTITIONER

## 2024-06-27 PROCEDURE — G8417 CALC BMI ABV UP PARAM F/U: HCPCS | Performed by: NURSE PRACTITIONER

## 2024-06-27 PROCEDURE — 1036F TOBACCO NON-USER: CPT | Performed by: NURSE PRACTITIONER

## 2024-06-27 PROCEDURE — 1123F ACP DISCUSS/DSCN MKR DOCD: CPT | Performed by: NURSE PRACTITIONER

## 2024-06-27 PROCEDURE — 99214 OFFICE O/P EST MOD 30 MIN: CPT | Performed by: NURSE PRACTITIONER

## 2024-06-27 PROCEDURE — 1090F PRES/ABSN URINE INCON ASSESS: CPT | Performed by: NURSE PRACTITIONER

## 2024-06-27 RX ORDER — ATORVASTATIN CALCIUM 40 MG/1
40 TABLET, FILM COATED ORAL DAILY
Qty: 90 TABLET | Refills: 3 | Status: SHIPPED | OUTPATIENT
Start: 2024-06-27

## 2024-06-27 RX ORDER — SPIRONOLACTONE 25 MG/1
25 TABLET ORAL DAILY
Qty: 90 TABLET | Refills: 3 | Status: SHIPPED | OUTPATIENT
Start: 2024-06-27

## 2024-06-27 RX ORDER — CARVEDILOL 3.12 MG/1
3.12 TABLET ORAL 2 TIMES DAILY WITH MEALS
Qty: 180 TABLET | Refills: 3 | Status: SHIPPED | OUTPATIENT
Start: 2024-06-27

## 2024-06-27 RX ORDER — FUROSEMIDE 40 MG/1
40 TABLET ORAL DAILY
Qty: 90 TABLET | Refills: 3 | Status: SHIPPED | OUTPATIENT
Start: 2024-06-27

## 2024-06-27 RX ORDER — CLOPIDOGREL BISULFATE 75 MG/1
75 TABLET ORAL DAILY
Qty: 90 TABLET | Refills: 2 | Status: SHIPPED | OUTPATIENT
Start: 2024-06-27

## 2024-06-27 RX ORDER — OMEPRAZOLE 20 MG/1
20 CAPSULE, DELAYED RELEASE ORAL
Qty: 90 CAPSULE | Refills: 3 | Status: SHIPPED | OUTPATIENT
Start: 2024-06-27

## 2024-06-27 NOTE — PATIENT INSTRUCTIONS
May use Tylenol arthritis strength as directed on the bottle to control the back pain.     May use Melatonin 2 - 10 mg - obtain a bottle of 5 mg strength and start with 1/2 tablet (2.5 mg ); may repeat if still awake in 30 - 60 minutes or if wake up couple hours later and can't get back to sleep.    Continue current medications as prescribed.    Stay as active as you can.     Eat heart healthy diet.     You may receive a survey regarding the care you received during your visit.  Your input is valuable to us.  We encourage you to complete and return your survey.  We hope you will choose us in the future for your healthcare needs.    Your Nurses Today: ZAC Macias      Can check into falls and balance clinic or could do physical therapy with attention to fall and balance work.    Follow-up with your PCP as scheduled.    Follow-up with Dr. Hwang  on 12/3/2024 as scheduled or sooner if need.     Call with questions or concerns - [phone] use option #3 - ask for Bhumi to call you.     Compression socks can be found at [employer] or [employer] - use the low compression (10 - 20 mmHg) - roll down in one roll to keep from behind the knees.

## 2024-06-27 NOTE — PROGRESS NOTES
[facility] [place] [facility]  [facility] CARDIOLOGY  [address]  [address]  [place] [address]  Dept: [phone]  Dept Fax: [phone]  Loc: [phone]    Visit Date: 6/27/2024    Ms. Welch is a 89 y.o. female  who presented for: 3 month follow-up      Chief Complaint   Patient presents with    Follow-up     3 month fu     Hypertension     \"Has some lower back pain when BP is up\"     Cardiologist:  Dr. Hwang    HPI:   Last seen in office on 3/26/2024 per JAVIER Coy CNP. Per office note:  89 y.o. F with PMH NSTEMI/CAD s/p PCI, CKD , HFrEF, hypothyroidism, chronic back pain who was recently evaluated in hospital for complaints of chest pain. Did have abnormal stress in 2023 in which partially reversible anterior/apical defect was noted- after discussion with patient/family- conservative medical management was pursued.   Today reports no chest pain, SOB/MCKEON, palpitations, dizziness, or syncope. Monitors BP and weight at home- few borderline BP recently- SBP in high 80-90s. Asymptomatic. Will cut back on antihypertensives. Does have intermittent back pain- thoracic fractures last fall- managed with activity limits- is seeing chiropractor. Able to complete all ADLs- staying active, walking daily.   Assessment/Plan    Recent hospitalization for evaluation of chest pain. 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 No recurrence of

## 2024-07-08 ENCOUNTER — CARE COORDINATION (OUTPATIENT)
Dept: CARE COORDINATION | Age: 89
End: 2024-07-08

## 2024-07-08 NOTE — CARE COORDINATION
Ambulatory Care Coordination Note     2024 12:33 PM     Patient Current Location:  Home: [address]  [place] [address]     ACM contacted the patient by telephone. Verified name and  with patient as identifiers.         ACM: Rupal Griffin RN     Challenges to be reviewed by the provider   Additional needs identified to be addressed with provider No  none               Method of communication with provider: none.    Care Summary Note: Spoke with Kristel for continued Care Coordination  Stable for review  Completed cardiology appt, blood pressure was higher in that office visit but continues with RPM and they are running great  Blood pressure ws 111/57, 61 this morning  States she has been baking bread and is a little exhausted from that  Denies additional needs during this call    Plan  Reinforce education completed  Continue with RPM monitoring  Reinforce importance of early symptom recognition and reporting to prevent exacerbation and unnecessary hospitalization    Offered patient enrollment in the Remote Patient Monitoring (RPM) program for in-home monitoring: Yes, patient enrolled; current status is activated and monitoring.     Assessments Completed:   No changes since last call    Medications Reviewed:   Completed during this call    Advance Care Planning:   Not reviewed during this call     Care Planning:    Goals Addressed                      This Visit's Progress      Conditions and Symptoms (pt-stated)   On track      I will schedule office visits, as directed by my provider.  I will keep my appointment or reschedule if I have to cancel.  I will notify my provider of any barriers to my plan of care.  I will follow my Zone Management tool to seek urgent or emergent care.  I will notify my provider of any symptoms that indicate a worsening of my condition.    Barriers: need for additional support and education  Plan for overcoming my barriers: family and ACM support  Confidence:

## 2024-07-11 ENCOUNTER — CARE COORDINATION (OUTPATIENT)
Dept: CARE COORDINATION | Age: 89
End: 2024-07-11

## 2024-07-11 NOTE — CARE COORDINATION
Kristel Welch  7/11/2024    Registered Dietitian Progress Note for Care Coordination    Assessment: Kristel is a 89 y.o. female. RD called to follow up with pt today 7/11/24. Per chart review, patient had OV with Cardiology on 6/27/24 and patient's weight documented as 128 lb. RD noted patient is enrolled in RPM.   RD reviewed the importance of eating balanced meals consistently throughout the day. Discussed incorporating a variety of fruits, vegetables, whole grains, lean protein and low fat dairy. Patient states she tried ground turkey and seasoned it with paprika and garlic. Patient asked for additional seasoning recommendations- discussed chili powder, cumin, oregano, black pepper, onion powder, Mrs.Dash. Patient states she bought plain greek yogurt and she does not like the taste. Recommended using plain greek yogurt in smoothies or in place of hannon. RD discussed buying triple zero Oikos flavored greek yogurt. Patient asked specifically how long baking powder, baking soda and cornstarch are good for- RD discussed referring to expiration date on items. Patient verbalizes understanding. Patient has no additional nutrition related questions or concerns at this time.     Follow Up:    RD will call pt in 1 month to follow up and answer any nutrition related questions at that time.     Armida Darling RDN, LD  [phone]

## 2024-07-16 ENCOUNTER — CARE COORDINATION (OUTPATIENT)
Dept: CARE COORDINATION | Age: 89
End: 2024-07-16

## 2024-07-16 NOTE — CARE COORDINATION
Ambulatory Care Coordination Note     2024 9:47 AM     Patient Current Location:  Home: [facility] [address]  [facility] [address]     ACM contacted the patient by telephone. Verified name and  with patient as identifiers.         ACM: Rupal Griffin RN     Challenges to be reviewed by the provider   Additional needs identified to be addressed with provider No  none               Method of communication with provider: none.    Care Summary Note: Received a call from Kristel  States that she was concerned about her lower blood pressure this morning  Blood pressure 93/48, 68 with RPM reading, no alert noted  States she is holding her diuretics this morning  Discussed fluid intake, she states she is drinking about 20 oz of fluid daily  Discussed with her that she is on a 2L/restriction and informed her what that is in ounces  She states she will try to drink a little more daily  Has follow up appts in place  States she is looking for compression socks and will have Chasity, her friend, help her find some on [employer]  Gave her the recommendations from cardiology office which is 10-20 mmhg of compression    Plan  Continue with RPM monitoring  Reinforce importance of early symptom recognition and reporting to prevent exacerbation and unnecessary hospitalization  Assist with finding compression socks if needed    Offered patient enrollment in the Remote Patient Monitoring (RPM) program for in-home monitoring: Yes, patient enrolled; current status is activated and monitoring.     Assessments Completed:   General Assessment    Do you have any symptoms that are causing concern?: Yes  Progression since Onset: Intermittent - Waxing/Waning  Reported Symptoms: Other (Comment: lower blood pressure)          Medications Reviewed:   Completed during this call    Advance Care Planning:   Not reviewed during this call     Care Planning:    Goals Addressed                      This Visit's Progress      Conditions and Symptoms

## 2024-07-25 ENCOUNTER — HOSPITAL ENCOUNTER (OUTPATIENT)
Dept: WOMENS IMAGING | Age: 89
Discharge: HOME | End: 2024-07-25
Payer: MEDICARE

## 2024-07-25 DIAGNOSIS — Z12.31 VISIT FOR SCREENING MAMMOGRAM: ICD-10-CM

## 2024-07-25 PROCEDURE — 77063 BREAST TOMOSYNTHESIS BI: CPT

## 2024-07-26 ENCOUNTER — CARE COORDINATION (OUTPATIENT)
Dept: CARE COORDINATION | Age: 89
End: 2024-07-26

## 2024-07-26 NOTE — CARE COORDINATION
Remote Patient Monitoring Note      Date/Time:  7/26/2024 8:19 AM    LPN attempted to contact patient by telephone regarding red alert received for weight increase (132.2lb).    Background: High Blood-Pressure, CHF, Kidney Disease     Clinical Interventions:  HIPAA compliant message left on  requesting a return call.    Plan/Follow Up: Will continue to review, monitor and address alerts with follow up based on severity of symptoms and risk factors.       ZAC Flower [facility]/ CTN/ Remote Patient Monitoring  [phone]

## 2024-07-26 NOTE — CARE COORDINATION
Remote Alert Monitoring Note      Date/Time:  2024 8:52 AM  Patient Current Location: Home: [address]  [facility] [address]    LPN contacted patient by telephone regarding red alert received for weight increase (132.2lb). 5.2lb weight increase overnight.  Verified patients name and  as identifiers.    Rpm alert to be reviewed by the provider    Red Alert 5.2lb weight gain over night.    Pt is in no apparent distress, speaking in full sentences.  She reports some increased swelling in her feet and ankles.  She denies any SOB.  She states she has been taking all mediations as prescribed.    Please see metric history below                     Background: High Blood-Pressure, CHF, Kidney Disease     Refer to 911 immediately if:  Patient unresponsive or unable to provide history  Change in cognition or sudden confusion  Patient unable to respond in complete sentences  Intense chest pain/tightness  Any concern for any clinical emergency  Red Alert: Provider response time of 1 hr required for any red alert requiring intervention  Yellow Alert: Provider response time of 3hr required for any escalated yellow alert          Have you taken your medications as instructed by your doctor today? Yes   Weight Triage  Are you weighing any different than you did yesterday? (time of day, clothes and shoes on or off, etc)? no   Do you have any shortness of breath? no   Do you have any swelling in your hands of feet? yes   Are you having any other health concerns or issues? dizziness       Clinical Interventions: Reviewed and followed up on alerts and treatments-. Pt is in no apparent distress, speaking in full sentences.  She reports some increased swelling in her feet and ankles.  She denies any SOB.  She states she has been taking all mediations as prescribed.  Update sent to CHF clinic and AC.      Signs and symptoms of CHF discussed with patient, such as feeling more tired than normal, feeling short of breath,

## 2024-07-29 ENCOUNTER — TELEPHONE (OUTPATIENT)
Dept: FAMILY MEDICINE CLINIC | Age: 89
End: 2024-07-29

## 2024-07-29 NOTE — TELEPHONE ENCOUNTER
----- Message from Martin Davenport DO sent at 7/29/2024  6:53 AM EDT -----  Please let pt know that mammogram is normal.   She does have dense breast tissue, which is a common finding, and not abnormal.    This can lower the sensitivity of mammograms.   Given the finding of dense breast tissue, this patient is a candidate for a new imaging test called automated whole breast screening ultrasound (ABUS) to aid in breast cancer detection.  If would like to pursue this, I can order. Let me know.      Let me know if questions, thanks!  
Left message on answering machine requesting pt to call back at earliest convenience.     
Pt informed and understanding with no further questions at this time.       She would like to do the ABUS in the future,possibly for the next one, but not at this moment.         
done

## 2024-07-30 ENCOUNTER — CARE COORDINATION (OUTPATIENT)
Dept: CARE COORDINATION | Age: 89
End: 2024-07-30

## 2024-07-30 NOTE — CARE COORDINATION
Ambulatory Care Coordination Note     2024 2:17 PM     Patient Current Location:  Home: [facility] [address]  [facility] [address]     Patient contacted the patient by telephone. Verified name and  with patient as identifiers.         ACM: Rupal Griffin RN     Challenges to be reviewed by the provider   Additional needs identified to be addressed with provider No  none               Method of communication with provider: none.    Care Summary Note: Spoke with Martina  Discussed RPM red alert and intervention  States she didn't feel bad on that day and states she is now back down to normal readings  Watching her salt intake  Completed mammogram  Having injection completed next week at [facility]  Denies additional needs during call    Plan  Continue with RPM monitoring  Ensure follow up appts completed  Working with RD  Reinforce importance of early symptom recognition and reporting    Offered patient enrollment in the Remote Patient Monitoring (RPM) program for in-home monitoring: Yes, patient enrolled; current status is activated and monitoring.     Assessments Completed:   No changes since last call    Medications Reviewed:   Completed during this call    Advance Care Planning:   Not reviewed during this call     Care Planning:    Goals Addressed                      This Visit's Progress      Conditions and Symptoms (pt-stated)   On track      I will schedule office visits, as directed by my provider.  I will keep my appointment or reschedule if I have to cancel.  I will notify my provider of any barriers to my plan of care.  I will follow my Zone Management tool to seek urgent or emergent care.  I will notify my provider of any symptoms that indicate a worsening of my condition.    Barriers: need for additional support and education  Plan for overcoming my barriers: family and ACM support  Confidence: 9/10  Anticipated Goal Completion Date: 24                 PCP/Specialist follow up:   Future Appointments

## 2024-07-30 NOTE — CARE COORDINATION
Ambulatory Care Coordination Note     7/30/2024 2:13 PM     Patient outreach attempt by this ACM today to perform care management follow up . ACM was unable to reach the patient by telephone today; left voice message requesting a return phone call to this ACM.     ACM: Rupal Griffin, RN     Care Summary Note:     PCP/Specialist follow up:   Future Appointments         Provider Specialty Dept Phone    9/12/2024 10:20 AM Martin Davenport, DO Family Medicine [phone]    10/31/2024 11:30 AM Jie Multani, APRN - CNP Cardiology [phone]    12/3/2024 3:15 PM Christopher Hwang MD Cardiology [phone]    7/28/2025 1:00 PM (Arrive by 12:50 PM) STR MAMMOGRAPHY RM2  [facility] Radiology [phone]            Follow Up:   Plan for next ACM outreach in approximately 1 week to complete:  - medication review  - goal progression  - education   - RPM.

## 2024-08-02 ENCOUNTER — CARE COORDINATION (OUTPATIENT)
Dept: CARE COORDINATION | Age: 89
End: 2024-08-02

## 2024-08-02 NOTE — CARE COORDINATION
RPM team,       Kristel will be going out of town for a few day in August.  Could you please pause her RPM from August 9th -11th.  Thank you!

## 2024-08-05 ENCOUNTER — CARE COORDINATION (OUTPATIENT)
Dept: CARE COORDINATION | Age: 89
End: 2024-08-05

## 2024-08-05 NOTE — CARE COORDINATION
Received a call from Martina, she had some questions about her medications and getting them set up as she is going out of town this weekend.  All questions answered.  Discussed RPM metrics.  No alerts noted.

## 2024-08-07 NOTE — CARE COORDINATION
RRM team,     Just following up on initial request to have RPM equipment paused on August 9-11th.  Thank you!

## 2024-08-08 ENCOUNTER — TELEPHONE (OUTPATIENT)
Dept: FAMILY MEDICINE CLINIC | Age: 89
End: 2024-08-08

## 2024-08-08 ENCOUNTER — CARE COORDINATION (OUTPATIENT)
Dept: CARE COORDINATION | Age: 89
End: 2024-08-08

## 2024-08-08 DIAGNOSIS — I10 HYPERTENSION, ESSENTIAL: Primary | Chronic | ICD-10-CM

## 2024-08-08 DIAGNOSIS — R73.9 HYPERGLYCEMIA: ICD-10-CM

## 2024-08-08 NOTE — CARE COORDINATION
Kristel called with a few questions before participating in family wedding this weekend.  Her RPM will be paused from 9-11th.  She states she will be monitoring with her own blood pressure cuff during that time.  Encouraged her to take her medications as ordered over the weekend.  She is having some anxiety and does have prn ativan that she is taking with her.  Encouraged her to only use 1/2 tab is she needs to take it to reduce any drowsiness during wedding.

## 2024-08-08 NOTE — TELEPHONE ENCOUNTER
Pt due for fasting labs prior to next apt on 9/12/2024. Please call to have pt complete this. Thanks!    ASSESSMENT & PLAN   Diagnosis Orders   1. Hypertension, essential  CBC with Auto Differential    Comprehensive Metabolic Panel    Hemoglobin A1C    Lipid Panel    TSH with Reflex    Microalbumin / Creatinine Urine Ratio      2. Hyperglycemia  CBC with Auto Differential    Comprehensive Metabolic Panel    Hemoglobin A1C    Lipid Panel    TSH with Reflex    Microalbumin / Creatinine Urine Ratio        Future Appointments   Date Time Provider Department Center   9/12/2024 10:20 AM [facility]port[facility] DO [facility]   10/31/2024 11:30 AM Jie Multani, APRN - CNP [facility] CHF P - [place]   12/3/2024  3:15 PM Christopher Hwang MD [facility] Heart P - [place]   7/28/2025  1:00 PM STR MAMMOGRAPHY RM2  [facility] STR Rad/Card

## 2024-08-08 NOTE — TELEPHONE ENCOUNTER
Pt informed of lab test to be completed and voiced understanding with no further questions at this time.     Lab slip mailed to pt

## 2024-08-12 ENCOUNTER — CARE COORDINATION (OUTPATIENT)
Dept: CARE COORDINATION | Age: 89
End: 2024-08-12

## 2024-08-12 NOTE — CARE COORDINATION
Contacted Kristel Welch regarding Dietitian follow up. Pt answered, RD explained reason for call and role in care. Pt requested RD call back on a different day this week. RD will outreach as requested and follow up as appropriate.         Armida Darling RDN, LD  [phone]

## 2024-08-14 ENCOUNTER — CARE COORDINATION (OUTPATIENT)
Dept: CARE COORDINATION | Age: 89
End: 2024-08-14
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
## 2024-08-14 NOTE — TELEPHONE ENCOUNTER
Noted.  I spoke with Kristel this morning as well.  Will see if any additional intervention recommended from CHF clinic.  Thanks

## 2024-08-14 NOTE — CARE COORDINATION
Ambulatory Care Coordination Note     2024 9:42 AM     Patient Current Location:  Home: [facility] [address]  [facility] [address]     ACM contacted the patient by telephone. Verified name and  with patient as identifiers.         ACM: Rupal Griffin RN     Challenges to be reviewed by the provider   Additional needs identified to be addressed with provider No  none               Method of communication with provider: none.    Care Summary Note: Martina called to update me on  this weekend  States she had some complications from the wedding this weekend  Discussed that she had a \"semi- fall\" from a step but states she didn't fall  States she does have some swelling of her ankle and bruising  States another stressor was that her purse got stollen  Her blood pressure is higher today at 177/69  RPM nurse has notified CHF clinic  Discussed weight, states she feels it could be from swelling from her ankle  She denies any pain  States her friend, Chasity is coming today to help her do her running  She denies any additional needs from me or PCP office at this time  States she is going to recheck her blood pressure since she has now taken her medications    Offered patient enrollment in the Remote Patient Monitoring (RPM) program for in-home monitoring: Yes, patient enrolled; current status is activated and monitoring.     Assessments Completed:   General Assessment    Do you have any symptoms that are causing concern?: Yes  Reported Symptoms: Other (Comment: swelling from twisted ankle)          Medications Reviewed:   Patient denies any changes with medications and reports taking all medications as prescribed.    Advance Care Planning:   Not reviewed during this call     Care Planning:    Goals Addressed    None          PCP/Specialist follow up:   Future Appointments         Provider Specialty Dept Phone    2024 10:20 AM Martin Davenport DO Family Medicine [phone]    10/31/2024 11:30 AM [facility]

## 2024-08-14 NOTE — CARE COORDINATION
Remote Alert Monitoring Note      Date/Time:  2024 8:52 AM  Patient Current Location: Home: [address]  [facility] [address]    LPN contacted patient by telephone regarding red alert and yellow alert received for blood pressure reading (177/69) and weight increase (131.4lb). 5.2lb increase overnight. Verified patients name and  as identifiers.    Rpm alert to be reviewed by the provider    Red Alert Weight gain (5.2lb weight gain overnight)    Patient denies any SOB. She did have a fall over the weekend and reports swelling in that leg and ankle from injury. She states her unaffected leg is not swollen at all. She continues to take Spironolactone 25mg and Lasix 40mg. Please advise of any changes.      Please see metric history below                   Background:  High Blood-Pressure, CHF, Kidney Disease     Refer to 911 immediately if:  Patient unresponsive or unable to provide history  Change in cognition or sudden confusion  Patient unable to respond in complete sentences  Intense chest pain/tightness  Any concern for any clinical emergency  Red Alert: Provider response time of 1 hr required for any red alert requiring intervention  Yellow Alert: Provider response time of 3hr required for any escalated yellow alert        BP Triage  Are you having any Chest Pain? no   Are you having any Shortness of Breath? no   Do you have a headache or have any vision changes? no   Are you having any numbness or tingling? no   Are you having any other health concerns or issues? no     Have you taken your medications as instructed by your doctor today? Yes   Weight Triage  Are you weighing any different than you did yesterday? (time of day, clothes and shoes on or off, etc)? no   Do you have any shortness of breath? no   Do you have any swelling in your hands of feet? yes   Are you having any other health concerns or issues? anxiety       Clinical Interventions: Reviewed and followed up on alerts and treatments-.

## 2024-08-16 ENCOUNTER — CARE COORDINATION (OUTPATIENT)
Dept: CARE COORDINATION | Age: 89
End: 2024-08-16

## 2024-08-16 NOTE — CARE COORDINATION
Remote Alert Monitoring Note      Date/Time:  2024 8:52 AM  Patient Current Location: Home: [address]  [facility] [address]    LPN contacted patient by telephone regarding red alert and yellow alert received for blood pressure reading (175/75) and weight increase (130.8lb). Verified patients name and  as identifiers.    Rpm alert to be reviewed by the provider   Red Alert Weight gain     Weight increase of 4lbs noted overnight. Patient denies any SOB and states swelling is completely resolved from her ankle injury and does not note any other areas of swelling. She had similar weight gain 2 days ago  with ankle swelling and then weight returned to normal yesterday.    Metric history below.                     Background: High Blood-Pressure, CHF, Kidney Disease     Refer to 911 immediately if:  Patient unresponsive or unable to provide history  Change in cognition or sudden confusion  Patient unable to respond in complete sentences  Intense chest pain/tightness  Any concern for any clinical emergency  Red Alert: Provider response time of 1 hr required for any red alert requiring intervention  Yellow Alert: Provider response time of 3hr required for any escalated yellow alert        Weight Scale Triage  Was your weight obtained upon rising/waking today? yes   Was your weight obtained after voiding and/or use of the bathroom today? yes   Did you weigh yourself in the same amount of clothing today, compared to how you typically do? yes   Was the scale bumped or moved prior to today's weight? no   Is your scale on a flat/hard surface? yes   Did you obtain your weight with shoes on? no   If yes, is this something you normally do during your daily weights? yes   Were you standing up straight on the scale today? yes   Were you leaning on anything while obtaining your weight today? no     Have you taken your medications as instructed by your doctor today? Yes   Weight Triage  Are you weighing any different

## 2024-08-19 ENCOUNTER — CARE COORDINATION (OUTPATIENT)
Dept: CARE COORDINATION | Age: 89
End: 2024-08-19

## 2024-08-19 NOTE — CARE COORDINATION
support and education  Plan for overcoming my barriers: family and ACM support  Confidence: 9/10  Anticipated Goal Completion Date: 8-16-24                 PCP/Specialist follow up:   Future Appointments         Provider Specialty Dept Phone    9/12/2024 10:20 AM Martin Davenport, DO Family Medicine [phone]    10/31/2024 11:30 AM Jie Multani, APRN - CNP Cardiology [phone]    12/3/2024 3:15 PM Christopher Hwang MD Cardiology [phone]    7/28/2025 1:00 PM (Arrive by 12:50 PM) STR MAMMOGRAPHY RM2  [facility] Radiology [phone]            Follow Up:   Plan for next ACM outreach in approximately 2 weeks to complete:  - disease specific assessments  - goal progression  - education .   Patient  is agreeable to this plan.

## 2024-08-28 ENCOUNTER — HOSPITAL ENCOUNTER (INPATIENT)
Age: 89
LOS: 1 days | Discharge: HOME | DRG: 291 | End: 2024-08-30
Attending: EMERGENCY MEDICINE | Admitting: PHYSICIAN ASSISTANT
Payer: MEDICARE

## 2024-08-28 ENCOUNTER — APPOINTMENT (OUTPATIENT)
Age: 89
DRG: 291 | End: 2024-08-28
Payer: MEDICARE

## 2024-08-28 ENCOUNTER — APPOINTMENT (OUTPATIENT)
Dept: CT IMAGING | Age: 89
DRG: 291 | End: 2024-08-28
Payer: MEDICARE

## 2024-08-28 ENCOUNTER — APPOINTMENT (OUTPATIENT)
Dept: GENERAL RADIOLOGY | Age: 89
DRG: 291 | End: 2024-08-28
Payer: MEDICARE

## 2024-08-28 ENCOUNTER — CARE COORDINATION (OUTPATIENT)
Dept: CARE COORDINATION | Age: 89
End: 2024-08-28

## 2024-08-28 DIAGNOSIS — I10 ACCELERATED HYPERTENSION: ICD-10-CM

## 2024-08-28 DIAGNOSIS — I21.4 NSTEMI (NON-ST ELEVATED MYOCARDIAL INFARCTION) (HCC): Primary | ICD-10-CM

## 2024-08-28 DIAGNOSIS — I50.9 ACUTE ON CHRONIC CONGESTIVE HEART FAILURE, UNSPECIFIED HEART FAILURE TYPE (HCC): ICD-10-CM

## 2024-08-28 LAB
ALBUMIN SERPL BCG-MCNC: 4.5 G/DL (ref 3.5–5.1)
ALP SERPL-CCNC: 100 U/L (ref 38–126)
ALT SERPL W/O P-5'-P-CCNC: 22 U/L (ref 11–66)
ANION GAP SERPL CALC-SCNC: 11 MEQ/L (ref 8–16)
APTT PPP: 30.8 SECONDS (ref 22–38)
AST SERPL-CCNC: 26 U/L (ref 5–40)
BACTERIA URNS QL MICRO: ABNORMAL /HPF
BASOPHILS ABSOLUTE: 0 THOU/MM3 (ref 0–0.1)
BASOPHILS NFR BLD AUTO: 0.3 %
BILIRUB CONJ SERPL-MCNC: 0.2 MG/DL (ref 0.1–13.8)
BILIRUB SERPL-MCNC: 0.6 MG/DL (ref 0.3–1.2)
BILIRUB UR QL STRIP.AUTO: NEGATIVE
BUN SERPL-MCNC: 18 MG/DL (ref 7–22)
CALCIUM SERPL-MCNC: 9.1 MG/DL (ref 8.5–10.5)
CASTS #/AREA URNS LPF: ABNORMAL /LPF
CASTS 2: ABNORMAL /LPF
CHARACTER UR: CLEAR
CHLORIDE SERPL-SCNC: 107 MEQ/L (ref 98–111)
CO2 SERPL-SCNC: 27 MEQ/L (ref 23–33)
COLOR, UA: YELLOW
CREAT SERPL-MCNC: 0.9 MG/DL (ref 0.4–1.2)
CREAT UR-MCNC: 11.3 MG/DL
CRYSTALS URNS MICRO: ABNORMAL
DEPRECATED MEAN GLUCOSE BLD GHB EST-ACNC: 114 MG/DL (ref 70–126)
DEPRECATED RDW RBC AUTO: 50 FL (ref 35–45)
DEPRECATED RDW RBC AUTO: 51.5 FL (ref 35–45)
ECHO BSA: 1.53 M2
ECHO LA AREA 2C: 12.1 CM2
ECHO LA AREA 4C: 13.6 CM2
ECHO LA DIAMETER INDEX: 1.79 CM/M2
ECHO LA DIAMETER: 2.7 CM
ECHO LA MAJOR AXIS: 4.4 CM
ECHO LA MINOR AXIS: 4.4 CM
ECHO LA VOL BP: 31 ML (ref 22–52)
ECHO LA VOL MOD A2C: 27 ML (ref 22–52)
ECHO LA VOL MOD A4C: 35 ML (ref 22–52)
ECHO LA VOL/BSA BIPLANE: 21 ML/M2 (ref 16–34)
ECHO LA VOLUME INDEX MOD A2C: 18 ML/M2 (ref 16–34)
ECHO LA VOLUME INDEX MOD A4C: 23 ML/M2 (ref 16–34)
ECHO LV EDV A2C: 72 ML
ECHO LV EDV A4C: 80 ML
ECHO LV EDV INDEX A4C: 53 ML/M2
ECHO LV EDV NDEX A2C: 48 ML/M2
ECHO LV EJECTION FRACTION A2C: 53 %
ECHO LV EJECTION FRACTION A4C: 51 %
ECHO LV EJECTION FRACTION BIPLANE: 52 % (ref 55–100)
ECHO LV ESV A2C: 35 ML
ECHO LV ESV A4C: 39 ML
ECHO LV ESV INDEX A2C: 23 ML/M2
ECHO LV ESV INDEX A4C: 26 ML/M2
ECHO LV FRACTIONAL SHORTENING: 31 % (ref 28–44)
ECHO LV INTERNAL DIMENSION DIASTOLE INDEX: 2.58 CM/M2
ECHO LV INTERNAL DIMENSION DIASTOLIC: 3.9 CM (ref 3.9–5.3)
ECHO LV INTERNAL DIMENSION SYSTOLIC INDEX: 1.79 CM/M2
ECHO LV INTERNAL DIMENSION SYSTOLIC: 2.7 CM
ECHO LV IVSD: 0.9 CM (ref 0.6–0.9)
ECHO LV MASS 2D: 105.3 G (ref 67–162)
ECHO LV MASS INDEX 2D: 69.8 G/M2 (ref 43–95)
ECHO LV POSTERIOR WALL DIASTOLIC: 0.9 CM (ref 0.6–0.9)
ECHO LV RELATIVE WALL THICKNESS RATIO: 0.46
EKG ATRIAL RATE: 78 BPM
EKG P AXIS: 82 DEGREES
EKG P-R INTERVAL: 170 MS
EKG Q-T INTERVAL: 408 MS
EKG QRS DURATION: 78 MS
EKG QTC CALCULATION (BAZETT): 465 MS
EKG R AXIS: -51 DEGREES
EKG T AXIS: 81 DEGREES
EKG VENTRICULAR RATE: 78 BPM
EOSINOPHIL NFR BLD AUTO: 0.9 %
EOSINOPHILS ABSOLUTE: 0.1 THOU/MM3 (ref 0–0.4)
EPITHELIAL CELLS, UA: ABNORMAL /HPF
ERYTHROCYTE [DISTWIDTH] IN BLOOD BY AUTOMATED COUNT: 13.9 % (ref 11.5–14.5)
ERYTHROCYTE [DISTWIDTH] IN BLOOD BY AUTOMATED COUNT: 14 % (ref 11.5–14.5)
GFR SERPL CREATININE-BSD FRML MDRD: 57 ML/MIN/1.73M2
GLUCOSE SERPL-MCNC: 141 MG/DL (ref 70–108)
GLUCOSE UR QL STRIP.AUTO: NEGATIVE MG/DL
HBA1C MFR BLD HPLC: 5.8 % (ref 4.4–6.4)
HCT VFR BLD AUTO: 40.3 % (ref 37–47)
HCT VFR BLD AUTO: 41.6 % (ref 37–47)
HEPARIN UNFRACTIONATED: < 0.04 U/ML (ref 0.3–0.7)
HGB BLD-MCNC: 13.5 GM/DL (ref 12–16)
HGB BLD-MCNC: 13.9 GM/DL (ref 12–16)
HGB UR QL STRIP.AUTO: ABNORMAL
IMM GRANULOCYTES # BLD AUTO: 0.03 THOU/MM3 (ref 0–0.07)
IMM GRANULOCYTES NFR BLD AUTO: 0.4 %
INR PPP: 1.1 (ref 0.85–1.13)
KETONES UR QL STRIP.AUTO: NEGATIVE
LYMPHOCYTES ABSOLUTE: 0.7 THOU/MM3 (ref 1–4.8)
LYMPHOCYTES NFR BLD AUTO: 8.2 %
MCH RBC QN AUTO: 32.8 PG (ref 26–33)
MCH RBC QN AUTO: 33.3 PG (ref 26–33)
MCHC RBC AUTO-ENTMCNC: 33.4 GM/DL (ref 32.2–35.5)
MCHC RBC AUTO-ENTMCNC: 33.5 GM/DL (ref 32.2–35.5)
MCV RBC AUTO: 98.1 FL (ref 81–99)
MCV RBC AUTO: 99.5 FL (ref 81–99)
MICROALBUMIN UR-MCNC: < 1.2 MG/DL
MICROALBUMIN/CREAT RATIO PNL UR: 106 MG/G (ref 0–30)
MISCELLANEOUS 2: ABNORMAL
MONOCYTES ABSOLUTE: 0.5 THOU/MM3 (ref 0.4–1.3)
MONOCYTES NFR BLD AUTO: 6.8 %
NEUTROPHILS ABSOLUTE: 6.7 THOU/MM3 (ref 1.8–7.7)
NEUTROPHILS NFR BLD AUTO: 83.4 %
NITRITE UR QL STRIP: NEGATIVE
NRBC BLD AUTO-RTO: 0 /100 WBC
NT-PROBNP SERPL IA-MCNC: 1069 PG/ML (ref 0–449)
OSMOLALITY SERPL CALC.SUM OF ELEC: 293 MOSMOL/KG (ref 275–300)
PH UR STRIP.AUTO: 7.5 [PH] (ref 5–9)
PLATELET # BLD AUTO: 147 THOU/MM3 (ref 130–400)
PLATELET # BLD AUTO: 152 THOU/MM3 (ref 130–400)
PMV BLD AUTO: 10 FL (ref 9.4–12.4)
PMV BLD AUTO: 10.1 FL (ref 9.4–12.4)
POTASSIUM SERPL-SCNC: 4.3 MEQ/L (ref 3.5–5.2)
PROT SERPL-MCNC: 7.3 G/DL (ref 6.1–8)
PROT UR STRIP.AUTO-MCNC: NEGATIVE MG/DL
RBC # BLD AUTO: 4.11 MILL/MM3 (ref 4.2–5.4)
RBC # BLD AUTO: 4.18 MILL/MM3 (ref 4.2–5.4)
RBC URINE: ABNORMAL /HPF
RENAL EPI CELLS #/AREA URNS HPF: ABNORMAL /[HPF]
SODIUM SERPL-SCNC: 145 MEQ/L (ref 135–145)
SP GR UR REFRACT.AUTO: 1.01 (ref 1–1.03)
TROPONIN, HIGH SENSITIVITY: 24 NG/L (ref 0–12)
TROPONIN, HIGH SENSITIVITY: 42 NG/L (ref 0–12)
TROPONIN, HIGH SENSITIVITY: 52 NG/L (ref 0–12)
TSH SERPL DL<=0.005 MIU/L-ACNC: 2.74 UIU/ML (ref 0.4–4.2)
UROBILINOGEN, URINE: 0.2 EU/DL (ref 0–1)
WBC # BLD AUTO: 5.1 THOU/MM3 (ref 4.8–10.8)
WBC # BLD AUTO: 8 THOU/MM3 (ref 4.8–10.8)
WBC #/AREA URNS HPF: ABNORMAL /HPF
WBC #/AREA URNS HPF: NEGATIVE /[HPF]
YEAST LIKE FUNGI URNS QL MICRO: ABNORMAL

## 2024-08-28 PROCEDURE — G0378 HOSPITAL OBSERVATION PER HR: HCPCS

## 2024-08-28 PROCEDURE — 83880 ASSAY OF NATRIURETIC PEPTIDE: CPT

## 2024-08-28 PROCEDURE — 36415 COLL VENOUS BLD VENIPUNCTURE: CPT

## 2024-08-28 PROCEDURE — 85520 HEPARIN ASSAY: CPT

## 2024-08-28 PROCEDURE — 93307 TTE W/O DOPPLER COMPLETE: CPT

## 2024-08-28 PROCEDURE — 99285 EMERGENCY DEPT VISIT HI MDM: CPT

## 2024-08-28 PROCEDURE — 96375 TX/PRO/DX INJ NEW DRUG ADDON: CPT

## 2024-08-28 PROCEDURE — 6370000000 HC RX 637 (ALT 250 FOR IP)

## 2024-08-28 PROCEDURE — 85610 PROTHROMBIN TIME: CPT

## 2024-08-28 PROCEDURE — 80048 BASIC METABOLIC PNL TOTAL CA: CPT

## 2024-08-28 PROCEDURE — 6360000002 HC RX W HCPCS

## 2024-08-28 PROCEDURE — 81001 URINALYSIS AUTO W/SCOPE: CPT

## 2024-08-28 PROCEDURE — 2580000003 HC RX 258

## 2024-08-28 PROCEDURE — 85025 COMPLETE CBC W/AUTO DIFF WBC: CPT

## 2024-08-28 PROCEDURE — 6360000002 HC RX W HCPCS: Performed by: EMERGENCY MEDICINE

## 2024-08-28 PROCEDURE — 96374 THER/PROPH/DIAG INJ IV PUSH: CPT

## 2024-08-28 PROCEDURE — 85730 THROMBOPLASTIN TIME PARTIAL: CPT

## 2024-08-28 PROCEDURE — 85027 COMPLETE CBC AUTOMATED: CPT

## 2024-08-28 PROCEDURE — 93005 ELECTROCARDIOGRAM TRACING: CPT | Performed by: EMERGENCY MEDICINE

## 2024-08-28 PROCEDURE — 70450 CT HEAD/BRAIN W/O DYE: CPT

## 2024-08-28 PROCEDURE — 82043 UR ALBUMIN QUANTITATIVE: CPT

## 2024-08-28 PROCEDURE — 93307 TTE W/O DOPPLER COMPLETE: CPT | Performed by: INTERNAL MEDICINE

## 2024-08-28 PROCEDURE — 93010 ELECTROCARDIOGRAM REPORT: CPT | Performed by: NUCLEAR MEDICINE

## 2024-08-28 PROCEDURE — 84443 ASSAY THYROID STIM HORMONE: CPT

## 2024-08-28 PROCEDURE — 96365 THER/PROPH/DIAG IV INF INIT: CPT

## 2024-08-28 PROCEDURE — 80076 HEPATIC FUNCTION PANEL: CPT

## 2024-08-28 PROCEDURE — 71046 X-RAY EXAM CHEST 2 VIEWS: CPT

## 2024-08-28 PROCEDURE — 96366 THER/PROPH/DIAG IV INF ADDON: CPT

## 2024-08-28 PROCEDURE — 84484 ASSAY OF TROPONIN QUANT: CPT

## 2024-08-28 PROCEDURE — 83036 HEMOGLOBIN GLYCOSYLATED A1C: CPT

## 2024-08-28 RX ORDER — FLUTICASONE PROPIONATE 50 MCG
2 SPRAY, SUSPENSION (ML) NASAL DAILY
Status: DISCONTINUED | OUTPATIENT
Start: 2024-08-28 | End: 2024-08-30 | Stop reason: HOSPADM

## 2024-08-28 RX ORDER — PANTOPRAZOLE SODIUM 40 MG/1
40 TABLET, DELAYED RELEASE ORAL
Status: DISCONTINUED | OUTPATIENT
Start: 2024-08-29 | End: 2024-08-30 | Stop reason: HOSPADM

## 2024-08-28 RX ORDER — SPIRONOLACTONE 25 MG/1
25 TABLET ORAL DAILY
Status: DISCONTINUED | OUTPATIENT
Start: 2024-08-29 | End: 2024-08-30 | Stop reason: HOSPADM

## 2024-08-28 RX ORDER — THYROID 60 MG/1
30 TABLET ORAL DAILY
Status: DISCONTINUED | OUTPATIENT
Start: 2024-08-29 | End: 2024-08-30 | Stop reason: HOSPADM

## 2024-08-28 RX ORDER — ASCORBIC ACID 500 MG
1000 TABLET ORAL DAILY
Status: DISCONTINUED | OUTPATIENT
Start: 2024-08-29 | End: 2024-08-30 | Stop reason: HOSPADM

## 2024-08-28 RX ORDER — SODIUM CHLORIDE 9 MG/ML
INJECTION, SOLUTION INTRAVENOUS PRN
Status: DISCONTINUED | OUTPATIENT
Start: 2024-08-28 | End: 2024-08-30 | Stop reason: HOSPADM

## 2024-08-28 RX ORDER — ASPIRIN 81 MG/1
81 TABLET ORAL DAILY
Status: DISCONTINUED | OUTPATIENT
Start: 2024-08-28 | End: 2024-08-30 | Stop reason: HOSPADM

## 2024-08-28 RX ORDER — MONTELUKAST SODIUM 10 MG/1
10 TABLET ORAL DAILY
Status: DISCONTINUED | OUTPATIENT
Start: 2024-08-28 | End: 2024-08-28

## 2024-08-28 RX ORDER — CALCIUM POLYCARBOPHIL 625 MG
1250 TABLET ORAL DAILY
Status: DISCONTINUED | OUTPATIENT
Start: 2024-08-28 | End: 2024-08-28

## 2024-08-28 RX ORDER — LIDOCAINE 4 G/G
1 PATCH TOPICAL DAILY
Status: DISCONTINUED | OUTPATIENT
Start: 2024-08-29 | End: 2024-08-30 | Stop reason: HOSPADM

## 2024-08-28 RX ORDER — CLOPIDOGREL BISULFATE 75 MG/1
75 TABLET ORAL DAILY
Status: DISCONTINUED | OUTPATIENT
Start: 2024-08-28 | End: 2024-08-30 | Stop reason: HOSPADM

## 2024-08-28 RX ORDER — THYROID 60 MG/1
30 TABLET ORAL DAILY
Status: DISCONTINUED | OUTPATIENT
Start: 2024-08-28 | End: 2024-08-28

## 2024-08-28 RX ORDER — SODIUM CHLORIDE 0.9 % (FLUSH) 0.9 %
5-40 SYRINGE (ML) INJECTION PRN
Status: DISCONTINUED | OUTPATIENT
Start: 2024-08-28 | End: 2024-08-30 | Stop reason: HOSPADM

## 2024-08-28 RX ORDER — HYDRALAZINE HYDROCHLORIDE 20 MG/ML
5 INJECTION INTRAMUSCULAR; INTRAVENOUS EVERY 6 HOURS PRN
Status: DISCONTINUED | OUTPATIENT
Start: 2024-08-28 | End: 2024-08-30 | Stop reason: HOSPADM

## 2024-08-28 RX ORDER — HEPARIN SODIUM 1000 [USP'U]/ML
60 INJECTION, SOLUTION INTRAVENOUS; SUBCUTANEOUS PRN
Status: DISCONTINUED | OUTPATIENT
Start: 2024-08-28 | End: 2024-08-29

## 2024-08-28 RX ORDER — SODIUM CHLORIDE 0.9 % (FLUSH) 0.9 %
5-40 SYRINGE (ML) INJECTION EVERY 12 HOURS SCHEDULED
Status: DISCONTINUED | OUTPATIENT
Start: 2024-08-28 | End: 2024-08-30 | Stop reason: HOSPADM

## 2024-08-28 RX ORDER — HEPARIN SODIUM 1000 [USP'U]/ML
60 INJECTION, SOLUTION INTRAVENOUS; SUBCUTANEOUS ONCE
Status: COMPLETED | OUTPATIENT
Start: 2024-08-28 | End: 2024-08-28

## 2024-08-28 RX ORDER — ASPIRIN 81 MG/1
324 TABLET, CHEWABLE ORAL ONCE
Status: COMPLETED | OUTPATIENT
Start: 2024-08-28 | End: 2024-08-28

## 2024-08-28 RX ORDER — ONDANSETRON 2 MG/ML
4 INJECTION INTRAMUSCULAR; INTRAVENOUS EVERY 6 HOURS PRN
Status: DISCONTINUED | OUTPATIENT
Start: 2024-08-28 | End: 2024-08-30 | Stop reason: HOSPADM

## 2024-08-28 RX ORDER — NEOMYCIN/BACITRACIN/POLYMYXINB 3.5-400-5K
OINTMENT (GRAM) TOPICAL 2 TIMES DAILY
Status: DISCONTINUED | OUTPATIENT
Start: 2024-08-28 | End: 2024-08-30 | Stop reason: HOSPADM

## 2024-08-28 RX ORDER — SPIRONOLACTONE 25 MG/1
25 TABLET ORAL DAILY
Status: DISCONTINUED | OUTPATIENT
Start: 2024-08-28 | End: 2024-08-28

## 2024-08-28 RX ORDER — CARVEDILOL 3.12 MG/1
3.12 TABLET ORAL 2 TIMES DAILY WITH MEALS
Status: DISCONTINUED | OUTPATIENT
Start: 2024-08-29 | End: 2024-08-30 | Stop reason: HOSPADM

## 2024-08-28 RX ORDER — ACETAMINOPHEN 325 MG/1
650 TABLET ORAL EVERY 6 HOURS PRN
Status: DISCONTINUED | OUTPATIENT
Start: 2024-08-28 | End: 2024-08-30 | Stop reason: HOSPADM

## 2024-08-28 RX ORDER — CALCIUM POLYCARBOPHIL 625 MG 625 MG/1
1250 TABLET ORAL DAILY PRN
Status: DISCONTINUED | OUTPATIENT
Start: 2024-08-28 | End: 2024-08-29

## 2024-08-28 RX ORDER — ASCORBIC ACID 500 MG
1000 TABLET ORAL DAILY
Status: DISCONTINUED | OUTPATIENT
Start: 2024-08-28 | End: 2024-08-28

## 2024-08-28 RX ORDER — CARVEDILOL 3.12 MG/1
3.12 TABLET ORAL 2 TIMES DAILY WITH MEALS
Status: DISCONTINUED | OUTPATIENT
Start: 2024-08-28 | End: 2024-08-28

## 2024-08-28 RX ORDER — ACETAMINOPHEN 650 MG/1
650 SUPPOSITORY RECTAL EVERY 6 HOURS PRN
Status: DISCONTINUED | OUTPATIENT
Start: 2024-08-28 | End: 2024-08-30 | Stop reason: HOSPADM

## 2024-08-28 RX ORDER — ONDANSETRON 4 MG/1
4 TABLET, ORALLY DISINTEGRATING ORAL EVERY 8 HOURS PRN
Status: DISCONTINUED | OUTPATIENT
Start: 2024-08-28 | End: 2024-08-30 | Stop reason: HOSPADM

## 2024-08-28 RX ORDER — ATORVASTATIN CALCIUM 40 MG/1
40 TABLET, FILM COATED ORAL DAILY
Status: DISCONTINUED | OUTPATIENT
Start: 2024-08-28 | End: 2024-08-28

## 2024-08-28 RX ORDER — POLYETHYLENE GLYCOL 3350 17 G/17G
17 POWDER, FOR SOLUTION ORAL DAILY PRN
Status: DISCONTINUED | OUTPATIENT
Start: 2024-08-28 | End: 2024-08-30 | Stop reason: HOSPADM

## 2024-08-28 RX ORDER — HEPARIN SODIUM 1000 [USP'U]/ML
30 INJECTION, SOLUTION INTRAVENOUS; SUBCUTANEOUS PRN
Status: DISCONTINUED | OUTPATIENT
Start: 2024-08-28 | End: 2024-08-29

## 2024-08-28 RX ORDER — HEPARIN SODIUM 10000 [USP'U]/100ML
5-30 INJECTION, SOLUTION INTRAVENOUS CONTINUOUS
Status: DISCONTINUED | OUTPATIENT
Start: 2024-08-28 | End: 2024-08-29

## 2024-08-28 RX ORDER — ATORVASTATIN CALCIUM 40 MG/1
40 TABLET, FILM COATED ORAL DAILY
Status: DISCONTINUED | OUTPATIENT
Start: 2024-08-29 | End: 2024-08-29

## 2024-08-28 RX ORDER — LORAZEPAM 1 MG/1
0.5 TABLET ORAL EVERY 6 HOURS PRN
Status: DISCONTINUED | OUTPATIENT
Start: 2024-08-28 | End: 2024-08-30 | Stop reason: HOSPADM

## 2024-08-28 RX ORDER — CALCIUM POLYCARBOPHIL 625 MG 625 MG/1
1250 TABLET ORAL DAILY
Status: DISCONTINUED | OUTPATIENT
Start: 2024-08-28 | End: 2024-08-30 | Stop reason: HOSPADM

## 2024-08-28 RX ORDER — MONTELUKAST SODIUM 10 MG/1
10 TABLET ORAL DAILY
Status: DISCONTINUED | OUTPATIENT
Start: 2024-08-29 | End: 2024-08-30 | Stop reason: HOSPADM

## 2024-08-28 RX ORDER — FUROSEMIDE 10 MG/ML
40 INJECTION INTRAMUSCULAR; INTRAVENOUS ONCE
Status: COMPLETED | OUTPATIENT
Start: 2024-08-28 | End: 2024-08-28

## 2024-08-28 RX ADMIN — HEPARIN SODIUM 3300 UNITS: 1000 INJECTION INTRAVENOUS; SUBCUTANEOUS at 21:48

## 2024-08-28 RX ADMIN — HEPARIN SODIUM 12 UNITS/KG/HR: 10000 INJECTION, SOLUTION INTRAVENOUS at 21:51

## 2024-08-28 RX ADMIN — CALCIUM POLYCARBOPHIL 2 TABLET: 625 TABLET, FILM COATED ORAL at 22:06

## 2024-08-28 RX ADMIN — ASPIRIN 324 MG: 81 TABLET, CHEWABLE ORAL at 10:25

## 2024-08-28 RX ADMIN — FUROSEMIDE 40 MG: 10 INJECTION, SOLUTION INTRAMUSCULAR; INTRAVENOUS at 10:23

## 2024-08-28 RX ADMIN — SODIUM CHLORIDE, PRESERVATIVE FREE 10 ML: 5 INJECTION INTRAVENOUS at 21:52

## 2024-08-28 RX ADMIN — BACITRACIN ZINC NEOMYCIN SULFATE POLYMYXIN B SULFATE: 400; 3.5; 5 OINTMENT TOPICAL at 22:04

## 2024-08-28 ASSESSMENT — ENCOUNTER SYMPTOMS
DIARRHEA: 0
ABDOMINAL PAIN: 0
SORE THROAT: 0
SHORTNESS OF BREATH: 0
VOMITING: 0
BACK PAIN: 0
COUGH: 0

## 2024-08-28 ASSESSMENT — PAIN - FUNCTIONAL ASSESSMENT
PAIN_FUNCTIONAL_ASSESSMENT: NONE - DENIES PAIN

## 2024-08-28 ASSESSMENT — HEART SCORE: ECG: NON-SPECIFC REPOLARIZATION DISTURBANCE/LBTB/PM

## 2024-08-28 NOTE — CARE COORDINATION
Advance Care Planning     General Advance Care Planning (ACP) Conversation    Date of Conversation: 8/28/2024  Conducted with: Patient with Decision Making Capacity  Other persons present: Other friend Chasity who does not make decisions but is local contact and will call sons who are POA to give updates    Healthcare Decision Maker:   The identified healthcare power of /surrogate decision makers are as follows:   Primary Decision Maker: Ilya Welch - Child - [phone]    Secondary Decision Maker: Teoiflo Welch - Child - [phone]     Content/Action Overview:  Patient has valid ACP documents on file in the chart.    Treatment limitations and CODE STATUS to be reviewed by the provider.    Length of Voluntary ACP Conversation in minutes:  <16 minutes (Non-Billable)    NIYAH LUZ RN

## 2024-08-28 NOTE — CARE COORDINATION
RPM team,      Kristel has been admitted to [facility].  Could you please pause RPM equipment and I will update you to resume when she is discharged home.  Thank you!

## 2024-08-28 NOTE — CARE COORDINATION
Remote Alert Monitoring Note      Date/Time:  2024 8:15 AM  Patient Current Location:  [facility]    LPN contacted patient by telephone regarding red alert received for blood pressure reading (212/91) and weight increase (133.8lb). Verified patients name and  as identifiers.    Rpm alert to be reviewed by the provider                         Background: High Blood-Pressure, CHF, Kidney Disease     Refer to 911 immediately if:  Patient unresponsive or unable to provide history  Change in cognition or sudden confusion  Patient unable to respond in complete sentences  Intense chest pain/tightness  Any concern for any clinical emergency  Red Alert: Provider response time of 1 hr required for any red alert requiring intervention  Yellow Alert: Provider response time of 3hr required for any escalated yellow alert            Clinical Interventions: Reviewed and followed up on alerts and treatments-. Patient states she is currently at the ER due to increased BP and weight.She states she is having some dizziness and was concerned about her readings.  ACM updated.      Plan/Follow Up: Will continue to review, monitor and address alerts with follow up based on severity of symptoms and risk factors.    Radha De Leon LPN  [facility]/ CTN/ Remote Patient monitoring  [phone]

## 2024-08-28 NOTE — ED NOTES
Patient to be transferred to [facility]. Patient is in stable condition at this time. Staff notified prior to transfer.   Please inform Cindy Garcia of the following:    Hi Ms. White,     Your cardiology appt is 5/7/2024 at 1:00 pm. The cardiology clinic is on the 7th floor.     Thank you,     JERRY Vences

## 2024-08-28 NOTE — ED PROVIDER NOTES
NOSTRIL DAILY, Disp: 16 g, Rfl: 5    montelukast (SINGULAIR) 10 MG tablet, TAKE 1 TABLET BY MOUTH DAILY, Disp: 90 tablet, Rfl: 3    Misc. Devices (CANE) MISC, Quad Cane, Disp: 1 each, Rfl: 0    triamcinolone (KENALOG) 0.1 % cream, APPLY TOPICALLY TO THE AFFECTED AREA TWICE DAILY AS NEEDED, Disp: , Rfl:     ARMOUR THYROID 30 MG tablet, TAKE 1 TABLET BY MOUTH EVERY DAY, Disp: 90 tablet, Rfl: 3    lidocaine (XYLOCAINE) 5 % ointment, Apply topically as needed., Disp: 50 g, Rfl: 0    denosumab (PROLIA) 60 MG/ML SOSY SC injection, Inject 1 mL into the skin every 6 months, Disp: 180 mL, Rfl: 1    NONFORMULARY, Calcium / vit d3  1200 mg-25 mcgs, Disp: , Rfl:     Misc. Devices (DIGITAL GLASS SCALE) MISC, 1 each by Does not apply route every morning (before breakfast), Disp: 1 each, Rfl: 0    Blood Glucose Monitoring Suppl (TRUE METRIX METER) w/Device KIT, 1 Device by Does not apply route daily Check blood sugar q daily, Dx E11.9, Disp: 1 kit, Rfl: 0    LORazepam (ATIVAN) 0.5 MG tablet, Take 1 tablet by mouth every 6 hours as needed for Anxiety., Disp: , Rfl:     aspirin EC 81 MG EC tablet, Take 1 tablet by mouth daily, Disp: 90 tablet, Rfl: 1    acetaminophen (TYLENOL) 650 MG extended release tablet, Take 1 tablet by mouth every 8 hours as needed for Pain, Disp: , Rfl:     nitroGLYCERIN (NITROSTAT) 0.4 MG SL tablet, up to max of 3 total doses. If no relief after 1 dose, call 911., Disp: 25 tablet, Rfl: 1    Blood Pressure KIT, Check blood pressure q daily, Disp: 1 kit, Rfl: 0    Ascorbic Acid (VITAMIN C) 1000 MG tablet, Take 1 tablet by mouth daily, Disp: , Rfl:     blood glucose monitor strips, Check blood sugar q daily, Dx R73.01, Disp: 100 strip, Rfl: 3    polyethyl glycol-propyl glycol 0.4-0.3 % (SYSTANE) 0.4-0.3 % ophthalmic solution, 1 drop in the morning and at bedtime Both eyes, Disp: , Rfl:     docusate sodium (COLACE) 100 MG capsule, Take 1 capsule by mouth 2 times daily, Disp: , Rfl:     Multiple Vitamins-Minerals  Score  History   Highly suspicious………………………2            Moderately suspicious………………...1     = 0    Slightly or non suspicious…………….0      ECG   Significant STD………………………....2        Nonspecific repolarization……………1      = 1   Normal (no change from previous)…..0      Age   >64…………………………………………2      > 45 - <65………………………………….1     = 2   < 46………………………………………...0      Risk Factors  >2 risk factors…………………………….2     I - 2 risk factors…………………………..1     = 2  No risk factors…………………………....0     Troponin   >3times normal limit……………………...2      >1 time - <3 times normal limit………….1   = 2    Normal trop………………………………..0     -----------------------------------------------------------------------------------------      TOTAL RISK SCORE =  7          RISK % =  72.7        Risk Factors: DM, smoker (current or recent < mo), HTN, HLP, FHx CAD, obesity,       Score 0 - 3 =  2.5% MACE over next 6 wks = Discharge home  Score 4 - 6 =  20.3% MACE over next 6 wks = Obs admit  Score 7 - 10 = 72.7% MACE over next 6 wks = Early invasive Rx       Decision Rules/Clinical Scores utilized:  HEART Score and   .    Code Status:  Not addressed during this ED visit    [facility] Social determinants of health considered to potentially effect treatment and/or disposition plan:  Older age and Considered and not applicable   Healthy People 2030, U.[place] Department of Health and Human Services, Office of Disease Prevention and Health Promotion.     Medical Comorbidities impacting treatment or disposition:  NSTEMI, aortic valve regurg   Past Medical History:   Diagnosis Date    Anxiety     Aortic valve regurgitation     Arthritis     ASHD (arteriosclerotic heart disease)     CKD (chronic kidney disease) stage 3, GFR 30-59 ml/min ([facility])     Diverticulosis     Dyslipidemia     GERD (gastroesophageal reflux disease)     Heart failure with reduced ejection fraction ([facility])     History of non-ST elevation myocardial infarction (NSTEMI) 05/06/2021    History of shingles

## 2024-08-28 NOTE — PLAN OF CARE
Problem: Discharge Planning  Goal: Discharge to home or other facility with appropriate resources  Outcome: Progressing     Problem: Safety - Adult  Goal: Free from fall injury  Outcome: Progressing     Patient arrived to unit alert and oriented, VSS, O2 maintained on RA. Patient denies any complaints. OOB with supervision.

## 2024-08-28 NOTE — H&P
History & Physical  Internal Medicine Resident         Patient: Kristel Welch 89 y.o. female      : 1925  Date of Admission: 2024  Date of Service: Pt seen/examined on 24 and Admitted to Inpatient with expected LOS greater than two midnights due to medical therapy.     ASSESSMENT AND PLAN  NSTEMI  CAD s/p PCI of LAD x3 JUSTUS, OM1 x1 JUSTUS in   - Patient presenting with elevated blood pressure, lightheadedness  - Patient denying chest pain/tightness/pressure  - EKG showing NSR with T wave changes in the anterior leads  - Troponin Trend: 24, 42, 52  - HEART Score: 5  - s/p ; patient's home BB/Statin/ASA resumed; heparin infusion initiated after discussing the risks of administration with patient; home plavix held   - 2024 ECHO EF 45% with mildly reduced LV systolic function and mild global hypokinesis  - Cardiology consulted for consideration for LHC; NPO midnight    Systolic Heart Failure with Reduced EF, Not in Acute Exacerbation  - 2024 ECHO EF 45% with mildly reduced LV systolic function and mild global hypokinesis  - GDMT: BB, MRA  - Strict I/O's, Daily Weights, Judicious Diuresis and IVF   - Consideration for initiation of SGLT2i, ARNi in the context of proteinuria    Essential HTN with concern for Accelerated HTN  - Home Regimen: BB, MRA, Loop  - Consideration for initiation of SGLT2i, ARNi in the context of proteinuria  - Patient endorsing SBP in the 200s in the AM of admission    GOC  - Extensive review of different CODE Status types held with patient and patient's friends. Patient and patient's friends verbalized understanding and patient stated clearly she would like to be LIMITED x4 with DNRCCA with DNI addressed at discharge.     Chronic:   CKD  HLD  Hypothyroidism  GERD  =======================================================================    SUBJECTIVE    Chief Complaint:  Elevated Blood Pressure, Malaise    History Of Present Illness:    This is an     SKIN CANCER ON LEFT ANKLE    Hypertension, essential     Hypothyroidism     Osteoporosis, post-menopausal     Prediabetes 03/17/2016    Psoriasis     S/P angioplasty with stent 05/06/2021    3 stents to LAD, 1 stent to OM, 2 stents to diag. per Dr. Castillo         Procedure Laterality Date    BREAST BIOPSY Left 1966    benign    CARPAL TUNNEL RELEASE Right 01/30/2013    CARPAL TUNNEL RELEASE Left 07/25/2013    CATARACT REMOVAL Bilateral 1999    COLON SURGERY  11/1999    resection    COLONOSCOPY  2003, 2006, 2011    DIAGNOSTIC CARDIAC CATH LAB PROCEDURE  05/07/2021    6 stents     HYSTERECTOMY (CERVIX STATUS UNKNOWN)  02/23/1970    age 45    KNEE ARTHROSCOPY Right 11/21/2007    meniscectomies    KNEE SURGERY Left 2000    replacement    LUKE STEROTACTIC LOC BREAST BIOPSY LEFT Left 02/16/2005    benign stereotactic biopsy    MYRINGOTOMY Right 07/01/2015    tube insertion    OVARY REMOVAL Bilateral 2003    SHOULDER SURGERY  05/2014    SINUS SURGERY           Problem Relation Age of Onset    Breast Cancer Sister 76    Stroke Sister     Heart Disease Sister     Breast Cancer Daughter 45        had breast removed but no problems after that.    Other Other         visual problems     Social History     Socioeconomic History    Marital status:      Spouse name: None    Number of children: None    Years of education: None    Highest education level: None   Tobacco Use    Smoking status: Never     Passive exposure: Never    Smokeless tobacco: Never   Vaping Use    Vaping status: Never Used   Substance and Sexual Activity    Alcohol use: No    Drug use: No    Sexual activity: Not Currently     Partners: Male   Social History Narrative    Family and friends helps transport    Currently active with [employer] and [employer], gets MOW    Denies the need for additional support in the home at this time     Social Determinants of Health     Financial Resource Strain: Low Risk  (9/7/2023)    Overall Financial Resource Strain (CARDIA)

## 2024-08-28 NOTE — ED TRIAGE NOTES
Pt presents to the ED through triage with c/c hypertension. Pt reports hx hypertension, reports taking medications at 0730 this morning. Pt reports dizziness x3 days as well. Denies pain on arrival. EKG completed on arrival.

## 2024-08-28 NOTE — ED NOTES
ED to inpatient nurses report      Chief Complaint:  Chief Complaint   Patient presents with    Hypertension     Present to ED from: HOME    MOA:     LOC: alert and orientated to name, place, date  Mobility: Requires assistance * 1  Oxygen Baseline: ROOM AIR     Current needs required: NONE     Code Status:   Full Code    Mental Status:  Level of Consciousness: Alert (0)    Psych Assessment:        Vitals:  Patient Vitals for the past 24 hrs:   BP Temp Temp src Pulse Resp SpO2 Height Weight   08/28/24 1017 (!) 150/63 -- -- 71 16 99 % -- --   08/28/24 0921 (!) 141/64 -- -- 67 18 98 % -- --   08/28/24 0818 139/60 98 °F (36.7 °C) Oral 79 16 98 % 1.524 m (5') 57.2 kg (126 lb)        LDAs:   Peripheral IV 08/28/24 Left Arm (Active)       Ambulatory Status:  No data recorded    Diagnosis:  DISPOSITION Admitted 08/28/2024 10:24:14 AM   Final diagnoses:   NSTEMI (non-ST elevated myocardial infarction) ([facility])   Acute on chronic congestive heart failure, unspecified heart failure type ([facility])        Consults:  IP CONSULT TO SPIRITUAL SERVICES     Pain Score:  Pain Assessment  Pain Assessment: None - Denies Pain    C-SSRS:   Risk of Suicide: No Risk    Sepsis Screening:       Clarisse Fall Risk:       Swallow Screening        Preferred Language:   English      ALLERGIES     Prednisone, Actonel [risedronate sodium], Baclofen, Bactrim [sulfamethoxazole-trimethoprim], Cardizem [diltiazem], Celebrex [celecoxib], Dicloxacillin, Doxycycline, Evista [raloxifene], Lopid [gemfibrozil], Questran [cholestyramine], Synthroid [levothyroxine sodium], Tramadol, Zestoretic [lisinopril-hydrochlorothiazide], and Zetia [ezetimibe]    SURGICAL HISTORY       Past Surgical History:   Procedure Laterality Date    BREAST BIOPSY Left 1966    benign    CARPAL TUNNEL RELEASE Right 01/30/2013    CARPAL TUNNEL RELEASE Left 07/25/2013    CATARACT REMOVAL Bilateral 1999    COLON SURGERY  11/1999    resection    COLONOSCOPY  2003, 2006, 2011    DIAGNOSTIC CARDIAC  CATH LAB PROCEDURE  05/07/2021    6 stents     HYSTERECTOMY (CERVIX STATUS UNKNOWN)  02/23/1970    age 45    KNEE ARTHROSCOPY Right 11/21/2007    meniscectomies    KNEE SURGERY Left 2000    replacement    LUKE STEROTACTIC LOC BREAST BIOPSY LEFT Left 02/16/2005    benign stereotactic biopsy    MYRINGOTOMY Right 07/01/2015    tube insertion    OVARY REMOVAL Bilateral 2003    SHOULDER SURGERY  05/2014    SINUS SURGERY         PAST MEDICAL HISTORY       Past Medical History:   Diagnosis Date    Anxiety     Aortic valve regurgitation     Arthritis     ASHD (arteriosclerotic heart disease)     CKD (chronic kidney disease) stage 3, GFR 30-59 ml/min ([facility])     Diverticulosis     Dyslipidemia     GERD (gastroesophageal reflux disease)     Heart failure with reduced ejection fraction ([facility])     History of non-ST elevation myocardial infarction (NSTEMI) 05/06/2021    History of shingles     History of skin cancer 2013    SKIN CANCER ON LEFT ANKLE    Hypertension, essential     Hypothyroidism     Osteoporosis, post-menopausal     Prediabetes 03/17/2016    Psoriasis     S/P angioplasty with stent 05/06/2021    3 stents to LAD, 1 stent to OM, 2 stents to diag. per Dr. Castillo           Electronically signed by Vani Vallecillo RN on 8/28/2024 at 10:30 AM

## 2024-08-29 PROBLEM — R79.89 ELEVATED TROPONIN: Status: ACTIVE | Noted: 2024-08-29

## 2024-08-29 LAB
ANION GAP SERPL CALC-SCNC: 13 MEQ/L (ref 8–16)
BUN SERPL-MCNC: 21 MG/DL (ref 7–22)
CALCIUM SERPL-MCNC: 8.7 MG/DL (ref 8.5–10.5)
CHLORIDE SERPL-SCNC: 104 MEQ/L (ref 98–111)
CO2 SERPL-SCNC: 24 MEQ/L (ref 23–33)
CREAT SERPL-MCNC: 0.9 MG/DL (ref 0.4–1.2)
DEPRECATED RDW RBC AUTO: 51.6 FL (ref 35–45)
ERYTHROCYTE [DISTWIDTH] IN BLOOD BY AUTOMATED COUNT: 13.9 % (ref 11.5–14.5)
GFR SERPL CREATININE-BSD FRML MDRD: 57 ML/MIN/1.73M2
GLUCOSE SERPL-MCNC: 104 MG/DL (ref 70–108)
HCT VFR BLD AUTO: 39.2 % (ref 37–47)
HEPARIN UNFRACTIONATED: 0.54 U/ML (ref 0.3–0.7)
HEPARIN UNFRACTIONATED: 0.62 U/ML (ref 0.3–0.7)
HGB BLD-MCNC: 13 GM/DL (ref 12–16)
MAGNESIUM SERPL-MCNC: 2.3 MG/DL (ref 1.6–2.4)
MCH RBC QN AUTO: 33 PG (ref 26–33)
MCHC RBC AUTO-ENTMCNC: 33.2 GM/DL (ref 32.2–35.5)
MCV RBC AUTO: 99.5 FL (ref 81–99)
PLATELET # BLD AUTO: 145 THOU/MM3 (ref 130–400)
PMV BLD AUTO: 10 FL (ref 9.4–12.4)
POTASSIUM SERPL-SCNC: 3.7 MEQ/L (ref 3.5–5.2)
RBC # BLD AUTO: 3.94 MILL/MM3 (ref 4.2–5.4)
SODIUM SERPL-SCNC: 141 MEQ/L (ref 135–145)
WBC # BLD AUTO: 5.3 THOU/MM3 (ref 4.8–10.8)

## 2024-08-29 PROCEDURE — 85027 COMPLETE CBC AUTOMATED: CPT

## 2024-08-29 PROCEDURE — 85520 HEPARIN ASSAY: CPT

## 2024-08-29 PROCEDURE — G0378 HOSPITAL OBSERVATION PER HR: HCPCS

## 2024-08-29 PROCEDURE — 6370000000 HC RX 637 (ALT 250 FOR IP)

## 2024-08-29 PROCEDURE — 97535 SELF CARE MNGMENT TRAINING: CPT

## 2024-08-29 PROCEDURE — 83735 ASSAY OF MAGNESIUM: CPT

## 2024-08-29 PROCEDURE — 80048 BASIC METABOLIC PNL TOTAL CA: CPT

## 2024-08-29 PROCEDURE — 99223 1ST HOSP IP/OBS HIGH 75: CPT | Performed by: INTERNAL MEDICINE

## 2024-08-29 PROCEDURE — 6370000000 HC RX 637 (ALT 250 FOR IP): Performed by: PHYSICIAN ASSISTANT

## 2024-08-29 PROCEDURE — 6360000002 HC RX W HCPCS: Performed by: PHYSICIAN ASSISTANT

## 2024-08-29 PROCEDURE — 2580000003 HC RX 258

## 2024-08-29 PROCEDURE — 96376 TX/PRO/DX INJ SAME DRUG ADON: CPT

## 2024-08-29 PROCEDURE — 96366 THER/PROPH/DIAG IV INF ADDON: CPT

## 2024-08-29 PROCEDURE — 97129 THER IVNTJ 1ST 15 MIN: CPT

## 2024-08-29 PROCEDURE — 36415 COLL VENOUS BLD VENIPUNCTURE: CPT

## 2024-08-29 PROCEDURE — 97162 PT EVAL MOD COMPLEX 30 MIN: CPT

## 2024-08-29 PROCEDURE — 99233 SBSQ HOSP IP/OBS HIGH 50: CPT | Performed by: PHYSICIAN ASSISTANT

## 2024-08-29 PROCEDURE — 97530 THERAPEUTIC ACTIVITIES: CPT

## 2024-08-29 PROCEDURE — 97165 OT EVAL LOW COMPLEX 30 MIN: CPT

## 2024-08-29 PROCEDURE — 99223 1ST HOSP IP/OBS HIGH 75: CPT

## 2024-08-29 PROCEDURE — 92523 SPEECH SOUND LANG COMPREHEN: CPT

## 2024-08-29 RX ORDER — FUROSEMIDE 10 MG/ML
40 INJECTION INTRAMUSCULAR; INTRAVENOUS 2 TIMES DAILY
Status: COMPLETED | OUTPATIENT
Start: 2024-08-29 | End: 2024-08-30

## 2024-08-29 RX ORDER — ATORVASTATIN CALCIUM 40 MG/1
40 TABLET, FILM COATED ORAL NIGHTLY
Status: DISCONTINUED | OUTPATIENT
Start: 2024-08-30 | End: 2024-08-30 | Stop reason: HOSPADM

## 2024-08-29 RX ADMIN — CARVEDILOL 3.12 MG: 3.12 TABLET, FILM COATED ORAL at 17:20

## 2024-08-29 RX ADMIN — BACITRACIN ZINC NEOMYCIN SULFATE POLYMYXIN B SULFATE: 400; 3.5; 5 OINTMENT TOPICAL at 21:24

## 2024-08-29 RX ADMIN — ASPIRIN 81 MG: 81 TABLET, COATED ORAL at 08:04

## 2024-08-29 RX ADMIN — SPIRONOLACTONE 25 MG: 25 TABLET ORAL at 08:04

## 2024-08-29 RX ADMIN — MONTELUKAST SODIUM 10 MG: 10 TABLET ORAL at 08:05

## 2024-08-29 RX ADMIN — SODIUM CHLORIDE, PRESERVATIVE FREE 10 ML: 5 INJECTION INTRAVENOUS at 21:25

## 2024-08-29 RX ADMIN — CARVEDILOL 3.12 MG: 3.12 TABLET, FILM COATED ORAL at 08:05

## 2024-08-29 RX ADMIN — SACUBITRIL AND VALSARTAN 1 TABLET: 24; 26 TABLET, FILM COATED ORAL at 15:11

## 2024-08-29 RX ADMIN — CALCIUM POLYCARBOPHIL 2 TABLET: 625 TABLET, FILM COATED ORAL at 08:05

## 2024-08-29 RX ADMIN — BACITRACIN ZINC NEOMYCIN SULFATE POLYMYXIN B SULFATE: 400; 3.5; 5 OINTMENT TOPICAL at 08:06

## 2024-08-29 RX ADMIN — ACETAMINOPHEN 650 MG: 325 TABLET ORAL at 03:08

## 2024-08-29 RX ADMIN — LEVOTHYROXINE, LIOTHYRONINE 30 MG: 38; 9 TABLET ORAL at 08:04

## 2024-08-29 RX ADMIN — OXYCODONE HYDROCHLORIDE AND ACETAMINOPHEN 1000 MG: 500 TABLET ORAL at 08:04

## 2024-08-29 RX ADMIN — FLUTICASONE PROPIONATE 2 SPRAY: 50 SPRAY, METERED NASAL at 08:06

## 2024-08-29 RX ADMIN — FUROSEMIDE 40 MG: 10 INJECTION, SOLUTION INTRAMUSCULAR; INTRAVENOUS at 17:21

## 2024-08-29 RX ADMIN — SODIUM CHLORIDE, PRESERVATIVE FREE 10 ML: 5 INJECTION INTRAVENOUS at 08:09

## 2024-08-29 RX ADMIN — SACUBITRIL AND VALSARTAN 1 TABLET: 24; 26 TABLET, FILM COATED ORAL at 21:25

## 2024-08-29 RX ADMIN — PANTOPRAZOLE SODIUM 40 MG: 40 TABLET, DELAYED RELEASE ORAL at 06:15

## 2024-08-29 RX ADMIN — ATORVASTATIN CALCIUM 40 MG: 40 TABLET, FILM COATED ORAL at 08:05

## 2024-08-29 ASSESSMENT — PAIN SCALES - GENERAL
PAINLEVEL_OUTOF10: 0
PAINLEVEL_OUTOF10: 0
PAINLEVEL_OUTOF10: 2
PAINLEVEL_OUTOF10: 0
PAINLEVEL_OUTOF10: 0

## 2024-08-29 ASSESSMENT — PAIN DESCRIPTION - ORIENTATION: ORIENTATION: MID

## 2024-08-29 ASSESSMENT — PAIN DESCRIPTION - LOCATION: LOCATION: NECK

## 2024-08-29 ASSESSMENT — PAIN DESCRIPTION - DESCRIPTORS: DESCRIPTORS: ACHING

## 2024-08-29 NOTE — PROGRESS NOTES
[facility]  SPEECH THERAPY  STRZ MED SURG 8AB  Speech - Language - Cognitive Evaluation + Cognitive tx    Discharge Recommendations: Home with Home Exercise Program, Outpatient Speech Therapy, and Continue to Assess Pending Progress    SLP Individual Minutes  Time In: 1128  Time Out: 1156  Minutes: 28  Timed Code Treatment Minutes: 8 Minutes     Jszyph-xkqamjlq-ydtuylvmo evaluation: 20 minutes  Cognitive tx: 8 minutes    Date: 2024  Patient Name: Kristel Welch      CSN: [id number]   : 1925  (89 y.o.)  Gender: female   Referring Physician:  Garrett Neely MD   Diagnosis: accelerated hypertension  Precautions: fall risk  History of Present Illness/Injury: Patient admitted to [facility] with above diagnosis: see physician H&P for further information. Per chart review, \"This is an 89-year-old female who presented to [facility] ED on 2024 secondary to an elevation of blood pressure.  The patient states that she was checking her blood pressure in the morning and saw a systolic in the 200s.  She additionally notes generalized sense of malaise and worry about her blood pressure as well as a weight gain of 8 pounds since the day prior to admission.     Review of systems is negative for fever, chills, tinnitus, acute vision loss, dysphagia, nausea, vomiting, cough, sputum production, shortness of breath, chest pain/tightness/pressure, abdominal cramping, hematochezia, dyschezia, melena, change in urination, dysuria, hematuria, lightheadedness, dizziness, falls, loss of consciousness.     Review of systems is positive for daily generalized headache, generalized blurring of vision, intermittent heartburn, constipation, bilateral lower extremity edema.     The patient denies recent illness, sick contacts, recent travel outside of the state or country, animal/insect bites.  The patient lives by herself and utilizes a walker and cane.  She denies use of supplemental oxygen or inhalers.  Patient states that her

## 2024-08-29 NOTE — PLAN OF CARE
Problem: Chronic Conditions and Co-morbidities  Goal: Patient's chronic conditions and co-morbidity symptoms are monitored and maintained or improved  Outcome: Progressing     Problem: Pain  Goal: Verbalizes/displays adequate comfort level or baseline comfort level  Outcome: Progressing     Patient alert and oriented throughout shift. Complaints of neck pain-improved with heat and lidocaine patch. Ambulating OOB with supervision. NPO discontinued and diet order placed. Patient resting comfortably

## 2024-08-29 NOTE — CARE COORDINATION
Case Management Assessment Initial Evaluation    Date/Time of Evaluation: 8/29/2024 1:45 PM  Assessment Completed by: Lolis Adrian RN    If patient is discharged prior to next notation, then this note serves as note for discharge by case management.    Patient Name: Kristel Welch                   YOB: 1925  Diagnosis: Accelerated hypertension [I10]  NSTEMI (non-ST elevated myocardial infarction) (HCC) [I21.4]  Acute on chronic congestive heart failure, unspecified heart failure type (HCC) [I50.9]                   Date / Time: 8/28/2024  8:14 AM  Location: [facility]26/026-A     Patient Admission Status: Observation   Readmission Risk Low 0-14, Mod 15-19), High > 20: Readmission Risk Score: 13.9    Current PCP: Martin Davenport DO  Health Care Decision Makers:   Primary Decision Maker: Ilya Welch - [place] - [phone]    Secondary Decision Maker: Teofilo Welch - [place] - [phone]    Additional Case Management Notes: Admitted from ER with elevated BP and dizziness. BNP elevated. Lasix. Aldactone. Entresto. Cardiology consulted. Echo.     Procedures: 8-28-24 Echo    Imaging: No acute findings.    Patient Goals/Plan/Treatment Preferences: Met with Martina. She resides alone. She has 2 children but neither reside in [place]. States she has many friends. She is independent. She drives, has a PCP and is insured. She is current with the Remote Pt Monitoring system.          08/29/24 1337   Service Assessment   Patient Orientation Alert and Oriented   Cognition Alert   History Provided By Patient   Primary Caregiver Self   Support Systems Family Members;Friends/Neighbors   Patient's Healthcare Decision Maker is: Named in Scanned ACP Document   PCP Verified by CM Yes   Last Visit to PCP Within last 6 months   Prior Functional Level Independent in ADLs/IADLs   Current Functional Level Independent in ADLs/IADLs   Can patient return to prior living arrangement Yes   Ability to make needs known: Good   Family

## 2024-08-29 NOTE — PROGRESS NOTES
Hospitalist Progress Note      Patient:  Kristel Welch 89 y.o. female     Unit/Bed:8B-26/026-A    Date of Admission: 8/28/2024      ASSESSMENT AND PLAN    Active Problems  NSTEMI: Cardiology consulted, case discussed with service & their notes reviewed, appreciate recommendations.    Code Status - LIMITED x4. Pt does not wish for invasive intervention. Will defer from [facility] and treat w/ medical management.   Stopped Heparin drip. Restarted Plavix & continue ASA & Statin.   Trop trend 24, 42, 52. Pt is denying CP at this time.   EKG was reviewed by this provider- NSR. TWI in anterior leads.   HFrEF, acute on chronic   BNP elevated, ~1000. IV diuretics ordered, will transition to PO Lasix tomorrow AM. Daily Weights. I&Os.   GDMT - Coreg, [place], Lasix, Entresto.   Follow up with HF Clinic.   BMP in the AM to watch kidney function.   Resolved Problems  Accelerated HTN: BP better controlled today. SBP 120s. Continue home regimen and started Entresto and will give 2 doses of IV diuretics.   Chronic Conditions (reviewed and stable unless otherwise stated)  Obstructive CAD s/p PCI LAD x3, PCI OM1 in 2021  Mild AI/AS/MR   CKD  HLD  Hypothyroidism  GERD      LDA: []CVC / []PICC / []Midline / []Thompson / []Drains / []Mediport / [x]None  Antibiotics: None   Steroids: None   Labs (still needed?): [x]Yes / []No  IVF (still needed?): []Yes / [x]No    Level of care: []Step Down / [x]Med-Surg  Bed Status: [x]Inpatient / []Observation  Telemetry: [x]Yes / []No  PT/OT: []Yes / [x]No    DVT Prophylaxis: [] Lovenox / [] Heparin / [x] SCDs / [] Already on Systemic Anticoagulation / [] None   Code status: Limited     Expected discharge date:  Tomorrow   Disposition: Home      ===================================================================    Chief Complaint: Elevated BP   Subjective (past 24 hours): No acute events overnight.  Patient resting comfortably in the chair.  Patient worked with speech therapy today.  Patient denies chest  Hearing grossly intact.   Neck: Supple, with full range of motion. Trachea midline.  No gross JVD appreciated.  Respiratory:  Normal respiratory effort. Clear to auscultation, bilaterally without rales or wheezes or rhonchi.  Cardiovascular: Normal rate, regular rhythm with normal S1/S2 without murmurs.  Trace pitting edema of the lower extremities bilaterally  Abdomen: Soft, non-tender, non-distended with normal bowel sounds.  Musculoskeletal: No joint swelling or tenderness. Normal tone. No abnormal movements.   Skin: Warm and dry. No rashes or lesions.  Neurologic:  No focal sensory/motor deficits in the upper and lower extremities. Cranial nerves:  grossly non-focal 2-12.     Psychiatric: Alert and oriented, normal insight and thought content.   Capillary Refill: Brisk,< 3 seconds.  Peripheral Pulses: +2 palpable, equal bilaterally.       Labs/Radiology: See chart or assessment above.     Electronically signed by JERONIMO Horowitz on 8/29/2024 at 1:46 PM

## 2024-08-29 NOTE — PROGRESS NOTES
[facility]  INPATIENT PHYSICAL THERAPY  EVALUATION  STRZ MED SURG 8AB - 8B-26/026-A    Discharge Recommendations: Discharge Recommendations: Home with Home health PT  Equipment Recommendations: Equipment Needed: No      Time In: 1345  Time Out: 1411  Timed Code Treatment Minutes: 15 Minutes  Minutes: 26          Date: 2024  Patient Name: Kristel Welch,  Gender:  female        MRN: [id number]  : 1925  (89 y.o.)      Referring Practitioner: Garrett Neely MD  Diagnosis: Accelerated hypertension  Additional Pertinent Hx: Per EMR: \"This is an 89-year-old female who presented to [facility] ED on 2024 secondary to an elevation of blood pressure.  The patient states that she was checking her blood pressure in the morning and saw a systolic in the 200s.  She additionally notes generalized sense of malaise and worry about her blood pressure as well as a weight gain of 8 pounds since the day prior to admission.\"     Restrictions/Precautions:  Restrictions/Precautions: General Precautions, Fall Risk    Subjective:  Chart Reviewed: Yes  Patient assessed for rehabilitation services?: Yes  Family / Caregiver Present: No  Subjective: Clearance from RN to see pt this date. Pt was sitting in bedside chair when PT arrived. Pt agreeable to PT session    General:  Overall Orientation Status: Within Normal Limits  Orientation Level: Oriented X4  Overall Cognitive Status: WFL  Vision: Impaired  Vision Exceptions: Wears glasses at all times  Hearing: Within functional limits       Pain: -/10: Pt denies pain    Vitals: Vitals not assessed per clinical judgement, see nursing flowsheet    Social/Functional History:    Lives With: Alone  Type of Home: Apartment  Home Layout: One level  Home Access: Stairs to enter with rails  Entrance Stairs - Number of Steps: 1  Home Equipment: Cane, Cane - Quad, Walker - Rolling, Rollator     Bathroom Shower/Tub: Tub/Shower unit  Bathroom Toilet: Handicap height  Bathroom

## 2024-08-29 NOTE — PROGRESS NOTES
[facility]  INPATIENT OCCUPATIONAL THERAPY  STRZ MED SURG 8AB  EVALUATION      Discharge Recommendations: Defer OT at this time, Home with assist PRN  Equipment Recommendations: Equipment Needed: No      Time In: 1039  Time Out: 1059  Timed Code Treatment Minutes: 11 Minutes  Minutes: 20          Date: 2024  Patient Name: Kristel Welch,   Gender: female      MRN: [id number]  : 1925  (89 y.o.)  Referring Practitioner: Garrett Neely MD  Diagnosis: accelerated hypertension  Additional Pertinent Hx: per chart review; This is an 89-year-old female who presented to [facility] ED on 2024 secondary to an elevation of blood pressure.  The patient states that she was checking her blood pressure in the morning and saw a systolic in the 200s.  She additionally notes generalized sense of malaise and worry about her blood pressure as well as a weight gain of 8 pounds since the day prior to admission.    Restrictions/Precautions:  Restrictions/Precautions: General Precautions, Fall Risk    Subjective  Chart Reviewed: Yes, Orders, Progress Notes, History and Physical  Patient assessed for rehabilitation services?: Yes  Family / Caregiver Present: No    Subjective: RN approved session, patient seated up in bed upon OT arrival and agreeable to eval. patient A & O x 4. patient states she feels like she is at her baseline and has no OT concerns.    Pain: 0/10:     Vitals: Vitals not assessed per clinical judgement, see nursing flowsheet    Social/Functional History:  Lives With: Alone  Type of Home: Apartment  Home Layout: One level  Home Access: Stairs to enter with rails  Entrance Stairs - Number of Steps: 1  Home Equipment: Cane, Cane - Quad, Walker - Rolling, Rollator   Bathroom Shower/Tub: Tub/Shower unit  Bathroom Toilet: Handicap height  Bathroom Equipment: Grab bars in shower, Shower chair  Bathroom Accessibility: Accessible    Receives Help From: Friend(s)  ADL Assistance: Independent  Homemaking  Assistance: Independent  Ambulation Assistance: Independent  Transfer Assistance: Independent    Active : Yes (locally)  Occupation: Retired  Additional Comments: patient mainly uses standard cane for mobility.    VISION:Corrected    HEARING:  WFL    COGNITION: WFL    RANGE OF MOTION:  Bilateral Upper Extremity:  WFL    STRENGTH:  Bilateral Upper Extremity:  WFL    SENSATION:   WFL    ADL:   Grooming: Supervision.  Standing at sink with cane placed to side to complete oral care with toothbrush and oral care for dental partial  Lower Extremity Dressing: Independent.  To don (B) slipper socks seated EOB .    IADL:   Not Tested    BALANCE:  Sitting Balance:  Independent.    Standing Balance: Supervision. With and without standard cane    BED MOBILITY:  Supine to Sit: Modified Independent with use of bed rails    TRANSFERS:  Sit to Stand:  Supervision. No unsteadiness in transition from sit to stand.     FUNCTIONAL MOBILITY:  Assistive Device: Straight Cane  Assist Level:  Supervision.   Distance: To and from bathroom  No LOB or c/o dizziness     AM-[facility] Inpatient Daily Activity Raw Score: 23  AM-PAC Inpatient ADL T-Scale Score : 51.12  ADL Inpatient CMS 0-100% Score: 15.86    Activity Tolerance:  Patient tolerance of  treatment: Good treatment tolerance      Modified [place]:  Premorbid Functional Status: Not Applicable  Current Functional Status:  Not Applicable    Assessment:  Assessment: patient completes functional mobility with a standard cane and no LOB or unsteadiness and completes ADLs at/close to baseline and has no OT concerns. OT to eval only and d/c.  Prognosis: Good  REQUIRES OT FOLLOW-UP: No  No Skilled OT: No OT goals identified  Decision Making: Low Complexity    Treatment Initiated: Treatment and education initiated within context of evaluation.  Evaluation time included review of current medical information, gathering information related to past medical, social and functional history, completion

## 2024-08-29 NOTE — PLAN OF CARE
Problem: Chronic Conditions and Co-morbidities  Goal: Patient's chronic conditions and co-morbidity symptoms are monitored and maintained or improved  Outcome: Progressing  Flowsheets (Taken 8/28/2024 2130)  Care Plan - Patient's Chronic Conditions and Co-Morbidity Symptoms are Monitored and Maintained or Improved: Monitor and assess patient's chronic conditions and comorbid symptoms for stability, deterioration, or improvement
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
## 2024-08-30 ENCOUNTER — CARE COORDINATION (OUTPATIENT)
Dept: CARE COORDINATION | Age: 89
End: 2024-08-30

## 2024-08-30 VITALS
TEMPERATURE: 97.5 F | HEIGHT: 60 IN | RESPIRATION RATE: 16 BRPM | WEIGHT: 121.91 LBS | HEART RATE: 77 BPM | SYSTOLIC BLOOD PRESSURE: 112 MMHG | OXYGEN SATURATION: 97 % | BODY MASS INDEX: 23.94 KG/M2 | DIASTOLIC BLOOD PRESSURE: 55 MMHG

## 2024-08-30 PROBLEM — R07.9 CHEST PAIN: Status: ACTIVE | Noted: 2024-08-30

## 2024-08-30 LAB
ANION GAP SERPL CALC-SCNC: 8 MEQ/L (ref 8–16)
BUN SERPL-MCNC: 27 MG/DL (ref 7–22)
CALCIUM SERPL-MCNC: 8.6 MG/DL (ref 8.5–10.5)
CHLORIDE SERPL-SCNC: 105 MEQ/L (ref 98–111)
CO2 SERPL-SCNC: 28 MEQ/L (ref 23–33)
CREAT SERPL-MCNC: 1.1 MG/DL (ref 0.4–1.2)
GFR SERPL CREATININE-BSD FRML MDRD: 45 ML/MIN/1.73M2
GLUCOSE SERPL-MCNC: 103 MG/DL (ref 70–108)
HEPARIN UNFRACTIONATED: 0.04 U/ML (ref 0.3–0.7)
POTASSIUM SERPL-SCNC: 4.3 MEQ/L (ref 3.5–5.2)
SODIUM SERPL-SCNC: 141 MEQ/L (ref 135–145)

## 2024-08-30 PROCEDURE — 6360000002 HC RX W HCPCS: Performed by: PHYSICIAN ASSISTANT

## 2024-08-30 PROCEDURE — 97530 THERAPEUTIC ACTIVITIES: CPT

## 2024-08-30 PROCEDURE — 36415 COLL VENOUS BLD VENIPUNCTURE: CPT

## 2024-08-30 PROCEDURE — 97116 GAIT TRAINING THERAPY: CPT

## 2024-08-30 PROCEDURE — G0378 HOSPITAL OBSERVATION PER HR: HCPCS

## 2024-08-30 PROCEDURE — 6370000000 HC RX 637 (ALT 250 FOR IP)

## 2024-08-30 PROCEDURE — 6370000000 HC RX 637 (ALT 250 FOR IP): Performed by: PHYSICIAN ASSISTANT

## 2024-08-30 PROCEDURE — 85520 HEPARIN ASSAY: CPT

## 2024-08-30 PROCEDURE — 80048 BASIC METABOLIC PNL TOTAL CA: CPT

## 2024-08-30 PROCEDURE — 1200000000 HC SEMI PRIVATE

## 2024-08-30 PROCEDURE — 99239 HOSP IP/OBS DSCHRG MGMT >30: CPT | Performed by: PHYSICIAN ASSISTANT

## 2024-08-30 PROCEDURE — 2580000003 HC RX 258

## 2024-08-30 PROCEDURE — 96376 TX/PRO/DX INJ SAME DRUG ADON: CPT

## 2024-08-30 RX ADMIN — CALCIUM POLYCARBOPHIL 1250 MG: 625 TABLET, FILM COATED ORAL at 08:13

## 2024-08-30 RX ADMIN — CLOPIDOGREL BISULFATE 75 MG: 75 TABLET ORAL at 08:13

## 2024-08-30 RX ADMIN — FUROSEMIDE 40 MG: 10 INJECTION, SOLUTION INTRAMUSCULAR; INTRAVENOUS at 08:13

## 2024-08-30 RX ADMIN — LEVOTHYROXINE, LIOTHYRONINE 30 MG: 38; 9 TABLET ORAL at 08:14

## 2024-08-30 RX ADMIN — SACUBITRIL AND VALSARTAN 1 TABLET: 24; 26 TABLET, FILM COATED ORAL at 08:13

## 2024-08-30 RX ADMIN — ASPIRIN 81 MG: 81 TABLET, COATED ORAL at 08:12

## 2024-08-30 RX ADMIN — PANTOPRAZOLE SODIUM 40 MG: 40 TABLET, DELAYED RELEASE ORAL at 06:13

## 2024-08-30 RX ADMIN — CARVEDILOL 3.12 MG: 3.12 TABLET, FILM COATED ORAL at 08:14

## 2024-08-30 RX ADMIN — SPIRONOLACTONE 25 MG: 25 TABLET ORAL at 08:13

## 2024-08-30 RX ADMIN — MONTELUKAST SODIUM 10 MG: 10 TABLET ORAL at 08:14

## 2024-08-30 RX ADMIN — FLUTICASONE PROPIONATE 2 SPRAY: 50 SPRAY, METERED NASAL at 08:13

## 2024-08-30 RX ADMIN — ACETAMINOPHEN 650 MG: 325 TABLET ORAL at 08:12

## 2024-08-30 RX ADMIN — SODIUM CHLORIDE, PRESERVATIVE FREE 10 ML: 5 INJECTION INTRAVENOUS at 08:14

## 2024-08-30 RX ADMIN — OXYCODONE HYDROCHLORIDE AND ACETAMINOPHEN 1000 MG: 500 TABLET ORAL at 08:14

## 2024-08-30 RX ADMIN — BACITRACIN ZINC NEOMYCIN SULFATE POLYMYXIN B SULFATE: 400; 3.5; 5 OINTMENT TOPICAL at 08:15

## 2024-08-30 ASSESSMENT — PAIN SCALES - GENERAL
PAINLEVEL_OUTOF10: 4
PAINLEVEL_OUTOF10: 0

## 2024-08-30 ASSESSMENT — PAIN DESCRIPTION - LOCATION: LOCATION: HEAD

## 2024-08-30 NOTE — DISCHARGE SUMMARY
Hospital Medicine Discharge Summary      Patient Identification:   Kristel Welch   : 1925  MRN: [id number]   Account: [id number]      Patient's PCP: Martin Davenport DO    Admit Date: 2024     Discharge Date: 2024  1:53 PM    Admitting Physician: No admitting provider for patient encounter.     Discharge Physician: JERONIMO Horowitz     Discharge Diagnoses:    Active Hospital Problems    Diagnosis Date Noted    Chest pain [R07.9] 2024    Elevated troponin [R79.89] 2024    Accelerated hypertension [I10] 2024     Discharge Assessment & Plan  NSTEMI: Cardiology consulted, case discussed with service & their notes reviewed, appreciate recommendations.    Code Status - LIMITED x4. Pt does not wish for invasive intervention. Will defer from [facility] and treat w/ medical management.   Stopped Heparin drip. Restarted Plavix & continue ASA & Statin.   Trop trend 24, 42, 52. Pt is denying CP at this time.   EKG was reviewed by this provider- NSR. TWI in anterior leads.   HFrEF, acute on chronic   BNP elevated, ~1000. IV diuretics ordered, transitioned to PO Lasix. Daily Weights. I&Os.   GDMT - Coreg, [place], Lasix  Entresto would be over $500 a month, this was not continued on DC.   Follow up with [facility].     The patient was seen and examined on day of discharge and this discharge summary is in conjunction with any daily progress note from day of discharge.    Hospital Course:   Kristel Welch is a 89 y.o. female admitted to [facility] on 2024 for elevated BP.        Initial H&P \"This is an 89-year-old female who presented to [facility] ED on 2024 secondary to an elevation of blood pressure.  The patient states that she was checking her blood pressure in the morning and saw a systolic in the 200s.  She additionally notes generalized sense of malaise and worry about her blood pressure as well as a weight gain of 8 pounds since the day prior to admission.     Review  furosemide and was admitted for further evaluation and management.\"     8/29: No acute events overnight. Patient resting comfortably in the chair. Patient worked with speech therapy today. Patient denies chest pain at this time. Patient spoke with cardiology and has no want to do any intervention and understands we will do conservative treatment at this time.       Exam:     Vitals:  Vitals:    08/29/24 2115 08/29/24 2314 08/30/24 0449 08/30/24 0808   BP: (!) 99/53 (!) 90/43 (!) 124/54 (!) 112/55   Pulse: 67 66 66 77   Resp: 18 18 17 16   Temp: 98.7 °F (37.1 °C) 98.4 °F (36.9 °C) 97.7 °F (36.5 °C) 97.5 °F (36.4 °C)   TempSrc: Oral Oral Oral Oral   SpO2: 95% 97% 95% 97%   Weight:       Height:         Weight: Weight - Scale: 55.3 kg (121 lb 14.6 oz)     24 hour intake/output:  Intake/Output Summary (Last 24 hours) at 8/30/2024 1603  Last data filed at 8/30/2024 1032  Gross per 24 hour   Intake 360 ml   Output 725 ml   Net -365 ml         General appearance:  No apparent distress, appears stated age and cooperative.  HEENT:  Normal cephalic, atraumatic without obvious deformity. Pupils equal, round, and reactive to light.  Extra ocular muscles intact. Conjunctivae/corneas clear.  Neck: Supple, with full range of motion. No jugular venous distention. Trachea midline.  Respiratory:  Normal respiratory effort. Clear to auscultation, bilaterally without Rales/Wheezes/Rhonchi.  Cardiovascular:  Regular rate and rhythm with normal S1/S2 without murmurs, rubs or gallops.  Abdomen: Soft, non-tender, non-distended with normal bowel sounds.  Musculoskeletal:  No clubbing, cyanosis or edema bilaterally.  Full range of motion without deformity.  Skin: Skin color, texture, turgor normal.  No rashes or lesions.  Neurologic:  Neurovascularly intact without any focal sensory/motor deficits. Cranial nerves: II-XII intact, grossly non-focal.  Psychiatric:  Alert and oriented, thought content appropriate, normal insight  Capillary

## 2024-08-30 NOTE — PLAN OF CARE
Problem: Chronic Conditions and Co-morbidities  Goal: Patient's chronic conditions and co-morbidity symptoms are monitored and maintained or improved  8/30/2024 0224 by Dre Romero RN  Outcome: Progressing  Note: Pt monitored and assessed on my shift       Problem: Pain  Goal: Verbalizes/displays adequate comfort level or baseline comfort level  8/30/2024 0224 by Dre Romero, RN  Outcome: Progressing  Note: Pt denies pain on my shift. Non pharmaceutical interventions like ice and repositioning offered before pain medications. Will continue to assess.     Pt verbalizes understanding of care plan. Pt contributes to goal settings.

## 2024-08-30 NOTE — CARE COORDINATION
8/30/24, 10:14 AM EDT    Patient goals/plan/ treatment preferences discussed by  and .  Patient goals/plan/ treatment preferences reviewed with patient/ family.  Patient/ family verbalize understanding of discharge plan and are in agreement with goal/plan/treatment preferences.  Understanding was demonstrated using the teach back method.  AVS provided by RN at time of discharge, which includes all necessary medical information pertaining to the patients current course of illness, treatment, post-discharge goals of care, and treatment preferences.     Services At/After Discharge: OT possibly OP.    Pt discharge order in place. She plans return home alone with much friend and family support. She is considering OP OT but does not want home services as she does not plan to be homebound.

## 2024-08-30 NOTE — PROGRESS NOTES
Kristel is a 89 years old female sitting in her recliner chair in her room. Patient is alert, oriented to time and place. Patient has capacity and engaged in conversation with this  during this visit. Purpose of this visit is to talk with patient about ACP conversation and to assist her in completing one. Patient shared with me that she completed a ACP document few years ago and had  Garrett Cooper helped her complete and documented ACP document in medical record. This  checked patient's ACP note and verify that document exist in medical record. Patient shared with me that she is not planing to change her A Health Care Decision Maker. Patient current code status in medical Record is [place] and has ACP  document at this time. No Need to for a new ACP document for this patient at this time. Please refer to ACP note for this patient.

## 2024-08-30 NOTE — PROGRESS NOTES
[facility]  INPATIENT PHYSICAL THERAPY  DAILY NOTE  STRZ MED SURG 8AB - 8B-26/026-A      Discharge Recommendations: Home with Assist as Needed  Equipment Recommendations:Equipment Needed: No      Time In: 55  Time Out: 1023  Timed Code Treatment Minutes: 28 Minutes  Minutes: 28          Date: 2024  Patient Name: Kristel Welch,  Gender:  female        MRN: [id number]  : 1925  (89 y.o.)     Referring Practitioner: Garrett Neely MD  Diagnosis: Accelerated hypertension  Additional Pertinent Hx: Per EMR: \"This is an 89-year-old female who presented to [facility] ED on 2024 secondary to an elevation of blood pressure.  The patient states that she was checking her blood pressure in the morning and saw a systolic in the 200s.  She additionally notes generalized sense of malaise and worry about her blood pressure as well as a weight gain of 8 pounds since the day prior to admission.\"     Prior Level of Function:  Lives With: Alone  Type of Home: Apartment  Home Layout: One level  Home Access: Stairs to enter with rails  Entrance Stairs - Number of Steps: 1  Home Equipment: Cane, Cane - Quad, Walker - Rolling, Rollator   Bathroom Shower/Tub: Tub/Shower unit  Bathroom Toilet: Handicap height  Bathroom Equipment: Grab bars in shower, Shower chair  Bathroom Accessibility: Accessible    Receives Help From: Friend(s)  ADL Assistance: Independent  Homemaking Assistance: Independent  Ambulation Assistance: Independent  Transfer Assistance: Independent  Active : Yes  Additional Comments: Per OT, confirmed with PT: patient mainly uses standard cane for mobility.    Restrictions/Precautions:  Restrictions/Precautions: General Precautions, Fall Risk     SUBJECTIVE: RN approved therapy session. Patient supine in bed upon PTA arrival and agreeable to therapy session. Patient A&Ox4/4.     PAIN: 0/10: denies    Vitals: Vitals not assessed per clinical judgement, see nursing flowsheet    OBJECTIVE:  Bed  Mobility:  Supine to Sit: Independent    Transfers:  Sit to Stand: Modified Independent  Stand to Sit:Modified Independent  To/From Bed and Chair: Modified Independent    Ambulation:  Supervision  Distance: 300'x2, 200'x1 and multiple shorter distances  Surface: Level Tile  Device:  cane for long distance, Patient denies need of AD for short distances in room.  Gait Deviations: Decreased Arm Swing, Decreased Trunk Rotation, and Mild Path Deviations     Stairs:  Stairs: 6\" steps X 3 using One Handrail and Contact Guard Assistance.    Balance:  Not Tested    Exercise:  Patient was guided in 1 set(s) 10 reps of exercises to both lower extremities: ,Seated marches, Seated heel/toe raises, Long arc quads, Seated isometric hip adduction, and Seated abduction/adduction.  Exercises were completed for increased independence with functional mobility.    Functional Outcome Measures:  [facility] (6 CLICK) BASIC MOBILITY  AM-[facility] Inpatient Mobility Raw Score : 23  AM-PAC Inpatient T-Scale Score : 56.93  Mobility Inpatient CMS 0-100% Score: 11.2  Timed up and Go Test (TUG)  12.03 seconds       Normative Reference Values  60-69 years:  8.1 seconds  70-79 years: 9.2 seconds  80-99 years: 11.3 seconds    < 10 seconds is normal, a score of > 14 seconds indicates high fall risk          Modified Kern Scale:  Not Applicable    ASSESSMENT:  Assessment: Patient progressing toward established goals.  Activity Tolerance:  Patient tolerance of  treatment:Good.  Plan: Current Treatment Recommendations: Strengthening, Balance training, Functional mobility training, Transfer training, Endurance training, Gait training, Stair training, Safety education & training, Home exercise program, Therapeutic activities  General Plan:  (4-5x GM)    Education:  Learners: Patient  Patient Education: Plan of Care, Bed Mobility, Transfers, Gait, Stairs, Use of Gait Belt, Verbal Exercise Instruction, Health Promotion and Wellness Education, Home Safety

## 2024-08-30 NOTE — PROGRESS NOTES
Physician Progress Note      PATIENT:               JUAN MERINO  CSN #:                  [id number]  :                       1925  ADMIT DATE:       2024 8:14 AM  DISCH DATE:        2024 1:53 PM  RESPONDING  PROVIDER #:        Maria G DECKER          QUERY TEXT:    Pt admitted with acute on chronic systolic CHF.  Noted documentation of type 2   MI in cardiology consult note and NSTEMI in H&P and  note from attending.    If possible, please document in progress notes and discharge summary:    The medical record reflects the following:  Risk Factors: acute on chronic systolic CHF, HTN  Clinical Indicators: EKG showed NSR with left anterior fascicular block and   septal infarct; trop trend 24, 42, 52; cardiology documents type 2 MI in   consult note, while H&P and  note from IM state NSTEMI, no chest pain per   patient  Treatment: Labs, imaging, cardiology consult, monitoring, ECHO, EKG, Heparin   gtt stopped, aspirin, Plavix, Atorvastatin  Options provided:  -- Type 2 MI confirmed present on admission, NSTEMI ruled out  -- Other - I will add my own diagnosis  -- Disagree - Not applicable / Not valid  -- Disagree - Clinically unable to determine / Unknown  -- Refer to Clinical Documentation Reviewer    PROVIDER RESPONSE TEXT:    Provider is clinically unable to determine a response to this query.    Query created by: Willa Pino on 2024 11:41 AM      Electronically signed by:  Maria G DECKER 2024 4:03 PM

## 2024-08-30 NOTE — CARE COORDINATION
RPM team,       Kristel is now home from hospital.  Could you please resume RPM monitoring now.  Thank you!

## 2024-09-02 ENCOUNTER — HOSPITAL ENCOUNTER (EMERGENCY)
Age: 89
Discharge: HOME | End: 2024-09-02
Attending: EMERGENCY MEDICINE
Payer: MEDICARE

## 2024-09-02 VITALS
RESPIRATION RATE: 19 BRPM | TEMPERATURE: 97.4 F | OXYGEN SATURATION: 97 % | HEIGHT: 60 IN | HEART RATE: 68 BPM | BODY MASS INDEX: 24.54 KG/M2 | SYSTOLIC BLOOD PRESSURE: 136 MMHG | DIASTOLIC BLOOD PRESSURE: 59 MMHG | WEIGHT: 125 LBS

## 2024-09-02 DIAGNOSIS — I10 PRIMARY HYPERTENSION: Primary | ICD-10-CM

## 2024-09-02 LAB
ANION GAP SERPL CALC-SCNC: 13 MEQ/L (ref 8–16)
BACTERIA URNS QL MICRO: ABNORMAL /HPF
BASOPHILS ABSOLUTE: 0 THOU/MM3 (ref 0–0.1)
BASOPHILS NFR BLD AUTO: 0.5 %
BILIRUB UR QL STRIP.AUTO: NEGATIVE
BUN SERPL-MCNC: 27 MG/DL (ref 7–22)
CALCIUM SERPL-MCNC: 9.9 MG/DL (ref 8.5–10.5)
CASTS #/AREA URNS LPF: ABNORMAL /LPF
CASTS 2: ABNORMAL /LPF
CHARACTER UR: CLEAR
CHLORIDE SERPL-SCNC: 102 MEQ/L (ref 98–111)
CO2 SERPL-SCNC: 26 MEQ/L (ref 23–33)
COLOR, UA: ABNORMAL
CREAT SERPL-MCNC: 1.1 MG/DL (ref 0.4–1.2)
CRYSTALS URNS MICRO: ABNORMAL
DEPRECATED RDW RBC AUTO: 51 FL (ref 35–45)
EKG ATRIAL RATE: 65 BPM
EKG P AXIS: 77 DEGREES
EKG P-R INTERVAL: 172 MS
EKG Q-T INTERVAL: 428 MS
EKG QRS DURATION: 82 MS
EKG QTC CALCULATION (BAZETT): 445 MS
EKG R AXIS: -43 DEGREES
EKG T AXIS: 89 DEGREES
EKG VENTRICULAR RATE: 65 BPM
EOSINOPHIL NFR BLD AUTO: 1.6 %
EOSINOPHILS ABSOLUTE: 0.1 THOU/MM3 (ref 0–0.4)
EPITHELIAL CELLS, UA: ABNORMAL /HPF
ERYTHROCYTE [DISTWIDTH] IN BLOOD BY AUTOMATED COUNT: 13.9 % (ref 11.5–14.5)
GFR SERPL CREATININE-BSD FRML MDRD: 45 ML/MIN/1.73M2
GLUCOSE SERPL-MCNC: 115 MG/DL (ref 70–108)
GLUCOSE UR QL STRIP.AUTO: NEGATIVE MG/DL
HCT VFR BLD AUTO: 42.2 % (ref 37–47)
HGB BLD-MCNC: 14 GM/DL (ref 12–16)
HGB UR QL STRIP.AUTO: ABNORMAL
IMM GRANULOCYTES # BLD AUTO: 0.02 THOU/MM3 (ref 0–0.07)
IMM GRANULOCYTES NFR BLD AUTO: 0.4 %
KETONES UR QL STRIP.AUTO: NEGATIVE
LYMPHOCYTES ABSOLUTE: 0.8 THOU/MM3 (ref 1–4.8)
LYMPHOCYTES NFR BLD AUTO: 13.5 %
MCH RBC QN AUTO: 32.9 PG (ref 26–33)
MCHC RBC AUTO-ENTMCNC: 33.2 GM/DL (ref 32.2–35.5)
MCV RBC AUTO: 99.3 FL (ref 81–99)
MISCELLANEOUS 2: ABNORMAL
MONOCYTES ABSOLUTE: 0.5 THOU/MM3 (ref 0.4–1.3)
MONOCYTES NFR BLD AUTO: 8.9 %
NEUTROPHILS ABSOLUTE: 4.3 THOU/MM3 (ref 1.8–7.7)
NEUTROPHILS NFR BLD AUTO: 75.1 %
NITRITE UR QL STRIP: NEGATIVE
NRBC BLD AUTO-RTO: 0 /100 WBC
NT-PROBNP SERPL IA-MCNC: 1014 PG/ML (ref 0–449)
OSMOLALITY SERPL CALC.SUM OF ELEC: 287.3 MOSMOL/KG (ref 275–300)
PH UR STRIP.AUTO: 7 [PH] (ref 5–9)
PLATELET # BLD AUTO: 149 THOU/MM3 (ref 130–400)
PMV BLD AUTO: 9.8 FL (ref 9.4–12.4)
POTASSIUM SERPL-SCNC: 4.2 MEQ/L (ref 3.5–5.2)
PROT UR STRIP.AUTO-MCNC: NEGATIVE MG/DL
RBC # BLD AUTO: 4.25 MILL/MM3 (ref 4.2–5.4)
RBC URINE: ABNORMAL /HPF
RENAL EPI CELLS #/AREA URNS HPF: ABNORMAL /[HPF]
SODIUM SERPL-SCNC: 141 MEQ/L (ref 135–145)
SP GR UR REFRACT.AUTO: 1.01 (ref 1–1.03)
TROPONIN, HIGH SENSITIVITY: 26 NG/L (ref 0–12)
UROBILINOGEN, URINE: 0.2 EU/DL (ref 0–1)
WBC # BLD AUTO: 5.7 THOU/MM3 (ref 4.8–10.8)
WBC #/AREA URNS HPF: ABNORMAL /HPF
WBC #/AREA URNS HPF: ABNORMAL /[HPF]
YEAST LIKE FUNGI URNS QL MICRO: ABNORMAL

## 2024-09-02 PROCEDURE — 93005 ELECTROCARDIOGRAM TRACING: CPT | Performed by: EMERGENCY MEDICINE

## 2024-09-02 PROCEDURE — 85025 COMPLETE CBC W/AUTO DIFF WBC: CPT

## 2024-09-02 PROCEDURE — 36415 COLL VENOUS BLD VENIPUNCTURE: CPT

## 2024-09-02 PROCEDURE — 80048 BASIC METABOLIC PNL TOTAL CA: CPT

## 2024-09-02 PROCEDURE — 84484 ASSAY OF TROPONIN QUANT: CPT

## 2024-09-02 PROCEDURE — 83880 ASSAY OF NATRIURETIC PEPTIDE: CPT

## 2024-09-02 PROCEDURE — 99284 EMERGENCY DEPT VISIT MOD MDM: CPT

## 2024-09-02 PROCEDURE — 93010 ELECTROCARDIOGRAM REPORT: CPT | Performed by: INTERNAL MEDICINE

## 2024-09-02 PROCEDURE — 81001 URINALYSIS AUTO W/SCOPE: CPT

## 2024-09-02 ASSESSMENT — PAIN SCALES - GENERAL: PAINLEVEL_OUTOF10: 0

## 2024-09-02 ASSESSMENT — PAIN - FUNCTIONAL ASSESSMENT: PAIN_FUNCTIONAL_ASSESSMENT: 0-10

## 2024-09-02 NOTE — ED PROVIDER NOTES
Comorbid Conditions:    Hypertension    2)  Data Reviewed (none if left blank)          My Independent interpretations:     EKG:          Imaging:     Labs:      none                 Decision Rules/Clinical Scores utilized:              External Documentation Reviewed:         Previous patient encounter documents & history available on EMR was reviewed              See Formal Diagnostic Results above for the lab and radiology tests and orders.    3)  Treatment and Disposition         ED Reassessment: Patient presenting with complaint of high blood pressure, is since improved, systolic 150s, will observe.         Case discussed with consulting clinician:  none         Shared Decision-Making was performed and disposition discussed with the        Patient/Family and questions answered          Social determinants of health impacting treatment or disposition:           Code Status: Full      Summary of Patient Presentation:      MDM  /   Vitals Reviewed:    Vitals:    09/02/24 0841 09/02/24 0904 09/02/24 0905 09/02/24 1033   BP: (!) 176/66 (!) 157/58  (!) 136/59   Pulse: 71  68 68   Resp: 16 16 13 19   Temp: 97.4 °F (36.3 °C)      TempSrc: Oral      SpO2: 99% 97%  97%   Weight: 56.7 kg (125 lb)      Height: 1.524 m (5')          The patient was seen and examined. Appropriate diagnostic testing was performed and results reviewed with the patient.      The results of pertinent diagnostic studies and exam findings were discussed. The patient’s provisional diagnosis and plan of care were discussed with the patient and present family who expressed understanding. Any medications were reviewed and indications and risks of medications were discussed with the patient /family present. Strict verbal and written return precautions, instructions and appropriate follow-up provided to  the patient .     ED Medications administered this visit:  (None if blank)  Medications - No data to display            DIAGNOSTIC RESULTS     EKG: All

## 2024-09-02 NOTE — ED TRIAGE NOTES
Patient presents via [facility] to ER with complaints of hypertension that started this morning. Patient reports monitoring BP at home. Patient denies any chest pain or sob. Patient reports has not taken Carvedilol this morning, patient teaching provided. Patient reports chronic neck pain from a fall that occurred in March. EKG obtained. Telemetry applied.

## 2024-09-03 ENCOUNTER — TELEPHONE (OUTPATIENT)
Dept: FAMILY MEDICINE CLINIC | Age: 89
End: 2024-09-03

## 2024-09-03 ENCOUNTER — CARE COORDINATION (OUTPATIENT)
Dept: CARE COORDINATION | Age: 89
End: 2024-09-03

## 2024-09-03 ENCOUNTER — TELEPHONE (OUTPATIENT)
Dept: CARDIOLOGY CLINIC | Age: 89
End: 2024-09-03

## 2024-09-03 NOTE — TELEPHONE ENCOUNTER
Pt calling, she is wanting to schedule hospital f/u appt with her cardiologist-Dr Hwang.  She was recently admitted for NSTEMI.  She would like to talk about her options and treatment plan.   She is 89 and needs her friend to come with her.  Her friend is available 9/12 and 9/13.  Dr Hwang is rounder.  Dr Hwang would you be able to see this patient on either day?

## 2024-09-03 NOTE — TELEPHONE ENCOUNTER
[facility] Transitions Initial Follow Up Call    Outreach made within 2 business days of discharge: Yes    Patient: Kristel Welch Patient : 1925   MRN: [id number]  Reason for Admission: There are no discharge diagnoses documented for the most recent discharge.  Discharge Date: 24       Spoke with: Kristel    Discharge department/facility: [facility]    TCM Interactive Patient Contact:  Was patient able to fill all prescriptions: Yes  Was patient instructed to bring all medications to the follow-up visit: Yes  Is patient taking all medications as directed in the discharge summary? Yes  Does patient understand their discharge instructions: Yes  Does patient have questions or concerns that need addressed prior to 7 day follow up office visit: no    Scheduled appointment with PCP within 7-14 days    Follow Up  Future Appointments   Date Time Provider Department Center   2024  2:20 PM Martin Davenport, DO Fam Med [facility] ECC DEP   2024 10:20 AM Martin Davenport, DO Fam Med [facility] ECC DEP   2024  8:00 AM Jie Multani APRN - CNP N [facility]X CHF [facility] - [place]   10/31/2024 11:30 AM Jie Multani APRN - CNP N SRPX CHF [facility] - [place]   12/3/2024  3:15 PM Christopher Hwang MD N SRPX Heart [facility] - [place]   2025  1:00 PM STR MAMMOGRAPHY RM2  [facility] STR Rad/Card       Dedra Moore LPN

## 2024-09-03 NOTE — CARE COORDINATION
Ambulatory Care Coordination Note     9/3/2024 9:14 AM     Patient outreach attempt by this ACM today to perform care management follow up . ACM was unable to reach the patient by telephone today; left voice message requesting a return phone call to this ACM.     ACM: Rupal Griffin RN     Care Summary Note: ED follow up, returning call from message left this morning    PCP/Specialist follow up:   Future Appointments         Provider Specialty Dept Phone    9/9/2024 2:20 PM Martin Davenport, DO Family Medicine [phone]    9/12/2024 10:20 AM Martin Davenport DO Family Medicine [phone]    9/25/2024 8:00 AM Jie Multani APRN - CNP Cardiology [phone]    10/31/2024 11:30 AM Jie Multani APRN - CNP Cardiology [phone]    12/3/2024 3:15 PM Christopher Hwang MD Cardiology [phone]    7/28/2025 1:00 PM (Arrive by 12:50 PM) STR MAMMOGRAPHY RM2  [facility] Radiology [phone]            Follow Up:   Plan for next ACM outreach in approximately 1 week to complete:  - disease specific assessments  - goal progression  - education   - RPM.

## 2024-09-03 NOTE — CARE COORDINATION
PCP office staff,       Could you please reach out to Kristel.  She is wanting to see if hospital follow up appt can be moved to tomorrow ( 9-4-24)  due to family friend being able to drive her to appt and be with her during appt.  Could you please reach out to reschedule appt.  Thank you!
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
## 2024-09-04 NOTE — TELEPHONE ENCOUNTER
Kristel called and wants to cancel her PCP appt on 9-9 and keep the appt on 9-12 because her friend Chasity will be able to come with her to appt.  WALTER cancelled appt on 9-9-24 per her request.

## 2024-09-05 ENCOUNTER — CARE COORDINATION (OUTPATIENT)
Dept: CARE COORDINATION | Age: 89
End: 2024-09-05

## 2024-09-05 NOTE — CARE COORDINATION
Received a missed call from Kristel.  Called her back.  She states she received a call from office from Dedra and was trying to call her back.  I reached out to Dedra in office and informed her.

## 2024-09-11 ENCOUNTER — CARE COORDINATION (OUTPATIENT)
Dept: CARE COORDINATION | Age: 89
End: 2024-09-11

## 2024-09-11 PROBLEM — I21.4 NSTEMI (NON-ST ELEVATED MYOCARDIAL INFARCTION) (HCC): Status: RESOLVED | Noted: 2024-03-04 | Resolved: 2024-09-11

## 2024-09-11 PROBLEM — R07.9 CHEST PAIN: Status: RESOLVED | Noted: 2024-08-30 | Resolved: 2024-09-11

## 2024-09-11 PROBLEM — I25.119 ATHEROSCLEROTIC HEART DISEASE OF NATIVE CORONARY ARTERY WITH UNSPECIFIED ANGINA PECTORIS (HCC): Status: RESOLVED | Noted: 2023-10-26 | Resolved: 2024-09-11

## 2024-09-11 PROBLEM — I10 ACCELERATED HYPERTENSION: Status: RESOLVED | Noted: 2024-08-28 | Resolved: 2024-09-11

## 2024-09-11 PROBLEM — R79.89 ELEVATED TROPONIN: Status: RESOLVED | Noted: 2024-08-29 | Resolved: 2024-09-11

## 2024-09-11 PROBLEM — I20.9 ANGINA PECTORIS, UNSPECIFIED (HCC): Status: RESOLVED | Noted: 2023-10-26 | Resolved: 2024-09-11

## 2024-09-12 ENCOUNTER — OFFICE VISIT (OUTPATIENT)
Dept: FAMILY MEDICINE CLINIC | Age: 89
End: 2024-09-12

## 2024-09-12 ENCOUNTER — OFFICE VISIT (OUTPATIENT)
Dept: CARDIOLOGY CLINIC | Age: 89
End: 2024-09-12
Payer: MEDICARE

## 2024-09-12 VITALS
RESPIRATION RATE: 18 BRPM | SYSTOLIC BLOOD PRESSURE: 130 MMHG | BODY MASS INDEX: 25.29 KG/M2 | TEMPERATURE: 97.1 F | WEIGHT: 128.8 LBS | HEIGHT: 60 IN | DIASTOLIC BLOOD PRESSURE: 70 MMHG | HEART RATE: 72 BPM | OXYGEN SATURATION: 99 %

## 2024-09-12 VITALS
SYSTOLIC BLOOD PRESSURE: 136 MMHG | WEIGHT: 128.13 LBS | HEIGHT: 60 IN | DIASTOLIC BLOOD PRESSURE: 62 MMHG | HEART RATE: 76 BPM | BODY MASS INDEX: 25.16 KG/M2

## 2024-09-12 DIAGNOSIS — I50.22 CHF NYHA CLASS II, CHRONIC, SYSTOLIC (HCC): Primary | ICD-10-CM

## 2024-09-12 DIAGNOSIS — M81.0 OSTEOPOROSIS, POST-MENOPAUSAL: Chronic | ICD-10-CM

## 2024-09-12 DIAGNOSIS — S22.050S COMPRESSION FRACTURE OF T5 VERTEBRA, SEQUELA: ICD-10-CM

## 2024-09-12 DIAGNOSIS — E03.9 HYPOTHYROIDISM, UNSPECIFIED TYPE: ICD-10-CM

## 2024-09-12 DIAGNOSIS — I50.20 HEART FAILURE WITH REDUCED EJECTION FRACTION (HCC): Chronic | ICD-10-CM

## 2024-09-12 DIAGNOSIS — S22.060S COMPRESSION FRACTURE OF T7 VERTEBRA, SEQUELA: ICD-10-CM

## 2024-09-12 DIAGNOSIS — E78.5 DYSLIPIDEMIA: Chronic | ICD-10-CM

## 2024-09-12 DIAGNOSIS — I25.10 ASHD (ARTERIOSCLEROTIC HEART DISEASE): Chronic | ICD-10-CM

## 2024-09-12 DIAGNOSIS — R73.03 PREDIABETES: Chronic | ICD-10-CM

## 2024-09-12 DIAGNOSIS — N18.31 STAGE 3A CHRONIC KIDNEY DISEASE (HCC): ICD-10-CM

## 2024-09-12 DIAGNOSIS — I21.4 NSTEMI (NON-ST ELEVATED MYOCARDIAL INFARCTION) (HCC): Primary | ICD-10-CM

## 2024-09-12 DIAGNOSIS — F41.9 ANXIETY: Chronic | ICD-10-CM

## 2024-09-12 DIAGNOSIS — I10 HYPERTENSION, ESSENTIAL: Chronic | ICD-10-CM

## 2024-09-12 PROCEDURE — 1036F TOBACCO NON-USER: CPT | Performed by: INTERNAL MEDICINE

## 2024-09-12 PROCEDURE — 1111F DSCHRG MED/CURRENT MED MERGE: CPT | Performed by: INTERNAL MEDICINE

## 2024-09-12 PROCEDURE — 1123F ACP DISCUSS/DSCN MKR DOCD: CPT | Performed by: INTERNAL MEDICINE

## 2024-09-12 PROCEDURE — G8417 CALC BMI ABV UP PARAM F/U: HCPCS | Performed by: INTERNAL MEDICINE

## 2024-09-12 PROCEDURE — 1090F PRES/ABSN URINE INCON ASSESS: CPT | Performed by: INTERNAL MEDICINE

## 2024-09-12 PROCEDURE — 99214 OFFICE O/P EST MOD 30 MIN: CPT | Performed by: INTERNAL MEDICINE

## 2024-09-12 PROCEDURE — G8428 CUR MEDS NOT DOCUMENT: HCPCS | Performed by: INTERNAL MEDICINE

## 2024-09-13 RX ORDER — DIPHENHYDRAMINE HYDROCHLORIDE 50 MG/ML
50 INJECTION INTRAMUSCULAR; INTRAVENOUS
OUTPATIENT
Start: 2024-09-16

## 2024-09-13 RX ORDER — SODIUM CHLORIDE 9 MG/ML
INJECTION, SOLUTION INTRAVENOUS CONTINUOUS
OUTPATIENT
Start: 2024-09-16

## 2024-09-13 RX ORDER — EPINEPHRINE 1 MG/ML
0.3 INJECTION, SOLUTION, CONCENTRATE INTRAVENOUS PRN
OUTPATIENT
Start: 2024-09-16

## 2024-09-17 ENCOUNTER — CARE COORDINATION (OUTPATIENT)
Dept: CARE COORDINATION | Age: 89
End: 2024-09-17

## 2024-09-17 DIAGNOSIS — J01.90 ACUTE RHINOSINUSITIS: ICD-10-CM

## 2024-09-17 RX ORDER — FLUTICASONE PROPIONATE 50 MCG
2 SPRAY, SUSPENSION (ML) NASAL DAILY
Qty: 16 G | Refills: 5 | Status: SHIPPED | OUTPATIENT
Start: 2024-09-17

## 2024-09-23 ENCOUNTER — CARE COORDINATION (OUTPATIENT)
Dept: CARE COORDINATION | Age: 89
End: 2024-09-23

## 2024-09-24 ENCOUNTER — CARE COORDINATION (OUTPATIENT)
Dept: CARE COORDINATION | Age: 89
End: 2024-09-24

## 2024-09-25 ENCOUNTER — CARE COORDINATION (OUTPATIENT)
Dept: CARE COORDINATION | Age: 89
End: 2024-09-25

## 2024-09-26 DIAGNOSIS — I10 ESSENTIAL HYPERTENSION: ICD-10-CM

## 2024-09-27 ENCOUNTER — CARE COORDINATION (OUTPATIENT)
Dept: CARE COORDINATION | Age: 89
End: 2024-09-27

## 2024-09-30 ENCOUNTER — CARE COORDINATION (OUTPATIENT)
Dept: CARE COORDINATION | Age: 89
End: 2024-09-30

## 2024-09-30 RX ORDER — SPIRONOLACTONE 50 MG/1
25 TABLET, FILM COATED ORAL 2 TIMES DAILY
Qty: 90 TABLET | Refills: 3 | Status: SHIPPED | OUTPATIENT
Start: 2024-09-30

## 2024-09-30 NOTE — CARE COORDINATION
Date/Time:  9/30/2024 9:41 AM    Update: Patient informed to continue to monitor weight and ok to continue Spironolactone bid.    
Ok - continue to monitor.   Labs 9/2 look good - ok to continue Aldactone BID  Has appt w/ us end of the month  
Remote Patient Monitoring Note      Date/Time:  2024 8:21 AM  Patient Current Location: Home: [facility] [address]  [place] [address]    LPN contacted patient by telephone regarding red alert received for weight increase (130.4lb). Verified patients name and  as identifiers.    Background: High Blood-Pressure, CHF, Kidney Disease     Clinical Interventions: Patient states she will call writer back shortly.    Plan/Follow Up: Will continue to review, monitor and address alerts with follow up based on severity of symptoms and risk factors.       [facility]/ CTN/ Remote Patient Monitoring  [phone]    
different than you did yesterday? (time of day, clothes and shoes on or off, etc)? no   Do you have any shortness of breath? no   Do you have any swelling in your hands of feet? no   Are you having any other health concerns or issues? no       Clinical Interventions: Reviewed and followed up on alerts and treatments-. Pt is asymptomatic and in no apparent distress, speaking in full sentences.  Weight keeps bouncing between 130lb and 124lb. She has remained asymptomatic during these fluctuations. She is currently taking Lasix 40 qd and Spironolactone 25mg bid.  Her med list does states she is to be taking Spironolactone 25mg qd, but states she was told by her doctor to take bid.  Updated sent to Jie Multani, CHF clinic and [facility].    Signs and symptoms of CHF discussed with patient, such as feeling more tired than normal, feeling short of breath, coughing that increases when lying down, sudden weight gain, swelling of the feet, legs or belly.  Patient verbalized understanding to notify physician office if these symptoms occur.    Plan/Follow Up: Will continue to review, monitor and address alerts with follow up based on severity of symptoms and risk factors.    Radha De Leon LPN  [facility]/ CTN/ Remote Patient monitoring  [phone]

## 2024-10-01 ENCOUNTER — CARE COORDINATION (OUTPATIENT)
Dept: CARE COORDINATION | Age: 89
End: 2024-10-01

## 2024-10-02 ENCOUNTER — CARE COORDINATION (OUTPATIENT)
Dept: CARE COORDINATION | Age: 89
End: 2024-10-02

## 2024-10-02 NOTE — CARE COORDINATION
Ambulatory Care Coordination Note     10/2/2024 11:04 AM     Patient Current Location:  Home: [facility] [address]  [place] [address]     Patient contacted the [facility] by telephone. Verified name and  with patient as identifiers.         ACM: Rupal Griffin RN     Challenges to be reviewed by the provider   Additional needs identified to be addressed with provider No  none               Method of communication with provider: none.    Has the patient been seen in the ED since your last call? no    Care Summary Note: [facility] received a call from Kristel  Wanted to update [facility] on blood pressure this morning which was 105/53, 71.  Weight back down to normal now at 125  Taking medications as ordered  Started on new cream to cancerous spot on nose  States she is having some eye dryness and might be from cream  Encouraged her to contact dermatology if having any side effects from cream  Continues with RPM monitoring    Plan  Continue with RPM monitoring  Collaborate with CHF clinic and derm as needed  Reinforce importance of early symptom recognition and reporting    Offered patient enrollment in the Remote Patient Monitoring (RPM) program for in-home monitoring: Yes, patient enrolled; current status is activated and monitoring.     Assessments Completed:   No changes since last call    Medications Reviewed:   Completed during this call    Advance Care Planning:   Not reviewed during this call     Care Planning:    Goals Addressed    None          PCP/Specialist follow up:   Future Appointments         Provider Specialty Dept Phone    10/21/2024 10:00 AM STR RECOVERY ROOM A IP Unit [phone]    10/22/2024 10:20 AM Martin Davenport DO Family Medicine [phone]    10/31/2024 11:30 AM Jie Multani, APRN - CNP Cardiology [phone]    3/12/2025 3:00 PM Christopher Hwang MD Cardiology [phone]    2025 1:00 PM (Arrive by 12:50 PM) [facility]HY [facility] [phone]            Follow Up:   Plan for

## 2024-10-02 NOTE — CARE COORDINATION
Kristel CALLE Welch  10/2/2024    Registered Dietitian Progress Note for Care Coordination    Assessment: Kristel is a 89 y.o. female. RD called to follow up with pt today 10/2/24. Per chart review, patient had OV with PCP on 9/12/24 and OV with Cardiology on 9/12/24. Per chart review, patient is currently enrolled in [facility] and patient's weight documented as 125 lb today 10/2/24. Patient states she is looking for low sodium recipes- RD recommended browsing American Heart Association recipes website. RD provided patient with the link to website recipes.heart.org. RD reiterated the importance of eating 3 meals/day and making meals balanced using MyPlate. Discussed incorporating a variety of fruits, vegetables, whole grains, lean protein and low fat dairy. Patient has no nutrition related questions or concerns at this time. Per chart review, patient has OV with PCP on 10/22/24.     Follow Up:    RD will call pt in 3 weeks to follow up and answer any nutrition related questions at that time.     Armida Darling RDN, LD  [phone]

## 2024-10-03 ENCOUNTER — CARE COORDINATION (OUTPATIENT)
Dept: CARE COORDINATION | Age: 89
End: 2024-10-03

## 2024-10-16 ENCOUNTER — CARE COORDINATION (OUTPATIENT)
Dept: CARE COORDINATION | Age: 89
End: 2024-10-16

## 2024-10-16 NOTE — CARE COORDINATION
Ambulatory Care Coordination Note     10/16/2024 11:06 AM     Patient outreach attempt by this ACM today to perform care management follow up . ACM was unable to reach the patient by telephone today; left voice message requesting a return phone call to this ACM.     ACM: Rupal Griffin, RN     Care Summary Note: work on RPM graduation    PCP/Specialist follow up:   Future Appointments         Provider Specialty Dept Phone    10/21/2024 10:00 AM STR RECOVERY ROOM A IP Unit [phone]    10/22/2024 10:20 AM Martin Davenport, DO Family Medicine [phone]    10/31/2024 11:30 AM Jie Multani, APRN - CNP Cardiology [phone]    3/12/2025 3:00 PM Christopher Hwang MD Cardiology [phone]    7/28/2025 1:00 PM (Arrive by 12:50 PM) STR MAMMOGRAPHY RM2  [facility] Radiology [phone]            Follow Up:   Plan for next ACM outreach in approximately 1 week to complete:  - RPM.             
seek urgent or emergent care.  I will notify my provider of any symptoms that indicate a worsening of my condition.    Barriers: need for additional support and education  Plan for overcoming my barriers: family and ACM support  Confidence: 9/10  Anticipated Goal Completion Date: 8-16-24 Update 10-17-24 Update 11-16-24                 PCP/Specialist follow up:   Future Appointments         Provider Specialty Dept Phone    10/21/2024 10:00 AM STR RECOVERY ROOM A IP Unit [phone]    10/22/2024 10:20 AM Martin Davenport,  Family Medicine [phone]    10/31/2024 11:30 AM Jie Multani, APRN - CNP Cardiology [phone]    3/12/2025 3:00 PM Christopher Hwang MD Cardiology [phone]    7/28/2025 1:00 PM (Arrive by 12:50 PM) STR MAMMOGRAPHY RM2  [facility] Radiology [phone]            Follow Up:   Plan for next ACM outreach in approximately 2 weeks to complete:  - disease specific assessments  - medication review   - goal progression  - education   - RPM.   Patient  is agreeable to this plan.

## 2024-10-21 ENCOUNTER — HOSPITAL ENCOUNTER (OUTPATIENT)
Dept: NURSING | Age: 89
Discharge: HOME | End: 2024-10-21
Payer: MEDICARE

## 2024-10-21 VITALS
HEART RATE: 70 BPM | OXYGEN SATURATION: 98 % | DIASTOLIC BLOOD PRESSURE: 58 MMHG | TEMPERATURE: 97.3 F | SYSTOLIC BLOOD PRESSURE: 150 MMHG

## 2024-10-21 DIAGNOSIS — M81.0 OSTEOPOROSIS, POST-MENOPAUSAL: Primary | ICD-10-CM

## 2024-10-21 PROCEDURE — 6360000002 HC RX W HCPCS: Performed by: FAMILY MEDICINE

## 2024-10-21 PROCEDURE — 96372 THER/PROPH/DIAG INJ SC/IM: CPT

## 2024-10-21 RX ORDER — DIPHENHYDRAMINE HYDROCHLORIDE 50 MG/ML
50 INJECTION INTRAMUSCULAR; INTRAVENOUS
OUTPATIENT
Start: 2025-04-21

## 2024-10-21 RX ORDER — EPINEPHRINE 1 MG/ML
0.3 INJECTION, SOLUTION INTRAMUSCULAR; SUBCUTANEOUS PRN
OUTPATIENT
Start: 2025-04-21

## 2024-10-21 RX ORDER — SODIUM CHLORIDE 9 MG/ML
INJECTION, SOLUTION INTRAVENOUS CONTINUOUS
OUTPATIENT
Start: 2025-04-21

## 2024-10-21 RX ADMIN — DENOSUMAB 60 MG: 60 INJECTION SUBCUTANEOUS at 10:28

## 2024-10-21 NOTE — DISCHARGE INSTRUCTIONS
Prolia injection discharge instructions:    Side effects: headache, joint pain, rash, swelling    Next Prolia injection on: 4/22/25 @ 10am     This medication is given every 6 months. We will need a new order before we schedule your next one due around:     Please call [phone] with a any questions or concerns.

## 2024-10-21 NOTE — PROGRESS NOTES
Chief Complaint   Patient presents with    Medicare AW    Dysphagia    Follow-up     Chronic issues noted below     History obtained from the patient.    SUBJECTIVE:  Kristel Welch is a 89 y.o. female that presents today for     -Dysphagia:  Has noted mild dysphagia the last few months  Mostly with solids  Never gets fully stuck  No abd pain  Would like to see GI      -Patient admitted to [facility] from 8/28 to 8/30/24 for issues noted below.   D/c summary:  \"Discharge Assessment & Plan  NSTEMI: Cardiology consulted, case discussed with service & their notes reviewed, appreciate recommendations.    Code Status - LIMITED x4. Pt does not wish for invasive intervention. Will defer from [facility] and treat w/ medical management.   Stopped Heparin drip. Restarted Plavix & continue ASA & Statin.   Trop trend 24, 42, 52. Pt is denying CP at this time.   EKG was reviewed by this provider- NSR. TWI in anterior leads.   HFrEF, acute on chronic   BNP elevated, ~1000. IV diuretics ordered, transitioned to PO Lasix. Daily Weights. I&Os.   GDMT - Coreg, Duarte, Lasix  Entresto would be over $500 a month, this was not continued on DC.   Follow up with HF Clinic.      The patient was seen and examined on day of discharge and this discharge summary is in conjunction with any daily progress note from day of discharge.     Hospital Course:   Kristel Welch is a 89 y.o. female admitted to [facility] on 8/28/2024 for elevated BP.         Initial H&P \"This is an 89-year-old female who presented to [facility] ED on 08/28/2024 secondary to an elevation of blood pressure.  The patient states that she was checking her blood pressure in the morning and saw a systolic in the 200s.  She additionally notes generalized sense of malaise and worry about her blood pressure as well as a weight gain of 8 pounds since the day prior to admission.     Review of systems is negative for fever, chills, tinnitus, acute vision loss, dysphagia, nausea,

## 2024-10-21 NOTE — PROGRESS NOTES
1015 Patient ambulatory to OPN for Prolia injection. Patient verbalizes understanding of injection. States she has had it before multiple times. PT RIGHTS AND RESPONSIBILITIES OFFERED TO PT.    1028 Prolia injection given to patient. Tolerated well.     1035 AVS reviewed with patient. Verbalizes understanding. Patient left ambulatory to discharge lobby with friend.         _M___ Safety:       (Environmental)  [place] to environment  Ensure ID band is correct and in place/ allergy band as needed  Assess for fall risk  Initiate fall precautions as applicable (fall band, side rails, etc.)  Call light within reach  Bed in low position/ wheels locked    _M___ Pain:       Assess pain level and characteristics  Administer analgesics as ordered  Assess effectiveness of pain management and report to MD as needed    _M___ Knowledge Deficit:  Assess baseline knowledge  Provide teaching at level of understanding  Provide teaching via preferred learning method  Evaluate teaching effectiveness    _M___ Hemodynamic/Respiratory Status:       (Pre and Post Procedure Monitoring)  Assess/Monitor vital signs and LOC  Assess Baseline SpO2 prior to any sedation  Obtain weight/height  Assess vital signs/ LOC until patient meets discharge criteria  Monitor procedure site and notify MD of any issues

## 2024-10-22 ENCOUNTER — OFFICE VISIT (OUTPATIENT)
Dept: FAMILY MEDICINE CLINIC | Age: 89
End: 2024-10-22

## 2024-10-22 VITALS
OXYGEN SATURATION: 99 % | SYSTOLIC BLOOD PRESSURE: 130 MMHG | WEIGHT: 129.2 LBS | HEIGHT: 60 IN | TEMPERATURE: 97.1 F | HEART RATE: 76 BPM | DIASTOLIC BLOOD PRESSURE: 72 MMHG | RESPIRATION RATE: 18 BRPM | BODY MASS INDEX: 25.36 KG/M2

## 2024-10-22 DIAGNOSIS — I10 HYPERTENSION, ESSENTIAL: Chronic | ICD-10-CM

## 2024-10-22 DIAGNOSIS — I25.2 HISTORY OF NON-ST ELEVATION MYOCARDIAL INFARCTION (NSTEMI): Chronic | ICD-10-CM

## 2024-10-22 DIAGNOSIS — R13.10 DYSPHAGIA, UNSPECIFIED TYPE: ICD-10-CM

## 2024-10-22 DIAGNOSIS — E03.9 HYPOTHYROIDISM, UNSPECIFIED TYPE: ICD-10-CM

## 2024-10-22 DIAGNOSIS — S22.060S COMPRESSION FRACTURE OF T7 VERTEBRA, SEQUELA: ICD-10-CM

## 2024-10-22 DIAGNOSIS — Z23 NEED FOR IMMUNIZATION AGAINST INFLUENZA: ICD-10-CM

## 2024-10-22 DIAGNOSIS — I50.20 HEART FAILURE WITH REDUCED EJECTION FRACTION (HCC): Chronic | ICD-10-CM

## 2024-10-22 DIAGNOSIS — S22.050S COMPRESSION FRACTURE OF T5 VERTEBRA, SEQUELA: ICD-10-CM

## 2024-10-22 DIAGNOSIS — M81.0 OSTEOPOROSIS, POST-MENOPAUSAL: Chronic | ICD-10-CM

## 2024-10-22 DIAGNOSIS — R73.03 PREDIABETES: Chronic | ICD-10-CM

## 2024-10-22 DIAGNOSIS — Z00.00 MEDICARE ANNUAL WELLNESS VISIT, SUBSEQUENT: Primary | ICD-10-CM

## 2024-10-22 DIAGNOSIS — I25.10 ASHD (ARTERIOSCLEROTIC HEART DISEASE): Chronic | ICD-10-CM

## 2024-10-22 DIAGNOSIS — E78.5 DYSLIPIDEMIA: Chronic | ICD-10-CM

## 2024-10-22 DIAGNOSIS — N18.31 STAGE 3A CHRONIC KIDNEY DISEASE (HCC): ICD-10-CM

## 2024-10-22 DIAGNOSIS — F41.9 ANXIETY: Chronic | ICD-10-CM

## 2024-10-22 ASSESSMENT — PATIENT HEALTH QUESTIONNAIRE - PHQ9
2. FEELING DOWN, DEPRESSED OR HOPELESS: NOT AT ALL
SUM OF ALL RESPONSES TO PHQ QUESTIONS 1-9: 0
SUM OF ALL RESPONSES TO PHQ9 QUESTIONS 1 & 2: 0
SUM OF ALL RESPONSES TO PHQ QUESTIONS 1-9: 0
1. LITTLE INTEREST OR PLEASURE IN DOING THINGS: NOT AT ALL

## 2024-10-22 NOTE — PROGRESS NOTES
Vaccine Information Sheet, \"Influenza - Inactivated\"  given to Kristel Welch, or parent/legal guardian of  Kristel Welch and verbalized understanding.    Patient responses:    Have you ever had a reaction to a flu vaccine? No  Do you have an allergy to eggs, neomycin or polymixin?  No  Do you have an allergy to Thimerosal, contact lens solution, or Merthiolate? No  Have you ever had Guillian [place] Syndrome?  No  Do you have any current illness?  No  Do you have a temperature above 100 degrees? No  Are you pregnant? No  If pregnant, permission obtained from physician? No  Do you have an active neurological disorder? No      Flu vaccine given per order. Please see immunization tab.

## 2024-10-23 ENCOUNTER — CARE COORDINATION (OUTPATIENT)
Dept: CARE COORDINATION | Age: 89
End: 2024-10-23

## 2024-10-23 NOTE — CARE COORDINATION
Contacted Kristel Welch and left voicemail regarding Dietitian follow up. Left call back number and will follow up as appropriate.         Armida Darling RDN, LD  [phone]

## 2024-10-29 ENCOUNTER — CARE COORDINATION (OUTPATIENT)
Dept: CARE COORDINATION | Age: 89
End: 2024-10-29

## 2024-10-29 ENCOUNTER — TELEPHONE (OUTPATIENT)
Dept: CARDIOLOGY CLINIC | Age: 89
End: 2024-10-29

## 2024-10-29 NOTE — CARE COORDINATION
Remote Alert Monitoring Note      Date/Time:  10/29/2024 2:43 PM  Patient Current Location: Home: [address]  [place] [address]    LPN contacted patient by telephone regarding red alert received for blood pressure reading (). Verified patients name and  as identifiers.    Rpm alert to be reviewed by the provider                         Background: High Blood-Pressure, CHF, Kidney Disease     Refer to 911 immediately if:  Patient unresponsive or unable to provide history  Change in cognition or sudden confusion  Patient unable to respond in complete sentences  Intense chest pain/tightness  Any concern for any clinical emergency  Red Alert: Provider response time of 1 hr required for any red alert requiring intervention  Yellow Alert: Provider response time of 3hr required for any escalated yellow alert        Blood Pressure BP Triage  Are you having any Chest Pain? no   Are you having any Shortness of Breath? no   Do you have a headache or have any vision changes? no   Are you having any numbness or tingling? no   Are you having any other health concerns or issues? Sinus congestion     Have you taken your medications as instructed by your doctor today? Yes       Clinical Interventions: Reviewed and followed up on alerts and treatments-. Pt is in no apparent distress, speaking in full sentences.  She states she has some sinus congestion but other than that she is feeling fine. She states she has been resting for about 15 minutes and is agreeable to recheck BP in 10 minutes.  Will await update.       Signs and symptoms of CHF discussed with patient, such as feeling more tired than normal, feeling short of breath, coughing that increases when lying down, sudden weight gain, swelling of the feet, legs or belly.  Patient verbalized understanding to notify physician office if these symptoms occur.    Plan/Follow Up: Will continue to review, monitor and address alerts with follow up based on severity of

## 2024-10-29 NOTE — CARE COORDINATION
Ambulatory Care Coordination Note     10/29/2024 2:36 PM     Patient Current Location:  Home: [facility] [address]  [place] [address]     ACM contacted the patient by telephone. Verified name and  with patient as identifiers.         ACM: Rupal Griffin RN     Challenges to be reviewed by the provider   Additional needs identified to be addressed with provider No  none               Method of communication with provider: none.    Has the patient been seen in the ED since your last call? no    Care Summary Note: Kristel called  She was concerned with a recent blood pressure she checked on RPM  Blood pressure this morning was 127/58, 62  States she has appt with GI today and does have a small headache  States her blood pressure was 151/69, 67 In GI office  Will have her sit for about 15 minutes and then recheck it to see if it goes down  ACM will call her back in 15 minutes to reassess  Kristel was in agreement with this plan    Called Kristel back after 15 minutes to recheck blood pressure  Her blood pressure was 183/99, 85  She states she really doesn't want to go to the ED and will sit down in her recliner for awhile and see how she feels  She states her headache is gone and feels good.  Denies shortness of breath, denies chest pain  Encouraged her to recheck it later after she relaxes a bit and to call me with any questions or concerns  Informed her that if any sx's appear, to head to ED for evaluation      Offered patient enrollment in the Remote Patient Monitoring (RPM) program for in-home monitoring: Yes, patient enrolled; current status is activated and monitoring.     Assessments Completed:   General Assessment    Do you have any symptoms that are causing concern?: Yes  Progression since Onset: Unchanged  Reported Symptoms: Other (Comment: blood pressure fluctuation)          Medications Reviewed:   Patient denies any changes with medications and reports taking all medications as prescribed.    Advance

## 2024-10-29 NOTE — TELEPHONE ENCOUNTER
Pre op Risk Assessment    Procedure EGD w/ Dilation  Physician Dr. Colmenares  Date of surgery/procedure TBD-pending clearance    Last OV 9/12/24 with Dr. Hwang  Last Stress 3-21-23  Last Echo 8-28-24  Last Cath 5-6-21  Last Stent 5-6-21  Is patient on blood thinners Plavix and ASA  Hold Meds/how many days Requesting 3-5 days prior    P: [phone] F: [phone]

## 2024-10-30 ENCOUNTER — CARE COORDINATION (OUTPATIENT)
Dept: CASE MANAGEMENT | Age: 89
End: 2024-10-30

## 2024-10-30 ENCOUNTER — CARE COORDINATION (OUTPATIENT)
Dept: CARE COORDINATION | Age: 89
End: 2024-10-30

## 2024-10-30 NOTE — CARE COORDINATION
find a place in her bathroom to place the scale to leave in 1 place. Recommended finding a place to put scale whereas it is out of the way and she will not trip over it. Pt verbalized understanding.  Kristel has an appt tomorrow with the CHF clinic.  All other metrics WNL.No escalation at this time  Plan/Follow Up: Will continue to review, monitor and address alerts with follow up based on severity of symptoms and risk factors.  **For any new or worsening symptoms or you are concerned in anyway, please contact your Provider or report to the nearest Emergency Room.**

## 2024-10-30 NOTE — CARE COORDINATION
Kristel Welch  10/30/2024    Registered Dietitian Progress Note for Care Coordination    Assessment: Kristel is a 89 y.o. female.  RD called to follow up with patient today 10/30/24. Per chart review, patient had OV with PCP on 10/22/24 and OV with Gastro on 10/29/24. Patient is enrolled in [facility] and patient's weight documented as 129 lb today 10/30/24. Per chart review, patient has follow up OV with CHF Clinic tomorrow 10/31/24. RD reiterated the importance of eating balanced meals consistently throughout the day. Reviewed the components of a balanced meal using MyPlate. Patient states she did look at AHA website for recipes briefly. Patient states for breakfast she has been eating rolled oats with flax seed, cinnamon, pepitas and raisins or an egg casserole with turkey sausage. Patient states she is going to make homemade potato soup with no salt broth, ham, carrots, green pepper and spinach. Patient asked about seasoning tips- RD reviewed salt free seasonings and will mail patient handout on Sodium Free Flavoring Tips. Patient has no nutrition related questions or concerns at this time.     Follow Up:    RD will call pt in weeks 2-4 weeks to follow up and answer any nutrition related questions at that time.     Armida Darling RDN, LD  [phone]

## 2024-10-31 ENCOUNTER — OFFICE VISIT (OUTPATIENT)
Dept: CARDIOLOGY CLINIC | Age: 89
End: 2024-10-31

## 2024-10-31 VITALS
HEIGHT: 60 IN | HEART RATE: 66 BPM | SYSTOLIC BLOOD PRESSURE: 146 MMHG | BODY MASS INDEX: 24.94 KG/M2 | DIASTOLIC BLOOD PRESSURE: 64 MMHG | OXYGEN SATURATION: 99 % | WEIGHT: 127 LBS

## 2024-10-31 DIAGNOSIS — I25.10 CORONARY ARTERY DISEASE INVOLVING NATIVE CORONARY ARTERY OF NATIVE HEART WITHOUT ANGINA PECTORIS: ICD-10-CM

## 2024-10-31 DIAGNOSIS — I10 ESSENTIAL HYPERTENSION: ICD-10-CM

## 2024-10-31 DIAGNOSIS — I50.22 CHF NYHA CLASS II, CHRONIC, SYSTOLIC (HCC): Primary | ICD-10-CM

## 2024-10-31 ASSESSMENT — ENCOUNTER SYMPTOMS
CONSTIPATION: 0
ABDOMINAL DISTENTION: 0
SHORTNESS OF BREATH: 0
COUGH: 0

## 2024-10-31 NOTE — PROGRESS NOTES
50/day  Diuretic - Lasix 40/day  Vasodilator -   Other - Plavix     HFmrEF (45-50%), GRD 1 DD, NYHA II   ECHO 7/2022 8/2024 - EF 45%  HTN - stable, controlled.  Call if high readings.  Keep monitoring at home.   CAD s/p PCI (5/2021)   DOMINIK - stable.  Continue to monitor.     Stable, appears Euvolemic  Lab reviewed - stable  BP/HR stable   No med changes today  Continue diet/fluid adherence  Continue daily wts.  F/U w/ Cardiology -   F/U in clinic in 1 yr    Tolerating above noted HF meds, no ill side effects noted. Will continue to monitor kidney function and electrolytes. Will optimize as tolerated.   Pt is compliant w/ medications.    Total visit time of 30 minutes has been spent with patient on education of symptoms, management, medication, and plan of care; as well as review of chart: labs, ECHO, radiology reports, etc.   I personally spent more then 50% of the appt time face to face with the patient.  Daily weights  Fluid restriction of 2 Liters per day  Limit sodium in diet to around 8435-7735 mg/day  Monitor BP  Activity as tolerated     Patient was instructed to call the Heart Failure Clinic for any changes in symptoms as noted in AVS.      Return in about 1 year (around 10/31/2025). or sooner if needed     Patient given educational materials - see patient instructions.   We discussed the importance of weighing oneself and recording daily. We also discussed the importance of a low sodium diet, higher sodium foods to avoid and better low sodium food options.   Patient verbalizes understanding of plan of care using teach back method, and is agreeable to the treatment plan.       Electronically signed by RACQUEL Vegas CNP on 10/31/2024 at 12:17 PM

## 2024-10-31 NOTE — PATIENT INSTRUCTIONS
You may receive a survey regarding the care you received during your visit.  Your input is valuable to us.  We encourage you to complete and return your survey.  We hope you will choose us in the future for your healthcare needs.    Your nurses today were Amarilys.  Office hours:   Mon-Thurs 8-4:30  Friday 8-12  Phone: [phone]    Continue:  Continue current medications  Daily weights and record  Fluid restriction of 2 Liters per day  Limit sodium in diet to around 5052-0747 mg/day  Monitor BP  Activity as tolerated     Call the Heart Failure Clinic for any of the following symptoms:   Weight gain of 3 pounds in 1 day or 5 pounds in 1 week  Increased shortness of breath  Shortness of breath while laying down  Cough  Chest pain  Swelling in feet, ankles or legs  Bloating in abdomen  Fatigue          IF weight jumps 4-5# overnight take extra HALF tab Lasix.     IF dizzy, lightheaded drink extra glass water and cracker.

## 2024-11-01 ENCOUNTER — CARE COORDINATION (OUTPATIENT)
Dept: CARE COORDINATION | Age: 89
End: 2024-11-01

## 2024-11-01 DIAGNOSIS — R13.10 DYSPHAGIA, UNSPECIFIED TYPE: Primary | ICD-10-CM

## 2024-11-01 NOTE — TELEPHONE ENCOUNTER
Will do!     Diagnosis Orders   1. Dysphagia, unspecified type  JOSHUA - Flores Castillo MD, Gastroenterology, [place]

## 2024-11-01 NOTE — CARE COORDINATION
Kristel Sadler has been following with Dr. Colmenares for her GI issues and she would like to switch providers.  She is asking for a referral to Flores Castillo MD at [facility].  If you agree, could you please place that referral for her.  Thank you!

## 2024-11-04 ENCOUNTER — TELEPHONE (OUTPATIENT)
Dept: FAMILY MEDICINE CLINIC | Age: 89
End: 2024-11-04

## 2024-11-04 ENCOUNTER — CARE COORDINATION (OUTPATIENT)
Dept: CARE COORDINATION | Age: 89
End: 2024-11-04

## 2024-11-04 NOTE — TELEPHONE ENCOUNTER
Called and spoke to patient to confirm patient is wanting to go to [facility] Dr. Castillo, as I seen she just had an appointment with Dr. Colmenares. Patient states [facility].    I let pt know I will be facing over referral and they will be contacting her.

## 2024-11-04 NOTE — CARE COORDINATION
Per Armida Darling RDN dietician education mailed to patient.        Katelyn Sampson [facility]  CC   Medication Assistance  [facility] [employer]    (P) [phone]  (F) [phone]

## 2024-11-11 ENCOUNTER — CARE COORDINATION (OUTPATIENT)
Dept: CARE COORDINATION | Age: 89
End: 2024-11-11

## 2024-11-11 NOTE — CARE COORDINATION
LAURITALUC received a text message from Kristel.  Stating she received a call from LUANA Rincon's office and have an appt with them Dec 6th and 11:30am.  States she has transportation to that appt.  Kristel wanted to confirm that she has everything that she needs because she states the message she received from Dr. Castillo's office states she needs a referral.    LAURITALUC left a detailed message with Dr. Castillo's office to make sure they received the referral and have everything they need to see Kristel on Dec at her appt.

## 2024-11-11 NOTE — CARE COORDINATION
Received a call from Dr. Castillo's office.  States they do not need another referral but they are needing paperwork completed by Kristel before the appt.  I asked the office staff there to please call Kristel back and confirm with her what needs to be done before the appt to rest any confusion.  She states she will call Kristel and inform her.  Thank you

## 2024-11-13 ENCOUNTER — CARE COORDINATION (OUTPATIENT)
Dept: CARE COORDINATION | Age: 89
End: 2024-11-13

## 2024-11-19 ENCOUNTER — CARE COORDINATION (OUTPATIENT)
Dept: CARE COORDINATION | Age: 89
End: 2024-11-19

## 2024-11-19 NOTE — CARE COORDINATION
Date/Time:  11/19/2024 9:23 AM    Update: Spoke with patient, informed that Jie wishes to continue to monitor at this time.    
of day, clothes and shoes on or off, etc)? no   Do you have any shortness of breath? no   Do you have any swelling in your hands of feet? Minimal baseline in ankles   Are you having any other health concerns or issues? no       Clinical Interventions: Reviewed and followed up on alerts and treatments-. Pt is in no apparent distress, speaking in full sentences.  4.4lb weight gain overnight. Patient denies any changes in swelling or SOB. She states she feels great and denies any issues. She states she did take an extra Lasix 20mg this am due to the weight gain. She denies any increased sodium intake recently as well. Jie Multani, Cardiology and ACM updated.      Signs and symptoms of CHF discussed with patient, such as feeling more tired than normal, feeling short of breath, coughing that increases when lying down, sudden weight gain, swelling of the feet, legs or belly.  Patient verbalized understanding to notify physician office if these symptoms occur.    Plan/Follow Up: Will continue to review, monitor and address alerts with follow up based on severity of symptoms and risk factors.    Radha De Leon LPN  [facility]/ CTN/ Remote Patient monitoring  [phone]

## 2024-11-20 ENCOUNTER — CARE COORDINATION (OUTPATIENT)
Dept: CARE COORDINATION | Age: 89
End: 2024-11-20

## 2024-11-20 NOTE — CARE COORDINATION
associated PND: Neg, CHF associated shortness of breath: Neg, CHF associated weakness: Neg      Symptom course: stable  Patient-reported weight (lb): 126  Weight trend: stable          Medications Reviewed:   Patient denies any changes with medications and reports taking all medications as prescribed.    Advance Care Planning:   Not reviewed during this call     Care Planning:    Goals Addressed                      This Visit's Progress      Conditions and Symptoms (pt-stated)   On track      I will schedule office visits, as directed by my provider.  I will keep my appointment or reschedule if I have to cancel.  I will notify my provider of any barriers to my plan of care.  I will follow my Zone Management tool to seek urgent or emergent care.  I will notify my provider of any symptoms that indicate a worsening of my condition.    Barriers: need for additional support and education  Plan for overcoming my barriers: family and ACM support  Confidence: 9/10  Anticipated Goal Completion Date: 8-16-24 Update 10-17-24 Update 11-16-24 Update 12-20-24                 PCP/Specialist follow up:   Future Appointments         Provider Specialty Dept Phone    3/12/2025 3:00 PM Christopher Hwang MD Cardiology [phone]    4/22/2025 10:00 AM STR EXAM ROOM 4 IP Unit [phone]    4/22/2025 1:20 PM Martin Davenport,  Family Medicine [phone]    7/28/2025 1:00 PM (Arrive by 12:50 PM) STR MAMMOGRAPHY RM2  [facility] Radiology [phone]    10/23/2025 11:30 AM Jie Multani, APRN - CNP Cardiology [phone]            Follow Up:   Plan for next ACM outreach in approximately 2 weeks to complete:  - disease specific assessments  - medication review   - goal progression  - education   - RPM.   Patient  is agreeable to this plan.

## 2024-11-22 ENCOUNTER — CARE COORDINATION (OUTPATIENT)
Dept: CARE COORDINATION | Age: 89
End: 2024-11-22

## 2024-11-22 NOTE — CARE COORDINATION
Kristel Welch  11/22/2024    Registered Dietitian Progress Note for Care Coordination    Assessment: Kristel is a 89 y.o. female. RD called to follow up with pt today 11/22/24. Patient states she received the handout in the mail. Per chart review, patient had OV with [facility]F Clinic on 10/31/24. Patient is enrolled in [facility] and patient's weight documented as 125 lb today 11/22/24. RD reiterated the importance of eating balanced meals consistently throughout the day. Reviewed the components of a balanced meal using MyPlate. Patient states she has been printing recipes off of the American Heart Association website. Patient asked about sylwia crackers with 130 mg of sodium per serving. RD explained a low sodium food is one with 140 mg or less of sodium per serving. Patient asked specifically about Butter Ball turkey sausage- patient read the food label and said it has 450 mg of sodium per serving. RD explained this is higher in sodium- discussed moderation and portion sizes. Reiterated the importance of limiting sodium from food and beverages to no more than 2000 mg per day. Patient verbalizes understanding. Patient states she has been drinking a small amount of unsweetened tea for something other than water. Patient asked specifically for additional Ensure coupons- RD will mail both Ensure and Glucerna coupons to patient. RD noted patient's current A1C is 5.8% as of 8/28/24. RD will mail additional handouts to patient: Meal Planner CHF, Heart Failure Nutrition Therapy, Heart Healthy Shopping Tips, Sodium Can be Sneaky. Patient has no nutrition related questions or concerns at this time     Follow Up:    RD will call pt in 3 weeks to follow up and answer any nutrition related questions at that time.     Armida Darling RDN, LD  [phone]

## 2024-11-27 ENCOUNTER — CARE COORDINATION (OUTPATIENT)
Dept: CARE COORDINATION | Age: 88
End: 2024-11-27

## 2024-11-27 NOTE — CARE COORDINATION
Ambulatory Care Coordination Note     2024 9:36 AM     Patient Current Location:  Home: [address]  [place] [address]     Patient contacted the ACM by telephone. Verified name and  with patient as identifiers.         ACM: Rupal Griffin RN     Challenges to be reviewed by the provider   Additional needs identified to be addressed with provider No  none               Method of communication with provider: none.    Utilization: Patient has not had any utilization since our last call.     Care Summary Note: Received a call from Kristel  She is wanting to schedule an appt to see PCP about an \"lump\" on her abdomen by her belly button  States it has been there for a few months and she just wants it checked out  Denies pain  Denies sx's with lump  States her weight today was up from baseline on RPM  ACM cannot see RPM reading of weight today  Kristel states it was 129.6  Denies shortness of breath, denies edema  States she took an extra 1/2 diuretic which is what she was informed to do from CHF clinic   No other issues with weight reporting in RPM equipment, if doesn't show up again, may need to look at changing batteries on scale    Plan  Ask PCP office staff to set up appt  Continue with RPM monitoring due to advanced age of pt and her desire for closer monitoring to prevent exacerbation  The RPM equipment gives pt a sense of security as she lives alone and is concerned about any fluctuations in her metrics    Offered patient enrollment in the Remote Patient Monitoring (RPM) program for in-home monitoring: Yes, patient enrolled; current status is activated and monitoring.     Assessments Completed:   Congestive Heart Failure Assessment    Are you currently restricting fluids?: No Restriction  Do you understand a low sodium diet?: Yes  Do you understand how to read food labels?: Yes  How many restaurant meals do you eat per week?: 1-2  Do you salt your food before tasting it?: No         Symptoms:

## 2024-11-27 NOTE — CARE COORDINATION
PCP office staff,      Could you please assist Kristel with setting up PCP appt to address a lump on her abdomen close to her belly button that has been there for several months.  She denies any symptoms but would like PCP to look at it to make sure there is no additional intervention needed.  Thank you!

## 2024-11-27 NOTE — CARE COORDINATION
Future Appointments   Date Time Provider Department Center   12/2/2024  9:40 AM Martin Davenport, DO Fam Med [facility] DEP   3/12/2025  3:00 PM Christopher Hwang MD N [facility] Heart P - [place]   4/22/2025 10:00 AM STR EXAM ROOM 4 STRZ OP NUR[facility] [place] [facility]   4/22/2025  1:20 PM Martin Davenport, DO Fam Med [facility] DEP   7/28/2025  1:00 PM STR MAMMOGRAPHY RM2  [facility] WOMEN STR Rad/Card   10/23/2025 11:30 AM Jie Multani, APRN - CNP N [facility] CHF P - [place]

## 2024-12-01 NOTE — PROGRESS NOTES
Chief Complaint   Patient presents with    Other     Lump in belly button   Denies pain  Noticed it 2 months ago      History obtained from the patient.    SUBJECTIVE:  Kristel Welch is a 89 y.o. female that presents today for     -Umbilical nodule:  Small nodule in her umbilical area.   Been there a few months  Hasn't changed since she noticed it  Pretty small  No pain at all.       -Dysphagia:  Has noted mild dysphagia the last few months  Mostly with solids  Never gets fully stuck  No abd pain  Was referred to GI last visit  Has apt next wk  Sxs unchanged.       Age/Gender Health Maintenance    Lipid -   Lab Results   Component Value Date    CHOL 128 09/18/2023    CHOL 112 02/22/2022    CHOL 184 05/07/2021     Lab Results   Component Value Date    TRIG 92 09/18/2023    TRIG 75 02/22/2022    TRIG 82 05/07/2021     Lab Results   Component Value Date    HDL 42 03/04/2024    HDL 41 09/18/2023    HDL 45 02/22/2022     Lab Results   Component Value Date    LDL 51 03/04/2024    LDL 69 09/18/2023    LDL 52 02/22/2022       DM Screen -   Lab Results   Component Value Date/Time    GLUCOSE 115 09/02/2024 09:05 AM    GLUCOSE 97 11/02/2019 06:30 AM     Lab Results   Component Value Date/Time    LABA1C 5.8 08/28/2024 08:30 AM    LABA1C 6.1 02/28/2024 08:06 AM    LABA1C 6.0 09/18/2023 08:08 AM    LABA1C 5.6 05/09/2021 03:31 AM    LABA1C 6.1 06/05/2019 11:20 AM    LABA1C 6.0 06/07/2018 01:37 PM       TSH -   Lab Results   Component Value Date    TSH 2.740 08/28/2024    T4FREE 1.32 03/04/2024       Colon Cancer Screening - Aged out  Lung Cancer Screening - Never smoker    Tetanus - UTD SEPT 2023  Influenza Vaccine - UTD FALL 2024  Pneumonia Vaccine - UTD x 2, PCV 13 and PPV 23  Zoster - UTD x 2     Breast Cancer Screening - Negative July 2024  Osteoporosis Screening - Osteoporosis July 2024  Prolia: Dose # 1 given 10/17/2023; Dose # 2 given 4/18/2024; Dose # 3 given 10/21/24      Current Outpatient Medications   Medication

## 2024-12-02 ENCOUNTER — OFFICE VISIT (OUTPATIENT)
Dept: FAMILY MEDICINE CLINIC | Age: 88
End: 2024-12-02

## 2024-12-02 VITALS
SYSTOLIC BLOOD PRESSURE: 130 MMHG | BODY MASS INDEX: 25.25 KG/M2 | HEIGHT: 60 IN | WEIGHT: 128.6 LBS | HEART RATE: 81 BPM | DIASTOLIC BLOOD PRESSURE: 70 MMHG | TEMPERATURE: 97 F | RESPIRATION RATE: 16 BRPM | OXYGEN SATURATION: 98 %

## 2024-12-02 DIAGNOSIS — R13.10 DYSPHAGIA, UNSPECIFIED TYPE: ICD-10-CM

## 2024-12-02 DIAGNOSIS — R22.2 NODULE OF SKIN OF ABDOMEN: Primary | ICD-10-CM

## 2024-12-03 ENCOUNTER — CARE COORDINATION (OUTPATIENT)
Dept: CARE COORDINATION | Age: 88
End: 2024-12-03

## 2024-12-03 NOTE — CARE COORDINATION
Remote Alert Monitoring Note      Date/Time:  12/3/2024 9:48 AM  Patient Current Location: Home: [address]  [place] [id number]    LPN contacted patient by telephone regarding red alert received for weight increase (130.6lb). Verified patients name and  as identifiers.    Rpm alert to be reviewed by the provider                         Background: High Blood-Pressure, CHF, Kidney Disease     Refer to 911 immediately if:  Patient unresponsive or unable to provide history  Change in cognition or sudden confusion  Patient unable to respond in complete sentences  Intense chest pain/tightness  Any concern for any clinical emergency  Red Alert: Provider response time of 1 hr required for any red alert requiring intervention  Yellow Alert: Provider response time of 3hr required for any escalated yellow alert        Weight Weight Scale Triage  Was your weight obtained upon rising/waking today? no   Was your weight obtained after voiding and/or use of the bathroom today? yes   Did you weigh yourself in the same amount of clothing today, compared to how you typically do? yes   Was the scale bumped or moved prior to today's weight? no   Is your scale on a flat/hard surface? yes   Did you obtain your weight with shoes on? no   If yes, is this something you normally do during your daily weights? yes   Were you standing up straight on the scale today? yes   Were you leaning on anything while obtaining your weight today? no     Have you taken your medications as instructed by your doctor today? Yes   Weight Triage  Are you weighing any different than you did yesterday? (time of day, clothes and shoes on or off, etc)? Yes after breakfast   Do you have any shortness of breath? no   Do you have any swelling in your hands of feet? unchanged   Are you having any other health concerns or issues? no       Clinical Interventions: Reviewed and followed up on alerts and treatments-. Pt is in no apparent distress, speaking in

## 2024-12-05 ENCOUNTER — CARE COORDINATION (OUTPATIENT)
Dept: CARE COORDINATION | Age: 88
End: 2024-12-05

## 2024-12-06 ENCOUNTER — CARE COORDINATION (OUTPATIENT)
Dept: CARE COORDINATION | Age: 88
End: 2024-12-06

## 2024-12-06 NOTE — CARE COORDINATION
Remote Alert Monitoring Note      Date/Time:  2024 8:15 AM  Patient Current Location: Home: [address]  [place] [id number]    LPN contacted patient by telephone regarding red alert received for weight increase (130lb). Verified patients name and  as identifiers.    Rpm alert to be reviewed by the provider                         Background: High Blood-Pressure, CHF, Kidney Disease     Refer to 911 immediately if:  Patient unresponsive or unable to provide history  Change in cognition or sudden confusion  Patient unable to respond in complete sentences  Intense chest pain/tightness  Any concern for any clinical emergency  Red Alert: Provider response time of 1 hr required for any red alert requiring intervention  Yellow Alert: Provider response time of 3hr required for any escalated yellow alert        Weight Weight Scale Triage  Was your weight obtained upon rising/waking today? yes   Was your weight obtained after voiding and/or use of the bathroom today? yes   Did you weigh yourself in the same amount of clothing today, compared to how you typically do? yes   Was the scale bumped or moved prior to today's weight? no   Is your scale on a flat/hard surface? yes   Did you obtain your weight with shoes on? no   If yes, is this something you normally do during your daily weights? yes   Were you standing up straight on the scale today? yes   Were you leaning on anything while obtaining your weight today? no     Have you taken your medications as instructed by your doctor today? Yes   Weight Triage  Are you weighing any different than you did yesterday? (time of day, clothes and shoes on or off, etc)? no   Do you have any shortness of breath? no   Do you have any swelling in your hands of feet? Baseline minimal   Are you having any other health concerns or issues? no       Clinical Interventions: Reviewed and followed up on alerts and treatments-. 4.6lb weight increase noted this am. Pt is in no apparent

## 2024-12-11 ENCOUNTER — CARE COORDINATION (OUTPATIENT)
Dept: CARE COORDINATION | Age: 88
End: 2024-12-11

## 2024-12-11 NOTE — CARE COORDINATION
Ambulatory Care Coordination Note     12/11/2024 10:33 AM     Patient outreach attempt by this ACM today to perform care management follow up . ACM was unable to reach the patient by telephone today;   left voice message requesting a return phone call to this ACM.     ACM: Rupal Griffin RN     Care Summary Note:     PCP/Specialist follow up:   Future Appointments         Provider Specialty Dept Phone    3/12/2025 3:00 PM Christopher Hwang MD Cardiology [phone]    4/22/2025 10:00 AM STR EXAM ROOM 4 IP Unit [phone]    4/22/2025 1:20 PM Martin Davenport,  Family Medicine [phone]    7/28/2025 1:00 PM (Arrive by 12:50 PM) [facility] [phone]    10/23/2025 11:30 AM Jie Multani, APRN - CNP Cardiology [phone]            Follow Up:   Plan for next ACM outreach in approximately 1 week to complete:  - disease specific assessments  - medication review  - goal progression  - education   - RPM.

## 2024-12-11 NOTE — CARE COORDINATION
Ambulatory Care Coordination Note     2024 1:30 PM     Patient Current Location:  Home: [facility] [address]  [place] [address]     ACM contacted the patient by telephone. Verified name and  with patient as identifiers.         ACM: Rupal Griffin RN     Challenges to be reviewed by the provider   Additional needs identified to be addressed with provider No  none               Method of communication with provider: none.    Utilization: Patient has not had any utilization since our last call.     Care Summary Note: Spoke with Kristel Crespo for review  She is currently working with chiropractor  Did see eye doctor as well  Did see LUANA Rincon, working on GI office for new medication that is a high cost medication  Dr. Castillo wants an EGD completed which will be scheduled  She is going to a [place] party tonight   Continues with RPM monitoring, no  alerts  Having injection at [facility]  in her hip    Plan  Continue with RPM  Reinforce importance of early symptom recognition and reporting              Offered patient enrollment in the Remote Patient Monitoring (RPM) program for in-home monitoring: Yes, patient enrolled; current status is activated and monitoring.     Assessments Completed:   No changes since last call    Medications Reviewed:   Patient denies any changes with medications and reports taking all medications as prescribed.    Advance Care Planning:   Reviewed and current     Care Planning:    Goals Addressed                      This Visit's Progress      Conditions and Symptoms (pt-stated)   On track      I will schedule office visits, as directed by my provider.  I will keep my appointment or reschedule if I have to cancel.  I will notify my provider of any barriers to my plan of care.  I will follow my Zone Management tool to seek urgent or emergent care.  I will notify my provider of any symptoms that indicate a worsening of my condition.    Barriers: need for additional support

## 2024-12-12 ENCOUNTER — CARE COORDINATION (OUTPATIENT)
Dept: CARE COORDINATION | Age: 88
End: 2024-12-12

## 2024-12-12 NOTE — CARE COORDINATION
Kristel Welch  12/12/2024    Registered Dietitian Progress Note for Care Coordination    Assessment: Kristel is a 89 y.o. female. RD received incoming call from patient today 12/12/24. Patient states she received the handouts and coupons in the mail- patient is very appreciative and states she read through the information. RD reiterated the importance of eating balanced meals consistently throughout the day and following a low sodium diet closely. Reviewed the components of a balanced meal using MyPlate. Discussed incorporating a variety of fruits, vegetables, whole grains, lean protein and low fat dairy. Patient states she made homemade split pea soup that she really enjoyed. Patient states she will try her best to stay on track with the holidays coming up- RD discussed the importance of moderation and watching portion sizes. Patient verbalizes understanding. Patient asked specifically about ONS- patient states she bought Ensure Original. RD explained ONS can be used as a meal replacement, snack or to help supplement a meal. RD recommended Ensure HP instead of Original. Patient verbalizes understanding. Patient has no nutrition related questions or concerns at this time. Per chart review, patient had OV with PCP on 12/2/24.      Follow Up:    RD will call pt in 4-6 weeks to follow up and answer any nutrition related questions at that time.     Armida Darling RDN, LD  [phone]

## 2024-12-13 ENCOUNTER — CARE COORDINATION (OUTPATIENT)
Dept: CARE COORDINATION | Age: 88
End: 2024-12-13

## 2024-12-13 NOTE — CARE COORDINATION
Remote Alert Monitoring Note      Date/Time:  2024 8:36 AM  Patient Current Location: Home: [address]  [facility] [address]    LPN contacted patient by telephone regarding red alert received for weight increase (128.8lb). Verified patients name and  as identifiers.    Rpm alert to be reviewed by the provider    Red Alert Weight Gain    4.6lb weight gain overnight. Patient states she had pizza for supper last night and knew she shouldn't have. She states she is going to take an extra Lasix 20mg today as she has been directed in the past. She states she is feeling great and denies any changes in breathing or swelling.  She has not checked her BP yet today, but states she will after breakfast.    See metric hx below                       Background: High Blood-Pressure, CHF, Kidney Disease     Refer to 911 immediately if:  Patient unresponsive or unable to provide history  Change in cognition or sudden confusion  Patient unable to respond in complete sentences  Intense chest pain/tightness  Any concern for any clinical emergency  Red Alert: Provider response time of 1 hr required for any red alert requiring intervention  Yellow Alert: Provider response time of 3hr required for any escalated yellow alert        Weight Weight Scale Triage  Was your weight obtained upon rising/waking today? yes   Was your weight obtained after voiding and/or use of the bathroom today? yes   Did you weigh yourself in the same amount of clothing today, compared to how you typically do? yes   Was the scale bumped or moved prior to today's weight? no   Is your scale on a flat/hard surface? yes   Did you obtain your weight with shoes on? no   If yes, is this something you normally do during your daily weights? yes   Were you standing up straight on the scale today? yes   Were you leaning on anything while obtaining your weight today? no   Weight Education Provided to Patient or Caregiver:   Patient to weigh on the same scale

## 2024-12-16 ENCOUNTER — CARE COORDINATION (OUTPATIENT)
Dept: CARE COORDINATION | Age: 88
End: 2024-12-16

## 2024-12-16 DIAGNOSIS — I50.22 CHRONIC HFREF (HEART FAILURE WITH REDUCED EJECTION FRACTION) (HCC): ICD-10-CM

## 2024-12-16 DIAGNOSIS — I10 ESSENTIAL HYPERTENSION: ICD-10-CM

## 2024-12-16 DIAGNOSIS — E03.9 HYPOTHYROIDISM, UNSPECIFIED TYPE: ICD-10-CM

## 2024-12-16 RX ORDER — THYROID 30 MG/1
30 TABLET ORAL DAILY
Qty: 90 TABLET | Refills: 3 | Status: SHIPPED | OUTPATIENT
Start: 2024-12-16

## 2024-12-16 NOTE — CARE COORDINATION
Ambulatory Care Coordination Note     2024 2:12 PM     Patient Current Location:  Home: [address]  [facility] [address]     Patient contacted the ACM by telephone. Verified name and  with patient as identifiers.         ACM: Rupal Griffin RN     Challenges to be reviewed by the provider   Additional needs identified to be addressed with provider No  none               Method of communication with provider: none.    Utilization: Patient has not had any utilization since our last call.     Care Summary Note: Spoke with Kristel  Discussed RPM red alert  States she is feeling good  Went out to lunch today  Took an extra 1/2 tab of Lasix  States asymptomatic  Went over medications in detail  Answered questions regarding medications  Asking for refill on  thyroid medications     Plan  Ask for refill   Continue with RPM monitoring      Offered patient enrollment in the Remote Patient Monitoring (RPM) program for in-home monitoring: Yes, patient enrolled; current status is activated and monitoring.     Assessments Completed:   Congestive Heart Failure Assessment    Are you currently restricting fluids?: No Restriction  Do you understand a low sodium diet?: Yes  Do you understand how to read food labels?: Yes  How many restaurant meals do you eat per week?: 1-2  Do you salt your food before tasting it?: No         Symptoms:               Medications Reviewed:   Patient denies any changes with medications and reports taking all medications as prescribed.    Advance Care Planning:   Not reviewed during this call     Care Planning:    Goals Addressed    None          PCP/Specialist follow up:   Future Appointments         Provider Specialty Dept Phone    3/12/2025 3:00 PM Christopher Hwang MD Cardiology [phone]    2025 10:00 AM STR EXAM ROOM 4 IP Unit [phone]    2025 1:20 PM Martin Davenport DO Family Medicine [phone]    2025 1:00 PM (Arrive by 12:50 PM) STR MAMMOGRAPHY RM2

## 2024-12-16 NOTE — CARE COORDINATION
Remote Alert Monitoring Note      Date/Time:  2024 8:56 AM  Patient Current Location: Home: [address]  [facility] [address]    LPN contacted patient by telephone regarding red alert received for weight increase (128.8lb). Verified patients name and  as identifiers.    Rpm alert to be reviewed by the provider   Red Alert Weight Gain     5lb weight gain overnight. Patient was up 5lbs on Friday and took her extra 20mg Lasix and weight was down to 123.8 over the weekend. A neighbor brought her renaerole last night and she is not sure what kind of sodium content it had. Weight spiked again this am and she did take her extra 20mg Lasix again. She denies any concerning sxs, SOB, cough, swelling.  Will monitor    See metric hx below                        Background: High Blood-Pressure, CHF, Kidney Disease     Refer to 911 immediately if:  Patient unresponsive or unable to provide history  Change in cognition or sudden confusion  Patient unable to respond in complete sentences  Intense chest pain/tightness  Any concern for any clinical emergency  Red Alert: Provider response time of 1 hr required for any red alert requiring intervention  Yellow Alert: Provider response time of 3hr required for any escalated yellow alert        Weight Weight Scale Triage  Was your weight obtained upon rising/waking today? yes   Was your weight obtained after voiding and/or use of the bathroom today? yes   Did you weigh yourself in the same amount of clothing today, compared to how you typically do? yes   Was the scale bumped or moved prior to today's weight? no   Is your scale on a flat/hard surface? yes   Did you obtain your weight with shoes on? no   If yes, is this something you normally do during your daily weights? yes   Were you standing up straight on the scale today? yes   Were you leaning on anything while obtaining your weight today? no   Weight Education Provided to Patient or Caregiver:   Patient to weigh on the

## 2024-12-17 ENCOUNTER — CARE COORDINATION (OUTPATIENT)
Dept: CARE COORDINATION | Age: 88
End: 2024-12-17

## 2024-12-17 NOTE — CARE COORDINATION
Date/Time:  12/17/2024 9:06 AM    VM received from patient sating BP reading did not register on tablet. BP updated in HRS and Tech Support informed.

## 2024-12-18 ENCOUNTER — CARE COORDINATION (OUTPATIENT)
Dept: CARE COORDINATION | Age: 88
End: 2024-12-18

## 2024-12-18 NOTE — TELEPHONE ENCOUNTER
Agree w/ extra Lasix dose.  Question if all this d/t holidays - ok keep utilizing PRN as needed.  At 89 yrs old hate increase maintenance Lasix if not really needed.   Will see though how she does over next week/so

## 2024-12-18 NOTE — CARE COORDINATION
Remote Alert Monitoring Note      Date/Time:  2024 8:14 AM  Patient Current Location: Home: [address]  [place] [address]    LPN contacted patient by telephone regarding red alert received for weight increase (129.2lb). Verified patients name and  as identifiers.    Rpm alert to be reviewed by the provider    Red Alert Weight Gain    Pt is asymptomatic and in no apparent distress, speaking in full sentences.  5.8lb weight increase overnight. Patient states she didn't pay close attention to her diet yesterday as she was out for appointments. She denies any swelling or changes in breathing. She did take her PRN dose of Lasix 20mg Patients weight has been fluctuating every other day this week and resolves with her PRN Lasix.     See metric hx below                     Background: High Blood-Pressure, CHF, Kidney Disease     Refer to 911 immediately if:  Patient unresponsive or unable to provide history  Change in cognition or sudden confusion  Patient unable to respond in complete sentences  Intense chest pain/tightness  Any concern for any clinical emergency  Red Alert: Provider response time of 1 hr required for any red alert requiring intervention  Yellow Alert: Provider response time of 3hr required for any escalated yellow alert        Weight Weight Scale Triage  Was your weight obtained upon rising/waking today? yes   Was your weight obtained after voiding and/or use of the bathroom today? yes   Did you weigh yourself in the same amount of clothing today, compared to how you typically do? yes   Was the scale bumped or moved prior to today's weight? no   Is your scale on a flat/hard surface? yes   Did you obtain your weight with shoes on? no   If yes, is this something you normally do during your daily weights? yes   Were you standing up straight on the scale today? yes   Were you leaning on anything while obtaining your weight today? no   Weight Education Provided to Patient or Caregiver:

## 2024-12-23 ENCOUNTER — CARE COORDINATION (OUTPATIENT)
Dept: CARE COORDINATION | Age: 88
End: 2024-12-23

## 2024-12-23 NOTE — CARE COORDINATION
WALTER received a message from Kristel.  She is asking about a letter she received in the mail from [employer].  She was currently at grocery store and will call me back one she is home.

## 2024-12-23 NOTE — CARE COORDINATION
Kristel called [facility] back.  Read letter from [facility].  Informed Kristel of why she received the letter.  Kristel was never active with [facility] services as she declined those services.

## 2024-12-26 ENCOUNTER — CARE COORDINATION (OUTPATIENT)
Dept: CARE COORDINATION | Age: 88
End: 2024-12-26

## 2024-12-26 NOTE — CARE COORDINATION
2024 8:05 AM  *  Alert and Triage   -Remote Alert Monitoring Note      Date/Time:  2024 8:05 AM  Patient Current Location: Home: [facility] [address]  [place] [id number]  Verified patients name and  as identifiers.    Rpm alert to be reviewed by the provider   red alert  weight (130.2)    Additional needs to be addressed by provider: No           LPN contacted patient by telephone regarding red alert received   Background: enrolled in RPM for HTN, CHF and Kidney Disease.   Refer to 911 immediately if:  Patient unresponsive or unable to provide history  Change in cognition or sudden confusion  Patient unable to respond in complete sentences  Intense chest pain/tightness  Any concern for any clinical emergency  Red Alert: Provider response time of 1 hr required for any red alert requiring intervention  Yellow Alert: Provider response time of 3hr required for any escalated yellow alert  Patient Chief Complaint:  Weight Weight Scale Triage  Was your weight obtained upon rising/waking today? yes   Was your weight obtained after voiding and/or use of the bathroom today? yes   Did you weigh yourself in the same amount of clothing today, compared to how you typically do? Yes the patient had been moving scale daily and placing the scale on her spare bed. The patient education was provided to place scale on hard flat surface where it can stay and not be moved. The patient to obtain weights upon rising, after voiding and prior to dressing. The patient verbalized understanding.    Was the scale bumped or moved prior to today's weight? Yes - moves from bed    Is your scale on a flat/hard surface? yes   Did you obtain your weight with shoes on? no   If yes, is this something you normally do during your daily weights? NA   Were you standing up straight on the scale today? no   Were you leaning on anything while obtaining your weight today? NA   Weight Education Provided to Patient or Caregiver:   Patient to weigh on

## 2025-01-02 ENCOUNTER — CARE COORDINATION (OUTPATIENT)
Dept: CARE COORDINATION | Age: 89
End: 2025-01-02

## 2025-01-02 NOTE — CARE COORDINATION
Spoke with [employer] team as Kristel is getting very confused with all the calls, texts and mailings.  This is causing Kristel some confusion and anxiety regarding this.  I talked to Sunita and got her off of the calling, mailing and texting lists.  Kristel informed.

## 2025-01-02 NOTE — CARE COORDINATION
PCP office staff,      Could you please reach out to  Kristel to assist with setting up a Same Day appt if possible for her to be seen.  She is having raspy throat symptoms, sinus issues and is having chills.  She would like to be seen before the weekend.  I informed her that PCP was out for the week and she was agreeable to see any provider in the office covering for PCP.  Thank you!

## 2025-01-02 NOTE — CARE COORDINATION
Spoke with patient appt is scheduled for tomorrow 1.3.25     Future Appointments   Date Time Provider Department Center   1/3/2025 10:20 AM Nikky Banks APRN - CNP Fam Med [facility] DEP   3/12/2025  3:00 PM Christopher Hwang MD [facility] - [place]   4/22/2025 10:00 AM STR EXAM ROOM 4 STRZ OP NURS [place] HOD   4/22/2025  1:20 PM Martin Davenport DO Fam Med [facility] DEP   7/28/2025  1:00 PM STR MAMMOGRAPHY RM2  [facility] WOMEN STR Rad/Card   10/23/2025 11:30 AM Jie Multani, RACQUEL - CNP N [facility] CHF [facility] - [place]

## 2025-01-03 ENCOUNTER — OFFICE VISIT (OUTPATIENT)
Dept: FAMILY MEDICINE CLINIC | Age: 89
End: 2025-01-03

## 2025-01-03 ENCOUNTER — CARE COORDINATION (OUTPATIENT)
Dept: CARE COORDINATION | Age: 89
End: 2025-01-03

## 2025-01-03 VITALS
HEART RATE: 74 BPM | DIASTOLIC BLOOD PRESSURE: 66 MMHG | OXYGEN SATURATION: 98 % | TEMPERATURE: 97.7 F | SYSTOLIC BLOOD PRESSURE: 122 MMHG | RESPIRATION RATE: 14 BRPM | BODY MASS INDEX: 25.64 KG/M2 | WEIGHT: 130.6 LBS | HEIGHT: 60 IN

## 2025-01-03 DIAGNOSIS — R09.81 CONGESTION OF NASAL SINUS: Primary | ICD-10-CM

## 2025-01-03 DIAGNOSIS — U07.1 COVID: ICD-10-CM

## 2025-01-03 DIAGNOSIS — R05.9 COUGH, UNSPECIFIED TYPE: ICD-10-CM

## 2025-01-03 LAB
INFLUENZA A ANTIBODY: NEGATIVE
INFLUENZA B ANTIBODY: NEGATIVE
Lab: ABNORMAL
QC PASS/FAIL: ABNORMAL
SARS-COV-2 RDRP RESP QL NAA+PROBE: POSITIVE

## 2025-01-03 RX ORDER — CEFDINIR 300 MG/1
300 CAPSULE ORAL DAILY
Qty: 7 CAPSULE | Refills: 0 | Status: SHIPPED | OUTPATIENT
Start: 2025-01-03 | End: 2025-01-10

## 2025-01-03 ASSESSMENT — ENCOUNTER SYMPTOMS
SINUS PRESSURE: 1
COUGH: 1
SINUS PAIN: 1
SHORTNESS OF BREATH: 0

## 2025-01-03 NOTE — CARE COORDINATION
[facility] [address] as pt texted that she was not able to get medication filled due to out of stock.  Pharmacy is closed for lunch from 1:30pm to 2:00pm.  ACM will call back after 2pm.

## 2025-01-03 NOTE — CARE COORDINATION
WALTER contacted [facility] and they informed me there is nothing they can do until the medication comes in on Monday.    LAURITAM contacted [facility] on [address]  The pharmacist there was very helpful and completed a search of the medication and states that the [facility] [address] has the medication.  I will inform PCP office of this to see if script can be sent there.

## 2025-01-03 NOTE — TELEPHONE ENCOUNTER
ACM called and spoke with Nikky Banks, explained situation that medication was going to cost $1100 due to not being in formulary as informed by [facility].  Nikky states she will call in an antibiotic to [facility] on [address] informed and verbalized understanding.

## 2025-01-03 NOTE — CARE COORDINATION
Nikky,        Just wanted to let you know that [facility] is currently out of molnupiravir.  They informed Kristel that it would be in after a few days.  Kristel is not sure she can wait that long to get the medication and get it started to treat her symptoms.   I have been trying to contact the pharmacy but I keep getting disconnected.  Wanted to let you know in case there was anything else that can be ordered.  Please advise.  Thank you!

## 2025-01-03 NOTE — PROGRESS NOTES
Kristel Welch is a 89 y.o. female thatpresents for Cough, Pharyngitis, Congestion, Fever, and Chills      History obtained today from Patient.    HPI      Cough    Started 3 days ago  And sore throat  No fevers  No shortness of breath  Drinking ok  Not taking anything over the counter  Some fatigue/body aches    I have reviewed the patient's past medical history, past surgical history, allergies,medications, social and family history and I have made updates where appropriate.    Past Medical History:   Diagnosis Date    Anxiety     Aortic valve regurgitation     Arthritis     ASHD (arteriosclerotic heart disease)     CKD (chronic kidney disease) stage 3, GFR 30-59 ml/min ([facility])     Diverticulosis     Dyslipidemia     GERD (gastroesophageal reflux disease)     Heart failure with reduced ejection fraction ([facility])     History of non-ST elevation myocardial infarction (NSTEMI) 05/06/2021    History of shingles     History of skin cancer 2013    SKIN CANCER ON LEFT ANKLE    Hypertension, essential     Hypothyroidism     Osteoporosis, post-menopausal     Prediabetes 03/17/2016    Psoriasis     S/P angioplasty with stent 05/06/2021    3 stents to LAD, 1 stent to OM, 2 stents to diag. per Dr. Castillo       Social History     Tobacco Use    Smoking status: Never     Passive exposure: Never    Smokeless tobacco: Never   Vaping Use    Vaping status: Never Used   Substance Use Topics    Alcohol use: No    Drug use: No       Family History   Problem Relation Age of Onset    Breast Cancer Sister 76    Stroke Sister     Heart Disease Sister     Breast Cancer Daughter 45        had breast removed but no problems after that.    Other Other         visual problems    Colon Cancer Neg Hx          Review of Systems   Constitutional:  Positive for appetite change and fatigue.   HENT:  Positive for congestion, sinus pressure and sinus pain.    Respiratory:  Positive for cough. Negative for shortness of breath.            PHYSICAL EXAM:  BP

## 2025-01-03 NOTE — CARE COORDINATION
[facility] does not have the medication in stock that was sent over for Kristel.    I called around and [facility] pharmacy on [address] has the medication.  Could you please [facility] to [facility] [address] so that Kristel can start the medication before the weekend?  Thank you!

## 2025-01-03 NOTE — CARE COORDINATION
ACLUC received a text message from Kristel.  I attempted to call her back and left a detailed voicemail.

## 2025-01-06 ENCOUNTER — CARE COORDINATION (OUTPATIENT)
Dept: CARE COORDINATION | Age: 89
End: 2025-01-06

## 2025-01-06 ENCOUNTER — TELEPHONE (OUTPATIENT)
Dept: FAMILY MEDICINE CLINIC | Age: 89
End: 2025-01-06

## 2025-01-06 NOTE — CARE COORDINATION
Received a call from Kristel.  States she is doing a lot better than she was.  States she didn't sleep well last evening because of sinus congestion.  States she does feel better.  States she will try OTC medication as PCP office recommends.

## 2025-01-06 NOTE — CARE COORDINATION
Kristel texted ACM with questions regarding OTC medication she bought at pharmacy.  ACM was able to answer question.

## 2025-01-06 NOTE — TELEPHONE ENCOUNTER
Called and spoke to patient, states yesterday and Saturday we're pretty terrible. Took the medication and had diarrhea from 9pm-9am. States that yesterday she had the coughing and sneezing pretty bad. Slept 5 hours last night, states this morning she feels ok, nose is running a little bit.   Is there anything else she could take for the sinuses, wondering if paxlovid could be sent in? Doesn't know who has it in stock, or if it would benefit her at all.

## 2025-01-06 NOTE — TELEPHONE ENCOUNTER
Paxlovid  and plavix interact that's why I didn't do paxlovid  She can do flonase for sinus congestion and runny nose.  And can do Coricidin HBP over the counter for congestion/cough.  FU if no better in 2 days

## 2025-01-06 NOTE — TELEPHONE ENCOUNTER
Pt informed of OTC medication. Pt voiced understanding with no further questions at this time.

## 2025-01-09 ENCOUNTER — CARE COORDINATION (OUTPATIENT)
Dept: CARE COORDINATION | Age: 89
End: 2025-01-09

## 2025-01-09 NOTE — CARE COORDINATION
RPM team,       Kristel is ready to graduation from RPM monitoring.  Could you please assist with helping turn equipment back in.  Thank you!    Remote Patient Monitoring Graduation      Date/Time:  1/9/2025 9:49 AM  Patient Current Location: Home: [facility] [address]  [facility] [address]  Patient has graduated from the Remote Patient Monitoring program on 1/9/2025.   RPM goals have been met at this time.      Patient has been provided instruction on process to return RPM equipment and RPM has been deactivated.     Patient has AC's contact information for any further questions, concerns, or needs.

## 2025-01-09 NOTE — CARE COORDINATION
Ambulatory Care Coordination Note     2025 9:43 AM     Patient Current Location:  Home: [address]  [facility] [address]     ACM contacted the patient by telephone. Verified name and  with patient as identifiers.         ACM: Rupal Griffin RN     Challenges to be reviewed by the provider   Additional needs identified to be addressed with provider No  none               Method of communication with provider: none.    Utilization: Patient has not had any utilization since our last call.     Care Summary Note: Spoke with Kristel  States she is starting to feel better slowly  Still has cough but feeling better  Discussed RPM graduation, she is agreeable to graduation    Plan  RPM graduation sent to RPM team  Ensure continued resolve of COVID sx's    Offered patient enrollment in the Remote Patient Monitoring (RPM) program for in-home monitoring: Yes, patient enrolled; current status is activated and monitoring.     Assessments Completed:   General Assessment    Do you have any symptoms that are causing concern?: Yes  Progression since Onset: Gradually Improving  Reported Symptoms: Cough, Congestion          Medications Reviewed:   Patient denies any changes with medications and reports taking all medications as prescribed.    Advance Care Planning:   Not reviewed during this call     Care Planning:    Goals Addressed    None          PCP/Specialist follow up:   Future Appointments         Provider Specialty Dept Phone    3/12/2025 3:00 PM Christopher Hwang MD Cardiology [phone]    2025 10:00 AM STR EXAM ROOM 4 IP Unit [phone]    2025 1:20 PM Martin Davenport DO Family Medicine [phone]    2025 1:00 PM (Arrive by 12:50 PM) STR MAMMOGRAPHY RM2  [facility] Radiology [phone]    10/23/2025 11:30 AM Jie Multani, APRN - CNP Cardiology [phone]            Follow Up:   Plan for next AC outreach in approximately 1 week to complete:  - disease specific assessments  - medication

## 2025-01-10 ENCOUNTER — CARE COORDINATION (OUTPATIENT)
Dept: CARE COORDINATION | Age: 89
End: 2025-01-10

## 2025-01-10 NOTE — CARE COORDINATION
Received text message from Kristel.  States she is feeling better.  Still coughing up some mucus.  Now self monitoring as RPM equipment has been turned back in.  Reports weight of 125 and blood sugar of 113.  She is having some issues with her own blood pressure cuff fitting her correctly.  Encouraged her to go to pharmacy to see about purchasing a smaller cuff that will fit her better.

## 2025-01-14 ENCOUNTER — CARE COORDINATION (OUTPATIENT)
Dept: CARE COORDINATION | Age: 89
End: 2025-01-14

## 2025-01-14 NOTE — CARE COORDINATION
Ambulatory Care Coordination Note     2025 9:38 AM     Patient Current Location:  Home: [address]  [facility] [address]     Patient contacted the patient by telephone. Verified name and  with patient as identifiers.         ACM: Rupal Griffin RN     Challenges to be reviewed by the provider   Additional needs identified to be addressed with provider No  none               Method of communication with provider: none.    Utilization: Patient has not had any utilization since our last call.     Care Summary Note: received a text message from Kristel  She is having some issues with a blood pressure cuff since RPM equipment has been turned in  States her daughter is going to look today at [facility] to see if she can find her a new kit with a smaller cuff  Kristel states she is feeling much better from her Covid, still a little cough but much better and feeling back to her baseline    Plan  Reinforce education completed  Work towards graduation once no new barriers or support needed    Offered patient enrollment in the Remote Patient Monitoring (RPM) program for in-home monitoring: Yes, but did not enroll at this time: graduated .     Assessments Completed:   No changes since last call    Medications Reviewed:   Patient denies any changes with medications and reports taking all medications as prescribed.    Advance Care Planning:   Not reviewed during this call     Care Planning:    Goals Addressed    None          PCP/Specialist follow up:   Future Appointments         Provider Specialty Dept Phone    3/12/2025 3:00 PM Christopher Hwang MD Cardiology [phone]    2025 10:00 AM STR EXAM ROOM 4 IP Unit [phone]    2025 1:20 PM Martin Davenport DO Family Medicine [phone]    2025 1:00 PM (Arrive by 12:50 PM) STR MAMMOGRAPHY RM2  [facility] Radiology [phone]    10/23/2025 11:30 AM Jie Multani, APRN - CNP Cardiology [phone]            Follow Up:   Plan for next [facility] outreach

## 2025-01-14 NOTE — CARE COORDINATION
Kristel Welch  1/14/2025    Registered Dietitian Progress Note for Care Coordination    Assessment: Kristel is a 89 y.o. female. RD called patient today 1/14/25 for Dietitian follow up. Patient states she recently got over Covid. Patient states her appetite was off while she was sick and she mainly ate peanut butter bread, drank water and Pedialyte. Patient states she is starting to get back on track with her meals. Patient states she went to the grocery store earlier today and she bought cabbage, 1% cottage cheese, honeysuckle ground turkey 90% lean, light peach greek yogurt, cucumber, tomato, carrots, cinnamon, etc. Patient states she eats oatmeal with raisins, grapes, cinnamon and flax seed or raisin bran crunch cereal for breakfast. Patient states today for lunch she ate leftover homemade meatloaf, small baked potato and beets. Patient states tonight for dinner she is going to eat homemade chili soup. Patient states she is drinking a lot of water and drinking Ensure every other day. Patient states her weight is staying stable, no weight provided per patient. RD noted patient graduated from [facility]. Reiterated the importance of taking medicine as directed and monitoring daily weights. Patient verbalizes understanding. Patient has no additional nutrition related questions or concerns at this time.     Follow Up:    RD will call pt in 3-4 weeks to follow up and answer any nutrition related questions at that time.     Armida Darling RDN,   [phone]

## 2025-01-28 ENCOUNTER — TELEPHONE (OUTPATIENT)
Dept: FAMILY MEDICINE CLINIC | Age: 89
End: 2025-01-28

## 2025-01-28 ENCOUNTER — OFFICE VISIT (OUTPATIENT)
Dept: FAMILY MEDICINE CLINIC | Age: 89
End: 2025-01-28
Payer: MEDICARE

## 2025-01-28 ENCOUNTER — CARE COORDINATION (OUTPATIENT)
Dept: CARE COORDINATION | Age: 89
End: 2025-01-28

## 2025-01-28 VITALS
RESPIRATION RATE: 16 BRPM | WEIGHT: 126 LBS | SYSTOLIC BLOOD PRESSURE: 122 MMHG | OXYGEN SATURATION: 98 % | BODY MASS INDEX: 24.74 KG/M2 | HEIGHT: 60 IN | TEMPERATURE: 97.1 F | HEART RATE: 70 BPM | DIASTOLIC BLOOD PRESSURE: 62 MMHG

## 2025-01-28 DIAGNOSIS — J06.9 ACUTE UPPER RESPIRATORY INFECTION: Primary | ICD-10-CM

## 2025-01-28 DIAGNOSIS — Z86.16 HISTORY OF COVID-19: ICD-10-CM

## 2025-01-28 PROCEDURE — G8427 DOCREV CUR MEDS BY ELIG CLIN: HCPCS | Performed by: FAMILY MEDICINE

## 2025-01-28 PROCEDURE — 1159F MED LIST DOCD IN RCRD: CPT | Performed by: FAMILY MEDICINE

## 2025-01-28 PROCEDURE — 99213 OFFICE O/P EST LOW 20 MIN: CPT | Performed by: FAMILY MEDICINE

## 2025-01-28 PROCEDURE — 1160F RVW MEDS BY RX/DR IN RCRD: CPT | Performed by: FAMILY MEDICINE

## 2025-01-28 PROCEDURE — 1036F TOBACCO NON-USER: CPT | Performed by: FAMILY MEDICINE

## 2025-01-28 PROCEDURE — 1090F PRES/ABSN URINE INCON ASSESS: CPT | Performed by: FAMILY MEDICINE

## 2025-01-28 PROCEDURE — 1123F ACP DISCUSS/DSCN MKR DOCD: CPT | Performed by: FAMILY MEDICINE

## 2025-01-28 PROCEDURE — G8420 CALC BMI NORM PARAMETERS: HCPCS | Performed by: FAMILY MEDICINE

## 2025-01-28 RX ORDER — FAMOTIDINE 40 MG/1
40 TABLET, FILM COATED ORAL DAILY
COMMUNITY
Start: 2024-12-07

## 2025-01-28 RX ORDER — ESOMEPRAZOLE MAGNESIUM 40 MG/1
CAPSULE, DELAYED RELEASE ORAL
COMMUNITY
Start: 2024-12-17

## 2025-01-28 ASSESSMENT — PATIENT HEALTH QUESTIONNAIRE - PHQ9
1. LITTLE INTEREST OR PLEASURE IN DOING THINGS: NOT AT ALL
SUM OF ALL RESPONSES TO PHQ QUESTIONS 1-9: 0
2. FEELING DOWN, DEPRESSED OR HOPELESS: NOT AT ALL
SUM OF ALL RESPONSES TO PHQ9 QUESTIONS 1 & 2: 0

## 2025-01-28 NOTE — TELEPHONE ENCOUNTER
Future Appointments   Date Time Provider Department Center   1/28/2025  1:40 PM Martin Davenport, DO [facility] DEP   3/12/2025  3:00 PM Christopher Hwang MD N [facility] Heart P - [place]   4/22/2025 10:00 AM STR EXAM ROOM 4 [facility] OP NURS [place] HOD   4/22/2025  1:20 PM Martin Davenport, DO [facility] DEP   7/28/2025  1:00 PM STR MAMMOGRAPHY RM2  [facility] WOMEN STR Rad/Card   10/23/2025 11:30 AM Jie Multani, APRN - CNP N [facility] CHF P - [place]

## 2025-01-28 NOTE — PROGRESS NOTES
Chief Complaint   Patient presents with    Cold Symptoms     History obtained from the patient.    SUBJECTIVE:  Kristel Welch is a 89 y.o. female that presents today for     -URI type sxs:   Started over the weekend with mild sxs  Nasal congestion  Drainage  Mild cough  No fever  No SOB  Feeling a bit better today  Just had COVID on 1/3/25  Got over that ok  Was around some folks with colds    Fever - No  Runny nose or congestion -  Yes   Cough -  Yes  Sore throat -  No  Headache, fatigue, joint pains, muscle aches -  No  Double Sickening - No  Shortness of breath/Wheezing? -  No  Nausea/Vomiting/Diarrhea?  No  Sick contacts - Yes  Maxillary Tooth Pain -  No  Treatment tried and response - none      Age/Gender Health Maintenance    Lipid -   Lab Results   Component Value Date    CHOL 128 09/18/2023    CHOL 112 02/22/2022    CHOL 184 05/07/2021     Lab Results   Component Value Date    TRIG 92 09/18/2023    TRIG 75 02/22/2022    TRIG 82 05/07/2021     Lab Results   Component Value Date    HDL 42 03/04/2024    HDL 41 09/18/2023    HDL 45 02/22/2022     Lab Results   Component Value Date    LDL 51 03/04/2024    LDL 69 09/18/2023    LDL 52 02/22/2022       DM Screen -   Lab Results   Component Value Date/Time    GLUCOSE 115 09/02/2024 09:05 AM    GLUCOSE 97 11/02/2019 06:30 AM     Lab Results   Component Value Date/Time    LABA1C 5.8 08/28/2024 08:30 AM    LABA1C 6.1 02/28/2024 08:06 AM    LABA1C 6.0 09/18/2023 08:08 AM    LABA1C 5.6 05/09/2021 03:31 AM    LABA1C 6.1 06/05/2019 11:20 AM    LABA1C 6.0 06/07/2018 01:37 PM       TSH -   Lab Results   Component Value Date    TSH 2.740 08/28/2024    T4FREE 1.32 03/04/2024       Colon Cancer Screening - Aged out  Lung Cancer Screening - Never smoker    Tetanus - UTD SEPT 2023  Influenza Vaccine - UTD FALL 2024  Pneumonia Vaccine - UTD x 2, PCV 13 and PPV 23  Zoster - UTD x 2     Breast Cancer Screening - Negative July 2024  Osteoporosis Screening - Osteoporosis July

## 2025-01-28 NOTE — CARE COORDINATION
Ambulatory Care Coordination Note     2025 10:01 AM     Patient Current Location:  Home: [address]  [place] [address]     ACM contacted the patient by telephone. Verified name and  with patient as identifiers.         ACM: Rupal Griffin RN     Challenges to be reviewed by the provider   Additional needs identified to be addressed with provider Yes  Not feeling well               Method of communication with provider: chart routing.    Utilization: Patient has not had any utilization since our last call.     Care Summary Note: Spoke with Kristel  Was working towards graduation but is having some issues with not feeling well  Will inform PCP of this  States her blood pressure 116/72  Weighing self on scale  Will work towards graduation  Weight is 125#  Chronic conditions at baseline  Working with RD    Plan  Inform PCP of sx's present    Offered patient enrollment in the Remote Patient Monitoring (RPM) program for in-home monitoring: Yes, but did not enroll at this time: already monitoring with home equipment.     Assessments Completed:   General Assessment    Do you have any symptoms that are causing concern?: Yes  Progression since Onset: Unchanged  Reported Symptoms:  (Comment: coughing yellow sputum)          Medications Reviewed:   Completed during this call    Advance Care Planning:   On file     Care Planning:    Goals Addressed    None          PCP/Specialist follow up:   Future Appointments         Provider Specialty Dept Phone    3/12/2025 3:00 PM Christopher Hwang MD Cardiology [phone]    2025 10:00 AM STR EXAM ROOM 4 IP Unit [phone]    2025 1:20 PM Martin Davenport DO Family Medicine [phone]    2025 1:00 PM (Arrive by 12:50 PM) STR MAMMOGRAPHY RM2  [facility] Radiology [phone]    10/23/2025 11:30 AM Jie Multani, APRN - CNP Cardiology [phone]            Follow Up:   Plan for next ACM outreach in approximately 2 weeks to complete:  -   .   Patient

## 2025-01-28 NOTE — CARE COORDINATION
Kristel Sadler states she was around some friends that ended up having colds and she was not aware of it during the time.  States 2 days later, she is now developing the symptoms.  Positive for cough with yellow sputum and raspy throat.  Denies fever,  denies congestion.  She is wanting PCP recommendations.  Please advise.  Thank you!

## 2025-01-28 NOTE — TELEPHONE ENCOUNTER
We're going to see if she can come in this afternoon to get some testing done to r/o covid/flu  If she can't come in, I'll order some swabs.  Dedra is calling her now.   Thanks!

## 2025-01-28 NOTE — TELEPHONE ENCOUNTER
Left message for pt to call back.    Per Dr. Davenport pt needs to come in for an appointment. Has an opening at 1:40 PM today

## 2025-01-29 ENCOUNTER — CARE COORDINATION (OUTPATIENT)
Dept: CARE COORDINATION | Age: 89
End: 2025-01-29

## 2025-01-29 DIAGNOSIS — F41.9 ANXIETY: Primary | ICD-10-CM

## 2025-01-29 RX ORDER — LORAZEPAM 0.5 MG/1
0.5 TABLET ORAL EVERY 8 HOURS PRN
Qty: 40 TABLET | Refills: 0 | Status: SHIPPED | OUTPATIENT
Start: 2025-01-29 | End: 2025-02-28

## 2025-01-29 NOTE — CARE COORDINATION
Kristel Sadler states she is having a dry, scratchy throat and blowing out \"crud from nose\".  Some yellowish color.  Has slight headache today as well.  She wants to know if she should take Coricidine HBP from over the counter for these symptoms?      She also states that she takes Ativan as needed when she gets \"uptight\".  She hardly uses it and sometimes only takes 1/2 tab as needed.  She states hers is .  States she still has 15 pills left but wanted to see if a refill could be called in due to it being .    Blood pressure, weight and pulse all stable at 126/65, 76 and 124#    Pharmacy is [facility] on [address]  Please advise.  Thank you!

## 2025-02-04 ENCOUNTER — CARE COORDINATION (OUTPATIENT)
Dept: CARE COORDINATION | Age: 89
End: 2025-02-04

## 2025-02-04 NOTE — CARE COORDINATION
Kristel Welch  2/4/2025    Registered Dietitian Progress Note for Care Coordination    Assessment: Kristel is a 89 y.o. female.  RD called to follow up with pt today 2/4/25. Patient states she is \"pretty good.\" Patient states she has been stressed out relating to getting her medications at the Pharmacy- RD provided active listening. RD reviewed the importance of eating 3 meals/day and making meals balanced using MyPlate. Patient states she continues to eat rolled oats for breakfast. Patient states she has been cooking with ground turkey instead of ground beef- per patient, she made homemade lasagna with ground turkey and low sodium cheese (discussed buying no salt added tomato sauce to use for lasagna). Patient states she made orange jello with apple and cottage cheese to have as a snack. Patient states she bought cabbage but it did not sit well (too gassy). Discussed the importance of fiber and fluid to help promote regular BM. Per chart review, patient had OV with PCP on 1/28/25 and patient's weight documented as 126 lb. Discussed the importance of meeting estimated nutrition needs daily and supplementing with ONS as needed. Patient states she has been drinking at least half a bottle of Ensure Original daily- patient states she wants to buy one with more protein. RD recommended Ensure Plus, Ensure HP or Ensure Enlive. RD offered to mail additional Ensure coupons, patient states she does not need any at this time. Patient has no additional nutrition related questions or concerns at this time.     Follow Up:    RD will call pt in 4 weeks to follow up and answer any nutrition related questions at that time.     Armida Darling RDN, LD  [phone]

## 2025-02-12 ENCOUNTER — CARE COORDINATION (OUTPATIENT)
Dept: CARE COORDINATION | Age: 89
End: 2025-02-12

## 2025-02-12 NOTE — CARE COORDINATION
Dr. Davenport,       I need to graduate Kristel from Care Coordination at this time due to prolonged follow up and need to discharge.  I will continue to support Kristel as she communicates with me often and will re enroll her if needed.  Do you agree with this plan?  Thank you!

## 2025-02-17 ENCOUNTER — CARE COORDINATION (OUTPATIENT)
Dept: CARE COORDINATION | Age: 89
End: 2025-02-17

## 2025-02-20 ENCOUNTER — CARE COORDINATION (OUTPATIENT)
Dept: CARE COORDINATION | Age: 89
End: 2025-02-20

## 2025-02-20 RX ORDER — METOPROLOL SUCCINATE 25 MG/1
25 TABLET, EXTENDED RELEASE ORAL DAILY
COMMUNITY
End: 2025-02-24 | Stop reason: SDUPTHER

## 2025-02-20 RX ORDER — METOPROLOL SUCCINATE 25 MG/1
25 TABLET, EXTENDED RELEASE ORAL DAILY
Qty: 90 TABLET | Refills: 3 | Status: CANCELLED | OUTPATIENT
Start: 2025-02-20

## 2025-02-20 NOTE — CARE COORDINATION
Kristel Sadler wanted to report her recent blood pressures over the past 3 days as she feels they might be running too low?  She states these readings are taken one hour after taking her medications.  98/52, 102/48 and 99/49.  States she doesn't feel too bad but can sometimes get a little dizzy feeling.  She has been eating some crackers when they get this low.    She would like PCP recommendations if these readings are okay for her?      Please advise.  Thank you!

## 2025-02-20 NOTE — TELEPHONE ENCOUNTER
Got it,  Her cardiac meds (Carvedilol/Spironolactone) are being written by cardiology.     I would reach out to them to see if they want to adjust something there. I'll have to defer them.    Thanks!

## 2025-02-20 NOTE — TELEPHONE ENCOUNTER
Pt notified of medication changes.   Medication list updated. New medication pended for refill.   Pharmacy verified.

## 2025-02-20 NOTE — CARE COORDINATION
Cardiology office,     Kristel wanted to report her recent blood pressures over the past 3 days as she feels they might be running too low? She states these readings are taken one hour after taking her medications. 98/52, 102/48 and 99/49. States she doesn't feel too bad but can sometimes get a little dizzy feeling. She has been eating some crackers when they get this low.     She states that when she was checking them before, she was checking them first thing in the morning after she ate and before her medications.  She states since Monday of this week, she is now checking them after she takes her medications and now are getting the lower readings.       When she was checking them before meds, she would get readings around 115-120's/ 70's.  But now that she is taking her meds and waiting an hour after to check them, she is getting these new lower readings.       She has been having several parties to celebrate her 89th birthday which is Friday, this is bringing her some concerns as she doesn't want to get too lightheaded while she is out with friends and family so much right now.     Please advise.  Thank you!

## 2025-02-20 NOTE — CARE COORDINATION
Dr. Davenport,       She states that when she was checking them before, she was checking them first thing in the morning after she ate and before her medications.  She states since Monday of this week, she is now checking them after she takes her medications and now are getting the lower readings.      When she was checking them before meds, she would get readings around 115-120's/ 70's.  But now that she is taking her meds and waiting an hour after to check them, she is getting these new lower readings.      She has been having several parties to celebrate her 89th birthday which is Friday, this is bringing her some concerns as she doesn't want to get too lightheaded while she is out with friends and family so much right now.

## 2025-02-20 NOTE — TELEPHONE ENCOUNTER
Stop coreg and change to Toprol XL 25 mg po daily (heart/HTN medication that is \"softer\" on blood pressure)

## 2025-02-24 RX ORDER — METOPROLOL SUCCINATE 25 MG/1
25 TABLET, EXTENDED RELEASE ORAL DAILY
Qty: 90 TABLET | Refills: 3 | OUTPATIENT
Start: 2025-02-24

## 2025-02-24 NOTE — TELEPHONE ENCOUNTER
Angela Sampson LPN at 2/20/2025  3:32 PM    Status: Signed   Pt notified of medication changes.   Medication list updated. New medication pended for refill.   Pharmacy verified.         Christopher Hwang MD at 2/20/2025  2:38 PM    Status: Signed   Stop coreg and change to Toprol XL 25 mg po daily (heart/HTN medication that is \"softer\" on blood pressure)

## 2025-02-26 ENCOUNTER — CARE COORDINATION (OUTPATIENT)
Dept: CARE COORDINATION | Age: 89
End: 2025-02-26

## 2025-02-26 NOTE — CARE COORDINATION
Kristel called and needed clarification on the medication changes from cardiology office.  Informed her of changes during the call and she verbalized understanding

## 2025-03-04 ENCOUNTER — CARE COORDINATION (OUTPATIENT)
Dept: CARE COORDINATION | Age: 89
End: 2025-03-04

## 2025-03-04 NOTE — CARE COORDINATION
Kristel CALLE Rebekah  3/4/2025    Registered Dietitian Progress Note for Care Coordination    Assessment: Kristel is a 89 y.o. female. Patient states she is feeling blessed but she is tired from all of her birthday celebrations. Patient states she is trying to eat right. Patient states today for lunch she ate a sloppy peter with a little cheese, broccoli, fruit and part of a cookie. Patient states last night for dinner she made homemade lasagna with ground turkey, small amount of sauce and a side salad. Patient states she plans to make a chicken casserole. RD reviewed the components of a balanced meal using MyPlate. Discussed incorporating a variety of fruits, vegetables, whole grains, lean protein, low fat dairy. Reiterated the importance of following a low sodium diet and limiting sodium to no more than 2000 mg per day. Per chart review, patient has OV with Cardiology on 3/12/25. Patient has no additional nutrition related questions or concerns at this time.     Follow Up:    RD will call pt in 1 month to follow up and answer any nutrition related questions at that time.     Armida Darling RDN, LD  [phone]

## 2025-03-06 ENCOUNTER — CARE COORDINATION (OUTPATIENT)
Dept: CARE COORDINATION | Age: 89
End: 2025-03-06

## 2025-03-06 NOTE — CARE COORDINATION
Kristel called and had some questions regarding some bills that she received from [facility].  States she also received a text message stating she owed money for going through an [place] Toll.  I informed her about the toll scam as it has been on the local news.  Kristel states she is going to call [employer] to help figure out what to do with the [facility] bill.

## 2025-03-10 ENCOUNTER — TELEPHONE (OUTPATIENT)
Dept: CARDIOLOGY CLINIC | Age: 89
End: 2025-03-10

## 2025-03-10 NOTE — TELEPHONE ENCOUNTER
Pre op Risk Assessment    Procedure egd with dilation  Physician dr smith  Date of surgery/procedure tbd    Last OV 9/12/24  Last Stress 3/21/23  Last Echo 8/28/25  Last Cath 5/7/21  Last Stent 5/7/21  Is patient on blood thinners plavix and aspirin  Hold Meds/how many days 7

## 2025-03-11 DIAGNOSIS — J30.89 NON-SEASONAL ALLERGIC RHINITIS, UNSPECIFIED TRIGGER: ICD-10-CM

## 2025-03-11 RX ORDER — MONTELUKAST SODIUM 10 MG/1
10 TABLET ORAL DAILY
Qty: 90 TABLET | Refills: 3 | Status: SHIPPED | OUTPATIENT
Start: 2025-03-11

## 2025-03-11 NOTE — PATIENT INSTRUCTIONS
You may receive a survey regarding the care you received during your visit. We encourage you to complete and return your survey, as your input is valuable to us. We hope you will choose us in the future for your healthcare needs. Thank you!    Your Medical Assistant today: GRICELDA Wise & ZAC Macias  Thank you for coming to our office! It was a pleasure to serve you.

## 2025-03-11 NOTE — TELEPHONE ENCOUNTER
Recent Visits  Date Type Provider Dept   01/28/25 Office Visit Martin Davenport, DO [facility]   01/03/25 Office Visit Nikky Banks APRN - CNP [facility]   12/02/24 Office Visit Martin Davenport, DO [facility]   10/22/24 Office Visit Martin Davenport, DO [facility]   09/12/24 Office Visit Martin Davenport, DO [facility]   06/11/24 Office Visit Martin Davenport, DO [facility]   03/11/24 Office Visit Martin Davenport, DO [facility]   02/26/24 Office Visit Martin Davenport, DO [facility]   12/27/23 Office Visit Clarence Pearson APRN - CNP [facility]   11/02/23 Office Visit Martin Davenport, DO [facility]   Showing recent visits within past 540 days with a meds authorizing provider and meeting all other requirements  Future Appointments  Date Type Provider Dept   04/22/25 Appointment Martin Davenport, DO [facility]   Showing future appointments within next 150 days with a meds authorizing provider and meeting all other requirements

## 2025-03-12 ENCOUNTER — OFFICE VISIT (OUTPATIENT)
Dept: CARDIOLOGY CLINIC | Age: 89
End: 2025-03-12
Payer: MEDICARE

## 2025-03-12 VITALS
HEART RATE: 67 BPM | DIASTOLIC BLOOD PRESSURE: 63 MMHG | HEIGHT: 60 IN | SYSTOLIC BLOOD PRESSURE: 151 MMHG | WEIGHT: 128.2 LBS | BODY MASS INDEX: 25.17 KG/M2

## 2025-03-12 DIAGNOSIS — I25.10 CAD IN NATIVE ARTERY: Primary | ICD-10-CM

## 2025-03-12 PROCEDURE — 1036F TOBACCO NON-USER: CPT | Performed by: INTERNAL MEDICINE

## 2025-03-12 PROCEDURE — 99214 OFFICE O/P EST MOD 30 MIN: CPT | Performed by: INTERNAL MEDICINE

## 2025-03-12 PROCEDURE — G8417 CALC BMI ABV UP PARAM F/U: HCPCS | Performed by: INTERNAL MEDICINE

## 2025-03-12 PROCEDURE — G8428 CUR MEDS NOT DOCUMENT: HCPCS | Performed by: INTERNAL MEDICINE

## 2025-03-12 PROCEDURE — 1090F PRES/ABSN URINE INCON ASSESS: CPT | Performed by: INTERNAL MEDICINE

## 2025-03-12 PROCEDURE — 1123F ACP DISCUSS/DSCN MKR DOCD: CPT | Performed by: INTERNAL MEDICINE

## 2025-03-12 NOTE — PROGRESS NOTES
[facility] PHYSICIANS [place] SPECIALTY  [facility] CARDIOLOGY  [address]  [address]  [place] [address]  Dept: [phone]  Dept Fax: [phone]  Loc: [phone]    Visit Date: 3/12/2025  Ms. Welch is a 89 y.o. female who presented for:  CAD  Preop  HPI:   Kristel Welch is a pleasant 89 y.o. female who  has a past medical history of Anxiety, Aortic valve regurgitation, Arthritis, ASHD (arteriosclerotic heart disease), CKD (chronic kidney disease) stage 3, GFR 30-59 ml/min ([facility]), Diverticulosis, Dyslipidemia, GERD (gastroesophageal reflux disease), Heart failure with reduced ejection fraction (HCC), History of non-ST elevation myocardial infarction (NSTEMI), History of shingles, History of skin cancer, Hypertension, essential, Hypothyroidism, Osteoporosis, post-menopausal, Prediabetes, Psoriasis, and S/P angioplasty with stent. On 5/2021, she was admitted to [facility] with NSTEMI. Patient underwent cardiac cath on 5/6/2021 which showed severe LAD 80-90% and DX 90% disease, Ostial OM 90%, RCA non-dominant. She underwent PCI of the LAD x 3 JUSTUS, PCI of the OM x 1 JUSTUS. Procedure was complicated by loss of side branch (D1) and no re-flow in LAD. IABP was placed post PCI. Ischemia was noted on stress test 3/2023, patient elected for medical management. She was admitted to [facility] and was seen by cardiology for mild troponin elevation/chest pain on 7/2024, patient refused invasive work up. Echo 8/2024 revealed an EF 45%. She is scheduled for EGD with possible esophageal dilation, preoperative cardiac risk was requested. The patient states she had COVID infection.     Current Outpatient Medications:     montelukast (SINGULAIR) 10 MG tablet, TAKE 1 TABLET BY MOUTH DAILY, Disp: 90 tablet, Rfl: 3    metoprolol succinate (TOPROL XL) 25 MG extended release tablet, Take 1 tablet by mouth daily, Disp: 90 tablet, Rfl: 3    LORazepam (ATIVAN) 0.5 MG tablet, Take 1 tablet by mouth every 8 hours as needed for

## 2025-03-13 ENCOUNTER — CARE COORDINATION (OUTPATIENT)
Dept: CARE COORDINATION | Age: 89
End: 2025-03-13

## 2025-03-13 NOTE — CARE COORDINATION
Received a text message from Kristel.  Asking if a text she received was a scam.  ACLUC contacted End[facility] of [facility] which is the GI office on [address] and confirmed that they did sent her a text message.  States it was regarding her upcoming EGD and they will be contacted her regarding this.  Encouraged their information to be called in versus sent by text because Kristel gets a lot of scam texts which confuses her.    Kristel informed.

## 2025-03-20 ENCOUNTER — CARE COORDINATION (OUTPATIENT)
Dept: CARE COORDINATION | Age: 89
End: 2025-03-20

## 2025-03-20 NOTE — CARE COORDINATION
Kristel called and states she has some questions with her bills.  States she is still getting bills from pediatricians and [facility].  I informed Kristel that I would contact Chasity Calderon, friend who helps Kristel to see if she is able to help figure out the bills that she is receiving.    Kristel states that her throat was stretched yesterday.  States she is doing well today.  States she will be switching to Dr. Paula with GI versus Dr. Castillo.      [facility] will call Chasity Calderon regarding assistance needed.

## 2025-03-27 ENCOUNTER — TELEPHONE (OUTPATIENT)
Dept: CARDIOLOGY CLINIC | Age: 89
End: 2025-03-27

## 2025-03-27 RX ORDER — METOPROLOL SUCCINATE 25 MG/1
12.5 TABLET, EXTENDED RELEASE ORAL DAILY
COMMUNITY

## 2025-03-27 RX ORDER — SPIRONOLACTONE 25 MG/1
12.5 TABLET ORAL DAILY
COMMUNITY

## 2025-03-27 NOTE — TELEPHONE ENCOUNTER
Pt states that her bp has been dropping lower like 91/42 and does get light headed at times. Asking for recommendations.

## 2025-03-27 NOTE — TELEPHONE ENCOUNTER
Decrease Toprol XL to 12.5 mg po daily  Decreased spironolactone to 12.5 mg po daily  Advise to keep a log with her home BP readings and to see PCP

## 2025-04-02 ENCOUNTER — TELEPHONE (OUTPATIENT)
Dept: FAMILY MEDICINE CLINIC | Age: 89
End: 2025-04-02

## 2025-04-02 DIAGNOSIS — R73.9 HYPERGLYCEMIA: ICD-10-CM

## 2025-04-02 DIAGNOSIS — I10 HYPERTENSION, ESSENTIAL: Primary | Chronic | ICD-10-CM

## 2025-04-03 NOTE — TELEPHONE ENCOUNTER
Pt informed of labs to be completed . Pt voiced understanding with no further questions at this time.     Lab slip mailed to pt

## 2025-04-03 NOTE — TELEPHONE ENCOUNTER
Pt due for fasting labs prior to next apt on 4/22/2025. Please call to have pt complete this. Thanks!    ASSESSMENT & PLAN   Diagnosis Orders   1. Hypertension, essential  CBC with Auto Differential    Comprehensive Metabolic Panel    Lipid Panel    TSH reflex to FT4    Albumin/Creatinine Ratio, Urine      2. Hyperglycemia  Hemoglobin A1C        Future Appointments   Date Time Provider Department Center   4/22/2025 12:00 PM STR RECOVERY ROOM D [facility] OP N[facility]S [place] HOD   4/22/2025  1:20 PM Martin Davenport, DO [facility] DEP   7/28/2025  1:00 PM STR MAMMOGRAPHY RM2  [facility]OM[facility] STR Rad/Card   10/23/2025 11:30 AM Jie Multani, APRN - CNP N [facility] CHF P - [place]   3/12/2026  2:15 PM Christopher Hwang MD N [facility] Heart [facility]

## 2025-04-04 ENCOUNTER — CARE COORDINATION (OUTPATIENT)
Dept: CARE COORDINATION | Age: 89
End: 2025-04-04

## 2025-04-04 NOTE — CARE COORDINATION
Kristel Welch  4/4/2025    Registered Dietitian Progress Note for Care Coordination    Assessment: Kristel is a 89 y.o. female. Patient states she is doing pretty good and she is getting back on track with her diet. Patient is eating 3 meals/day. Patient states last night for dinner she made ground turkey, scrambled egg, potatoes, carrots, onion. RD reviewed the components of a balanced meal using MyPlate and discussed the importance of watching sodium intake daily. Patient asked specifically about natural creamy peanut butter and puff popcorn. Patient states she had an EGD and everything looks okay but she still has some burping- RD recommended keeping a food log to help pinpoint trigger foods. Patient states she is drinking Ensure as needed. Patient states she recently bought Ensure Complete for more protein (30 grams protein per bottle). Patient requested additional Ensure coupons- RD will mail coupons to patient. Per chart review, patient had OV with Cardiology on 3/12/25 and patient's weight documented as 128 lb. Patient has no additional nutrition related questions or concerns at this time. Per chart review, patient has OV with PCP on 4/22/25.     Follow Up:    RD will call pt in 4 weeks to follow up and answer any nutrition related questions at that time.     Armida Darling RDN, LD  [phone]

## 2025-04-11 ENCOUNTER — CARE COORDINATION (OUTPATIENT)
Dept: CARE COORDINATION | Age: 89
End: 2025-04-11

## 2025-04-11 NOTE — CARE COORDINATION
ACM contacted Kristel to make sure she is doing okay.  States she has been feeling well with no issues.  States she got the unpaid bill situation fixed and didn't end up owing anything out of pocket.  Blood pressures are running good.  Thankful for the check in call.  Confirmed date and time of PCP appt scheduled this month.  
Private car

## 2025-04-12 ENCOUNTER — HOSPITAL ENCOUNTER (OUTPATIENT)
Dept: GENERAL RADIOLOGY | Age: 89
Discharge: HOME | End: 2025-04-12
Payer: MEDICARE

## 2025-04-12 DIAGNOSIS — I10 HYPERTENSION, ESSENTIAL: Chronic | ICD-10-CM

## 2025-04-12 DIAGNOSIS — R73.9 HYPERGLYCEMIA: ICD-10-CM

## 2025-04-12 LAB
ALBUMIN SERPL BCG-MCNC: 4.1 G/DL (ref 3.4–4.9)
ALP SERPL-CCNC: 89 U/L (ref 38–126)
ALT SERPL W/O P-5'-P-CCNC: 18 U/L (ref 10–35)
ANION GAP SERPL CALC-SCNC: 9 MEQ/L (ref 8–16)
AST SERPL-CCNC: 24 U/L (ref 10–35)
BASOPHILS ABSOLUTE: 0 THOU/MM3 (ref 0–0.1)
BASOPHILS NFR BLD AUTO: 0.8 %
BILIRUB SERPL-MCNC: 0.4 MG/DL (ref 0.3–1.2)
BUN SERPL-MCNC: 20 MG/DL (ref 8–23)
CALCIUM SERPL-MCNC: 9.9 MG/DL (ref 8.2–9.6)
CHLORIDE SERPL-SCNC: 105 MEQ/L (ref 98–111)
CHOLEST SERPL-MCNC: 119 MG/DL (ref 100–199)
CO2 SERPL-SCNC: 28 MEQ/L (ref 22–29)
CREAT SERPL-MCNC: 1 MG/DL (ref 0.5–0.9)
CREAT UR-MCNC: 29.4 MG/DL
DEPRECATED MEAN GLUCOSE BLD GHB EST-ACNC: 108 MG/DL (ref 70–126)
DEPRECATED RDW RBC AUTO: 49.7 FL (ref 35–45)
EOSINOPHIL NFR BLD AUTO: 1.6 %
EOSINOPHILS ABSOLUTE: 0.1 THOU/MM3 (ref 0–0.4)
ERYTHROCYTE [DISTWIDTH] IN BLOOD BY AUTOMATED COUNT: 13.3 % (ref 11.5–14.5)
GFR SERPL CREATININE-BSD FRML MDRD: 50 ML/MIN/1.73M2
GLUCOSE SERPL-MCNC: 105 MG/DL (ref 74–109)
HBA1C MFR BLD HPLC: 5.6 % (ref 4–6)
HCT VFR BLD AUTO: 43 % (ref 37–47)
HDLC SERPL-MCNC: 47 MG/DL
HGB BLD-MCNC: 13.8 GM/DL (ref 12–16)
IMM GRANULOCYTES # BLD AUTO: 0.01 THOU/MM3 (ref 0–0.07)
IMM GRANULOCYTES NFR BLD AUTO: 0.2 %
LDLC SERPL CALC-MCNC: 53 MG/DL
LYMPHOCYTES ABSOLUTE: 1.1 THOU/MM3 (ref 1–4.8)
LYMPHOCYTES NFR BLD AUTO: 22.1 %
MCH RBC QN AUTO: 32.4 PG (ref 26–33)
MCHC RBC AUTO-ENTMCNC: 32.1 GM/DL (ref 32.2–35.5)
MCV RBC AUTO: 100.9 FL (ref 81–99)
MICROALBUMIN UR-MCNC: 2.22 MG/DL
MICROALBUMIN/CREAT RATIO PNL UR: 76 MG/G (ref 0–30)
MONOCYTES ABSOLUTE: 0.6 THOU/MM3 (ref 0.4–1.3)
MONOCYTES NFR BLD AUTO: 11.6 %
NEUTROPHILS ABSOLUTE: 3.3 THOU/MM3 (ref 1.8–7.7)
NEUTROPHILS NFR BLD AUTO: 63.7 %
NRBC BLD AUTO-RTO: 0 /100 WBC
PLATELET # BLD AUTO: 150 THOU/MM3 (ref 130–400)
PMV BLD AUTO: 10.6 FL (ref 9.4–12.4)
POTASSIUM SERPL-SCNC: 4.3 MEQ/L (ref 3.5–5.2)
PROT SERPL-MCNC: 7 G/DL (ref 6.4–8.3)
RBC # BLD AUTO: 4.26 MILL/MM3 (ref 4.2–5.4)
SODIUM SERPL-SCNC: 142 MEQ/L (ref 135–145)
TRIGL SERPL-MCNC: 96 MG/DL (ref 0–199)
TSH SERPL DL<=0.05 MIU/L-ACNC: 4.06 UIU/ML (ref 0.27–4.2)
WBC # BLD AUTO: 5.2 THOU/MM3 (ref 4.8–10.8)

## 2025-04-12 PROCEDURE — 83036 HEMOGLOBIN GLYCOSYLATED A1C: CPT

## 2025-04-12 PROCEDURE — 82043 UR ALBUMIN QUANTITATIVE: CPT

## 2025-04-12 PROCEDURE — 36415 COLL VENOUS BLD VENIPUNCTURE: CPT

## 2025-04-12 PROCEDURE — 85025 COMPLETE CBC W/AUTO DIFF WBC: CPT

## 2025-04-12 PROCEDURE — 84443 ASSAY THYROID STIM HORMONE: CPT

## 2025-04-12 PROCEDURE — 80061 LIPID PANEL: CPT

## 2025-04-12 PROCEDURE — 80053 COMPREHEN METABOLIC PANEL: CPT

## 2025-04-13 ENCOUNTER — RESULTS FOLLOW-UP (OUTPATIENT)
Dept: FAMILY MEDICINE CLINIC | Age: 89
End: 2025-04-13

## 2025-04-13 DIAGNOSIS — E83.52 HYPERCALCEMIA: Primary | ICD-10-CM

## 2025-04-14 ENCOUNTER — TELEPHONE (OUTPATIENT)
Dept: FAMILY MEDICINE CLINIC | Age: 89
End: 2025-04-14

## 2025-04-14 NOTE — TELEPHONE ENCOUNTER
Martin Davenport,   P [facility] Clinical Staff    Please let pt know that labs overall looked good.  Her Ca++ level was mildly elevated. This is likely transient.  Recommend repeat BMP in 1 to 2 wks, fasting, to f/u on this  Let me know if questions, thanks!

## 2025-04-18 DIAGNOSIS — Z59.9 FINANCIAL DIFFICULTY: Primary | ICD-10-CM

## 2025-04-18 SDOH — ECONOMIC STABILITY - INCOME SECURITY: PROBLEM RELATED TO HOUSING AND ECONOMIC CIRCUMSTANCES, UNSPECIFIED: Z59.9

## 2025-04-21 NOTE — PROGRESS NOTES
Presyncope, Numbness, Seizures, Tremors  Endocrine:  Heat Intolerance, Cold Intolerance, Polydipsia, Polyphagia, Polyuria      PHYSICAL EXAM:  Vitals:    04/22/25 1331   BP: 138/84   Pulse: 79   Resp: 18   Temp: 97.7 °F (36.5 °C)   SpO2: 98%   Weight: 58.8 kg (129 lb 9.6 oz)   Height: 1.524 m (5')     Body mass index is 25.31 kg/m².     VS Reviewed  General Appearance: A&O x 3, No acute distress,well developed and well- nourished  Eyes: pupils equal, round, and reactive to light, extraocular eye movements intact, conjunctivae and eye lids without erythema  ENT: external ear and ear canal clear bilaterally, TMs intact and regular, nose without deformity, nasal mucosa and turbinates normal without polyps, oropharynx normal, dentition is normal for age  Neck: supple and non-tender without mass, no thyromegaly or thyroid nodules, no cervical lymphadenopathy  Pulmonary/Chest: clear to auscultation bilaterally- no wheezes, rales or rhonchi, normal air movement, no respiratory distress or retractions  Cardiovascular: S1 and S2 auscultated w/ RRR. No murmurs, rubs, clicks, or gallops, distal pulses intact.  Abdomen: soft, non-tender, non-distended, bowel sounds physiologic,  no rebound or guarding, no masses or hernias noted. Liver and spleen without enlargement.   Extremities: no cyanosis, clubbing or edema of the lower extremities.   Skin: warm and dry, no rash or erythema  Psych: Affect appropriate. Mood euthymic. Thought process is normal without evidence of depression or psychosis. Good insight and appropriate interaction.  Cognition and memory appear to be intact.      ASSESSMENT & PLAN  1. Dysphagia, unspecified type    Improved  Con't PPI/Famotidine  F/u GI, [place]    2. Gastroesophageal reflux disease, unspecified whether esophagitis present    As per # 1    3. Hypercalcemia    Was mild  Likely transient  Has f/u BMP ordered  Await results    4. ASHD (arteriosclerotic heart disease)    Stable  Con't DAPT, Toprol XL,

## 2025-04-22 ENCOUNTER — OFFICE VISIT (OUTPATIENT)
Dept: FAMILY MEDICINE CLINIC | Age: 89
End: 2025-04-22

## 2025-04-22 ENCOUNTER — HOSPITAL ENCOUNTER (OUTPATIENT)
Dept: NURSING | Age: 89
Discharge: HOME | End: 2025-04-22
Payer: MEDICARE

## 2025-04-22 VITALS
HEART RATE: 79 BPM | HEIGHT: 60 IN | BODY MASS INDEX: 25.45 KG/M2 | DIASTOLIC BLOOD PRESSURE: 84 MMHG | OXYGEN SATURATION: 98 % | RESPIRATION RATE: 18 BRPM | WEIGHT: 129.6 LBS | TEMPERATURE: 97.7 F | SYSTOLIC BLOOD PRESSURE: 138 MMHG

## 2025-04-22 VITALS
DIASTOLIC BLOOD PRESSURE: 69 MMHG | HEART RATE: 73 BPM | TEMPERATURE: 97.5 F | OXYGEN SATURATION: 95 % | SYSTOLIC BLOOD PRESSURE: 160 MMHG | RESPIRATION RATE: 16 BRPM

## 2025-04-22 DIAGNOSIS — I10 HYPERTENSION, ESSENTIAL: Chronic | ICD-10-CM

## 2025-04-22 DIAGNOSIS — S22.060S COMPRESSION FRACTURE OF T7 VERTEBRA, SEQUELA: ICD-10-CM

## 2025-04-22 DIAGNOSIS — I50.20 HEART FAILURE WITH REDUCED EJECTION FRACTION (HCC): Chronic | ICD-10-CM

## 2025-04-22 DIAGNOSIS — M81.0 OSTEOPOROSIS, POST-MENOPAUSAL: Primary | ICD-10-CM

## 2025-04-22 DIAGNOSIS — R13.10 DYSPHAGIA, UNSPECIFIED TYPE: Primary | ICD-10-CM

## 2025-04-22 DIAGNOSIS — E83.52 HYPERCALCEMIA: ICD-10-CM

## 2025-04-22 DIAGNOSIS — F41.9 ANXIETY: Chronic | ICD-10-CM

## 2025-04-22 DIAGNOSIS — E03.9 HYPOTHYROIDISM, UNSPECIFIED TYPE: ICD-10-CM

## 2025-04-22 DIAGNOSIS — E78.5 DYSLIPIDEMIA: Chronic | ICD-10-CM

## 2025-04-22 DIAGNOSIS — I25.10 ASHD (ARTERIOSCLEROTIC HEART DISEASE): Chronic | ICD-10-CM

## 2025-04-22 DIAGNOSIS — N18.31 STAGE 3A CHRONIC KIDNEY DISEASE (HCC): ICD-10-CM

## 2025-04-22 DIAGNOSIS — R73.03 PREDIABETES: Chronic | ICD-10-CM

## 2025-04-22 DIAGNOSIS — S22.050S COMPRESSION FRACTURE OF T5 VERTEBRA, SEQUELA: ICD-10-CM

## 2025-04-22 DIAGNOSIS — K21.9 GASTROESOPHAGEAL REFLUX DISEASE, UNSPECIFIED WHETHER ESOPHAGITIS PRESENT: Chronic | ICD-10-CM

## 2025-04-22 DIAGNOSIS — I25.2 HISTORY OF NON-ST ELEVATION MYOCARDIAL INFARCTION (NSTEMI): Chronic | ICD-10-CM

## 2025-04-22 DIAGNOSIS — M81.0 OSTEOPOROSIS, POST-MENOPAUSAL: Chronic | ICD-10-CM

## 2025-04-22 PROCEDURE — 96372 THER/PROPH/DIAG INJ SC/IM: CPT

## 2025-04-22 PROCEDURE — 6360000002 HC RX W HCPCS: Performed by: FAMILY MEDICINE

## 2025-04-22 RX ORDER — EPINEPHRINE 1 MG/ML
0.3 INJECTION, SOLUTION INTRAMUSCULAR; SUBCUTANEOUS PRN
Status: CANCELLED | OUTPATIENT
Start: 2025-10-21

## 2025-04-22 RX ORDER — SODIUM CHLORIDE 9 MG/ML
INJECTION, SOLUTION INTRAVENOUS CONTINUOUS
Status: CANCELLED | OUTPATIENT
Start: 2025-10-21

## 2025-04-22 RX ORDER — DIPHENHYDRAMINE HYDROCHLORIDE 50 MG/ML
50 INJECTION, SOLUTION INTRAMUSCULAR; INTRAVENOUS
Status: CANCELLED | OUTPATIENT
Start: 2025-10-21

## 2025-04-22 RX ADMIN — DENOSUMAB 60 MG: 60 INJECTION SUBCUTANEOUS at 12:14

## 2025-04-22 NOTE — PROGRESS NOTES
1200- Patient arrived to \[facility]\"" ambulatory with cane for Prolia injection. Patient states she has received before and tolerated well in the past. Vitals stable. Call light placed within reach. PT RIGHTS AND RESPONSIBILITIES OFFERED TO PT.    1214- Injection given. Patient tolerated well with no issues.     1220- Discharge instructions reviewed with patient. Verbalized understanding with no further questions. AVS provided. Discharged via wheelchair per request to lobby in stable condition.           __M__ Safety:       (Environmental)  [place] to environment  Ensure ID band is correct and in place/ allergy band as needed  Assess for fall risk  Initiate fall precautions as applicable (fall band, side rails, etc.)  Call light within reach  Bed in low position/ wheels locked    __M__ Pain:       Assess pain level and characteristics  Administer analgesics as ordered  Assess effectiveness of pain management and report to MD as needed    __M__ Knowledge Deficit:  Assess baseline knowledge  Provide teaching at level of understanding  Provide teaching via preferred learning method  Evaluate teaching effectiveness    __M__ Hemodynamic/Respiratory Status:       (Pre and Post Procedure Monitoring)  Assess/Monitor vital signs and LOC  Assess Baseline SpO2 prior to any sedation  Obtain weight/height  Assess vital signs/ LOC until patient meets discharge criteria  Monitor procedure site and notify MD of any issues

## 2025-04-22 NOTE — DISCHARGE INSTRUCTIONS
Prolia injection discharge instructions:    Side effects: headache, joint pain, rash, swelling    This medication is given every 6 months. We will need a new order before we schedule your next one due around: After October 23rd 2025    Please call [phone] with a any questions or concerns.

## 2025-04-25 ENCOUNTER — HOSPITAL ENCOUNTER (OUTPATIENT)
Age: 89
Discharge: HOME | End: 2025-04-25
Payer: MEDICARE

## 2025-04-25 ENCOUNTER — RESULTS FOLLOW-UP (OUTPATIENT)
Dept: FAMILY MEDICINE CLINIC | Age: 89
End: 2025-04-25

## 2025-04-25 DIAGNOSIS — E83.52 HYPERCALCEMIA: ICD-10-CM

## 2025-04-25 LAB
ANION GAP SERPL CALC-SCNC: 12 MEQ/L (ref 8–16)
BUN SERPL-MCNC: 19 MG/DL (ref 8–23)
CALCIUM SERPL-MCNC: 8.7 MG/DL (ref 8.2–9.6)
CHLORIDE SERPL-SCNC: 107 MEQ/L (ref 98–111)
CO2 SERPL-SCNC: 24 MEQ/L (ref 22–29)
CREAT SERPL-MCNC: 1 MG/DL (ref 0.5–0.9)
GFR SERPL CREATININE-BSD FRML MDRD: 50 ML/MIN/1.73M2
GLUCOSE SERPL-MCNC: 99 MG/DL (ref 74–109)
POTASSIUM SERPL-SCNC: 3.8 MEQ/L (ref 3.5–5.2)
SODIUM SERPL-SCNC: 143 MEQ/L (ref 135–145)

## 2025-04-25 PROCEDURE — 36415 COLL VENOUS BLD VENIPUNCTURE: CPT

## 2025-04-25 PROCEDURE — 80048 BASIC METABOLIC PNL TOTAL CA: CPT

## 2025-04-28 NOTE — TELEPHONE ENCOUNTER
----- Message from Dr. Martin Davenport, DO sent at 4/25/2025  4:20 PM EDT -----  Please let pt know that labs look good. No concerning findings.   Ca++ level improved.   Let me know if questions, thanks!

## 2025-05-02 ENCOUNTER — CARE COORDINATION (OUTPATIENT)
Dept: CARE COORDINATION | Age: 89
End: 2025-05-02

## 2025-05-02 NOTE — CARE COORDINATION
Contacted Kristel Welch and left voicemail regarding Dietitian follow up. Left call back number and will follow up as appropriate.         Armida Darling RDN, LD  [phone]

## 2025-05-08 ENCOUNTER — CARE COORDINATION (OUTPATIENT)
Dept: CARE COORDINATION | Age: 89
End: 2025-05-08

## 2025-05-08 NOTE — CARE COORDINATION
Kristel Welch  5/8/2025    Registered Dietitian Progress Note for Care Coordination    Assessment: Kristel is a 89 y.o. female. RD called to follow up with patient today 5/8/25. Patient states she received the additional Ensure coupons in the mail and she is very appreciative. Patient states today for lunch she ate a sloppy peter and right now for an afternoon snack she is having part of an Ensure drink. Patient states she plans to make chicken casserole one day soon for dinner. Patient has no nutrition related questions or concerns at this time. Per chart review, patient had OV with PCP on 4/22/25 and patient's weight documented as 129 lb. RD noted patient's A1C is 5.6% as of 4/12/25.     Follow Up:    RD will call pt in 4 weeks to follow up and answer any nutrition related questions at that time.     Armida Darling RDN, LD  [phone]

## 2025-05-28 ENCOUNTER — CARE COORDINATION (OUTPATIENT)
Dept: CARE COORDINATION | Age: 89
End: 2025-05-28

## 2025-05-28 NOTE — CARE COORDINATION
Ambulatory Care Coordination Note     5/28/2025 9:23 AM     Patient outreach attempt by this ACM today to perform care management follow up . ACM was unable to reach the patient by telephone today;   left voice message requesting a return phone call to this ACM.     ACM: Rupal Griffin, RN     Care Summary Note:     PCP/Specialist follow up:   Future Appointments         Provider Specialty Dept Phone    7/28/2025 1:00 PM (Arrive by 12:50 PM) STR MAMMOGRAPHY RM2  [facility] [phone]    10/23/2025 11:30 AM Jie Multani, APRN - CNP Cardiology [phone]    10/27/2025 1:20 PM Martin Davenport, DO Family Medicine [phone]    3/12/2026 2:15 PM Christopher Hwang MD Cardiology [phone]            Follow Up:   Plan for next ACM outreach in approximately  touch base  to complete:    .

## 2025-05-29 ENCOUNTER — CARE COORDINATION (OUTPATIENT)
Dept: CARE COORDINATION | Age: 89
End: 2025-05-29

## 2025-05-29 NOTE — CARE COORDINATION
Spoke with Kristel.  Screening call for any additional needs or support.  States her blood pressures are running 112/54, 120/53, 105/52.  States she had an injection in her shoulder and now feeling better.  States she is having some hip pain but stable.  Using cane.  Had EDG completed.  Seeing Dr. Paula now.  Denies needs from myself or PCP office at this time.

## 2025-06-02 RX ORDER — ATORVASTATIN CALCIUM 40 MG/1
40 TABLET, FILM COATED ORAL DAILY
Qty: 90 TABLET | Refills: 3 | Status: SHIPPED | OUTPATIENT
Start: 2025-06-02

## 2025-06-05 ENCOUNTER — CARE COORDINATION (OUTPATIENT)
Dept: CARE COORDINATION | Age: 89
End: 2025-06-05

## 2025-06-05 NOTE — CARE COORDINATION
Kristel CALLE Rebekah  6/5/2025    Registered Dietitian Progress Note for Care Coordination    Assessment: Kristel is a 89 y.o. female. RD called to follow up with patient today 6/5/25. Patient states she is doing good. Patient states she is having problems with her hip and using her cane as needed. RD reiterated the importance of eating balanced meals and snacks consistently throughout the day. Discussed incorporating a variety of fruits, vegetables, whole grains, lean protein and low fat dairy. Patient states she bought mushrooms, blueberries and avocados to try. Patient states she is drinking Ensure as needed- RD will mail additional Ensure coupons. Reiterated the importance of watching sodium intake daily. Patient has no nutrition related questions or concerns at this time.       Follow Up:    RD will call pt in 1 month to follow up and answer any nutrition related questions at that time.     Armida Darling RDN, LD  [phone]

## 2025-06-09 ENCOUNTER — CARE COORDINATION (OUTPATIENT)
Dept: CARE COORDINATION | Age: 89
End: 2025-06-09

## 2025-06-09 NOTE — CARE COORDINATION
WALTER received a call from Kristel.  She states she picked up her metoprolol from the pharmacy and it states that her dose is 25mg daily.  The order in the chart and the order that Kristel believes it should be is 12.5mg.  The med rec dose state 12.5mg.  The dose was decreased to 12.5mg on 3-27-25 by Dr. Hwang.      WALTER contacted [facility] pharmacy to confirm the dose that Kristel received.  They stated they did have the wrong dose in their records.  They have fixed it in their system.  Kristel informed.

## 2025-06-13 ENCOUNTER — CARE COORDINATION (OUTPATIENT)
Dept: CARE COORDINATION | Age: 89
End: 2025-06-13

## 2025-06-13 NOTE — CARE COORDINATION
Mailed Ensure coupons to patient per Armida Darling.        Katelyn Sampson [facility]  CC   Medication Assistance  [facility] [place] [facility]    P) [phone]  (F) [phone]

## 2025-06-19 ENCOUNTER — CARE COORDINATION (OUTPATIENT)
Dept: CARE COORDINATION | Age: 89
End: 2025-06-19

## 2025-06-19 NOTE — TELEPHONE ENCOUNTER
Cont lasix and Aldactone  Advise to check weight daily and call office for sudden weight gain (>2 Ib in 2 days, and/or increased swelling)  Monitor BP   Advise not to take metoprolol when SBP is below 100

## 2025-06-19 NOTE — CARE COORDINATION
Kristel More called and wants to report her blood pressures as she wants to make sure that they are not running too low.      Blood pressures the last 5 days are as follows:  -113/55  -101/55  -111/48  -131/57  -94/47    She states she does have some swelling in her bilateral ankles.  States her weight is 128# and she will only fluctuate 1-2 lbs at a time    She just wants to make sure that you are okay with these readings?  Please advise.  Thank you!

## 2025-06-25 DIAGNOSIS — J01.90 ACUTE RHINOSINUSITIS: ICD-10-CM

## 2025-06-25 RX ORDER — FLUTICASONE PROPIONATE 50 MCG
2 SPRAY, SUSPENSION (ML) NASAL DAILY
Qty: 16 G | Refills: 5 | Status: SHIPPED | OUTPATIENT
Start: 2025-06-25

## 2025-06-25 NOTE — TELEPHONE ENCOUNTER
Recent Visits  Date Type Provider Dept   04/22/25 Office Visit Martin Davenport, DO [facility]   01/28/25 Office Visit Martin Davneport, DO [facility]   01/03/25 Office Visit Nikky Banks APRN - CNP [facility]   12/02/24 Office Visit Martin Davenport, DO [facility]   10/22/24 Office Visit Martin Davenport, DO [facility]   09/12/24 Office Visit Martin Davenport, DO [facility]   06/11/24 Office Visit Martin Davenport, DO [facility]   03/11/24 Office Visit Martin aDvenport, DO [facility]   02/26/24 Office Visit Martin Davenport, DO [facility]   Showing recent visits within past 540 days with a meds authorizing provider and meeting all other requirements  Future Appointments  Date Type Provider Dept   10/27/25 Appointment Martin Davenport, DO [facility]   Showing future appointments within next 150 days with a meds authorizing provider and meeting all other requirements

## 2025-06-26 ENCOUNTER — CARE COORDINATION (OUTPATIENT)
Dept: CARE COORDINATION | Age: 89
End: 2025-06-26

## 2025-06-26 NOTE — CARE COORDINATION
Spoke with Kristel.  Clarified the recommendations from Dr. Jones about monitoring blood pressure and to hold metroprolol if SBP below 100.  She verbalized understanding.

## 2025-06-30 ENCOUNTER — CARE COORDINATION (OUTPATIENT)
Dept: CARE COORDINATION | Age: 89
End: 2025-06-30

## 2025-06-30 NOTE — CARE COORDINATION
Kristel More reported her weekend blood pressures and I wanted to update you.  She is concerned they are still running too low.  She states she is holding the metoprolol if SBP is below 100.  Please advise.  Thank you!    Today- 97/42 Sunday- 127/63 Saturday- 98/50    Thank you!

## 2025-06-30 NOTE — CARE COORDINATION
Ambulatory Care Coordination Note     2025 10:51 AM     Patient Current Location:  Home: [address]  [place] [address]     This patient was received as a referral from the patient (self-referred).    Patient contacted the ACM by telephone. Verified name and  with patient as identifiers. Provided introduction to self, and explanation of the ACM role.   Patient accepted care management services at this time.          ACM: Rupal Griffin RN     Challenges to be reviewed by the provider   Additional needs identified to be addressed with provider No                  Method of communication with provider: will message cardiology about updated blood pressures.    Utilization: Initial Call - N/A    Care Summary Note: Received a text from Kristel Humphries is familiar with ACM as I have worked with her in the past and has been updating me on her fluctuating blood pressures  I will re enroll Kristel at this time as she is having continued concerns about her blood pressure running too low  ACM collaborate with cardiology on 25 and med adjustments were made  Kristel is holding metoprolol when SBP is below 100 per his recommendations  She reports today that her blood pressures are running 97/43 today, 127/62 on  and Saturday was 98/50  Will update Dr. Hwang with new blood pressures readings  Will re enroll Kristel at this time to provide better support and health management  She was on RPM in the past and wants to continue self monitoring at this time.    Plan  Re enroll for closer follow up  Report blood pressure readings to Dr. Hwang for any additional recommendations  Reinforce importance of early symptom recognition and reporting to prevent exacerbation and unnecessary hospitalization    Offered patient enrollment in the Remote Patient Monitoring (RPM) program for in-home monitoring: Yes, but did not enroll at this time: already monitoring with home equipment.     Assessments Completed:

## 2025-07-08 ENCOUNTER — CARE COORDINATION (OUTPATIENT)
Dept: CARE COORDINATION | Age: 89
End: 2025-07-08

## 2025-07-08 NOTE — CARE COORDINATION
Ambulatory Care Coordination Note     7/8/2025 1:33 PM     Patient outreach attempt by this ACM today to perform care management follow up . ACM was unable to reach the patient by telephone today;   left voice message requesting a return phone call to this ACM.     ACM: Rupal Griffin, RN     Care Summary Note:     PCP/Specialist follow up:   Future Appointments         Provider Specialty Dept Phone    7/28/2025 1:00 PM (Arrive by 12:50 PM) STR MAMMOGRAPHY RM2  [facility] [phone]    10/23/2025 11:30 AM Thea Hewitt, APRN - CNP Cardiology [phone]    10/27/2025 1:20 PM Martin Davenport, DO Family Medicine [phone]    3/12/2026 2:15 PM Christopher Hwang MD Cardiology [phone]            Follow Up:   Plan for next ACM outreach in approximately 1 week to complete:  - disease specific assessments  - medication review  - goal progression  - education .

## 2025-07-09 ENCOUNTER — CARE COORDINATION (OUTPATIENT)
Dept: CARE COORDINATION | Age: 89
End: 2025-07-09

## 2025-07-09 NOTE — CARE COORDINATION
Ambulatory Care Coordination Note     2025 9:06 AM     Patient Current Location:  Home: [address]  [place] [address]     ACM contacted the patient by telephone. Verified name and  with patient as identifiers.         ACM: Rupal Griffin RN     Challenges to be reviewed by the provider   Additional needs identified to be addressed with provider No                  Method of communication with provider: none.    Utilization: Patient has not had any utilization since our last call.     Care Summary Note: Received a text message from Kristel  Update blood pressure readings since holding med per cardiology  Readings appear to be running much better  118/54  116/53  108/51  113/55  131/57  119/50  Kristel also received a letter in the mail which appeared to be \"scam\" mail  Informed her to show her friend, Chasity who helps take care of her but that it appears to be a \"scam\" from what I can see    Plan  Reinforce education completed  Continue with blood pressure monitoring and reporting  Collaborate with cardiology as needed   Reinforce importance of early symptom recognition and reporting to prevent exacerbation and unnecessary hospitalization    Offered patient enrollment in the Remote Patient Monitoring (RPM) program for in-home monitoring: Yes, but did not enroll at this time: already monitoring with home equipment.     Assessments Completed:   General Assessment    Do you have any symptoms that are causing concern?: Yes  Progression since Onset: Gradually Improving  Reported Symptoms: Other (Comment: blood pressure fluctations)          Medications Reviewed:   Patient denies any changes with medications and reports taking all medications as prescribed.    Advance Care Planning:   On file     Care Planning:    Goals Addressed                      This Visit's Progress      Conditions and Symptoms (pt-stated)   On track      I will schedule office visits, as directed by my provider.  I will keep my

## 2025-07-10 ENCOUNTER — CARE COORDINATION (OUTPATIENT)
Dept: CARE COORDINATION | Age: 89
End: 2025-07-10

## 2025-07-10 NOTE — CARE COORDINATION
Kristel Welch  7/10/2025    Registered Dietitian Progress Note for Care Coordination    Assessment: Kristel is a 89 y.o. female. RD called to follow up with patient today 7/10/25. Patient states she received the additional Ensure coupons in the mail and she is very appreciative. Patient states her BP was running low but her medications were adjusted and now her BP is running normal. Patient states she is trying her best to eat healthy. Examples provided include eggs, turkey sausage, yellow pepper, onions, whole wheat toast, peanut butter, low fat cream cheese, mashed potatoes, broccoli, banana, oranges, sauerkraut, Spiritism bread. RD reviewed the importance of moderation and portion sizes. Reviewed the components of a balanced meal using MyPlate. Discussed incorporating a variety of fruits, vegetables, whole grains, lean protein and low fat dairy. Recommended browsing American [employer] Association for heart healthy recipes- RD sent link to patient. Patient has no nutrition related questions or concerns at this time.     Follow Up:    RD will call pt in 1 month to follow up and answer any nutrition related questions at that time.     Armida Darling RDN, LD  [phone]

## 2025-07-28 ENCOUNTER — RESULTS FOLLOW-UP (OUTPATIENT)
Dept: FAMILY MEDICINE CLINIC | Age: 89
End: 2025-07-28

## 2025-07-28 ENCOUNTER — CARE COORDINATION (OUTPATIENT)
Dept: CARE COORDINATION | Age: 89
End: 2025-07-28

## 2025-07-28 ENCOUNTER — HOSPITAL ENCOUNTER (OUTPATIENT)
Dept: WOMENS IMAGING | Age: 89
Discharge: HOME | End: 2025-07-28
Payer: MEDICARE

## 2025-07-28 VITALS — BODY MASS INDEX: 25.39 KG/M2 | WEIGHT: 130 LBS

## 2025-07-28 DIAGNOSIS — Z12.31 VISIT FOR SCREENING MAMMOGRAM: ICD-10-CM

## 2025-07-28 PROCEDURE — 77063 BREAST TOMOSYNTHESIS BI: CPT

## 2025-07-28 NOTE — CARE COORDINATION
Ambulatory Care Coordination Note     2025 10:22 AM     Patient Current Location:  Home: [address]  [place] [address]     Patient contacted the ACM by telephone. Verified name and  with patient as identifiers.         ACM: Rupal Griffin RN     Challenges to be reviewed by the provider   Additional needs identified to be addressed with provider No                  Method of communication with provider: none.    Utilization: Patient has not had any utilization since our last call.     Care Summary Note: Received a message from Kristel  Doing well for review  States weight is down 3#  She has been increasing her activity level but walking more around her home  Looking to purchase pedometer to track her steps  Completed EGD  States she has been having a little more gas and burping since procedure  She will be starting balance class at [facility] from 25 until 10-11 2 days weekly  Has mammogram today at 1  Weight is running 130#  Blood pressure 100/56, 116/55, 106.51, 119/56 with pulse running in the 60's  Blood sugar 95    Offered patient enrollment in the Remote Patient Monitoring (RPM) program for in-home monitoring: Yes, but did not enroll at this time: already monitoring with home equipment.     Assessments Completed:   No changes since last call    Medications Reviewed:   Patient denies any changes with medications and reports taking all medications as prescribed.    Advance Care Planning:   Not reviewed during this call     Care Planning:    Goals Addressed    None          PCP/Specialist follow up:   Future Appointments         Provider Specialty Dept Phone    2025 1:00 PM (Arrive by 12:50 PM) STR MAMMOGRAPHY RM2  [facility] Radiology [phone]    10/23/2025 11:30 AM Thea Hewitt, APRN - CNP Cardiology [phone]    10/27/2025 1:20 PM Martin Davenport DO Family Medicine [phone]    3/12/2026 2:15 PM Christopher Hwang MD Cardiology [phone]            Follow Up:   Plan

## 2025-07-29 NOTE — TELEPHONE ENCOUNTER
----- Message from Dr. Martin Davenport, DO sent at 7/28/2025  8:31 PM EDT -----  Please let pt know that mammogram is normal. Let me know if questions, thanks!

## 2025-07-31 ENCOUNTER — CARE COORDINATION (OUTPATIENT)
Dept: CARE COORDINATION | Age: 89
End: 2025-07-31

## 2025-07-31 DIAGNOSIS — I50.22 CHRONIC HFREF (HEART FAILURE WITH REDUCED EJECTION FRACTION) (HCC): ICD-10-CM

## 2025-07-31 DIAGNOSIS — I10 ESSENTIAL HYPERTENSION: ICD-10-CM

## 2025-07-31 DIAGNOSIS — E03.9 HYPOTHYROIDISM, UNSPECIFIED TYPE: ICD-10-CM

## 2025-07-31 DIAGNOSIS — N39.0 RECURRENT UTI: Primary | ICD-10-CM

## 2025-07-31 RX ORDER — THYROID 30 MG/1
30 TABLET ORAL DAILY
Qty: 90 TABLET | Refills: 3 | Status: SHIPPED | OUTPATIENT
Start: 2025-07-31

## 2025-07-31 NOTE — CARE COORDINATION
Dr. Davenport,       Called Kristel to clarify dose.  She states yes, she is taking 500mg capsules and is taking 3 per day for a total of 1500mg daily.

## 2025-07-31 NOTE — CARE COORDINATION
Dr. Davenport,       Received a text message from Kristel.  She states \" I have been taking 3 capsules, 500mg per day of D-Mannose.  My friend said it is also sold in powder form.  I have 2 capsules left and was wondering if I could take the powder form instead of the capsules?  My friend said she would deliver it to me at [facility].  If doctor agrees, would I take 1 tsp?\"    Please advise.  Thank you!

## 2025-08-11 ENCOUNTER — CARE COORDINATION (OUTPATIENT)
Dept: CARE COORDINATION | Age: 89
End: 2025-08-11

## 2025-08-12 ENCOUNTER — CARE COORDINATION (OUTPATIENT)
Dept: CARE COORDINATION | Age: 89
End: 2025-08-12

## 2025-08-18 ENCOUNTER — LAB (OUTPATIENT)
Dept: LAB | Age: 89
End: 2025-08-18

## 2025-08-18 ENCOUNTER — CARE COORDINATION (OUTPATIENT)
Dept: CARE COORDINATION | Age: 89
End: 2025-08-18

## 2025-08-18 DIAGNOSIS — R30.0 DYSURIA: ICD-10-CM

## 2025-08-18 DIAGNOSIS — R30.0 DYSURIA: Primary | ICD-10-CM

## 2025-08-18 LAB
BACTERIA: ABNORMAL
BILIRUB UR QL STRIP: NEGATIVE
CASTS #/AREA URNS LPF: ABNORMAL /LPF
CASTS #/AREA URNS LPF: ABNORMAL /LPF
CHARACTER UR: CLEAR
CHARCOAL URNS QL MICRO: ABNORMAL
COLOR UR: YELLOW
CRYSTALS URNS QL MICRO: ABNORMAL
EPITHELIAL CELLS, UA: ABNORMAL /HPF
GLUCOSE UR QL STRIP.AUTO: NEGATIVE MG/DL
HGB UR QL STRIP.AUTO: ABNORMAL
KETONES UR QL STRIP.AUTO: NEGATIVE
LEUKOCYTE ESTERASE UR QL STRIP.AUTO: ABNORMAL
NITRITE UR QL STRIP.AUTO: NEGATIVE
PH UR STRIP.AUTO: 6 [PH] (ref 5–9)
PROT UR STRIP.AUTO-MCNC: NEGATIVE MG/DL
RBC #/AREA URNS HPF: ABNORMAL /HPF
RENAL EPI CELLS #/AREA URNS HPF: ABNORMAL /[HPF]
SPECIFIC GRAVITY UA: 1.01 (ref 1–1.03)
UROBILINOGEN, URINE: 0.2 EU/DL (ref 0–1)
WBC #/AREA URNS HPF: ABNORMAL /HPF
YEAST LIKE FUNGI URNS QL MICRO: ABNORMAL

## 2025-08-19 ENCOUNTER — HOSPITAL ENCOUNTER (EMERGENCY)
Age: 89
Discharge: HOME | End: 2025-08-19
Attending: EMERGENCY MEDICINE
Payer: MEDICARE

## 2025-08-19 ENCOUNTER — TELEPHONE (OUTPATIENT)
Dept: FAMILY MEDICINE CLINIC | Age: 89
End: 2025-08-19

## 2025-08-19 ENCOUNTER — CARE COORDINATION (OUTPATIENT)
Dept: CARE COORDINATION | Age: 89
End: 2025-08-19

## 2025-08-19 VITALS
TEMPERATURE: 97.8 F | OXYGEN SATURATION: 96 % | BODY MASS INDEX: 25.13 KG/M2 | SYSTOLIC BLOOD PRESSURE: 150 MMHG | HEART RATE: 70 BPM | HEIGHT: 60 IN | RESPIRATION RATE: 18 BRPM | WEIGHT: 128 LBS | DIASTOLIC BLOOD PRESSURE: 69 MMHG

## 2025-08-19 DIAGNOSIS — N39.0 URINARY TRACT INFECTION WITHOUT HEMATURIA, SITE UNSPECIFIED: ICD-10-CM

## 2025-08-19 DIAGNOSIS — I10 HYPERTENSION, UNSPECIFIED TYPE: Primary | ICD-10-CM

## 2025-08-19 LAB
BACTERIA URNS QL MICRO: ABNORMAL /HPF
BILIRUB UR QL STRIP.AUTO: NEGATIVE
CASTS #/AREA URNS LPF: ABNORMAL /LPF
CASTS 2: ABNORMAL /LPF
CHARACTER UR: CLEAR
COLOR, UA: YELLOW
CRYSTALS URNS MICRO: ABNORMAL
EKG ATRIAL RATE: 82 BPM
EKG P AXIS: 80 DEGREES
EKG P-R INTERVAL: 166 MS
EKG Q-T INTERVAL: 382 MS
EKG QRS DURATION: 82 MS
EKG QTC CALCULATION (BAZETT): 446 MS
EKG R AXIS: -60 DEGREES
EKG T AXIS: 76 DEGREES
EKG VENTRICULAR RATE: 82 BPM
EPITHELIAL CELLS, UA: ABNORMAL /HPF
GLUCOSE UR QL STRIP.AUTO: NEGATIVE MG/DL
HGB UR QL STRIP.AUTO: ABNORMAL
KETONES UR QL STRIP.AUTO: NEGATIVE
MISCELLANEOUS 2: ABNORMAL
NITRITE UR QL STRIP: NEGATIVE
PH UR STRIP.AUTO: 8.5 [PH] (ref 5–9)
PROT UR STRIP.AUTO-MCNC: NEGATIVE MG/DL
RBC URINE: ABNORMAL /HPF
RENAL EPI CELLS #/AREA URNS HPF: ABNORMAL /[HPF]
SP GR UR REFRACT.AUTO: 1.01 (ref 1–1.03)
UROBILINOGEN, URINE: 0.2 EU/DL (ref 0–1)
WBC #/AREA URNS HPF: ABNORMAL /HPF
WBC #/AREA URNS HPF: ABNORMAL /[HPF]
YEAST LIKE FUNGI URNS QL MICRO: ABNORMAL

## 2025-08-19 PROCEDURE — 93005 ELECTROCARDIOGRAM TRACING: CPT | Performed by: EMERGENCY MEDICINE

## 2025-08-19 PROCEDURE — 81001 URINALYSIS AUTO W/SCOPE: CPT

## 2025-08-19 PROCEDURE — 87086 URINE CULTURE/COLONY COUNT: CPT

## 2025-08-19 PROCEDURE — 6370000000 HC RX 637 (ALT 250 FOR IP): Performed by: EMERGENCY MEDICINE

## 2025-08-19 PROCEDURE — 99284 EMERGENCY DEPT VISIT MOD MDM: CPT

## 2025-08-19 RX ORDER — CEPHALEXIN 500 MG/1
500 CAPSULE ORAL 2 TIMES DAILY
Qty: 14 CAPSULE | Refills: 0 | Status: SHIPPED | OUTPATIENT
Start: 2025-08-19 | End: 2025-08-26

## 2025-08-19 RX ADMIN — CEPHALEXIN 250 MG: 250 CAPSULE ORAL at 13:28

## 2025-08-19 RX ADMIN — CEPHALEXIN 250 MG: 250 CAPSULE ORAL at 13:06

## 2025-08-19 ASSESSMENT — ENCOUNTER SYMPTOMS
BACK PAIN: 0
VOICE CHANGE: 0
EYE REDNESS: 0
DIARRHEA: 0
CHOKING: 0
EYE PAIN: 0
EYE ITCHING: 0
PHOTOPHOBIA: 0
ABDOMINAL PAIN: 0
SINUS PRESSURE: 0
CHEST TIGHTNESS: 0
NAUSEA: 0
SORE THROAT: 0
TROUBLE SWALLOWING: 0
ABDOMINAL DISTENTION: 0
VOMITING: 0
CONSTIPATION: 0
BLOOD IN STOOL: 0
RHINORRHEA: 0
COUGH: 0
SHORTNESS OF BREATH: 0
EYE DISCHARGE: 0
WHEEZING: 0

## 2025-08-19 ASSESSMENT — PAIN - FUNCTIONAL ASSESSMENT: PAIN_FUNCTIONAL_ASSESSMENT: 0-10

## 2025-08-19 ASSESSMENT — PAIN DESCRIPTION - LOCATION: LOCATION: HEAD

## 2025-08-19 ASSESSMENT — PAIN SCALES - GENERAL: PAINLEVEL_OUTOF10: 1

## 2025-08-20 ENCOUNTER — CARE COORDINATION (OUTPATIENT)
Dept: CARE COORDINATION | Age: 89
End: 2025-08-20

## 2025-08-20 LAB
BACTERIA UR CULT: ABNORMAL
BACTERIA UR CULT: ABNORMAL
ORGANISM: ABNORMAL
ORGANISM: ABNORMAL

## 2025-08-20 RX ORDER — OMEPRAZOLE 40 MG/1
CAPSULE, DELAYED RELEASE ORAL
COMMUNITY
Start: 2025-06-15

## 2025-08-21 ENCOUNTER — OFFICE VISIT (OUTPATIENT)
Dept: FAMILY MEDICINE CLINIC | Age: 89
End: 2025-08-21

## 2025-08-21 VITALS
RESPIRATION RATE: 16 BRPM | TEMPERATURE: 97.7 F | OXYGEN SATURATION: 98 % | BODY MASS INDEX: 24.7 KG/M2 | HEIGHT: 60 IN | HEART RATE: 79 BPM | DIASTOLIC BLOOD PRESSURE: 82 MMHG | SYSTOLIC BLOOD PRESSURE: 136 MMHG | WEIGHT: 125.8 LBS

## 2025-08-21 DIAGNOSIS — N39.0 ACUTE UTI: ICD-10-CM

## 2025-08-21 DIAGNOSIS — M54.2 CERVICALGIA: ICD-10-CM

## 2025-08-21 DIAGNOSIS — N18.31 STAGE 3A CHRONIC KIDNEY DISEASE (HCC): ICD-10-CM

## 2025-08-21 DIAGNOSIS — I10 HYPERTENSION, ESSENTIAL: Primary | Chronic | ICD-10-CM

## 2025-08-22 ENCOUNTER — CARE COORDINATION (OUTPATIENT)
Dept: CARE COORDINATION | Age: 89
End: 2025-08-22

## 2025-08-25 ENCOUNTER — CARE COORDINATION (OUTPATIENT)
Dept: CARE COORDINATION | Age: 89
End: 2025-08-25

## 2025-08-27 ENCOUNTER — CARE COORDINATION (OUTPATIENT)
Dept: CARE COORDINATION | Age: 89
End: 2025-08-27

## 2025-08-28 DIAGNOSIS — Z95.820 S/P ANGIOPLASTY WITH STENT: Chronic | ICD-10-CM

## 2025-08-28 RX ORDER — CLOPIDOGREL BISULFATE 75 MG/1
75 TABLET ORAL DAILY
Qty: 90 TABLET | Refills: 2 | Status: SHIPPED | OUTPATIENT
Start: 2025-08-28